# Patient Record
Sex: MALE | Race: WHITE | NOT HISPANIC OR LATINO | Employment: OTHER | URBAN - METROPOLITAN AREA
[De-identification: names, ages, dates, MRNs, and addresses within clinical notes are randomized per-mention and may not be internally consistent; named-entity substitution may affect disease eponyms.]

---

## 2017-01-02 ENCOUNTER — NURSE TRIAGE (OUTPATIENT)
Dept: ADMINISTRATIVE | Facility: CLINIC | Age: 72
End: 2017-01-02

## 2017-01-02 NOTE — TELEPHONE ENCOUNTER
Reason for Disposition   [1] Continuous (nonstop) coughing interferes with work or school AND [2] no improvement using cough treatment per protocol    Protocols used: ST COUGH - ACUTE NON-PRODUCTIVE-A-AH    Lenny is calling to report that he has been having a cough with congestion.  Hx of COPD.  Is using inhaler and it is helping.  Requesting appointment for tomorrow states may need steroid shot.  Advised will go to ER if sob gets worse or is not helped per inhaler.  Appointment scheduled for tomorrow.

## 2017-01-03 ENCOUNTER — OFFICE VISIT (OUTPATIENT)
Dept: FAMILY MEDICINE | Facility: CLINIC | Age: 72
End: 2017-01-03
Payer: MEDICARE

## 2017-01-03 VITALS
SYSTOLIC BLOOD PRESSURE: 113 MMHG | OXYGEN SATURATION: 96 % | DIASTOLIC BLOOD PRESSURE: 59 MMHG | TEMPERATURE: 98 F | BODY MASS INDEX: 35.56 KG/M2 | WEIGHT: 254 LBS | HEIGHT: 71 IN | HEART RATE: 68 BPM

## 2017-01-03 DIAGNOSIS — J44.1 COPD EXACERBATION: ICD-10-CM

## 2017-01-03 DIAGNOSIS — R05.8 PRODUCTIVE COUGH: ICD-10-CM

## 2017-01-03 DIAGNOSIS — R06.2 WHEEZING: ICD-10-CM

## 2017-01-03 DIAGNOSIS — J01.00 ACUTE MAXILLARY SINUSITIS, RECURRENCE NOT SPECIFIED: Primary | ICD-10-CM

## 2017-01-03 PROCEDURE — 99499 UNLISTED E&M SERVICE: CPT | Mod: S$GLB,,, | Performed by: PHYSICIAN ASSISTANT

## 2017-01-03 PROCEDURE — 1159F MED LIST DOCD IN RCRD: CPT | Mod: S$GLB,,, | Performed by: PHYSICIAN ASSISTANT

## 2017-01-03 PROCEDURE — 1125F AMNT PAIN NOTED PAIN PRSNT: CPT | Mod: S$GLB,,, | Performed by: PHYSICIAN ASSISTANT

## 2017-01-03 PROCEDURE — 1157F ADVNC CARE PLAN IN RCRD: CPT | Mod: S$GLB,,, | Performed by: PHYSICIAN ASSISTANT

## 2017-01-03 PROCEDURE — 99213 OFFICE O/P EST LOW 20 MIN: CPT | Mod: 25,S$GLB,, | Performed by: PHYSICIAN ASSISTANT

## 2017-01-03 PROCEDURE — 99999 PR PBB SHADOW E&M-EST. PATIENT-LVL IV: CPT | Mod: PBBFAC,,, | Performed by: PHYSICIAN ASSISTANT

## 2017-01-03 PROCEDURE — 3074F SYST BP LT 130 MM HG: CPT | Mod: S$GLB,,, | Performed by: PHYSICIAN ASSISTANT

## 2017-01-03 PROCEDURE — 1160F RVW MEDS BY RX/DR IN RCRD: CPT | Mod: S$GLB,,, | Performed by: PHYSICIAN ASSISTANT

## 2017-01-03 PROCEDURE — 3078F DIAST BP <80 MM HG: CPT | Mod: S$GLB,,, | Performed by: PHYSICIAN ASSISTANT

## 2017-01-03 PROCEDURE — 96372 THER/PROPH/DIAG INJ SC/IM: CPT | Mod: 59,S$GLB,, | Performed by: PHYSICIAN ASSISTANT

## 2017-01-03 RX ORDER — AMOXICILLIN AND CLAVULANATE POTASSIUM 875; 125 MG/1; MG/1
1 TABLET, FILM COATED ORAL 2 TIMES DAILY
Qty: 20 TABLET | Refills: 0 | Status: SHIPPED | OUTPATIENT
Start: 2017-01-03 | End: 2017-01-13

## 2017-01-03 RX ORDER — PREDNISONE 10 MG/1
TABLET ORAL
Qty: 12 TABLET | Refills: 0 | Status: SHIPPED | OUTPATIENT
Start: 2017-01-03 | End: 2017-01-26

## 2017-01-03 RX ORDER — BETAMETHASONE SODIUM PHOSPHATE AND BETAMETHASONE ACETATE 3; 3 MG/ML; MG/ML
6 INJECTION, SUSPENSION INTRA-ARTICULAR; INTRALESIONAL; INTRAMUSCULAR; SOFT TISSUE
Status: COMPLETED | OUTPATIENT
Start: 2017-01-03 | End: 2017-01-03

## 2017-01-03 RX ADMIN — BETAMETHASONE SODIUM PHOSPHATE AND BETAMETHASONE ACETATE 6 MG: 3; 3 INJECTION, SUSPENSION INTRA-ARTICULAR; INTRALESIONAL; INTRAMUSCULAR; SOFT TISSUE at 08:01

## 2017-01-03 NOTE — MR AVS SNAPSHOT
[facility]  [address]  [place] [address]  Phone: [phone]  Fax: [phone]                  Lenny Lopez   1/3/2017 8:00 AM   Office Visit    Description:  Male : 1945   Provider:  Kandace Davidson PA-C   Department:  [facility]           Reason for Visit     Cough           Diagnoses this Visit        Comments    Acute maxillary sinusitis, recurrence not specified    -  Primary     COPD exacerbation         Productive cough         Wheezing                To Do List           Future Appointments        Provider Department Dept Phone    2017 8:00 AM Kandace Davidson PA-C [facility] Medicine [phone]    2017 10:00 AM Candace Villegas DO [place] [phone]    2017 9:00 AM [facility] [phone]    3/8/2017 1:30 PM Aaron Armenta MD [facility] Cardiology [phone]      Goals (5 Years of Data)     None       These Medications        Disp Refills Start End    amoxicillin-clavulanate 875-125mg (AUGMENTIN) 875-125 mg per tablet 20 tablet 0 1/3/2017 2017    Take 1 tablet by mouth 2 (two) times daily. - Oral    Pharmacy: [facility] [address] [place] [address]. Ph #: [phone]       predniSONE (DELTASONE) 10 MG tablet 12 tablet 0 1/3/2017     2 tabs x 3 days then 1 tab x 3 days then 1/2 tab x 3 days    Pharmacy: [facility] [address] [place] [address]. Ph #: [phone]         [facility]     [facility] -  Assistance  Registered nurses in the [facility] provide clinical advisement, health education, appointment booking, and other advisory services.  Call for this free service at [phone].             Medications           Message regarding Medications     Verify the changes and/or additions to your medication regime listed below are the same as discussed with your  clinician today.  If any of these changes or additions are incorrect, please notify your healthcare provider.        START taking these NEW medications        Refills    amoxicillin-clavulanate 875-125mg (AUGMENTIN) 875-125 mg per tablet 0    Sig: Take 1 tablet by mouth 2 (two) times daily.    Class: Normal    Route: Oral    predniSONE (DELTASONE) 10 MG tablet 0    Si tabs x 3 days then 1 tab x 3 days then 1/2 tab x 3 days    Class: Normal      These medications were administered today        Dose Freq    betamethasone acetate-betamethasone sodium phosphate injection 6 mg 6 mg Clinic/HOD 1 time    Sig: Inject 1 mL (6 mg total) into the muscle one time.    Class: Normal    Route: Intramuscular    Cosign for Ordering: Required by Blue Shah MD           Verify that the below list of medications is an accurate representation of the medications you are currently taking.  If none reported, the list may be blank. If incorrect, please contact your healthcare provider. Carry this list with you in case of emergency.           Current Medications     albuterol 90 mcg/actuation inhaler Inhale 2 puffs into the lungs every 6 (six) hours as needed. Ventolin , medical necessary    aspirin 81 mg Tab Take 1 tablet by mouth once daily. Every day PRN    blood sugar diagnostic Strp 1 strip by Misc.(Non-Drug; Combo Route) route 3 (three) times daily.    BREO ELLIPTA 200-25 mcg/dose DsDv diskus inhaler Inhale 1 puff into the lungs once daily.    DYMISTA 137-50 mcg/spray Spry nassal spray 1 spray by Each Nare route 2 (two) times daily as needed.    lancets (FREESTYLE LANCETS) 28 gauge Misc 1 lancet by Misc.(Non-Drug; Combo Route) route 3 (three) times daily.    lidocaine-prilocaine (EMLA) cream Apply topically as needed.    lisinopril 10 MG tablet Take 1 tablet (10 mg total) by mouth every evening.    loratadine (CLARITIN) 10 mg tablet Take 10 mg by mouth once daily.    MAGNESIUM ORAL Take by mouth 2 (two) times daily.    omega-3  "fatty acids-vitamin E (FISH OIL) 1,000 mg Cap Three times a day    simvastatin (ZOCOR) 20 MG tablet Take 1 tablet (20 mg total) by mouth once daily.    spironolactone (ALDACTONE) 50 MG tablet Take 1 tablet (50 mg total) by mouth once daily.    turmeric root extract 500 mg Cap Take 1 capsule by mouth once daily.    amoxicillin-clavulanate 875-125mg (AUGMENTIN) 875-125 mg per tablet Take 1 tablet by mouth 2 (two) times daily.    predniSONE (DELTASONE) 10 MG tablet 2 tabs x 3 days then 1 tab x 3 days then 1/2 tab x 3 days           Clinical Reference Information           Vital Signs - Last Recorded  Most recent update: 1/3/2017  8:09 AM by Kandace Davidson PA-C    BP Pulse Temp Ht    (!) 113/59 (BP Location: Left arm, Patient Position: Sitting, BP Method: Automatic) 68 98.4 °F (36.9 °C) (Oral) 5' 11" (1.803 m)    Wt SpO2 BMI    115.2 kg (253 lb 15.5 oz) 96% 35.42 kg/m2      Blood Pressure          Most Recent Value    BP  (!)  113/59      Allergies as of 1/3/2017     No Known Drug Allergies      Immunizations Administered on Date of Encounter - 1/3/2017     None      Administrations This Visit     betamethasone acetate-betamethasone sodium phosphate injection 6 mg     Admin Date Action Dose Route Administered By             01/03/2017 Given 6 mg Intramuscular Snow Bermudez LPN                      Instructions    Take antibiotics with food.  Increase fluid intake.  Call the clinic if symptoms worsen, new symptoms develop or if you are not any better after completion of your antibiotics.           "

## 2017-01-03 NOTE — PROGRESS NOTES
Patient identified using name and . VIS given to patient and procedure was explained. Patient verbalized understanding. Celestone 1ml administered IM in the right upper outer quad gluteus using sterile technique. No residual bleeding noted. Patient tolerated procedure well.

## 2017-01-03 NOTE — PATIENT INSTRUCTIONS
Take antibiotics with food.  Increase fluid intake.  Call the clinic if symptoms worsen, new symptoms develop or if you are not any better after completion of your antibiotics.

## 2017-01-03 NOTE — PROGRESS NOTES
Subjective:       Patient ID: Lenny Lopez is a 71 y.o. male.    Chief Complaint: Cough (nasal drainage, pain when cough)    Cough   This is a recurrent problem. The current episode started 1 to 4 weeks ago. The problem has been gradually worsening. The problem occurs every few minutes. The cough is productive of purulent sputum. Associated symptoms include nasal congestion, postnasal drip, rhinorrhea, shortness of breath and wheezing. Pertinent negatives include no chest pain, chills, fever, headaches, heartburn, hemoptysis, sore throat or sweats. Nothing aggravates the symptoms. He has tried a beta-agonist inhaler (albuteral inhaler, breathing treatments) for the symptoms. The treatment provided no relief. His past medical history is significant for bronchitis, COPD and environmental allergies.     Review of Systems   Constitutional: Positive for activity change and appetite change. Negative for chills and fever.   HENT: Positive for congestion, postnasal drip, rhinorrhea and sinus pressure. Negative for sore throat.    Eyes: Negative for visual disturbance.   Respiratory: Positive for cough, shortness of breath and wheezing. Negative for hemoptysis.    Cardiovascular: Negative for chest pain.   Gastrointestinal: Negative for abdominal distention, abdominal pain and heartburn.   Allergic/Immunologic: Positive for environmental allergies.   Neurological: Negative for headaches.   Hematological: Negative for adenopathy.   Psychiatric/Behavioral: The patient is not nervous/anxious.        Objective:      Physical Exam   Constitutional: He is oriented to person, place, and time.   HENT:   Mouth/Throat: No oropharyngeal exudate.   Posterior oropharynx erythematous with post nasal drip present  Maxillary sinuses TTP  Nasal turbinates edematous    Eyes: Conjunctivae are normal. Pupils are equal, round, and reactive to light.   Cardiovascular: Normal rate and regular rhythm.    Pulmonary/Chest: Effort normal. He has  wheezes.   Scattered wheezes  Decreased breath sounds (chronic due to COPD)   Abdominal: Soft. Bowel sounds are normal.   Musculoskeletal: He exhibits no edema.   Lymphadenopathy:     He has no cervical adenopathy.   Neurological: He is alert and oriented to person, place, and time.   Skin: No erythema.   Psychiatric: His behavior is normal.       Assessment:       1. Acute maxillary sinusitis, recurrence not specified    2. COPD exacerbation    3. Productive cough    4. Wheezing        Plan:   Lenny was seen today for cough.    Diagnoses and all orders for this visit:    Acute maxillary sinusitis, recurrence not specified  -     betamethasone acetate-betamethasone sodium phosphate injection 6 mg; Inject 1 mL (6 mg total) into the muscle one time.  -     amoxicillin-clavulanate 875-125mg (AUGMENTIN) 875-125 mg per tablet; Take 1 tablet by mouth 2 (two) times daily.  -     predniSONE (DELTASONE) 10 MG tablet; 2 tabs x 3 days then 1 tab x 3 days then 1/2 tab x 3 days    COPD exacerbation  -     betamethasone acetate-betamethasone sodium phosphate injection 6 mg; Inject 1 mL (6 mg total) into the muscle one time.  -     amoxicillin-clavulanate 875-125mg (AUGMENTIN) 875-125 mg per tablet; Take 1 tablet by mouth 2 (two) times daily.  -     predniSONE (DELTASONE) 10 MG tablet; 2 tabs x 3 days then 1 tab x 3 days then 1/2 tab x 3 days    Productive cough  -     betamethasone acetate-betamethasone sodium phosphate injection 6 mg; Inject 1 mL (6 mg total) into the muscle one time.  -     amoxicillin-clavulanate 875-125mg (AUGMENTIN) 875-125 mg per tablet; Take 1 tablet by mouth 2 (two) times daily.  -     predniSONE (DELTASONE) 10 MG tablet; 2 tabs x 3 days then 1 tab x 3 days then 1/2 tab x 3 days    Wheezing  -     betamethasone acetate-betamethasone sodium phosphate injection 6 mg; Inject 1 mL (6 mg total) into the muscle one time.  -     predniSONE (DELTASONE) 10 MG tablet; 2 tabs x 3 days then 1 tab x 3 days then 1/2  tab x 3 days    Take antibiotics with food.  Increase fluid intake.  Call the clinic if symptoms worsen, new symptoms develop or if you are not any better after completion of your antibiotics.    Follow up in 7-10 days or sooner if needed  Patient advised to call clinic if no improvement in next 48 hours so a CXR can be ordered. Patient verbalized understanding.

## 2017-01-09 ENCOUNTER — DOCUMENTATION ONLY (OUTPATIENT)
Dept: FAMILY MEDICINE | Facility: CLINIC | Age: 72
End: 2017-01-09

## 2017-01-12 ENCOUNTER — TELEPHONE (OUTPATIENT)
Dept: FAMILY MEDICINE | Facility: CLINIC | Age: 72
End: 2017-01-12

## 2017-01-12 ENCOUNTER — OFFICE VISIT (OUTPATIENT)
Dept: FAMILY MEDICINE | Facility: CLINIC | Age: 72
End: 2017-01-12
Payer: MEDICARE

## 2017-01-12 VITALS
WEIGHT: 252.19 LBS | SYSTOLIC BLOOD PRESSURE: 116 MMHG | OXYGEN SATURATION: 96 % | DIASTOLIC BLOOD PRESSURE: 68 MMHG | BODY MASS INDEX: 35.31 KG/M2 | HEIGHT: 71 IN | TEMPERATURE: 98 F | RESPIRATION RATE: 12 BRPM

## 2017-01-12 DIAGNOSIS — J44.9 CHRONIC OBSTRUCTIVE PULMONARY DISEASE, UNSPECIFIED COPD TYPE: ICD-10-CM

## 2017-01-12 DIAGNOSIS — I10 ESSENTIAL HYPERTENSION: Primary | ICD-10-CM

## 2017-01-12 PROCEDURE — 1160F RVW MEDS BY RX/DR IN RCRD: CPT | Mod: S$GLB,,, | Performed by: PHYSICIAN ASSISTANT

## 2017-01-12 PROCEDURE — 99213 OFFICE O/P EST LOW 20 MIN: CPT | Mod: S$GLB,,, | Performed by: PHYSICIAN ASSISTANT

## 2017-01-12 PROCEDURE — 3078F DIAST BP <80 MM HG: CPT | Mod: S$GLB,,, | Performed by: PHYSICIAN ASSISTANT

## 2017-01-12 PROCEDURE — 99999 PR PBB SHADOW E&M-EST. PATIENT-LVL III: CPT | Mod: PBBFAC,,, | Performed by: PHYSICIAN ASSISTANT

## 2017-01-12 PROCEDURE — 3074F SYST BP LT 130 MM HG: CPT | Mod: S$GLB,,, | Performed by: PHYSICIAN ASSISTANT

## 2017-01-12 PROCEDURE — 99499 UNLISTED E&M SERVICE: CPT | Mod: S$GLB,,, | Performed by: PHYSICIAN ASSISTANT

## 2017-01-12 PROCEDURE — 1157F ADVNC CARE PLAN IN RCRD: CPT | Mod: S$GLB,,, | Performed by: PHYSICIAN ASSISTANT

## 2017-01-12 PROCEDURE — 1159F MED LIST DOCD IN RCRD: CPT | Mod: S$GLB,,, | Performed by: PHYSICIAN ASSISTANT

## 2017-01-12 PROCEDURE — 1126F AMNT PAIN NOTED NONE PRSNT: CPT | Mod: S$GLB,,, | Performed by: PHYSICIAN ASSISTANT

## 2017-01-12 NOTE — PATIENT INSTRUCTIONS
Established High Blood Pressure    High blood pressure (hypertension) is a chronic disease. Often health care providers dont know what causes it. But it can be caused by certain health conditions and medicines.  If you have high blood pressure, you may not have any symptoms. If you do have symptoms, they may include headache, dizziness, changes in your vision, chest pain, and shortness of breath. But even without symptoms, high blood pressure thats not treated raises your risk for heart attack and stroke. High blood pressure is a serious health risk and shouldnt be ignored.  A blood pressure reading is made up of two numbers: a higher number over a lower number. The top number is the systolic pressure. The bottom number is the diastolic pressure. A normal blood pressure is less than 120 over less than 80.  High blood pressure is when either the top number is 140 or higher, or the bottom number is 90 or higher. This must be the result when taking your blood pressure a number of times. The blood pressures between normal and high are called prehypertension.  Home care  If you have high blood pressure, you should do what is listed below to lower your blood pressure. If you are taking medicines for high blood pressure, these methods may reduce or end your need for medicines in the future.  · Begin a weight-loss program if you are overweight.  · Cut back on how much salt you get in your diet. Heres how to do this:  ¨ Dont eat foods that have a lot of salt. These include olives, pickles, smoked meats, and salted potato chips.  ¨ Dont add salt to your food at the table.  ¨ Use only small amounts of salt when cooking.  · Begin an exercise program. Talk with your health care provider about the type of exercise program that would be best for you. It doesn't have to be hard. Even brisk walking for 20 minutes 3 times a week is a good form of exercise.  · Dont take medicines that have heart stimulants. This includes many  cold and sinus decongestant pills and sprays, as well as diet pills. Check the warnings about hypertension on the label. Stimulants such as amphetamine or cocaine could be lethal for someone with high blood pressure. Never take these.  · Limit how much caffeine you get in your diet. Switch to caffeine-free products.  · Stop smoking. If you are a long-time smoker, this can be hard. Enroll in a stop-smoking program to make it more likely that you will quit for good.  · Learn how to handle stress. This is an important part of any program to lower blood pressure. Learn about relaxation methods like meditation, yoga, or biofeedback.  · If your provider prescribed medicines, take them exactly as directed. Missing doses may cause your blood pressure get out of control.  · Consider buying an automatic blood pressure machine. You can get one of these at most pharmacies. Use this to watch your blood pressure at home. Give the results to your provider.  Follow-up care  You will need to make regular visits to your health care provider. This is to check your blood pressure and to make changes to your medicines. Make a follow-up appointment as directed.  When to seek medical advice  Call your health care provider right away if any of these occur:  · Chest pain or shortness of breath  · Severe headache  · Throbbing or rushing sound in the ears  · Nosebleed  · Sudden severe pain in your belly (abdomen)  · Extreme drowsiness, confusion, or fainting  · Dizziness or dizziness with a spinning sensation (vertigo)  · Weakness of an arm or leg or one side of the face  · You have problems speaking or seeing   © 9079-7290 [employer]. [address] [place] [address], [place] [address]. All rights reserved. This information is not intended as a substitute for professional medical care. Always follow your healthcare professional's instructions.            Diabetes (General Information)  Diabetes is a long-term health problem. It means  your body does not make enough insulin. Or it may mean that your body cannot use the insulin it makes. Insulin is a hormone in your body. It lets blood sugar (glucose) reach the cells in your body. All of your cells need glucose for fuel.  When you have diabetes, the glucose in your blood builds up because it cannot get into the cells. This buildup is called high blood sugar (hyperglycemia).  Your blood sugar level depends on several things. It depends on what kind of food you eat and how much of it you eat. It also depends on how much exercise you get, and how much insulin you have in your body. Eating too much of the wrong kinds of food or not taking diabetes medicine on time can cause high blood sugar. Infections can cause high blood sugar even if you are taking medicines correctly.  These things can also cause low blood sugar:  · Missing meals  · Not eating enough food  · Taking too much diabetes medicine  Diabetes can cause serious problems over time if you do not get treated. These problems include heart disease, stroke, kidney failure, and blindness. They also include nerve pain or loss of feeling in your legs and feet, and gangrene of the feet. By keeping your blood sugar under control you can prevent or delay these problems.  Normal blood sugar levels are 80 to 100 before a meal and less than 180 in the 1 to 2 hours after a meal.  Home care  Follow these guidelines when caring for yourself at home:  · Follow the diet your healthcare provider gives you. Take insulin or other diabetes medicine exactly as told to.  · Watch your blood sugar as you are told to. Keep a log of your results. This will help your provider change your medicines to keep your blood sugar under control.  · Try to reach your ideal weight. You may be able to cut back on or not have to take diabetes medicine if you eat the right foods and get exercise.  · Do not smoke. Smoking worsens the effects of diabetes on your circulation. You are  much more likely to have a heart attack if you have diabetes and you smoke.  · Take good care of your feet. If you have lost feeling in your feet, you may not see an injury or infection. Check your feet and between your toes at least once a week.  · Wear a medical alert bracelet or necklace, or carry a card in your wallet that says you have diabetes. This will help healthcare providers give you the right care if you get very ill and cannot tell them that you have diabetes.  Sick day plan  If you get a cold, the flu, or a bacterial or viral infection, take these steps:  · Look at your diabetes sick plan and call your healthcare provider as you were told to. You may need to call your provider right away if:  ¨ Your blood sugar is above 240 while taking your diabetes medicine  ¨ Your urine ketone levels are above normal or high  ¨ You have been vomiting more than 6 hours  ¨ You have trouble breathing or your breath ha s a fruity smell  ¨ You have a high fever  ¨ You have a fever for several days and you are not getting better  ¨ You get light-headed and are sleepier than usual  · Keep taking your diabetes pills (oral medicine) even if you have been vomiting and are feeling sick. Call your provider right away because you may need insulin to lower your blood sugar until you recover from your illness.  · Keep taking your insulin even if you have been vomiting and are feeling sick. Call your provider right away to ask if you need to change your insulin dose. This will depend on your blood sugar results.  · Check your blood sugar every 2 to 4 hours, or at least 4 times a day.  · Check your ketones often. If you are vomiting and having diarrhea, watch them more often.  · Do not skip meals. Try to eat small meals on a regular schedule. Do this even if you do not feel like eating.  · Drink water or other liquids that do not have caffeine or calories. This will keep you from getting dehydrated. If you are nauseated or vomiting,  takes small sips every 5 minutes. To prevent dehydration try to drink a cup (8 ounces) of fluids every hour while you are awake.  General care  Always bring a source of fast-acting sugar with you in case you have symptoms of low blood sugar (below 70). At the first sign of low blood sugar, eat or drink 15 to 20 grams of fast-acting sugar to raise your blood sugar. Examples are:  · 3 to 4 glucose tablets. You can buy these at most drugstores.  · 4 ounces (1/2 cup) of regular (not diet) soft drinks  · 4 ounces (1/2 cup) of any fruit juice  · 8 ounces (1 cup) of milk  · 5 to 6 pieces of hard candy  · 1 tablespoon of honey  Check your blood sugar 15 minutes after treating yourself. If it is still below 70, take 15 to 20 more grams of fast-acting sugar. Test again in 15 minutes. If it returns to normal (70 or above), eat a snack or meal to keep your blood sugar in a safe range. If it stays low, call your doctor or go to an emergency room.  Follow-up care  Follow-up with your healthcare provider, or as advised. For more information about diabetes, visit the American Diabetes Association website at www.diabetes.org or call 145-887-3863.  When to seek medical advice  Call your healthcare provider right away if you have any of these symptoms of high blood sugar:  · Frequent urination  · Dizziness  · Drowsiness  · Thirst  · Headache  · Nausea or vomiting  · Abdominal pain  · Eyesight changes  · Fast breathing  · Confusion or loss of consciousness  Also call your provider right away if you have any of these signs of low blood sugar:  · Fatigue  · Headache  · Shakes  · Excess sweating  · Hunger  · Feeling anxious or restless  · Eyesight changes  · Drowsiness  · Weakness  · Confusion or loss of consciousness  Call 911  Call for emergency help right away if any of these occur:  · Chest pain or shortness of breath  · Dizziness or fainting  · Weakness of an arm or leg or one side of the face  · Trouble speaking or seeing   ©  8405-4680 [employer]. [address] [place] [address], [place] [address]. All rights reserved. This information is not intended as a substitute for professional medical care. Always follow your healthcare professional's instructions.          Weight Management: Getting Started  Healthy bodies come in all shapes and sizes. Not all bodies are made to be thin. For some people, a healthy weight is higher than the average weight listed on weight charts. Your healthcare provider can help you decide on a healthy weight for you.    Reasons to lose weight  Losing weight can help with some health problems, such as high blood pressure, heart disease, diabetes, sleep apnea, and arthritis. You may also feel more energy.  Set your long-term goal  Your goal doesn't even have to be a specific weight. You may decide on a fitness goal (such as being able to walk 10 miles a week), or a health goal (such as lowering your blood pressure). Choose a goal that is measurable and reasonable, so you know when you've reached it. A goal of reaching a BMI of less than 25 is not always reasonable (or possible).   Make an action plan  Habits dont change overnight. Setting your goals too high can leave you feeling discouraged if you cant reach them. Be realistic. Choose one or two small changes you can make now. Set an action plan for how you are going to make these changes. When you can stick to this plan, keep making a few more small changes. Taking small steps will help you stay on the path to success.  Track your progress  Write down your goals. Then, keep a daily record of your progress. Write down what you eat and how active you are. This record lets you look back on how much youve done. It may also help when youre feeling frustrated. Reward yourself for success. Even if you dont reach every goal, give yourself credit for what you do get done.  Get support  Encouragement from others can help make losing weight easier. Ask your  family members and friends for support. They may even want to join you. Also look to your healthcare provider, registered dietitian, and  for help. Your local hospital can give you more information about nutrition, exercise, and weight loss.  © 5784-1778 [employer]. [address] [place] [address], [place] [address]. All rights reserved. This information is not intended as a substitute for professional medical care. Always follow your healthcare professional's instructions.            Walking for Fitness  Fitness walking has something for everyone, even people who are already fit. Walking is one of the safest ways to condition your body aerobically. It can boost energy, help you lose weight, and reduce stress.    Physical benefits  · Walking strengthens your heart and lungs, and tones your muscles.  · When walking, your feet land with less impact than in other sports. This reduces chances of muscle, bone, and joint injury.  · Regular walking improves your cholesterol levels and lowers your risk of heart disease. And it helps you control your blood sugar if you have diabetes.  · Walking is a weight-bearing activity, which helps maintain bone density. This can help prevent osteoporosis.  Personal rewards  · Taking walks can help you relax and manage stress. And fitness walking may make you feel better about yourself.  · Walking can help you sleep better at night and make you less likely to be depressed.  · Regular walking may help maintain your memory as you get older.  · Walking is a great way to spend extra time with friends and family members. Be sure to invite your dog along!  Q&A about fitness walking  Q: Will walking keep me fit?  A: Yes. Regular walking at the right pace gives you all the benefits of other aerobic activities, such as jogging and swimming.  Q: Will walking help me lose weight and keep it off?  A: Yes. Per mile, walking can burn as many calories as jogging. Your health  care provider can help work walking into your weight-loss plan.  Q: Is walking safe for my health?  A: Yes. Walking is safe if you have high blood pressure, diabetes, heart disease, or other conditions. Talk to your health care provider before you start.  © 2652-6740 [employer]. [address] [place] [address], [place] [address]. All rights reserved. This information is not intended as a substitute for professional medical care. Always follow your healthcare professional's instructions.

## 2017-01-12 NOTE — PROGRESS NOTES
Subjective:       Patient ID: Lenny Lopez is a 71 y.o. male.    Chief Complaint: Follow-up (10day f/u )    HPI Comments: Mr. Lopez comes to clinic today for one week follow up of wheezing and COPD exacerbation. The patient reports he is feeling much better. He does report mild residual cough. The patient is scheduled to follow up with his pulmonologist, Dr. Mosley soon. He reports he is taking his maintenance inhaler as directed. He has no additional complaints today    Review of Systems   Constitutional: Negative for activity change, appetite change and fever.   HENT: Negative for postnasal drip, rhinorrhea and sinus pressure.    Eyes: Negative for visual disturbance.   Respiratory: Positive for cough. Negative for shortness of breath.    Cardiovascular: Negative for chest pain.   Gastrointestinal: Negative for abdominal distention and abdominal pain.   Genitourinary: Negative for difficulty urinating and dysuria.   Musculoskeletal: Negative for arthralgias and myalgias.   Neurological: Negative for headaches.   Hematological: Negative for adenopathy.   Psychiatric/Behavioral: The patient is not nervous/anxious.        Objective:      Physical Exam   Constitutional: He is oriented to person, place, and time.   HENT:   Mouth/Throat: Oropharynx is clear and moist. No oropharyngeal exudate.   Cardiovascular: Normal rate and regular rhythm.    Pulmonary/Chest: Effort normal and breath sounds normal. He has no wheezes.   Abdominal: Soft. Bowel sounds are normal. There is no tenderness.   Musculoskeletal: He exhibits no edema.   Lymphadenopathy:     He has no cervical adenopathy.   Neurological: He is alert and oriented to person, place, and time.   Skin: No erythema.   Psychiatric: His behavior is normal.       Assessment:       1. Essential hypertension    2. Chronic obstructive pulmonary disease, unspecified COPD type        Plan:   Lenny was seen today for follow-up.    Diagnoses and all orders for this  visit:    Essential hypertension  Controlled  Low salt diet  Continue current medication  Chronic obstructive pulmonary disease, unspecified COPD type  Recent exacerbation resolved  Continue current medication  Follow up with Dr. Mosley as scheduled.

## 2017-01-12 NOTE — TELEPHONE ENCOUNTER
----- Message from Mitzi Fontanez sent at 1/12/2017  8:29 AM CST -----  Pt needs a f/u appt p/jennifer but next available is august but pt travels from April on and wants to know if he can be seen between now and march if possible.  Please call him @ [phone]. Thanks !

## 2017-01-12 NOTE — MR AVS SNAPSHOT
[place] [facility]  [address]  [place] [address]  Phone: [phone]  Fax: [phone]                  Lenny Lopez   2017 8:00 AM   Office Visit    Description:  Male : 1945   Provider:  Kandace Davidson PA-C   Department:  [place] [facility]           Reason for Visit     Follow-up           Diagnoses this Visit        Comments    Essential hypertension    -  Primary     Chronic obstructive pulmonary disease, unspecified COPD type                To Do List           Future Appointments        Provider Department Dept Phone    2017 9:00 AM [facility] [phone]    3/8/2017 1:30 PM Aaron Armenta MD [facility] [phone]      Goals (5 Years of Data)     None      [facility]     [facility] On Call Nurse Care Line -  Assistance  Registered nurses in the [facility] Call Center provide clinical advisement, health education, appointment booking, and other advisory services.  Call for this free service at [phone].             Medications           Message regarding Medications     Verify the changes and/or additions to your medication regime listed below are the same as discussed with your clinician today.  If any of these changes or additions are incorrect, please notify your healthcare provider.             Verify that the below list of medications is an accurate representation of the medications you are currently taking.  If none reported, the list may be blank. If incorrect, please contact your healthcare provider. Carry this list with you in case of emergency.           Current Medications     albuterol 90 mcg/actuation inhaler Inhale 2 puffs into the lungs every 6 (six) hours as needed. Ventolin , medical necessary    amoxicillin-clavulanate 875-125mg (AUGMENTIN) 875-125 mg per tablet Take 1 tablet by mouth 2 (two) times daily.    aspirin 81 mg Tab Take 1 tablet by mouth once daily. Every day PRN     "blood sugar diagnostic Strp 1 strip by Misc.(Non-Drug; Combo Route) route 3 (three) times daily.    BREO ELLIPTA 200-25 mcg/dose DsDv diskus inhaler Inhale 1 puff into the lungs once daily.    DYMISTA 137-50 mcg/spray Spry nassal spray 1 spray by Each Nare route 2 (two) times daily as needed.    lancets (FREESTYLE LANCETS) 28 gauge Misc 1 lancet by Misc.(Non-Drug; Combo Route) route 3 (three) times daily.    lidocaine-prilocaine (EMLA) cream Apply topically as needed.    lisinopril 10 MG tablet Take 1 tablet (10 mg total) by mouth every evening.    loratadine (CLARITIN) 10 mg tablet Take 10 mg by mouth once daily.    MAGNESIUM ORAL Take by mouth 2 (two) times daily.    omega-3 fatty acids-vitamin E (FISH OIL) 1,000 mg Cap Three times a day    predniSONE (DELTASONE) 10 MG tablet 2 tabs x 3 days then 1 tab x 3 days then 1/2 tab x 3 days    simvastatin (ZOCOR) 20 MG tablet Take 1 tablet (20 mg total) by mouth once daily.    spironolactone (ALDACTONE) 50 MG tablet Take 1 tablet (50 mg total) by mouth once daily.    turmeric root extract 500 mg Cap Take 1 capsule by mouth once daily.           Clinical Reference Information           Vital Signs - Last Recorded  Most recent update: 1/12/2017  8:29 AM by Kandace Davidson PA-C    BP Temp Resp Ht Wt SpO2    116/68 97.6 °F (36.4 °C) (Oral) 12 5' 11" (1.803 m) 114.4 kg (252 lb 3.3 oz) 96%    BMI                35.18 kg/m2          Blood Pressure          Most Recent Value    BP  116/68      Allergies as of 1/12/2017     No Known Drug Allergies      Immunizations Administered on Date of Encounter - 1/12/2017     None      Instructions      Established High Blood Pressure    High blood pressure (hypertension) is a chronic disease. Often health care providers dont know what causes it. But it can be caused by certain health conditions and medicines.  If you have high blood pressure, you may not have any symptoms. If you do have symptoms, they may include headache, dizziness, " changes in your vision, chest pain, and shortness of breath. But even without symptoms, high blood pressure thats not treated raises your risk for heart attack and stroke. High blood pressure is a serious health risk and shouldnt be ignored.  A blood pressure reading is made up of two numbers: a higher number over a lower number. The top number is the systolic pressure. The bottom number is the diastolic pressure. A normal blood pressure is less than 120 over less than 80.  High blood pressure is when either the top number is 140 or higher, or the bottom number is 90 or higher. This must be the result when taking your blood pressure a number of times. The blood pressures between normal and high are called prehypertension.  Home care  If you have high blood pressure, you should do what is listed below to lower your blood pressure. If you are taking medicines for high blood pressure, these methods may reduce or end your need for medicines in the future.  · Begin a weight-loss program if you are overweight.  · Cut back on how much salt you get in your diet. Heres how to do this:  ¨ Dont eat foods that have a lot of salt. These include olives, pickles, smoked meats, and salted potato chips.  ¨ Dont add salt to your food at the table.  ¨ Use only small amounts of salt when cooking.  · Begin an exercise program. Talk with your health care provider about the type of exercise program that would be best for you. It doesn't have to be hard. Even brisk walking for 20 minutes 3 times a week is a good form of exercise.  · Dont take medicines that have heart stimulants. This includes many cold and sinus decongestant pills and sprays, as well as diet pills. Check the warnings about hypertension on the label. Stimulants such as amphetamine or cocaine could be lethal for someone with high blood pressure. Never take these.  · Limit how much caffeine you get in your diet. Switch to caffeine-free products.  · Stop smoking. If you  are a long-time smoker, this can be hard. Enroll in a stop-smoking program to make it more likely that you will quit for good.  · Learn how to handle stress. This is an important part of any program to lower blood pressure. Learn about relaxation methods like meditation, yoga, or biofeedback.  · If your provider prescribed medicines, take them exactly as directed. Missing doses may cause your blood pressure get out of control.  · Consider buying an automatic blood pressure machine. You can get one of these at most pharmacies. Use this to watch your blood pressure at home. Give the results to your provider.  Follow-up care  You will need to make regular visits to your health care provider. This is to check your blood pressure and to make changes to your medicines. Make a follow-up appointment as directed.  When to seek medical advice  Call your health care provider right away if any of these occur:  · Chest pain or shortness of breath  · Severe headache  · Throbbing or rushing sound in the ears  · Nosebleed  · Sudden severe pain in your belly (abdomen)  · Extreme drowsiness, confusion, or fainting  · Dizziness or dizziness with a spinning sensation (vertigo)  · Weakness of an arm or leg or one side of the face  · You have problems speaking or seeing   © 5860-9206 [employer]. [address] [place] [address], [place] [address]. All rights reserved. This information is not intended as a substitute for professional medical care. Always follow your healthcare professional's instructions.            Diabetes (General Information)  Diabetes is a long-term health problem. It means your body does not make enough insulin. Or it may mean that your body cannot use the insulin it makes. Insulin is a hormone in your body. It lets blood sugar (glucose) reach the cells in your body. All of your cells need glucose for fuel.  When you have diabetes, the glucose in your blood builds up because it cannot get into the cells. This  buildup is called high blood sugar (hyperglycemia).  Your blood sugar level depends on several things. It depends on what kind of food you eat and how much of it you eat. It also depends on how much exercise you get, and how much insulin you have in your body. Eating too much of the wrong kinds of food or not taking diabetes medicine on time can cause high blood sugar. Infections can cause high blood sugar even if you are taking medicines correctly.  These things can also cause low blood sugar:  · Missing meals  · Not eating enough food  · Taking too much diabetes medicine  Diabetes can cause serious problems over time if you do not get treated. These problems include heart disease, stroke, kidney failure, and blindness. They also include nerve pain or loss of feeling in your legs and feet, and gangrene of the feet. By keeping your blood sugar under control you can prevent or delay these problems.  Normal blood sugar levels are 80 to 100 before a meal and less than 180 in the 1 to 2 hours after a meal.  Home care  Follow these guidelines when caring for yourself at home:  · Follow the diet your healthcare provider gives you. Take insulin or other diabetes medicine exactly as told to.  · Watch your blood sugar as you are told to. Keep a log of your results. This will help your provider change your medicines to keep your blood sugar under control.  · Try to reach your ideal weight. You may be able to cut back on or not have to take diabetes medicine if you eat the right foods and get exercise.  · Do not smoke. Smoking worsens the effects of diabetes on your circulation. You are much more likely to have a heart attack if you have diabetes and you smoke.  · Take good care of your feet. If you have lost feeling in your feet, you may not see an injury or infection. Check your feet and between your toes at least once a week.  · Wear a medical alert bracelet or necklace, or carry a card in your wallet that says you have  diabetes. This will help healthcare providers give you the right care if you get very ill and cannot tell them that you have diabetes.  Sick day plan  If you get a cold, the flu, or a bacterial or viral infection, take these steps:  · Look at your diabetes sick plan and call your healthcare provider as you were told to. You may need to call your provider right away if:  ¨ Your blood sugar is above 240 while taking your diabetes medicine  ¨ Your urine ketone levels are above normal or high  ¨ You have been vomiting more than 6 hours  ¨ You have trouble breathing or your breath ha s a fruity smell  ¨ You have a high fever  ¨ You have a fever for several days and you are not getting better  ¨ You get light-headed and are sleepier than usual  · Keep taking your diabetes pills (oral medicine) even if you have been vomiting and are feeling sick. Call your provider right away because you may need insulin to lower your blood sugar until you recover from your illness.  · Keep taking your insulin even if you have been vomiting and are feeling sick. Call your provider right away to ask if you need to change your insulin dose. This will depend on your blood sugar results.  · Check your blood sugar every 2 to 4 hours, or at least 4 times a day.  · Check your ketones often. If you are vomiting and having diarrhea, watch them more often.  · Do not skip meals. Try to eat small meals on a regular schedule. Do this even if you do not feel like eating.  · Drink water or other liquids that do not have caffeine or calories. This will keep you from getting dehydrated. If you are nauseated or vomiting, takes small sips every 5 minutes. To prevent dehydration try to drink a cup (8 ounces) of fluids every hour while you are awake.  General care  Always bring a source of fast-acting sugar with you in case you have symptoms of low blood sugar (below 70). At the first sign of low blood sugar, eat or drink 15 to 20 grams of fast-acting sugar to  raise your blood sugar. Examples are:  · 3 to 4 glucose tablets. You can buy these at most drugstores.  · 4 ounces (1/2 cup) of regular (not diet) soft drinks  · 4 ounces (1/2 cup) of any fruit juice  · 8 ounces (1 cup) of milk  · 5 to 6 pieces of hard candy  · 1 tablespoon of honey  Check your blood sugar 15 minutes after treating yourself. If it is still below 70, take 15 to 20 more grams of fast-acting sugar. Test again in 15 minutes. If it returns to normal (70 or above), eat a snack or meal to keep your blood sugar in a safe range. If it stays low, call your doctor or go to an emergency room.  Follow-up care  Follow-up with your healthcare provider, or as advised. For more information about diabetes, visit the American Diabetes Association website at www.diabetes.org or call 071-719-0161.  When to seek medical advice  Call your healthcare provider right away if you have any of these symptoms of high blood sugar:  · Frequent urination  · Dizziness  · Drowsiness  · Thirst  · Headache  · Nausea or vomiting  · Abdominal pain  · Eyesight changes  · Fast breathing  · Confusion or loss of consciousness  Also call your provider right away if you have any of these signs of low blood sugar:  · Fatigue  · Headache  · Shakes  · Excess sweating  · Hunger  · Feeling anxious or restless  · Eyesight changes  · Drowsiness  · Weakness  · Confusion or loss of consciousness  Call 999  Call for emergency help right away if any of these occur:  · Chest pain or shortness of breath  · Dizziness or fainting  · Weakness of an arm or leg or one side of the face  · Trouble speaking or seeing   © 1622-9601 [employer]. [address] [place] [address], [place] [address]. All rights reserved. This information is not intended as a substitute for professional medical care. Always follow your healthcare professional's instructions.          Weight Management: Getting Started  Healthy bodies come in all shapes and sizes. Not all bodies are  made to be thin. For some people, a healthy weight is higher than the average weight listed on weight charts. Your healthcare provider can help you decide on a healthy weight for you.    Reasons to lose weight  Losing weight can help with some health problems, such as high blood pressure, heart disease, diabetes, sleep apnea, and arthritis. You may also feel more energy.  Set your long-term goal  Your goal doesn't even have to be a specific weight. You may decide on a fitness goal (such as being able to walk 10 miles a week), or a health goal (such as lowering your blood pressure). Choose a goal that is measurable and reasonable, so you know when you've reached it. A goal of reaching a BMI of less than 25 is not always reasonable (or possible).   Make an action plan  Habits dont change overnight. Setting your goals too high can leave you feeling discouraged if you cant reach them. Be realistic. Choose one or two small changes you can make now. Set an action plan for how you are going to make these changes. When you can stick to this plan, keep making a few more small changes. Taking small steps will help you stay on the path to success.  Track your progress  Write down your goals. Then, keep a daily record of your progress. Write down what you eat and how active you are. This record lets you look back on how much youve done. It may also help when youre feeling frustrated. Reward yourself for success. Even if you dont reach every goal, give yourself credit for what you do get done.  Get support  Encouragement from others can help make losing weight easier. Ask your family members and friends for support. They may even want to join you. Also look to your healthcare provider, registered dietitian, and  for help. Your local hospital can give you more information about nutrition, exercise, and weight loss.  © 3928-7359 [employer]. [address] [place] [address], [place] [address]. All  rights reserved. This information is not intended as a substitute for professional medical care. Always follow your healthcare professional's instructions.            Walking for Fitness  Fitness walking has something for everyone, even people who are already fit. Walking is one of the safest ways to condition your body aerobically. It can boost energy, help you lose weight, and reduce stress.    Physical benefits  · Walking strengthens your heart and lungs, and tones your muscles.  · When walking, your feet land with less impact than in other sports. This reduces chances of muscle, bone, and joint injury.  · Regular walking improves your cholesterol levels and lowers your risk of heart disease. And it helps you control your blood sugar if you have diabetes.  · Walking is a weight-bearing activity, which helps maintain bone density. This can help prevent osteoporosis.  Personal rewards  · Taking walks can help you relax and manage stress. And fitness walking may make you feel better about yourself.  · Walking can help you sleep better at night and make you less likely to be depressed.  · Regular walking may help maintain your memory as you get older.  · Walking is a great way to spend extra time with friends and family members. Be sure to invite your dog along!  Q&A about fitness walking  Q: Will walking keep me fit?  A: Yes. Regular walking at the right pace gives you all the benefits of other aerobic activities, such as jogging and swimming.  Q: Will walking help me lose weight and keep it off?  A: Yes. Per mile, walking can burn as many calories as jogging. Your health care provider can help work walking into your weight-loss plan.  Q: Is walking safe for my health?  A: Yes. Walking is safe if you have high blood pressure, diabetes, heart disease, or other conditions. Talk to your health care provider before you start.  © 5096-2365 [employer]. [address] [place] [address], [place] [address]. All rights  reserved. This information is not intended as a substitute for professional medical care. Always follow your healthcare professional's instructions.

## 2017-01-23 RX ORDER — IPRATROPIUM BROMIDE AND ALBUTEROL SULFATE 2.5; .5 MG/3ML; MG/3ML
3 SOLUTION RESPIRATORY (INHALATION) EVERY 6 HOURS PRN
Qty: 120 ML | Refills: 0 | Status: SHIPPED | OUTPATIENT
Start: 2017-01-23 | End: 2017-03-14 | Stop reason: SDUPTHER

## 2017-01-23 NOTE — TELEPHONE ENCOUNTER
----- Message from Ella Young sent at 1/23/2017 11:15 AM CST -----  Rx Abuttal.  Please send into [facility], any questions call wife/Halie at [phone].

## 2017-01-26 ENCOUNTER — HOSPITAL ENCOUNTER (OUTPATIENT)
Dept: RADIOLOGY | Facility: HOSPITAL | Age: 72
Discharge: HOME | End: 2017-01-26
Attending: INTERNAL MEDICINE
Payer: MEDICARE

## 2017-01-26 ENCOUNTER — INITIAL CONSULT (OUTPATIENT)
Dept: RHEUMATOLOGY | Facility: CLINIC | Age: 72
End: 2017-01-26
Payer: MEDICARE

## 2017-01-26 VITALS
HEART RATE: 76 BPM | SYSTOLIC BLOOD PRESSURE: 110 MMHG | WEIGHT: 249 LBS | BODY MASS INDEX: 34.86 KG/M2 | HEIGHT: 71 IN | DIASTOLIC BLOOD PRESSURE: 69 MMHG

## 2017-01-26 DIAGNOSIS — R53.83 FATIGUE, UNSPECIFIED TYPE: ICD-10-CM

## 2017-01-26 DIAGNOSIS — M94.9 DISORDER OF BONE AND CARTILAGE: ICD-10-CM

## 2017-01-26 DIAGNOSIS — R68.2 DRY MOUTH: ICD-10-CM

## 2017-01-26 DIAGNOSIS — M79.10 MYALGIA: ICD-10-CM

## 2017-01-26 DIAGNOSIS — M89.9 DISORDER OF BONE AND CARTILAGE: ICD-10-CM

## 2017-01-26 DIAGNOSIS — M51.36 DDD (DEGENERATIVE DISC DISEASE), LUMBAR: ICD-10-CM

## 2017-01-26 DIAGNOSIS — M25.50 POLYARTHRALGIA: Primary | ICD-10-CM

## 2017-01-26 DIAGNOSIS — M25.50 POLYARTHRALGIA: ICD-10-CM

## 2017-01-26 DIAGNOSIS — E66.9 OBESITY (BMI 30.0-34.9): ICD-10-CM

## 2017-01-26 PROCEDURE — 3078F DIAST BP <80 MM HG: CPT | Mod: S$GLB,,, | Performed by: INTERNAL MEDICINE

## 2017-01-26 PROCEDURE — 99999 PR PBB SHADOW E&M-EST. PATIENT-LVL III: CPT | Mod: PBBFAC,,, | Performed by: INTERNAL MEDICINE

## 2017-01-26 PROCEDURE — 99499 UNLISTED E&M SERVICE: CPT | Mod: S$GLB,,, | Performed by: INTERNAL MEDICINE

## 2017-01-26 PROCEDURE — 77077 JOINT SURVEY SINGLE VIEW: CPT | Mod: TC

## 2017-01-26 PROCEDURE — 77077 JOINT SURVEY SINGLE VIEW: CPT | Mod: 26,,, | Performed by: RADIOLOGY

## 2017-01-26 PROCEDURE — 1160F RVW MEDS BY RX/DR IN RCRD: CPT | Mod: S$GLB,,, | Performed by: INTERNAL MEDICINE

## 2017-01-26 PROCEDURE — 99205 OFFICE O/P NEW HI 60 MIN: CPT | Mod: S$GLB,,, | Performed by: INTERNAL MEDICINE

## 2017-01-26 PROCEDURE — 1157F ADVNC CARE PLAN IN RCRD: CPT | Mod: S$GLB,,, | Performed by: INTERNAL MEDICINE

## 2017-01-26 PROCEDURE — 1125F AMNT PAIN NOTED PAIN PRSNT: CPT | Mod: S$GLB,,, | Performed by: INTERNAL MEDICINE

## 2017-01-26 PROCEDURE — 3074F SYST BP LT 130 MM HG: CPT | Mod: S$GLB,,, | Performed by: INTERNAL MEDICINE

## 2017-01-26 PROCEDURE — 1159F MED LIST DOCD IN RCRD: CPT | Mod: S$GLB,,, | Performed by: INTERNAL MEDICINE

## 2017-01-26 RX ORDER — GABAPENTIN 100 MG/1
100 CAPSULE ORAL NIGHTLY
Qty: 30 CAPSULE | Refills: 2 | Status: SHIPPED | OUTPATIENT
Start: 2017-01-26 | End: 2017-11-28

## 2017-01-26 NOTE — LETTER
January 29, 2017      Blue Shah MD  [address]  [place] [address]           [place] [facility]  [address]  [place] [address]  Phone: [phone]  Fax: [phone]          Patient: Lenny Lopez   MR Number: [id number]   YOB: 1945   Date of Visit: 1/26/2017       Dear Dr. Blue Shah:    Thank you for referring Lenny Lopez to me for evaluation. Attached you will find relevant portions of my assessment and plan of care.    If you have questions, please do not hesitate to call me. I look forward to following Lenny Lopez along with you.    Sincerely,    Ela Choudhury MD    Enclosure  CC:  No Recipients    If you would like to receive this communication electronically, please contact [email or web address] or [phone] to request more information on [employer] access.    For providers and/or their staff who would like to refer a patient to [facility], please contact us through our one-stop-shop provider referral line, [facility], at [phone].    If you feel you have received this communication in error or would no longer like to receive these types of communications, please e-mail [email or web address]

## 2017-01-26 NOTE — MR AVS SNAPSHOT
[place] - [facility]ology  [address] [facility] [address]  [place] [address]  Phone: [phone]  Fax: [phone]                  Lenny Lopez   2017 3:30 PM   Initial consult    Description:  Male : 1945   Provider:  Ela Choudhury MD   Department:  [place] - Rheumatology           Reason for Visit     Joint Pain           Diagnoses this Visit        Comments    Polyarthralgia    -  Primary     Disorder of bone and cartilage                To Do List           Future Appointments        Provider Department Dept Phone    2017 4:10 PM [facility] XR2 [facility] [phone]    2017 8:40 AM Kandace Davidson PA-C [place] - Family Medicine [phone]    3/8/2017 1:30 PM Aaron Armenta MD [facility] - Cardiology [phone]    3/14/2017 1:20 PM Nathaniel Allan MD [facility] - Pulmonary [phone]      Goals (5 Years of Data)     None       These Medications        Disp Refills Start End    gabapentin (NEURONTIN) 100 MG capsule 30 capsule 2 2017    Take 1 capsule (100 mg total) by mouth every evening. - Oral    Pharmacy: [id number] - [place] - [address] #: [phone]         [facility] On Call     [facility] On Call Nurse Care Line - 24/7 Assistance  Registered nurses in the [facility] provide clinical advisement, health education, appointment booking, and other advisory services.  Call for this free service at [phone].             Medications           Message regarding Medications     Verify the changes and/or additions to your medication regime listed below are the same as discussed with your clinician today.  If any of these changes or additions are incorrect, please notify your healthcare provider.        START taking these NEW medications        Refills    gabapentin (NEURONTIN) 100 MG capsule 2    Sig: Take 1 capsule (100 mg total) by mouth every evening.    Class: Normal    Route: Oral      STOP  taking these medications     predniSONE (DELTASONE) 10 MG tablet 2 tabs x 3 days then 1 tab x 3 days then 1/2 tab x 3 days           Verify that the below list of medications is an accurate representation of the medications you are currently taking.  If none reported, the list may be blank. If incorrect, please contact your healthcare provider. Carry this list with you in case of emergency.           Current Medications     albuterol 90 mcg/actuation inhaler Inhale 2 puffs into the lungs every 6 (six) hours as needed. Ventolin , medical necessary    albuterol-ipratropium 2.5mg-0.5mg/3mL (DUO-NEB) 0.5 mg-3 mg(2.5 mg base)/3 mL nebulizer solution Take 3 mLs by nebulization every 6 (six) hours as needed for Wheezing.    aspirin 81 mg Tab Take 1 tablet by mouth once daily. Every day PRN    blood sugar diagnostic Strp 1 strip by Misc.(Non-Drug; Combo Route) route 3 (three) times daily.    BREO ELLIPTA 200-25 mcg/dose DsDv diskus inhaler Inhale 1 puff into the lungs once daily.    DYMISTA 137-50 mcg/spray Spry nassal spray 1 spray by Each Nare route 2 (two) times daily as needed.    lancets (FREESTYLE LANCETS) 28 gauge Misc 1 lancet by Misc.(Non-Drug; Combo Route) route 3 (three) times daily.    lidocaine-prilocaine (EMLA) cream Apply topically as needed.    lisinopril 10 MG tablet Take 1 tablet (10 mg total) by mouth every evening.    loratadine (CLARITIN) 10 mg tablet Take 10 mg by mouth once daily.    MAGNESIUM ORAL Take by mouth 2 (two) times daily.    omega-3 fatty acids-vitamin E (FISH OIL) 1,000 mg Cap Three times a day    simvastatin (ZOCOR) 20 MG tablet Take 1 tablet (20 mg total) by mouth once daily.    spironolactone (ALDACTONE) 50 MG tablet Take 1 tablet (50 mg total) by mouth once daily.    turmeric root extract 500 mg Cap Take 1 capsule by mouth once daily.    gabapentin (NEURONTIN) 100 MG capsule Take 1 capsule (100 mg total) by mouth every evening.           Clinical Reference Information           Vital  "Signs - Last Recorded  Most recent update: 1/26/2017  3:23 PM by Ayleen Eng MA    BP Pulse Ht Wt BMI    110/69 (BP Location: Right arm, Patient Position: Sitting, BP Method: Automatic) 76 5' 11" (1.803 m) 112.9 kg (249 lb) 34.73 kg/m2      Blood Pressure          Most Recent Value    BP  110/69      Allergies as of 1/26/2017     No Known Drug Allergies      Immunizations Administered on Date of Encounter - 1/26/2017     None      Orders Placed During Today's Visit     Future Labs/Procedures Expected by Expires    Aldolase  1/26/2017 3/27/2018    MARLENY  1/26/2017 3/27/2018    C-reactive protein  1/26/2017 3/27/2018    CK  1/26/2017 3/27/2018    Cyclic citrul peptide antibody, IgG  1/26/2017 3/27/2018    Rheumatoid factor  1/26/2017 3/27/2018    Sedimentation rate, manual  1/26/2017 3/27/2018    Sjogrens syndrome-A extractable nuclear antibody  1/26/2017 3/27/2018    Uric acid  1/26/2017 3/27/2018    Vitamin D  1/26/2017 3/27/2018    XR Arthritis Survey  1/26/2017 1/26/2018      "

## 2017-01-26 NOTE — PROGRESS NOTES
"Subjective:       Patient ID: Lenny Lopez is a 72 y.o. male.    Chief Complaint: Joint Pain    HPI 73 yo male with multiple co morbidities including HTN, HLD, LUZ, CKD3 COPD is seen in consultation for joint pain. He c/o polyarthralgia and myalgia for the last 10 years. Pain is stabbing, worse with activity, rest helps.  No joint swelling. No early morning stiffness. B/L buttock pain bothers him the most.   Pain worsens with standing and walking. Pain improves with sitting down. Saw Vu - degenerative disc disease. He  recommended lumbar medial branch blocks as a diagnostic procedure.   +Dry mouth   He  denies fever, weight loss, dry eyes, photosensitivity, rash, ulcer, raynaud's phenomenon, alopecia, dysphagia, diarrhea or blood in the stools        Review of Systems   Constitutional: Positive for fatigue. Negative for fever.   HENT: Negative for ear discharge and ear pain.    Eyes: Negative for pain and redness.   Respiratory: Negative for cough and shortness of breath.    Cardiovascular: Negative for chest pain and palpitations.   Gastrointestinal: Negative for abdominal distention and abdominal pain.   Genitourinary: Negative for genital sores and hematuria.   Musculoskeletal: Positive for myalgias.   Neurological: Negative for tremors and seizures.   Psychiatric/Behavioral: Negative for agitation and hallucinations.         Objective:     Visit Vitals    /69 (BP Location: Right arm, Patient Position: Sitting, BP Method: Automatic)    Pulse 76    Ht 5' 11" (1.803 m)    Wt 112.9 kg (249 lb)    BMI 34.73 kg/m2        Physical Exam   Constitutional: He is oriented to person, place, and time and well-developed, well-nourished, and in no distress.   HENT:   Head: Normocephalic and atraumatic.   Eyes: Conjunctivae and EOM are normal. Pupils are equal, round, and reactive to light.   Neck: Normal range of motion. Neck supple. No thyromegaly present.   Cardiovascular: Normal rate and regular rhythm.  "   Pulmonary/Chest: Effort normal and breath sounds normal.   Abdominal: Soft. Bowel sounds are normal. There is no hepatomegaly.   Neurological: He is alert and oriented to person, place, and time. He has normal sensation.   Skin: Skin is warm and dry.     Psychiatric: Memory and affect normal.   Musculoskeletal: He exhibits no edema.   Neck with intact range of motion in all directions   Bilateral shoulders with intact ROM   Elbows without any nodules, swelling or tenderness  No swelling or tenderness of b/l wrists, mcps, pips, dips  Limited ROM of spine  SLRT negative   Bilateral hips with external rotation 25° and  internal rotation 15°   Bilateral knee crepitus  Both ankles and feet nontender, without synovitis             Lab Results   Component Value Date    WBC 6.55 11/16/2016    HGB 14.1 11/16/2016    HCT 43.3 11/16/2016    MCV 90 11/16/2016     11/16/2016     CMP  Sodium   Date Value Ref Range Status   11/16/2016 139 136 - 145 mmol/L Final     Potassium   Date Value Ref Range Status   11/16/2016 4.4 3.5 - 5.1 mmol/L Final     Chloride   Date Value Ref Range Status   11/16/2016 103 95 - 110 mmol/L Final     CO2   Date Value Ref Range Status   11/16/2016 26 23 - 29 mmol/L Final     Glucose   Date Value Ref Range Status   11/16/2016 172 (H) 70 - 110 mg/dL Final     BUN, Bld   Date Value Ref Range Status   11/16/2016 31 (H) 8 - 23 mg/dL Final     Creatinine   Date Value Ref Range Status   11/16/2016 1.5 (H) 0.5 - 1.4 mg/dL Final     Calcium   Date Value Ref Range Status   11/16/2016 9.1 8.7 - 10.5 mg/dL Final     Total Protein   Date Value Ref Range Status   11/16/2016 6.8 6.0 - 8.4 g/dL Final     Albumin   Date Value Ref Range Status   11/16/2016 3.5 3.5 - 5.2 g/dL Final     Total Bilirubin   Date Value Ref Range Status   11/16/2016 0.7 0.1 - 1.0 mg/dL Final     Comment:     For infants and newborns, interpretation of results should be based  on gestational age, weight and in agreement with  "clinical  observations.  Premature Infant recommended reference ranges:  Up to 24 hours.............<8.0 mg/dL  Up to 48 hours............<12.0 mg/dL  3-5 days..................<15.0 mg/dL  6-29 days.................<15.0 mg/dL       Alkaline Phosphatase   Date Value Ref Range Status   11/16/2016 75 55 - 135 U/L Final     AST   Date Value Ref Range Status   11/16/2016 27 10 - 40 U/L Final     ALT   Date Value Ref Range Status   11/16/2016 43 10 - 44 U/L Final     Anion Gap   Date Value Ref Range Status   11/16/2016 10 8 - 16 mmol/L Final     eGFR if    Date Value Ref Range Status   11/16/2016 52.6 (A) >60 mL/min/1.73 m^2 Final     eGFR if non    Date Value Ref Range Status   11/16/2016 45.5 (A) >60 mL/min/1.73 m^2 Final     Comment:     Calculation used to obtain the estimated glomerular filtration  rate (eGFR) is the CKD-EPI equation. Since race is unknown   in our information system, the eGFR values for   -American and Non--American patients are given   for each creatinine result.       No results found for: SEDRATE  No results found for: CRP      Radiology: I personally reviewed imaging   X ray L spine in Dec 2016 reveals DDD  Assessment:   Polyarthralgia- Rule out inflammatory arthritis  DDD with radiculopathy   Diffused myalgia-Rule out myositis  Dry mouth- likely sec to CPAP, will check SSA   Fatigue-Rule out disorder of bone and cartilage/  vitamin d deficiency  Obesity       Plan:    Check MARLENY, SSA, RF, CCP, ESR, CRP, uric acid, Arthritis survey  Check CPK, aldolase, 25 hydroxy Vit D   Start gabapentin 100mg po qhs for DDD  Slowly escalate the dose to 300mg po qhs as tolerated, hold for sedation   Patient informed about the "red flag signs" and advised to call 911 immediately  if the following symptoms develop- worsening back pain, B/L sciatica, saddle anesthesia, spasticity, bladder or bowel incontinence, motor weakness of the lower extremities  Continue to " follow with LEIDY Singh   Counseled to loose weight   RTC in 3 months

## 2017-01-29 ENCOUNTER — TELEPHONE (OUTPATIENT)
Dept: RHEUMATOLOGY | Facility: CLINIC | Age: 72
End: 2017-01-29

## 2017-01-29 NOTE — TELEPHONE ENCOUNTER
Romeo,   Please let the patient know that I have reviewed results and there is no evidence of inflammatory arthritis. X rays show evidence of degenerative arthritis.   Vitamin D is slightly low. He should take otc vitamin D 2000 units a day.   Thanks, SS

## 2017-02-06 NOTE — TELEPHONE ENCOUNTER
Pt notified that his results showed no evidence of inflammatory arthritis, but x ray showed degenerative arthritis. Vitamin D slightly low, and he should take OTC vitamin D 2000 units a day. Pt verbalized understanding.

## 2017-02-07 ENCOUNTER — DOCUMENTATION ONLY (OUTPATIENT)
Dept: FAMILY MEDICINE | Facility: CLINIC | Age: 72
End: 2017-02-07

## 2017-02-07 NOTE — PROGRESS NOTES
Pre-Visit Chart Review  For Appointment Scheduled on 2/13/17    Health Maintenance Due   Topic Date Due    Foot Exam  01/13/1953    TETANUS VACCINE  01/13/1961    Zoster Vaccine  01/13/2003    Eye Exam  01/29/2017

## 2017-02-13 ENCOUNTER — HOSPITAL ENCOUNTER (OUTPATIENT)
Dept: RADIOLOGY | Facility: CLINIC | Age: 72
Discharge: HOME | End: 2017-02-13
Attending: FAMILY MEDICINE
Payer: MEDICARE

## 2017-02-13 ENCOUNTER — OFFICE VISIT (OUTPATIENT)
Dept: FAMILY MEDICINE | Facility: CLINIC | Age: 72
End: 2017-02-13
Payer: MEDICARE

## 2017-02-13 VITALS
WEIGHT: 252.44 LBS | SYSTOLIC BLOOD PRESSURE: 130 MMHG | RESPIRATION RATE: 14 BRPM | HEIGHT: 71 IN | BODY MASS INDEX: 35.34 KG/M2 | TEMPERATURE: 98 F | OXYGEN SATURATION: 96 % | DIASTOLIC BLOOD PRESSURE: 64 MMHG | HEART RATE: 59 BPM

## 2017-02-13 DIAGNOSIS — J45.901 CHRONIC OBSTRUCTIVE ASTHMA WITH EXACERBATION: Primary | ICD-10-CM

## 2017-02-13 DIAGNOSIS — J45.901 CHRONIC OBSTRUCTIVE ASTHMA WITH EXACERBATION: ICD-10-CM

## 2017-02-13 DIAGNOSIS — R06.2 WHEEZING: ICD-10-CM

## 2017-02-13 DIAGNOSIS — J44.1 CHRONIC OBSTRUCTIVE ASTHMA WITH EXACERBATION: ICD-10-CM

## 2017-02-13 DIAGNOSIS — J44.1 CHRONIC OBSTRUCTIVE ASTHMA WITH EXACERBATION: Primary | ICD-10-CM

## 2017-02-13 DIAGNOSIS — J32.9 SINUSITIS, UNSPECIFIED CHRONICITY, UNSPECIFIED LOCATION: ICD-10-CM

## 2017-02-13 PROCEDURE — 3078F DIAST BP <80 MM HG: CPT | Mod: S$GLB,,, | Performed by: PHYSICIAN ASSISTANT

## 2017-02-13 PROCEDURE — 1157F ADVNC CARE PLAN IN RCRD: CPT | Mod: S$GLB,,, | Performed by: PHYSICIAN ASSISTANT

## 2017-02-13 PROCEDURE — 71020 XR CHEST PA AND LATERAL: CPT | Mod: TC,PO

## 2017-02-13 PROCEDURE — 99999 PR PBB SHADOW E&M-EST. PATIENT-LVL V: CPT | Mod: PBBFAC,,, | Performed by: PHYSICIAN ASSISTANT

## 2017-02-13 PROCEDURE — 1126F AMNT PAIN NOTED NONE PRSNT: CPT | Mod: S$GLB,,, | Performed by: PHYSICIAN ASSISTANT

## 2017-02-13 PROCEDURE — 99213 OFFICE O/P EST LOW 20 MIN: CPT | Mod: 25,S$GLB,, | Performed by: PHYSICIAN ASSISTANT

## 2017-02-13 PROCEDURE — 96372 THER/PROPH/DIAG INJ SC/IM: CPT | Mod: S$GLB,,, | Performed by: PHYSICIAN ASSISTANT

## 2017-02-13 PROCEDURE — 99499 UNLISTED E&M SERVICE: CPT | Mod: S$GLB,,, | Performed by: PHYSICIAN ASSISTANT

## 2017-02-13 PROCEDURE — 94640 AIRWAY INHALATION TREATMENT: CPT | Mod: 59,S$GLB,, | Performed by: PHYSICIAN ASSISTANT

## 2017-02-13 PROCEDURE — 3075F SYST BP GE 130 - 139MM HG: CPT | Mod: S$GLB,,, | Performed by: PHYSICIAN ASSISTANT

## 2017-02-13 PROCEDURE — 1160F RVW MEDS BY RX/DR IN RCRD: CPT | Mod: S$GLB,,, | Performed by: PHYSICIAN ASSISTANT

## 2017-02-13 PROCEDURE — 71020 XR CHEST PA AND LATERAL: CPT | Mod: 26,,, | Performed by: RADIOLOGY

## 2017-02-13 PROCEDURE — 1159F MED LIST DOCD IN RCRD: CPT | Mod: S$GLB,,, | Performed by: PHYSICIAN ASSISTANT

## 2017-02-13 RX ORDER — PREDNISONE 10 MG/1
TABLET ORAL
Qty: 12 TABLET | Refills: 0 | Status: SHIPPED | OUTPATIENT
Start: 2017-02-13 | End: 2017-03-08

## 2017-02-13 RX ORDER — LEVALBUTEROL INHALATION SOLUTION 1.25 MG/3ML
1.25 SOLUTION RESPIRATORY (INHALATION)
Status: COMPLETED | OUTPATIENT
Start: 2017-02-13 | End: 2017-02-13

## 2017-02-13 RX ORDER — DOXYCYCLINE 100 MG/1
100 CAPSULE ORAL EVERY 12 HOURS
Qty: 20 CAPSULE | Refills: 0 | Status: SHIPPED | OUTPATIENT
Start: 2017-02-13 | End: 2017-02-23 | Stop reason: ALTCHOICE

## 2017-02-13 RX ADMIN — LEVALBUTEROL INHALATION SOLUTION 1.25 MG: 1.25 SOLUTION RESPIRATORY (INHALATION) at 09:02

## 2017-02-13 NOTE — PROGRESS NOTES
Subjective:       Patient ID: Lenny Lopez is a 72 y.o. male.    Chief Complaint: Follow-up (1mth f/u hypertension)    Cough   This is a new problem. The current episode started 1 to 4 weeks ago. The problem has been gradually worsening. The problem occurs every few minutes. The cough is productive of purulent sputum. Associated symptoms include nasal congestion, postnasal drip, shortness of breath and wheezing. Pertinent negatives include no chest pain, chills, fever, headaches, heartburn, hemoptysis, myalgias, rhinorrhea, sore throat, sweats or weight loss. Nothing aggravates the symptoms. He has tried a beta-agonist inhaler and ipratropium inhaler for the symptoms. His past medical history is significant for asthma and COPD.   Wheezing    This is a recurrent problem. The current episode started 1 to 4 weeks ago. The problem occurs constantly. The problem has been gradually worsening. Associated symptoms include coughing, shortness of breath and sputum production. Pertinent negatives include no abdominal pain, chest pain, chills, diarrhea, fever, headaches, hemoptysis, rhinorrhea, sore throat or vomiting. Nothing aggravates the symptoms. He has tried beta agonist inhalers and ipratropium inhalers for the symptoms. His past medical history is significant for asthma, chronic lung disease and COPD. There is no history of bronchiolitis, CAD, DVT, heart failure, past MI or PE.     Review of Systems   Constitutional: Negative for activity change, appetite change, chills, fever and weight loss.   HENT: Positive for postnasal drip. Negative for rhinorrhea, sinus pressure and sore throat.    Eyes: Negative for visual disturbance.   Respiratory: Positive for cough, sputum production, shortness of breath and wheezing. Negative for hemoptysis.    Cardiovascular: Negative for chest pain.   Gastrointestinal: Negative for abdominal distention, abdominal pain, diarrhea, heartburn and vomiting.   Genitourinary: Negative for  difficulty urinating and dysuria.   Musculoskeletal: Negative for arthralgias and myalgias.   Neurological: Negative for headaches.   Hematological: Negative for adenopathy.   Psychiatric/Behavioral: The patient is not nervous/anxious.        Objective:      Physical Exam   Constitutional: He is oriented to person, place, and time.   HENT:   Mouth/Throat: Oropharynx is clear and moist. No oropharyngeal exudate.   Eyes: Conjunctivae are normal. Pupils are equal, round, and reactive to light.   Cardiovascular: Normal rate and regular rhythm.    Pulmonary/Chest: Effort normal. He has wheezes.   Coughing forcefully in exam room  Diffuse wheezes in all lung fields   Abdominal: Soft. Bowel sounds are normal. He exhibits no distension. There is no tenderness.   Musculoskeletal: He exhibits no edema.   Lymphadenopathy:     He has no cervical adenopathy.   Neurological: He is alert and oriented to person, place, and time.   Skin: No erythema.   Psychiatric: His behavior is normal.       Assessment:       1. Chronic obstructive asthma with exacerbation    2. Wheezing    3. Sinusitis, unspecified chronicity, unspecified location        Plan:   Lenny was seen today for follow-up.    Diagnoses and all orders for this visit:    Chronic obstructive asthma with exacerbation  -     levalbuterol nebulizer solution 1.25 mg; Take 3 mLs (1.25 mg total) by nebulization one time.  -     methylPREDNISolone sod suc(PF) 125 mg/2 mL injection 125 mg; Inject 125 mg into the muscle once.  -     Culture, Respiratory with Gram Stain  -     X-Ray Chest PA And Lateral; Future  -     predniSONE (DELTASONE) 10 MG tablet; Take 2 tabs x 3 days. Take 1 tab x 3 days. Take 1/2 tab x 3 days  -     doxycycline (VIBRAMYCIN) 100 MG Cap; Take 1 capsule (100 mg total) by mouth every 12 (twelve) hours. Do not take magnesium with this medication    Wheezing  -     levalbuterol nebulizer solution 1.25 mg; Take 3 mLs (1.25 mg total) by nebulization one time.  -      methylPREDNISolone sod suc(PF) 125 mg/2 mL injection 125 mg; Inject 125 mg into the muscle once.  -     Culture, Respiratory with Gram Stain  -     X-Ray Chest PA And Lateral; Future  -     predniSONE (DELTASONE) 10 MG tablet; Take 2 tabs x 3 days. Take 1 tab x 3 days. Take 1/2 tab x 3 days    Sinusitis, unspecified chronicity, unspecified location  -     doxycycline (VIBRAMYCIN) 100 MG Cap; Take 1 capsule (100 mg total) by mouth every 12 (twelve) hours. Do not take magnesium with this medication    Take antibiotics with food.  Increase fluid intake.  Call the clinic if symptoms worsen, new symptoms develop or if you are not any better after completion of your antibiotics.    Avoid sun exposure while taking this antibiotic. Patient verbalized understanding  Increase breathing treatments to 3 times daily  Follow up in 10 days or sooner if needed  Patient encouraged to call pulmonologist office, Dr. Mosley and follow up with him ASAP.   Hold magnesium while taking antibiotic  Patient advised to seek urgent medical attention for worsening of symptoms. Patient verbalized understanding.

## 2017-02-13 NOTE — PROGRESS NOTES
2 Patent identifiers used (name and ). Administered Solumedrol 125mg IM. Patient tolerated well. No bleeding at insertion site noted. Pain scale 1/10. Aseptic technique maintained. Levalbuterol Nebulizer 1.25mg administered. No adverse reactions noted.

## 2017-02-13 NOTE — MR AVS SNAPSHOT
[facility]  [address]  [facility] [address]  Phone: [phone]  Fax: [phone]                  Lenny Lopez   2017 8:40 AM   Office Visit    Description:  Male : 1945   Provider:  Kandace Davidson PA-C   Department:  [facility]           Reason for Visit     Follow-up           Diagnoses this Visit        Comments    Chronic obstructive asthma with exacerbation    -  Primary     Wheezing         Sinusitis, unspecified chronicity, unspecified location                To Do List           Future Appointments        Provider Department Dept Phone    2017 10:00 AM [facility] XR1 [facility]- X-Ray [phone]    2017 9:20 AM LEESA Aguilar [facility] [phone]    3/8/2017 1:30 PM Aaron Armenta MD [facility] - Cardiology [phone]    3/14/2017 1:20 PM Nathaniel Allan MD [facility] - Pulmonary [phone]      Goals (5 Years of Data)     None      Follow-Up and Disposition     Follow-up and Disposition History       These Medications        Disp Refills Start End    predniSONE (DELTASONE) 10 MG tablet 12 tablet 0 2017     Take 2 tabs x 3 days. Take 1 tab x 3 days. Take 1/2 tab x 3 days    Pharmacy: [facility] [address] [place] [address]. Ph #: [phone]       doxycycline (VIBRAMYCIN) 100 MG Cap 20 capsule 0 2017     Take 1 capsule (100 mg total) by mouth every 12 (twelve) hours. Do not take magnesium with this medication - Oral    Pharmacy: [facility] [address] [place] [address]. Ph #: [phone]         [facility]     [facility] On Call Nurse Care Line -  Assistance  Registered nurses in the [facility] provide clinical advisement, health education, appointment booking, and other advisory services.  Call for this free service at [phone].             Medications           Message regarding Medications     Verify the changes and/or  additions to your medication regime listed below are the same as discussed with your clinician today.  If any of these changes or additions are incorrect, please notify your healthcare provider.        START taking these NEW medications        Refills    predniSONE (DELTASONE) 10 MG tablet 0    Sig: Take 2 tabs x 3 days. Take 1 tab x 3 days. Take 1/2 tab x 3 days    Class: Normal    doxycycline (VIBRAMYCIN) 100 MG Cap 0    Sig: Take 1 capsule (100 mg total) by mouth every 12 (twelve) hours. Do not take magnesium with this medication    Class: Normal    Route: Oral      These medications were administered today        Dose Freq    levalbuterol nebulizer solution 1.25 mg 1.25 mg Clinic/HOD 1 time    Sig: Take 3 mLs (1.25 mg total) by nebulization one time.    Class: Normal    Route: Nebulization    Cosign for Ordering: Required by Blue Shah MD    methylPREDNISolone sod suc(PF) 125 mg/2 mL injection 125 mg 125 mg Once    Sig: Inject 125 mg into the muscle once.    Class: Normal    Route: Intramuscular    Cosign for Ordering: Required by Blue Shah MD           Verify that the below list of medications is an accurate representation of the medications you are currently taking.  If none reported, the list may be blank. If incorrect, please contact your healthcare provider. Carry this list with you in case of emergency.           Current Medications     albuterol 90 mcg/actuation inhaler Inhale 2 puffs into the lungs every 6 (six) hours as needed. Ventolin , medical necessary    albuterol-ipratropium 2.5mg-0.5mg/3mL (DUO-NEB) 0.5 mg-3 mg(2.5 mg base)/3 mL nebulizer solution Take 3 mLs by nebulization every 6 (six) hours as needed for Wheezing.    aspirin 81 mg Tab Take 1 tablet by mouth once daily. Every day PRN    blood sugar diagnostic Strp 1 strip by Misc.(Non-Drug; Combo Route) route 3 (three) times daily.    BREO ELLIPTA 200-25 mcg/dose DsDv diskus inhaler Inhale 1 puff into the lungs once daily.    DYMISTA  "137-50 mcg/spray Spry nassal spray 1 spray by Each Nare route 2 (two) times daily as needed.    gabapentin (NEURONTIN) 100 MG capsule Take 1 capsule (100 mg total) by mouth every evening.    lancets (FREESTYLE LANCETS) 28 gauge Misc 1 lancet by Misc.(Non-Drug; Combo Route) route 3 (three) times daily.    lidocaine-prilocaine (EMLA) cream Apply topically as needed.    lisinopril 10 MG tablet Take 1 tablet (10 mg total) by mouth every evening.    loratadine (CLARITIN) 10 mg tablet Take 10 mg by mouth once daily.    MAGNESIUM ORAL Take by mouth 2 (two) times daily.    omega-3 fatty acids-vitamin E (FISH OIL) 1,000 mg Cap Three times a day    simvastatin (ZOCOR) 20 MG tablet Take 1 tablet (20 mg total) by mouth once daily.    spironolactone (ALDACTONE) 50 MG tablet Take 1 tablet (50 mg total) by mouth once daily.    turmeric root extract 500 mg Cap Take 1 capsule by mouth once daily.    doxycycline (VIBRAMYCIN) 100 MG Cap Take 1 capsule (100 mg total) by mouth every 12 (twelve) hours. Do not take magnesium with this medication    predniSONE (DELTASONE) 10 MG tablet Take 2 tabs x 3 days. Take 1 tab x 3 days. Take 1/2 tab x 3 days           Clinical Reference Information           Your Vitals Were     BP Pulse Temp Resp    130/64 (BP Location: Right arm, Patient Position: Sitting, BP Method: Automatic) 59 97.8 °F (36.6 °C) (Oral) 14    Height Weight SpO2 BMI    5' 11" (1.803 m) 114.5 kg (252 lb 6.8 oz) 96% 35.21 kg/m2      Blood Pressure          Most Recent Value    BP  130/64      Allergies as of 2/13/2017     No Known Drug Allergies      Immunizations Administered on Date of Encounter - 2/13/2017     None      Orders Placed During Today's Visit      Normal Orders This Visit    Culture, Respiratory with Gram Stain     Future Labs/Procedures Expected by Expires    X-Ray Chest PA And Lateral  2/13/2017 2/13/2018      Administrations This Visit     levalbuterol nebulizer solution 1.25 mg     Admin Date Action Dose Route " Administered By             02/13/2017 Given 1.25 mg Nebulization Libby Mcdonough LPN                    methylPREDNISolone sod suc(PF) 125 mg/2 mL injection 125 mg     Admin Date Action Dose Route Administered By             02/13/2017 Given 125 mg Intramuscular Libby Mcdonough LPN                      Language Assistance Services     ATTENTION: Language assistance services are available, free of charge. Please call [phone].      ATENCIÓN: Si habla español, tiene a samson disposición servicios gratuitos de asistencia lingüística. Llame al [phone].     CHÚ Ý: N?u b?n nói Ti?ng Vi?t, có các d?ch v? h? tr? ngôn ng? mi?n phí dành cho b?n. G?i s? [phone].         [facility] complies with applicable Federal civil rights laws and does not discriminate on the basis of race, color, national origin, age, disability, or sex.

## 2017-02-13 NOTE — PATIENT INSTRUCTIONS
Established High Blood Pressure    High blood pressure (hypertension) is a chronic disease. Often health care providers dont know what causes it. But it can be caused by certain health conditions and medicines.  If you have high blood pressure, you may not have any symptoms. If you do have symptoms, they may include headache, dizziness, changes in your vision, chest pain, and shortness of breath. But even without symptoms, high blood pressure thats not treated raises your risk for heart attack and stroke. High blood pressure is a serious health risk and shouldnt be ignored.  A blood pressure reading is made up of two numbers: a higher number over a lower number. The top number is the systolic pressure. The bottom number is the diastolic pressure. A normal blood pressure is less than 120 over less than 80.  High blood pressure is when either the top number is 140 or higher, or the bottom number is 90 or higher. This must be the result when taking your blood pressure a number of times. The blood pressures between normal and high are called prehypertension.  Home care  If you have high blood pressure, you should do what is listed below to lower your blood pressure. If you are taking medicines for high blood pressure, these methods may reduce or end your need for medicines in the future.  · Begin a weight-loss program if you are overweight.  · Cut back on how much salt you get in your diet. Heres how to do this:  ¨ Dont eat foods that have a lot of salt. These include olives, pickles, smoked meats, and salted potato chips.  ¨ Dont add salt to your food at the table.  ¨ Use only small amounts of salt when cooking.  · Begin an exercise program. Talk with your health care provider about the type of exercise program that would be best for you. It doesn't have to be hard. Even brisk walking for 20 minutes 3 times a week is a good form of exercise.  · Dont take medicines that have heart stimulants. This includes many  cold and sinus decongestant pills and sprays, as well as diet pills. Check the warnings about hypertension on the label. Stimulants such as amphetamine or cocaine could be lethal for someone with high blood pressure. Never take these.  · Limit how much caffeine you get in your diet. Switch to caffeine-free products.  · Stop smoking. If you are a long-time smoker, this can be hard. Enroll in a stop-smoking program to make it more likely that you will quit for good.  · Learn how to handle stress. This is an important part of any program to lower blood pressure. Learn about relaxation methods like meditation, yoga, or biofeedback.  · If your provider prescribed medicines, take them exactly as directed. Missing doses may cause your blood pressure get out of control.  · Consider buying an automatic blood pressure machine. You can get one of these at most pharmacies. Use this to watch your blood pressure at home. Give the results to your provider.  Follow-up care  You will need to make regular visits to your health care provider. This is to check your blood pressure and to make changes to your medicines. Make a follow-up appointment as directed.  When to seek medical advice  Call your health care provider right away if any of these occur:  · Chest pain or shortness of breath  · Severe headache  · Throbbing or rushing sound in the ears  · Nosebleed  · Sudden severe pain in your belly (abdomen)  · Extreme drowsiness, confusion, or fainting  · Dizziness or dizziness with a spinning sensation (vertigo)  · Weakness of an arm or leg or one side of the face  · You have problems speaking or seeing   Date Last Reviewed: 11/25/2014  © 5767-9721 [employer]. [address] [place] [address], [place] [address]. All rights reserved. This information is not intended as a substitute for professional medical care. Always follow your healthcare professional's instructions.          Diabetes (General Information)  Diabetes is a  long-term health problem. It means your body does not make enough insulin. Or it may mean that your body cannot use the insulin it makes. Insulin is a hormone in your body. It lets blood sugar (glucose) reach the cells in your body. All of your cells need glucose for fuel.  When you have diabetes, the glucose in your blood builds up because it cannot get into the cells. This buildup is called high blood sugar (hyperglycemia).  Your blood sugar level depends on several things. It depends on what kind of food you eat and how much of it you eat. It also depends on how much exercise you get, and how much insulin you have in your body. Eating too much of the wrong kinds of food or not taking diabetes medicine on time can cause high blood sugar. Infections can cause high blood sugar even if you are taking medicines correctly.  These things can also cause low blood sugar:  · Missing meals  · Not eating enough food  · Taking too much diabetes medicine  Diabetes can cause serious problems over time if you do not get treated. These problems include heart disease, stroke, kidney failure, and blindness. They also include nerve pain or loss of feeling in your legs and feet, and gangrene of the feet. By keeping your blood sugar under control you can prevent or delay these problems.  Normal blood sugar levels are 80 to 100 before a meal and less than 180 in the 1 to 2 hours after a meal.  Home care  Follow these guidelines when caring for yourself at home:  · Follow the diet your healthcare provider gives you. Take insulin or other diabetes medicine exactly as told to.  · Watch your blood sugar as you are told to. Keep a log of your results. This will help your provider change your medicines to keep your blood sugar under control.  · Try to reach your ideal weight. You may be able to cut back on or not have to take diabetes medicine if you eat the right foods and get exercise.  · Do not smoke. Smoking worsens the effects of  diabetes on your circulation. You are much more likely to have a heart attack if you have diabetes and you smoke.  · Take good care of your feet. If you have lost feeling in your feet, you may not see an injury or infection. Check your feet and between your toes at least once a week.  · Wear a medical alert bracelet or necklace, or carry a card in your wallet that says you have diabetes. This will help healthcare providers give you the right care if you get very ill and cannot tell them that you have diabetes.  Sick day plan  If you get a cold, the flu, or a bacterial or viral infection, take these steps:  · Look at your diabetes sick plan and call your healthcare provider as you were told to. You may need to call your provider right away if:  ¨ Your blood sugar is above 240 while taking your diabetes medicine  ¨ Your urine ketone levels are above normal or high  ¨ You have been vomiting more than 6 hours  ¨ You have trouble breathing or your breath ha s a fruity smell  ¨ You have a high fever  ¨ You have a fever for several days and you are not getting better  ¨ You get light-headed and are sleepier than usual  · Keep taking your diabetes pills (oral medicine) even if you have been vomiting and are feeling sick. Call your provider right away because you may need insulin to lower your blood sugar until you recover from your illness.  · Keep taking your insulin even if you have been vomiting and are feeling sick. Call your provider right away to ask if you need to change your insulin dose. This will depend on your blood sugar results.  · Check your blood sugar every 2 to 4 hours, or at least 4 times a day.  · Check your ketones often. If you are vomiting and having diarrhea, watch them more often.  · Do not skip meals. Try to eat small meals on a regular schedule. Do this even if you do not feel like eating.  · Drink water or other liquids that do not have caffeine or calories. This will keep you from getting  dehydrated. If you are nauseated or vomiting, takes small sips every 5 minutes. To prevent dehydration try to drink a cup (8 ounces) of fluids every hour while you are awake.  General care  Always bring a source of fast-acting sugar with you in case you have symptoms of low blood sugar (below 70). At the first sign of low blood sugar, eat or drink 15 to 20 grams of fast-acting sugar to raise your blood sugar. Examples are:  · 3 to 4 glucose tablets. You can buy these at most [employer].  · 4 ounces (1/2 cup) of regular (not diet) soft drinks  · 4 ounces (1/2 cup) of any fruit juice  · 8 ounces (1 cup) of milk  · 5 to 6 pieces of hard candy  · 1 tablespoon of honey  Check your blood sugar 15 minutes after treating yourself. If it is still below 70, take 15 to 20 more grams of fast-acting sugar. Test again in 15 minutes. If it returns to normal (70 or above), eat a snack or meal to keep your blood sugar in a safe range. If it stays low, call your doctor or go to an emergency room.  Follow-up care  Follow-up with your healthcare provider, or as advised. For more information about diabetes, visit the American Diabetes Association website at www.diabetes.org or call 888-148-8527.  When to seek medical advice  Call your healthcare provider right away if you have any of these symptoms of high blood sugar:  · Frequent urination  · Dizziness  · Drowsiness  · Thirst  · Headache  · Nausea or vomiting  · Abdominal pain  · Eyesight changes  · Fast breathing  · Confusion or loss of consciousness  Also call your provider right away if you have any of these signs of low blood sugar:  · Fatigue  · Headache  · Shakes  · Excess sweating  · Hunger  · Feeling anxious or restless  · Eyesight changes  · Drowsiness  · Weakness  · Confusion or loss of consciousness  Call 911  Call for emergency help right away if any of these occur:  · Chest pain or shortness of breath  · Dizziness or fainting  · Weakness of an arm or leg or one side of the  face  · Trouble speaking or seeing   Date Last Reviewed: 6/1/2016 © 2000-2016 [employer]. [address] [place] [address], [place] [address]. All rights reserved. This information is not intended as a substitute for professional medical care. Always follow your healthcare professional's instructions.            Walking for Fitness  Fitness walking has something for everyone, even people who are already fit. Walking is one of the safest ways to condition your body aerobically. It can boost energy, help you lose weight, and reduce stress.    Physical benefits  · Walking strengthens your heart and lungs, and tones your muscles.  · When walking, your feet land with less impact than in other sports. This reduces chances of muscle, bone, and joint injury.  · Regular walking improves your cholesterol levels and lowers your risk of heart disease. And it helps you control your blood sugar if you have diabetes.  · Walking is a weight-bearing activity, which helps maintain bone density. This can help prevent osteoporosis.  Personal rewards  · Taking walks can help you relax and manage stress. And fitness walking may make you feel better about yourself.  · Walking can help you sleep better at night and make you less likely to be depressed.  · Regular walking may help maintain your memory as you get older.  · Walking is a great way to spend extra time with friends and family members. Be sure to invite your dog along!  Q&A about fitness walking  Q: Will walking keep me fit?  A: Yes. Regular walking at the right pace gives you all the benefits of other aerobic activities, such as jogging and swimming.  Q: Will walking help me lose weight and keep it off?  A: Yes. Per mile, walking can burn as many calories as jogging. Your health care provider can help work walking into your weight-loss plan.  Q: Is walking safe for my health?  A: Yes. Walking is safe if you have high blood pressure, diabetes, heart disease, or other  conditions. Talk to your health care provider before you start.  Date Last Reviewed: 5/9/2015  © 3550-0842 [employer]. [address] [place] [address], [place] [address]. All rights reserved. This information is not intended as a substitute for professional medical care. Always follow your healthcare professional's instructions.            Weight Management: Getting Started  Healthy bodies come in all shapes and sizes. Not all bodies are made to be thin. For some people, a healthy weight is higher than the average weight listed on weight charts. Your healthcare provider can help you decide on a healthy weight for you.    Reasons to lose weight  Losing weight can help with some health problems, such as high blood pressure, heart disease, diabetes, sleep apnea, and arthritis. You may also feel more energy.  Set your long-term goal  Your goal doesn't even have to be a specific weight. You may decide on a fitness goal (such as being able to walk 10 miles a week), or a health goal (such as lowering your blood pressure). Choose a goal that is measurable and reasonable, so you know when you've reached it. A goal of reaching a BMI of less than 25 is not always reasonable (or possible).   Make an action plan  Habits dont change overnight. Setting your goals too high can leave you feeling discouraged if you cant reach them. Be realistic. Choose one or two small changes you can make now. Set an action plan for how you are going to make these changes. When you can stick to this plan, keep making a few more small changes. Taking small steps will help you stay on the path to success.  Track your progress  Write down your goals. Then, keep a daily record of your progress. Write down what you eat and how active you are. This record lets you look back on how much youve done. It may also help when youre feeling frustrated. Reward yourself for success. Even if you dont reach every goal, give yourself credit for what you do  get done.  Get support  Encouragement from others can help make losing weight easier. Ask your family members and friends for support. They may even want to join you. Also look to your healthcare provider, registered dietitian, and  for help. Your local hospital can give you more information about nutrition, exercise, and weight loss.  Date Last Reviewed: 1/31/2016  © 7417-2446 [employer]. [address] [place] [address], [place] [address]. All rights reserved. This information is not intended as a substitute for professional medical care. Always follow your healthcare professional's instructions.

## 2017-02-15 DIAGNOSIS — J40 MORAXELLA CATARRHALIS BRONCHITIS: ICD-10-CM

## 2017-02-15 DIAGNOSIS — B96.89 MORAXELLA CATARRHALIS BRONCHITIS: ICD-10-CM

## 2017-02-15 DIAGNOSIS — J44.1 COPD EXACERBATION: Primary | ICD-10-CM

## 2017-02-15 LAB
BACTERIA SPEC AEROBE CULT: NORMAL
GRAM STN SPEC: NORMAL

## 2017-02-15 RX ORDER — AMOXICILLIN AND CLAVULANATE POTASSIUM 875; 125 MG/1; MG/1
1 TABLET, FILM COATED ORAL 2 TIMES DAILY
Qty: 20 TABLET | Refills: 0 | Status: SHIPPED | OUTPATIENT
Start: 2017-02-15 | End: 2020-12-23 | Stop reason: SDUPTHER

## 2017-02-22 ENCOUNTER — DOCUMENTATION ONLY (OUTPATIENT)
Dept: FAMILY MEDICINE | Facility: CLINIC | Age: 72
End: 2017-02-22

## 2017-02-22 NOTE — PROGRESS NOTES
Pre-Visit Chart Review  For Appointment Scheduled on 2/23/17    Health Maintenance Due   Topic Date Due    Foot Exam  01/13/1955    TETANUS VACCINE  01/13/1961    Zoster Vaccine  01/13/2003    Eye Exam  01/29/2017    Hemoglobin A1c  02/16/2017

## 2017-02-23 ENCOUNTER — OFFICE VISIT (OUTPATIENT)
Dept: FAMILY MEDICINE | Facility: CLINIC | Age: 72
End: 2017-02-23
Payer: MEDICARE

## 2017-02-23 VITALS
HEIGHT: 71 IN | WEIGHT: 254.44 LBS | HEART RATE: 64 BPM | SYSTOLIC BLOOD PRESSURE: 121 MMHG | TEMPERATURE: 98 F | BODY MASS INDEX: 35.62 KG/M2 | DIASTOLIC BLOOD PRESSURE: 67 MMHG | OXYGEN SATURATION: 96 %

## 2017-02-23 DIAGNOSIS — I10 ESSENTIAL HYPERTENSION: ICD-10-CM

## 2017-02-23 DIAGNOSIS — H69.91 EUSTACHIAN TUBE DYSFUNCTION, RIGHT: Primary | ICD-10-CM

## 2017-02-23 DIAGNOSIS — J45.901 CHRONIC OBSTRUCTIVE ASTHMA WITH EXACERBATION: ICD-10-CM

## 2017-02-23 DIAGNOSIS — J44.1 CHRONIC OBSTRUCTIVE ASTHMA WITH EXACERBATION: ICD-10-CM

## 2017-02-23 PROCEDURE — 1126F AMNT PAIN NOTED NONE PRSNT: CPT | Mod: S$GLB,,, | Performed by: NURSE PRACTITIONER

## 2017-02-23 PROCEDURE — 3074F SYST BP LT 130 MM HG: CPT | Mod: S$GLB,,, | Performed by: NURSE PRACTITIONER

## 2017-02-23 PROCEDURE — 3078F DIAST BP <80 MM HG: CPT | Mod: S$GLB,,, | Performed by: NURSE PRACTITIONER

## 2017-02-23 PROCEDURE — 1159F MED LIST DOCD IN RCRD: CPT | Mod: S$GLB,,, | Performed by: NURSE PRACTITIONER

## 2017-02-23 PROCEDURE — 1157F ADVNC CARE PLAN IN RCRD: CPT | Mod: S$GLB,,, | Performed by: NURSE PRACTITIONER

## 2017-02-23 PROCEDURE — 99499 UNLISTED E&M SERVICE: CPT | Mod: S$GLB,,, | Performed by: NURSE PRACTITIONER

## 2017-02-23 PROCEDURE — 1160F RVW MEDS BY RX/DR IN RCRD: CPT | Mod: S$GLB,,, | Performed by: NURSE PRACTITIONER

## 2017-02-23 PROCEDURE — 99213 OFFICE O/P EST LOW 20 MIN: CPT | Mod: S$GLB,,, | Performed by: NURSE PRACTITIONER

## 2017-02-23 PROCEDURE — 99999 PR PBB SHADOW E&M-EST. PATIENT-LVL IV: CPT | Mod: PBBFAC,,, | Performed by: NURSE PRACTITIONER

## 2017-02-23 NOTE — PROGRESS NOTES
Patient, Lenny Lopez (MRN #[id number]), presented with a recorded BMI of 35.48 kg/m^2 and a documented comorbidity(s):  - Hypertension  to which the severe obesity is a contributing factor. This is consistent with the definition of severe obesity (BMI 35.0-35.9) with comorbidity (ICD-10 E66.01, Z68.35). The patient's severe obesity was monitored, evaluated, addressed and/or treated. This addendum to the medical record is made on 02/23/2017.

## 2017-02-23 NOTE — MR AVS SNAPSHOT
[facility]  [address]  [place] [address]  Phone: [phone]  Fax: [phone]                  Lenny Lopez   2017 9:40 AM   Office Visit    Description:  Male : 1947   Provider:  LEESA Aguilar   Department:  [facility]           Reason for Visit     Sinusitis     Wheezing           Diagnoses this Visit        Comments    Eustachian tube dysfunction, right    -  Primary            To Do List           Future Appointments        Provider Department Dept Phone    3/8/2017 1:30 PM Aaron Armenta MD [facility] - Cardiology [phone]    3/14/2017 1:20 PM Nathaniel Allan MD [facility] - Pulmonary [phone]      Goals (5 Years of Data)     None      Follow-Up and Disposition     Return if symptoms worsen or fail to improve.      PURCHASE these Medications (No prescription required)        Start End    dextromethorphan-guaifenesin  mg (MUCINEX DM)  mg per 12 hr tablet 2017 3/5/2017    Sig - Route: Take 1 tablet by mouth 2 (two) times daily. - Oral    Class: OTC      [facility] On Call     [facility] On Call Nurse Care Line -  Assistance  Registered nurses in the [facility] On Call Center provide clinical advisement, health education, appointment booking, and other advisory services.  Call for this free service at [phone].             Medications           Message regarding Medications     Verify the changes and/or additions to your medication regime listed below are the same as discussed with your clinician today.  If any of these changes or additions are incorrect, please notify your healthcare provider.        START taking these NEW medications        Refills    dextromethorphan-guaifenesin  mg (MUCINEX DM)  mg per 12 hr tablet     Sig: Take 1 tablet by mouth 2 (two) times daily.    Class: OTC    Route: Oral      STOP taking these medications     doxycycline (VIBRAMYCIN) 100 MG Cap Take 1 capsule (100 mg total)  by mouth every 12 (twelve) hours. Do not take magnesium with this medication           Verify that the below list of medications is an accurate representation of the medications you are currently taking.  If none reported, the list may be blank. If incorrect, please contact your healthcare provider. Carry this list with you in case of emergency.           Current Medications     albuterol 90 mcg/actuation inhaler Inhale 2 puffs into the lungs every 6 (six) hours as needed. Ventolin , medical necessary    albuterol-ipratropium 2.5mg-0.5mg/3mL (DUO-NEB) 0.5 mg-3 mg(2.5 mg base)/3 mL nebulizer solution Take 3 mLs by nebulization every 6 (six) hours as needed for Wheezing.    amoxicillin-clavulanate 875-125mg (AUGMENTIN) 875-125 mg per tablet Take 1 tablet by mouth 2 (two) times daily.    aspirin 81 mg Tab Take 1 tablet by mouth once daily. Every day PRN    blood sugar diagnostic Strp 1 strip by Misc.(Non-Drug; Combo Route) route 3 (three) times daily.    BREO ELLIPTA 200-25 mcg/dose DsDv diskus inhaler Inhale 1 puff into the lungs once daily.    dextromethorphan-guaifenesin  mg (MUCINEX DM)  mg per 12 hr tablet Take 1 tablet by mouth 2 (two) times daily.    DYMISTA 137-50 mcg/spray Spry nassal spray 1 spray by Each Nare route 2 (two) times daily as needed.    gabapentin (NEURONTIN) 100 MG capsule Take 1 capsule (100 mg total) by mouth every evening.    lancets (FREESTYLE LANCETS) 28 gauge Misc 1 lancet by Misc.(Non-Drug; Combo Route) route 3 (three) times daily.    lidocaine-prilocaine (EMLA) cream Apply topically as needed.    lisinopril 10 MG tablet Take 1 tablet (10 mg total) by mouth every evening.    loratadine (CLARITIN) 10 mg tablet Take 10 mg by mouth once daily.    MAGNESIUM ORAL Take by mouth 2 (two) times daily.    omega-3 fatty acids-vitamin E (FISH OIL) 1,000 mg Cap Three times a day    predniSONE (DELTASONE) 10 MG tablet Take 2 tabs x 3 days. Take 1 tab x 3 days. Take 1/2 tab x 3 days     "simvastatin (ZOCOR) 20 MG tablet Take 1 tablet (20 mg total) by mouth once daily.    spironolactone (ALDACTONE) 50 MG tablet Take 1 tablet (50 mg total) by mouth once daily.    turmeric root extract 500 mg Cap Take 1 capsule by mouth once daily.           Clinical Reference Information           Your Vitals Were     BP Pulse Temp    121/67 (BP Location: Right arm, Patient Position: Sitting, BP Method: Automatic) 64 97.6 °F (36.4 °C) (Oral)    Height Weight BMI    5' 11" (1.803 m) 115.4 kg (254 lb 6.6 oz) 35.48 kg/m2      Blood Pressure          Most Recent Value    BP  121/67      Allergies as of 2/23/2017     No Known Drug Allergies      Immunizations Administered on Date of Encounter - 2/23/2017     None      Instructions      Earache, No Infection (Adult)  Earaches can happen without an infection. This occurs when air and fluid build up behind the eardrum causing a feeling of fullness and discomfort and reduced hearing. This is called otitis media with effusion (OME) or serous otitis media. It means there is fluid in the middle ear. It is not the same as acute otitis media, which is typically from infection.  OME can happen when you have a cold if congestion blocks the passage that drains the middle ear. This passage is called the eustachian tube. OME may also occur with nasal allergies or after a bacterial middle ear infection.    The pain or discomfort may come and go. You may hear clicking or popping sounds when you chew or swallow. You may feel that your balance is off. Or you may hear ringing in the ear.  It often takes from several weeks up to 3 months for the fluid to clear on its own. Oral pain relievers and ear drops help if there is pain. Decongestants and antihistamines sometimes help. Antibiotics don't help since there is no infection. Your doctor may prescribe a nasal spray to help reduce swelling in the nose and eustachian tube. This can allow the ear to drain.  If your OME doesn't improve after 3 " months, surgery may be used to drain the fluid and insert a small tube in the eardrum to allow continued drainage.  Because the middle ear fluid can become infected, it is important to watch for signs of an ear infection which may develop later. These signs include increased ear pain, fever, or drainage from the ear.  Home care  The following guidelines will help you care for yourself at home:  · You may use over-the-counter medicine as directed to control pain, unless another medicine was prescribed. If you have chronic liver or kidney disease or ever had a stomach ulcer or GI bleeding, talk with your doctor before using these medicines. Aspirin should never be used in anyone under 18 years of age who is ill with a fever. It may cause severe liver damage.  · You may use over-the-counter decongestants such as phenylephrine or pseudoephedrine. But they are not always helpful. Don't use nasal spray decongestants more than 3 days. Longer use can make congestion worse. Prescription nasal sprays from your doctor don't typically have those restrictions.  · Antihistamines may help if you are also having allergy symptoms.  · You may use medicines such as guaifenesin to thin mucus and promote drainage.  Follow-up care  Follow up with your healthcare provider or as advised if you are not feeling better after 3 days.  When to seek medical advice  Call your healthcare provider right away if any of the following occur:  · Your ear pain gets worse or does not start to improve   · Fever of 100.4°F (38°C) or higher, or as directed by your healthcare provider  · Fluid or blood draining from the ear  · Headache or sinus pain  · Stiff neck  · Unusual drowsiness or confusion  Date Last Reviewed: 10/1/2016  © 3553-0981 [employer]. [address] [place] [address], [place] [address]. All rights reserved. This information is not intended as a substitute for professional medical care. Always follow your healthcare professional's  instructions.             Language Assistance Services     ATTENTION: Language assistance services are available, free of charge. Please call [phone].      ATENCIÓN: Si habla rafa, tiene a samson disposición servicios gratuitos de asistencia lingüística. Llame al [phone].     CHÚ Ý: N?u b?n nói Ti?ng Vi?t, có các d?ch v? h? tr? ngôn ng? mi?n phí dành cho b?n. G?i s? [phone].         [facility] complies with applicable Federal civil rights laws and does not discriminate on the basis of race, color, national origin, age, disability, or sex.

## 2017-02-23 NOTE — PROGRESS NOTES
"Subjective:       Patient ID: Lenny Lopez is a 70 y.o. male.    Chief Complaint: Sinusitis (10 day follow up) and Wheezing    HPI Comments: Mr. Lopez presents to the clinic today for 10 day follow up for COPD exacerbation and sinusitis.  He was seen for cough, wheezing, sinus congestion at last visit.  Sputum culture showed MORAXELLA (BRANHAMELLA) CATARRHALIS and he was prescribed Augmentin.  He also took a course of prednisone.  He reports almost complete resolution of cough and wheezing.  He reports new onset right ear pain which began yesterday.  He denies drainage from the ear.  He is still finishing up the Augmentin.  He is already using Flonase daily and a "breathing pill" which he states Dr. Mosley prescribed, and this has replaced claritin.  He states he "might" clench jaw at night while sleeping but he is unsure.  No jaw popping.    Review of Systems   Constitutional: Negative for chills, fatigue and fever.   HENT: Positive for ear pain. Negative for congestion, hearing loss, rhinorrhea and sinus pressure.    Eyes: Negative for visual disturbance.   Respiratory: Negative for cough, shortness of breath and wheezing.    Cardiovascular: Negative for chest pain, palpitations and leg swelling.   Gastrointestinal: Negative for abdominal pain, constipation and diarrhea.       Objective:      Physical Exam   Constitutional: He is oriented to person, place, and time. He appears well-developed and well-nourished. No distress.   HENT:   Head: Normocephalic and atraumatic.   Right Ear: External ear normal. Tympanic membrane is retracted. Tympanic membrane is not erythematous.   Left Ear: External ear normal. Tympanic membrane is not erythematous and not retracted.   Mouth/Throat: Oropharynx is clear and moist. No oropharyngeal exudate.   Eyes: Pupils are equal, round, and reactive to light. Right eye exhibits no discharge. Left eye exhibits no discharge.   Neck: Neck supple. No thyromegaly present. "   Cardiovascular: Normal rate and regular rhythm.  Exam reveals no gallop and no friction rub.    No murmur heard.  Pulmonary/Chest: Effort normal and breath sounds normal. No respiratory distress. He has no wheezes. He has no rales.   Abdominal: Soft. He exhibits no distension. There is no tenderness.   Lymphadenopathy:     He has no cervical adenopathy.   Neurological: He is alert and oriented to person, place, and time. Coordination normal.   Skin: Skin is warm and dry.   Psychiatric: He has a normal mood and affect. His behavior is normal. Thought content normal.   Vitals reviewed.          Current Outpatient Prescriptions:     albuterol 90 mcg/actuation inhaler, Inhale 2 puffs into the lungs every 6 (six) hours as needed. Ventolin , medical necessary, Disp: 1 Inhaler, Rfl: 3    albuterol-ipratropium 2.5mg-0.5mg/3mL (DUO-NEB) 0.5 mg-3 mg(2.5 mg base)/3 mL nebulizer solution, Take 3 mLs by nebulization every 6 (six) hours as needed for Wheezing., Disp: 120 mL, Rfl: 0    amoxicillin-clavulanate 875-125mg (AUGMENTIN) 875-125 mg per tablet, Take 1 tablet by mouth 2 (two) times daily., Disp: 20 tablet, Rfl: 0    aspirin 81 mg Tab, Take 1 tablet by mouth once daily. Every day PRN, Disp: , Rfl:     blood sugar diagnostic Strp, 1 strip by Misc.(Non-Drug; Combo Route) route 3 (three) times daily., Disp: 300 strip, Rfl: 3    BREO ELLIPTA 200-25 mcg/dose DsDv diskus inhaler, Inhale 1 puff into the lungs once daily., Disp: 60 each, Rfl: 4    dextromethorphan-guaifenesin  mg (MUCINEX DM)  mg per 12 hr tablet, Take 1 tablet by mouth 2 (two) times daily., Disp: , Rfl:     DYMISTA 137-50 mcg/spray [place] nassal spray, 1 spray by Each Nare route 2 (two) times daily as needed., Disp: 23 g, Rfl: 3    gabapentin (NEURONTIN) 100 MG capsule, Take 1 capsule (100 mg total) by mouth every evening., Disp: 30 capsule, Rfl: 2    lancets (FREESTYLE LANCETS) 28 gauge Misc, 1 lancet by Misc.(Non-Drug; Combo Route) route 3  (three) times daily., Disp: 300 each, Rfl: 3    lidocaine-prilocaine (EMLA) cream, Apply topically as needed., Disp: 90 g, Rfl: 3    lisinopril 10 MG tablet, Take 1 tablet (10 mg total) by mouth every evening., Disp: 30 tablet, Rfl: 6    loratadine (CLARITIN) 10 mg tablet, Take 10 mg by mouth once daily., Disp: , Rfl:     MAGNESIUM ORAL, Take by mouth 2 (two) times daily., Disp: , Rfl:     omega-3 fatty acids-vitamin E (FISH OIL) 1,000 mg Cap, Three times a day, Disp: , Rfl:     predniSONE (DELTASONE) 10 MG tablet, Take 2 tabs x 3 days. Take 1 tab x 3 days. Take 1/2 tab x 3 days, Disp: 12 tablet, Rfl: 0    simvastatin (ZOCOR) 20 MG tablet, Take 1 tablet (20 mg total) by mouth once daily., Disp: 90 tablet, Rfl: 11    spironolactone (ALDACTONE) 50 MG tablet, Take 1 tablet (50 mg total) by mouth once daily., Disp: 30 tablet, Rfl: 11    turmeric root extract 500 mg Cap, Take 1 capsule by mouth once daily., Disp: , Rfl:   Assessment:       1. Eustachian tube dysfunction, right    2. Essential hypertension    3. Chronic obstructive asthma with exacerbation        Plan:     Eustachian tube dysfunction, right  Likely due to recent sinusitis.  Complete antibiotics. Continue Flonase.  May need ENT consult if no relief after 2 months or for worsening symptoms.  -     dextromethorphan-guaifenesin  mg (MUCINEX DM)  mg per 12 hr tablet; Take 1 tablet by mouth 2 (two) times daily.    Essential hypertension  Patient readiness: acceptance and barriers:none    During the course of the visit the patient was educated and counseled about the following:     Hypertension:   Medication: no change.    Goals: Hypertension: Reduce Blood Pressure    Did patient meet goals/outcomes: Yes    The following self management tools provided: declined    Patient Instructions (the written plan) was given to the patient/family.     Time spent with patient: 15 minutes    Chronic obstructive asthma with exacerbation  Exacerbation  resolved.

## 2017-02-23 NOTE — PATIENT INSTRUCTIONS

## 2017-03-02 DIAGNOSIS — Z11.59 NEED FOR HEPATITIS C SCREENING TEST: ICD-10-CM

## 2017-03-08 ENCOUNTER — OFFICE VISIT (OUTPATIENT)
Dept: CARDIOLOGY | Facility: CLINIC | Age: 72
End: 2017-03-08
Payer: MEDICARE

## 2017-03-08 VITALS
SYSTOLIC BLOOD PRESSURE: 130 MMHG | HEART RATE: 50 BPM | OXYGEN SATURATION: 97 % | HEIGHT: 71 IN | DIASTOLIC BLOOD PRESSURE: 61 MMHG | WEIGHT: 245.13 LBS | BODY MASS INDEX: 34.32 KG/M2

## 2017-03-08 DIAGNOSIS — R80.9 TYPE 2 DIABETES MELLITUS WITH MICROALBUMINURIA, WITHOUT LONG-TERM CURRENT USE OF INSULIN: ICD-10-CM

## 2017-03-08 DIAGNOSIS — I11.9 LVH (LEFT VENTRICULAR HYPERTROPHY) DUE TO HYPERTENSIVE DISEASE, WITHOUT HEART FAILURE: Primary | ICD-10-CM

## 2017-03-08 DIAGNOSIS — Z72.0 CHEWS TOBACCO REGULARLY: ICD-10-CM

## 2017-03-08 DIAGNOSIS — G47.33 OSA ON CPAP: ICD-10-CM

## 2017-03-08 DIAGNOSIS — E11.29 TYPE 2 DIABETES MELLITUS WITH MICROALBUMINURIA, WITHOUT LONG-TERM CURRENT USE OF INSULIN: ICD-10-CM

## 2017-03-08 DIAGNOSIS — R80.9 MICROALBUMINURIA: ICD-10-CM

## 2017-03-08 DIAGNOSIS — E65 ABDOMINAL OBESITY: ICD-10-CM

## 2017-03-08 DIAGNOSIS — Z91.89 CARDIOVASCULAR RISK FACTOR: ICD-10-CM

## 2017-03-08 DIAGNOSIS — N18.30 CKD (CHRONIC KIDNEY DISEASE) STAGE 3, GFR 30-59 ML/MIN: ICD-10-CM

## 2017-03-08 DIAGNOSIS — R60.9 EDEMA, UNSPECIFIED TYPE: ICD-10-CM

## 2017-03-08 DIAGNOSIS — M05.79 RHEUMATOID ARTHRITIS INVOLVING MULTIPLE SITES WITH POSITIVE RHEUMATOID FACTOR: ICD-10-CM

## 2017-03-08 DIAGNOSIS — E66.9 OBESITY (BMI 30-39.9): ICD-10-CM

## 2017-03-08 PROCEDURE — 1157F ADVNC CARE PLAN IN RCRD: CPT | Mod: S$GLB,,, | Performed by: INTERNAL MEDICINE

## 2017-03-08 PROCEDURE — 99215 OFFICE O/P EST HI 40 MIN: CPT | Mod: S$GLB,,, | Performed by: INTERNAL MEDICINE

## 2017-03-08 PROCEDURE — 4010F ACE/ARB THERAPY RXD/TAKEN: CPT | Mod: S$GLB,,, | Performed by: INTERNAL MEDICINE

## 2017-03-08 PROCEDURE — 3075F SYST BP GE 130 - 139MM HG: CPT | Mod: S$GLB,,, | Performed by: INTERNAL MEDICINE

## 2017-03-08 PROCEDURE — 1160F RVW MEDS BY RX/DR IN RCRD: CPT | Mod: S$GLB,,, | Performed by: INTERNAL MEDICINE

## 2017-03-08 PROCEDURE — 3045F PR MOST RECENT HEMOGLOBIN A1C LEVEL 7.0-9.0%: CPT | Mod: S$GLB,,, | Performed by: INTERNAL MEDICINE

## 2017-03-08 PROCEDURE — 1126F AMNT PAIN NOTED NONE PRSNT: CPT | Mod: S$GLB,,, | Performed by: INTERNAL MEDICINE

## 2017-03-08 PROCEDURE — 99999 PR PBB SHADOW E&M-EST. PATIENT-LVL III: CPT | Mod: PBBFAC,,, | Performed by: INTERNAL MEDICINE

## 2017-03-08 PROCEDURE — 3078F DIAST BP <80 MM HG: CPT | Mod: S$GLB,,, | Performed by: INTERNAL MEDICINE

## 2017-03-08 PROCEDURE — 2022F DILAT RTA XM EVC RTNOPTHY: CPT | Mod: S$GLB,,, | Performed by: INTERNAL MEDICINE

## 2017-03-08 PROCEDURE — 99499 UNLISTED E&M SERVICE: CPT | Mod: S$GLB,,, | Performed by: INTERNAL MEDICINE

## 2017-03-08 PROCEDURE — 1159F MED LIST DOCD IN RCRD: CPT | Mod: S$GLB,,, | Performed by: INTERNAL MEDICINE

## 2017-03-08 NOTE — LETTER
March 8, 2017      Blue Shah MD  [address]  [place] [address]           [facility]iology  [address]  [place] [address]  Phone: [phone]          Patient: Lenny Lopez   MR Number: [id number]   YOB: 1947   Date of Visit: 3/8/2017       Dear Dr. Blue Shah:    Thank you for referring Lenny Lopez to me for evaluation. Attached you will find relevant portions of my assessment and plan of care.    If you have questions, please do not hesitate to call me. I look forward to following Lenny Lopez along with you.    Sincerely,    Aaron Armenta MD    Enclosure  CC:  No Recipients    If you would like to receive this communication electronically, please contact [email or web address] or [phone] to request more information on [employer] Link access.    For providers and/or their staff who would like to refer a patient to [facility], please contact us through our one-stop-shop provider referral line, [facility] , at [phone].    If you feel you have received this communication in error or would no longer like to receive these types of communications, please e-mail [email or web address]

## 2017-03-08 NOTE — MR AVS SNAPSHOT
[place] [facility] [place] - Cardiology  [address]  [place] [address]  Phone: [phone]                  Lenny Lopez   3/8/2017 1:30 PM   Office Visit    Description:  Male : 1947   Provider:  Aaron Armenta MD   Department:  [place] - [facility]           Reason for Visit     Consult           Diagnoses this Visit        Comments    LVH (left ventricular hypertrophy) due to hypertensive disease, without heart failure    -  Primary     Rheumatoid arthritis involving multiple sites with positive rheumatoid factor         Cardiovascular risk factor         Chews tobacco regularly         Type 2 diabetes mellitus with microalbuminuria, without long-term current use of insulin         Microalbuminuria         Obesity (BMI 30-39.9)         CKD (chronic kidney disease) stage 3, GFR 30-59 ml/min         Edema, unspecified type         Abdominal obesity         LUZ on CPAP                To Do List           Future Appointments        Provider Department Dept Phone    3/14/2017 1:20 PM MD [place] [facility] [place] - Pulmonary [phone]      Goals (5 Years of Data)     None      Follow-Up and Disposition     Return in about 1 year (around 3/8/2018).    Follow-up and Disposition History      [facility]     [facility] -  Assistance  Registered nurses in the [facility] provide clinical advisement, health education, appointment booking, and other advisory services.  Call for this free service at [phone].             Medications           Message regarding Medications     Verify the changes and/or additions to your medication regime listed below are the same as discussed with your clinician today.  If any of these changes or additions are incorrect, please notify your healthcare provider.        STOP taking these medications     predniSONE (DELTASONE) 10 MG tablet Take 2 tabs x 3 days. Take 1 tab x 3 days. Take 1/2 tab x 3 days           Verify  "that the below list of medications is an accurate representation of the medications you are currently taking.  If none reported, the list may be blank. If incorrect, please contact your healthcare provider. Carry this list with you in case of emergency.           Current Medications     albuterol 90 mcg/actuation inhaler Inhale 2 puffs into the lungs every 6 (six) hours as needed. Ventolin , medical necessary    albuterol-ipratropium 2.5mg-0.5mg/3mL (DUO-NEB) 0.5 mg-3 mg(2.5 mg base)/3 mL nebulizer solution Take 3 mLs by nebulization every 6 (six) hours as needed for Wheezing.    aspirin 81 mg Tab Take 1 tablet by mouth once daily. Every day PRN    BREO ELLIPTA 200-25 mcg/dose DsDv diskus inhaler Inhale 1 puff into the lungs once daily.    lisinopril 10 MG tablet Take 1 tablet (10 mg total) by mouth every evening.    MAGNESIUM ORAL Take by mouth 2 (two) times daily.    omega-3 fatty acids-vitamin E (FISH OIL) 1,000 mg Cap Three times a day    simvastatin (ZOCOR) 20 MG tablet Take 1 tablet (20 mg total) by mouth once daily.    spironolactone (ALDACTONE) 50 MG tablet Take 1 tablet (50 mg total) by mouth once daily.    turmeric root extract 500 mg Cap Take 1 capsule by mouth once daily.    blood sugar diagnostic Strp 1 strip by Misc.(Non-Drug; Combo Route) route 3 (three) times daily.    DYMISTA 137-50 mcg/spray [place] nassal spray 1 spray by Each Nare route 2 (two) times daily as needed.    gabapentin (NEURONTIN) 100 MG capsule Take 1 capsule (100 mg total) by mouth every evening.    lancets (FREESTYLE LANCETS) 28 gauge Misc 1 lancet by Misc.(Non-Drug; Combo Route) route 3 (three) times daily.    lidocaine-prilocaine (EMLA) cream Apply topically as needed.           Clinical Reference Information           Your Vitals Were     BP Pulse Height Weight SpO2 BMI    130/61 (BP Location: Left arm) 50 5' 11" (1.803 m) 111.2 kg (245 lb 2.4 oz) 97% 34.19 kg/m2      Blood Pressure          Most Recent Value    Right Arm BP - " Sitting  132/69    Left Arm BP - Sitting  130/61    BP  130/61      Allergies as of 3/8/2017     No Known Drug Allergies      Immunizations Administered on Date of Encounter - 3/8/2017     None      Orders Placed During Today's Visit     Future Labs/Procedures Expected by Expires    Microalbumin/creatinine urine ratio  3/8/2017 5/7/2018    Pharmacological stress echo  As directed 3/8/2018      Language Assistance Services     ATTENTION: Language assistance services are available, free of charge. Please call [phone].      ATENCIÓN: Si buck craig, tiene a samson disposición servicios gratuitos de asistencia lingüística. Llame al [phone].     [facility] Ý: N?u b?n nói Ti?ng Vi?t, có các d?ch v? h? tr? ngôn ng? mi?n phí dành cho b?n. G?i s? [phone].         [facility] complies with applicable Federal civil rights laws and does not discriminate on the basis of race, color, national origin, age, disability, or sex.
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
## 2017-03-09 ENCOUNTER — TELEPHONE (OUTPATIENT)
Dept: CARDIOLOGY | Facility: CLINIC | Age: 72
End: 2017-03-09

## 2017-03-09 ENCOUNTER — HOSPITAL ENCOUNTER (OUTPATIENT)
Dept: CARDIOLOGY | Facility: HOSPITAL | Age: 72
Discharge: HOME | End: 2017-03-09
Attending: INTERNAL MEDICINE
Payer: MEDICARE

## 2017-03-09 DIAGNOSIS — Z91.89 CARDIOVASCULAR RISK FACTOR: ICD-10-CM

## 2017-03-09 DIAGNOSIS — E11.9 TYPE 2 DIABETES MELLITUS WITHOUT COMPLICATION: ICD-10-CM

## 2017-03-09 DIAGNOSIS — I11.9 LVH (LEFT VENTRICULAR HYPERTROPHY) DUE TO HYPERTENSIVE DISEASE, WITHOUT HEART FAILURE: ICD-10-CM

## 2017-03-09 DIAGNOSIS — Z72.0 CHEWS TOBACCO REGULARLY: ICD-10-CM

## 2017-03-09 LAB — RETIRED EF AND QEF - SEE NOTES: 55 (ref 55–65)

## 2017-03-09 PROCEDURE — C8930 TTE W OR W/O CONTR, CONT ECG: HCPCS

## 2017-03-09 PROCEDURE — 63600175 PHARM REV CODE 636 W HCPCS: Performed by: INTERNAL MEDICINE

## 2017-03-09 PROCEDURE — 93351 STRESS TTE COMPLETE: CPT | Mod: 26,,, | Performed by: INTERNAL MEDICINE

## 2017-03-09 PROCEDURE — 63600175 PHARM REV CODE 636 W HCPCS

## 2017-03-09 RX ORDER — ATROPINE SULFATE 0.1 MG/ML
INJECTION INTRAVENOUS
Status: DISCONTINUED
Start: 2017-03-09 | End: 2017-03-09 | Stop reason: WASHOUT

## 2017-03-09 RX ORDER — DOBUTAMINE HYDROCHLORIDE 200 MG/100ML
INJECTION INTRAVENOUS
Status: DISPENSED
Start: 2017-03-09 | End: 2017-03-09

## 2017-03-09 NOTE — TELEPHONE ENCOUNTER
Called patient to come in 330PM  pm tomorrow for an appointment for Dr Armenta . Explained that Dr Armenta wants to discuss results for the DSE he had today 03/09/2017  Patient stated that he has to pay another co pay and that upsets him , and that he will try to be at clinic 330 PM

## 2017-03-09 NOTE — PROGRESS NOTES
DOBUTAMINE STRESS ECHO COMPLETED.  VSS, NO PROBLEMS NOTED.  IV D/IVANIA AND PT DISCHARGED FROM UNIT.

## 2017-03-10 ENCOUNTER — OFFICE VISIT (OUTPATIENT)
Dept: CARDIOLOGY | Facility: CLINIC | Age: 72
End: 2017-03-10
Payer: MEDICARE

## 2017-03-10 VITALS
DIASTOLIC BLOOD PRESSURE: 63 MMHG | SYSTOLIC BLOOD PRESSURE: 137 MMHG | WEIGHT: 243.38 LBS | HEIGHT: 71 IN | BODY MASS INDEX: 34.07 KG/M2 | RESPIRATION RATE: 16 BRPM | OXYGEN SATURATION: 96 % | HEART RATE: 71 BPM

## 2017-03-10 DIAGNOSIS — E78.00 HYPERCHOLESTEREMIA: ICD-10-CM

## 2017-03-10 DIAGNOSIS — I11.9 LVH (LEFT VENTRICULAR HYPERTROPHY) DUE TO HYPERTENSIVE DISEASE, WITHOUT HEART FAILURE: ICD-10-CM

## 2017-03-10 DIAGNOSIS — R94.39 ABNORMAL CARDIOVASCULAR STRESS TEST: Primary | ICD-10-CM

## 2017-03-10 DIAGNOSIS — N18.30 CKD (CHRONIC KIDNEY DISEASE) STAGE 3, GFR 30-59 ML/MIN: ICD-10-CM

## 2017-03-10 DIAGNOSIS — I10 ESSENTIAL HYPERTENSION: ICD-10-CM

## 2017-03-10 PROCEDURE — 1157F ADVNC CARE PLAN IN RCRD: CPT | Mod: S$GLB,,, | Performed by: INTERNAL MEDICINE

## 2017-03-10 PROCEDURE — 99499 UNLISTED E&M SERVICE: CPT | Mod: S$GLB,,, | Performed by: INTERNAL MEDICINE

## 2017-03-10 PROCEDURE — 1160F RVW MEDS BY RX/DR IN RCRD: CPT | Mod: S$GLB,,, | Performed by: INTERNAL MEDICINE

## 2017-03-10 PROCEDURE — 3075F SYST BP GE 130 - 139MM HG: CPT | Mod: S$GLB,,, | Performed by: INTERNAL MEDICINE

## 2017-03-10 PROCEDURE — 99214 OFFICE O/P EST MOD 30 MIN: CPT | Mod: S$GLB,,, | Performed by: INTERNAL MEDICINE

## 2017-03-10 PROCEDURE — 1126F AMNT PAIN NOTED NONE PRSNT: CPT | Mod: S$GLB,,, | Performed by: INTERNAL MEDICINE

## 2017-03-10 PROCEDURE — 1159F MED LIST DOCD IN RCRD: CPT | Mod: S$GLB,,, | Performed by: INTERNAL MEDICINE

## 2017-03-10 PROCEDURE — 3078F DIAST BP <80 MM HG: CPT | Mod: S$GLB,,, | Performed by: INTERNAL MEDICINE

## 2017-03-10 PROCEDURE — 99999 PR PBB SHADOW E&M-EST. PATIENT-LVL III: CPT | Mod: PBBFAC,,, | Performed by: INTERNAL MEDICINE

## 2017-03-10 RX ORDER — LISINOPRIL 20 MG/1
20 TABLET ORAL NIGHTLY
Qty: 30 TABLET | Refills: 11 | Status: SHIPPED | OUTPATIENT
Start: 2017-03-10 | End: 2018-02-20

## 2017-03-10 RX ORDER — NITROGLYCERIN 0.4 MG/1
0.4 TABLET SUBLINGUAL EVERY 5 MIN PRN
Qty: 25 TABLET | Refills: 3 | Status: SHIPPED | OUTPATIENT
Start: 2017-03-10 | End: 2020-08-11 | Stop reason: SDUPTHER

## 2017-03-10 RX ORDER — METOPROLOL SUCCINATE 25 MG/1
25 TABLET, EXTENDED RELEASE ORAL DAILY
Qty: 30 TABLET | Refills: 11 | Status: SHIPPED | OUTPATIENT
Start: 2017-03-10 | End: 2018-02-20

## 2017-03-10 RX ORDER — ATORVASTATIN CALCIUM 80 MG/1
80 TABLET, FILM COATED ORAL DAILY
Qty: 30 TABLET | Refills: 11 | Status: SHIPPED | OUTPATIENT
Start: 2017-03-10 | End: 2018-03-10
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
## 2017-03-10 NOTE — MR AVS SNAPSHOT
[facility] - Cardiology  [address]  [facility] [address]  Phone: [phone]                  Lenny Lopez   3/10/2017 1:00 PM   Office Visit    Description:  Male : 1947   Provider:  Aaron Armenta MD   Department:  [facility] - Cardiology           Reason for Visit     Follow-up           Diagnoses this Visit        Comments    Abnormal cardiovascular stress test    -  Primary     Hypercholesteremia         CKD (chronic kidney disease) stage 3, GFR 30-59 ml/min         Essential hypertension         LVH (left ventricular hypertrophy) due to hypertensive disease, without heart failure                To Do List           Future Appointments        Provider Department Dept Phone    3/14/2017 1:20 PM [phone]      Goals (5 Years of Data)     None      Follow-Up and Disposition     Return in about 4 weeks (around 2017).    Follow-up and Disposition History       These Medications        Disp Refills Start End    atorvastatin (LIPITOR) 80 MG tablet 30 tablet 11 3/10/2017 3/10/2018    Take 1 tablet (80 mg total) by mouth once daily. - Oral    Pharmacy: [address] Ph #: [phone]       lisinopril (PRINIVIL,ZESTRIL) 20 MG tablet 30 tablet 11 3/10/2017     Take 1 tablet (20 mg total) by mouth every evening. - Oral    Pharmacy: [address] Ph #: [phone]       metoprolol succinate (TOPROL-XL) 25 MG 24 hr tablet 30 tablet 11 3/10/2017 3/10/2018    Take 1 tablet (25 mg total) by mouth once daily. - Oral    Pharmacy: [facility] [address] [place] [address]. Ph #: [phone]       nitroGLYCERIN (NITROSTAT) 0.4 MG SL tablet 25 tablet 3 3/10/2017 3/10/2018    Place 1 tablet (0.4 mg total) under the tongue every 5 (five) minutes as needed for Chest pain. - Sublingual    Pharmacy: [address]  - [address] [facility].  #: [phone]         [facility]     [facility] - 24/7 Assistance  Registered nurses in the [facility] provide clinical advisement, health education, appointment booking, and other advisory services.  Call for this free service at [phone].             Medications           Message regarding Medications     Verify the changes and/or additions to your medication regime listed below are the same as discussed with your clinician today.  If any of these changes or additions are incorrect, please notify your healthcare provider.        START taking these NEW medications        Refills    atorvastatin (LIPITOR) 80 MG tablet 11    Sig: Take 1 tablet (80 mg total) by mouth once daily.    Class: Normal    Route: Oral    metoprolol succinate (TOPROL-XL) 25 MG 24 hr tablet 11    Sig: Take 1 tablet (25 mg total) by mouth once daily.    Class: Normal    Route: Oral    nitroGLYCERIN (NITROSTAT) 0.4 MG SL tablet 3    Sig: Place 1 tablet (0.4 mg total) under the tongue every 5 (five) minutes as needed for Chest pain.    Class: Normal    Route: Sublingual      CHANGE how you are taking these medications     Start Taking Instead of    lisinopril (PRINIVIL,ZESTRIL) 20 MG tablet lisinopril 10 MG tablet    Dosage:  Take 1 tablet (20 mg total) by mouth every evening. Dosage:  Take 1 tablet (10 mg total) by mouth every evening.    Reason for Change:  Reorder       STOP taking these medications     simvastatin (ZOCOR) 20 MG tablet Take 1 tablet (20 mg total) by mouth once daily.           Verify that the below list of medications is an accurate representation of the medications you are currently taking.  If none reported, the list may be blank. If incorrect, please contact your healthcare provider. Carry this list with you in case of emergency.           Current Medications     albuterol 90 mcg/actuation inhaler Inhale 2 puffs into the lungs every 6 (six) hours as  "needed. Ventolin , medical necessary    albuterol-ipratropium 2.5mg-0.5mg/3mL (DUO-NEB) 0.5 mg-3 mg(2.5 mg base)/3 mL nebulizer solution Take 3 mLs by nebulization every 6 (six) hours as needed for Wheezing.    aspirin 81 mg Tab Take 1 tablet by mouth once daily. Every day PRN    blood sugar diagnostic Strp 1 strip by Misc.(Non-Drug; Combo Route) route 3 (three) times daily.    BREO ELLIPTA 200-25 mcg/dose DsDv diskus inhaler Inhale 1 puff into the lungs once daily.    DYMISTA 137-50 mcg/spray Spry nassal spray 1 spray by Each Nare route 2 (two) times daily as needed.    gabapentin (NEURONTIN) 100 MG capsule Take 1 capsule (100 mg total) by mouth every evening.    lancets (FREESTYLE LANCETS) 28 gauge Misc 1 lancet by Misc.(Non-Drug; Combo Route) route 3 (three) times daily.    lidocaine-prilocaine (EMLA) cream Apply topically as needed.    lisinopril (PRINIVIL,ZESTRIL) 20 MG tablet Take 1 tablet (20 mg total) by mouth every evening.    MAGNESIUM ORAL Take by mouth 2 (two) times daily.    omega-3 fatty acids-vitamin E (FISH OIL) 1,000 mg Cap Three times a day    spironolactone (ALDACTONE) 50 MG tablet Take 1 tablet (50 mg total) by mouth once daily.    turmeric root extract 500 mg Cap Take 1 capsule by mouth once daily.    atorvastatin (LIPITOR) 80 MG tablet Take 1 tablet (80 mg total) by mouth once daily.    metoprolol succinate (TOPROL-XL) 25 MG 24 hr tablet Take 1 tablet (25 mg total) by mouth once daily.    nitroGLYCERIN (NITROSTAT) 0.4 MG SL tablet Place 1 tablet (0.4 mg total) under the tongue every 5 (five) minutes as needed for Chest pain.           Clinical Reference Information           Your Vitals Were     BP Pulse Resp Height Weight SpO2    137/63 71 16 5' 11" (1.803 m) 110.4 kg (243 lb 6.2 oz) 96%    BMI                33.95 kg/m2          Blood Pressure          Most Recent Value    Right Arm BP - Sitting  141/63    Left Arm BP - Sitting  137/63    BP  137/63      Allergies as of 3/10/2017     No Known " Drug Allergies      Immunizations Administered on Date of Encounter - 3/10/2017     None      Instructions      Tips for Quitting Smoking (Cardiovascular)  Quitting smoking is a gift to yourself, one of the best things you can do to keep your heart disease from getting worse. Smoking reduces oxygen flow to your heart by speeding the buildup of plaque and changing the health of your blood vessels. This increases your risk for heart attack, also known as acute myocardial infarction, or AMI. Quitting helps reduce smoking's harmful effects. You may have tried to quit before, but dont give up. Try again. Many smokers try 4 or 5 times before they succeed. It is never too early to benefit from smoking cessation, especially if you already have chronic conditions such as high blood pressure and high cholesterol that put you at increased risk for cardiovascular disease.     Youll have the best chance of success if you join a stop-smoking group and have the support of your doctor, family and friends.     Line up help  · Ask for the support of your family and friends.  · Join a smoking cessation class, or ask your healthcare provider for a referral to a psychologist who specializes in helping people quit smoking.   · Ask your healthcare provider about nicotine replacement products and prescription medicines that can help you quit.  Set a quit date  · Choose a date within the next 2 to 4 weeks.  · After picking a day, veronica it in bold letters on a calendar.     Your quit list  Ideas to stop smoking include:  1. Start by giving up cigarettes at the times you least need them.  2. Keeping a piece of fruit close by at the times you are most vulnerable to reach for a cigarette.  3. Using a nicotine replacement product instead of a cigarette.  Write down a few more ideas.     Set limits  · Limit where you can smoke. Pick one room or a porch, and smoke only in that place.  · Make smoking outdoors a house rule. Other smokers wont  tempt you as much.  · Speak to smokers around you about your intent to stop smoking so they can show consideration for you and limit their smoking around you.  · Hang a list of  quit benefits in the spot where you smoke. Put one on the refrigerator and one on your car dashboard.     For more information  · smokefree.gov/qdta-sk-ib-expert  · National Cancer [place] Smoking Quitline: 877-44U-QUIT (191-159-6462)      Date Last Reviewed: 3/26/2016  © 3099-3912 [employer]. [address] [place] [address]. All rights reserved. This information is not intended as a substitute for professional medical care. Always follow your healthcare professional's instructions.        Understanding Coronary Artery Disease (CAD)    To understand coronary artery disease (CAD), you need to know how your heart works. Your heart is a muscle that pumps blood throughout your body. To work right, your heart needs a steady supply of oxygen. It gets this oxygen from blood supplied by the coronary arteries.        Healthy Artery      Damaged Artery      Narrowed Artery      Blocked Artery   Healthy artery. When a coronary artery is healthy and has no blockages, blood flows through easily. Healthy arteries can easily supply the oxygen-rich blood your heart needs.  Damaged artery. Coronary artery disease begins when damage to the artery lining leads to the buildup of fat-like substances and cholesterol along the artery wall. This is called plaque. This damage could be caused by things like high blood pressure or smoking. This plaque buildup begins to narrow the arteries carrying blood to the heart. This is called atherosclerosis,  Narrowed artery. As more plaque builds up, your artery has trouble supplying blood to your heart muscle when it needs it most, such as during exercise. You may not feel any symptoms when this happens. Or you may feel angina--pressure, tightness, achiness, or pain in your chest, jaw, neck, back,  or arm.  Blocked artery. A piece of plaque may break off and completely block the artery. Or a blood clot may plug the narrowed artery. When this happens, blood flow is blocked from reaching the heart. Without oxygen-rich blood, part of the heart muscle becomes damaged and stops working. You may feel crushing pressure or pain in or around your chest. This is a heart attack (acute myocardial infarction, or AMI) and is a medical emergency.  Date Last Reviewed: 3/28/2016  © 7107-7345 [employer]. [address] [place] [address], [place] [address]. All rights reserved. This information is not intended as a substitute for professional medical care. Always follow your healthcare professional's instructions.        Exercise for a Healthier Heart  You may wonder how you can improve the health of your heart. If youre thinking about exercise, youre on the right track. You dont need to become an athlete, but you do need a certain amount of brisk exercise to help strengthen your heart. If you have been diagnosed with a heart condition, your doctor may recommend exercise to help stabilize your condition. To help make exercise a habit, choose safe, fun activities.     Exercise with a friend. When activity is fun, you're more likely to stick with it.     Be sure to check with your healthcare provider before starting an exercise program.   Why exercise?  Exercising regularly offers many healthy rewards. It can help you do all of the following:  · Improve your blood cholesterol level to help prevent further heart trouble  · Lower your blood pressure to help prevent a stroke or heart attack  · Control diabetes, or reduce your risk of getting this disease  · Improve your heart and lung function  · Reach and maintain a healthy weight  · Make your muscles stronger and more limber so you can stay active  · Prevent falls and fractures by slowing the loss of bone mass (osteoporosis)  · Manage stress better  · Reduce your blood  pressure  · Improve your sense of self and your body image  Exercise tips  Ease into your routine. Set small goals. Then build on them.  Exercise on most days. Aim for a total of 150 or more minutes of moderate to  vigorous intensity activity each week. Consider 40 minutes, 3 to 4 times a week. For best results, activity should last for 40 minutes on average. It is OK to work up to the 40 minute period over time. Examples of moderate-intensity activity is walking 1 mile in 15 minutes or 30 to 45 minutes of yard work.  Step up your daily activity level. Along with your exercise program, try being more active throughout the day. Walk instead of drive. Do more household tasks or yard work.  Choose one or more activities you enjoy. Walking is one of the easiest things you can do. You can also try swimming, riding a bike, dancing, or taking an exercise class.  Stop exercising and call your doctor if you:  · Have chest pain or feel dizzy or lightheaded  · Feel burning, tightness, pressure, or heaviness in your chest, neck, shoulders, back, or arms  · Have unusual shortness of breath  · Have increased joint or muscle pain  · Have palpitations or an irregular heartbeat   Date Last Reviewed: 5/1/2016  © 1825-7744 [employer]. [address] [place] [address], [place] [address]. All rights reserved. This information is not intended as a substitute for professional medical care. Always follow your healthcare professional's instructions.        Heart Disease Education    The heart beats 60 to 100 times per minute, 24 hours a day. This equals almost 1000,000 times a day. It pumps blood with oxygen and nutrients to the tissues and organs of the body. But the heart is a muscle and needs its own supply of blood. Blood flow to the heart is supplied by the coronary arteries. Coronary artery disease (atherosclerosis) is a result of cholesterol, saturated fat, and calcium deposits (plaques) that build up inside the walls. This  causes inflammation within the coronary arteries. These plaques narrow the artery and reduce blood flow to the heart muscle. The reduction in blood flow to the heart muscle decreases oxygen supply to the heart. If the narrowing is significant enough, the oxygen supply to one or more regions of the heart can be temporarily or permanently shut down. This can cause chest pain, and possibly death of heart tissue (heart attack).  Types of chest pain  Angina is the name for pain in the heart muscle. Angina is a warning sign of serious heart disease. When untreated it can lead to a heart attack, also known as acute myocardial infarction, or AMI. Angina occurs when there is not enough blood and oxygen flowing to the heart for the amount of work it is doing. This most often happens during physical exertion, when the heart is working hardest. It is usually relieved by rest or nitroglycerin. Angina may also occur after a large meal when extra blood is sent to the digestive organs and less goes to the heart. In the case of advanced or unstable heart disease, angina can occur at rest or awaken you from sleep. Angina usually lasts from a few minutes up to 20 minutes or more. When treated early, the effects of angina can be reversed without permanent damage to the heart. Angina is a serious condition and needs to be evaluated by a medical professional immediately.  There are two types of angina -- stable and unstable:  · Stable angina usually occurs with a predictable level of activity. Being stable, its character, severity, and occurrence do not change much over time. It usually starts with activity, and resolves with rest or taking your medicine as instructed by your doctor. The symptoms usually do not last long.  · Unstable angina changes or gets worse over time. It is different from whatever you are used to. It may feel different or worse, begin without cause, occur with exercise or exertion, wake you up from sleep, and last  "longer. It may not respond in the same way as it does when you take your usual medicines for an attack. This type of angina can be a warning sign of an impending heart attack.     A heart attack is usually the result of a blood clot that suddenly forms in a coronary artery that has been narrowed with plaque. When this occurs, blood flow may be cut off to a part of the heart muscle, causing the cells to die. This weakens the pumping action of the heart, which affects the delivery of blood to all the other organs in the body including the brain. This damage is not reversible. However, early treatment can limit the amount of damage.  The pain you feel with angina and a heart attack may have a similar quality. However, it is usually different in intensity and duration. Here are some typical descriptions of a heart attack:  · It is most often experienced as a squeezing, crushing, pressure-like sensation in the center of the chest.  · It is sometimes described as something heavy sitting on my chest.  · It may feel more like a bad case of indigestion.  · The pain may spread from the chest to the arm, shoulder, throat or jaw.  · Sometimes the pain is not felt in the chest at all, but only in the arm, shoulder, throat or jaw.  · There may also be nausea, vomiting, dizziness or light-headedness, sweating and trouble breathing.  · Palpitations, or your heart beating rapidly  · A new, irregular heart beat  · Unexplained weakness  You may not be able to tell the difference between "bad" angina and a heart attack at home. Seek help if your symptoms are different than usual. Do not be in denial or just try to "tough it out."  Call 911  This is the fastest and safest way to get to the emergency department. The paramedics can also start treatment on the way to the hospital, saving valuable time for your heart.  · If the angina gets worse, if it continues, or if it stops and returns, call 911 immediately. Do not delay. You may be " having a heart attack.  · After you call 911, take a second tablet or spray unless instructed otherwise. When repeating doses, sit down if possible, because it can make you feel lightheaded or dizzy. Wait another 5 minutes. If the angina still does not go away, take a third tablet or spray. Do not take more than 3 tablets or sprays within 15 minutes. Stay on the phone with 911 for further instruction.  · Your healthcare provider may give you slightly different instructions than those above. If so, follow them carefully.  Do not wait until symptoms become severe to call 911.  Other reasons to call 911 include:  · Trouble breathing  · Feeling lightheaded, faint, or dizzy  · Rapid heart beat  · Slower than usual heart rate compared to your normal  · Angina with weakness, dizziness, fainting, heavy sweating, nausea, or vomiting  · Extreme drowsiness, confusion  · Weakness of an arm or leg or one side of the face  · Difficulty with speech or vision  When to seek medical care  Remember, the signs and symptoms of a heart attack are not always like they are on TV. Sometimes they are not so obvious. You may only feel weak, or just not right. If it is not clear or if you have any doubt, call for advice.  · Seek help if there is a change in the type of pain, if it feels different, or if your symptoms are mild.  · Do not drive yourself. Have someone else drive you. If no one can drive, call 911.  · Do not delay. Fast diagnosis and treatment can prevent or limit the amount of heart damage during a heart attack.  · Do not go to your doctor's office or a clinic as they may not be able to provide all the testing and treatment required for this condition.  · If your doctor has given you medicine to take when symptoms occur, take them but don't delay getting help trying to locate medicines.  What happens in the emergency department  The emergency department is connected to your local emergency medical system (EMS) through 911. That's  "why during a cardiac emergency, calling 911 is the fastest way to get help. The goal of the emergency department is to rapidly screen, evaluate, and treat people.  Once you are there, an electrocardiogram (ECG or heart tracing) will be done. Blood samples may be taken to look for the presence of heart enzymes that leak from damaged heart cells and show if a heart attack is occurring. You will often be evaluated by a heart specialist (cardiologist) who decides the best course of action. In the case of severe angina or early heart attack, and depending on the circumstances, powerful "clot busting" medicines can be used to dissolve blood clots in the coronary artery. In other cases, you may be taken to a cardiac catheterization lab. Here, a tiny balloon-tipped catheter is advanced through blood vessels to the heart. There the balloon is inflated pushing open the blood vessel restoring blood flow.  Risk factors for heart disease  Risk factors for heart disease are a combination of genetic and lifestyle. Many risk factors work by either directly or indirectly damaging the blood vessels of the heart, or by increasing the risk of forming blood or cholesterol clots, which then clog up and block the arteries.     Examples of physical lifestyle risk factors:  · Cigarette smoking  · High blood pressure  · High blood cholesterol  · Use of stimulant drugs such as cocaine, crack, and amphetamines  · Eating a high-fat, high-cholesterol meal  · Diabetes   · Obesity which increases risk for diabetes and high blood pressure  · Lack of regular physical activity     Examples of emotional lifestyle factors:  · Chronic high stress levels release stress hormones. These raise blood pressure and cholesterol level and makes blood clot more easily.  · Held-in anger, hostile or cynical attitude  · Social and emotional isolation, lack of intimacy  · Loss of relationship  · Depression  Other factors that increase the risk of heart attack that " you cannot control :  · Age. The older you get beyond 40, the greater is your risk of significant coronary artery disease.  · Gender. More men than women get heart disease; but once past menopause, women who are not taking estrogen replacement have the same risk as men for a heart attack.  · Family history. If your mother, father, brother or sister has coronary artery disease, your risk of having it is higher than a person your age without this family history.  What can you do to decrease your risk  To reduce your risk of heart disease:  · Get regular checkups with your doctor.  · Take your medicines for blood pressure, cholesterol or diabetes as directed.  · Watch your diet. Eat a heart healthy diet choosing fresh foods, less salt, cholesterol, and fat  · Stop smoking. Get help if needed.  · Get regular exercise.  · Manage stress.  · Carry a list of medicines and doses in your wallet.  Date Last Reviewed: 12/30/2015  © 8657-8855 [employer]. [address] [place] [address]. All rights reserved. This information is not intended as a substitute for professional medical care. Always follow your healthcare professional's instructions.             Language Assistance Services     ATTENTION: Language assistance services are available, free of charge. Please call [phone].      ATENCIÓN: Si maritala rafa, tiene a samson disposición servicios gratuitos de asistencia lingüística. Llame al [phone].     RODGER Ý: N?u b?n nói Ti?ng Vi?t, có các d?ch v? h? tr? ngôn ng? mi?n phí dành cho b?n. G?i s? [phone].         [facility] complies with applicable Federal civil rights laws and does not discriminate on the basis of race, color, national origin, age, disability, or sex.

## 2017-03-10 NOTE — PATIENT INSTRUCTIONS
Tips for Quitting Smoking (Cardiovascular)  Quitting smoking is a gift to yourself, one of the best things you can do to keep your heart disease from getting worse. Smoking reduces oxygen flow to your heart by speeding the buildup of plaque and changing the health of your blood vessels. This increases your risk for heart attack, also known as acute myocardial infarction, or AMI. Quitting helps reduce smoking's harmful effects. You may have tried to quit before, but dont give up. Try again. Many smokers try 4 or 5 times before they succeed. It is never too early to benefit from smoking cessation, especially if you already have chronic conditions such as high blood pressure and high cholesterol that put you at increased risk for cardiovascular disease.     Youll have the best chance of success if you join a stop-smoking group and have the support of your doctor, family and friends.     Line up help  · Ask for the support of your family and friends.  · Join a smoking cessation class, or ask your healthcare provider for a referral to a psychologist who specializes in helping people quit smoking.   · Ask your healthcare provider about nicotine replacement products and prescription medicines that can help you quit.  Set a quit date  · Choose a date within the next 2 to 4 weeks.  · After picking a day, veronica it in bold letters on a calendar.     Your quit list  Ideas to stop smoking include:  1. Start by giving up cigarettes at the times you least need them.  2. Keeping a piece of fruit close by at the times you are most vulnerable to reach for a cigarette.  3. Using a nicotine replacement product instead of a cigarette.  Write down a few more ideas.     Set limits  · Limit where you can smoke. Pick one room or a porch, and smoke only in that place.  · Make smoking outdoors a house rule. Other smokers wont tempt you as much.  · Speak to smokers around you about your intent to stop smoking so they can show consideration  for you and limit their smoking around you.  · Hang a list of  quit benefits in the spot where you smoke. Put one on the refrigerator and one on your car dashboard.     For more information  · smokefree.gov/hyjx-nj-nh-expert  · National Cancer [place] Smoking Quitline: 877-44U-QUIT (094-380-0883)      Date Last Reviewed: 3/26/2016  © 5494-3416 [employer]. [address] [place] [address], [place] [address]. All rights reserved. This information is not intended as a substitute for professional medical care. Always follow your healthcare professional's instructions.        Understanding Coronary Artery Disease (CAD)    To understand coronary artery disease (CAD), you need to know how your heart works. Your heart is a muscle that pumps blood throughout your body. To work right, your heart needs a steady supply of oxygen. It gets this oxygen from blood supplied by the coronary arteries.        Healthy Artery      Damaged Artery      Narrowed Artery      Blocked Artery   Healthy artery. When a coronary artery is healthy and has no blockages, blood flows through easily. Healthy arteries can easily supply the oxygen-rich blood your heart needs.  Damaged artery. Coronary artery disease begins when damage to the artery lining leads to the buildup of fat-like substances and cholesterol along the artery wall. This is called plaque. This damage could be caused by things like high blood pressure or smoking. This plaque buildup begins to narrow the arteries carrying blood to the heart. This is called atherosclerosis,  Narrowed artery. As more plaque builds up, your artery has trouble supplying blood to your heart muscle when it needs it most, such as during exercise. You may not feel any symptoms when this happens. Or you may feel angina--pressure, tightness, achiness, or pain in your chest, jaw, neck, back, or arm.  Blocked artery. A piece of plaque may break off and completely block the artery. Or a blood clot may plug  the narrowed artery. When this happens, blood flow is blocked from reaching the heart. Without oxygen-rich blood, part of the heart muscle becomes damaged and stops working. You may feel crushing pressure or pain in or around your chest. This is a heart attack (acute myocardial infarction, or AMI) and is a medical emergency.  Date Last Reviewed: 3/28/2016  © 4912-1315 [employer]. [address] [place] [address], [place] [address]. All rights reserved. This information is not intended as a substitute for professional medical care. Always follow your healthcare professional's instructions.        Exercise for a Healthier Heart  You may wonder how you can improve the health of your heart. If youre thinking about exercise, youre on the right track. You dont need to become an athlete, but you do need a certain amount of brisk exercise to help strengthen your heart. If you have been diagnosed with a heart condition, your doctor may recommend exercise to help stabilize your condition. To help make exercise a habit, choose safe, fun activities.     Exercise with a friend. When activity is fun, you're more likely to stick with it.     Be sure to check with your healthcare provider before starting an exercise program.   Why exercise?  Exercising regularly offers many healthy rewards. It can help you do all of the following:  · Improve your blood cholesterol level to help prevent further heart trouble  · Lower your blood pressure to help prevent a stroke or heart attack  · Control diabetes, or reduce your risk of getting this disease  · Improve your heart and lung function  · Reach and maintain a healthy weight  · Make your muscles stronger and more limber so you can stay active  · Prevent falls and fractures by slowing the loss of bone mass (osteoporosis)  · Manage stress better  · Reduce your blood pressure  · Improve your sense of self and your body image  Exercise tips  Ease into your routine. Set small goals.  Then build on them.  Exercise on most days. Aim for a total of 150 or more minutes of moderate to  vigorous intensity activity each week. Consider 40 minutes, 3 to 4 times a week. For best results, activity should last for 40 minutes on average. It is OK to work up to the 40 minute period over time. Examples of moderate-intensity activity is walking 1 mile in 15 minutes or 30 to 45 minutes of yard work.  Step up your daily activity level. Along with your exercise program, try being more active throughout the day. Walk instead of drive. Do more household tasks or yard work.  Choose one or more activities you enjoy. Walking is one of the easiest things you can do. You can also try swimming, riding a bike, dancing, or taking an exercise class.  Stop exercising and call your doctor if you:  · Have chest pain or feel dizzy or lightheaded  · Feel burning, tightness, pressure, or heaviness in your chest, neck, shoulders, back, or arms  · Have unusual shortness of breath  · Have increased joint or muscle pain  · Have palpitations or an irregular heartbeat   Date Last Reviewed: 5/1/2016  © 9520-2617 [employer]. [address] [place] [address]. All rights reserved. This information is not intended as a substitute for professional medical care. Always follow your healthcare professional's instructions.        Heart Disease Education    The heart beats 60 to 100 times per minute, 24 hours a day. This equals almost 1000,000 times a day. It pumps blood with oxygen and nutrients to the tissues and organs of the body. But the heart is a muscle and needs its own supply of blood. Blood flow to the heart is supplied by the coronary arteries. Coronary artery disease (atherosclerosis) is a result of cholesterol, saturated fat, and calcium deposits (plaques) that build up inside the walls. This causes inflammation within the coronary arteries. These plaques narrow the artery and reduce blood flow to the heart  muscle. The reduction in blood flow to the heart muscle decreases oxygen supply to the heart. If the narrowing is significant enough, the oxygen supply to one or more regions of the heart can be temporarily or permanently shut down. This can cause chest pain, and possibly death of heart tissue (heart attack).  Types of chest pain  Angina is the name for pain in the heart muscle. Angina is a warning sign of serious heart disease. When untreated it can lead to a heart attack, also known as acute myocardial infarction, or AMI. Angina occurs when there is not enough blood and oxygen flowing to the heart for the amount of work it is doing. This most often happens during physical exertion, when the heart is working hardest. It is usually relieved by rest or nitroglycerin. Angina may also occur after a large meal when extra blood is sent to the digestive organs and less goes to the heart. In the case of advanced or unstable heart disease, angina can occur at rest or awaken you from sleep. Angina usually lasts from a few minutes up to 20 minutes or more. When treated early, the effects of angina can be reversed without permanent damage to the heart. Angina is a serious condition and needs to be evaluated by a medical professional immediately.  There are two types of angina -- stable and unstable:  · Stable angina usually occurs with a predictable level of activity. Being stable, its character, severity, and occurrence do not change much over time. It usually starts with activity, and resolves with rest or taking your medicine as instructed by your doctor. The symptoms usually do not last long.  · Unstable angina changes or gets worse over time. It is different from whatever you are used to. It may feel different or worse, begin without cause, occur with exercise or exertion, wake you up from sleep, and last longer. It may not respond in the same way as it does when you take your usual medicines for an attack. This type of  "angina can be a warning sign of an impending heart attack.     A heart attack is usually the result of a blood clot that suddenly forms in a coronary artery that has been narrowed with plaque. When this occurs, blood flow may be cut off to a part of the heart muscle, causing the cells to die. This weakens the pumping action of the heart, which affects the delivery of blood to all the other organs in the body including the brain. This damage is not reversible. However, early treatment can limit the amount of damage.  The pain you feel with angina and a heart attack may have a similar quality. However, it is usually different in intensity and duration. Here are some typical descriptions of a heart attack:  · It is most often experienced as a squeezing, crushing, pressure-like sensation in the center of the chest.  · It is sometimes described as something heavy sitting on my chest.  · It may feel more like a bad case of indigestion.  · The pain may spread from the chest to the arm, shoulder, throat or jaw.  · Sometimes the pain is not felt in the chest at all, but only in the arm, shoulder, throat or jaw.  · There may also be nausea, vomiting, dizziness or light-headedness, sweating and trouble breathing.  · Palpitations, or your heart beating rapidly  · A new, irregular heart beat  · Unexplained weakness  You may not be able to tell the difference between "bad" angina and a heart attack at home. Seek help if your symptoms are different than usual. Do not be in denial or just try to "tough it out."  Call 911  This is the fastest and safest way to get to the emergency department. The paramedics can also start treatment on the way to the hospital, saving valuable time for your heart.  · If the angina gets worse, if it continues, or if it stops and returns, call 911 immediately. Do not delay. You may be having a heart attack.  · After you call 911, take a second tablet or spray unless instructed otherwise. When " repeating doses, sit down if possible, because it can make you feel lightheaded or dizzy. Wait another 5 minutes. If the angina still does not go away, take a third tablet or spray. Do not take more than 3 tablets or sprays within 15 minutes. Stay on the phone with 911 for further instruction.  · Your healthcare provider may give you slightly different instructions than those above. If so, follow them carefully.  Do not wait until symptoms become severe to call 911.  Other reasons to call 911 include:  · Trouble breathing  · Feeling lightheaded, faint, or dizzy  · Rapid heart beat  · Slower than usual heart rate compared to your normal  · Angina with weakness, dizziness, fainting, heavy sweating, nausea, or vomiting  · Extreme drowsiness, confusion  · Weakness of an arm or leg or one side of the face  · Difficulty with speech or vision  When to seek medical care  Remember, the signs and symptoms of a heart attack are not always like they are on TV. Sometimes they are not so obvious. You may only feel weak, or just not right. If it is not clear or if you have any doubt, call for advice.  · Seek help if there is a change in the type of pain, if it feels different, or if your symptoms are mild.  · Do not drive yourself. Have someone else drive you. If no one can drive, call 911.  · Do not delay. Fast diagnosis and treatment can prevent or limit the amount of heart damage during a heart attack.  · Do not go to your doctor's office or a clinic as they may not be able to provide all the testing and treatment required for this condition.  · If your doctor has given you medicine to take when symptoms occur, take them but don't delay getting help trying to locate medicines.  What happens in the emergency department  The emergency department is connected to your local emergency medical system (EMS) through 911. That's why during a cardiac emergency, calling 911 is the fastest way to get help. The goal of the emergency  "department is to rapidly screen, evaluate, and treat people.  Once you are there, an electrocardiogram (ECG or heart tracing) will be done. Blood samples may be taken to look for the presence of heart enzymes that leak from damaged heart cells and show if a heart attack is occurring. You will often be evaluated by a heart specialist (cardiologist) who decides the best course of action. In the case of severe angina or early heart attack, and depending on the circumstances, powerful "clot busting" medicines can be used to dissolve blood clots in the coronary artery. In other cases, you may be taken to a cardiac catheterization lab. Here, a tiny balloon-tipped catheter is advanced through blood vessels to the heart. There the balloon is inflated pushing open the blood vessel restoring blood flow.  Risk factors for heart disease  Risk factors for heart disease are a combination of genetic and lifestyle. Many risk factors work by either directly or indirectly damaging the blood vessels of the heart, or by increasing the risk of forming blood or cholesterol clots, which then clog up and block the arteries.     Examples of physical lifestyle risk factors:  · Cigarette smoking  · High blood pressure  · High blood cholesterol  · Use of stimulant drugs such as cocaine, crack, and amphetamines  · Eating a high-fat, high-cholesterol meal  · Diabetes   · Obesity which increases risk for diabetes and high blood pressure  · Lack of regular physical activity     Examples of emotional lifestyle factors:  · Chronic high stress levels release stress hormones. These raise blood pressure and cholesterol level and makes blood clot more easily.  · Held-in anger, hostile or cynical attitude  · Social and emotional isolation, lack of intimacy  · Loss of relationship  · Depression  Other factors that increase the risk of heart attack that you cannot control :  · Age. The older you get beyond 40, the greater is your risk of significant " coronary artery disease.  · Gender. More men than women get heart disease; but once past menopause, women who are not taking estrogen replacement have the same risk as men for a heart attack.  · Family history. If your mother, father, brother or sister has coronary artery disease, your risk of having it is higher than a person your age without this family history.  What can you do to decrease your risk  To reduce your risk of heart disease:  · Get regular checkups with your doctor.  · Take your medicines for blood pressure, cholesterol or diabetes as directed.  · Watch your diet. Eat a heart healthy diet choosing fresh foods, less salt, cholesterol, and fat  · Stop smoking. Get help if needed.  · Get regular exercise.  · Manage stress.  · Carry a list of medicines and doses in your wallet.  Date Last Reviewed: 12/30/2015  © 9800-5758 [employer]. [address] [place] [address], [place] [address]. All rights reserved. This information is not intended as a substitute for professional medical care. Always follow your healthcare professional's instructions.

## 2017-03-13 ENCOUNTER — TELEPHONE (OUTPATIENT)
Dept: CARDIOLOGY | Facility: CLINIC | Age: 72
End: 2017-03-13

## 2017-03-13 NOTE — TELEPHONE ENCOUNTER
----- Message from Libby Ibarra sent at 3/13/2017  9:32 AM CDT -----  Patient is returning nurse's call/please call patient back at [phone]

## 2017-03-13 NOTE — TELEPHONE ENCOUNTER
"Returned pts call regarding incorrect date of birth. Pt was advised that his  was showing 43 in Epic system.  Pt advised that he was born on [birth year], and that his 's License shows [birth year]. Pt was very irrate and stated that when he checked in for Dr Armenta appointment on 3/10/17, his ID was checked and he was told by  that "they had to use [birth year], and that it could not be changed."  Pt also stated that he is very unhappy about the way that he is treated by Dr Armenta when in office.   Pt stated that he waited 1 hour and 18 minutes for Dr Armenta to come into exam room, after being roomed and having initial work up done for 1:00pm appointment.  Pt also stated that he will be seeking care elsewhere, due to "Dr Armenta not listening to him, talking over him, and basically making him feel stupid" while he is in the room with him.   Pt expressed his appreciation toward myself and the rest of the nursing staff, stating that he "knew it was not our fault", but feels that something needs to be changed in the way this office is ran.   Phone number to Patient Relations was offer to Mr Lopez, but he declined and stated that he would be in touch at a later date to request Cardiology records be forwarded to new physician.   Pt was satisfied with the outcome of the call and had no further issues, but reiterated that he would be seeking care elsewhere.  "

## 2017-03-14 ENCOUNTER — OFFICE VISIT (OUTPATIENT)
Dept: PULMONOLOGY | Facility: CLINIC | Age: 72
End: 2017-03-14
Payer: MEDICARE

## 2017-03-14 VITALS
OXYGEN SATURATION: 98 % | DIASTOLIC BLOOD PRESSURE: 76 MMHG | BODY MASS INDEX: 34.11 KG/M2 | SYSTOLIC BLOOD PRESSURE: 136 MMHG | HEART RATE: 68 BPM | HEIGHT: 71 IN | WEIGHT: 243.63 LBS

## 2017-03-14 DIAGNOSIS — J41.1 CHRONIC BRONCHITIS, MUCOPURULENT: ICD-10-CM

## 2017-03-14 DIAGNOSIS — J44.0 CHRONIC OBSTRUCTIVE PULMONARY DISEASE WITH ACUTE LOWER RESPIRATORY INFECTION: ICD-10-CM

## 2017-03-14 DIAGNOSIS — G47.33 OSA ON CPAP: ICD-10-CM

## 2017-03-14 DIAGNOSIS — J30.2 SEASONAL ALLERGIC RHINITIS, UNSPECIFIED ALLERGIC RHINITIS TRIGGER: Primary | ICD-10-CM

## 2017-03-14 PROCEDURE — 1159F MED LIST DOCD IN RCRD: CPT | Mod: S$GLB,,, | Performed by: INTERNAL MEDICINE

## 2017-03-14 PROCEDURE — 99999 PR PBB SHADOW E&M-EST. PATIENT-LVL III: CPT | Mod: PBBFAC,,, | Performed by: INTERNAL MEDICINE

## 2017-03-14 PROCEDURE — 3075F SYST BP GE 130 - 139MM HG: CPT | Mod: S$GLB,,, | Performed by: INTERNAL MEDICINE

## 2017-03-14 PROCEDURE — 1157F ADVNC CARE PLAN IN RCRD: CPT | Mod: S$GLB,,, | Performed by: INTERNAL MEDICINE

## 2017-03-14 PROCEDURE — 3078F DIAST BP <80 MM HG: CPT | Mod: S$GLB,,, | Performed by: INTERNAL MEDICINE

## 2017-03-14 PROCEDURE — 1126F AMNT PAIN NOTED NONE PRSNT: CPT | Mod: S$GLB,,, | Performed by: INTERNAL MEDICINE

## 2017-03-14 PROCEDURE — 99499 UNLISTED E&M SERVICE: CPT | Mod: S$GLB,,, | Performed by: INTERNAL MEDICINE

## 2017-03-14 PROCEDURE — 1160F RVW MEDS BY RX/DR IN RCRD: CPT | Mod: S$GLB,,, | Performed by: INTERNAL MEDICINE

## 2017-03-14 PROCEDURE — 99214 OFFICE O/P EST MOD 30 MIN: CPT | Mod: S$GLB,,, | Performed by: INTERNAL MEDICINE

## 2017-03-14 RX ORDER — DOXYCYCLINE 100 MG/1
100 CAPSULE ORAL EVERY 12 HOURS
Qty: 20 CAPSULE | Refills: 2 | Status: SHIPPED | OUTPATIENT
Start: 2017-03-14 | End: 2017-04-13

## 2017-03-14 RX ORDER — PREDNISONE 20 MG/1
TABLET ORAL
Qty: 36 TABLET | Refills: 1 | Status: SHIPPED | OUTPATIENT
Start: 2017-03-14 | End: 2017-11-28 | Stop reason: SDUPTHER

## 2017-03-14 RX ORDER — IPRATROPIUM BROMIDE AND ALBUTEROL SULFATE 2.5; .5 MG/3ML; MG/3ML
3 SOLUTION RESPIRATORY (INHALATION) EVERY 6 HOURS PRN
Qty: 120 ML | Refills: 0 | Status: SHIPPED | OUTPATIENT
Start: 2017-03-14 | End: 2017-06-29 | Stop reason: SDUPTHER

## 2017-03-14 RX ORDER — ALBUTEROL SULFATE 90 UG/1
2 AEROSOL, METERED RESPIRATORY (INHALATION) EVERY 6 HOURS PRN
Qty: 1 INHALER | Refills: 3 | Status: SHIPPED | OUTPATIENT
Start: 2017-03-14 | End: 2017-06-29 | Stop reason: SDUPTHER

## 2017-03-14 RX ORDER — MONTELUKAST SODIUM 10 MG/1
10 TABLET ORAL NIGHTLY
Qty: 30 TABLET | Refills: 11 | Status: SHIPPED | OUTPATIENT
Start: 2017-03-14 | End: 2018-01-09 | Stop reason: SDUPTHER

## 2017-03-14 RX ORDER — AMOXICILLIN 875 MG/1
875 TABLET, FILM COATED ORAL 2 TIMES DAILY
Qty: 20 TABLET | Refills: 2 | Status: SHIPPED | OUTPATIENT
Start: 2017-03-14 | End: 2017-03-24

## 2017-03-14 NOTE — PROGRESS NOTES
3/14/2017    Lenny Lopez  New Patient History and Physical    Chief Complaint   Patient presents with    Asthma    Apnea       HPI: pt follows with Dr dean, has had 3 exacerbations.  Took prednisone x 3 and cleared sort of.  Has had cough and wheeze and seasonal worse.  Chr sinus seems to trigger.      Problem now continuous. Prednisone only effective rx.  On breo - uses regular. Has had freq infections last 2 yrs.    Has diabetes, sugars 150's or so.       The chief compliant  problem is new to me    PFSH:  Past Medical History:   Diagnosis Date    Angina pectoris     Arthritis     Asthma     Back pain     Cataract     COPD (chronic obstructive pulmonary disease)     Coronary artery disease     DM (diabetes mellitus), type 2, 2000 12/12/2014    Hyperlipidemia     Hypertension     Nephrolithiasis     Obesity     Sleep apnea     Thyroid disease     Unspecified disorder of kidney and ureter     Urinary incontinence          Past Surgical History:   Procedure Laterality Date    APPENDECTOMY      EYE SURGERY      left eye secondary to trauma    FRACTURE SURGERY      right arm    KNEE SURGERY      screw    TONSILLECTOMY, ADENOIDECTOMY       Social History   Substance Use Topics    Smoking status: Former Smoker     Types: Cigarettes, Cigars    Smokeless tobacco: Current User     Types: Chew      Comment: smoked a few cigars in his 20s    Alcohol use No      Comment: occ/socially     Family History   Problem Relation Age of Onset    Thyroid disease Other     Cancer Mother     Hypertension Mother     Osteoarthritis Mother     Heart disease Father     Hypertension Father     Cancer Paternal Uncle      lung    Hypertension Sister     Hypertension Brother     Diabetes Maternal Aunt     Collagen disease Neg Hx     Amblyopia Neg Hx     Blindness Neg Hx     Cataracts Neg Hx     Glaucoma Neg Hx     Macular degeneration Neg Hx     Retinal detachment Neg Hx     Strabismus Neg Hx   "   Stroke Neg Hx      Review of patient's allergies indicates:   Allergen Reactions    No known drug allergies        Performance Status:The patient's activity level is functions out of house.      Review of Systems:  a review of eleven systems covering constitutional, Eye, HEENT, Psych, Respiratory, Cardiac, GI, , Musculoskeletal, Endocrine, Dermatologic was negative except for pertinent findings as listed ABOVE and below: wt varies 265 -240, singh, sl abn stress test but no chest pain/mi, urine freq with slow flow, arthritis degenerative.       Exam:Comprehensive exam done. /76 (BP Location: Right arm, Patient Position: Sitting, BP Method: Automatic)  Pulse 68  Ht 5' 11" (1.803 m)  Wt 110.5 kg (243 lb 9.7 oz)  SpO2 98%  BMI 33.98 kg/m2  Exam included Vitals as listed, and patient's appearance and affect and alertness and mood, oral exam for yeast and hygiene and pharynx lesions and Mallapatti (M) score, neck with inspection for jvd and masses and thyroid abnormalities and lymph nodes (supraclavicular and infraclavicular nodes and axillary also examined and noted if abn), chest exam included symmetry and effort and fremitus and percussion and auscultation, cardiac exam included rhythm and gallops and murmur and rubs and jvd and edema, abdominal exam for mass and hepatosplenomegaly and tenderness and hernias and bowel sounds, Musculoskeletal exam with muscle tone and posture and mobility/gait and  strength, and skin for rashes and cyanosis and pallor and turgor, extremity for clubbing.  Findings were normal except for pertinent findings listed below:   M3 and good bs, no edema (trace), diaphragms move.    Radiographs (ct chest and cxr) reviewed: view by direct vision left diaphragm up.    Labs reviewed  eos up     PFT results reviewed Pulmonary Functions, including spirometry and bronchodilator response and lung volumes and diffusion, study was done dec 7, 2016.  Spirometry shows loss of vital " capacity and fev1 with no obstruction and no bronchodilator response.   FEV1 is 55% or 1.81 liters.  Lung volumes show  loss of TLC with restriction 64% and elevated residual volume.  Diffusion shows reduced but falls within normal range when corrected for lung volumes.     Pulmonary functions show restriction. Clinical correlation recommended.      Nathaniel Allan M.D.          4wk ago     Respiratory Culture MORAXELLA (BRANHAMELLA) CATARRHALIS  Many  Normal respiratory iris also present  Beta Lactamase positive    Gram Stain (Respiratory) <10 epithelial cells per low power field.   Gram Stain (Respiratory) Rare WBC's               Plan:  Clinical impression is resonably certain and repeated evaluation prn +/- follow up will be needed as below.    Lenny was seen today for asthma and apnea.    Diagnoses and all orders for this visit:    Seasonal allergic rhinitis, unspecified allergic rhinitis trigger  -     amoxicillin (AMOXIL) 875 MG tablet; Take 1 tablet (875 mg total) by mouth 2 (two) times daily.  -     montelukast (SINGULAIR) 10 mg tablet; Take 1 tablet (10 mg total) by mouth every evening.  -     IgE; Future    Chronic obstructive pulmonary disease with acute lower respiratory infection  -     amoxicillin (AMOXIL) 875 MG tablet; Take 1 tablet (875 mg total) by mouth 2 (two) times daily.  -     predniSONE (DELTASONE) 20 MG tablet; 3 for 3 days then 2 for 3 days then one for 3 days and repeat for breathing problems  -     albuterol-ipratropium 2.5mg-0.5mg/3mL (DUO-NEB) 0.5 mg-3 mg(2.5 mg base)/3 mL nebulizer solution; Take 3 mLs by nebulization every 6 (six) hours as needed for Wheezing.  -     albuterol 90 mcg/actuation inhaler; Inhale 2 puffs into the lungs every 6 (six) hours as needed. Ventolin , medical necessary    LUZ on CPAP, 100% use, 2016    Asthma, persistent  -     amoxicillin (AMOXIL) 875 MG tablet; Take 1 tablet (875 mg total) by mouth 2 (two) times daily.  -     montelukast (SINGULAIR) 10 mg  tablet; Take 1 tablet (10 mg total) by mouth every evening.  -     predniSONE (DELTASONE) 20 MG tablet; 3 for 3 days then 2 for 3 days then one for 3 days and repeat for breathing problems  -     albuterol-ipratropium 2.5mg-0.5mg/3mL (DUO-NEB) 0.5 mg-3 mg(2.5 mg base)/3 mL nebulizer solution; Take 3 mLs by nebulization every 6 (six) hours as needed for Wheezing.  -     albuterol 90 mcg/actuation inhaler; Inhale 2 puffs into the lungs every 6 (six) hours as needed. Ventolin , medical necessary  -     IgE; Future    Chronic bronchitis, mucopurulent  -     amoxicillin (AMOXIL) 875 MG tablet; Take 1 tablet (875 mg total) by mouth 2 (two) times daily.  -     AFB Culture & Smear; Future  -     AFB Culture & Smear; Future  -     Culture, Respiratory with Gram Stain; Future  -     doxycycline (VIBRAMYCIN) 100 MG Cap; Take 1 capsule (100 mg total) by mouth every 12 (twelve) hours.  -     albuterol-ipratropium 2.5mg-0.5mg/3mL (DUO-NEB) 0.5 mg-3 mg(2.5 mg base)/3 mL nebulizer solution; Take 3 mLs by nebulization every 6 (six) hours as needed for Wheezing.  -     albuterol 90 mcg/actuation inhaler; Inhale 2 puffs into the lungs every 6 (six) hours as needed. Ventolin , medical necessary      Return in about 4 weeks (around 4/11/2017), or if symptoms worsen or fail to improve.    Discussed with patient above for education the following:        Chronic sinus - singulair, flonase and zyrtec ok, you may be on astelin also?   Had sinus infection in 2012, has freq need for antibiotic        Chronic lung disease- suspect chronic asthma caused copd. You had lots eosinophils on recent blood test. Asthma should do better with singulair.  Lung capacity is 55% or so- not too bad.   breo and singulair prevents      Albuterol as needed, may up to every 4 hrs if needed.      Prednisone (had used 10 mg - script is for 20 mg) one daily for 3 to 5 days for mild.  Higher doses should run sugar up.        Check for unusual lung infections.   Specific asthma therapy for eosinophilic asthma (nucala)??      antibiotics   Doxycycline or amoxil reasonable - only if yellow mucous or sinus pain/infection.

## 2017-03-14 NOTE — PATIENT INSTRUCTIONS
Chronic sinus - singulair, flonase and zyrtec ok, you may be on astelin also?   Had sinus infection in 2012, has freq need for antibiotic        Chronic lung disease- suspect chronic asthma caused copd. You had lots eosinophils on recent blood test. Asthma should do better with singulair.  Lung capacity is 55% or so- not too bad.   breo and singulair prevents      Albuterol as needed, may up to every 4 hrs if needed.      Prednisone (had used 10 mg - script is for 20 mg) one daily for 3 to 5 days for mild.  Higher doses should run sugar up.        Check for unusual lung infections.  Specific asthma therapy for eosinophilic asthma (nucala)??      antibiotics   Doxycycline or amoxil reasonable - only if yellow mucous or sinus pain/infection.                                           TEST DESCRIPTION   The patient received a graduated infusion of Dobutamine beginning at 10.00 mcg/Kg/min to a peak dose of 50 mcg/Kg/min, achieving a peak heart rate of 133 bpm, which is 90% of the age predicted maximum heart rate. . The patient experienced 6/10 shortness   of breath. All symptoms resolved by in recovery recovery.     At peak infusion, EKG revealed < 1mm of horizontal ST segment depression at a maximum heart rate of 133 bpm.     EKG Conclusions:    1. The EKG portion of this study is negative for ischemia at a peak heart rate of 133 bpm (90% of predicted).   2. Blood pressure remained stable throughout the protocol  (Presenting BP: 123/62 Peak BP: 183/57).   3. The following arrhythmias were present: PACs & PVCs.   4. The patient reported significant dyspnea during the protocol which resolved in recovery.     Echocardiographic Description:  Technical Quality: This is a technically challenging study. This study was performed in conjunction with a 3.00ml intravenous injection of Optison contrast agent because of poor endocardial visualization.     Aorta: The aortic root is normal in size, measuring 2.4 cm at sinotubular  junction.     Left Atrium: The left atrial volume index is normal, measuring 27.12 cc/m2.     Left Ventricle: The left ventricle is normal in size, with an end-diastolic diameter of 4.8 cm, and an end-systolic diameter of 3.5 cm. Wall thickness is upper limit of normal in size, with the septum and the posterior wall each measuring 1.1 cm across.   Relative wall thickness was increased at 0.46, and the LV mass index was 99.2 g/m2 consistent with concentric remodeling. Global left ventricular systolic function appears normal. Visually estimated ejection fraction is 55-60%.       Right Atrium: The right atrium is mildly enlarged, measuring 4.7 cm in length and 3.2 cm in width in the apical view.     Right Ventricle: The right ventricle is normal in size measuring 2.7 cm at the base in the apical right ventricle-focused view. Global right ventricular systolic function was not well seen.     Mitral Valve:  There is mitral annular calcification.     Intracavitary: There is no evidence of pericardial effusion, intracavity mass, thrombi, or vegetation.     Peak Imaging:    Images taken at peak infusion showed left ventricular function augmenting. Left ventricular end systolic volume decreases.     The apex worsens becoming moderately hypokinetic.       CONCLUSIONS     1 - Concentric remodeling.     2 - Normal left ventricular systolic function (EF 55-60%).     3 - Right atrial enlargement.     4 - Difficult windows, Optison contrast used for wall motion analysis.     Positive stress echocardiographic study demonstrating an ischemic response involving the apex.         This document has been electronically    SIGNED BY: Aaron Armenta MD On: 03/09/2017 12:00    This document was originally electronically signed on: 03/09/2017 11:59

## 2017-03-14 NOTE — MR AVS SNAPSHOT
[facility]  [address]  [place] [address]  Phone: [phone]                  Lenny Lopez   3/14/2017 1:20 PM   Office Visit    Description:  Male : 1947   Provider:  Nathaniel Allan MD   Department:  [facility]           Reason for Visit     Asthma     Apnea           Diagnoses this Visit        Comments    Seasonal allergic rhinitis, unspecified allergic rhinitis trigger    -  Primary     Chronic obstructive pulmonary disease with acute lower respiratory infection         LUZ on CPAP         Asthma, persistent         Chronic bronchitis, mucopurulent                To Do List           Future Appointments        Provider Department Dept Phone    2017 10:40 AM [phone]    2017 3:00 PM Aaron Armenta MD [facility] - Cardiology [phone]      Goals (5 Years of Data)     None      Follow-Up and Disposition     Return in about 4 weeks (around 2017), or if symptoms worsen or fail to improve.    Follow-up and Disposition History       These Medications        Disp Refills Start End    amoxicillin (AMOXIL) 875 MG tablet 20 tablet 2 3/14/2017 3/24/2017    Take 1 tablet (875 mg total) by mouth 2 (two) times daily. - Oral    Pharmacy: [facility] [address] [place] [address]. Ph #: [phone]       montelukast (SINGULAIR) 10 mg tablet 30 tablet 11 3/14/2017     Take 1 tablet (10 mg total) by mouth every evening. - Oral    Pharmacy: [facility] [address] [place] [address]. Ph #: [phone]       predniSONE (DELTASONE) 20 MG tablet 36 tablet 1 3/14/2017     3 for 3 days then 2 for 3 days then one for 3 days and repeat for breathing problems    Pharmacy: [facility] [address] [place] [address]. Ph #: [phone]       doxycycline (VIBRAMYCIN) 100 MG Cap 20 capsule 2 3/14/2017 2017    Take 1 capsule (100 mg total) by mouth every 12  (twelve) hours. - Oral    Pharmacy: [facility] [address] [place] [address]. Ph #: [phone]       albuterol-ipratropium 2.5mg-0.5mg/3mL (DUO-NEB) 0.5 mg-3 mg(2.5 mg base)/3 mL nebulizer solution 120 mL 0 3/14/2017 3/14/2018    Take 3 mLs by nebulization every 6 (six) hours as needed for Wheezing. - Nebulization    Pharmacy: [facility] [address] [place] [address]. Ph #: [phone]       albuterol 90 mcg/actuation inhaler 1 Inhaler 3 3/14/2017 3/14/2018    Inhale 2 puffs into the lungs every 6 (six) hours as needed. Ventolin , medical necessary - Inhalation    Pharmacy: [facility] [address] [place] [address]. Ph #: [phone]         [facility] On Call     [facility] On Call Nurse [facility] - 24/7 Assistance  Registered nurses in the [facility] On Call Center provide clinical advisement, health education, appointment booking, and other advisory services.  Call for this free service at [phone].             Medications           Message regarding Medications     Verify the changes and/or additions to your medication regime listed below are the same as discussed with your clinician today.  If any of these changes or additions are incorrect, please notify your healthcare provider.        START taking these NEW medications        Refills    amoxicillin (AMOXIL) 875 MG tablet 2    Sig: Take 1 tablet (875 mg total) by mouth 2 (two) times daily.    Class: Normal    Route: Oral    montelukast (SINGULAIR) 10 mg tablet 11    Sig: Take 1 tablet (10 mg total) by mouth every evening.    Class: Normal    Route: Oral    predniSONE (DELTASONE) 20 MG tablet 1    Sig: 3 for 3 days then 2 for 3 days then one for 3 days and repeat for breathing problems    Class: Normal    doxycycline (VIBRAMYCIN) 100 MG Cap 2    Sig: Take 1 capsule (100 mg total) by mouth every 12 (twelve) hours.    Class: Normal    Route: Oral      STOP taking these medications     DYMISTA 137-50 mcg/spray  Spry nassal spray 1 spray by Each Nare route 2 (two) times daily as needed.           Verify that the below list of medications is an accurate representation of the medications you are currently taking.  If none reported, the list may be blank. If incorrect, please contact your healthcare provider. Carry this list with you in case of emergency.           Current Medications     aspirin 81 mg Tab Take 1 tablet by mouth once daily. Every day PRN    blood sugar diagnostic Strp 1 strip by Misc.(Non-Drug; Combo Route) route 3 (three) times daily.    BREO ELLIPTA 200-25 mcg/dose DsDv diskus inhaler Inhale 1 puff into the lungs once daily.    gabapentin (NEURONTIN) 100 MG capsule Take 1 capsule (100 mg total) by mouth every evening.    lancets (FREESTYLE LANCETS) 28 gauge Misc 1 lancet by Misc.(Non-Drug; Combo Route) route 3 (three) times daily.    lisinopril (PRINIVIL,ZESTRIL) 20 MG tablet Take 1 tablet (20 mg total) by mouth every evening.    omega-3 fatty acids-vitamin E (FISH OIL) 1,000 mg Cap Three times a day    spironolactone (ALDACTONE) 50 MG tablet Take 1 tablet (50 mg total) by mouth once daily.    turmeric root extract 500 mg Cap Take 1 capsule by mouth once daily.    albuterol 90 mcg/actuation inhaler Inhale 2 puffs into the lungs every 6 (six) hours as needed. Ventolin , medical necessary    albuterol-ipratropium 2.5mg-0.5mg/3mL (DUO-NEB) 0.5 mg-3 mg(2.5 mg base)/3 mL nebulizer solution Take 3 mLs by nebulization every 6 (six) hours as needed for Wheezing.    amoxicillin (AMOXIL) 875 MG tablet Take 1 tablet (875 mg total) by mouth 2 (two) times daily.    atorvastatin (LIPITOR) 80 MG tablet Take 1 tablet (80 mg total) by mouth once daily.    doxycycline (VIBRAMYCIN) 100 MG Cap Take 1 capsule (100 mg total) by mouth every 12 (twelve) hours.    lidocaine-prilocaine (EMLA) cream Apply topically as needed.    MAGNESIUM ORAL Take by mouth 2 (two) times daily.    metoprolol succinate (TOPROL-XL) 25 MG 24 hr tablet  "Take 1 tablet (25 mg total) by mouth once daily.    montelukast (SINGULAIR) 10 mg tablet Take 1 tablet (10 mg total) by mouth every evening.    nitroGLYCERIN (NITROSTAT) 0.4 MG SL tablet Place 1 tablet (0.4 mg total) under the tongue every 5 (five) minutes as needed for Chest pain.    predniSONE (DELTASONE) 20 MG tablet 3 for 3 days then 2 for 3 days then one for 3 days and repeat for breathing problems           Clinical Reference Information           Your Vitals Were     BP Pulse Height Weight SpO2 BMI    136/76 (BP Location: Right arm, Patient Position: Sitting, BP Method: Automatic) 68 5' 11" (1.803 m) 110.5 kg (243 lb 9.7 oz) 98% 33.98 kg/m2      Blood Pressure          Most Recent Value    BP  136/76      Allergies as of 3/14/2017     No Known Drug Allergies      Immunizations Administered on Date of Encounter - 3/14/2017     None      Orders Placed During Today's Visit     Future Labs/Procedures Expected by Expires    AFB Culture & Smear  3/14/2017 5/13/2018    IgE  3/14/2017 5/13/2018    Culture, Respiratory with Gram Stain  4/3/2017 (Approximate) 5/13/2018    AFB Culture & Smear  As directed 3/14/2018      Instructions      Chronic sinus - singulair, flonase and zyrtec ok, you may be on astelin also?   Had sinus infection in 2012, has freq need for antibiotic        Chronic lung disease- suspect chronic asthma caused copd. You had lots eosinophils on recent blood test. Asthma should do better with singulair.  Lung capacity is 55% or so- not too bad.   breo and singulair prevents      Albuterol as needed, may up to every 4 hrs if needed.      Prednisone (had used 10 mg - script is for 20 mg) one daily for 3 to 5 days for mild.  Higher doses should run sugar up.        Check for unusual lung infections.  Specific asthma therapy for eosinophilic asthma (nucala)??      antibiotics   Doxycycline or amoxil reasonable - only if yellow mucous or sinus pain/infection.                                           TEST " DESCRIPTION   The patient received a graduated infusion of Dobutamine beginning at 10.00 mcg/Kg/min to a peak dose of 50 mcg/Kg/min, achieving a peak heart rate of 133 bpm, which is 90% of the age predicted maximum heart rate. . The patient experienced 6/10 shortness   of breath. All symptoms resolved by in recovery recovery.     At peak infusion, EKG revealed < 1mm of horizontal ST segment depression at a maximum heart rate of 133 bpm.     EKG Conclusions:    1. The EKG portion of this study is negative for ischemia at a peak heart rate of 133 bpm (90% of predicted).   2. Blood pressure remained stable throughout the protocol  (Presenting BP: 123/62 Peak BP: 183/57).   3. The following arrhythmias were present: PACs & PVCs.   4. The patient reported significant dyspnea during the protocol which resolved in recovery.     Echocardiographic Description:  Technical Quality: This is a technically challenging study. This study was performed in conjunction with a 3.00ml intravenous injection of Optison contrast agent because of poor endocardial visualization.     Aorta: The aortic root is normal in size, measuring 2.4 cm at sinotubular junction.     Left Atrium: The left atrial volume index is normal, measuring 27.12 cc/m2.     Left Ventricle: The left ventricle is normal in size, with an end-diastolic diameter of 4.8 cm, and an end-systolic diameter of 3.5 cm. Wall thickness is upper limit of normal in size, with the septum and the posterior wall each measuring 1.1 cm across.   Relative wall thickness was increased at 0.46, and the LV mass index was 99.2 g/m2 consistent with concentric remodeling. Global left ventricular systolic function appears normal. Visually estimated ejection fraction is 55-60%.       Right Atrium: The right atrium is mildly enlarged, measuring 4.7 cm in length and 3.2 cm in width in the apical view.     Right Ventricle: The right ventricle is normal in size measuring 2.7 cm at the base in the  apical right ventricle-focused view. Global right ventricular systolic function was not well seen.     Mitral Valve:  There is mitral annular calcification.     Intracavitary: There is no evidence of pericardial effusion, intracavity mass, thrombi, or vegetation.     Peak Imaging:    Images taken at peak infusion showed left ventricular function augmenting. Left ventricular end systolic volume decreases.     The apex worsens becoming moderately hypokinetic.       CONCLUSIONS     1 - Concentric remodeling.     2 - Normal left ventricular systolic function (EF 55-60%).     3 - Right atrial enlargement.     4 - Difficult windows, Optison contrast used for wall motion analysis.     Positive stress echocardiographic study demonstrating an ischemic response involving the apex.         This document has been electronically    SIGNED BY: Aaron Armenta MD On: 03/09/2017 12:00    This document was originally electronically signed on: 03/09/2017 11:59       Language Assistance Services     ATTENTION: Language assistance services are available, free of charge. Please call [phone].      ATENCIÓN: Si habla español, tiene a samson disposición servicios gratuitos de asistencia lingüística. Llame al [phone].     RODGER Ý: N?u b?n nói Ti?ng Vi?t, có các d?ch v? h? tr? ngôn ng? mi?n phí dành cho b?n. G?i s? [phone].         [facility] complies with applicable Federal civil rights laws and does not discriminate on the basis of race, color, national origin, age, disability, or sex.

## 2017-03-15 ENCOUNTER — LAB VISIT (OUTPATIENT)
Dept: LAB | Facility: HOSPITAL | Age: 72
End: 2017-03-15
Attending: INTERNAL MEDICINE
Payer: MEDICARE

## 2017-03-15 DIAGNOSIS — J41.1 CHRONIC BRONCHITIS, MUCOPURULENT: ICD-10-CM

## 2017-03-15 PROCEDURE — 87186 SC STD MICRODIL/AGAR DIL: CPT

## 2017-03-15 PROCEDURE — 87149 DNA/RNA DIRECT PROBE: CPT

## 2017-03-15 PROCEDURE — 87116 MYCOBACTERIA CULTURE: CPT

## 2017-03-15 PROCEDURE — 87070 CULTURE OTHR SPECIMN AEROBIC: CPT

## 2017-03-15 PROCEDURE — 87015 SPECIMEN INFECT AGNT CONCNTJ: CPT

## 2017-03-15 PROCEDURE — 87118 MYCOBACTERIC IDENTIFICATION: CPT

## 2017-03-15 PROCEDURE — 87205 SMEAR GRAM STAIN: CPT

## 2017-03-17 LAB
BACTERIA SPEC AEROBE CULT: NORMAL
GRAM STN SPEC: NORMAL

## 2017-03-28 ENCOUNTER — LAB VISIT (OUTPATIENT)
Dept: LAB | Facility: HOSPITAL | Age: 72
End: 2017-03-28
Attending: INTERNAL MEDICINE
Payer: MEDICARE

## 2017-03-28 DIAGNOSIS — J30.2 SEASONAL ALLERGIC RHINITIS, UNSPECIFIED ALLERGIC RHINITIS TRIGGER: ICD-10-CM

## 2017-03-28 LAB — IGE SERPL-ACNC: 174 IU/ML

## 2017-03-28 PROCEDURE — 82785 ASSAY OF IGE: CPT

## 2017-03-28 PROCEDURE — 36415 COLL VENOUS BLD VENIPUNCTURE: CPT | Mod: PO

## 2017-03-29 ENCOUNTER — TELEPHONE (OUTPATIENT)
Dept: PULMONOLOGY | Facility: CLINIC | Age: 72
End: 2017-03-29

## 2017-03-29 NOTE — TELEPHONE ENCOUNTER
Spoke to pt. He was concerned about the directions on the label of the prednisone. After reviewing his med list and the AVS from last office visit, I advised him to only take one tablet a day for 3-5 and call us and let us know how he feels after 2-3 days. Pt stated he was much more comfortable with that and he understood my instructions.

## 2017-03-29 NOTE — TELEPHONE ENCOUNTER
----- Message from Radha Pitts sent at 3/29/2017  9:27 AM CDT -----  Contact: Wife,Halie Ambrose wants to speak with a nurse regarding prednisone dosage, patient think he taking too much of it,  please call back at [phone]

## 2017-03-30 ENCOUNTER — TELEPHONE (OUTPATIENT)
Dept: PULMONOLOGY | Facility: CLINIC | Age: 72
End: 2017-03-30

## 2017-03-30 NOTE — TELEPHONE ENCOUNTER
Spoke with pt and informed him of possible mycobacterial infection. Told him we would call back if needed in office sooner than next scheduled appt

## 2017-03-30 NOTE — TELEPHONE ENCOUNTER
----- Message from Nathaniel Allan MD sent at 3/30/2017 12:58 PM CDT -----  May have mycobacterial infection, id likely soon if significant, discuss at follow up

## 2017-04-04 ENCOUNTER — TELEPHONE (OUTPATIENT)
Dept: PULMONOLOGY | Facility: CLINIC | Age: 72
End: 2017-04-04

## 2017-04-04 NOTE — TELEPHONE ENCOUNTER
Notified pt of Mycobacterial in sputum and that tb is not likely       ----- Message from Nathaniel Allan MD sent at 4/4/2017 12:59 PM CDT -----  Has mycobacterial in sputum, may not be significant.  Will take a few days to weeks to id. Tb is not likely.

## 2017-04-04 NOTE — PROGRESS NOTES
Has mycobacterial in sputum, may not be significant.  Will take a few days to weeks to id. Tb is not likely.

## 2017-04-11 ENCOUNTER — OFFICE VISIT (OUTPATIENT)
Dept: PULMONOLOGY | Facility: CLINIC | Age: 72
End: 2017-04-11
Payer: MEDICARE

## 2017-04-11 VITALS
DIASTOLIC BLOOD PRESSURE: 71 MMHG | OXYGEN SATURATION: 95 % | WEIGHT: 256.81 LBS | SYSTOLIC BLOOD PRESSURE: 115 MMHG | HEIGHT: 71 IN | BODY MASS INDEX: 35.95 KG/M2 | HEART RATE: 68 BPM

## 2017-04-11 DIAGNOSIS — N40.0 PROSTATISM: ICD-10-CM

## 2017-04-11 DIAGNOSIS — A31.0 MAI (MYCOBACTERIUM AVIUM-INTRACELLULARE): ICD-10-CM

## 2017-04-11 DIAGNOSIS — J41.1 CHRONIC BRONCHITIS, MUCOPURULENT: ICD-10-CM

## 2017-04-11 DIAGNOSIS — R60.9 EDEMA, UNSPECIFIED TYPE: ICD-10-CM

## 2017-04-11 DIAGNOSIS — E11.9 TYPE 2 DIABETES MELLITUS WITHOUT COMPLICATION: ICD-10-CM

## 2017-04-11 DIAGNOSIS — J44.0 CHRONIC OBSTRUCTIVE PULMONARY DISEASE WITH ACUTE LOWER RESPIRATORY INFECTION: Primary | ICD-10-CM

## 2017-04-11 DIAGNOSIS — R09.89 CHRONIC SINUS COMPLAINTS: ICD-10-CM

## 2017-04-11 PROCEDURE — 3078F DIAST BP <80 MM HG: CPT | Mod: S$GLB,,, | Performed by: INTERNAL MEDICINE

## 2017-04-11 PROCEDURE — 1160F RVW MEDS BY RX/DR IN RCRD: CPT | Mod: S$GLB,,, | Performed by: INTERNAL MEDICINE

## 2017-04-11 PROCEDURE — 3074F SYST BP LT 130 MM HG: CPT | Mod: S$GLB,,, | Performed by: INTERNAL MEDICINE

## 2017-04-11 PROCEDURE — 1157F ADVNC CARE PLAN IN RCRD: CPT | Mod: S$GLB,,, | Performed by: INTERNAL MEDICINE

## 2017-04-11 PROCEDURE — 99999 PR PBB SHADOW E&M-EST. PATIENT-LVL IV: CPT | Mod: PBBFAC,,, | Performed by: INTERNAL MEDICINE

## 2017-04-11 PROCEDURE — 1126F AMNT PAIN NOTED NONE PRSNT: CPT | Mod: S$GLB,,, | Performed by: INTERNAL MEDICINE

## 2017-04-11 PROCEDURE — 99499 UNLISTED E&M SERVICE: CPT | Mod: S$GLB,,, | Performed by: INTERNAL MEDICINE

## 2017-04-11 PROCEDURE — 99214 OFFICE O/P EST MOD 30 MIN: CPT | Mod: S$GLB,,, | Performed by: INTERNAL MEDICINE

## 2017-04-11 PROCEDURE — 1159F MED LIST DOCD IN RCRD: CPT | Mod: S$GLB,,, | Performed by: INTERNAL MEDICINE

## 2017-04-11 RX ORDER — SIMVASTATIN 20 MG/1
TABLET, FILM COATED ORAL
Qty: 90 TABLET | Refills: 0 | Status: SHIPPED | OUTPATIENT
Start: 2017-04-11 | End: 2017-04-12 | Stop reason: ALTCHOICE

## 2017-04-11 RX ORDER — AZITHROMYCIN 500 MG/1
500 TABLET, FILM COATED ORAL DAILY
Qty: 30 TABLET | Refills: 11 | Status: SHIPPED | OUTPATIENT
Start: 2017-04-11 | End: 2017-06-29

## 2017-04-11 RX ORDER — LEVOFLOXACIN 500 MG/1
500 TABLET, FILM COATED ORAL DAILY
Qty: 12 TABLET | Refills: 3 | Status: SHIPPED | OUTPATIENT
Start: 2017-04-11 | End: 2017-11-21

## 2017-04-11 RX ORDER — TAMSULOSIN HYDROCHLORIDE 0.4 MG/1
0.4 CAPSULE ORAL DAILY
Qty: 30 CAPSULE | Refills: 11 | Status: SHIPPED | OUTPATIENT
Start: 2017-04-11 | End: 2017-06-29 | Stop reason: SDUPTHER

## 2017-04-11 RX ORDER — INDOMETHACIN 50 MG/1
CAPSULE ORAL
COMMUNITY
Start: 2017-03-24 | End: 2017-06-29 | Stop reason: SDUPTHER

## 2017-04-11 RX ORDER — FUROSEMIDE 20 MG/1
20 TABLET ORAL DAILY PRN
Qty: 15 TABLET | Refills: 1 | Status: SHIPPED | OUTPATIENT
Start: 2017-04-11 | End: 2017-11-28

## 2017-04-11 RX ORDER — AZELASTINE 1 MG/ML
SPRAY, METERED NASAL
COMMUNITY
Start: 2017-02-14 | End: 2017-11-28

## 2017-04-11 RX ORDER — ZAFIRLUKAST 20 MG/1
TABLET, FILM COATED ORAL
COMMUNITY
Start: 2017-02-14 | End: 2017-04-11 | Stop reason: SDUPTHER

## 2017-04-11 NOTE — PROGRESS NOTES
4/11/2017    Lenny Lopez  New Patient History and Physical    Chief Complaint   Patient presents with    Follow-up     4 wk    Asthma    Apnea     April 11, 2107, having fluid retention, uses indocin 2 pills 2 weeks ago- for episodic gout.   Had prednisone once since last month, one of 2 sputums + Maryana.  Congestion problems.  Coughs with lung pain.   Sinuses crazy- lots of secretions, uses flonase with   Sugars 115 or so.  Up with prednisone.      March 14, 2017HPI: pt follows with Dr dean, has had 3 exacerbations.  Took prednisone x 3 and cleared sort of.  Has had cough and wheeze and seasonal worse.  Chr sinus seems to trigger.      Problem now continuous. Prednisone only effective rx.  On breo - uses regular. Has had freq infections last 2 yrs.    Has diabetes, sugars 150's or so.       The chief compliant  problem is new to me    PFSH:  Past Medical History:   Diagnosis Date    Angina pectoris     Arthritis     Asthma     Back pain     Cataract     COPD (chronic obstructive pulmonary disease)     Coronary artery disease     DM (diabetes mellitus), type 2, 2000 12/12/2014    Hyperlipidemia     Hypertension     Nephrolithiasis     Obesity     Sleep apnea     Thyroid disease     Unspecified disorder of kidney and ureter     Urinary incontinence          Past Surgical History:   Procedure Laterality Date    APPENDECTOMY      EYE SURGERY      left eye secondary to trauma    FRACTURE SURGERY      right arm    KNEE SURGERY      screw    TONSILLECTOMY, ADENOIDECTOMY       Social History   Substance Use Topics    Smoking status: Former Smoker     Types: Cigarettes, Cigars    Smokeless tobacco: Current User     Types: Chew      Comment: smoked a few cigars in his 20s    Alcohol use No      Comment: occ/socially     Family History   Problem Relation Age of Onset    Thyroid disease Other     Cancer Mother     Hypertension Mother     Osteoarthritis Mother     Heart disease Father      "Hypertension Father     Cancer Paternal Uncle      lung    Hypertension Sister     Hypertension Brother     Diabetes Maternal Aunt     Collagen disease Neg Hx     Amblyopia Neg Hx     Blindness Neg Hx     Cataracts Neg Hx     Glaucoma Neg Hx     Macular degeneration Neg Hx     Retinal detachment Neg Hx     Strabismus Neg Hx     Stroke Neg Hx      Review of patient's allergies indicates:   Allergen Reactions    No known drug allergies        Performance Status:The patient's activity level is functions out of house.      Review of Systems:  a review of eleven systems covering constitutional, Eye, HEENT, Psych, Respiratory, Cardiac, GI, , Musculoskeletal, Endocrine, Dermatologic was negative except for pertinent findings as listed ABOVE and below: wt varies 265 -240, singh, sl abn stress test but no chest pain/mi, urine freq with slow flow, arthritis degenerative.       Exam:Comprehensive exam done. /71 (BP Location: Right arm, Patient Position: Sitting, BP Method: Automatic)  Pulse 68  Ht 5' 11" (1.803 m)  Wt 116.5 kg (256 lb 13.4 oz)  SpO2 95%  BMI 35.82 kg/m2  Exam included Vitals as listed, and patient's appearance and affect and alertness and mood, oral exam for yeast and hygiene and pharynx lesions and Mallapatti (M) score, neck with inspection for jvd and masses and thyroid abnormalities and lymph nodes (supraclavicular and infraclavicular nodes and axillary also examined and noted if abn), chest exam included symmetry and effort and fremitus and percussion and auscultation, cardiac exam included rhythm and gallops and murmur and rubs and jvd and edema, abdominal exam for mass and hepatosplenomegaly and tenderness and hernias and bowel sounds, Musculoskeletal exam with muscle tone and posture and mobility/gait and  strength, and skin for rashes and cyanosis and pallor and turgor, extremity for clubbing.  Findings were normal except for pertinent findings listed below:   M3 and good " bs with faint squeek, +2 edema (trace), diaphragms move.    Radiographs (ct chest and cxr) reviewed: view by direct vision left diaphragm up.    Labs reviewed  eos up     PFT results reviewed Pulmonary Functions, including spirometry and bronchodilator response and lung volumes and diffusion, study was done dec 7, 2016.  Spirometry shows loss of vital capacity and fev1 with no obstruction and no bronchodilator response.   FEV1 is 55% or 1.81 liters.  Lung volumes show  loss of TLC with restriction 64% and elevated residual volume.  Diffusion shows reduced but falls within normal range when corrected for lung volumes.     Pulmonary functions show restriction. Clinical correlation recommended.      Nathaniel Allan M.D.          4wk ago     Respiratory Culture MORAXELLA (BRANHAMELLA) CATARRHALIS  Many  Normal respiratory iris also present  Beta Lactamase positive    Gram Stain (Respiratory) <10 epithelial cells per low power field.   Gram Stain (Respiratory) Rare WBC's               Plan:  Clinical impression is resonably certain and repeated evaluation prn +/- follow up will be needed as below.    Lenny was seen today for follow-up, asthma and apnea.    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  -     AFB Culture & Smear; Future  -     AFB Culture & Smear; Future  -     AFB Culture & Smear; Future  -     azithromycin (ZITHROMAX) 500 MG tablet; Take 1 tablet (500 mg total) by mouth once daily.  -     levoFLOXacin (LEVAQUIN) 500 MG tablet; Take 1 tablet (500 mg total) by mouth once daily.  -     Comprehensive metabolic panel; Future    Chronic bronchitis, mucopurulent  -     AFB Culture & Smear; Future  -     AFB Culture & Smear; Future  -     AFB Culture & Smear; Future  -     azithromycin (ZITHROMAX) 500 MG tablet; Take 1  tablet (500 mg total) by mouth once daily.  -     levoFLOXacin (LEVAQUIN) 500 MG tablet; Take 1 tablet (500 mg total) by mouth once daily.  -     Comprehensive metabolic panel; Future  -     CBC auto differential; Future    Edema, unspecified type  -     Comprehensive metabolic panel; Future  -     CBC auto differential; Future  -     furosemide (LASIX) 20 MG tablet; Take 1 tablet (20 mg total) by mouth daily as needed (excess edema).    Prostatism  -     tamsulosin (FLOMAX) 0.4 mg Cp24; Take 1 capsule (0.4 mg total) by mouth once daily.      Return in about 3 months (around 7/11/2017), or if symptoms worsen or fail to improve.    Discussed with patient above for education the following:        May have chronic lung infection call ADIN with one of 2 sputums last month showing germ.  Need 2 positives to be certain ADIN.  Will order 3 more sputum checks.  Will give script for azithromycin daily for a week, then and levaquin every other day -- START IF INSTRUCTED.     Check blood for kidney test. May need kidney check if worsened, may need stronger diuretic if stable and follow up kidney.      Doxycycline caused sunburn reaction.  Avoid.      flonase should be adequate for sinuses, astelin could be used.    Would try flomax one a day- with urine and kidney problems.  Take lasix only once a day- if lots of edema- should work for 2 hrs or so.

## 2017-04-11 NOTE — PATIENT INSTRUCTIONS
May have chronic lung infection call ADIN with one of 2 sputums last month showing germ.  Need 2 positives to be certain ADIN.  Will order 3 more sputum checks.  Will give script for azithromycin daily for a week, then and levaquin every other day -- START IF INSTRUCTED.     Check blood for kidney test. May need kidney check if worsened, may need stronger diuretic if stable and follow up kidney.      Doxycycline caused sunburn reaction.  Avoid.      flonase should be adequate for sinuses, astelin could be used.    Would try flomax one a day- with urine and kidney problems.  Take lasix only once a day- if lots of edema- should work for 2 hrs or so.

## 2017-04-11 NOTE — MR AVS SNAPSHOT
[place]  [address]  [place] [address]  Phone: [phone]                  Lenny Lopez   2017 10:40 AM   Office Visit    Description:  Male : 1945   Provider:  Nathaniel Allan MD   Department:  [facility]           Reason for Visit     Follow-up     Asthma     Apnea           Diagnoses this Visit        Comments    Chronic obstructive pulmonary disease with acute lower respiratory infection    -  Primary     Asthma, persistent         Chronic sinus complaints         ADIN (mycobacterium avium-intracellulare)         Chronic bronchitis, mucopurulent         Edema, unspecified type         Prostatism                To Do List           Future Appointments        Provider Department Dept Phone    2017 3:00 PM [facility] [phone]    2017 2:00 PM [facility] [phone]      Goals (5 Years of Data)     None      Follow-Up and Disposition     Return in about 3 months (around 2017), or if symptoms worsen or fail to improve.    Follow-up and Disposition History       These Medications        Disp Refills Start End    azithromycin (ZITHROMAX) 500 MG tablet 30 tablet 11 2017     Take 1 tablet (500 mg total) by mouth once daily. - Oral    Pharmacy: [facility] [address] [place] [address]. Ph #: [phone]       levoFLOXacin (LEVAQUIN) 500 MG tablet 12 tablet 3 2017     Take 1 tablet (500 mg total) by mouth once daily. - Oral    Pharmacy: [facility] [address] [place] [address]. Ph #: [phone]       tamsulosin (FLOMAX) 0.4 mg Cp24 30 capsule 11 2017    Take 1 capsule (0.4 mg total) by mouth once daily. - Oral    Pharmacy: [facility] [address] [place] [address]. Ph #: [phone]       furosemide (LASIX) 20 MG tablet 15 tablet 1 2017    Take 1 tablet (20 mg total) by  mouth daily as needed (excess edema). - Oral    Pharmacy: [facility] [address], [place] - [address].  #: [phone]         [facility]     [facility] On Call Nurse Care Line - 24/7 Assistance  Unless otherwise directed by your provider, please contact [facility], our nurse care line that is available for 24/7 assistance.     Registered nurses in the [facility] provide: appointment scheduling, clinical advisement, health education, and other advisory services.  Call: [phone] (toll free)               Medications           Message regarding Medications     Verify the changes and/or additions to your medication regime listed below are the same as discussed with your clinician today.  If any of these changes or additions are incorrect, please notify your healthcare provider.        START taking these NEW medications        Refills    azithromycin (ZITHROMAX) 500 MG tablet 11    Sig: Take 1 tablet (500 mg total) by mouth once daily.    Class: Print    Route: Oral    levoFLOXacin (LEVAQUIN) 500 MG tablet 3    Sig: Take 1 tablet (500 mg total) by mouth once daily.    Class: Print    Route: Oral    tamsulosin (FLOMAX) 0.4 mg Cp24 11    Sig: Take 1 capsule (0.4 mg total) by mouth once daily.    Class: Normal    Route: Oral    furosemide (LASIX) 20 MG tablet 1    Sig: Take 1 tablet (20 mg total) by mouth daily as needed (excess edema).    Class: Normal    Route: Oral      STOP taking these medications     zafirlukast (ACCOLATE) 20 MG tablet            Verify that the below list of medications is an accurate representation of the medications you are currently taking.  If none reported, the list may be blank. If incorrect, please contact your healthcare provider. Carry this list with you in case of emergency.           Current Medications     albuterol 90 mcg/actuation inhaler Inhale 2 puffs into the lungs every 6 (six) hours as needed. Ventolin , medical necessary    aspirin 81 mg  Tab Take 1 tablet by mouth once daily. Every day PRN    atorvastatin (LIPITOR) 80 MG tablet Take 1 tablet (80 mg total) by mouth once daily.    azelastine (ASTELIN) 137 mcg (0.1 %) nasal spray     blood sugar diagnostic Strp 1 strip by Misc.(Non-Drug; Combo Route) route 3 (three) times daily.    BREO ELLIPTA 200-25 mcg/dose DsDv diskus inhaler Inhale 1 puff into the lungs once daily.    indomethacin (INDOCIN) 50 MG capsule     lancets (FREESTYLE LANCETS) 28 gauge Misc 1 lancet by Misc.(Non-Drug; Combo Route) route 3 (three) times daily.    lisinopril (PRINIVIL,ZESTRIL) 20 MG tablet Take 1 tablet (20 mg total) by mouth every evening.    metoprolol succinate (TOPROL-XL) 25 MG 24 hr tablet Take 1 tablet (25 mg total) by mouth once daily.    montelukast (SINGULAIR) 10 mg tablet Take 1 tablet (10 mg total) by mouth every evening.    omega-3 fatty acids-vitamin E (FISH OIL) 1,000 mg Cap Three times a day    spironolactone (ALDACTONE) 50 MG tablet Take 1 tablet (50 mg total) by mouth once daily.    turmeric root extract 500 mg Cap Take 1 capsule by mouth once daily.    albuterol-ipratropium 2.5mg-0.5mg/3mL (DUO-NEB) 0.5 mg-3 mg(2.5 mg base)/3 mL nebulizer solution Take 3 mLs by nebulization every 6 (six) hours as needed for Wheezing.    azithromycin (ZITHROMAX) 500 MG tablet Take 1 tablet (500 mg total) by mouth once daily.    doxycycline (VIBRAMYCIN) 100 MG Cap Take 1 capsule (100 mg total) by mouth every 12 (twelve) hours.    furosemide (LASIX) 20 MG tablet Take 1 tablet (20 mg total) by mouth daily as needed (excess edema).    gabapentin (NEURONTIN) 100 MG capsule Take 1 capsule (100 mg total) by mouth every evening.    levoFLOXacin (LEVAQUIN) 500 MG tablet Take 1 tablet (500 mg total) by mouth once daily.    lidocaine-prilocaine (EMLA) cream Apply topically as needed.    MAGNESIUM ORAL Take by mouth 2 (two) times daily.    nitroGLYCERIN (NITROSTAT) 0.4 MG SL tablet Place 1 tablet (0.4 mg total) under the tongue every 5  "(five) minutes as needed for Chest pain.    predniSONE (DELTASONE) 20 MG tablet 3 for 3 days then 2 for 3 days then one for 3 days and repeat for breathing problems    tamsulosin (FLOMAX) 0.4 mg Cp24 Take 1 capsule (0.4 mg total) by mouth once daily.           Clinical Reference Information           Your Vitals Were     BP Pulse Height Weight SpO2 BMI    115/71 (BP Location: Right arm, Patient Position: Sitting, BP Method: Automatic) 68 5' 11" (1.803 m) 116.5 kg (256 lb 13.4 oz) 95% 35.82 kg/m2      Blood Pressure          Most Recent Value    BP  115/71      Allergies as of 4/11/2017     No Known Drug Allergies      Immunizations Administered on Date of Encounter - 4/11/2017     None      Orders Placed During Today's Visit     Future Labs/Procedures Expected by Expires    AFB Culture & Smear  4/11/2017 6/10/2018    CBC auto differential  4/11/2017 6/10/2018    Comprehensive metabolic panel  4/11/2017 6/10/2018    AFB Culture & Smear  As directed 4/11/2018    AFB Culture & Smear  As directed 4/11/2018      Instructions    May have chronic lung infection call ADIN with one of 2 sputums last month showing germ.  Need 2 positives to be certain ADIN.  Will order 3 more sputum checks.  Will give script for azithromycin daily for a week, then and levaquin every other day -- START IF INSTRUCTED.     Check blood for kidney test. May need kidney check if worsened, may need stronger diuretic if stable and follow up kidney.      Doxycycline caused sunburn reaction.  Avoid.      flonase should be adequate for sinuses, astelin could be used.    Would try flomax one a day- with urine and kidney problems.  Take lasix only once a day- if lots of edema- should work for 2 hrs or so.       Language Assistance Services     ATTENTION: Language assistance services are available, free of charge. Please call [phone].      ATENCIÓN: Si maritala rafa, tiene a samson disposición servicios gratuitos de asistencia lingüística. Llame al " [phone].     RODGER Ý: N?u b?n nói Ti?ng Vi?t, có các d?ch v? h? tr? ngôn ng? mi?n phí dành cho b?n. G?i s? [phone].         [facility] complies with applicable Federal civil rights laws and does not discriminate on the basis of race, color, national origin, age, disability, or sex.

## 2017-04-12 ENCOUNTER — LAB VISIT (OUTPATIENT)
Dept: LAB | Facility: HOSPITAL | Age: 72
End: 2017-04-12
Attending: INTERNAL MEDICINE
Payer: MEDICARE

## 2017-04-12 ENCOUNTER — TELEPHONE (OUTPATIENT)
Dept: FAMILY MEDICINE | Facility: CLINIC | Age: 72
End: 2017-04-12

## 2017-04-12 DIAGNOSIS — R60.9 EDEMA, UNSPECIFIED TYPE: ICD-10-CM

## 2017-04-12 DIAGNOSIS — A31.0 MAI (MYCOBACTERIUM AVIUM-INTRACELLULARE): ICD-10-CM

## 2017-04-12 DIAGNOSIS — J41.1 CHRONIC BRONCHITIS, MUCOPURULENT: ICD-10-CM

## 2017-04-12 LAB
ALBUMIN SERPL BCP-MCNC: 3.5 G/DL
ALP SERPL-CCNC: 59 U/L
ALT SERPL W/O P-5'-P-CCNC: 20 U/L
ANION GAP SERPL CALC-SCNC: 10 MMOL/L
AST SERPL-CCNC: 19 U/L
BASOPHILS # BLD AUTO: 0.03 K/UL
BASOPHILS NFR BLD: 0.4 %
BILIRUB SERPL-MCNC: 0.7 MG/DL
BUN SERPL-MCNC: 32 MG/DL
CALCIUM SERPL-MCNC: 9.3 MG/DL
CHLORIDE SERPL-SCNC: 107 MMOL/L
CO2 SERPL-SCNC: 23 MMOL/L
CREAT SERPL-MCNC: 1.5 MG/DL
DIFFERENTIAL METHOD: ABNORMAL
EOSINOPHIL # BLD AUTO: 0.7 K/UL
EOSINOPHIL NFR BLD: 9.2 %
ERYTHROCYTE [DISTWIDTH] IN BLOOD BY AUTOMATED COUNT: 13.6 %
EST. GFR  (AFRICAN AMERICAN): 52.3 ML/MIN/1.73 M^2
EST. GFR  (NON AFRICAN AMERICAN): 45.2 ML/MIN/1.73 M^2
GLUCOSE SERPL-MCNC: 143 MG/DL
HCT VFR BLD AUTO: 39.5 %
HGB BLD-MCNC: 13.1 G/DL
LYMPHOCYTES # BLD AUTO: 1.9 K/UL
LYMPHOCYTES NFR BLD: 25.6 %
MCH RBC QN AUTO: 29.9 PG
MCHC RBC AUTO-ENTMCNC: 33.2 %
MCV RBC AUTO: 90 FL
MONOCYTES # BLD AUTO: 0.7 K/UL
MONOCYTES NFR BLD: 8.7 %
NEUTROPHILS # BLD AUTO: 4.2 K/UL
NEUTROPHILS NFR BLD: 55.8 %
PLATELET # BLD AUTO: 146 K/UL
PMV BLD AUTO: 12.2 FL
POTASSIUM SERPL-SCNC: 4.4 MMOL/L
PROT SERPL-MCNC: 6.6 G/DL
RBC # BLD AUTO: 4.38 M/UL
SODIUM SERPL-SCNC: 140 MMOL/L
WBC # BLD AUTO: 7.51 K/UL

## 2017-04-12 PROCEDURE — 87015 SPECIMEN INFECT AGNT CONCNTJ: CPT

## 2017-04-12 PROCEDURE — 85025 COMPLETE CBC W/AUTO DIFF WBC: CPT

## 2017-04-12 PROCEDURE — 80053 COMPREHEN METABOLIC PANEL: CPT

## 2017-04-12 PROCEDURE — 36415 COLL VENOUS BLD VENIPUNCTURE: CPT | Mod: PO

## 2017-04-12 PROCEDURE — 87116 MYCOBACTERIA CULTURE: CPT

## 2017-04-12 NOTE — TELEPHONE ENCOUNTER
----- Message from Nancy Carballo sent at 4/12/2017 12:08 PM CDT -----  Contact: [facility]- [phone]  Pharmacy called regarding duplicate rx zocor,lipitor  for the above listed patient.Pharmacy need to know which rx to fill for the patient.Thanks!

## 2017-04-17 ENCOUNTER — TELEPHONE (OUTPATIENT)
Dept: PULMONOLOGY | Facility: CLINIC | Age: 72
End: 2017-04-17

## 2017-04-17 NOTE — TELEPHONE ENCOUNTER
----- Message from Ella Hannah sent at 4/17/2017  2:02 PM CDT -----  Please call patients wife/Halie in reference to patients health condition.  Call [phone].

## 2017-04-17 NOTE — TELEPHONE ENCOUNTER
Advised the patient that the sputum cultures take a while to get back but as soon as we get the results back we will call.

## 2017-04-18 ENCOUNTER — TELEPHONE (OUTPATIENT)
Dept: PULMONOLOGY | Facility: CLINIC | Age: 72
End: 2017-04-18

## 2017-04-18 NOTE — TELEPHONE ENCOUNTER
Spoke directly to pt. Informed him that yes he can start using the predisone today, but he needs to monitor sugars while taking. Advised if he is not feeling better by Thursday afternoon to call us back and we will get him in to see Dr Allan       - Message from Zoila Arora sent at 4/18/2017 10:46 AM CDT -----  Contact: patient's wife Halie  patient's wife Halie called regarding if it's okay for patient to take the prednisone? Please call back at [phone] to advise. Thanks,

## 2017-04-26 RX ORDER — FLUTICASONE FUROATE AND VILANTEROL TRIFENATATE 200; 25 UG/1; UG/1
POWDER RESPIRATORY (INHALATION)
Qty: 60 EACH | Refills: 0 | Status: SHIPPED | OUTPATIENT
Start: 2017-04-26 | End: 2017-08-29 | Stop reason: SDUPTHER

## 2017-05-16 LAB
ACID FAST SUSCEPTIBILITY, SLOW GROWER: ABNORMAL
SPECIMEN SOURCE AND ORGANISM NAME: ABNORMAL

## 2017-05-17 ENCOUNTER — TELEPHONE (OUTPATIENT)
Dept: PULMONOLOGY | Facility: CLINIC | Age: 72
End: 2017-05-17

## 2017-05-17 LAB
ACID FAST MOD KINY STN SPEC: NORMAL
MYCOBACTERIUM SPEC QL CULT: NORMAL

## 2017-05-17 NOTE — TELEPHONE ENCOUNTER
Spoke to pt and informed him that he needed another sputum sample. Put sample cups at  for pt to , and rescheduled pt due to dr going out of town on currently scheduled appt date.       ----- Message from Nathaniel Allan MD sent at 5/16/2017  4:31 PM CDT -----  Need another pos afb to treat.

## 2017-05-18 ENCOUNTER — TELEPHONE (OUTPATIENT)
Dept: PULMONOLOGY | Facility: CLINIC | Age: 72
End: 2017-05-18

## 2017-05-18 ENCOUNTER — LAB VISIT (OUTPATIENT)
Dept: LAB | Facility: HOSPITAL | Age: 72
End: 2017-05-18
Attending: INTERNAL MEDICINE
Payer: MEDICARE

## 2017-05-18 DIAGNOSIS — J41.1 CHRONIC BRONCHITIS, MUCOPURULENT: Primary | ICD-10-CM

## 2017-05-18 DIAGNOSIS — A31.0 MAI (MYCOBACTERIUM AVIUM-INTRACELLULARE): ICD-10-CM

## 2017-05-18 DIAGNOSIS — J41.1 CHRONIC BRONCHITIS, MUCOPURULENT: ICD-10-CM

## 2017-05-18 DIAGNOSIS — J44.9 CHRONIC OBSTRUCTIVE PULMONARY DISEASE, UNSPECIFIED COPD TYPE: ICD-10-CM

## 2017-05-18 DIAGNOSIS — J44.9 CHRONIC OBSTRUCTIVE PULMONARY DISEASE, UNSPECIFIED COPD TYPE: Primary | ICD-10-CM

## 2017-05-18 PROCEDURE — 87070 CULTURE OTHR SPECIMN AEROBIC: CPT

## 2017-05-18 PROCEDURE — 87015 SPECIMEN INFECT AGNT CONCNTJ: CPT | Mod: 91

## 2017-05-18 PROCEDURE — 87116 MYCOBACTERIA CULTURE: CPT | Mod: 59

## 2017-05-18 PROCEDURE — 87205 SMEAR GRAM STAIN: CPT

## 2017-05-19 LAB
ACID FAST MOD KINY STN SPEC: NORMAL
MYCOBACTERIUM SPEC QL CULT: NORMAL

## 2017-05-20 LAB
BACTERIA SPEC AEROBE CULT: NORMAL
GRAM STN SPEC: NORMAL

## 2017-06-15 LAB
ACID FAST MOD KINY STN SPEC: NORMAL
MYCOBACTERIUM SPEC QL CULT: NORMAL

## 2017-06-29 ENCOUNTER — OFFICE VISIT (OUTPATIENT)
Dept: PULMONOLOGY | Facility: CLINIC | Age: 72
End: 2017-06-29
Payer: MEDICARE

## 2017-06-29 ENCOUNTER — HOSPITAL ENCOUNTER (OUTPATIENT)
Dept: RADIOLOGY | Facility: HOSPITAL | Age: 72
Discharge: HOME | End: 2017-06-29
Attending: INTERNAL MEDICINE
Payer: MEDICARE

## 2017-06-29 VITALS
SYSTOLIC BLOOD PRESSURE: 122 MMHG | BODY MASS INDEX: 36.02 KG/M2 | WEIGHT: 257.25 LBS | DIASTOLIC BLOOD PRESSURE: 70 MMHG | OXYGEN SATURATION: 96 % | HEIGHT: 71 IN | HEART RATE: 61 BPM

## 2017-06-29 DIAGNOSIS — N40.0 PROSTATISM: ICD-10-CM

## 2017-06-29 DIAGNOSIS — R09.89 CHRONIC SINUS COMPLAINTS: Primary | ICD-10-CM

## 2017-06-29 DIAGNOSIS — M10.9 GOUT, UNSPECIFIED CAUSE, UNSPECIFIED CHRONICITY, UNSPECIFIED SITE: ICD-10-CM

## 2017-06-29 DIAGNOSIS — J44.0 CHRONIC OBSTRUCTIVE PULMONARY DISEASE WITH ACUTE LOWER RESPIRATORY INFECTION: ICD-10-CM

## 2017-06-29 DIAGNOSIS — J41.1 CHRONIC BRONCHITIS, MUCOPURULENT: ICD-10-CM

## 2017-06-29 DIAGNOSIS — R60.0 BILATERAL LEG EDEMA: ICD-10-CM

## 2017-06-29 PROCEDURE — 99999 PR PBB SHADOW E&M-EST. PATIENT-LVL IV: CPT | Mod: PBBFAC,,, | Performed by: INTERNAL MEDICINE

## 2017-06-29 PROCEDURE — 99499 UNLISTED E&M SERVICE: CPT | Mod: S$GLB,,, | Performed by: INTERNAL MEDICINE

## 2017-06-29 PROCEDURE — 99214 OFFICE O/P EST MOD 30 MIN: CPT | Mod: S$GLB,,, | Performed by: INTERNAL MEDICINE

## 2017-06-29 PROCEDURE — 1159F MED LIST DOCD IN RCRD: CPT | Mod: S$GLB,,, | Performed by: INTERNAL MEDICINE

## 2017-06-29 PROCEDURE — 93970 EXTREMITY STUDY: CPT | Mod: 26,,, | Performed by: RADIOLOGY

## 2017-06-29 PROCEDURE — 93970 EXTREMITY STUDY: CPT | Mod: TC

## 2017-06-29 PROCEDURE — 1126F AMNT PAIN NOTED NONE PRSNT: CPT | Mod: S$GLB,,, | Performed by: INTERNAL MEDICINE

## 2017-06-29 RX ORDER — TAMSULOSIN HYDROCHLORIDE 0.4 MG/1
0.4 CAPSULE ORAL DAILY
Qty: 30 CAPSULE | Refills: 11 | Status: SHIPPED | OUTPATIENT
Start: 2017-06-29 | End: 2018-09-25 | Stop reason: SDUPTHER

## 2017-06-29 RX ORDER — ALBUTEROL SULFATE 90 UG/1
2 AEROSOL, METERED RESPIRATORY (INHALATION) EVERY 6 HOURS PRN
Qty: 1 INHALER | Refills: 3 | Status: SHIPPED | OUTPATIENT
Start: 2017-06-29 | End: 2017-11-28

## 2017-06-29 RX ORDER — IPRATROPIUM BROMIDE AND ALBUTEROL SULFATE 2.5; .5 MG/3ML; MG/3ML
3 SOLUTION RESPIRATORY (INHALATION) EVERY 6 HOURS PRN
Qty: 120 ML | Refills: 0 | Status: SHIPPED | OUTPATIENT
Start: 2017-06-29 | End: 2017-07-19 | Stop reason: SDUPTHER

## 2017-06-29 RX ORDER — PREDNISONE 20 MG/1
TABLET ORAL
Qty: 27 TABLET | Refills: 2 | Status: SHIPPED | OUTPATIENT
Start: 2017-06-29 | End: 2018-09-28 | Stop reason: SDUPTHER

## 2017-06-29 RX ORDER — INDOMETHACIN 50 MG/1
50 CAPSULE ORAL 2 TIMES DAILY WITH MEALS
Qty: 12 CAPSULE | Refills: 1 | Status: SHIPPED | OUTPATIENT
Start: 2017-06-29 | End: 2018-03-02 | Stop reason: SDUPTHER

## 2017-06-29 NOTE — PROGRESS NOTES
6/29/2017    Lenny Lopez  New Patient History and Physical    Chief Complaint   Patient presents with    Follow-up    COPD     juNE 29, 2017- used prednisone used 2x at 4 and 5 days, still green mucous, z pack only rx that works well for infection. Having intermittent leg swelling r> left.  Prostrate better with meds.    One Maryana +0 of 5 or so.    Not using lasix/aldactone regular      March 14, 2017HPI: pt follows with Dr dean, has had 3 exacerbations.  Took prednisone x 3 and cleared sort of.  Has had cough and wheeze and seasonal worse.  Chr sinus seems to trigger.      Problem now continuous. Prednisone only effective rx.  On breo - uses regular. Has had freq infections last 2 yrs.    Has diabetes, sugars 150's or so.       The chief compliant  problem is new to me    PFSH:  Past Medical History:   Diagnosis Date    Angina pectoris     Arthritis     Asthma     Back pain     Cataract     COPD (chronic obstructive pulmonary disease)     Coronary artery disease     DM (diabetes mellitus), type 2, 2000 12/12/2014    Hyperlipidemia     Hypertension     Nephrolithiasis     Obesity     Sleep apnea     Thyroid disease     Unspecified disorder of kidney and ureter     Urinary incontinence          Past Surgical History:   Procedure Laterality Date    APPENDECTOMY      EYE SURGERY      left eye secondary to trauma    FRACTURE SURGERY      right arm    KNEE SURGERY      screw    TONSILLECTOMY, ADENOIDECTOMY       Social History   Substance Use Topics    Smoking status: Former Smoker     Types: Cigarettes, Cigars    Smokeless tobacco: Current User     Types: Chew      Comment: smoked a few cigars in his 20s    Alcohol use No      Comment: occ/socially     Family History   Problem Relation Age of Onset    Thyroid disease Other     Cancer Mother     Hypertension Mother     Osteoarthritis Mother     Heart disease Father     Hypertension Father     Cancer Paternal Uncle      lung     "Hypertension Sister     Hypertension Brother     Diabetes Maternal Aunt     Collagen disease Neg Hx     Amblyopia Neg Hx     Blindness Neg Hx     Cataracts Neg Hx     Glaucoma Neg Hx     Macular degeneration Neg Hx     Retinal detachment Neg Hx     Strabismus Neg Hx     Stroke Neg Hx      Review of patient's allergies indicates:   Allergen Reactions    No known drug allergies        Performance Status:The patient's activity level is functions out of house.      Review of Systems:  a review of eleven systems covering constitutional, Eye, HEENT, Psych, Respiratory, Cardiac, GI, , Musculoskeletal, Endocrine, Dermatologic was negative except for pertinent findings as listed ABOVE and below: wt varies 265 -240, singh, sl abn stress test but no chest pain/mi, urine freq with slow flow, arthritis degenerative.       Exam:Comprehensive exam done. /70 (BP Location: Right arm, Patient Position: Sitting, BP Method: Automatic)   Pulse 61   Ht 5' 11" (1.803 m)   Wt 116.7 kg (257 lb 4.4 oz)   SpO2 96%   BMI 35.88 kg/m²   Exam included Vitals as listed, and patient's appearance and affect and alertness and mood, oral exam for yeast and hygiene and pharynx lesions and Mallapatti (M) score, neck with inspection for jvd and masses and thyroid abnormalities and lymph nodes (supraclavicular and infraclavicular nodes and axillary also examined and noted if abn), chest exam included symmetry and effort and fremitus and percussion and auscultation, cardiac exam included rhythm and gallops and murmur and rubs and jvd and edema, abdominal exam for mass and hepatosplenomegaly and tenderness and hernias and bowel sounds, Musculoskeletal exam with muscle tone and posture and mobility/gait and  strength, and skin for rashes and cyanosis and pallor and turgor, extremity for clubbing.  Findings were normal except for pertinent findings listed below:   M3 and good bs with faint squeek, +2 edema (trace), diaphragms " move.    Radiographs (ct chest and cxr) reviewed: view by direct vision left diaphragm up.    Labs reviewed  eos up     PFT results reviewed Pulmonary Functions, including spirometry and bronchodilator response and lung volumes and diffusion, study was done dec 7, 2016.  Spirometry shows loss of vital capacity and fev1 with no obstruction and no bronchodilator response.   FEV1 is 55% or 1.81 liters.  Lung volumes show  loss of TLC with restriction 64% and elevated residual volume.  Diffusion shows reduced but falls within normal range when corrected for lung volumes.     Pulmonary functions show restriction. Clinical correlation recommended.      Nathaniel Allan M.D.          4wk ago     Respiratory Culture MORAXELLA (BRANHAMELLA) CATARRHALIS  Many  Normal respiratory iris also present  Beta Lactamase positive    Gram Stain (Respiratory) <10 epithelial cells per low power field.   Gram Stain (Respiratory) Rare WBC's               Plan:  Clinical impression is resonably certain and repeated evaluation prn +/- follow up will be needed as below.    Lenny was seen today for follow-up and copd.    Diagnoses and all orders for this visit:    Chronic sinus complaints    Chronic obstructive pulmonary disease with acute lower respiratory infection  -     predniSONE (DELTASONE) 20 MG tablet; One daily for 7 days and repeat for flare of lung symptoms as intructed  -     albuterol 90 mcg/actuation inhaler; Inhale 2 puffs into the lungs every 6 (six) hours as needed. Ventolin , medical necessary  -     albuterol-ipratropium 2.5mg-0.5mg/3mL (DUO-NEB) 0.5 mg-3 mg(2.5 mg base)/3 mL nebulizer solution; Take 3 mLs by nebulization every 6 (six) hours as needed for Wheezing.    Chronic bronchitis, mucopurulent  -     predniSONE (DELTASONE) 20 MG tablet; One daily for 7 days and repeat for flare of lung symptoms as intructed  -     AFB Culture & Smear; Standing  -     albuterol 90 mcg/actuation inhaler; Inhale 2 puffs into the lungs  every 6 (six) hours as needed. Ventolin , medical necessary  -     albuterol-ipratropium 2.5mg-0.5mg/3mL (DUO-NEB) 0.5 mg-3 mg(2.5 mg base)/3 mL nebulizer solution; Take 3 mLs by nebulization every 6 (six) hours as needed for Wheezing.    Asthma, persistent  -     predniSONE (DELTASONE) 20 MG tablet; One daily for 7 days and repeat for flare of lung symptoms as intructed  -     albuterol 90 mcg/actuation inhaler; Inhale 2 puffs into the lungs every 6 (six) hours as needed. Ventolin , medical necessary  -     albuterol-ipratropium 2.5mg-0.5mg/3mL (DUO-NEB) 0.5 mg-3 mg(2.5 mg base)/3 mL nebulizer solution; Take 3 mLs by nebulization every 6 (six) hours as needed for Wheezing.    Prostatism  -     tamsulosin (FLOMAX) 0.4 mg Cp24; Take 1 capsule (0.4 mg total) by mouth once daily.    Bilateral leg edema  -     US Lower Extremity Veins Bilateral; Future    Gout, unspecified cause, unspecified chronicity, unspecified site  -     indomethacin (INDOCIN) 50 MG capsule; Take 1 capsule (50 mg total) by mouth 2 (two) times daily with meals. For gout only , limit use as dangerous, not likely covered by insurance.        Return in about 5 months (around 11/22/2017), or if symptoms worsen or fail to improve.    Discussed with patient above for education the following:        Need more sputums to be sure MAC not issue.  If no clear diagnosis of MAC by November - may start Nucala to stabilize.    Avoid using azithromycin - MAC may get resistant if present.    Check legs for clot, use flomax regular, avoid indocin (if kidneys worsen - you should not use), use diuretic if legs swell.    Prednisone as little as able up to 7 days.

## 2017-06-29 NOTE — PATIENT INSTRUCTIONS
Need more sputums to be sure MAC not issue.  If no clear diagnosis of MAC by November - may start Nucala to stabilize.    Avoid using azithromycin - MAC may get resistant if present.    Check legs for clot, use flomax regular, avoid indocin (if kidneys worsen - you should not use), use diuretic if legs swell.    Prednisone as little as able up to 7 days.

## 2017-06-30 ENCOUNTER — TELEPHONE (OUTPATIENT)
Dept: PULMONOLOGY | Facility: CLINIC | Age: 72
End: 2017-06-30

## 2017-06-30 NOTE — TELEPHONE ENCOUNTER
Pt notified no DVT seen on US    ----- Message from Nathaniel Allan MD sent at 6/29/2017  4:13 PM CDT -----  No dvt

## 2017-07-19 DIAGNOSIS — J44.0 CHRONIC OBSTRUCTIVE PULMONARY DISEASE WITH ACUTE LOWER RESPIRATORY INFECTION: ICD-10-CM

## 2017-07-19 DIAGNOSIS — J41.1 CHRONIC BRONCHITIS, MUCOPURULENT: ICD-10-CM

## 2017-07-19 RX ORDER — IPRATROPIUM BROMIDE AND ALBUTEROL SULFATE 2.5; .5 MG/3ML; MG/3ML
3 SOLUTION RESPIRATORY (INHALATION) EVERY 6 HOURS PRN
Qty: 120 ML | Refills: 11 | Status: SHIPPED | OUTPATIENT
Start: 2017-07-19 | End: 2017-07-20 | Stop reason: SDUPTHER

## 2017-07-19 NOTE — TELEPHONE ENCOUNTER
----- Message from Nickie Smith sent at 7/19/2017 10:45 AM CDT -----  Contact: Halie Lopez   Wife called regarding a refill of medication. Please contact [phone] (home)     albuterol-ipratropium 2.5mg-0.5mg/3mL (DUO-NEB) 0.5 mg-3 mg(2.5 mg base)/3 mL nebulizer solution      [facility]  [address] [place] [address]   Tele: [phone]

## 2017-07-20 DIAGNOSIS — J41.1 CHRONIC BRONCHITIS, MUCOPURULENT: ICD-10-CM

## 2017-07-20 DIAGNOSIS — J44.0 CHRONIC OBSTRUCTIVE PULMONARY DISEASE WITH ACUTE LOWER RESPIRATORY INFECTION: ICD-10-CM

## 2017-07-20 LAB
ACID FAST MOD KINY STN SPEC: NORMAL
MYCOBACTERIUM SPEC QL CULT: NORMAL

## 2017-07-20 RX ORDER — IPRATROPIUM BROMIDE AND ALBUTEROL SULFATE 2.5; .5 MG/3ML; MG/3ML
3 SOLUTION RESPIRATORY (INHALATION) EVERY 6 HOURS PRN
Qty: 120 ML | Refills: 1 | Status: SHIPPED | OUTPATIENT
Start: 2017-07-20 | End: 2017-11-28 | Stop reason: SDUPTHER

## 2017-07-20 NOTE — TELEPHONE ENCOUNTER
Sent rx    ----- Message from Zoila Arora sent at 7/19/2017  4:22 PM CDT -----  Contact: Patient  Patient called requesting medication albuterol-ipratropium 2.5mg-0.5mg/3mL (DUO-NEB) 0.5 mg-3 mg(2.5 mg base)/3 mL nebulizer solution be sent to [facility] in [place] [phone]. Please call back at [phone] when script has been sent. Thanks,

## 2017-07-21 LAB
ACID FAST MOD KINY STN SPEC: NORMAL
MYCOBACTERIUM SPEC QL CULT: NORMAL

## 2017-08-16 ENCOUNTER — TELEPHONE (OUTPATIENT)
Dept: PULMONOLOGY | Facility: CLINIC | Age: 72
End: 2017-08-16

## 2017-08-16 DIAGNOSIS — G47.33 OSA (OBSTRUCTIVE SLEEP APNEA): Primary | ICD-10-CM

## 2017-08-29 DIAGNOSIS — J45.909 UNCOMPLICATED ASTHMA, UNSPECIFIED ASTHMA SEVERITY: Primary | ICD-10-CM

## 2017-08-29 RX ORDER — FLUTICASONE FUROATE AND VILANTEROL 200; 25 UG/1; UG/1
1 POWDER RESPIRATORY (INHALATION) DAILY
Qty: 60 EACH | Refills: 11 | Status: SHIPPED | OUTPATIENT
Start: 2017-08-29 | End: 2018-02-20

## 2017-08-29 NOTE — TELEPHONE ENCOUNTER
----- Message from Radha Pitts sent at 8/29/2017 10:20 AM CDT -----  Patient need a refill on BREO please send to [facility], any questions please call back at [phone] (home)      [facility]  Phone Number [phone]

## 2017-08-30 ENCOUNTER — TELEPHONE (OUTPATIENT)
Dept: PULMONOLOGY | Facility: CLINIC | Age: 72
End: 2017-08-30

## 2017-08-30 NOTE — TELEPHONE ENCOUNTER
Spoke to pt and called in Rx for Breo to [facility] in [place], [place] [phone]    ----- Message from Libby Ibarra sent at 8/30/2017 12:59 PM CDT -----  Patient requested medication: Breo Inhaler 200 be ordered at [facility] in [place], [place] at [phone]/stated pharmacy received wrong information/please reorder and call back at [phone] to confirm.

## 2017-11-06 PROBLEM — J41.1 CHRONIC BRONCHITIS, MUCOPURULENT: Status: RESOLVED | Noted: 2017-04-11 | Resolved: 2017-11-06

## 2017-11-06 PROBLEM — E78.5 HYPERLIPIDEMIA ASSOCIATED WITH TYPE 2 DIABETES MELLITUS: Status: ACTIVE | Noted: 2017-03-10

## 2017-11-06 PROBLEM — R94.39 ABNORMAL CARDIOVASCULAR STRESS TEST: Status: RESOLVED | Noted: 2017-03-10 | Resolved: 2017-11-06

## 2017-11-06 PROBLEM — E11.69 HYPERLIPIDEMIA ASSOCIATED WITH TYPE 2 DIABETES MELLITUS: Status: ACTIVE | Noted: 2017-03-10

## 2017-11-17 ENCOUNTER — TELEPHONE (OUTPATIENT)
Dept: FAMILY MEDICINE | Facility: CLINIC | Age: 72
End: 2017-11-17

## 2017-11-17 NOTE — TELEPHONE ENCOUNTER
----- Message from Joann Square, MA sent at 11/17/2017 10:57 AM CST -----  Contact: Halie      ----- Message -----  From: Shweta Newman  Sent: 11/17/2017  10:11 AM  To: Dena CARTER Staff    Patient's wife returning call. Sending message on wife Halie Lopez. Please call [phone]. Thanks!

## 2017-11-17 NOTE — TELEPHONE ENCOUNTER
Patient's wife (Halie) requesting to know date of patient's next office appointment. Advised next appointment is with Jenn Fernandes on 11-21-17. Verbalized understanding.

## 2017-11-21 ENCOUNTER — DOCUMENTATION ONLY (OUTPATIENT)
Dept: FAMILY MEDICINE | Facility: CLINIC | Age: 72
End: 2017-11-21

## 2017-11-21 ENCOUNTER — TELEPHONE (OUTPATIENT)
Dept: FAMILY MEDICINE | Facility: CLINIC | Age: 72
End: 2017-11-21

## 2017-11-21 ENCOUNTER — CLINICAL SUPPORT (OUTPATIENT)
Dept: FAMILY MEDICINE | Facility: CLINIC | Age: 72
End: 2017-11-21
Attending: PHYSICIAN ASSISTANT
Payer: MEDICARE

## 2017-11-21 ENCOUNTER — OFFICE VISIT (OUTPATIENT)
Dept: FAMILY MEDICINE | Facility: CLINIC | Age: 72
End: 2017-11-21
Payer: MEDICARE

## 2017-11-21 VITALS
HEIGHT: 71 IN | SYSTOLIC BLOOD PRESSURE: 129 MMHG | HEART RATE: 63 BPM | TEMPERATURE: 98 F | DIASTOLIC BLOOD PRESSURE: 63 MMHG | OXYGEN SATURATION: 95 % | BODY MASS INDEX: 37.28 KG/M2 | WEIGHT: 266.31 LBS

## 2017-11-21 DIAGNOSIS — R32 URINARY INCONTINENCE, UNSPECIFIED TYPE: ICD-10-CM

## 2017-11-21 DIAGNOSIS — E11.29 TYPE 2 DIABETES MELLITUS WITH MICROALBUMINURIA, WITHOUT LONG-TERM CURRENT USE OF INSULIN: ICD-10-CM

## 2017-11-21 DIAGNOSIS — Z23 NEEDS FLU SHOT: ICD-10-CM

## 2017-11-21 DIAGNOSIS — I11.9 HYPERTENSIVE LEFT VENTRICULAR HYPERTROPHY, WITHOUT HEART FAILURE: ICD-10-CM

## 2017-11-21 DIAGNOSIS — J84.10 CALCIFIED GRANULOMA OF LUNG: ICD-10-CM

## 2017-11-21 DIAGNOSIS — I77.9 BILATERAL CAROTID ARTERY DISEASE: ICD-10-CM

## 2017-11-21 DIAGNOSIS — R80.9 TYPE 2 DIABETES MELLITUS WITH MICROALBUMINURIA, WITHOUT LONG-TERM CURRENT USE OF INSULIN: ICD-10-CM

## 2017-11-21 DIAGNOSIS — M19.90 OSTEOARTHRITIS, UNSPECIFIED OSTEOARTHRITIS TYPE, UNSPECIFIED SITE: ICD-10-CM

## 2017-11-21 DIAGNOSIS — J44.0 CHRONIC OBSTRUCTIVE PULMONARY DISEASE WITH ACUTE LOWER RESPIRATORY INFECTION: ICD-10-CM

## 2017-11-21 DIAGNOSIS — Z00.00 ENCOUNTER FOR PREVENTIVE HEALTH EXAMINATION: Primary | ICD-10-CM

## 2017-11-21 DIAGNOSIS — E78.5 HYPERLIPIDEMIA ASSOCIATED WITH TYPE 2 DIABETES MELLITUS: ICD-10-CM

## 2017-11-21 DIAGNOSIS — G47.33 OSA ON CPAP: ICD-10-CM

## 2017-11-21 DIAGNOSIS — E66.01 SEVERE OBESITY (BMI 35.0-39.9) WITH COMORBIDITY: ICD-10-CM

## 2017-11-21 DIAGNOSIS — E11.69 HYPERLIPIDEMIA ASSOCIATED WITH TYPE 2 DIABETES MELLITUS: ICD-10-CM

## 2017-11-21 DIAGNOSIS — N18.30 CKD (CHRONIC KIDNEY DISEASE) STAGE 3, GFR 30-59 ML/MIN: ICD-10-CM

## 2017-11-21 DIAGNOSIS — E11.59 HYPERTENSION ASSOCIATED WITH DIABETES: ICD-10-CM

## 2017-11-21 DIAGNOSIS — Z72.0 CHEWS TOBACCO REGULARLY: ICD-10-CM

## 2017-11-21 DIAGNOSIS — I15.2 HYPERTENSION ASSOCIATED WITH DIABETES: ICD-10-CM

## 2017-11-21 PROBLEM — J98.4 CALCIFIED GRANULOMA OF LUNG: Status: ACTIVE | Noted: 2017-11-21

## 2017-11-21 PROCEDURE — G0008 ADMIN INFLUENZA VIRUS VAC: HCPCS | Mod: S$GLB,,, | Performed by: FAMILY MEDICINE

## 2017-11-21 PROCEDURE — G0439 PPPS, SUBSEQ VISIT: HCPCS | Mod: S$GLB,,, | Performed by: PHYSICIAN ASSISTANT

## 2017-11-21 PROCEDURE — 99499 UNLISTED E&M SERVICE: CPT | Mod: S$GLB,,, | Performed by: PHYSICIAN ASSISTANT

## 2017-11-21 PROCEDURE — 99999 PR PBB SHADOW E&M-EST. PATIENT-LVL V: CPT | Mod: PBBFAC,,, | Performed by: PHYSICIAN ASSISTANT

## 2017-11-21 PROCEDURE — 90662 IIV NO PRSV INCREASED AG IM: CPT | Mod: S$GLB,,, | Performed by: FAMILY MEDICINE

## 2017-11-21 PROCEDURE — 99999 PR PBB SHADOW E&M-EST. PATIENT-LVL II: CPT | Mod: PBBFAC,,,

## 2017-11-21 NOTE — PATIENT INSTRUCTIONS
Counseling and Referral of Other Preventative  (Italic type indicates deductible and co-insurance are waived)    Patient Name: Lenny Lopez  Today's Date: 11/21/2017      SERVICE LIMITATIONS RECOMMENDATION    Vaccines    · Pneumococcal (once after 65)    · Influenza (annually)    · Hepatitis B (if medium/high risk)    · Prevnar 13      Hepatitis B medium/high risk factors:       - End-stage renal disease       - Hemophiliacs who received Factor VII or         IX concentrates       - Clients of institutions for the mentally             retarded       - Persons who live in the same house as          a HepB carrier       - Homosexual men       - Illicit injectable drug abusers     Pneumococcal: Done, no repeat necessary     Influenza: Done, repeat in one year     Hepatitis B: N/A     Prevnar 13: Done, no repeat necessary    Prostate cancer screening (annually to age 75)     Prostate specific antigen (PSA) Shared decision making with Provider. Sometimes a co-pay may be required if the patient decides to have this test. The USPSTF no longer recommends prostate cancer screening routinely in medicine: not to follow    Colorectal cancer screening (to age 75)    · Fecal occult blood test (annual)  · Flexible sigmoidoscopy (5y)  · Screening colonoscopy (10y)  · Barium enema   Last done 2013, recommend to repeat every 3-5  years    Diabetes self-management training (no USPSTF recommendations)  Requires referral by treating physician for patient with diabetes or renal disease. 10 hours of initial DSMT sessions of no less than 30 minutes each in a continuous 12-month period. 2 hours of follow-up DSMT in subsequent years.  Recommended to patient, declined    Glaucoma screening (no USPSTF recommendation)  Diabetes mellitus, family history   , age 50 or over    American, age 65 or over  Recommend follow up with eye care professional regularly    Medical nutrition therapy for diabetes or renal disease (no  recommended schedule)  Requires referral by treating physician for patient with diabetes or renal disease or kidney transplant within the past 3 years.  Can be provided in same year as diabetes self-management training (DSMT), and CMS recommends medical nutrition therapy take place after DSMT. Up to 3 hours for initial year and 2 hours in subsequent years.  Recommended to patient, declined    Cardiovascular screening blood tests (every 5 years)  · Fasting lipid panel  Order as a panel if possible  Scheduled, see appointments    Diabetes screening tests (at least every 3 years, Medicare covers annually or at 6-month intervals for prediabetic patients)  · Fasting blood sugar (FBS) or glucose tolerance test (GTT)  Patient must be diagnosed with one of the following:       - Hypertension       - Dyslipidemia       - Obesity (BMI 30kg/m2)       - Previous elevated impaired FBS or GTT       ... or any two of the following:       - Overweight (BMI 25 but <30)       - Family history of diabetes       - Age 65 or older       - History of gestational diabetes or birth of baby weighing more than 9 pounds  Done this year, repeat every year    Abdominal aortic aneurysm screening (once)  · Sonogram   Limited to patients who meet one of the following criteria:       - Men who are 65-75 years old and have smoked more than 100 cigarette in their lifetime       - Anyone with a family history of abdominal aortic aneurysm       - Anyone recommended for screening by the USPSTF  Done, no repeat necessary    HIV screening (annually for increased risk patients)  · HIV-1 and HIV-2 by EIA, or GOEFF, rapid antibody test or oral mucosa transudate  Patients must be at increased risk for HIV infection per USPSTF guidelines or pregnant. Tests covered annually for patient at increased risk or as requested by the patient. Pregnant patients may receive up to 3 tests during pregnancy.  Risks discussed, screening is not recommended    Smoking  cessation counseling (up to 8 sessions per year)  Patients must be asymptomatic of tobacco-related conditions to receive as a preventative service.  Non-smoker Encouraged smokeless tobacco cessation.    Subsequent annual wellness visit  At least 12 months since last AWV  Return in one year     The following information is provided to all patients.  This information is to help you find resources for any of the problems found today that may be affecting your health:                Living healthy guide: www.[facility].[place].[facility]      Understanding Diabetes: www.diabetes.org      Eating healthy: www.cdc.gov/healthyweight      [facility] home safety checklist: www.cdc.gov/steadi/patient.html      Agency on Aging: www.goea.[place].[facility]      Alcoholics anonymous (AA): www.aa.org      Physical Activity: www.mary beth.nih.gov/rc6dpvs      Tobacco use: www.quitwithusla.org

## 2017-11-21 NOTE — TELEPHONE ENCOUNTER
Call placed to patient to scheduled follow up appointment as requested by Meli Fernandes in 3-6 months. First available with Dr. Shah noted in April, 2018.  Patient declined to schedule stating he usually sees Dr. Shah on the same day as his wife and his wife is seeing Dr. Shah today. States he will not be home in April, 2018.  Advised patient will notify Jenae Hernandez for scheduling.

## 2017-11-21 NOTE — Clinical Note
Primary Care Providers: Blue Shah MD, MD (General)  Your patient was seen today for a HRA visit. Gap(s) in care (HEDIS gaps) have been identified during this visit that require additional testing and possible follow up.  Orders Placed This Encounter     Influenza - High Dose (65+) (PF) (IM)     Lipid panel      Optometry Referral, Diabetic Eye Photo      Urine microalbumin, Hbga1c   These orders were placed using [facility] approved protocol and any results will be forwarded to your office for appropriate follow up. I have included a copy of my visit note; please review the note and feel free to contact me with any questions.   Thank you for allowing me to participate in the care of your patients. Jenn Fernandes PA-C

## 2017-11-21 NOTE — PROGRESS NOTES
"Lenny Lopez presented for a  Medicare AWV and comprehensive Health Risk Assessment today. The following components were reviewed and updated:    · Medical history  · Family History  · Social history  · Allergies and Current Medications  · Health Risk Assessment  · Health Maintenance  · Care Team     ** See Completed Assessments for Annual Wellness Visit within the encounter summary.**       The following assessments were completed:  · Living Situation  · CAGE  · Depression Screening  · Timed Get Up and Go  · Whisper Test  · Cognitive Function Screening  · Nutrition Screening  · ADL Screening  · PAQ Screening    Vitals:    11/21/17 1309   BP: 129/63   BP Location: Right arm   Patient Position: Sitting   BP Method: Large (Automatic)   Pulse: 63   Temp: 98.3 °F (36.8 °C)   TempSrc: Oral   SpO2: 95%   Weight: 120.8 kg (266 lb 5.1 oz)   Height: 5' 11" (1.803 m)     Body mass index is 37.14 kg/m².  Physical Exam   Constitutional: He is oriented to person, place, and time. He appears well-developed.   Obese body habitus.   HENT:   Head: Normocephalic and atraumatic.   Right Ear: Hearing and external ear normal.   Left Ear: Hearing and external ear normal.   Eyes: Conjunctivae and EOM are normal. Pupils are equal, round, and reactive to light.   Cardiovascular: Normal rate, regular rhythm, normal heart sounds and intact distal pulses.    Pulses:       Dorsalis pedis pulses are 2+ on the right side, and 2+ on the left side.        Posterior tibial pulses are 2+ on the right side, and 2+ on the left side.   Trace bilateral lower ext edema L >R, mild erythema mid leg to the ankle   Pulmonary/Chest: Effort normal. He has wheezes.   Abdominal:   Protuberant abdomen.   Musculoskeletal:        Right foot: There is normal range of motion and no deformity.        Left foot: There is normal range of motion and no deformity.   Feet:   Right Foot:   Protective Sensation: 5 sites tested. 5 sites sensed.   Skin Integrity: Negative for " ulcer, blister or skin breakdown.   Left Foot:   Protective Sensation: 5 sites tested. 5 sites sensed.   Skin Integrity: Negative for ulcer, blister or skin breakdown.   Neurological: He is alert and oriented to person, place, and time.   Skin: Skin is warm and dry.   Abnormal scaling noted between the toes   Psychiatric: He has a normal mood and affect. His behavior is normal.             Diagnoses and health risks identified today and associated recommendations/orders:    Encounter for preventive health examination    Chronic obstructive pulmonary disease with acute lower respiratory infection  Comments:  Chronic, on breo ellipta inhaler and prednisone PRN, continue to follow with Dr. Allan    Hypertension associated with diabetes  Comments:  Controlled, on lisinopril 20 mg daily, toprol XL 25 mg daily, aldactone 50 mg daily    Hypertensive left ventricular hypertrophy, without heart failure  Comments:  Stable, continue to follow with Cardiology    Hyperlipidemia associated with type 2 diabetes mellitus  Comments:  Overdue for lipid panel, continue lipitor 80 mg daily, follow up with Cardiology  Orders:  -     Lipid panel; Future; Expected date: 12/05/2017    CKD (chronic kidney disease) stage 3, GFR 45 ml/min  Comments:  Stable, recent GFR 45 ml/min, avoid NSAIDs, continue to follow with PCP every 3-6 months    Severe obesity (BMI 35.0-39.9) with comorbidity  Comments:  Uncontrolled, encouraged weight loss, continue to f/u with PCP    Type 2 diabetes mellitus with microalbuminuria, without long-term current use of insulin  Comments:  Uncontrolled, last Hgba1c 7.6%, overdue for repeat testing, declined referral to diabetic education/nutrition  Orders:  -     Lipid panel; Future; Expected date: 12/05/2017  -     Hemoglobin A1c; Future; Expected date: 11/21/2017  -     Diabetic Eye Screening Photo; Future  -     Ambulatory referral to Optometry  -     Microalbumin/creatinine urine ratio; Future; Expected date:  12/05/2017    Osteoarthritis, unspecified osteoarthritis type, unspecified site  Comments:  Chronic, stable, continue to f/u with PCP    Urinary incontinence, unspecified type  Comments:  Uncontrolled, recommended referral to Urology   Orders:  -     Ambulatory referral to Urology    Calcified granuloma of lung  Comments:  Stable, seen on CXR 2/2017 left lung hilum, continue to follow with Pulmonary    Bilateral carotid artery disease  Comments:  Stable, >50 percent stenosis of LICA, moderate plaque ADILENE, recommend continue statin/ASA, follow up with Cardiology    LUZ on CPAP, 100% use, 2016  Comments:  Stable, continue CPAP and f/u with PCP    Chews tobacco regularly, about can a day  Comments:  Encouraged cessation    Needs flu shot  -     Influenza - High Dose (65+) (PF) (IM)      Recommended pt obtain zoster and tetanus vaccine at insurance approved pharmacy. Recommended Dermatology referral, pt declined.  Advised patient to f/u with Dr. Masterson (Cardiology) for dyspnea on exertion. Advised him to go to ER if he develops any chest pain/pressure or worsening SOB. Pt voiced understanding.     Provided Lenny with a 5-10 year written screening schedule and personal prevention plan. Recommendations were developed using the USPSTF age appropriate recommendations. Education, counseling, and referrals were provided as needed. After Visit Summary printed and given to patient which includes a list of additional screenings\tests needed.    Follow up with PCP in 3-6 months    Jenn Fernandes PA-C    Patient readiness: acceptance and barriers:none    During the course of the visit the patient was educated and counseled about the following:     Diabetes:  Discussed foot care.  Hypertension:   Medication: no change.  Obesity:   General weight loss/lifestyle modification strategies discussed (elicit support from others; identify saboteurs; non-food rewards, etc).    Goals: Diabetes: Maintain Hemoglobin A1C below 7, Hypertension:  Reduce Blood Pressure and Obesity: Reduce calorie intake and BMI    Did patient meet goals/outcomes: No    The following self management tools provided: declined    Patient Instructions (the written plan) was given to the patient/family.     Time spent with patient: 55 minutes      2 patient identifiers used ( name &  ).  Administered Flu vaccine IM right deltoid.  Patient tolerated well, no bleeding at insertion site noted.  Pain scale 0/10.  Aseptic technique maintained.  Immunization information given to patient. Advised patient to remain in clinic for 15 minutes to monitor for reaction.  No AR noted.

## 2017-11-21 NOTE — TELEPHONE ENCOUNTER
----- Message from Jenn Fernandes PA-C sent at 11/21/2017  3:13 PM CST -----  Can you please schedule Mr. Lopez for a f/u with Dr. Shah in 3-6 months?

## 2017-11-21 NOTE — PROGRESS NOTES
Lenny Lopez is a 72 y.o. male here for a diabetic eye screening with non-dilated fundus photos per Dr Shah  Patient cooperative?: Yes  Small pupils?: No  Last eye exam: 2 years ago    For exam results, see Encounter Report.

## 2017-11-21 NOTE — PROGRESS NOTES
Pre-Visit Chart Review  For Appointment Scheduled on 11/22/17    Health Maintenance Due   Topic Date Due    Foot Exam  01/13/1955    TETANUS VACCINE  01/13/1961    Zoster Vaccine  01/13/2003    Eye Exam  01/29/2017    Hemoglobin A1c  06/08/2017    Influenza Vaccine  08/01/2017    Lipid Panel  11/16/2017

## 2017-11-22 ENCOUNTER — DOCUMENTATION ONLY (OUTPATIENT)
Dept: FAMILY MEDICINE | Facility: CLINIC | Age: 72
End: 2017-11-22

## 2017-11-22 PROCEDURE — 92250 FUNDUS PHOTOGRAPHY W/I&R: CPT | Mod: S$GLB,,, | Performed by: OPTOMETRIST

## 2017-11-22 NOTE — PROGRESS NOTES
Pre-Visit Chart Review  For Appointment Scheduled on 11/28/17    Health Maintenance Due   Topic Date Due    TETANUS VACCINE  01/13/1961    Zoster Vaccine  01/13/2003    Eye Exam  01/29/2017    Hemoglobin A1c  06/08/2017    Lipid Panel  11/16/2017

## 2017-11-24 ENCOUNTER — OFFICE VISIT (OUTPATIENT)
Dept: OPTOMETRY | Facility: CLINIC | Age: 72
End: 2017-11-24
Payer: MEDICARE

## 2017-11-24 DIAGNOSIS — E11.9 DIABETES MELLITUS WITHOUT OPHTHALMIC MANIFESTATIONS: Primary | ICD-10-CM

## 2017-11-24 DIAGNOSIS — H25.13 NUCLEAR SCLEROSIS, BILATERAL: ICD-10-CM

## 2017-11-24 DIAGNOSIS — H52.7 REFRACTIVE ERROR: ICD-10-CM

## 2017-11-24 DIAGNOSIS — H11.003 PTERYGIUM OF BOTH EYES: ICD-10-CM

## 2017-11-24 PROCEDURE — 92014 COMPRE OPH EXAM EST PT 1/>: CPT | Mod: S$GLB,,, | Performed by: OPTOMETRIST

## 2017-11-24 PROCEDURE — 99999 PR PBB SHADOW E&M-EST. PATIENT-LVL II: CPT | Mod: PBBFAC,,, | Performed by: OPTOMETRIST

## 2017-11-24 PROCEDURE — 99499 UNLISTED E&M SERVICE: CPT | Mod: S$GLB,,, | Performed by: OPTOMETRIST

## 2017-11-24 NOTE — PROGRESS NOTES
HPI     Presenting Complaint: Pt here today for yearly diabetic exam./ Pt states   vision has been stable with current glasses.     Hemoglobin A1C       Date                     Value               Ref Range             Status                11/16/2016               7.6 (H)             4.5 - 6.2 %           Final                 04/22/2016               7.0 (H)             4.5 - 6.2 %           Final                 04/22/2016               7.0 (H)             4.5 - 6.2 %           Final            ----------     Ophthalmic medication / drops:None    (-) headaches  (-) diplopia   (-) flashes / (-) floaters      Last edited by Cy Gallego, OD on 11/24/2017  2:59 PM. (History)            Assessment /Plan     For exam results, see Encounter Report.    Diabetes mellitus without ophthalmic manifestations    Nuclear sclerosis, bilateral    Refractive error    Pterygium of both eyes      DM type 2 w/o ocular retinopathy OU. Discussed possible ocular affects of uncontrolled blood sugar with patient. Recommended continued strong blood sugar control and continued care with PCP. Monitor yearly.     Mild NS OU. Discussed possible ocular affects of cataracts. Acceptable BCVA OU. Discussed treatment options. Surgery not recommended at this time. Monitor yearly.     Pt doing well with current specs, denies refraction. Return prn for updated spec Rx.    Pterygium nasal OU. Non-visually significant. Recommend OTC refresh drops twice daily. Return if any problems.      RTC in 1 year for comprehensive eye exam, or sooner prn.

## 2017-11-24 NOTE — LETTER
November 24, 2017      Jenn Fernandes PA-C  [address]  [place] [address]           [facility] - Optometry  [address] [place] [address] [address]  [facility] [address]  Phone: [phone]          Patient: Lenny Lopez   MR Number: [id number]   YOB: 1945   Date of Visit: 11/24/2017       Dear Jenn Fernandes:    Thank you for referring Lenny Lopez to me for evaluation. Attached you will find relevant portions of my assessment and plan of care.    If you have questions, please do not hesitate to call me. I look forward to following Lenny Lopez along with you.    Sincerely,    Cy Gallego, OD    Enclosure  CC:  No Recipients    If you would like to receive this communication electronically, please contact [email or web address] or [phone] to request more information on [facility] Mobile Link access.    For providers and/or their staff who would like to refer a patient to [facility], please contact us through our one-stop-shop provider referral line, [facility], at [phone].    If you feel you have received this communication in error or would no longer like to receive these types of communications, please e-mail [email or web address]

## 2017-11-28 ENCOUNTER — OFFICE VISIT (OUTPATIENT)
Dept: PULMONOLOGY | Facility: CLINIC | Age: 72
End: 2017-11-28
Payer: MEDICARE

## 2017-11-28 ENCOUNTER — OFFICE VISIT (OUTPATIENT)
Dept: UROLOGY | Facility: CLINIC | Age: 72
End: 2017-11-28
Payer: MEDICARE

## 2017-11-28 ENCOUNTER — OFFICE VISIT (OUTPATIENT)
Dept: FAMILY MEDICINE | Facility: CLINIC | Age: 72
End: 2017-11-28
Payer: MEDICARE

## 2017-11-28 ENCOUNTER — LAB VISIT (OUTPATIENT)
Dept: LAB | Facility: HOSPITAL | Age: 72
End: 2017-11-28
Attending: PHYSICIAN ASSISTANT
Payer: MEDICARE

## 2017-11-28 VITALS
RESPIRATION RATE: 14 BRPM | OXYGEN SATURATION: 95 % | BODY MASS INDEX: 37.1 KG/M2 | DIASTOLIC BLOOD PRESSURE: 66 MMHG | HEART RATE: 62 BPM | WEIGHT: 265 LBS | HEIGHT: 71 IN | SYSTOLIC BLOOD PRESSURE: 113 MMHG

## 2017-11-28 VITALS
HEIGHT: 71 IN | OXYGEN SATURATION: 96 % | SYSTOLIC BLOOD PRESSURE: 116 MMHG | WEIGHT: 263.25 LBS | DIASTOLIC BLOOD PRESSURE: 56 MMHG | BODY MASS INDEX: 36.85 KG/M2 | HEART RATE: 73 BPM

## 2017-11-28 VITALS
HEIGHT: 71 IN | DIASTOLIC BLOOD PRESSURE: 67 MMHG | TEMPERATURE: 98 F | HEART RATE: 71 BPM | BODY MASS INDEX: 36.68 KG/M2 | SYSTOLIC BLOOD PRESSURE: 117 MMHG | WEIGHT: 262 LBS

## 2017-11-28 DIAGNOSIS — R39.15 URINARY URGENCY: ICD-10-CM

## 2017-11-28 DIAGNOSIS — J44.0 CHRONIC OBSTRUCTIVE PULMONARY DISEASE WITH ACUTE LOWER RESPIRATORY INFECTION: ICD-10-CM

## 2017-11-28 DIAGNOSIS — E11.59 HYPERTENSION ASSOCIATED WITH DIABETES: Primary | ICD-10-CM

## 2017-11-28 DIAGNOSIS — I15.2 HYPERTENSION ASSOCIATED WITH DIABETES: Primary | ICD-10-CM

## 2017-11-28 DIAGNOSIS — E11.69 HYPERLIPIDEMIA ASSOCIATED WITH TYPE 2 DIABETES MELLITUS: ICD-10-CM

## 2017-11-28 DIAGNOSIS — N18.30 CKD (CHRONIC KIDNEY DISEASE) STAGE 3, GFR 30-59 ML/MIN: ICD-10-CM

## 2017-11-28 DIAGNOSIS — J41.1 CHRONIC BRONCHITIS, MUCOPURULENT: ICD-10-CM

## 2017-11-28 DIAGNOSIS — R80.9 TYPE 2 DIABETES MELLITUS WITH MICROALBUMINURIA, WITHOUT LONG-TERM CURRENT USE OF INSULIN: ICD-10-CM

## 2017-11-28 DIAGNOSIS — R32 URINARY INCONTINENCE, UNSPECIFIED TYPE: ICD-10-CM

## 2017-11-28 DIAGNOSIS — E78.5 HYPERLIPIDEMIA ASSOCIATED WITH TYPE 2 DIABETES MELLITUS: ICD-10-CM

## 2017-11-28 DIAGNOSIS — J45.50 SEVERE PERSISTENT ASTHMA WITHOUT COMPLICATION: Primary | ICD-10-CM

## 2017-11-28 DIAGNOSIS — E11.29 TYPE 2 DIABETES MELLITUS WITH MICROALBUMINURIA, WITHOUT LONG-TERM CURRENT USE OF INSULIN: ICD-10-CM

## 2017-11-28 DIAGNOSIS — R32 URINARY INCONTINENCE, UNSPECIFIED TYPE: Primary | ICD-10-CM

## 2017-11-28 DIAGNOSIS — E66.9 OBESITY (BMI 30-39.9): ICD-10-CM

## 2017-11-28 PROBLEM — J44.1 ASTHMA EXACERBATION IN COPD: Status: ACTIVE | Noted: 2017-03-14

## 2017-11-28 PROBLEM — J45.901 ASTHMA EXACERBATION IN COPD: Status: ACTIVE | Noted: 2017-03-14

## 2017-11-28 LAB
ALBUMIN SERPL BCP-MCNC: 3.5 G/DL
ALP SERPL-CCNC: 69 U/L
ALT SERPL W/O P-5'-P-CCNC: 24 U/L
ANION GAP SERPL CALC-SCNC: 7 MMOL/L
AST SERPL-CCNC: 19 U/L
BILIRUB SERPL-MCNC: 0.9 MG/DL
BILIRUB SERPL-MCNC: NORMAL MG/DL
BLOOD URINE, POC: NORMAL
BUN SERPL-MCNC: 35 MG/DL
CALCIUM SERPL-MCNC: 9.4 MG/DL
CHLORIDE SERPL-SCNC: 106 MMOL/L
CHOLEST SERPL-MCNC: 94 MG/DL
CHOLEST/HDLC SERPL: 3.4 {RATIO}
CO2 SERPL-SCNC: 26 MMOL/L
COLOR, POC UA: NORMAL
CREAT SERPL-MCNC: 1.6 MG/DL
EST. GFR  (AFRICAN AMERICAN): 48.3 ML/MIN/1.73 M^2
EST. GFR  (NON AFRICAN AMERICAN): 41.8 ML/MIN/1.73 M^2
ESTIMATED AVG GLUCOSE: 140 MG/DL
GLUCOSE SERPL-MCNC: 126 MG/DL
GLUCOSE UR QL STRIP: NORMAL
HBA1C MFR BLD HPLC: 6.5 %
HDLC SERPL-MCNC: 28 MG/DL
HDLC SERPL: 29.8 %
KETONES UR QL STRIP: NORMAL
LDLC SERPL CALC-MCNC: 52 MG/DL
LEUKOCYTE ESTERASE URINE, POC: NORMAL
NITRITE, POC UA: NORMAL
NONHDLC SERPL-MCNC: 66 MG/DL
PH, POC UA: 5
POTASSIUM SERPL-SCNC: 4.4 MMOL/L
PROSTATE SPECIFIC ANTIGEN, TOTAL: 1.7 NG/ML
PROT SERPL-MCNC: 6.6 G/DL
PROTEIN, POC: NORMAL
PSA FREE MFR SERPL: 40.59 %
PSA FREE SERPL-MCNC: 0.69 NG/ML
SODIUM SERPL-SCNC: 139 MMOL/L
SPECIFIC GRAVITY, POC UA: 1.02
TRIGL SERPL-MCNC: 70 MG/DL
UROBILINOGEN, POC UA: NORMAL

## 2017-11-28 PROCEDURE — 99204 OFFICE O/P NEW MOD 45 MIN: CPT | Mod: 25,S$GLB,, | Performed by: NURSE PRACTITIONER

## 2017-11-28 PROCEDURE — 83036 HEMOGLOBIN GLYCOSYLATED A1C: CPT

## 2017-11-28 PROCEDURE — 99499 UNLISTED E&M SERVICE: CPT | Mod: S$GLB,,, | Performed by: PHYSICIAN ASSISTANT

## 2017-11-28 PROCEDURE — 51798 US URINE CAPACITY MEASURE: CPT | Mod: S$GLB,,, | Performed by: NURSE PRACTITIONER

## 2017-11-28 PROCEDURE — 81001 URINALYSIS AUTO W/SCOPE: CPT | Mod: S$GLB,,, | Performed by: NURSE PRACTITIONER

## 2017-11-28 PROCEDURE — 99999 PR PBB SHADOW E&M-EST. PATIENT-LVL V: CPT | Mod: PBBFAC,,, | Performed by: NURSE PRACTITIONER

## 2017-11-28 PROCEDURE — 99214 OFFICE O/P EST MOD 30 MIN: CPT | Mod: S$GLB,,, | Performed by: PHYSICIAN ASSISTANT

## 2017-11-28 PROCEDURE — 99499 UNLISTED E&M SERVICE: CPT | Mod: S$GLB,,, | Performed by: INTERNAL MEDICINE

## 2017-11-28 PROCEDURE — 99999 PR PBB SHADOW E&M-EST. PATIENT-LVL III: CPT | Mod: PBBFAC,,, | Performed by: PHYSICIAN ASSISTANT

## 2017-11-28 PROCEDURE — 99214 OFFICE O/P EST MOD 30 MIN: CPT | Mod: S$GLB,,, | Performed by: INTERNAL MEDICINE

## 2017-11-28 PROCEDURE — 80061 LIPID PANEL: CPT

## 2017-11-28 PROCEDURE — 99999 PR PBB SHADOW E&M-EST. PATIENT-LVL IV: CPT | Mod: PBBFAC,,, | Performed by: INTERNAL MEDICINE

## 2017-11-28 PROCEDURE — 36415 COLL VENOUS BLD VENIPUNCTURE: CPT | Mod: PO

## 2017-11-28 PROCEDURE — 80053 COMPREHEN METABOLIC PANEL: CPT

## 2017-11-28 PROCEDURE — 84153 ASSAY OF PSA TOTAL: CPT

## 2017-11-28 RX ORDER — IPRATROPIUM BROMIDE AND ALBUTEROL SULFATE 2.5; .5 MG/3ML; MG/3ML
3 SOLUTION RESPIRATORY (INHALATION) EVERY 6 HOURS PRN
Qty: 120 ML | Refills: 1 | Status: SHIPPED | OUTPATIENT
Start: 2017-11-28 | End: 2018-09-28 | Stop reason: SDUPTHER

## 2017-11-28 RX ORDER — ACETYLCYSTEINE 200 MG/ML
4 SOLUTION ORAL; RESPIRATORY (INHALATION) 3 TIMES DAILY PRN
Qty: 120 ML | Refills: 12 | Status: SHIPPED | OUTPATIENT
Start: 2017-11-28 | End: 2017-12-28

## 2017-11-28 NOTE — PATIENT INSTRUCTIONS
Urinary Incontinence (Male)    Urinary incontinence means not being able to control the release of urine from the bladder.  Causes  Common causes of urinary incontinence in men include:  · Infection  · Certain medicines  · Aging  · Poor pelvic muscle tone  · Bladder spasms  · Obesity  · Urinary retention  Nervous system diseases, diabetes, sleep apnea, urinary tract infections, prostate surgery, and pelvic trauma can also cause incontinence. Constipation and smoking have also been identified as risk factors.  Symptoms  · Urge incontinence (also called overactive bladder) is a sudden urge to urinate even though there may not be much urine in the bladder. The need to urinate often during the night is common. It is due to bladder spasms.  · Stress incontinence is involuntary urine leakage that can occur with sneezing, coughing, and other actions that put stress on the bladder.  Treatment  Treatment of urinary incontinence depends on the cause. Infections of the bladder are treated with antibiotics. Urinary retention is treated with a bladder catheter.  Home care  Follow these guidelines when caring for yourself at home:  · Avoid foods and drinks that may irritate the bladder. These include drinks containing alcohol, caffeinate, or carbonation; chocolate; and acidic fruits and juices).  · Limit fluid intake to 6 to 8 cups a day.  · Lose weight if you are overweight. This will reduce your symptoms.  · If needed, wear absorbent pads to catch urine. Change pads frequently to maintain hygiene and prevent skin and bladder infections.  · Bathe daily to maintain good hygiene.  · If an antibiotic was prescribed to treat a bladder infection, be sure to take it until finished, even if you are feeling better before then. This is to make sure your infection has cleared.  · If a catheter was left in place, it is important to keep bacteria from getting into the collection bag. Do not disconnect the catheter from the collection  bag.  · Use a leg band to secure the catheter drainage tube, so it does not pull on the catheter. Drain the collection bag when it becomes full using the drain spout at the bottom of the bag. Do not disconnect the bag from the catheter.  · Do not pull on or try to remove a catheter. The catheter must be removed by a healthcare provider.  Follow-up care  Follow up with your healthcare provider, or as advised.  When to seek medical advice  Call your healthcare provider right away if any of these occur:  · Fever over 100.4ºF (38°C), or as directed by your healthcare provider  · Bladder pain or fullness  · Abdominal swelling, nausea, or vomiting  · Back pain  · Weakness, dizziness, or fainting  · If a catheter was left in place, return if:  ¨ Catheter falls out  ¨ Catheter stops draining for 6 hours  Date Last Reviewed: 7/26/2015  © 1322-6133 [employer]. [address] [place] [address], [place] [address]. All rights reserved. This information is not intended as a substitute for professional medical care. Always follow your healthcare professional's instructions.

## 2017-11-28 NOTE — LETTER
November 28, 2017      Jenn Fernandes PA-C  [address]  [place] [address]           [facility]  [address] [place] [address] [address]  [place] [address]  Phone: [phone]  Fax: [phone]          Patient: Lenny Lopez   MR Number: [id number]   YOB: 1945   Date of Visit: 11/28/2017       Dear Jenn Fernandes:    Thank you for referring Lenny Lopez to me for evaluation. Attached you will find relevant portions of my assessment and plan of care.    If you have questions, please do not hesitate to call me. I look forward to following Lenny Lopez along with you.    Sincerely,    Matilda Chin, [facility]    Enclosure  CC:  No Recipients    If you would like to receive this communication electronically, please contact [email or web address] or [phone] to request more information on [facility] Link access.    For providers and/or their staff who would like to refer a patient to [facility], please contact us through our one-stop-shop provider referral line, [facility], at [phone].    If you feel you have received this communication in error or would no longer like to receive these types of communications, please e-mail [email or web address]

## 2017-11-28 NOTE — PROGRESS NOTES
Subjective:       Patient ID: Lenny Lopez is a 72 y.o. male.    Chief Complaint: Follow-up    Mr. Lopez comes to clinic today for follow up. He was recently seen by urology NP, Ms. Narvaez for urinary symptoms. He was started on vesicare and advised to continue flomax. The patient is also to follow up with pulmonologist, Dr. Allan. He reports the inhalers and treatment started by Dr. Allan have significantly helped him. He has not had any recent COPD exacerbations. The patient is overdue for blood work at this time.       Review of Systems   Constitutional: Negative for activity change, appetite change and fever.   HENT: Negative for postnasal drip, rhinorrhea and sinus pressure.    Eyes: Negative for visual disturbance.   Respiratory: Negative for cough and shortness of breath.    Cardiovascular: Negative for chest pain.   Gastrointestinal: Negative for abdominal distention and abdominal pain.   Genitourinary: Negative for difficulty urinating and dysuria.   Musculoskeletal: Positive for arthralgias and myalgias.        Chronic arthralgia due to RA   Neurological: Negative for headaches.   Hematological: Negative for adenopathy.   Psychiatric/Behavioral: The patient is not nervous/anxious.        Objective:      Physical Exam   Constitutional: He is oriented to person, place, and time.   HENT:   Mouth/Throat: Oropharynx is clear and moist. No oropharyngeal exudate.   Eyes: Conjunctivae are normal. Pupils are equal, round, and reactive to light.   Cardiovascular: Normal rate and regular rhythm.    Pulmonary/Chest: Effort normal and breath sounds normal. He has no wheezes.   Abdominal: Soft. Bowel sounds are normal. He exhibits no distension. There is no tenderness.   Musculoskeletal: He exhibits no edema.   Lymphadenopathy:     He has no cervical adenopathy.   Neurological: He is alert and oriented to person, place, and time.   Skin: No erythema.   Psychiatric: His behavior is normal.       Assessment:        1. Hypertension associated with diabetes    2. Hyperlipidemia associated with type 2 diabetes mellitus    3. CKD (chronic kidney disease) stage 3, GFR 39.7 ml/min    4. Chronic bronchitis, mucopurulent    5. Type 2 diabetes mellitus with microalbuminuria, without long-term current use of insulin    6. Obesity (BMI 30-39.9)        Plan:   Lenny was seen today for follow-up.    Diagnoses and all orders for this visit:    Hypertension associated with diabetes  Controlled  Low salt diet  Continue current medication  Hyperlipidemia associated with type 2 diabetes mellitus  High fiber diet  Continue current medication  Labs today  CKD (chronic kidney disease) stage 3, GFR 39.7 ml/min  -     Comprehensive metabolic panel; Future  Labs today  Chronic bronchitis, mucopurulent  Symptoms stable/ improved  Continue current medication/ inhalers  Follow up with Dr. Allan as scheduled  Type 2 diabetes mellitus with microalbuminuria, without long-term current use of insulin  Update labs today  Diabetic diet    Obesity (BMI 30-39.9)  Low carbohydrate, high fiber diet  Increase exercise as able      Patient readiness: acceptance and barriers:none    During the course of the visit the patient was educated and counseled about the following:     Diabetes:  Discussed general issues about diabetes pathophysiology and management.  Educational material distributed.  Addressed ADA diet.  Suggested low cholesterol diet.  Encouraged aerobic exercise.  Discussed foot care.  Reminded to get yearly retinal exam.  Hypertension:   Medication: no change.  Dietary sodium restriction.  Regular aerobic exercise.  Obesity:   General weight loss/lifestyle modification strategies discussed (elicit support from others; identify saboteurs; non-food rewards, etc).  Informal exercise measures discussed, e.g. taking stairs instead of elevator.  Regular aerobic exercise program discussed.    Goals: Diabetes: Maintain Hemoglobin A1C below 7, Hypertension:  Reduce Blood Pressure and Obesity: Reduce calorie intake and BMI    Did patient meet goals/outcomes: Yes    The following self management tools provided: declined    Patient Instructions (the written plan) was given to the patient/family.     Time spent with patient: 30 minutes

## 2017-11-28 NOTE — PATIENT INSTRUCTIONS
Your eosinophils are high and your lungs very unstable with prednisone use 9-12 times yearly.    xolair treatments would be needed every 2 weeks if able to qualify - would have to see allergist.    IL5 therapy is once a month and should stabilize lungs well - expect much less prednisone needs and better breathing.

## 2017-11-28 NOTE — PROGRESS NOTES
11/28/2017    Lenny Lopez    Office f/u    Chief Complaint   Patient presents with    Follow-up     5 month    COPD   f/u asthma and copd      Nov 28, 2017- pt had one of 5 afb pos for mike.  Still having thick mucous, uses prednisone every month or so.  No yg mucous production.  On road with [employer] freq.      juNE 29, 2017- used prednisone used 2x at 4 and 5 days, still green mucous, z pack only rx that works well for infection. Having intermittent leg swelling r> left.  Prostrate better with meds.  One Mike +0 of 5 or so.    Not using lasix/aldactone regular  March 14, 2017HPI: pt follows with Dr dean, has had 3 exacerbations.  Took prednisone x 3 and cleared sort of.  Has had cough and wheeze and seasonal worse.  Chr sinus seems to trigger.    Problem now continuous. Prednisone only effective rx.  On breo - uses regular. Has had freq infections last 2 yrs.  Has diabetes, sugars 150's or so.       The chief compliant  problem is new to me    PFSH:  Past Medical History:   Diagnosis Date    Angina pectoris     Arthritis     Asthma     Back pain     Cataract     Chronic bronchitis     COPD (chronic obstructive pulmonary disease)     Coronary artery disease     DM (diabetes mellitus), type 2, 2000 12/12/2014    Hyperlipidemia     Hypertension     Kidney stones 12/17/2012    Nephrolithiasis     Obesity     Renal manifestation of secondary diabetes mellitus     Sleep apnea     Thyroid disease     Unspecified disorder of kidney and ureter     Urinary incontinence          Past Surgical History:   Procedure Laterality Date    APPENDECTOMY      EYE SURGERY      left eye secondary to trauma    FRACTURE SURGERY      right arm    KNEE SURGERY      screw    TONSILLECTOMY, ADENOIDECTOMY       Social History   Substance Use Topics    Smoking status: Former Smoker     Types: Cigarettes, Cigars    Smokeless tobacco: Current User     Types: Chew      Comment: smoked a few cigars in his 20s     "Alcohol use No     Family History   Problem Relation Age of Onset    Thyroid disease Other     Cancer Mother     Hypertension Mother     Osteoarthritis Mother     Heart disease Father     Hypertension Father     Cancer Paternal Uncle      lung    Hypertension Sister     Hypertension Brother     Cancer Brother      colon?    Diabetes Maternal Aunt     Cancer Brother      pancreatic    Collagen disease Neg Hx     Amblyopia Neg Hx     Blindness Neg Hx     Cataracts Neg Hx     Glaucoma Neg Hx     Macular degeneration Neg Hx     Retinal detachment Neg Hx     Strabismus Neg Hx     Stroke Neg Hx      Review of patient's allergies indicates:   Allergen Reactions    No known drug allergies        Performance Status:The patient's activity level is functions out of house.      Review of Systems:  a review of eleven systems covering constitutional, Eye, HEENT, Psych, Respiratory, Cardiac, GI, , Musculoskeletal, Endocrine, Dermatologic was negative except for pertinent findings as listed ABOVE and below: wt varies 265 -240, singh, sl abn stress test but no chest pain/mi, urine freq with slow flow, arthritis degenerative.       Exam:Comprehensive exam done. BP (!) 116/56 (BP Location: Right arm, Patient Position: Sitting)   Pulse 73   Ht 5' 11" (1.803 m)   Wt 119.4 kg (263 lb 3.7 oz)   SpO2 96%   BMI 36.71 kg/m²   Exam included Vitals as listed, and patient's appearance and affect and alertness and mood, oral exam for yeast and hygiene and pharynx lesions and Mallapatti (M) score, neck with inspection for jvd and masses and thyroid abnormalities and lymph nodes (supraclavicular and infraclavicular nodes and axillary also examined and noted if abn), chest exam included symmetry and effort and fremitus and percussion and auscultation, cardiac exam included rhythm and gallops and murmur and rubs and jvd and edema, abdominal exam for mass and hepatosplenomegaly and tenderness and hernias and bowel sounds, " Musculoskeletal exam with muscle tone and posture and mobility/gait and  strength, and skin for rashes and cyanosis and pallor and turgor, extremity for clubbing.  Findings were normal except for pertinent findings listed below:   M3 and good bs with faint squeek, +2 edema (trace), diaphragms move.    Radiographs (ct chest and cxr) reviewed: view by direct vision left diaphragm up.    Labs reviewed  eos up     PFT results reviewed Pulmonary Functions, including spirometry and bronchodilator response and lung volumes and diffusion, study was done dec 7, 2016.  Spirometry shows loss of vital capacity and fev1 with no obstruction and no bronchodilator response.   FEV1 is 55% or 1.81 liters.  Lung volumes show  loss of TLC with restriction 64% and elevated residual volume.  Diffusion shows reduced but falls within normal range when corrected for lung volumes.     Pulmonary functions show restriction. Clinical correlation recommended.      Nathaniel Allan M.D.          4wk ago     Respiratory Culture MORAXELLA (BRANHAMELLA) CATARRHALIS  Many  Normal respiratory iris also present  Beta Lactamase positive    Gram Stain (Respiratory) <10 epithelial cells per low power field.   Gram Stain (Respiratory) Rare WBC's           Results for REG VINES (MRN [id number]) as of 11/28/2017 13:54   Ref. Range 4/12/2017 11:35   WBC Latest Ref Range: 3.90 - 12.70 K/uL 7.51   RBC Latest Ref Range: 4.60 - 6.20 M/uL 4.38 (L)   Hemoglobin Latest Ref Range: 14.0 - 18.0 g/dL 13.1 (L)   Hematocrit Latest Ref Range: 40.0 - 54.0 % 39.5 (L)   MCV Latest Ref Range: 82 - 98 fL 90   MCH Latest Ref Range: 27.0 - 31.0 pg 29.9   MCHC Latest Ref Range: 32.0 - 36.0 % 33.2   RDW Latest Ref Range: 11.5 - 14.5 % 13.6   Platelets Latest Ref Range: 150 - 350 K/uL 146 (L)   MPV Latest Ref Range: 9.2 - 12.9 fL 12.2   Gran% Latest Ref Range: 38.0 - 73.0 % 55.8   Gran # Latest Ref Range: 1.8 - 7.7 K/uL 4.2   Lymph% Latest Ref Range: 18.0 - 48.0 % 25.6    Lymph # Latest Ref Range: 1.0 - 4.8 K/uL 1.9   Mono% Latest Ref Range: 4.0 - 15.0 % 8.7   Mono # Latest Ref Range: 0.3 - 1.0 K/uL 0.7   Eosinophil% Latest Ref Range: 0.0 - 8.0 % 9.2 (H)   Eos # Latest Ref Range: 0.0 - 0.5 K/uL 0.7 (H)   Basophil% Latest Ref Range: 0.0 - 1.9 % 0.4   Baso # Latest Ref Range: 0.00 - 0.20 K/uL 0.03       Plan:  Clinical impression is resonably certain and repeated evaluation prn +/- follow up will be needed as below.    Lenny was seen today for follow-up and copd.    Diagnoses and all orders for this visit:    Severe persistent asthma without complication  -     acetylcysteine 200 mg/ml, 20%, (MUCOMYST) 200 mg/mL (20 %) nebulizer solution; Take 4 mLs by nebulization 3 (three) times daily as needed (thick mucous).  -     albuterol-ipratropium 2.5mg-0.5mg/3mL (DUO-NEB) 0.5 mg-3 mg(2.5 mg base)/3 mL nebulizer solution; Take 3 mLs by nebulization every 6 (six) hours as needed for Wheezing.  -     NEBULIZER FOR HOME USE    Chronic obstructive pulmonary disease with acute lower respiratory infection  -     acetylcysteine 200 mg/ml, 20%, (MUCOMYST) 200 mg/mL (20 %) nebulizer solution; Take 4 mLs by nebulization 3 (three) times daily as needed (thick mucous).  -     albuterol-ipratropium 2.5mg-0.5mg/3mL (DUO-NEB) 0.5 mg-3 mg(2.5 mg base)/3 mL nebulizer solution; Take 3 mLs by nebulization every 6 (six) hours as needed for Wheezing.  -     NEBULIZER FOR HOME USE    Chronic bronchitis, mucopurulent  -     acetylcysteine 200 mg/ml, 20%, (MUCOMYST) 200 mg/mL (20 %) nebulizer solution; Take 4 mLs by nebulization 3 (three) times daily as needed (thick mucous).  -     albuterol-ipratropium 2.5mg-0.5mg/3mL (DUO-NEB) 0.5 mg-3 mg(2.5 mg base)/3 mL nebulizer solution; Take 3 mLs by nebulization every 6 (six) hours as needed for Wheezing.  -     NEBULIZER FOR HOME USE        Return in about 3 months (around 2/28/2018), or if symptoms worsen or fail to improve.    Discussed with patient above for  education the following:      Your eosinophils are high and your lungs very unstable with prednisone use 9-12 times yearly.    xolair treatments would be needed every 2 weeks if able to qualify - would have to see allergist.    IL5 therapy is once a month and should stabilize lungs well - expect much less prednisone needs and better breathing.

## 2017-11-28 NOTE — PATIENT INSTRUCTIONS
Diabetes (General Information)  Diabetes is a long-term health problem. It means your body does not make enough insulin. Or it may mean that your body cannot use the insulin it makes. Insulin is a hormone in your body. It lets blood sugar (glucose) reach the cells in your body. All of your cells need glucose for fuel.  When you have diabetes, the glucose in your blood builds up because it cannot get into the cells. This buildup is called high blood sugar (hyperglycemia).  Your blood sugar level depends on several things. It depends on what kind of food you eat and how much of it you eat. It also depends on how much exercise you get, and how much insulin you have in your body. Eating too much of the wrong kinds of food or not taking diabetes medicine on time can cause high blood sugar. Infections can cause high blood sugar even if you are taking medicines correctly.  These things can also cause low blood sugar:  · Missing meals  · Not eating enough food  · Taking too much diabetes medicine  Diabetes can cause serious problems over time if you do not get treated. These problems include heart disease, stroke, kidney failure, and blindness. They also include nerve pain or loss of feeling in your legs and feet, and gangrene of the feet. By keeping your blood sugar under control you can prevent or delay these problems.  Normal blood sugar levels are 80 to 100 before a meal and less than 180 in the 1 to 2 hours after a meal.  Home care  Follow these guidelines when caring for yourself at home:  · Follow the diet your healthcare provider gives you. Take insulin or other diabetes medicine exactly as told to.  · Watch your blood sugar as you are told to. Keep a log of your results. This will help your provider change your medicines to keep your blood sugar under control.  · Try to reach your ideal weight. You may be able to cut back on or not have to take diabetes medicine if you eat the right foods and get exercise.  · Do  not smoke. Smoking worsens the effects of diabetes on your circulation. You are much more likely to have a heart attack if you have diabetes and you smoke.  · Take good care of your feet. If you have lost feeling in your feet, you may not see an injury or infection. Check your feet and between your toes at least once a week.  · Wear a medical alert bracelet or necklace, or carry a card in your wallet that says you have diabetes. This will help healthcare providers give you the right care if you get very ill and cannot tell them that you have diabetes.  Sick day plan  If you get a cold, the flu, or a bacterial or viral infection, take these steps:  · Look at your diabetes sick plan and call your healthcare provider as you were told to. You may need to call your provider right away if:  ¨ Your blood sugar is above 240 while taking your diabetes medicine  ¨ Your urine ketone levels are above normal or high  ¨ You have been vomiting more than 6 hours  ¨ You have trouble breathing or your breath ha s a fruity smell  ¨ You have a high fever  ¨ You have a fever for several days and you are not getting better  ¨ You get light-headed and are sleepier than usual  · Keep taking your diabetes pills (oral medicine) even if you have been vomiting and are feeling sick. Call your provider right away because you may need insulin to lower your blood sugar until you recover from your illness.  · Keep taking your insulin even if you have been vomiting and are feeling sick. Call your provider right away to ask if you need to change your insulin dose. This will depend on your blood sugar results.  · Check your blood sugar every 2 to 4 hours, or at least 4 times a day.  · Check your ketones often. If you are vomiting and having diarrhea, watch them more often.  · Do not skip meals. Try to eat small meals on a regular schedule. Do this even if you do not feel like eating.  · Drink water or other liquids that do not have caffeine or  calories. This will keep you from getting dehydrated. If you are nauseated or vomiting, takes small sips every 5 minutes. To prevent dehydration try to drink a cup (8 ounces) of fluids every hour while you are awake.  General care  Always bring a source of fast-acting sugar with you in case you have symptoms of low blood sugar (below 70). At the first sign of low blood sugar, eat or drink 15 to 20 grams of fast-acting sugar to raise your blood sugar. Examples are:  · 3 to 4 glucose tablets. You can buy these at most [facility].  · 4 ounces (1/2 cup) of regular (not diet) soft drinks  · 4 ounces (1/2 cup) of any fruit juice  · 8 ounces (1 cup) of milk  · 5 to 6 pieces of hard candy  · 1 tablespoon of honey  Check your blood sugar 15 minutes after treating yourself. If it is still below 70, take 15 to 20 more grams of fast-acting sugar. Test again in 15 minutes. If it returns to normal (70 or above), eat a snack or meal to keep your blood sugar in a safe range. If it stays low, call your doctor or go to an emergency room.  Follow-up care  Follow-up with your healthcare provider, or as advised. For more information about diabetes, visit the American Diabetes Association website at www.diabetes.org or call 538-748-4106.  When to seek medical advice  Call your healthcare provider right away if you have any of these symptoms of high blood sugar:  · Frequent urination  · Dizziness  · Drowsiness  · Thirst  · Headache  · Nausea or vomiting  · Abdominal pain  · Eyesight changes  · Fast breathing  · Confusion or loss of consciousness  Also call your provider right away if you have any of these signs of low blood sugar:  · Fatigue  · Headache  · Shakes  · Excess sweating  · Hunger  · Feeling anxious or restless  · Eyesight changes  · Drowsiness  · Weakness  · Confusion or loss of consciousness  Call 911  Call for emergency help right away if any of these occur:  · Chest pain or shortness of breath  · Dizziness or  fainting  · Weakness of an arm or leg or one side of the face  · Trouble speaking or seeing   Date Last Reviewed: 6/1/2016 © 2000-2017 [employer]. [address] [place] [address], [place] [address]. All rights reserved. This information is not intended as a substitute for professional medical care. Always follow your healthcare professional's instructions.            Established High Blood Pressure    High blood pressure (hypertension) is a chronic disease. Often, healthcare providers dont know what causes it. But it can be caused by certain health conditions and medicines.  If you have high blood pressure, you may not have any symptoms. If you do have symptoms, they may include headache, dizziness, changes in your vision, chest pain, and shortness of breath. But even without symptoms, high blood pressure thats not treated raises your risk for heart attack and stroke. High blood pressure is a serious health risk and shouldnt be ignored.  A blood pressure reading is made up of two numbers: a higher number over a lower number. The top number is the systolic pressure. The bottom number is the diastolic pressure. A normal blood pressure is a systolic pressure of  less than 120 over a diastolic pressure of less than 80. You will see your blood pressure readings written together. For example, a person with a systolic pressure of 188 and a diastolic pressure of 78 will have 118/78 written in the medical record.  High blood pressure is when either the top number is 140 or higher, or the bottom number is 90 or higher. This must be the result when taking your blood pressure a number of times. The blood pressures between normal and high are called prehypertension.  Home care  If you have high blood pressure, you should do what is listed below to lower your blood pressure. If you are taking medicines for high blood pressure, these methods may reduce or end your need for medicines in the future.  · Begin a weight-loss  program if you are overweight.  · Cut back on how much salt you get in your diet. Heres how to do this:  ¨ Dont eat foods that have a lot of salt. These include olives, pickles, smoked meats, and salted potato chips.  ¨ Dont add salt to your food at the table.  ¨ Use only small amounts of salt when cooking.  · Start an exercise program. Talk with your healthcare provider about the type of exercise program that would be best for you. It doesn't have to be hard. Even brisk walking for 20 minutes 3 times a week is a good form of exercise.  · Dont take medicines that stimulate the heart. This includes many over-the-counter cold and sinus decongestant pills and sprays, as well as diet pills. Check the warnings about hypertension on the label. Before buying any over-the-counter medicines or supplements, always ask the pharmacist about the product's potential interaction with your high blood pressure and your high blood pressure medicines.  · Stimulants such as amphetamine or cocaine could be deadly for someone with high blood pressure. Never take these.  · Limit how much caffeine you get in your diet. Switch to caffeine-free products.  · Stop smoking. If you are a long-time smoker, this can be hard. Talk to your healthcare provider about medicines and nicotine replacement options to help you. Also, enroll in a stop-smoking program to make it more likely that you will quit for good.  · Learn how to handle stress. This is an important part of any program to lower blood pressure. Learn about relaxation methods like meditation, yoga, or biofeedback.  · If your provider prescribed medicines, take them exactly as directed. Missing doses may cause your blood pressure get out of control.  · If you miss a dose or doses, check with your healthcare provider or pharmacist about what to do.  · Consider buying an automatic blood pressure machine. Ask your provider for a recommendation. You can get one of these at most  pharmacies.     The American Heart Association recommends the following guidelines for home blood pressure monitoring:  · Don't smoke or drink coffee for 30 minutes before taking your blood pressure.  · Go to the bathroom before the test.  · Relax for 5 minutes before taking the measurement.  · Sit with your back supported (don't sit on a couch or soft chair); keep your feet on the floor uncrossed. Place your arm on a solid flat surface (like a table) with the upper part of the arm at heart level. Place the middle of the cuff directly above the eye of the elbow. Check the monitor's instruction manual for an illustration.  · Take multiple readings. When you measure, take 2 to 3 readings one minute apart and record all of the results.  · Take your blood pressure at the same time every day, or as your healthcare provider recommends.  · Record the date, time, and blood pressure reading.  · Take the record with you to your next medical appointment. If your blood pressure monitor has a built-in memory, simply take the monitor with you to your next appointment.  · Call your provider if you have several high readings. Don't be frightened by a single high blood pressure reading, but if you get several high readings, check in with your healthcare provider.  · Note: When blood pressure reaches a systolic (top number) of 180 or higher OR diastolic (bottom number) of 110 or higher, seek emergency medical treatment.  Follow-up care  You will need to see your healthcare provider regularly. This is to check your blood pressure and to make changes to your medicines. Make a follow-up appointment as directed. Bring the record of your home blood pressure readings to the appointment.  When to seek medical advice  Call your healthcare provider right away if any of these occur:  · Blood pressure reaches a systolic (upper number) of 180 or higher OR a diastolic (bottom number) of 110 or higher  · Chest pain or shortness of breath  · Severe  headache  · Throbbing or rushing sound in the ears  · Nosebleed  · Sudden severe pain in your belly (abdomen)  · Extreme drowsiness, confusion, or fainting  · Dizziness or spinning sensation (vertigo)  · Weakness of an arm or leg or one side of the face  · You have problems speaking or seeing   Date Last Reviewed: 12/1/2016 © 2000-2017 [employer]. [address] [place] [address], [place] [address]. All rights reserved. This information is not intended as a substitute for professional medical care. Always follow your healthcare professional's instructions.            Walking for Fitness  Fitness walking has something for everyone, even people who are already fit. Walking is one of the safest ways to condition your body aerobically. It can boost energy, help you lose weight, and reduce stress.    Physical benefits  · Walking strengthens your heart and lungs, and tones your muscles.  · When walking, your feet land with less impact than in other sports. This reduces chances of muscle, bone, and joint injury.  · Regular walking improves your cholesterol levels and lowers your risk of heart disease. And it helps you control your blood sugar if you have diabetes.  · Walking is a weight-bearing activity, which helps maintain bone density. This can help prevent osteoporosis.  Personal rewards  · Taking walks can help you relax and manage stress. And fitness walking may make you feel better about yourself.  · Walking can help you sleep better at night and make you less likely to be depressed.  · Regular walking may help maintain your memory as you get older.  · Walking is a great way to spend extra time with friends and family members. Be sure to invite your dog along!  Q&A about fitness walking  Q: Will walking keep me fit?  A: Yes. Regular walking at the right pace gives you all the benefits of other aerobic activities, such as jogging and swimming.  Q: Will walking help me lose weight and keep it off?  A: Yes. Per  mile, walking can burn as many calories as jogging. Your health care provider can help work walking into your weight-loss plan.  Q: Is walking safe for my health?  A: Yes. Walking is safe if you have high blood pressure, diabetes, heart disease, or other conditions. Talk to your healthcare provider before you start.  Date Last Reviewed: 4/1/2017 © 2000-2017 [employer]. [address] [place] [address], [place] [address]. All rights reserved. This information is not intended as a substitute for professional medical care. Always follow your healthcare professional's instructions.            Weight Management: Getting Started  Healthy bodies come in all shapes and sizes. Not all bodies are made to be thin. For some people, a healthy weight is higher than the average weight listed on weight charts. Your healthcare provider can help you decide on a healthy weight for you.    Reasons to lose weight  Losing weight can help with some health problems, such as high blood pressure, heart disease, diabetes, sleep apnea, and arthritis. You may also feel more energy.  Set your long-term goal  Your goal doesn't even have to be a specific weight. You may decide on a fitness goal (such as being able to walk 10 miles a week), or a health goal (such as lowering your blood pressure). Choose a goal that is measurable and reasonable, so you know when you've reached it. A goal of reaching a BMI of less than 25 is not always reasonable (or possible).   Make an action plan  Habits dont change overnight. Setting your goals too high can leave you feeling discouraged if you cant reach them. Be realistic. Choose one or two small changes you can make now. Set an action plan for how you are going to make these changes. When you can stick to this plan, keep making a few more small changes. Taking small steps will help you stay on the path to success.  Track your progress  Write down your goals. Then, keep a daily record of your progress.  Write down what you eat and how active you are. This record lets you look back on how much youve done. It may also help when youre feeling frustrated. Reward yourself for success. Even if you dont reach every goal, give yourself credit for what you do get done.  Get support  Encouragement from others can help make losing weight easier. Ask your family members and friends for support. They may even want to join you. Also look to your healthcare provider, registered dietitian, and  for help. Your local hospital can give you more information about nutrition, exercise, and weight loss.  Date Last Reviewed: 1/31/2016  © 2670-5024 [employer]. [address] [place] [address], [place] [address]. All rights reserved. This information is not intended as a substitute for professional medical care. Always follow your healthcare professional's instructions.

## 2017-11-28 NOTE — PROGRESS NOTES
[facility] Note  Staff: MALACHI GutierrezC    PCP: Dr. Blue Shah    Chief Complaint: Urinary incontinence, urinary urgency    Subjective:        HPI: Lenny Lopez is a 72 y.o. male presents with complaints of urinary urgency and incontinence which began several years ago and has progressively worsened within past year.  Pt is accompanied today by his son and grandson to office visit.    The patient was last seen by Dr. Zaragoza on 03/17/2014 with kidney stones.  In the past pt has taken Ketty one tablet daily for his BPH with LUTS and Ditropan for his past urinary frequency.  Pt states today those meds caused extreme swelling to his bilateral lower extremities therefore he is no longer on any of those meds.    The only Urology medication he currently takes is Flomax 0.4 mg one tablet daily which Dr. Allan originally prescribed him to try one year ago and he is please with this medication.    Questions asked the pt during office visit today:  Urgency: Yes, urge incontinence? Yes  NTF: 1x night, DTF: Yes, but attributes to drinking lots of water during the day.  Dysuria: No  Gross Hematuria:No  Straining:No, Hesistancy:No, Intermittency:No, Weak stream:No with flomax    Family History:  Brother had prostate cancer in his 70s    Last PSA Screening:   Lab Results   Component Value Date    PSA 3.5 12/01/2015    PSA 3.3 12/10/2013    PSA 1.78 01/02/2013     REVIEW OF SYSTEMS:  Review of Systems   Constitutional: Negative for chills, diaphoresis, fever and weight loss.   HENT: Negative for congestion, hearing loss, nosebleeds and sore throat.    Eyes: Negative for blurred vision and pain.   Respiratory: Negative for cough and wheezing.    Cardiovascular: Negative for chest pain, palpitations and leg swelling.   Gastrointestinal: Negative for abdominal pain, heartburn, nausea and vomiting.   Genitourinary: Positive for urgency. Negative for dysuria, flank pain, frequency and hematuria.         +Incontinence   Musculoskeletal: Negative for back pain, joint pain, myalgias and neck pain.   Skin: Negative for itching and rash.   Neurological: Negative for dizziness, tremors, sensory change, seizures, loss of consciousness, weakness and headaches.   Endo/Heme/Allergies: Does not bruise/bleed easily.   Psychiatric/Behavioral: Negative for depression and suicidal ideas. The patient is not nervous/anxious.      Physical Exam    PMHx:  Past Medical History:   Diagnosis Date    Angina pectoris     Arthritis     Asthma     Back pain     Cataract     Chronic bronchitis     COPD (chronic obstructive pulmonary disease)     Coronary artery disease     DM (diabetes mellitus), type 2, 2000 12/12/2014    Hyperlipidemia     Hypertension     Kidney stones 12/17/2012    Nephrolithiasis     Obesity     Renal manifestation of secondary diabetes mellitus     Sleep apnea     Thyroid disease     Unspecified disorder of kidney and ureter     Urinary incontinence      PSHx:  Past Surgical History:   Procedure Laterality Date    APPENDECTOMY      EYE SURGERY      left eye secondary to trauma    FRACTURE SURGERY      right arm    KNEE SURGERY      screw    TONSILLECTOMY, ADENOIDECTOMY       Allergies:  No known drug allergies    Medications: reviewed   Objective:     Vitals:    11/28/17 0837   BP: 117/67   Pulse: 71   Temp: 97.7 °F (36.5 °C)     General:WDWN in NAD  Eyes: PERRLA, normal conjunctiva  Respiratory: no increased work on breathing, clear to auscultation  Cardiovascular: regular rate and rhythm. No obvious extremity edema.  GI: palpation of masses. No tenderness. No hepatosplenomegaly to palpation.  Musculoskeletal: normal range of motion of bilateral upper extremities. Normal muscle strength and tone.  Skin: no obvious rashes or lesions. No tightening of skin noted.  Neurologic: CN grossly normal. Normal sensation.   Psychiatric: awake, alert and oriented x 3. Mood and affect normal.  Cooperative.    :  Inspection of anus and perineum normal  No scrotal rashes, cysts or lesions  Epididymis normal in size, no tenderness  Testes normal and size, equal size bilaterally, no masses  Urethral meatus normal without discharge  MORTEZA: Enlarged gland without masses, tenderness. SV not palpable. Normal sphincter tone.   No bilateral inguinal hernias noted     PVR by bladder scan performed by me today:  0 mL*    LABS REVIEW:  UA today:  Color:Clear, Yellow  Spec. Grav.  1.020  PH  5.0  Negative for leukocytes, nitrates, protein, glucose, ketones, urobili, bili, and blood.    Assessment:       1. Urinary incontinence, unspecified type    2. Urinary urgency          Plan:     1.  Pt was advised to continue the Flomax 0.4 mg one tablet daily but take in the evenings.  2.  We will place pt on trial Vesicare 10 mg one tablet daily for his urinary urgency with incontinence at this time.  3.  Draw PSA level today.    F/u with me in 3-4 weeks.    [facility]: Active    Matilda Chin, JOHNP-C

## 2017-11-29 ENCOUNTER — TELEPHONE (OUTPATIENT)
Dept: PULMONOLOGY | Facility: CLINIC | Age: 72
End: 2017-11-29

## 2017-11-29 NOTE — TELEPHONE ENCOUNTER
Left message for patient to call me back     ----- Message from Nancy Carballo sent at 11/29/2017 12:32 PM CST -----  Contact: self-  [phone]  Patient called asking to speak with the nurse regarding supplies for Cpap. Thanks!

## 2017-11-30 ENCOUNTER — TELEPHONE (OUTPATIENT)
Dept: PULMONOLOGY | Facility: CLINIC | Age: 72
End: 2017-11-30

## 2017-11-30 NOTE — TELEPHONE ENCOUNTER
Advised to call [facility] to schedule a fitting for new mask     ----- Message from Leela Peacock sent at 11/30/2017  9:33 AM CST -----  Contact: wife,  russ   Mask is keeping pt awake   Wants to speak to Joselin   Call back

## 2017-12-01 ENCOUNTER — TELEPHONE (OUTPATIENT)
Dept: PULMONOLOGY | Facility: CLINIC | Age: 72
End: 2017-12-01

## 2017-12-01 ENCOUNTER — TELEPHONE (OUTPATIENT)
Dept: FAMILY MEDICINE | Facility: CLINIC | Age: 72
End: 2017-12-01

## 2017-12-01 DIAGNOSIS — J45.50 SEVERE PERSISTENT ASTHMA, UNSPECIFIED WHETHER COMPLICATED: Primary | ICD-10-CM

## 2017-12-01 NOTE — TELEPHONE ENCOUNTER
Results given to patient.  Patient states he is not sure if he is taking indomethacin and he is not drinking much water like he use to.  Patient states he go to the restroom maybe every 2 hours or so.

## 2017-12-01 NOTE — TELEPHONE ENCOUNTER
----- Message from Kandace Davidson PA-C sent at 11/29/2017  9:37 AM CST -----  Blood work shows that there is a decline in kidney function. I see that the patient has indomethacin on med list. Has he taken this recently? Also, how much water is patient drinking daily?

## 2017-12-04 ENCOUNTER — LAB VISIT (OUTPATIENT)
Dept: LAB | Facility: HOSPITAL | Age: 72
End: 2017-12-04
Attending: INTERNAL MEDICINE
Payer: MEDICARE

## 2017-12-04 ENCOUNTER — TELEPHONE (OUTPATIENT)
Dept: FAMILY MEDICINE | Facility: CLINIC | Age: 72
End: 2017-12-04

## 2017-12-04 DIAGNOSIS — N28.9 FUNCTION KIDNEY DECREASED: Primary | ICD-10-CM

## 2017-12-04 DIAGNOSIS — R05.9 COUGH: ICD-10-CM

## 2017-12-04 DIAGNOSIS — J44.9 CHRONIC OBSTRUCTIVE PULMONARY DISEASE, UNSPECIFIED COPD TYPE: ICD-10-CM

## 2017-12-04 DIAGNOSIS — J45.50 SEVERE PERSISTENT ASTHMA, UNSPECIFIED WHETHER COMPLICATED: ICD-10-CM

## 2017-12-04 LAB
BASOPHILS # BLD AUTO: 0.04 K/UL
BASOPHILS NFR BLD: 0.6 %
DIFFERENTIAL METHOD: ABNORMAL
EOSINOPHIL # BLD AUTO: 0.4 K/UL
EOSINOPHIL NFR BLD: 6.7 %
ERYTHROCYTE [DISTWIDTH] IN BLOOD BY AUTOMATED COUNT: 13.1 %
HCT VFR BLD AUTO: 37.5 %
HGB BLD-MCNC: 12.2 G/DL
IMM GRANULOCYTES # BLD AUTO: 0.03 K/UL
IMM GRANULOCYTES NFR BLD AUTO: 0.5 %
LYMPHOCYTES # BLD AUTO: 1.3 K/UL
LYMPHOCYTES NFR BLD: 19.2 %
MCH RBC QN AUTO: 30.2 PG
MCHC RBC AUTO-ENTMCNC: 32.5 G/DL
MCV RBC AUTO: 93 FL
MONOCYTES # BLD AUTO: 0.6 K/UL
MONOCYTES NFR BLD: 9 %
NEUTROPHILS # BLD AUTO: 4.2 K/UL
NEUTROPHILS NFR BLD: 64 %
NRBC BLD-RTO: 0 /100 WBC
PLATELET # BLD AUTO: 139 K/UL
PMV BLD AUTO: 12.2 FL
RBC # BLD AUTO: 4.04 M/UL
WBC # BLD AUTO: 6.52 K/UL

## 2017-12-04 PROCEDURE — 36415 COLL VENOUS BLD VENIPUNCTURE: CPT | Mod: PO

## 2017-12-04 PROCEDURE — 85025 COMPLETE CBC W/AUTO DIFF WBC: CPT

## 2017-12-04 NOTE — TELEPHONE ENCOUNTER
----- Message from Kandace Davidson PA-C sent at 12/4/2017 10:55 AM CST -----  Increase water intake. Be sure to hold indomethacin and we will recheck labs in 2 weeks. Please schedule.

## 2017-12-14 ENCOUNTER — TELEPHONE (OUTPATIENT)
Dept: PULMONOLOGY | Facility: CLINIC | Age: 72
End: 2017-12-14

## 2017-12-14 NOTE — TELEPHONE ENCOUNTER
Spoke to patient and his grandson. Pt reports a wet non-productive cough with elevated heart rate. Dr Allan advised he needs cardiac evaluation ASAP. Advised to go to ED.     ----- Message from Brit Ball sent at 12/14/2017 11:21 AM CST -----  Contact: GrandSon - Lenny Queencomb  States that the patient has a really bad cough and just got a stint put in by the outside cardiologist due to a 95% blockage.  The patient has a bad cough, pulse is 150 and there isn't any mucus coming up.  Can you please call Lenny back at [phone].  Thank you

## 2017-12-15 ENCOUNTER — TELEPHONE (OUTPATIENT)
Dept: PULMONOLOGY | Facility: CLINIC | Age: 72
End: 2017-12-15

## 2017-12-15 NOTE — TELEPHONE ENCOUNTER
Advised the we knew he was admitted and dr rajan does not go to [place].    ----- Message from Nickie Smith sent at 12/15/2017  3:24 PM CST -----  Contact: Halie Lopez  Wife called regarding the patient is currently at [facility]. Wanted to discuss his treatments. Please contact [phone]

## 2017-12-20 ENCOUNTER — OFFICE VISIT (OUTPATIENT)
Dept: UROLOGY | Facility: CLINIC | Age: 72
End: 2017-12-20
Payer: MEDICARE

## 2017-12-20 VITALS
DIASTOLIC BLOOD PRESSURE: 67 MMHG | SYSTOLIC BLOOD PRESSURE: 143 MMHG | WEIGHT: 251 LBS | HEIGHT: 71 IN | BODY MASS INDEX: 35.14 KG/M2 | HEART RATE: 59 BPM | TEMPERATURE: 98 F

## 2017-12-20 DIAGNOSIS — N40.0 BENIGN PROSTATIC HYPERPLASIA, UNSPECIFIED WHETHER LOWER URINARY TRACT SYMPTOMS PRESENT: ICD-10-CM

## 2017-12-20 DIAGNOSIS — R32 URINARY INCONTINENCE, UNSPECIFIED TYPE: Primary | ICD-10-CM

## 2017-12-20 PROCEDURE — 99999 PR PBB SHADOW E&M-EST. PATIENT-LVL IV: CPT | Mod: PBBFAC,,, | Performed by: NURSE PRACTITIONER

## 2017-12-20 PROCEDURE — 99213 OFFICE O/P EST LOW 20 MIN: CPT | Mod: S$GLB,,, | Performed by: NURSE PRACTITIONER

## 2017-12-20 RX ORDER — APIXABAN 5 MG/1
5 TABLET, FILM COATED ORAL 2 TIMES DAILY
COMMUNITY
Start: 2017-12-19 | End: 2018-09-25 | Stop reason: SDUPTHER

## 2017-12-20 RX ORDER — METOPROLOL TARTRATE 25 MG/1
TABLET, FILM COATED ORAL
COMMUNITY
Start: 2017-12-19 | End: 2017-12-20 | Stop reason: SDUPTHER

## 2017-12-20 RX ORDER — SOLIFENACIN SUCCINATE 10 MG/1
10 TABLET, FILM COATED ORAL DAILY
Qty: 30 TABLET | Refills: 12 | Status: SHIPPED | OUTPATIENT
Start: 2017-12-20 | End: 2018-04-09

## 2017-12-20 RX ORDER — METHYLPREDNISOLONE 4 MG/1
4 TABLET ORAL DAILY
COMMUNITY
Start: 2017-12-19 | End: 2018-03-27

## 2017-12-20 RX ORDER — CLOPIDOGREL BISULFATE 75 MG/1
75 TABLET ORAL DAILY
COMMUNITY
Start: 2017-12-13 | End: 2018-02-20

## 2017-12-20 NOTE — PROGRESS NOTES
[facility] Progress Note  Staff: LEESA Gutierrez    Chief Complaint:   Chief Complaint   Patient presents with    3 week recheck     urinary incontinence      HPI: Lenny Lopez is a 72 y.o. male here for routine recheck r/t hx of urinary incontinence, BPH.    Last seen: I initially saw this patient on 2017 with urinary incontinence issues.  We started him back on Flomax 0.4 mg one tablet daily and gave him a trial of Vesicare 10 mg for the incontinence symptoms.    Pt states today he is doing quite well on this medication regimen with no problems.  His LUTS have improved, nocturia is now only 1x a night.    The patient was last seen by Dr. Zaragoza on 2014 with kidney stones.  In the past pt has taken Ketty one tablet daily for his BPH with LUTS and Ditropan for his past urinary frequency.  Pt states today those meds caused extreme swelling to his bilateral lower extremities therefore he is no longer on any of those meds.     Last PSA Screenin17:  1.7  Lab Results   Component Value Date    PSA 3.5 2015    PSA 3.3 12/10/2013    PSA 1.78 2013     Urologic meds: Flomax 0.4 mg one tablet daily; Vesicare 10 mg one tablet daily  Anticoagulant meds: Plavix 75 mg one tablet daily; Aspirin 81 mg one tablet daily  Eliquis 5 mg one tablet twice daily.    Review of Systems   Constitutional: Negative for chills, diaphoresis, fever and weight loss.   HENT: Negative for congestion, hearing loss, nosebleeds and sore throat.    Eyes: Negative for blurred vision and pain.   Respiratory: Negative for cough and wheezing.    Cardiovascular: Negative for chest pain, palpitations and leg swelling.        Recent heart procedure performed.   Gastrointestinal: Negative for abdominal pain, heartburn, nausea and vomiting.   Genitourinary: Negative for dysuria, flank pain, frequency, hematuria and urgency.        +Incontinence issues  +BPH   Musculoskeletal: Negative for back  pain, joint pain, myalgias and neck pain.   Skin: Negative for itching and rash.   Neurological: Negative for dizziness, tremors, sensory change, seizures, loss of consciousness, weakness and headaches.   Endo/Heme/Allergies: Does not bruise/bleed easily.   Psychiatric/Behavioral: Negative for depression and suicidal ideas. The patient is not nervous/anxious.      Physical Exam   Vitals reviewed.  Constitutional: He is oriented to person, place, and time. He appears well-developed and well-nourished.   HENT:   Head: Normocephalic and atraumatic.   Right Ear: External ear normal.   Left Ear: External ear normal.   Nose: Nose normal.   Mouth/Throat: Oropharynx is clear and moist.   Eyes: Conjunctivae and EOM are normal. Pupils are equal, round, and reactive to light.   Neck: Normal range of motion. Neck supple.   Cardiovascular: Normal rate, regular rhythm, normal heart sounds and intact distal pulses.    Pulmonary/Chest: Effort normal and breath sounds normal.   Abdominal: Soft. Bowel sounds are normal.   Musculoskeletal: Normal range of motion.   Neurological: He is alert and oriented to person, place, and time. He has normal reflexes.   Skin: Skin is warm and dry.     Psychiatric: He has a normal mood and affect. His behavior is normal. Judgment and thought content normal.     A) Lenny Lopez is a 72 y.o. male with   1. Urinary incontinence, unspecified type    2. Benign prostatic hyperplasia, unspecified whether lower urinary tract symptoms present        Plan)   1. Vesicare 10 mg one tablet daily prescribed to the patient today.  2.  Pt will continue the Flomax 0.4 mg daily as previously instructed.    I have spent 15 minutes with this patient and over 50% of visit was spent counseling with the patient.    Matilda WELLER-EMMETT

## 2017-12-22 ENCOUNTER — TELEPHONE (OUTPATIENT)
Dept: PULMONOLOGY | Facility: CLINIC | Age: 72
End: 2017-12-22

## 2017-12-22 NOTE — TELEPHONE ENCOUNTER
Spoke to pt. He stated that he has large amounts of thick green mucous from lungs.  Advised we need a sample of mucous and left cups at  for family member to . Pt verbalized understanding.     ----- Message from Abiola Garcia sent at 12/22/2017  8:57 AM CST -----  Contact: [place] -Lisa Law needs to be advised if he can have something over the counter for congestion. Please call back patient at

## 2017-12-26 ENCOUNTER — LAB VISIT (OUTPATIENT)
Dept: LAB | Facility: HOSPITAL | Age: 72
End: 2017-12-26
Attending: INTERNAL MEDICINE
Payer: MEDICARE

## 2017-12-26 DIAGNOSIS — J44.9 CHRONIC OBSTRUCTIVE PULMONARY DISEASE, UNSPECIFIED COPD TYPE: ICD-10-CM

## 2017-12-26 DIAGNOSIS — R05.9 COUGH: ICD-10-CM

## 2017-12-26 DIAGNOSIS — J41.1 CHRONIC BRONCHITIS, MUCOPURULENT: ICD-10-CM

## 2017-12-26 PROCEDURE — 87205 SMEAR GRAM STAIN: CPT

## 2017-12-26 PROCEDURE — 87015 SPECIMEN INFECT AGNT CONCNTJ: CPT

## 2017-12-26 PROCEDURE — 87116 MYCOBACTERIA CULTURE: CPT

## 2017-12-26 PROCEDURE — 87070 CULTURE OTHR SPECIMN AEROBIC: CPT

## 2017-12-28 LAB
BACTERIA SPEC AEROBE CULT: NORMAL
GRAM STN SPEC: NORMAL

## 2018-01-08 ENCOUNTER — TELEPHONE (OUTPATIENT)
Dept: PHARMACY | Facility: CLINIC | Age: 73
End: 2018-01-08

## 2018-01-09 DIAGNOSIS — J45.909 PERSISTENT ASTHMA WITHOUT COMPLICATION, UNSPECIFIED ASTHMA SEVERITY: ICD-10-CM

## 2018-01-09 RX ORDER — MONTELUKAST SODIUM 10 MG/1
10 TABLET ORAL NIGHTLY
Qty: 30 TABLET | Refills: 11 | Status: SHIPPED | OUTPATIENT
Start: 2018-01-09 | End: 2018-09-25 | Stop reason: SDUPTHER

## 2018-01-09 NOTE — TELEPHONE ENCOUNTER
----- Message from Halie Norton sent at 1/9/2018  3:11 PM CST -----  Contact: Joseluis  Best Hartman Joseluis [phone], calling about the patient needing a refill on Rx montelukast (SINGULAIR) 10 mg tablet. Patient only has one week left. Please advise. thanks.  [facility] [phone]  [place] [id number]  Phone: [phone] Fax: [phone]

## 2018-01-11 DIAGNOSIS — J45.909 ASTHMA, UNSPECIFIED ASTHMA SEVERITY, UNSPECIFIED WHETHER COMPLICATED, UNSPECIFIED WHETHER PERSISTENT: Primary | ICD-10-CM

## 2018-01-11 DIAGNOSIS — J44.9 CHRONIC OBSTRUCTIVE PULMONARY DISEASE, UNSPECIFIED COPD TYPE: ICD-10-CM

## 2018-01-11 NOTE — TELEPHONE ENCOUNTER
Pt received sample of Trelegy from another doctor and wanted to know If dr allan would approve a Rx for it. Routed request to Dr Allan

## 2018-01-15 NOTE — TELEPHONE ENCOUNTER
DOCUMENTATION ONLY:  Prior authorization for Fasenra approved from 01/17/2018 to 01/17/2019    Case Id: [id number]    Co-pay: $917.16    Patient Assistance IS required and is being researched.  ANABELLE      Submitted prior authorization request for Fasenra to insurance on 01/15 4:55pm. ANABELLE

## 2018-01-24 NOTE — TELEPHONE ENCOUNTER
FOR DOCUMENTATION ONLY:  Financial Assistance for Fasenra approved from 10/26/17 to 1/23/19  Source: [facility] ([phone])  BIN: [id number]  GREG: [id number]  Id: [id number]  GRP: [id number]

## 2018-02-02 ENCOUNTER — LAB VISIT (OUTPATIENT)
Dept: LAB | Facility: HOSPITAL | Age: 73
End: 2018-02-02
Attending: SPECIALIST
Payer: MEDICARE

## 2018-02-02 DIAGNOSIS — E78.5 HYPERLIPEMIA: ICD-10-CM

## 2018-02-02 DIAGNOSIS — E78.5 HYPERLIPEMIA: Primary | ICD-10-CM

## 2018-02-02 DIAGNOSIS — M10.9 GOUT, UNSPECIFIED CAUSE, UNSPECIFIED CHRONICITY, UNSPECIFIED SITE: ICD-10-CM

## 2018-02-02 LAB
ALBUMIN SERPL BCP-MCNC: 3.5 G/DL
ALP SERPL-CCNC: 83 U/L
ALT SERPL W/O P-5'-P-CCNC: 26 U/L
ANION GAP SERPL CALC-SCNC: 10 MMOL/L
AST SERPL-CCNC: 25 U/L
BASOPHILS # BLD AUTO: 0.07 K/UL
BASOPHILS NFR BLD: 0.9 %
BILIRUB SERPL-MCNC: 1 MG/DL
BUN SERPL-MCNC: 32 MG/DL
CALCIUM SERPL-MCNC: 9.5 MG/DL
CHLORIDE SERPL-SCNC: 101 MMOL/L
CO2 SERPL-SCNC: 28 MMOL/L
CREAT SERPL-MCNC: 1.8 MG/DL
DIFFERENTIAL METHOD: ABNORMAL
EOSINOPHIL # BLD AUTO: 0.8 K/UL
EOSINOPHIL NFR BLD: 10.2 %
ERYTHROCYTE [DISTWIDTH] IN BLOOD BY AUTOMATED COUNT: 13 %
EST. GFR  (AFRICAN AMERICAN): 41.6 ML/MIN/1.73 M^2
EST. GFR  (NON AFRICAN AMERICAN): 36 ML/MIN/1.73 M^2
GLUCOSE SERPL-MCNC: 140 MG/DL
HCT VFR BLD AUTO: 39.3 %
HGB BLD-MCNC: 12.8 G/DL
IMM GRANULOCYTES # BLD AUTO: 0.04 K/UL
IMM GRANULOCYTES NFR BLD AUTO: 0.5 %
LYMPHOCYTES # BLD AUTO: 1.6 K/UL
LYMPHOCYTES NFR BLD: 21.2 %
MCH RBC QN AUTO: 28.9 PG
MCHC RBC AUTO-ENTMCNC: 32.6 G/DL
MCV RBC AUTO: 89 FL
MONOCYTES # BLD AUTO: 0.9 K/UL
MONOCYTES NFR BLD: 12.3 %
NEUTROPHILS # BLD AUTO: 4.1 K/UL
NEUTROPHILS NFR BLD: 54.9 %
NRBC BLD-RTO: 0 /100 WBC
PLATELET # BLD AUTO: 174 K/UL
PMV BLD AUTO: 12.2 FL
POTASSIUM SERPL-SCNC: 4.4 MMOL/L
PROT SERPL-MCNC: 7 G/DL
RBC # BLD AUTO: 4.43 M/UL
SODIUM SERPL-SCNC: 139 MMOL/L
WBC # BLD AUTO: 7.47 K/UL

## 2018-02-02 PROCEDURE — 85025 COMPLETE CBC W/AUTO DIFF WBC: CPT

## 2018-02-02 PROCEDURE — 80053 COMPREHEN METABOLIC PANEL: CPT

## 2018-02-02 PROCEDURE — 36415 COLL VENOUS BLD VENIPUNCTURE: CPT | Mod: PO

## 2018-02-12 ENCOUNTER — TELEPHONE (OUTPATIENT)
Dept: PHARMACY | Facility: CLINIC | Age: 73
End: 2018-02-12

## 2018-02-12 NOTE — TELEPHONE ENCOUNTER
Initial Fasenra consult completed on . Fasenra will sent via  to provider's office on 2/15. $0 copay- 004. Fasenra first dose date planned for . Address confirmed, CC on file. Confirmed 2 patient identifiers - name and . Therapy Appropriate.    Will be delivered to  Attn: Lalitha Mora- Dr Nathaniel Allan  [address]  [address]  [place] [address]    Patient was counseled on the administration directions:  · To be injected by providers office - Inject 30mg subcutaneously every 4 weeks for the first 3 doses and every 8 weeks thereafter.   · Inject into upper arm, thigh or abdomen  · Do not discontinue asthma medications unless discussed with provider    Patient was counseled on the following possible side effects, which include, but are not limited to:  · Headache, pharyngitis, and fever  · Hypersensitivity- rash and itching      DDIs: medication list reviewed, no DDI's with Fasenra upon initial review.       Consultation included: indication; goals of treatment; administration side effects; the importance of compliance; the importance of laboratory monitoring; the importance of keeping all follow up appointments.  Patient understands to report any medication changes to [facility] and provider. All questions answered and addressed to patients satisfaction. [facility] will f/u with patient 1 week after start and [facility] to contact patient in 3 weeks for refills. Patient verbalized understanding.      Merna Rendon, Pharm.D  Specialty Pharmacy Clinical Pharmacist  [facility]  Phone: [phone]

## 2018-02-16 ENCOUNTER — CLINICAL SUPPORT (OUTPATIENT)
Dept: PULMONOLOGY | Facility: CLINIC | Age: 73
End: 2018-02-16
Payer: MEDICARE

## 2018-02-16 ENCOUNTER — LAB VISIT (OUTPATIENT)
Dept: LAB | Facility: HOSPITAL | Age: 73
End: 2018-02-16
Attending: INTERNAL MEDICINE
Payer: MEDICARE

## 2018-02-16 VITALS
DIASTOLIC BLOOD PRESSURE: 66 MMHG | OXYGEN SATURATION: 95 % | WEIGHT: 255.5 LBS | SYSTOLIC BLOOD PRESSURE: 143 MMHG | HEART RATE: 51 BPM | BODY MASS INDEX: 35.64 KG/M2

## 2018-02-16 DIAGNOSIS — J44.89 COPD WITH ASTHMA: Primary | ICD-10-CM

## 2018-02-16 DIAGNOSIS — J44.89 COPD WITH ASTHMA: ICD-10-CM

## 2018-02-16 DIAGNOSIS — J82.83 EOSINOPHILIC ASTHMA: Primary | ICD-10-CM

## 2018-02-16 PROCEDURE — 87205 SMEAR GRAM STAIN: CPT

## 2018-02-16 PROCEDURE — 99999 PR PBB SHADOW E&M-EST. PATIENT-LVL III: CPT | Mod: PBBFAC,,,

## 2018-02-16 PROCEDURE — 87070 CULTURE OTHR SPECIMN AEROBIC: CPT

## 2018-02-16 NOTE — PROGRESS NOTES
Pt came in for 1st Fasenra injection. Administered Subcutaneously in left arm. Aseptic technique maintained. Pt reported 0/10 pain scale. No site reaction and no A/R noted. Pt monitored for one full hour post injection. Next injection scheduled.

## 2018-02-19 LAB
BACTERIA SPEC AEROBE CULT: NORMAL
GRAM STN SPEC: NORMAL

## 2018-02-20 ENCOUNTER — OFFICE VISIT (OUTPATIENT)
Dept: PULMONOLOGY | Facility: CLINIC | Age: 73
End: 2018-02-20
Payer: MEDICARE

## 2018-02-20 VITALS
BODY MASS INDEX: 35.62 KG/M2 | OXYGEN SATURATION: 97 % | WEIGHT: 254.44 LBS | HEART RATE: 63 BPM | SYSTOLIC BLOOD PRESSURE: 145 MMHG | DIASTOLIC BLOOD PRESSURE: 66 MMHG | HEIGHT: 71 IN

## 2018-02-20 DIAGNOSIS — J44.89 COPD WITH ASTHMA: ICD-10-CM

## 2018-02-20 DIAGNOSIS — R79.89 AZOTEMIA: ICD-10-CM

## 2018-02-20 DIAGNOSIS — J82.83 EOSINOPHILIC ASTHMA: ICD-10-CM

## 2018-02-20 DIAGNOSIS — R60.0 LOCALIZED EDEMA: ICD-10-CM

## 2018-02-20 DIAGNOSIS — R09.89 CHRONIC SINUS COMPLAINTS: ICD-10-CM

## 2018-02-20 DIAGNOSIS — G47.33 OSA ON CPAP: Primary | ICD-10-CM

## 2018-02-20 PROCEDURE — 99214 OFFICE O/P EST MOD 30 MIN: CPT | Mod: S$GLB,,, | Performed by: INTERNAL MEDICINE

## 2018-02-20 PROCEDURE — 1125F AMNT PAIN NOTED PAIN PRSNT: CPT | Mod: S$GLB,,, | Performed by: INTERNAL MEDICINE

## 2018-02-20 PROCEDURE — 99499 UNLISTED E&M SERVICE: CPT | Mod: S$GLB,,, | Performed by: INTERNAL MEDICINE

## 2018-02-20 PROCEDURE — 3008F BODY MASS INDEX DOCD: CPT | Mod: S$GLB,,, | Performed by: INTERNAL MEDICINE

## 2018-02-20 PROCEDURE — 1159F MED LIST DOCD IN RCRD: CPT | Mod: S$GLB,,, | Performed by: INTERNAL MEDICINE

## 2018-02-20 PROCEDURE — 99999 PR PBB SHADOW E&M-EST. PATIENT-LVL V: CPT | Mod: PBBFAC,,, | Performed by: INTERNAL MEDICINE

## 2018-02-20 RX ORDER — BENRALIZUMAB 30 MG/ML
INJECTION, SOLUTION SUBCUTANEOUS
COMMUNITY
Start: 2018-02-12 | End: 2018-05-30 | Stop reason: SDUPTHER

## 2018-02-20 RX ORDER — ALBUTEROL SULFATE 90 UG/1
AEROSOL, METERED RESPIRATORY (INHALATION)
COMMUNITY
Start: 2018-02-17 | End: 2018-09-28 | Stop reason: SDUPTHER

## 2018-02-20 RX ORDER — TICAGRELOR 90 MG/1
90 TABLET ORAL 2 TIMES DAILY
COMMUNITY
Start: 2018-02-15 | End: 2018-08-03 | Stop reason: ALTCHOICE

## 2018-02-20 RX ORDER — SOTALOL HYDROCHLORIDE 80 MG/1
40 TABLET ORAL 2 TIMES DAILY
COMMUNITY
Start: 2018-02-14 | End: 2018-09-25 | Stop reason: SDUPTHER

## 2018-02-20 RX ORDER — FUROSEMIDE 40 MG/1
20 TABLET ORAL DAILY PRN
COMMUNITY
Start: 2018-01-29 | End: 2018-09-25 | Stop reason: SDUPTHER

## 2018-02-20 NOTE — PATIENT INSTRUCTIONS
Asthma should improve with fasenra in future.    Check kidney ultrasound and kidney doctor evaluation.    Use lasix as needed, do blood work monthly if needing lasix regular.

## 2018-02-20 NOTE — PROGRESS NOTES
2/20/2018    Lenny Lopez    Office f/u    Chief Complaint   Patient presents with    Follow-up     3 month    Asthma   f/u asthma and copd  Feb 20, 2018 - took prednisone 1-2 since November. Started nucala - had stented 95% blockage and pacer.  Having bilat left > right edema    Nov 28, 2017- pt had one of 5 afb pos for maryana.  Still having thick mucous, uses prednisone every month or so.  No yg mucous production.  On road with [employer].      juNE 29, 2017- used prednisone used 2x at 4 and 5 days, still green mucous, z pack only rx that works well for infection. Having intermittent leg swelling r> left.  Prostrate better with meds.  One Maryana +0 of 5 or so.    Not using lasix/aldactone regular  March 14, 2017HPI: pt follows with Dr dean, has had 3 exacerbations.  Took prednisone x 3 and cleared sort of.  Has had cough and wheeze and seasonal worse.  Chr sinus seems to trigger.    Problem now continuous. Prednisone only effective rx.  On breo - uses regular. Has had freq infections last 2 yrs.  Has diabetes, sugars 150's or so.       The chief compliant  problem is new to me    PFSH:  Past Medical History:   Diagnosis Date    Angina pectoris     Arthritis     Asthma     Back pain     Cataract     Chronic bronchitis     COPD (chronic obstructive pulmonary disease)     Coronary artery disease     DM (diabetes mellitus), type 2, 2000 12/12/2014    Hyperlipidemia     Hypertension     Kidney stones 12/17/2012    Nephrolithiasis     Obesity     Renal manifestation of secondary diabetes mellitus     Sleep apnea     Thyroid disease     Unspecified disorder of kidney and ureter     Urinary incontinence          Past Surgical History:   Procedure Laterality Date    APPENDECTOMY      EYE SURGERY      left eye secondary to trauma    FRACTURE SURGERY      right arm    KNEE SURGERY      screw    TONSILLECTOMY, ADENOIDECTOMY       Social History   Substance Use Topics    Smoking status: Former  "Smoker     Types: Cigarettes, Cigars    Smokeless tobacco: Current User     Types: Chew      Comment: smoked a few cigars in his 20s    Alcohol use No     Family History   Problem Relation Age of Onset    Thyroid disease Other     Cancer Mother     Hypertension Mother     Osteoarthritis Mother     Heart disease Father     Hypertension Father     Cancer Paternal Uncle      lung    Hypertension Sister     Hypertension Brother     Cancer Brother      colon?    Diabetes Maternal Aunt     Cancer Brother      pancreatic    Collagen disease Neg Hx     Amblyopia Neg Hx     Blindness Neg Hx     Cataracts Neg Hx     Glaucoma Neg Hx     Macular degeneration Neg Hx     Retinal detachment Neg Hx     Strabismus Neg Hx     Stroke Neg Hx      Review of patient's allergies indicates:   Allergen Reactions    No known drug allergies        Performance Status:The patient's activity level is functions out of house.      Review of Systems:  a review of eleven systems covering constitutional, Eye, HEENT, Psych, Respiratory, Cardiac, GI, , Musculoskeletal, Endocrine, Dermatologic was negative except for pertinent findings as listed ABOVE and below: wt varies 265 -240, singh, sl abn stress test but no chest pain/mi, urine freq with slow flow, arthritis degenerative.       Exam:Comprehensive exam done. BP (!) 145/66 (BP Location: Right arm, Patient Position: Sitting)   Pulse 63   Ht 5' 11" (1.803 m)   Wt 115.4 kg (254 lb 6.6 oz)   SpO2 97%   BMI 35.48 kg/m²   Exam included Vitals as listed, and patient's appearance and affect and alertness and mood, oral exam for yeast and hygiene and pharynx lesions and Mallapatti (M) score, neck with inspection for jvd and masses and thyroid abnormalities and lymph nodes (supraclavicular and infraclavicular nodes and axillary also examined and noted if abn), chest exam included symmetry and effort and fremitus and percussion and auscultation, cardiac exam included rhythm and " gallops and murmur and rubs and jvd and edema, abdominal exam for mass and hepatosplenomegaly and tenderness and hernias and bowel sounds, Musculoskeletal exam with muscle tone and posture and mobility/gait and  strength, and skin for rashes and cyanosis and pallor and turgor, extremity for clubbing.  Findings were normal except for pertinent findings listed below:   M3 and good bs , +2 edema (trace), diaphragms move.    Radiographs (ct chest and cxr) reviewed: view by direct vision left diaphragm up.    Labs reviewed  eos up     PFT results reviewed Pulmonary Functions, including spirometry and bronchodilator response and lung volumes and diffusion, study was done dec 7, 2016.  Spirometry shows loss of vital capacity and fev1 with no obstruction and no bronchodilator response.   FEV1 is 55% or 1.81 liters.  Lung volumes show  loss of TLC with restriction 64% and elevated residual volume.  Diffusion shows reduced but falls within normal range when corrected for lung volumes.     Pulmonary functions show restriction. Clinical correlation recommended.      Nathaniel Allan M.D.          4wk ago     Respiratory Culture MORAXELLA (BRANHAMELLA) CATARRHALIS  Many  Normal respiratory iris also present  Beta Lactamase positive    Gram Stain (Respiratory) <10 epithelial cells per low power field.   Gram Stain (Respiratory) Rare WBC's           Results for REG VINES (MRN [id number]) as of 11/28/2017 13:54   Ref. Range 4/12/2017 11:35   WBC Latest Ref Range: 3.90 - 12.70 K/uL 7.51   RBC Latest Ref Range: 4.60 - 6.20 M/uL 4.38 (L)   Hemoglobin Latest Ref Range: 14.0 - 18.0 g/dL 13.1 (L)   Hematocrit Latest Ref Range: 40.0 - 54.0 % 39.5 (L)   MCV Latest Ref Range: 82 - 98 fL 90   MCH Latest Ref Range: 27.0 - 31.0 pg 29.9   MCHC Latest Ref Range: 32.0 - 36.0 % 33.2   RDW Latest Ref Range: 11.5 - 14.5 % 13.6   Platelets Latest Ref Range: 150 - 350 K/uL 146 (L)   MPV Latest Ref Range: 9.2 - 12.9 fL 12.2   Gran% Latest Ref  Range: 38.0 - 73.0 % 55.8   Gran # Latest Ref Range: 1.8 - 7.7 K/uL 4.2   Lymph% Latest Ref Range: 18.0 - 48.0 % 25.6   Lymph # Latest Ref Range: 1.0 - 4.8 K/uL 1.9   Mono% Latest Ref Range: 4.0 - 15.0 % 8.7   Mono # Latest Ref Range: 0.3 - 1.0 K/uL 0.7   Eosinophil% Latest Ref Range: 0.0 - 8.0 % 9.2 (H)   Eos # Latest Ref Range: 0.0 - 0.5 K/uL 0.7 (H)   Basophil% Latest Ref Range: 0.0 - 1.9 % 0.4   Baso # Latest Ref Range: 0.00 - 0.20 K/uL 0.03       Plan:  Clinical impression is resonably certain and repeated evaluation prn +/- follow up will be needed as below.    Lenny was seen today for follow-up and asthma.    Diagnoses and all orders for this visit:    LUZ on CPAP, 100% use, 2016    Chronic sinus complaints    Eosinophilic asthma    COPD with asthma    Localized edema  -     Basic metabolic panel; Standing  -     US Kidney Only; Future    Azotemia  -     Basic metabolic panel; Standing  -     US Kidney Only; Future  -     Ambulatory consult to Nephrology        Follow-up in about 4 months (around 6/20/2018), or if symptoms worsen or fail to improve.    Discussed with patient above for education the following:      Asthma should improve with fasenra in future.    Check kidney ultrasound and kidney doctor evaluation.    Use lasix as needed, do blood work monthly if needing lasix regular.

## 2018-02-26 ENCOUNTER — TELEPHONE (OUTPATIENT)
Dept: PULMONOLOGY | Facility: CLINIC | Age: 73
End: 2018-02-26

## 2018-02-26 ENCOUNTER — HOSPITAL ENCOUNTER (OUTPATIENT)
Dept: RADIOLOGY | Facility: HOSPITAL | Age: 73
Discharge: HOME | End: 2018-02-26
Attending: INTERNAL MEDICINE
Payer: MEDICARE

## 2018-02-26 DIAGNOSIS — R60.0 LOCALIZED EDEMA: ICD-10-CM

## 2018-02-26 DIAGNOSIS — R79.89 AZOTEMIA: ICD-10-CM

## 2018-02-26 PROCEDURE — 76770 US EXAM ABDO BACK WALL COMP: CPT | Mod: TC

## 2018-02-26 PROCEDURE — 76770 US EXAM ABDO BACK WALL COMP: CPT | Mod: 26,,, | Performed by: RADIOLOGY

## 2018-02-26 NOTE — TELEPHONE ENCOUNTER
Pt notified    ----- Message from Nathaniel Allan MD sent at 2/26/2018  4:18 PM CST -----  Notify us not too bad, needs renal f/u.

## 2018-02-27 ENCOUNTER — TELEPHONE (OUTPATIENT)
Dept: UROLOGY | Facility: CLINIC | Age: 73
End: 2018-02-27

## 2018-02-27 LAB
ACID FAST MOD KINY STN SPEC: NORMAL
MYCOBACTERIUM SPEC QL CULT: NORMAL

## 2018-02-27 NOTE — TELEPHONE ENCOUNTER
Called and spoke to patient's son, states patient needs to be seen for chronic kidney disease. Name and phone given to Nephrology office, Dr Jaramillo and Dr Alvarado,  to call and make appt, he verbally understood.

## 2018-02-27 NOTE — TELEPHONE ENCOUNTER
----- Message from Lalitha Mora LPN sent at 2/20/2018  3:38 PM CST -----  Regarding: consult  Hello,       Can someone please schedule this pt when able for a consult with Dr Wheatley please and thank you.     Lalitha

## 2018-02-28 ENCOUNTER — LAB VISIT (OUTPATIENT)
Dept: LAB | Facility: HOSPITAL | Age: 73
End: 2018-02-28
Attending: INTERNAL MEDICINE
Payer: MEDICARE

## 2018-02-28 DIAGNOSIS — R79.89 AZOTEMIA: ICD-10-CM

## 2018-02-28 DIAGNOSIS — R60.0 LOCALIZED EDEMA: ICD-10-CM

## 2018-02-28 PROCEDURE — 36415 COLL VENOUS BLD VENIPUNCTURE: CPT | Mod: PO

## 2018-02-28 PROCEDURE — 80048 BASIC METABOLIC PNL TOTAL CA: CPT

## 2018-03-01 ENCOUNTER — TELEPHONE (OUTPATIENT)
Dept: PULMONOLOGY | Facility: CLINIC | Age: 73
End: 2018-03-01

## 2018-03-01 LAB
ANION GAP SERPL CALC-SCNC: 11 MMOL/L
BUN SERPL-MCNC: 24 MG/DL
CALCIUM SERPL-MCNC: 9.8 MG/DL
CHLORIDE SERPL-SCNC: 102 MMOL/L
CO2 SERPL-SCNC: 24 MMOL/L
CREAT SERPL-MCNC: 1.3 MG/DL
EST. GFR  (AFRICAN AMERICAN): >60 ML/MIN/1.73 M^2
EST. GFR  (NON AFRICAN AMERICAN): 53.4 ML/MIN/1.73 M^2
GLUCOSE SERPL-MCNC: 129 MG/DL
POTASSIUM SERPL-SCNC: 4.8 MMOL/L
SODIUM SERPL-SCNC: 137 MMOL/L

## 2018-03-01 NOTE — TELEPHONE ENCOUNTER
Speaking to MD about medication. Will return call to pt when have information     ----- Message from Katey Nicholson sent at 3/1/2018 11:02 AM CST -----  Contact: patient  Patient calling to speak with the Nurse about requesting some gout medication. He is having a flare up. Please advise. Call to pod. Call connected to pod. Warm transferred.  Call back   Thanks!

## 2018-03-02 ENCOUNTER — TELEPHONE (OUTPATIENT)
Dept: PULMONOLOGY | Facility: CLINIC | Age: 73
End: 2018-03-02

## 2018-03-02 RX ORDER — INDOMETHACIN 50 MG/1
CAPSULE ORAL
Qty: 6 CAPSULE | Refills: 0 | Status: SHIPPED | OUTPATIENT
Start: 2018-03-02 | End: 2018-03-27 | Stop reason: SINTOL

## 2018-03-02 NOTE — TELEPHONE ENCOUNTER
Pt notified  ----- Message from Nathaniel Allan MD sent at 3/1/2018  5:34 PM CST -----  Notify kidney function is much better, sodium is slightly low (?).  It was low back in November.  No new recommendations.

## 2018-03-13 ENCOUNTER — TELEPHONE (OUTPATIENT)
Dept: PULMONOLOGY | Facility: CLINIC | Age: 73
End: 2018-03-13

## 2018-03-13 DIAGNOSIS — J45.909 PERSISTENT ASTHMA WITHOUT COMPLICATION, UNSPECIFIED ASTHMA SEVERITY: ICD-10-CM

## 2018-03-13 NOTE — TELEPHONE ENCOUNTER
Fasenra will be sent via  to provider's office on 3/15/18.  Patient has appointment for injection 3/16/18.  Copay $234.32 in 004.  Verified address, informed of copay.  Declined [facility] .    Attn: Lalitha Mora  [address]  [address]  [place] [address]

## 2018-03-13 NOTE — TELEPHONE ENCOUNTER
Pt needed refill on singulair. Pt had one on file at other [facility]. Advised have transfer Rx

## 2018-03-16 ENCOUNTER — CLINICAL SUPPORT (OUTPATIENT)
Dept: PULMONOLOGY | Facility: CLINIC | Age: 73
End: 2018-03-16
Payer: MEDICARE

## 2018-03-16 VITALS
OXYGEN SATURATION: 96 % | SYSTOLIC BLOOD PRESSURE: 137 MMHG | BODY MASS INDEX: 34.93 KG/M2 | WEIGHT: 250.44 LBS | DIASTOLIC BLOOD PRESSURE: 67 MMHG | HEART RATE: 63 BPM

## 2018-03-16 DIAGNOSIS — J82.83 EOSINOPHILIC ASTHMA: Primary | ICD-10-CM

## 2018-03-16 PROCEDURE — 99999 PR PBB SHADOW E&M-EST. PATIENT-LVL IV: CPT | Mod: PBBFAC,,,

## 2018-03-16 NOTE — PROGRESS NOTES
Pt came in for 2nd Fasenra injeciton. Subcutaneous injection given, aseptic technique maintained, pt monitored for 30 min post injection with no A/R noted.  Injection site clear with not A/R noted. Pt stated he has only had to use nebulizer one time since first injection was given.  Pt states he is noticing an improvement in breathing.

## 2018-03-27 ENCOUNTER — HOSPITAL ENCOUNTER (OUTPATIENT)
Dept: RADIOLOGY | Facility: CLINIC | Age: 73
Discharge: HOME | End: 2018-03-27
Attending: PHYSICIAN ASSISTANT
Payer: MEDICARE

## 2018-03-27 ENCOUNTER — TELEPHONE (OUTPATIENT)
Dept: FAMILY MEDICINE | Facility: CLINIC | Age: 73
End: 2018-03-27

## 2018-03-27 ENCOUNTER — OFFICE VISIT (OUTPATIENT)
Dept: FAMILY MEDICINE | Facility: CLINIC | Age: 73
End: 2018-03-27
Payer: MEDICARE

## 2018-03-27 VITALS
RESPIRATION RATE: 14 BRPM | OXYGEN SATURATION: 95 % | WEIGHT: 248.25 LBS | DIASTOLIC BLOOD PRESSURE: 64 MMHG | BODY MASS INDEX: 34.75 KG/M2 | SYSTOLIC BLOOD PRESSURE: 119 MMHG | HEART RATE: 63 BPM | HEIGHT: 71 IN

## 2018-03-27 DIAGNOSIS — M05.79 RHEUMATOID ARTHRITIS INVOLVING MULTIPLE SITES WITH POSITIVE RHEUMATOID FACTOR: ICD-10-CM

## 2018-03-27 DIAGNOSIS — G89.29 CHRONIC PAIN OF RIGHT ANKLE: ICD-10-CM

## 2018-03-27 DIAGNOSIS — M25.571 CHRONIC PAIN OF RIGHT ANKLE: ICD-10-CM

## 2018-03-27 DIAGNOSIS — I25.10 CORONARY ARTERY DISEASE INVOLVING NATIVE CORONARY ARTERY OF NATIVE HEART WITHOUT ANGINA PECTORIS: ICD-10-CM

## 2018-03-27 DIAGNOSIS — M79.671 RIGHT FOOT PAIN: ICD-10-CM

## 2018-03-27 DIAGNOSIS — G89.29 CHRONIC PAIN OF RIGHT ANKLE: Primary | ICD-10-CM

## 2018-03-27 DIAGNOSIS — M25.571 CHRONIC PAIN OF RIGHT ANKLE: Primary | ICD-10-CM

## 2018-03-27 PROCEDURE — 73630 X-RAY EXAM OF FOOT: CPT | Mod: TC,FY,PO,RT

## 2018-03-27 PROCEDURE — 99499 UNLISTED E&M SERVICE: CPT | Mod: S$GLB,,, | Performed by: PHYSICIAN ASSISTANT

## 2018-03-27 PROCEDURE — 3074F SYST BP LT 130 MM HG: CPT | Mod: CPTII,S$GLB,, | Performed by: PHYSICIAN ASSISTANT

## 2018-03-27 PROCEDURE — 99213 OFFICE O/P EST LOW 20 MIN: CPT | Mod: S$GLB,,, | Performed by: PHYSICIAN ASSISTANT

## 2018-03-27 PROCEDURE — 3078F DIAST BP <80 MM HG: CPT | Mod: CPTII,S$GLB,, | Performed by: PHYSICIAN ASSISTANT

## 2018-03-27 PROCEDURE — 73610 X-RAY EXAM OF ANKLE: CPT | Mod: TC,FY,PO,RT

## 2018-03-27 PROCEDURE — 73630 X-RAY EXAM OF FOOT: CPT | Mod: 26,RT,S$GLB, | Performed by: RADIOLOGY

## 2018-03-27 PROCEDURE — 99999 PR PBB SHADOW E&M-EST. PATIENT-LVL V: CPT | Mod: PBBFAC,,, | Performed by: PHYSICIAN ASSISTANT

## 2018-03-27 PROCEDURE — 73610 X-RAY EXAM OF ANKLE: CPT | Mod: 26,RT,S$GLB, | Performed by: RADIOLOGY

## 2018-03-27 RX ORDER — ATORVASTATIN CALCIUM 80 MG/1
40 TABLET, FILM COATED ORAL DAILY
COMMUNITY
End: 2018-09-25 | Stop reason: SDUPTHER

## 2018-03-27 NOTE — PROGRESS NOTES
Subjective:       Patient ID: Lenny Lopez is a 73 y.o. male.    Chief Complaint: Possible Gout    Mr. Lopez comes to clinic today for pain in right foot and ankle. The patient reports that he feels this is his gout. He was been taking indomethacin for the pain with mild improvement. The pain has not resolved. The patient does have known history of RA. The patient has also been diagnosed with gout in the past. The patient has had a recent hospitalization where he received a stent in the RCA  And a pacemaker. The patient is followed by cardiologist, Dr. Masterson.       Review of Systems   Constitutional: Negative for activity change, appetite change and fever.   HENT: Negative for postnasal drip, rhinorrhea and sinus pressure.    Eyes: Negative for visual disturbance.   Respiratory: Negative for cough and shortness of breath.    Cardiovascular: Negative for chest pain.   Gastrointestinal: Negative for abdominal distention and abdominal pain.   Genitourinary: Negative for difficulty urinating and dysuria.   Musculoskeletal: Positive for arthralgias and myalgias.   Neurological: Negative for headaches.   Hematological: Negative for adenopathy.   Psychiatric/Behavioral: The patient is not nervous/anxious.        Objective:      Physical Exam   Constitutional: He is oriented to person, place, and time.   HENT:   Mouth/Throat: Oropharynx is clear and moist. No oropharyngeal exudate.   Eyes: Conjunctivae are normal. Pupils are equal, round, and reactive to light.   Cardiovascular: Normal rate and regular rhythm.    Pulmonary/Chest: Effort normal and breath sounds normal. He has no wheezes.   Abdominal: Soft. Bowel sounds are normal. He exhibits no distension. There is no tenderness.   Musculoskeletal: He exhibits edema.   Distal lower extremities 1+ pitting edema present  Right ankle mild TTP. No erythema present. No increased temperature of skin   Lymphadenopathy:     He has no cervical adenopathy.   Neurological:  He is alert and oriented to person, place, and time.   Skin: No erythema.   Psychiatric: His behavior is normal.       Assessment:       1. Chronic pain of right ankle    2. Right foot pain    3. Rheumatoid arthritis involving multiple sites with positive rheumatoid factor, diagnosed 2017    4. Coronary artery disease involving native coronary artery of native heart without angina pectoris        Plan:     Lenny was seen today for possible gout.    Diagnoses and all orders for this visit:    Chronic pain of right ankle  -     X-Ray Ankle Complete Right; Future  -     X-Ray Foot Complete Right; Future  -     Uric acid; Future  -     Basic metabolic panel; Future  -     CBC auto differential; Future    Right foot pain  -     X-Ray Ankle Complete Right; Future  -     X-Ray Foot Complete Right; Future  -     Uric acid; Future  -     Basic metabolic panel; Future  -     CBC auto differential; Future    Rheumatoid arthritis involving multiple sites with positive rheumatoid factor, diagnosed 2017  -     C-reactive protein; Future  -     Sedimentation rate, manual; Future    Coronary artery disease involving native coronary artery of native heart without angina pectoris  We will request records from [place]      Patient advised he must not take any NSAIDs due to his chronic conditions.

## 2018-03-27 NOTE — PATIENT INSTRUCTIONS
Diabetes (General Information)  Diabetes is a long-term health problem. It means your body does not make enough insulin. Or it may mean that your body cannot use the insulin it makes. Insulin is a hormone in your body. It lets blood sugar (glucose) reach the cells in your body. All of your cells need glucose for fuel.  When you have diabetes, the glucose in your blood builds up because it cannot get into the cells. This buildup is called high blood sugar (hyperglycemia).  Your blood sugar level depends on several things. It depends on what kind of food you eat and how much of it you eat. It also depends on how much exercise you get, and how much insulin you have in your body. Eating too much of the wrong kinds of food or not taking diabetes medicine on time can cause high blood sugar. Infections can cause high blood sugar even if you are taking medicines correctly.  These things can also cause low blood sugar:  · Missing meals  · Not eating enough food  · Taking too much diabetes medicine  Diabetes can cause serious problems over time if you do not get treated. These problems include heart disease, stroke, kidney failure, and blindness. They also include nerve pain or loss of feeling in your legs and feet, and gangrene of the feet. By keeping your blood sugar under control you can prevent or delay these problems.  Normal blood sugar levels are 80 to 100 before a meal and less than 180 in the 1 to 2 hours after a meal.  Home care  Follow these guidelines when caring for yourself at home:  · Follow the diet your healthcare provider gives you. Take insulin or other diabetes medicine exactly as told to.  · Watch your blood sugar as you are told to. Keep a log of your results. This will help your provider change your medicines to keep your blood sugar under control.  · Try to reach your ideal weight. You may be able to cut back on or not have to take diabetes medicine if you eat the right foods and get exercise.  · Do  not smoke. Smoking worsens the effects of diabetes on your circulation. You are much more likely to have a heart attack if you have diabetes and you smoke.  · Take good care of your feet. If you have lost feeling in your feet, you may not see an injury or infection. Check your feet and between your toes at least once a week.  · Wear a medical alert bracelet or necklace, or carry a card in your wallet that says you have diabetes. This will help healthcare providers give you the right care if you get very ill and cannot tell them that you have diabetes.  Sick day plan  If you get a cold, the flu, or a bacterial or viral infection, take these steps:  · Look at your diabetes sick plan and call your healthcare provider as you were told to. You may need to call your provider right away if:  ¨ Your blood sugar is above 240 while taking your diabetes medicine  ¨ Your urine ketone levels are above normal or high  ¨ You have been vomiting more than 6 hours  ¨ You have trouble breathing or your breath ha s a fruity smell  ¨ You have a high fever  ¨ You have a fever for several days and you are not getting better  ¨ You get light-headed and are sleepier than usual  · Keep taking your diabetes pills (oral medicine) even if you have been vomiting and are feeling sick. Call your provider right away because you may need insulin to lower your blood sugar until you recover from your illness.  · Keep taking your insulin even if you have been vomiting and are feeling sick. Call your provider right away to ask if you need to change your insulin dose. This will depend on your blood sugar results.  · Check your blood sugar every 2 to 4 hours, or at least 4 times a day.  · Check your ketones often. If you are vomiting and having diarrhea, watch them more often.  · Do not skip meals. Try to eat small meals on a regular schedule. Do this even if you do not feel like eating.  · Drink water or other liquids that do not have caffeine or  calories. This will keep you from getting dehydrated. If you are nauseated or vomiting, takes small sips every 5 minutes. To prevent dehydration try to drink a cup (8 ounces) of fluids every hour while you are awake.  General care  Always bring a source of fast-acting sugar with you in case you have symptoms of low blood sugar (below 70). At the first sign of low blood sugar, eat or drink 15 to 20 grams of fast-acting sugar to raise your blood sugar. Examples are:  · 3 to 4 glucose tablets. You can buy these at most [employer].  · 4 ounces (1/2 cup) of regular (not diet) soft drinks  · 4 ounces (1/2 cup) of any fruit juice  · 8 ounces (1 cup) of milk  · 5 to 6 pieces of hard candy  · 1 tablespoon of honey  Check your blood sugar 15 minutes after treating yourself. If it is still below 70, take 15 to 20 more grams of fast-acting sugar. Test again in 15 minutes. If it returns to normal (70 or above), eat a snack or meal to keep your blood sugar in a safe range. If it stays low, call your doctor or go to an emergency room.  Follow-up care  Follow-up with your healthcare provider, or as advised. For more information about diabetes, visit the American Diabetes Association website at www.diabetes.org or call 043-817-8516.  When to seek medical advice  Call your healthcare provider right away if you have any of these symptoms of high blood sugar:  · Frequent urination  · Dizziness  · Drowsiness  · Thirst  · Headache  · Nausea or vomiting  · Abdominal pain  · Eyesight changes  · Fast breathing  · Confusion or loss of consciousness  Also call your provider right away if you have any of these signs of low blood sugar:  · Fatigue  · Headache  · Shakes  · Excess sweating  · Hunger  · Feeling anxious or restless  · Eyesight changes  · Drowsiness  · Weakness  · Confusion or loss of consciousness  Call 911  Call for emergency help right away if any of these occur:  · Chest pain or shortness of breath  · Dizziness or  fainting  · Weakness of an arm or leg or one side of the face  · Trouble speaking or seeing   Date Last Reviewed: 6/1/2016  © 5563-8969 [employer]. [address] [place] [address], [place] [address]. All rights reserved. This information is not intended as a substitute for professional medical care. Always follow your healthcare professional's instructions.        Walking for Fitness  Fitness walking has something for everyone, even people who are already fit. Walking is one of the safest ways to condition your body aerobically. It can boost energy, help you lose weight, and reduce stress.    Physical benefits  · Walking strengthens your heart and lungs, and tones your muscles.  · When walking, your feet land with less impact than in other sports. This reduces chances of muscle, bone, and joint injury.  · Regular walking improves your cholesterol levels and lowers your risk of heart disease. And it helps you control your blood sugar if you have diabetes.  · Walking is a weight-bearing activity, which helps maintain bone density. This can help prevent osteoporosis.  Personal rewards  · Taking walks can help you relax and manage stress. And fitness walking may make you feel better about yourself.  · Walking can help you sleep better at night and make you less likely to be depressed.  · Regular walking may help maintain your memory as you get older.  · Walking is a great way to spend extra time with friends and family members. Be sure to invite your dog along!  Q&A about fitness walking  Q: Will walking keep me fit?  A: Yes. Regular walking at the right pace gives you all the benefits of other aerobic activities, such as jogging and swimming.  Q: Will walking help me lose weight and keep it off?  A: Yes. Per mile, walking can burn as many calories as jogging. Your health care provider can help work walking into your weight-loss plan.  Q: Is walking safe for my health?  A: Yes. Walking is safe if you have high  blood pressure, diabetes, heart disease, or other conditions. Talk to your healthcare provider before you start.  Date Last Reviewed: 4/1/2017 © 2000-2017 [employer]. [address] [place] [address], [place] [address]. All rights reserved. This information is not intended as a substitute for professional medical care. Always follow your healthcare professional's instructions.          Established High Blood Pressure    High blood pressure (hypertension) is a chronic disease. Often, healthcare providers dont know what causes it. But it can be caused by certain health conditions and medicines.  If you have high blood pressure, you may not have any symptoms. If you do have symptoms, they may include headache, dizziness, changes in your vision, chest pain, and shortness of breath. But even without symptoms, high blood pressure thats not treated raises your risk for heart attack and stroke. High blood pressure is a serious health risk and shouldnt be ignored.  A blood pressure reading is made up of two numbers: a higher number over a lower number. The top number is the systolic pressure. The bottom number is the diastolic pressure. A normal blood pressure is a systolic pressure of  less than 120 over a diastolic pressure of less than 80. You will see your blood pressure readings written together. For example, a person with a systolic pressure of 188 and a diastolic pressure of 78 will have 118/78 written in the medical record.  High blood pressure is when either the top number is 140 or higher, or the bottom number is 90 or higher. This must be the result when taking your blood pressure a number of times. The blood pressures between normal and high are called prehypertension.  Home care  If you have high blood pressure, you should do what is listed below to lower your blood pressure. If you are taking medicines for high blood pressure, these methods may reduce or end your need for medicines in the  future.  · Begin a weight-loss program if you are overweight.  · Cut back on how much salt you get in your diet. Heres how to do this:  ¨ Dont eat foods that have a lot of salt. These include olives, pickles, smoked meats, and salted potato chips.  ¨ Dont add salt to your food at the table.  ¨ Use only small amounts of salt when cooking.  · Start an exercise program. Talk with your healthcare provider about the type of exercise program that would be best for you. It doesn't have to be hard. Even brisk walking for 20 minutes 3 times a week is a good form of exercise.  · Dont take medicines that stimulate the heart. This includes many over-the-counter cold and sinus decongestant pills and sprays, as well as diet pills. Check the warnings about hypertension on the label. Before buying any over-the-counter medicines or supplements, always ask the pharmacist about the product's potential interaction with your high blood pressure and your high blood pressure medicines.  · Stimulants such as amphetamine or cocaine could be deadly for someone with high blood pressure. Never take these.  · Limit how much caffeine you get in your diet. Switch to caffeine-free products.  · Stop smoking. If you are a long-time smoker, this can be hard. Talk to your healthcare provider about medicines and nicotine replacement options to help you. Also, enroll in a stop-smoking program to make it more likely that you will quit for good.  · Learn how to handle stress. This is an important part of any program to lower blood pressure. Learn about relaxation methods like meditation, yoga, or biofeedback.  · If your provider prescribed medicines, take them exactly as directed. Missing doses may cause your blood pressure get out of control.  · If you miss a dose or doses, check with your healthcare provider or pharmacist about what to do.  · Consider buying an automatic blood pressure machine. Ask your provider for a recommendation. You can get one  of these at most pharmacies.     The American Heart Association recommends the following guidelines for home blood pressure monitoring:  · Don't smoke or drink coffee for 30 minutes before taking your blood pressure.  · Go to the bathroom before the test.  · Relax for 5 minutes before taking the measurement.  · Sit with your back supported (don't sit on a couch or soft chair); keep your feet on the floor uncrossed. Place your arm on a solid flat surface (like a table) with the upper part of the arm at heart level. Place the middle of the cuff directly above the eye of the elbow. Check the monitor's instruction manual for an illustration.  · Take multiple readings. When you measure, take 2 to 3 readings one minute apart and record all of the results.  · Take your blood pressure at the same time every day, or as your healthcare provider recommends.  · Record the date, time, and blood pressure reading.  · Take the record with you to your next medical appointment. If your blood pressure monitor has a built-in memory, simply take the monitor with you to your next appointment.  · Call your provider if you have several high readings. Don't be frightened by a single high blood pressure reading, but if you get several high readings, check in with your healthcare provider.  · Note: When blood pressure reaches a systolic (top number) of 180 or higher OR diastolic (bottom number) of 110 or higher, seek emergency medical treatment.  Follow-up care  You will need to see your healthcare provider regularly. This is to check your blood pressure and to make changes to your medicines. Make a follow-up appointment as directed. Bring the record of your home blood pressure readings to the appointment.  When to seek medical advice  Call your healthcare provider right away if any of these occur:  · Blood pressure reaches a systolic (upper number) of 180 or higher OR a diastolic (bottom number) of 110 or higher  · Chest pain or shortness of  breath  · Severe headache  · Throbbing or rushing sound in the ears  · Nosebleed  · Sudden severe pain in your belly (abdomen)  · Extreme drowsiness, confusion, or fainting  · Dizziness or spinning sensation (vertigo)  · Weakness of an arm or leg or one side of the face  · You have problems speaking or seeing   Date Last Reviewed: 12/1/2016 © 2000-2017 [employer]. [address] [place] [address], [place] [address]. All rights reserved. This information is not intended as a substitute for professional medical care. Always follow your healthcare professional's instructions.            Weight Management: Getting Started  Healthy bodies come in all shapes and sizes. Not all bodies are made to be thin. For some people, a healthy weight is higher than the average weight listed on weight charts. Your healthcare provider can help you decide on a healthy weight for you.    Reasons to lose weight  Losing weight can help with some health problems, such as high blood pressure, heart disease, diabetes, sleep apnea, and arthritis. You may also feel more energy.  Set your long-term goal  Your goal doesn't even have to be a specific weight. You may decide on a fitness goal (such as being able to walk 10 miles a week), or a health goal (such as lowering your blood pressure). Choose a goal that is measurable and reasonable, so you know when you've reached it. A goal of reaching a BMI of less than 25 is not always reasonable (or possible).   Make an action plan  Habits dont change overnight. Setting your goals too high can leave you feeling discouraged if you cant reach them. Be realistic. Choose one or two small changes you can make now. Set an action plan for how you are going to make these changes. When you can stick to this plan, keep making a few more small changes. Taking small steps will help you stay on the path to success.  Track your progress  Write down your goals. Then, keep a daily record of your progress. Write  down what you eat and how active you are. This record lets you look back on how much youve done. It may also help when youre feeling frustrated. Reward yourself for success. Even if you dont reach every goal, give yourself credit for what you do get done.  Get support  Encouragement from others can help make losing weight easier. Ask your family members and friends for support. They may even want to join you. Also look to your healthcare provider, registered dietitian, and  for help. Your local hospital can give you more information about nutrition, exercise, and weight loss.  Date Last Reviewed: 1/31/2016  © 0142-1938 [employer], [employer]. [address] [place] [address], [place] [address]. All rights reserved. This information is not intended as a substitute for professional medical care. Always follow your healthcare professional's instructions.

## 2018-03-27 NOTE — TELEPHONE ENCOUNTER
----- Message from Salvador Sparks sent at 3/27/2018 10:06 AM CDT -----  Contact: Pt's grandson Best Jewell is calling in regards to pt. Best states pt would like to be seen today or sometime this week due to gout. Best states this is the worst form of gout he's seen so far from pt. They are concerned. Best can be reached at [phone]

## 2018-03-28 DIAGNOSIS — M25.571 CHRONIC PAIN OF RIGHT ANKLE: ICD-10-CM

## 2018-03-28 DIAGNOSIS — E79.0 ELEVATED URIC ACID IN BLOOD: ICD-10-CM

## 2018-03-28 DIAGNOSIS — E79.0 ELEVATED URIC ACID IN BLOOD: Primary | ICD-10-CM

## 2018-03-28 DIAGNOSIS — M77.31 HEEL SPUR, RIGHT: Primary | ICD-10-CM

## 2018-03-28 DIAGNOSIS — M79.671 RIGHT FOOT PAIN: ICD-10-CM

## 2018-03-28 DIAGNOSIS — G89.29 CHRONIC PAIN OF RIGHT ANKLE: ICD-10-CM

## 2018-03-28 RX ORDER — METHYLPREDNISOLONE 4 MG/1
TABLET ORAL
Qty: 1 PACKAGE | Refills: 0 | Status: SHIPPED | OUTPATIENT
Start: 2018-03-28 | End: 2018-04-09

## 2018-03-28 RX ORDER — ALLOPURINOL 100 MG/1
100 TABLET ORAL DAILY
Qty: 90 TABLET | Refills: 0 | Status: SHIPPED | OUTPATIENT
Start: 2018-03-28 | End: 2018-05-30 | Stop reason: SDUPTHER

## 2018-04-05 ENCOUNTER — TELEPHONE (OUTPATIENT)
Dept: PHARMACY | Facility: CLINIC | Age: 73
End: 2018-04-05

## 2018-04-05 NOTE — TELEPHONE ENCOUNTER
Refill call for Fas[facility]ra.Patient confirmed the need of refill for next appointment, will deliver on 04/12 with patient consent.Patient denies any changes to his medical history and has no questions for a clinical pharmacist at this time.Confirmed delivery and appointment date with Lalitha.Copay $0 at 004.

## 2018-04-05 NOTE — TELEPHONE ENCOUNTER
FOR DOCUMENTATION ONLY:  Financial Assistance extension for Fasenra approved until 01/23/2019  Source [facility]  BIN: [id number]  PCN: [id number]  Id: [id number]  GRP: [id number]    Pt. Will exhaust funds in [place] by June. Will apply for assistance through [place] and [place] before this time. ANABELLE

## 2018-04-09 ENCOUNTER — OFFICE VISIT (OUTPATIENT)
Dept: FAMILY MEDICINE | Facility: CLINIC | Age: 73
End: 2018-04-09
Payer: MEDICARE

## 2018-04-09 VITALS
WEIGHT: 246.94 LBS | OXYGEN SATURATION: 96 % | BODY MASS INDEX: 34.57 KG/M2 | SYSTOLIC BLOOD PRESSURE: 109 MMHG | HEART RATE: 75 BPM | DIASTOLIC BLOOD PRESSURE: 59 MMHG | TEMPERATURE: 98 F | HEIGHT: 71 IN

## 2018-04-09 DIAGNOSIS — E78.2 MIXED HYPERLIPIDEMIA: ICD-10-CM

## 2018-04-09 DIAGNOSIS — G47.33 OSA ON CPAP: ICD-10-CM

## 2018-04-09 DIAGNOSIS — M05.79 RHEUMATOID ARTHRITIS INVOLVING MULTIPLE SITES WITH POSITIVE RHEUMATOID FACTOR: ICD-10-CM

## 2018-04-09 DIAGNOSIS — J45.30 MILD PERSISTENT ASTHMA WITHOUT COMPLICATION: ICD-10-CM

## 2018-04-09 DIAGNOSIS — Z78.9 STATIN INTOLERANCE: Chronic | ICD-10-CM

## 2018-04-09 DIAGNOSIS — M19.171 POST-TRAUMATIC OSTEOARTHRITIS OF RIGHT FOOT: Primary | ICD-10-CM

## 2018-04-09 DIAGNOSIS — N18.30 CKD (CHRONIC KIDNEY DISEASE) STAGE 3, GFR 30-59 ML/MIN: ICD-10-CM

## 2018-04-09 DIAGNOSIS — M10.071 ACUTE IDIOPATHIC GOUT OF RIGHT FOOT: ICD-10-CM

## 2018-04-09 DIAGNOSIS — J41.1 CHRONIC BRONCHITIS, MUCOPURULENT: ICD-10-CM

## 2018-04-09 DIAGNOSIS — J44.0 CHRONIC OBSTRUCTIVE PULMONARY DISEASE WITH ACUTE LOWER RESPIRATORY INFECTION: ICD-10-CM

## 2018-04-09 DIAGNOSIS — E66.9 OBESITY (BMI 30-39.9): ICD-10-CM

## 2018-04-09 PROCEDURE — 3078F DIAST BP <80 MM HG: CPT | Mod: CPTII,S$GLB,, | Performed by: FAMILY MEDICINE

## 2018-04-09 PROCEDURE — 99999 PR PBB SHADOW E&M-EST. PATIENT-LVL IV: CPT | Mod: PBBFAC,,, | Performed by: FAMILY MEDICINE

## 2018-04-09 PROCEDURE — 99499 UNLISTED E&M SERVICE: CPT | Mod: S$GLB,,, | Performed by: FAMILY MEDICINE

## 2018-04-09 PROCEDURE — 99214 OFFICE O/P EST MOD 30 MIN: CPT | Mod: S$GLB,,, | Performed by: FAMILY MEDICINE

## 2018-04-09 PROCEDURE — 3074F SYST BP LT 130 MM HG: CPT | Mod: CPTII,S$GLB,, | Performed by: FAMILY MEDICINE

## 2018-04-09 RX ORDER — PREDNISONE 10 MG/1
10 TABLET ORAL DAILY
Qty: 5 TABLET | Refills: 0 | Status: SHIPPED | OUTPATIENT
Start: 2018-04-09 | End: 2018-04-14

## 2018-04-09 NOTE — PATIENT INSTRUCTIONS

## 2018-04-10 NOTE — PROGRESS NOTES
Subjective:       Patient ID: Lenny Lopez is a 73 y.o. male.    Chief Complaint: Hypertension and Gout    HPI  Review of Systems   Constitutional: Positive for unexpected weight change. Negative for fatigue.   Respiratory: Positive for cough and shortness of breath. Negative for chest tightness.         LUZ  With fair response to Cpap. He has lately more fatigue, and poor sleeping cycle.   Cardiovascular: Negative for chest pain, palpitations and leg swelling.   Gastrointestinal: Negative for abdominal pain.   Musculoskeletal: Positive for arthralgias and joint swelling.        Patient with chronic arthritis, with mild lateral swollen and erythema. Partial response to prednisone. He has moderate elevated uric acid. He has CKD III.   Neurological: Negative for dizziness, syncope, light-headedness and headaches.   Psychiatric/Behavioral: Positive for decreased concentration.       Patient Active Problem List   Diagnosis    Osteoarthritis    Hypertension associated with diabetes    CKD (chronic kidney disease) stage 3, GFR 39.7 ml/min    Constipation due to pain medication    Gout    Low testosterone    Metabolic syndrome    Obesity (BMI 30-39.9), today 34.1    LVH (left ventricular hypertrophy) due to hypertensive disease    Cardiovascular risk factor, ASCVD 10-year risk 20.7%, ideal 14.1%    Chews tobacco regularly, about can a day    Type 2 diabetes mellitus with microalbuminuria, without long-term current use of insulin    Microalbuminuria    Rheumatoid arthritis involving multiple sites with positive rheumatoid factor, diagnosed 2017    LUZ on CPAP, 100% use, 2016    Hyperlipidemia associated with type 2 diabetes mellitus    Abnormal cardiovascular stress test    Asthma exacerbation in COPD    Chronic sinus complaints    Prostatism    Bilateral leg edema    Calcified granuloma of lung    Bilateral carotid artery disease    Azotemia    Localized edema       Objective:      Physical  Exam   Constitutional: He is oriented to person, place, and time. He appears well-developed and well-nourished.   Cardiovascular: Normal rate, regular rhythm and normal heart sounds.    Pulses:       Dorsalis pedis pulses are 1+ on the right side, and 1+ on the left side.        Posterior tibial pulses are 1+ on the right side, and 1+ on the left side.   Pulmonary/Chest: Effort normal. He has decreased breath sounds in the right lower field and the left lower field. He has no wheezes. He has no rhonchi. He has no rales.   Musculoskeletal: He exhibits no edema.        Right foot: There is decreased range of motion, tenderness and bony tenderness.        Left foot: There is normal range of motion, no tenderness, no bony tenderness and no swelling.   RT foot decreased with chronic talar arthritis, pes planus bilateral.bilateral achilles and plantar fasciitis   Feet:   Right Foot:   Protective Sensation: 6 sites tested. 4 sites sensed.   Skin Integrity: Negative for ulcer, blister or skin breakdown.   Left Foot:   Protective Sensation: 6 sites tested. 5 sites sensed.   Skin Integrity: Negative for ulcer, blister or skin breakdown.   Neurological: He is alert and oriented to person, place, and time.   Skin: Skin is warm and dry.   Psychiatric: He has a normal mood and affect.   Nursing note and vitals reviewed.      Lab Results   Component Value Date    WBC 7.91 03/27/2018    HGB 12.9 (L) 03/27/2018    HCT 40.4 03/27/2018     03/27/2018    CHOL 94 (L) 11/28/2017    TRIG 70 11/28/2017    HDL 28 (L) 11/28/2017    ALT 26 02/02/2018    AST 25 02/02/2018     03/27/2018    K 3.9 03/27/2018     03/27/2018    CREATININE 1.6 (H) 03/27/2018    BUN 27 (H) 03/27/2018    CO2 30 (H) 03/27/2018    TSH 3.464 12/10/2013    PSA 3.5 12/01/2015    HGBA1C 6.5 (H) 11/28/2017     The ASCVD Risk score (O'[place] JEREMY Jr., et al., 2013) failed to calculate for the following reasons:    The valid total cholesterol range is 130 to 320  mg/dL    Assessment:       1. Post-traumatic osteoarthritis of right foot    2. LUZ on CPAP    3. Chronic obstructive pulmonary disease with acute lower respiratory infection    4. Chronic bronchitis, mucopurulent    5. Mild persistent asthma without complication    6. Mixed hyperlipidemia        Plan:       Post-traumatic osteoarthritis of right foot  -     Ambulatory referral to Orthopedics  -     predniSONE (DELTASONE) 10 MG tablet; Take 1 tablet (10 mg total) by mouth once daily.  Dispense: 5 tablet; Refill: 0  -     Uric acid; Future; Expected date: 04/09/2018    LUZ on CPAP  -     Ambulatory referral to Pulmonology    Chronic obstructive pulmonary disease with acute lower respiratory infection    Chronic bronchitis, mucopurulent    Mild persistent asthma without complication    Mixed hyperlipidemia  -     Lipid panel; Future; Expected date: 04/09/2018  -     Comprehensive metabolic panel; Future; Expected date: 04/09/2018  -     CBC auto differential; Future; Expected date: 04/09/2018  -     Comprehensive metabolic panel; Future; Expected date: 04/09/2018  -     Lipid panel; Future; Expected date: 04/09/2018    Statin intolerance    CKD (chronic kidney disease) stage 3, GFR 39.7 ml/min    Obesity (BMI 30-39.9), today 34.1    Rheumatoid arthritis involving multiple sites with positive rheumatoid factor, diagnosed 2017    Acute idiopathic gout of right foot      Patient readiness: acceptance and barriers:readiness    During the course of the visit the patient was educated and counseled about the following:     Hypertension:   Dietary sodium restriction.  Regular aerobic exercise.  Check blood pressures daily and record.  Follow up: 4 months and as needed.  Obesity:   General weight loss/lifestyle modification strategies discussed (elicit support from others; identify saboteurs; non-food rewards, etc).  Informal exercise measures discussed, e.g. taking stairs instead of elevator.  Regular aerobic exercise program  discussed.    Goals: Hypertension: Reduce Blood Pressure and Obesity: Reduce calorie intake and BMI    Did patient meet goals/outcomes: Yes    The following self management tools provided: blood pressure log    Patient Instructions (the written plan) was given to the patient/family.     Time spent with patient: 30 minutes    Barriers to medications present (yes )    Adverse reactions to current medications (yes)    Over the counter medications reviewed (No)        40-minute visit. 25 minutes spent counseling patient on diet, exercise, and weight loss.  This has been fully explained to the patient, who indicates understanding.

## 2018-04-13 ENCOUNTER — CLINICAL SUPPORT (OUTPATIENT)
Dept: PULMONOLOGY | Facility: CLINIC | Age: 73
End: 2018-04-13
Payer: MEDICARE

## 2018-04-13 VITALS
BODY MASS INDEX: 33.67 KG/M2 | WEIGHT: 241.38 LBS | SYSTOLIC BLOOD PRESSURE: 134 MMHG | DIASTOLIC BLOOD PRESSURE: 64 MMHG | HEART RATE: 65 BPM | OXYGEN SATURATION: 95 %

## 2018-04-13 DIAGNOSIS — J82.83 EOSINOPHILIC ASTHMA: Primary | ICD-10-CM

## 2018-04-13 PROCEDURE — 99999 PR PBB SHADOW E&M-EST. PATIENT-LVL III: CPT | Mod: PBBFAC,,,

## 2018-04-13 NOTE — PROGRESS NOTES
Pt came in today for 3rd fasenra injeciton. Pt reports a decrease in the use of nebulizer medications and rescue medications.  Pt currently on 10mg prednisone, but not due to lung issues. Pt monitored for 15 minutes post injection. No a/r noted at injection site or otherwise.

## 2018-04-26 ENCOUNTER — OFFICE VISIT (OUTPATIENT)
Dept: ORTHOPEDICS | Facility: CLINIC | Age: 73
End: 2018-04-26
Payer: MEDICARE

## 2018-04-26 VITALS
HEART RATE: 67 BPM | HEIGHT: 71 IN | DIASTOLIC BLOOD PRESSURE: 60 MMHG | SYSTOLIC BLOOD PRESSURE: 132 MMHG | BODY MASS INDEX: 33.74 KG/M2 | WEIGHT: 241 LBS

## 2018-04-26 DIAGNOSIS — M72.2 PLANTAR FASCIITIS, RIGHT: Primary | ICD-10-CM

## 2018-04-26 PROCEDURE — 3078F DIAST BP <80 MM HG: CPT | Mod: CPTII,S$GLB,, | Performed by: ORTHOPAEDIC SURGERY

## 2018-04-26 PROCEDURE — 99203 OFFICE O/P NEW LOW 30 MIN: CPT | Mod: S$GLB,,, | Performed by: ORTHOPAEDIC SURGERY

## 2018-04-26 PROCEDURE — 99999 PR PBB SHADOW E&M-EST. PATIENT-LVL III: CPT | Mod: PBBFAC,,, | Performed by: ORTHOPAEDIC SURGERY

## 2018-04-26 PROCEDURE — 3075F SYST BP GE 130 - 139MM HG: CPT | Mod: CPTII,S$GLB,, | Performed by: ORTHOPAEDIC SURGERY

## 2018-04-26 RX ORDER — METHYLPREDNISOLONE 4 MG/1
TABLET ORAL
Qty: 1 PACKAGE | Refills: 0 | Status: SHIPPED | OUTPATIENT
Start: 2018-04-26 | End: 2018-05-17

## 2018-04-26 NOTE — LETTER
May 1, 2018      Blue Shah MD  [address]  [place] [address]           [address] [facility] [address]  [place] [address]  Phone: [phone]          Patient: Lenny Lopez   MR Number: [id number]   YOB: 1945   Date of Visit: 4/26/2018       Dear Dr. Blue Shah:    Thank you for referring Lenny Lopez to me for evaluation. Attached you will find relevant portions of my assessment and plan of care.    If you have questions, please do not hesitate to call me. I look forward to following Lenny Lopez along with you.    Sincerely,    Tra Carvalho MD    Enclosure  CC:  No Recipients    If you would like to receive this communication electronically, please contact [email or web address] or [phone] to request more information on [facility] Link access.    For providers and/or their staff who would like to refer a patient to [facility], please contact us through our one-stop-shop provider referral line, Lalito Flaherty, at [phone].    If you feel you have received this communication in error or would no longer like to receive these types of communications, please e-mail [email or web address]

## 2018-04-26 NOTE — PROGRESS NOTES
Past Medical History:   Diagnosis Date    Angina pectoris     Arthritis     Asthma     Back pain     Cataract     Chronic bronchitis     COPD (chronic obstructive pulmonary disease)     Coronary artery disease     DM (diabetes mellitus), type 2, 2000 12/12/2014    Hyperlipidemia     Hypertension     Kidney stones 12/17/2012    Nephrolithiasis     Obesity     Renal manifestation of secondary diabetes mellitus     Sleep apnea     Thyroid disease     Unspecified disorder of kidney and ureter     Urinary incontinence        Past Surgical History:   Procedure Laterality Date    APPENDECTOMY      EYE SURGERY      left eye secondary to trauma    FRACTURE SURGERY      right arm    KNEE SURGERY      screw    TONSILLECTOMY, ADENOIDECTOMY         Current Outpatient Prescriptions   Medication Sig    albuterol-ipratropium 2.5mg-0.5mg/3mL (DUO-NEB) 0.5 mg-3 mg(2.5 mg base)/3 mL nebulizer solution Take 3 mLs by nebulization every 6 (six) hours as needed for Wheezing.    allopurinol (ZYLOPRIM) 100 MG tablet Take 1 tablet (100 mg total) by mouth once daily.    aspirin 81 mg Tab Take 1 tablet by mouth once daily.     atorvastatin (LIPITOR) 80 MG tablet Take 40 mg by mouth once daily.    blood sugar diagnostic Strp 1 strip by Misc.(Non-Drug; Combo Route) route 3 (three) times daily. DX: E11.29   Dispense test strips that are covered by insurance    BRILINTA 90 mg tablet Take 90 mg by mouth 2 (two) times daily.     ELIQUIS 5 mg Tab 5 mg 2 (two) times daily.     FASENRA 30 mg/mL Syrg     fluticasone-umeclidin-vilanter (TRELEGY ELLIPTA) 100-62.5-25 mcg DsDv Inhale 1 puff into the lungs once daily.    furosemide (LASIX) 40 MG tablet Take 20 mg by mouth daily as needed.     lancets (FREESTYLE LANCETS) 28 gauge Misc 1 lancet by Misc.(Non-Drug; Combo Route) route 3 (three) times daily.    montelukast (SINGULAIR) 10 mg tablet Take 1 tablet (10 mg total) by mouth every evening.    omega-3 fatty  acids-vitamin E (FISH OIL) 1,000 mg Cap 1 capsule once daily. Three times a day    predniSONE (DELTASONE) 20 MG tablet One daily for 7 days and repeat for flare of lung symptoms as intructed    sotalol (BETAPACE) 80 MG tablet Take 40 mg by mouth 2 (two) times daily.     tamsulosin (FLOMAX) 0.4 mg Cp24 Take 1 capsule (0.4 mg total) by mouth once daily.    turmeric root extract 500 mg Cap Take 1 capsule by mouth once daily.    VENTOLIN HFA 90 mcg/actuation inhaler     nitroGLYCERIN (NITROSTAT) 0.4 MG SL tablet Place 1 tablet (0.4 mg total) under the tongue every 5 (five) minutes as needed for Chest pain.     No current facility-administered medications for this visit.        Review of patient's allergies indicates:   Allergen Reactions    No known drug allergies        Family History   Problem Relation Age of Onset    Thyroid disease Other     Cancer Mother     Hypertension Mother     Osteoarthritis Mother     Heart disease Father     Hypertension Father     Cancer Paternal Uncle      lung    Hypertension Sister     Hypertension Brother     Cancer Brother      colon?    Diabetes Maternal Aunt     Cancer Brother      pancreatic    Collagen disease Neg Hx     Amblyopia Neg Hx     Blindness Neg Hx     Cataracts Neg Hx     Glaucoma Neg Hx     Macular degeneration Neg Hx     Retinal detachment Neg Hx     Strabismus Neg Hx     Stroke Neg Hx        Social History     Social History    Marital status:      Spouse name: N/A    Number of children: N/A    Years of education: N/A     Occupational History    Not on file.     Social History Main Topics    Smoking status: Former Smoker     Types: Cigarettes, Cigars    Smokeless tobacco: Current User     Types: Chew      Comment: smoked a few cigars in his 20s    Alcohol use No    Drug use: No    Sexual activity: Not on file     Other Topics Concern    Not on file     Social History Narrative    No narrative on file       Chief Complaint:    Chief Complaint   Patient presents with    Right Foot - Pain    Right Ankle - Pain       History of present illness: Is a 73-year-old male seen for 3 weeks of right foot and heel pain.  Patient's had chronic problems with both feet forever.  He has had custom shoes made over time.  Patient was treated recently for gout with minimal improvement.  Has trouble taking some of the complications because of his kidneys.  He took prednisone which helped while on the medicine.  Pain along the lateral aspect of his ankle as well as over the heel.  Pain is an 8 out of 10.      Review of Systems:    Constitution: Negative for chills, fever, and sweats.  Negative for unexplained weight loss.    HENT:  Negative for headaches and blurry vision.    Cardiovascular:Negative for chest pain or irregular heart beat. Negative for hypertension.    Respiratory:  Negative for cough and shortness of breath.    Gastrointestinal: Negative for abdominal pain, heartburn, melena, nausea, and vomitting.    Genitourinary:  Negative bladder incontinence and dysuria.    Musculoskeletal:  See HPI    Neurological: Negative for numbness.    Psychiatric/Behavioral: Negative for depression.  The patient is not nervous/anxious.      Endocrine: Negative for polyuria    Hematologic/Lymphatic: Negative for bleeding problem.  Does not bruise/bleed easily.    Skin: Negative for poor would healing and rash      Physical Examination:    Vital Signs:    Vitals:    04/26/18 0923   BP: 132/60   Pulse: 67       Body mass index is 33.61 kg/m².    This a well-developed, well nourished patient in no acute distress.  They are alert and oriented and cooperative to examination.  Pt. walks without an antalgic gait.      Examination of the patient's right foot and ankle shows no signs of rashes or erythema. The patient has no ecchymosis or effusion or masses. The patient has a negative anterior drawer and talar tilting exam. The patient has full range of motion of ankle  dorsiflexion, plantarflexion, inversion, and eversion. Patient has 5 out of 5 motor strength in all muscle groups. Patient has 2+ dorsalis pedis pulses and intact light touch sensation. The patient is tender along the plantar fascial origin as well as along the course of the peroneal tendons.  Significant flatfoot deformity.    Examination of the patient's left foot and ankle shows no signs of rashes or erythema. The patient has no ecchymosis or effusion or masses. The patient has a negative anterior drawer and talar tilting exam. The patient has full range of motion of ankle dorsiflexion, plantarflexion, inversion, and eversion. Patient has 5 out of 5 motor strength in all muscle groups. Patient has 2+ dorsalis pedis pulses and intact light touch sensation. The patient is nontender over both medial ankle ligaments and medial malleolus as well as lateral ankle ligaments and the lateral malleolus. Negative squeeze test.        X-rays: 3 views of the foot and ankle are available for review which show severe flatfoot.  Patient has some degenerative changes noted.  Spurring along the heel.  Also has some hammertoes and some anteromedial ankle arthritis     Assessment:: Right gout in the foot  Right flatfoot deformity with arthritis and peroneal tendinitis and impingement    Plan:  I reviewed the findings with him today.  I recommended talking with Dr. Shah about further treatment for his gout.  I offered him another Medrol Dosepak since it's been a while since he was on the last one.  I recommended physical therapy and custom arch supports.  Follow-up as needed.    This note was created using Dragon voice recognition software that occasionally misinterpreted phrases or words.    Consult note is delivered via Epic messaging service.

## 2018-05-10 DIAGNOSIS — E11.9 TYPE 2 DIABETES MELLITUS WITHOUT COMPLICATION: ICD-10-CM

## 2018-05-18 ENCOUNTER — TELEPHONE (OUTPATIENT)
Dept: PHARMACY | Facility: CLINIC | Age: 73
End: 2018-05-18

## 2018-05-18 NOTE — TELEPHONE ENCOUNTER
FOR DOCUMENTATION ONLY  Financial Assistance for Fasenra approved from 05/14/18 to 12/31/18. Prescription comes directly from [employer].    Source: [place]

## 2018-05-30 ENCOUNTER — OFFICE VISIT (OUTPATIENT)
Dept: PULMONOLOGY | Facility: CLINIC | Age: 73
End: 2018-05-30
Payer: MEDICARE

## 2018-05-30 ENCOUNTER — PES CALL (OUTPATIENT)
Dept: ADMINISTRATIVE | Facility: CLINIC | Age: 73
End: 2018-05-30

## 2018-05-30 VITALS
DIASTOLIC BLOOD PRESSURE: 67 MMHG | SYSTOLIC BLOOD PRESSURE: 133 MMHG | HEIGHT: 71 IN | BODY MASS INDEX: 35.12 KG/M2 | WEIGHT: 250.88 LBS | OXYGEN SATURATION: 96 % | HEART RATE: 78 BPM

## 2018-05-30 DIAGNOSIS — R60.0 LOCALIZED EDEMA: ICD-10-CM

## 2018-05-30 DIAGNOSIS — E79.0 ELEVATED URIC ACID IN BLOOD: ICD-10-CM

## 2018-05-30 DIAGNOSIS — J82.83 EOSINOPHILIC ASTHMA: ICD-10-CM

## 2018-05-30 DIAGNOSIS — J44.89 ASTHMA-COPD OVERLAP SYNDROME: Primary | ICD-10-CM

## 2018-05-30 DIAGNOSIS — J45.50 SEVERE PERSISTENT ASTHMA WITHOUT COMPLICATION: ICD-10-CM

## 2018-05-30 DIAGNOSIS — M10.9 GOUT, UNSPECIFIED CAUSE, UNSPECIFIED CHRONICITY, UNSPECIFIED SITE: ICD-10-CM

## 2018-05-30 DIAGNOSIS — M79.671 RIGHT FOOT PAIN: ICD-10-CM

## 2018-05-30 PROBLEM — J44.1 ASTHMA EXACERBATION IN COPD: Status: RESOLVED | Noted: 2017-03-14 | Resolved: 2018-05-30

## 2018-05-30 PROBLEM — J45.901 ASTHMA EXACERBATION IN COPD: Status: RESOLVED | Noted: 2017-03-14 | Resolved: 2018-05-30

## 2018-05-30 PROCEDURE — 99214 OFFICE O/P EST MOD 30 MIN: CPT | Mod: S$GLB,,, | Performed by: INTERNAL MEDICINE

## 2018-05-30 PROCEDURE — 99999 PR PBB SHADOW E&M-EST. PATIENT-LVL IV: CPT | Mod: PBBFAC,,, | Performed by: INTERNAL MEDICINE

## 2018-05-30 PROCEDURE — 99499 UNLISTED E&M SERVICE: CPT | Mod: S$GLB,,, | Performed by: INTERNAL MEDICINE

## 2018-05-30 PROCEDURE — 3078F DIAST BP <80 MM HG: CPT | Mod: CPTII,S$GLB,, | Performed by: INTERNAL MEDICINE

## 2018-05-30 PROCEDURE — 3075F SYST BP GE 130 - 139MM HG: CPT | Mod: CPTII,S$GLB,, | Performed by: INTERNAL MEDICINE

## 2018-05-30 RX ORDER — ALLOPURINOL 100 MG/1
200 TABLET ORAL DAILY
Qty: 180 TABLET | Refills: 0 | Status: SHIPPED | OUTPATIENT
Start: 2018-05-30 | End: 2019-03-28 | Stop reason: SDUPTHER

## 2018-05-30 NOTE — PROGRESS NOTES
5/30/2018    Lenny Lopez    Office f/u    Chief Complaint   Patient presents with    Follow-up   f/u asthma and copd  May 30 , 2018 - on fasenra last 4 months- going to every other month.  Has done well last 2 months since last shot-  Uses trelegy and some sinus problem.  Right ankle pain - saw ortho - recommended gout rx optimal - no f/u uric acid.      Feb 20, 2018 - took prednisone 1-2 since November. Started nucala - had stented 95% blockage and pacer.  Having bilat left > right edema    Nov 28, 2017- pt had one of 5 afb pos for maryana.  Still having thick mucous, uses prednisone every month or so.  No yg mucous production.  On road with [employer].      juNE 29, 2017- used prednisone used 2x at 4 and 5 days, still green mucous, z pack only rx that works well for infection. Having intermittent leg swelling r> left.  Prostrate better with meds.  One Maryana +0 of 5 or so.    Not using lasix/aldactone regular  March 14, 2017HPI: pt follows with Dr dean, has had 3 exacerbations.  Took prednisone x 3 and cleared sort of.  Has had cough and wheeze and seasonal worse.  Chr sinus seems to trigger.    Problem now continuous. Prednisone only effective rx.  On breo - uses regular. Has had freq infections last 2 yrs.  Has diabetes, sugars 150's or so.       The chief compliant  problem is new to me    PFSH:  Past Medical History:   Diagnosis Date    Angina pectoris     Arthritis     Asthma     Back pain     Cataract     Chronic bronchitis     COPD (chronic obstructive pulmonary disease)     Coronary artery disease     DM (diabetes mellitus), type 2, 2000 12/12/2014    Hyperlipidemia     Hypertension     Kidney stones 12/17/2012    Nephrolithiasis     Obesity     Renal manifestation of secondary diabetes mellitus     Sleep apnea     Thyroid disease     Unspecified disorder of kidney and ureter     Urinary incontinence          Past Surgical History:   Procedure Laterality Date    APPENDECTOMY       "EYE SURGERY      left eye secondary to trauma    FRACTURE SURGERY      right arm    KNEE SURGERY      screw    TONSILLECTOMY, ADENOIDECTOMY       Social History   Substance Use Topics    Smoking status: Former Smoker     Types: Cigarettes, Cigars    Smokeless tobacco: Current User     Types: Chew      Comment: smoked a few cigars in his 20s    Alcohol use No     Family History   Problem Relation Age of Onset    Thyroid disease Other     Cancer Mother     Hypertension Mother     Osteoarthritis Mother     Heart disease Father     Hypertension Father     Cancer Paternal Uncle         lung    Hypertension Sister     Hypertension Brother     Cancer Brother         colon?    Diabetes Maternal Aunt     Cancer Brother         pancreatic    Collagen disease Neg Hx     Amblyopia Neg Hx     Blindness Neg Hx     Cataracts Neg Hx     Glaucoma Neg Hx     Macular degeneration Neg Hx     Retinal detachment Neg Hx     Strabismus Neg Hx     Stroke Neg Hx      Review of patient's allergies indicates:   Allergen Reactions    No known drug allergies        Performance Status:The patient's activity level is functions out of house.      Review of Systems:  a review of eleven systems covering constitutional, Eye, HEENT, Psych, Respiratory, Cardiac, GI, , Musculoskeletal, Endocrine, Dermatologic was negative except for pertinent findings as listed ABOVE and below: wt varies 265 -240, singh, sl abn stress test but no chest pain/mi, urine freq with slow flow, arthritis degenerative.       Exam:Comprehensive exam done. /67 (BP Location: Left arm, Patient Position: Sitting)   Pulse 78   Ht 5' 11" (1.803 m)   Wt 113.8 kg (250 lb 14.1 oz)   SpO2 96%   BMI 34.99 kg/m²   Exam included Vitals as listed, and patient's appearance and affect and alertness and mood, oral exam for yeast and hygiene and pharynx lesions and Mallapatti (M) score, neck with inspection for jvd and masses and thyroid abnormalities and " lymph nodes (supraclavicular and infraclavicular nodes and axillary also examined and noted if abn), chest exam included symmetry and effort and fremitus and percussion and auscultation, cardiac exam included rhythm and gallops and murmur and rubs and jvd and edema, abdominal exam for mass and hepatosplenomegaly and tenderness and hernias and bowel sounds, Musculoskeletal exam with muscle tone and posture and mobility/gait and  strength, and skin for rashes and cyanosis and pallor and turgor, extremity for clubbing.  Findings were normal except for pertinent findings listed below:   M3 and good bs , +2 edema (trace), diaphragms move.    Radiographs (ct chest and cxr) reviewed: view by direct vision left diaphragm up.    Labs reviewed  eos up     PFT results reviewed Pulmonary Functions, including spirometry and bronchodilator response and lung volumes and diffusion, study was done dec 7, 2016.  Spirometry shows loss of vital capacity and fev1 with no obstruction and no bronchodilator response.   FEV1 is 55% or 1.81 liters.  Lung volumes show  loss of TLC with restriction 64% and elevated residual volume.  Diffusion shows reduced but falls within normal range when corrected for lung volumes.     Pulmonary functions show restriction. Clinical correlation recommended.      Nathaniel Allan M.D.          4wk ago     Respiratory Culture MORAXELLA (BRANHAMELLA) CATARRHALIS  Many  Normal respiratory iris also present  Beta Lactamase positive    Gram Stain (Respiratory) <10 epithelial cells per low power field.   Gram Stain (Respiratory) Rare WBC's           Results for REG VINES (MRN [id number]) as of 11/28/2017 13:54   Ref. Range 4/12/2017 11:35   WBC Latest Ref Range: 3.90 - 12.70 K/uL 7.51   RBC Latest Ref Range: 4.60 - 6.20 M/uL 4.38 (L)   Hemoglobin Latest Ref Range: 14.0 - 18.0 g/dL 13.1 (L)   Hematocrit Latest Ref Range: 40.0 - 54.0 % 39.5 (L)   MCV Latest Ref Range: 82 - 98 fL 90   MCH Latest Ref Range:  27.0 - 31.0 pg 29.9   MCHC Latest Ref Range: 32.0 - 36.0 % 33.2   RDW Latest Ref Range: 11.5 - 14.5 % 13.6   Platelets Latest Ref Range: 150 - 350 K/uL 146 (L)   MPV Latest Ref Range: 9.2 - 12.9 fL 12.2   Gran% Latest Ref Range: 38.0 - 73.0 % 55.8   Gran # Latest Ref Range: 1.8 - 7.7 K/uL 4.2   Lymph% Latest Ref Range: 18.0 - 48.0 % 25.6   Lymph # Latest Ref Range: 1.0 - 4.8 K/uL 1.9   Mono% Latest Ref Range: 4.0 - 15.0 % 8.7   Mono # Latest Ref Range: 0.3 - 1.0 K/uL 0.7   Eosinophil% Latest Ref Range: 0.0 - 8.0 % 9.2 (H)   Eos # Latest Ref Range: 0.0 - 0.5 K/uL 0.7 (H)   Basophil% Latest Ref Range: 0.0 - 1.9 % 0.4   Baso # Latest Ref Range: 0.00 - 0.20 K/uL 0.03       Plan:  Clinical impression is resonably certain and repeated evaluation prn +/- follow up will be needed as below.    Lenny was seen today for follow-up.    Diagnoses and all orders for this visit:    Asthma-COPD overlap syndrome    Elevated uric acid in blood  -     allopurinol (ZYLOPRIM) 100 MG tablet; Take 2 tablets (200 mg total) by mouth once daily.  -     URIC ACID; Standing    Right foot pain  -     allopurinol (ZYLOPRIM) 100 MG tablet; Take 2 tablets (200 mg total) by mouth once daily.  -     URIC ACID; Standing    Severe persistent asthma without complication    Eosinophilic asthma    Gout, unspecified cause, unspecified chronicity, unspecified site  -     allopurinol (ZYLOPRIM) 100 MG tablet; Take 2 tablets (200 mg total) by mouth once daily.  -     URIC ACID; Standing    Localized edema  -     Basic metabolic panel; Future        Follow-up in about 4 months (around 9/30/2018), or if symptoms worsen or fail to improve.    Discussed with patient above for education the following:      Uric acid needs less than 6- ordered uric acid levels every 2 wks up to 4 times.      Ordered allopurinol 200 mg doses, may need adjustment.    May use prednisone if asthma unstable or gout- prednisone 40 x 3 20 x 3.    Indocin risky- avoid.      fasenra has  helped greatly, continue    Use trelegy.

## 2018-05-30 NOTE — PATIENT INSTRUCTIONS
Uric acid needs less than 6- ordered uric acid levels every 2 wks up to 4 times.      Ordered allopurinol 200 mg doses, may need adjustment.    May use prednisone if asthma unstable or gout- prednisone 40 x 3 20 x 3.    Indocin risky- avoid.      fasenra has helped greatly, continue    Use trelegy.

## 2018-05-31 ENCOUNTER — LAB VISIT (OUTPATIENT)
Dept: LAB | Facility: HOSPITAL | Age: 73
End: 2018-05-31
Attending: INTERNAL MEDICINE
Payer: MEDICARE

## 2018-05-31 ENCOUNTER — DOCUMENTATION ONLY (OUTPATIENT)
Dept: FAMILY MEDICINE | Facility: CLINIC | Age: 73
End: 2018-05-31

## 2018-05-31 ENCOUNTER — OFFICE VISIT (OUTPATIENT)
Dept: FAMILY MEDICINE | Facility: CLINIC | Age: 73
End: 2018-05-31
Payer: MEDICARE

## 2018-05-31 VITALS
TEMPERATURE: 99 F | HEART RATE: 76 BPM | HEIGHT: 71 IN | WEIGHT: 250 LBS | BODY MASS INDEX: 35 KG/M2 | DIASTOLIC BLOOD PRESSURE: 71 MMHG | SYSTOLIC BLOOD PRESSURE: 121 MMHG

## 2018-05-31 DIAGNOSIS — I15.2 HYPERTENSION ASSOCIATED WITH DIABETES: ICD-10-CM

## 2018-05-31 DIAGNOSIS — R80.9 TYPE 2 DIABETES MELLITUS WITH MICROALBUMINURIA, WITHOUT LONG-TERM CURRENT USE OF INSULIN: ICD-10-CM

## 2018-05-31 DIAGNOSIS — J45.50 SEVERE PERSISTENT ASTHMA WITHOUT COMPLICATION: ICD-10-CM

## 2018-05-31 DIAGNOSIS — Z72.0 CHEWS TOBACCO REGULARLY: ICD-10-CM

## 2018-05-31 DIAGNOSIS — R60.0 LOCALIZED EDEMA: ICD-10-CM

## 2018-05-31 DIAGNOSIS — E11.69 HYPERLIPIDEMIA ASSOCIATED WITH TYPE 2 DIABETES MELLITUS: ICD-10-CM

## 2018-05-31 DIAGNOSIS — M19.90 OSTEOARTHRITIS, UNSPECIFIED OSTEOARTHRITIS TYPE, UNSPECIFIED SITE: ICD-10-CM

## 2018-05-31 DIAGNOSIS — H91.91 DECREASED HEARING OF RIGHT EAR: ICD-10-CM

## 2018-05-31 DIAGNOSIS — E78.5 HYPERLIPIDEMIA ASSOCIATED WITH TYPE 2 DIABETES MELLITUS: ICD-10-CM

## 2018-05-31 DIAGNOSIS — M10.9 GOUT, UNSPECIFIED CAUSE, UNSPECIFIED CHRONICITY, UNSPECIFIED SITE: ICD-10-CM

## 2018-05-31 DIAGNOSIS — E66.9 OBESITY (BMI 30-39.9): ICD-10-CM

## 2018-05-31 DIAGNOSIS — G47.33 OSA ON CPAP: ICD-10-CM

## 2018-05-31 DIAGNOSIS — E11.29 TYPE 2 DIABETES MELLITUS WITH MICROALBUMINURIA, WITHOUT LONG-TERM CURRENT USE OF INSULIN: ICD-10-CM

## 2018-05-31 DIAGNOSIS — E11.22 TYPE 2 DIABETES MELLITUS WITH CHRONIC KIDNEY DISEASE, WITHOUT LONG-TERM CURRENT USE OF INSULIN, UNSPECIFIED CKD STAGE: Primary | ICD-10-CM

## 2018-05-31 DIAGNOSIS — I48.91 ATRIAL FIBRILLATION, UNSPECIFIED TYPE: ICD-10-CM

## 2018-05-31 DIAGNOSIS — I25.118 CORONARY ARTERY DISEASE WITH STABLE ANGINA PECTORIS, UNSPECIFIED VESSEL OR LESION TYPE, UNSPECIFIED WHETHER NATIVE OR TRANSPLANTED HEART: ICD-10-CM

## 2018-05-31 DIAGNOSIS — E11.22 CKD STAGE 3 DUE TO TYPE 2 DIABETES MELLITUS: ICD-10-CM

## 2018-05-31 DIAGNOSIS — N18.9 CHRONIC KIDNEY DISEASE, UNSPECIFIED CKD STAGE: ICD-10-CM

## 2018-05-31 DIAGNOSIS — Z00.00 ENCOUNTER FOR PREVENTIVE HEALTH EXAMINATION: Primary | ICD-10-CM

## 2018-05-31 DIAGNOSIS — Z95.0 S/P PLACEMENT OF CARDIAC PACEMAKER: ICD-10-CM

## 2018-05-31 DIAGNOSIS — I77.9 BILATERAL CAROTID ARTERY DISEASE: ICD-10-CM

## 2018-05-31 DIAGNOSIS — N18.30 CKD STAGE 3 DUE TO TYPE 2 DIABETES MELLITUS: ICD-10-CM

## 2018-05-31 DIAGNOSIS — E11.59 HYPERTENSION ASSOCIATED WITH DIABETES: ICD-10-CM

## 2018-05-31 DIAGNOSIS — J84.10 CALCIFIED GRANULOMA OF LUNG: ICD-10-CM

## 2018-05-31 PROBLEM — I25.10 CORONARY ARTERY DISEASE: Status: ACTIVE | Noted: 2018-05-31

## 2018-05-31 PROBLEM — R76.8 RHEUMATOID FACTOR POSITIVE: Status: ACTIVE | Noted: 2017-03-08

## 2018-05-31 LAB
ANION GAP SERPL CALC-SCNC: 9 MMOL/L
BUN SERPL-MCNC: 25 MG/DL
CALCIUM SERPL-MCNC: 10 MG/DL
CHLORIDE SERPL-SCNC: 101 MMOL/L
CO2 SERPL-SCNC: 31 MMOL/L
CREAT SERPL-MCNC: 1.5 MG/DL
EST. GFR  (AFRICAN AMERICAN): 51.9 ML/MIN/1.73 M^2
EST. GFR  (NON AFRICAN AMERICAN): 44.9 ML/MIN/1.73 M^2
GLUCOSE SERPL-MCNC: 139 MG/DL
POTASSIUM SERPL-SCNC: 4.1 MMOL/L
SODIUM SERPL-SCNC: 141 MMOL/L

## 2018-05-31 PROCEDURE — 99999 PR PBB SHADOW E&M-EST. PATIENT-LVL V: CPT | Mod: PBBFAC,,, | Performed by: PHYSICIAN ASSISTANT

## 2018-05-31 PROCEDURE — G0439 PPPS, SUBSEQ VISIT: HCPCS | Mod: S$GLB,,, | Performed by: PHYSICIAN ASSISTANT

## 2018-05-31 PROCEDURE — 3078F DIAST BP <80 MM HG: CPT | Mod: CPTII,S$GLB,, | Performed by: PHYSICIAN ASSISTANT

## 2018-05-31 PROCEDURE — 99499 UNLISTED E&M SERVICE: CPT | Mod: S$GLB,,, | Performed by: PHYSICIAN ASSISTANT

## 2018-05-31 PROCEDURE — 3074F SYST BP LT 130 MM HG: CPT | Mod: CPTII,S$GLB,, | Performed by: PHYSICIAN ASSISTANT

## 2018-05-31 PROCEDURE — 3044F HG A1C LEVEL LT 7.0%: CPT | Mod: CPTII,S$GLB,, | Performed by: PHYSICIAN ASSISTANT

## 2018-05-31 PROCEDURE — 80048 BASIC METABOLIC PNL TOTAL CA: CPT

## 2018-05-31 PROCEDURE — 36415 COLL VENOUS BLD VENIPUNCTURE: CPT | Mod: PO

## 2018-05-31 RX ORDER — INSULIN PUMP SYRINGE, 3 ML
EACH MISCELLANEOUS
Qty: 1 EACH | Refills: 0 | Status: SHIPPED | OUTPATIENT
Start: 2018-05-31 | End: 2020-10-09

## 2018-05-31 NOTE — PROGRESS NOTES
Pre-Visit Chart Review  For Appointment Scheduled on 5/31/2018    Health Maintenance Due   Topic Date Due    TETANUS VACCINE  01/13/1963    Zoster Vaccine  01/13/2003    Hemoglobin A1c  02/28/2018

## 2018-05-31 NOTE — PATIENT INSTRUCTIONS
Counseling and Referral of Other Preventative  (Italic type indicates deductible and co-insurance are waived)    Patient Name: Lenny Lopez  Today's Date: 5/31/2018    Health Maintenance       Date Due Completion Date    TETANUS VACCINE 01/13/1961 ---    Zoster Vaccine 01/13/2003 ---    Hemoglobin A1c 02/28/2018 11/28/2017    Influenza Vaccine 08/01/2018 11/21/2017    Eye Exam 11/24/2018 11/24/2017    Lipid Panel 11/28/2018 11/28/2017    Urine Microalbumin 11/28/2018 11/28/2017    Colonoscopy 12/17/2018 12/17/2013    Foot Exam 04/09/2019 4/9/2018    High Dose Statin 05/30/2019 5/30/2018        HgbA1c ordered today. Tetanus and zoster at local insurance pharmacy.   The following information is provided to all patients.  This information is to help you find resources for any of the problems found today that may be affecting your health:                Living healthy guide: www.[facility].[place].[facility]      Understanding Diabetes: www.diabetes.org      Eating healthy: www.cdc.gov/healthyweight      CDC home safety checklist: www.cdc.gov/steadi/patient.html      Agency on Aging: www.goea.[place].[facility]      Alcoholics anonymous (AA): www.aa.org      Physical Activity: www.mary beth.nih.gov/wf3inld      Tobacco use: www.quitwithusla.org

## 2018-05-31 NOTE — PROGRESS NOTES
"Lenny Lopez presented for a  Medicare AWV and comprehensive Health Risk Assessment today. The following components were reviewed and updated:    · Medical history  · Family History  · Social history  · Allergies and Current Medications  · Health Risk Assessment  · Health Maintenance  · Care Team     ** See Completed Assessments for Annual Wellness Visit within the encounter summary.**       The following assessments were completed:  · Living Situation  · CAGE  · Depression Screening  · Timed Get Up and Go  · Whisper Test  · Cognitive Function Screening  · Nutrition Screening  · ADL Screening  · PAQ Screening    I offered to discuss end of life issues, including information on how to make advance directives that the patient could use to name someone who would make medical decisions on their behalf if they became too ill to make themselves.    ___Patient declined  _X_Patient is interested, I provided paper work and offered to discuss.    Vitals:    05/31/18 1334   BP: 121/71   Pulse: 76   Temp: 98.7 °F (37.1 °C)   Weight: 113.4 kg (250 lb)   Height: 5' 11" (1.803 m)     Body mass index is 34.87 kg/m².  Physical Exam   Constitutional: He is oriented to person, place, and time. He appears well-developed.   Obese body habitus.   HENT:   Head: Normocephalic and atraumatic.   Right Ear: Hearing and external ear normal.   Left Ear: Hearing and external ear normal.   Eyes: Conjunctivae and EOM are normal. Pupils are equal, round, and reactive to light.   Cardiovascular: Normal rate, regular rhythm, normal heart sounds and intact distal pulses.    Trace bilateral lower ext edema   Pulmonary/Chest: Effort normal and breath sounds normal.   Neurological: He is alert and oriented to person, place, and time.   Skin: Skin is warm and dry.   Psychiatric: He has a normal mood and affect. His behavior is normal.             Diagnoses and health risks identified today and associated recommendations/orders:    Encounter for " preventive health examination    Calcified granuloma of lung  Comments:  Stable, seen on CXR 2/27 in the left hilum, conitinue to follow with Pulmonary    Severe persistent asthma without complication  Comments:  Stable, improving control with fasenra injections monthly, on trelegy ellipta per Pulmonary    Bilateral carotid artery disease  Comments:  Stable, >50 percent stenosis of LICA, moderate plaque ADILENE, recommend continue statin/ASA, follow up with Cardiology, evidence of plaque on CT 2010       Hyperlipidemia associated with type 2 diabetes mellitus  Comments:  Stable, continue lipitor 80 mg daily per PCP    Hypertension associated with diabetes  Comments:  Stable, not currently ACE inh with hx of DM?, on sotalol per Cardiology with recent afib diagnosis, continue per Cardiology    CKD stage 3 due to type 2 diabetes mellitus  Comments:  Stable, avoid NSAIDs, continue to follow with PCP    Obesity (BMI 30-39.9), today 34.1  Comments:  Uncontrolled, encouraged weight loss and dietary changes, exercise must be cleared by Cardiology given recent stent placement    Type 2 diabetes mellitus with microalbuminuria, without long-term current use of insulin  Comments:  Stable, not currently on any medications, due for HgbA1c now  Orders:  -     Hemoglobin A1c; Future; Expected date: 05/31/2018    Gout, unspecified cause, unspecified chronicity, unspecified site  Comments:  Stable, allopurinol recently started, continue to follow with PCP    Osteoarthritis, unspecified osteoarthritis type, unspecified site  Comments:  Chronic, stable, no evidence of inflammatory arthritis per Rheum work up in 1/2017    LUZ on CPAP, 100% use, 2016  Comments:  Stable, using CPAP nightly, continue to follow with PCP    Chews tobacco regularly, about can a day  Comments:  Uncontrolled, encouraged cessation, pt declines referral to tobacco cessation program    Decreased hearing of right ear  Comments:  Abnormal whisper test, pt declined  referral to ENT/Audiology    Coronary artery disease with stable angina pectoris, unspecified vessel or lesion type, unspecified whether native or transplanted heart  Comments:  Stable, s/p recent stent within the last 4 months on brilinta per Cardiology    Atrial fibrillation, unspecified type  Comments:  Stable, on sotalol and eliquis per Cardiology    S/P placement of cardiac pacemaker  Comments:  Stable, continue to follow with Dr. Masterson        Provided Lenny with a 5-10 year written screening schedule and personal prevention plan. Recommendations were developed using the USPSTF age appropriate recommendations. Education, counseling, and referrals were provided as needed. After Visit Summary printed and given to patient which includes a list of additional screenings\tests needed.    Follow up with PCP team in 3-4 months.  HgbA1c scheduled in 2 weeks.    Jenn Fernandes PA-C     Patient readiness: acceptance and barriers:none    During the course of the visit the patient was educated and counseled about the following:     Diabetes:  Labs: hemoglobin A1C.  Hypertension:   Medication: no change.  Obesity:   General weight loss/lifestyle modification strategies discussed (elicit support from others; identify saboteurs; non-food rewards, etc).    Goals: Diabetes: Maintain Hemoglobin A1C below 7, Hypertension: Reduce Blood Pressure and Obesity: Reduce calorie intake and BMI    Did patient meet goals/outcomes: No    The following self management tools provided: declined    Patient Instructions (the written plan) was given to the patient/family.     Time spent with patient: 55 minutes

## 2018-05-31 NOTE — Clinical Note
Primary Care Providers: Blue Shah MD, MD (General)  Your patient was seen today for a HRA visit. Gap(s) in care (HEDIS gaps) have been identified during this visit that require additional testing and possible follow up. Orders Placed This Encounter     Hemoglobin A1c  These orders were placed using [facility] approved protocol and any results will be forwarded to your office for appropriate follow up. I have included a copy of my visit note; please review the note and feel free to contact me with any questions.   Thank you for allowing me to participate in the care of your patients. Jenn Fernandes PA-C

## 2018-06-07 DIAGNOSIS — J82.83 EOSINOPHILIC ASTHMA: Primary | ICD-10-CM

## 2018-06-08 ENCOUNTER — LAB VISIT (OUTPATIENT)
Dept: LAB | Facility: HOSPITAL | Age: 73
End: 2018-06-08
Attending: PHYSICIAN ASSISTANT
Payer: MEDICARE

## 2018-06-08 ENCOUNTER — CLINICAL SUPPORT (OUTPATIENT)
Dept: PULMONOLOGY | Facility: CLINIC | Age: 73
End: 2018-06-08
Payer: MEDICARE

## 2018-06-08 VITALS
WEIGHT: 248.44 LBS | SYSTOLIC BLOOD PRESSURE: 135 MMHG | DIASTOLIC BLOOD PRESSURE: 66 MMHG | OXYGEN SATURATION: 97 % | HEART RATE: 72 BPM | BODY MASS INDEX: 34.65 KG/M2

## 2018-06-08 DIAGNOSIS — E11.29 TYPE 2 DIABETES MELLITUS WITH MICROALBUMINURIA, WITHOUT LONG-TERM CURRENT USE OF INSULIN: ICD-10-CM

## 2018-06-08 DIAGNOSIS — R80.9 TYPE 2 DIABETES MELLITUS WITH MICROALBUMINURIA, WITHOUT LONG-TERM CURRENT USE OF INSULIN: ICD-10-CM

## 2018-06-08 DIAGNOSIS — J82.83 EOSINOPHILIC ASTHMA: Primary | ICD-10-CM

## 2018-06-08 LAB
ESTIMATED AVG GLUCOSE: 146 MG/DL
HBA1C MFR BLD HPLC: 6.7 %

## 2018-06-08 PROCEDURE — 83036 HEMOGLOBIN GLYCOSYLATED A1C: CPT

## 2018-06-08 PROCEDURE — 36415 COLL VENOUS BLD VENIPUNCTURE: CPT | Mod: PO

## 2018-06-08 PROCEDURE — 99999 PR PBB SHADOW E&M-EST. PATIENT-LVL III: CPT | Mod: PBBFAC,,,

## 2018-06-08 NOTE — PROGRESS NOTES
Pt completed 4 th Fasenra injection. Pt. Reports no complications from previous injection. Education received and reports complete understanding. No further needs. Pt monitored for 15 minutes post injection per Dr. Ryder instructions.

## 2018-06-09 ENCOUNTER — NURSE TRIAGE (OUTPATIENT)
Dept: ADMINISTRATIVE | Facility: CLINIC | Age: 73
End: 2018-06-09

## 2018-06-09 NOTE — TELEPHONE ENCOUNTER
Reason for Disposition   General information question, no triage required and triager able to answer question    Protocols used: ST INFORMATION ONLY CALL-A-AH    Caregiver calling in regards to sleep apnea machine is not working.  Care advice given.

## 2018-06-12 ENCOUNTER — TELEPHONE (OUTPATIENT)
Dept: FAMILY MEDICINE | Facility: CLINIC | Age: 73
End: 2018-06-12

## 2018-06-12 NOTE — TELEPHONE ENCOUNTER
----- Message from Jenn Fernandes PA-C sent at 6/11/2018  8:27 PM CDT -----  Blood sugar is at goal. We will continue to monitor his blood sugar over time.

## 2018-06-21 ENCOUNTER — LAB VISIT (OUTPATIENT)
Dept: LAB | Facility: HOSPITAL | Age: 73
End: 2018-06-21
Attending: INTERNAL MEDICINE
Payer: MEDICARE

## 2018-06-21 DIAGNOSIS — N18.30 CHRONIC KIDNEY DISEASE, STAGE III (MODERATE): ICD-10-CM

## 2018-06-21 DIAGNOSIS — N18.6 TYPE 2 DIABETES MELLITUS WITH END-STAGE RENAL DISEASE: ICD-10-CM

## 2018-06-21 DIAGNOSIS — I10 ESSENTIAL HYPERTENSION, MALIGNANT: ICD-10-CM

## 2018-06-21 DIAGNOSIS — R60.9 EDEMA: ICD-10-CM

## 2018-06-21 DIAGNOSIS — D63.1 ANEMIA OF CHRONIC RENAL FAILURE: Primary | ICD-10-CM

## 2018-06-21 DIAGNOSIS — N18.9 ANEMIA OF CHRONIC RENAL FAILURE: ICD-10-CM

## 2018-06-21 DIAGNOSIS — N18.9 ANEMIA OF CHRONIC RENAL FAILURE: Primary | ICD-10-CM

## 2018-06-21 DIAGNOSIS — E11.22 TYPE 2 DIABETES MELLITUS WITH END-STAGE RENAL DISEASE: ICD-10-CM

## 2018-06-21 DIAGNOSIS — E55.9 AVITAMINOSIS D: ICD-10-CM

## 2018-06-21 DIAGNOSIS — R80.9 PROTEINURIA: ICD-10-CM

## 2018-06-21 DIAGNOSIS — M10.9 GOUT, UNSPECIFIED: ICD-10-CM

## 2018-06-21 DIAGNOSIS — D63.1 ANEMIA OF CHRONIC RENAL FAILURE: ICD-10-CM

## 2018-06-21 LAB
25(OH)D3+25(OH)D2 SERPL-MCNC: 29 NG/ML
ALBUMIN SERPL BCP-MCNC: 3.7 G/DL
ANION GAP SERPL CALC-SCNC: 9 MMOL/L
BASOPHILS # BLD AUTO: 0.01 K/UL
BASOPHILS NFR BLD: 0.2 %
BUN SERPL-MCNC: 26 MG/DL
CALCIUM SERPL-MCNC: 9.8 MG/DL
CHLORIDE SERPL-SCNC: 103 MMOL/L
CO2 SERPL-SCNC: 27 MMOL/L
CREAT SERPL-MCNC: 1.3 MG/DL
DIFFERENTIAL METHOD: ABNORMAL
EOSINOPHIL # BLD AUTO: 0 K/UL
EOSINOPHIL NFR BLD: 0 %
ERYTHROCYTE [DISTWIDTH] IN BLOOD BY AUTOMATED COUNT: 15 %
EST. GFR  (AFRICAN AMERICAN): >60 ML/MIN/1.73 M^2
EST. GFR  (NON AFRICAN AMERICAN): 53.4 ML/MIN/1.73 M^2
GLUCOSE SERPL-MCNC: 130 MG/DL
HCT VFR BLD AUTO: 41.9 %
HGB BLD-MCNC: 12.5 G/DL
IMM GRANULOCYTES # BLD AUTO: 0.03 K/UL
IMM GRANULOCYTES NFR BLD AUTO: 0.5 %
LYMPHOCYTES # BLD AUTO: 1.2 K/UL
LYMPHOCYTES NFR BLD: 20.7 %
MCH RBC QN AUTO: 27.5 PG
MCHC RBC AUTO-ENTMCNC: 29.8 G/DL
MCV RBC AUTO: 92 FL
MONOCYTES # BLD AUTO: 0.6 K/UL
MONOCYTES NFR BLD: 9.5 %
NEUTROPHILS # BLD AUTO: 4 K/UL
NEUTROPHILS NFR BLD: 69.1 %
NRBC BLD-RTO: 0 /100 WBC
PHOSPHATE SERPL-MCNC: 3.5 MG/DL
PLATELET # BLD AUTO: 152 K/UL
PMV BLD AUTO: 12.5 FL
POTASSIUM SERPL-SCNC: 4.4 MMOL/L
PTH-INTACT SERPL-MCNC: 69 PG/ML
RBC # BLD AUTO: 4.55 M/UL
SODIUM SERPL-SCNC: 139 MMOL/L
URATE SERPL-MCNC: 6.1 MG/DL
WBC # BLD AUTO: 5.81 K/UL

## 2018-06-21 PROCEDURE — 82306 VITAMIN D 25 HYDROXY: CPT

## 2018-06-21 PROCEDURE — 36415 COLL VENOUS BLD VENIPUNCTURE: CPT | Mod: PO

## 2018-06-21 PROCEDURE — 85025 COMPLETE CBC W/AUTO DIFF WBC: CPT

## 2018-06-21 PROCEDURE — 84550 ASSAY OF BLOOD/URIC ACID: CPT

## 2018-06-21 PROCEDURE — 83970 ASSAY OF PARATHORMONE: CPT

## 2018-06-21 PROCEDURE — 80069 RENAL FUNCTION PANEL: CPT

## 2018-07-25 PROBLEM — R76.8 RHEUMATOID FACTOR POSITIVE: Status: RESOLVED | Noted: 2017-03-08 | Resolved: 2018-07-25

## 2018-08-03 ENCOUNTER — CLINICAL SUPPORT (OUTPATIENT)
Dept: PULMONOLOGY | Facility: CLINIC | Age: 73
End: 2018-08-03
Payer: MEDICARE

## 2018-08-03 VITALS
BODY MASS INDEX: 36.25 KG/M2 | DIASTOLIC BLOOD PRESSURE: 70 MMHG | WEIGHT: 259.94 LBS | HEART RATE: 59 BPM | OXYGEN SATURATION: 95 % | SYSTOLIC BLOOD PRESSURE: 150 MMHG

## 2018-08-03 DIAGNOSIS — J82.83 EOSINOPHILIC ASTHMA: Primary | ICD-10-CM

## 2018-08-03 PROCEDURE — 99999 PR PBB SHADOW E&M-EST. PATIENT-LVL IV: CPT | Mod: PBBFAC,,,

## 2018-08-06 NOTE — PROGRESS NOTES
Pt came in for his Fasenra injection. No complaints of lung problems. Pt states it is the best he has felt in years. Pt monitored in office for 15 min post injection.

## 2018-09-17 DIAGNOSIS — J45.909 PERSISTENT ASTHMA WITHOUT COMPLICATION, UNSPECIFIED ASTHMA SEVERITY: ICD-10-CM

## 2018-09-25 DIAGNOSIS — I25.10 CORONARY ARTERY DISEASE, ANGINA PRESENCE UNSPECIFIED, UNSPECIFIED VESSEL OR LESION TYPE, UNSPECIFIED WHETHER NATIVE OR TRANSPLANTED HEART: ICD-10-CM

## 2018-09-25 DIAGNOSIS — N40.0 PROSTATISM: ICD-10-CM

## 2018-09-25 DIAGNOSIS — I77.9 BILATERAL CAROTID ARTERY DISEASE: ICD-10-CM

## 2018-09-25 DIAGNOSIS — I48.91 ATRIAL FIBRILLATION, UNSPECIFIED TYPE: Primary | ICD-10-CM

## 2018-09-25 DIAGNOSIS — J45.909 PERSISTENT ASTHMA WITHOUT COMPLICATION, UNSPECIFIED ASTHMA SEVERITY: ICD-10-CM

## 2018-09-25 RX ORDER — TAMSULOSIN HYDROCHLORIDE 0.4 MG/1
0.4 CAPSULE ORAL DAILY
Qty: 30 CAPSULE | Refills: 11 | Status: SHIPPED | OUTPATIENT
Start: 2018-09-25 | End: 2019-09-18 | Stop reason: SDUPTHER

## 2018-09-25 RX ORDER — MONTELUKAST SODIUM 10 MG/1
10 TABLET ORAL NIGHTLY
Qty: 30 TABLET | Refills: 11 | Status: SHIPPED | OUTPATIENT
Start: 2018-09-25 | End: 2019-10-16 | Stop reason: SDUPTHER

## 2018-09-25 RX ORDER — ATORVASTATIN CALCIUM 80 MG/1
40 TABLET, FILM COATED ORAL DAILY
Qty: 7 TABLET | Refills: 0 | Status: SHIPPED | OUTPATIENT
Start: 2018-09-25 | End: 2020-01-16

## 2018-09-25 RX ORDER — SOTALOL HYDROCHLORIDE 80 MG/1
40 TABLET ORAL 2 TIMES DAILY
Qty: 7 TABLET | Refills: 0 | Status: SHIPPED | OUTPATIENT
Start: 2018-09-25 | End: 2024-01-16

## 2018-09-25 RX ORDER — FUROSEMIDE 40 MG/1
40 TABLET ORAL DAILY PRN
Qty: 7 TABLET | Refills: 0 | Status: SHIPPED | OUTPATIENT
Start: 2018-09-25 | End: 2018-09-26 | Stop reason: SDUPTHER

## 2018-09-25 RX ORDER — APIXABAN 5 MG/1
5 TABLET, FILM COATED ORAL 2 TIMES DAILY
Qty: 14 TABLET | Refills: 0 | Status: SHIPPED | OUTPATIENT
Start: 2018-09-25 | End: 2018-10-02
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
## 2018-09-26 ENCOUNTER — HOSPITAL ENCOUNTER (OUTPATIENT)
Dept: RADIOLOGY | Facility: CLINIC | Age: 73
Discharge: HOME | End: 2018-09-26
Attending: PHYSICIAN ASSISTANT
Payer: MEDICARE

## 2018-09-26 ENCOUNTER — OFFICE VISIT (OUTPATIENT)
Dept: FAMILY MEDICINE | Facility: CLINIC | Age: 73
End: 2018-09-26
Payer: MEDICARE

## 2018-09-26 ENCOUNTER — TELEPHONE (OUTPATIENT)
Dept: PULMONOLOGY | Facility: CLINIC | Age: 73
End: 2018-09-26

## 2018-09-26 VITALS
HEART RATE: 64 BPM | RESPIRATION RATE: 16 BRPM | TEMPERATURE: 98 F | HEIGHT: 71 IN | SYSTOLIC BLOOD PRESSURE: 138 MMHG | BODY MASS INDEX: 35.95 KG/M2 | WEIGHT: 256.81 LBS | OXYGEN SATURATION: 96 % | DIASTOLIC BLOOD PRESSURE: 60 MMHG

## 2018-09-26 DIAGNOSIS — E11.29 TYPE 2 DIABETES MELLITUS WITH MICROALBUMINURIA, WITHOUT LONG-TERM CURRENT USE OF INSULIN: ICD-10-CM

## 2018-09-26 DIAGNOSIS — I77.9 BILATERAL CAROTID ARTERY DISEASE: ICD-10-CM

## 2018-09-26 DIAGNOSIS — I25.10 CORONARY ARTERY DISEASE, ANGINA PRESENCE UNSPECIFIED, UNSPECIFIED VESSEL OR LESION TYPE, UNSPECIFIED WHETHER NATIVE OR TRANSPLANTED HEART: ICD-10-CM

## 2018-09-26 DIAGNOSIS — M79.89 LEFT LEG SWELLING: ICD-10-CM

## 2018-09-26 DIAGNOSIS — M79.605 LEFT LEG PAIN: ICD-10-CM

## 2018-09-26 DIAGNOSIS — E66.01 SEVERE OBESITY (BMI 35.0-39.9) WITH COMORBIDITY: ICD-10-CM

## 2018-09-26 DIAGNOSIS — E11.69 HYPERLIPIDEMIA ASSOCIATED WITH TYPE 2 DIABETES MELLITUS: ICD-10-CM

## 2018-09-26 DIAGNOSIS — E78.5 HYPERLIPIDEMIA ASSOCIATED WITH TYPE 2 DIABETES MELLITUS: ICD-10-CM

## 2018-09-26 DIAGNOSIS — M79.89 LEFT LEG SWELLING: Primary | ICD-10-CM

## 2018-09-26 DIAGNOSIS — E11.59 HYPERTENSION ASSOCIATED WITH DIABETES: ICD-10-CM

## 2018-09-26 DIAGNOSIS — R80.9 TYPE 2 DIABETES MELLITUS WITH MICROALBUMINURIA, WITHOUT LONG-TERM CURRENT USE OF INSULIN: ICD-10-CM

## 2018-09-26 DIAGNOSIS — I15.2 HYPERTENSION ASSOCIATED WITH DIABETES: ICD-10-CM

## 2018-09-26 PROCEDURE — 3044F HG A1C LEVEL LT 7.0%: CPT | Mod: CPTII,,, | Performed by: PHYSICIAN ASSISTANT

## 2018-09-26 PROCEDURE — 99499 UNLISTED E&M SERVICE: CPT | Mod: S$GLB,,, | Performed by: PHYSICIAN ASSISTANT

## 2018-09-26 PROCEDURE — 3078F DIAST BP <80 MM HG: CPT | Mod: CPTII,,, | Performed by: PHYSICIAN ASSISTANT

## 2018-09-26 PROCEDURE — 93971 EXTREMITY STUDY: CPT | Mod: 26,,, | Performed by: RADIOLOGY

## 2018-09-26 PROCEDURE — 90662 IIV NO PRSV INCREASED AG IM: CPT | Mod: PBBFAC,PO

## 2018-09-26 PROCEDURE — 99215 OFFICE O/P EST HI 40 MIN: CPT | Mod: PBBFAC,PO,25 | Performed by: PHYSICIAN ASSISTANT

## 2018-09-26 PROCEDURE — 99214 OFFICE O/P EST MOD 30 MIN: CPT | Mod: S$PBB,,, | Performed by: PHYSICIAN ASSISTANT

## 2018-09-26 PROCEDURE — 99999 PR PBB SHADOW E&M-EST. PATIENT-LVL V: CPT | Mod: PBBFAC,,, | Performed by: PHYSICIAN ASSISTANT

## 2018-09-26 PROCEDURE — 93971 EXTREMITY STUDY: CPT | Mod: TC,PO

## 2018-09-26 PROCEDURE — 3075F SYST BP GE 130 - 139MM HG: CPT | Mod: CPTII,,, | Performed by: PHYSICIAN ASSISTANT

## 2018-09-26 PROCEDURE — 1101F PT FALLS ASSESS-DOCD LE1/YR: CPT | Mod: CPTII,,, | Performed by: PHYSICIAN ASSISTANT

## 2018-09-26 RX ORDER — POTASSIUM CHLORIDE 750 MG/1
10 TABLET, EXTENDED RELEASE ORAL 2 TIMES DAILY
Qty: 180 TABLET | Refills: 0 | Status: SHIPPED | OUTPATIENT
Start: 2018-09-26 | End: 2019-03-26 | Stop reason: SDUPTHER

## 2018-09-26 RX ORDER — FUROSEMIDE 40 MG/1
40 TABLET ORAL DAILY PRN
Qty: 90 TABLET | Refills: 0 | Status: SHIPPED | OUTPATIENT
Start: 2018-09-26 | End: 2019-03-26 | Stop reason: SDUPTHER
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
## 2018-09-27 ENCOUNTER — PATIENT MESSAGE (OUTPATIENT)
Dept: FAMILY MEDICINE | Facility: CLINIC | Age: 73
End: 2018-09-27

## 2018-09-27 NOTE — TELEPHONE ENCOUNTER
No, the kidney function is chronically low but stable.  There has been minimal fluctuation.  I would always recommend sharing the lab work with Nephrology at your next appointment with them.

## 2018-09-28 ENCOUNTER — TELEPHONE (OUTPATIENT)
Dept: FAMILY MEDICINE | Facility: CLINIC | Age: 73
End: 2018-09-28

## 2018-09-28 ENCOUNTER — CLINICAL SUPPORT (OUTPATIENT)
Dept: PULMONOLOGY | Facility: CLINIC | Age: 73
End: 2018-09-28
Payer: MEDICARE

## 2018-09-28 ENCOUNTER — OFFICE VISIT (OUTPATIENT)
Dept: PULMONOLOGY | Facility: CLINIC | Age: 73
End: 2018-09-28
Payer: MEDICARE

## 2018-09-28 VITALS
DIASTOLIC BLOOD PRESSURE: 67 MMHG | WEIGHT: 254.44 LBS | SYSTOLIC BLOOD PRESSURE: 143 MMHG | OXYGEN SATURATION: 97 % | HEIGHT: 71 IN | BODY MASS INDEX: 35.62 KG/M2 | HEART RATE: 61 BPM

## 2018-09-28 DIAGNOSIS — R09.89 CHRONIC SINUS COMPLAINTS: ICD-10-CM

## 2018-09-28 DIAGNOSIS — J45.50 SEVERE PERSISTENT ASTHMA, UNSPECIFIED WHETHER COMPLICATED: ICD-10-CM

## 2018-09-28 DIAGNOSIS — J82.83 EOSINOPHILIC ASTHMA: Primary | ICD-10-CM

## 2018-09-28 DIAGNOSIS — J41.1 CHRONIC BRONCHITIS, MUCOPURULENT: ICD-10-CM

## 2018-09-28 DIAGNOSIS — Z51.81 ENCOUNTER FOR MONITORING DIURETIC THERAPY: Primary | ICD-10-CM

## 2018-09-28 DIAGNOSIS — J44.9 CHRONIC OBSTRUCTIVE PULMONARY DISEASE, UNSPECIFIED COPD TYPE: ICD-10-CM

## 2018-09-28 DIAGNOSIS — G47.33 OSA ON CPAP: ICD-10-CM

## 2018-09-28 DIAGNOSIS — Z79.899 ENCOUNTER FOR MONITORING DIURETIC THERAPY: Primary | ICD-10-CM

## 2018-09-28 PROCEDURE — 99214 OFFICE O/P EST MOD 30 MIN: CPT | Mod: S$PBB,,, | Performed by: NURSE PRACTITIONER

## 2018-09-28 PROCEDURE — 99213 OFFICE O/P EST LOW 20 MIN: CPT | Mod: PBBFAC,PO | Performed by: NURSE PRACTITIONER

## 2018-09-28 PROCEDURE — 3077F SYST BP >= 140 MM HG: CPT | Mod: CPTII,,, | Performed by: NURSE PRACTITIONER

## 2018-09-28 PROCEDURE — 99999 PR PBB SHADOW E&M-EST. PATIENT-LVL III: CPT | Mod: PBBFAC,,, | Performed by: NURSE PRACTITIONER

## 2018-09-28 PROCEDURE — 1101F PT FALLS ASSESS-DOCD LE1/YR: CPT | Mod: CPTII,,, | Performed by: NURSE PRACTITIONER

## 2018-09-28 PROCEDURE — 3078F DIAST BP <80 MM HG: CPT | Mod: CPTII,,, | Performed by: NURSE PRACTITIONER

## 2018-09-28 RX ORDER — IPRATROPIUM BROMIDE AND ALBUTEROL SULFATE 2.5; .5 MG/3ML; MG/3ML
3 SOLUTION RESPIRATORY (INHALATION) EVERY 6 HOURS PRN
Qty: 120 ML | Refills: 1 | Status: SHIPPED | OUTPATIENT
Start: 2018-09-28 | End: 2020-02-20

## 2018-09-28 RX ORDER — ALBUTEROL SULFATE 90 UG/1
1-2 AEROSOL, METERED RESPIRATORY (INHALATION) EVERY 4 HOURS PRN
Qty: 1 EACH | Refills: 11 | Status: SHIPPED | OUTPATIENT
Start: 2018-09-28 | End: 2020-12-23 | Stop reason: SDUPTHER

## 2018-09-28 RX ORDER — PREDNISONE 20 MG/1
TABLET ORAL
Qty: 27 TABLET | Refills: 2 | Status: SHIPPED | OUTPATIENT
Start: 2018-09-28 | End: 2020-02-13

## 2018-09-28 NOTE — TELEPHONE ENCOUNTER
Patient should start lasix. Take with potassium. We will follow up with repeat kidney functions in 2 weeks. Orders placed.

## 2018-09-28 NOTE — TELEPHONE ENCOUNTER
Patient wants to know if he is supposed to start his Lasix? He was waiting to see what his labs said.
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
## 2018-09-28 NOTE — TELEPHONE ENCOUNTER
----- Message from Cely Bergman sent at 9/28/2018 11:45 AM CDT -----  Contact: patient  Patient requesting call back regarding his test results. Please call [phone]
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
## 2018-10-17 ENCOUNTER — LAB VISIT (OUTPATIENT)
Dept: LAB | Facility: HOSPITAL | Age: 73
End: 2018-10-17
Attending: PHYSICIAN ASSISTANT
Payer: MEDICARE

## 2018-10-17 DIAGNOSIS — Z51.81 ENCOUNTER FOR MONITORING DIURETIC THERAPY: ICD-10-CM

## 2018-10-17 DIAGNOSIS — Z79.899 ENCOUNTER FOR MONITORING DIURETIC THERAPY: ICD-10-CM

## 2018-10-17 LAB
ALBUMIN SERPL BCP-MCNC: 3.8 G/DL
ANION GAP SERPL CALC-SCNC: 8 MMOL/L
BUN SERPL-MCNC: 33 MG/DL
CALCIUM SERPL-MCNC: 9.7 MG/DL
CHLORIDE SERPL-SCNC: 103 MMOL/L
CO2 SERPL-SCNC: 28 MMOL/L
CREAT SERPL-MCNC: 1.4 MG/DL
EST. GFR  (AFRICAN AMERICAN): 56.4 ML/MIN/1.73 M^2
EST. GFR  (NON AFRICAN AMERICAN): 48.8 ML/MIN/1.73 M^2
GLUCOSE SERPL-MCNC: 141 MG/DL
PHOSPHATE SERPL-MCNC: 3.6 MG/DL
POTASSIUM SERPL-SCNC: 4.2 MMOL/L
SODIUM SERPL-SCNC: 139 MMOL/L

## 2018-10-17 PROCEDURE — 36415 COLL VENOUS BLD VENIPUNCTURE: CPT | Mod: PO

## 2018-10-17 PROCEDURE — 80069 RENAL FUNCTION PANEL: CPT

## 2018-10-26 ENCOUNTER — LAB VISIT (OUTPATIENT)
Dept: LAB | Facility: HOSPITAL | Age: 73
End: 2018-10-26
Attending: INTERNAL MEDICINE
Payer: MEDICARE

## 2018-10-26 DIAGNOSIS — M10.9 GOUT, UNSPECIFIED: ICD-10-CM

## 2018-10-26 DIAGNOSIS — N18.30 CHRONIC KIDNEY DISEASE, STAGE III (MODERATE): ICD-10-CM

## 2018-10-26 DIAGNOSIS — N18.6 TYPE 2 DIABETES MELLITUS WITH END-STAGE RENAL DISEASE: ICD-10-CM

## 2018-10-26 DIAGNOSIS — E11.22 TYPE 2 DIABETES MELLITUS WITH END-STAGE RENAL DISEASE: ICD-10-CM

## 2018-10-26 DIAGNOSIS — R80.9 PROTEINURIA: ICD-10-CM

## 2018-10-26 DIAGNOSIS — I10 ESSENTIAL HYPERTENSION, MALIGNANT: ICD-10-CM

## 2018-10-26 DIAGNOSIS — D63.1 ANEMIA OF CHRONIC RENAL FAILURE: Primary | ICD-10-CM

## 2018-10-26 DIAGNOSIS — E55.9 AVITAMINOSIS D: ICD-10-CM

## 2018-10-26 DIAGNOSIS — N18.9 ANEMIA OF CHRONIC RENAL FAILURE: Primary | ICD-10-CM

## 2018-10-26 LAB
25(OH)D3+25(OH)D2 SERPL-MCNC: 31 NG/ML
ALBUMIN SERPL BCP-MCNC: 3.5 G/DL
ANION GAP SERPL CALC-SCNC: 6 MMOL/L
BASOPHILS # BLD AUTO: 0.01 K/UL
BASOPHILS NFR BLD: 0.2 %
BUN SERPL-MCNC: 32 MG/DL
CALCIUM SERPL-MCNC: 9.7 MG/DL
CHLORIDE SERPL-SCNC: 103 MMOL/L
CO2 SERPL-SCNC: 30 MMOL/L
CREAT SERPL-MCNC: 1.3 MG/DL
DIFFERENTIAL METHOD: ABNORMAL
EOSINOPHIL # BLD AUTO: 0 K/UL
EOSINOPHIL NFR BLD: 0 %
ERYTHROCYTE [DISTWIDTH] IN BLOOD BY AUTOMATED COUNT: 14.4 %
EST. GFR  (AFRICAN AMERICAN): >60 ML/MIN/1.73 M^2
EST. GFR  (NON AFRICAN AMERICAN): 53.4 ML/MIN/1.73 M^2
GLUCOSE SERPL-MCNC: 131 MG/DL
HCT VFR BLD AUTO: 43.4 %
HGB BLD-MCNC: 13.8 G/DL
IMM GRANULOCYTES # BLD AUTO: 0.02 K/UL
IMM GRANULOCYTES NFR BLD AUTO: 0.3 %
LYMPHOCYTES # BLD AUTO: 1.5 K/UL
LYMPHOCYTES NFR BLD: 23.8 %
MCH RBC QN AUTO: 28.7 PG
MCHC RBC AUTO-ENTMCNC: 31.8 G/DL
MCV RBC AUTO: 90 FL
MONOCYTES # BLD AUTO: 0.6 K/UL
MONOCYTES NFR BLD: 9.7 %
NEUTROPHILS # BLD AUTO: 4.2 K/UL
NEUTROPHILS NFR BLD: 66 %
NRBC BLD-RTO: 0 /100 WBC
PHOSPHATE SERPL-MCNC: 3.3 MG/DL
PLATELET # BLD AUTO: 150 K/UL
PMV BLD AUTO: 12.5 FL
POTASSIUM SERPL-SCNC: 4.4 MMOL/L
PTH-INTACT SERPL-MCNC: 86 PG/ML
RBC # BLD AUTO: 4.81 M/UL
SODIUM SERPL-SCNC: 139 MMOL/L
WBC # BLD AUTO: 6.42 K/UL

## 2018-10-26 PROCEDURE — 82306 VITAMIN D 25 HYDROXY: CPT | Mod: HCWC

## 2018-10-26 PROCEDURE — 83970 ASSAY OF PARATHORMONE: CPT | Mod: HCWC

## 2018-10-26 PROCEDURE — 80069 RENAL FUNCTION PANEL: CPT | Mod: HCWC

## 2018-10-26 PROCEDURE — 36415 COLL VENOUS BLD VENIPUNCTURE: CPT | Mod: HCWC,PO

## 2018-10-26 PROCEDURE — 85025 COMPLETE CBC W/AUTO DIFF WBC: CPT | Mod: HCWC

## 2018-11-23 ENCOUNTER — CLINICAL SUPPORT (OUTPATIENT)
Dept: PULMONOLOGY | Facility: CLINIC | Age: 73
End: 2018-11-23
Payer: MEDICARE

## 2018-11-23 VITALS
WEIGHT: 258.19 LBS | HEIGHT: 71 IN | OXYGEN SATURATION: 96 % | SYSTOLIC BLOOD PRESSURE: 124 MMHG | HEART RATE: 61 BPM | BODY MASS INDEX: 36.15 KG/M2 | DIASTOLIC BLOOD PRESSURE: 59 MMHG

## 2018-11-23 DIAGNOSIS — J82.83 EOSINOPHILIC ASTHMA: Primary | ICD-10-CM

## 2018-11-23 PROCEDURE — 99999 PR PBB SHADOW E&M-EST. PATIENT-LVL IV: CPT | Mod: PBBFAC,HCWC,,

## 2018-11-23 RX ORDER — APIXABAN 5 MG/1
5 TABLET, FILM COATED ORAL 2 TIMES DAILY
COMMUNITY
Start: 2018-10-30

## 2018-11-23 NOTE — PROGRESS NOTES
Pt came in for 7th fasenra injection. Pt reports feeling congestion in nose/throat, but not in lungs. Small amount of mucous, no color noted. Pt states that he stil has not been using his nebulizer for rescue needs at all.

## 2018-12-04 ENCOUNTER — OFFICE VISIT (OUTPATIENT)
Dept: PULMONOLOGY | Facility: CLINIC | Age: 73
End: 2018-12-04
Payer: MEDICARE

## 2018-12-04 VITALS
HEART RATE: 61 BPM | WEIGHT: 255.5 LBS | HEIGHT: 71 IN | OXYGEN SATURATION: 95 % | BODY MASS INDEX: 35.77 KG/M2 | DIASTOLIC BLOOD PRESSURE: 59 MMHG | SYSTOLIC BLOOD PRESSURE: 130 MMHG

## 2018-12-04 DIAGNOSIS — J82.83 EOSINOPHILIC ASTHMA: Primary | ICD-10-CM

## 2018-12-04 DIAGNOSIS — G47.33 OSA ON CPAP: ICD-10-CM

## 2018-12-04 DIAGNOSIS — J45.50 SEVERE PERSISTENT ASTHMA, UNSPECIFIED WHETHER COMPLICATED: ICD-10-CM

## 2018-12-04 DIAGNOSIS — R09.89 CHRONIC SINUS COMPLAINTS: ICD-10-CM

## 2018-12-04 PROCEDURE — 99999 PR PBB SHADOW E&M-EST. PATIENT-LVL IV: CPT | Mod: PBBFAC,HCWC,, | Performed by: INTERNAL MEDICINE

## 2018-12-04 PROCEDURE — 3075F SYST BP GE 130 - 139MM HG: CPT | Mod: CPTII,HCWC,S$GLB, | Performed by: INTERNAL MEDICINE

## 2018-12-04 PROCEDURE — 1101F PT FALLS ASSESS-DOCD LE1/YR: CPT | Mod: CPTII,HCWC,S$GLB, | Performed by: INTERNAL MEDICINE

## 2018-12-04 PROCEDURE — 3078F DIAST BP <80 MM HG: CPT | Mod: CPTII,HCWC,S$GLB, | Performed by: INTERNAL MEDICINE

## 2018-12-04 PROCEDURE — 99499 UNLISTED E&M SERVICE: CPT | Mod: HCNC,S$GLB,, | Performed by: INTERNAL MEDICINE

## 2018-12-04 PROCEDURE — 99214 OFFICE O/P EST MOD 30 MIN: CPT | Mod: HCWC,S$GLB,, | Performed by: INTERNAL MEDICINE
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
## 2018-12-04 NOTE — PATIENT INSTRUCTIONS
flonase 2 each side daily bedtime,  Use afrin  1st only at bedtime.    Use some coverage over nose sore to prevent shearing.    Could try tapering down trelegy.      singulair needed for lungs and sinus.

## 2019-01-02 ENCOUNTER — LAB VISIT (OUTPATIENT)
Dept: LAB | Facility: HOSPITAL | Age: 74
End: 2019-01-02
Attending: UROLOGY
Payer: MEDICARE

## 2019-01-02 ENCOUNTER — OFFICE VISIT (OUTPATIENT)
Dept: UROLOGY | Facility: CLINIC | Age: 74
End: 2019-01-02
Payer: MEDICARE

## 2019-01-02 VITALS — HEIGHT: 72 IN | BODY MASS INDEX: 33.72 KG/M2 | WEIGHT: 249 LBS | TEMPERATURE: 98 F

## 2019-01-02 DIAGNOSIS — N40.0 BPH WITHOUT URINARY OBSTRUCTION: ICD-10-CM

## 2019-01-02 DIAGNOSIS — N39.41 URGE URINARY INCONTINENCE: Primary | ICD-10-CM

## 2019-01-02 LAB
BILIRUB SERPL-MCNC: NEGATIVE MG/DL
BLOOD URINE, POC: NEGATIVE
COLOR, POC UA: NORMAL
COMPLEXED PSA SERPL-MCNC: 3.7 NG/ML
GLUCOSE UR QL STRIP: NEGATIVE
KETONES UR QL STRIP: NEGATIVE
LEUKOCYTE ESTERASE URINE, POC: NEGATIVE
NITRITE, POC UA: NEGATIVE
PH, POC UA: 5
PROTEIN, POC: NEGATIVE
SPECIFIC GRAVITY, POC UA: 1015
UROBILINOGEN, POC UA: NEGATIVE

## 2019-01-02 PROCEDURE — 3288F PR FALLS RISK ASSESSMENT DOCUMENTED: ICD-10-PCS | Mod: CPTII,HCWC,S$GLB, | Performed by: UROLOGY

## 2019-01-02 PROCEDURE — 1100F PR PT FALLS ASSESS DOC 2+ FALLS/FALL W/INJURY/YR: ICD-10-PCS | Mod: CPTII,HCWC,S$GLB, | Performed by: UROLOGY

## 2019-01-02 PROCEDURE — 81002 POCT URINE DIPSTICK WITHOUT MICROSCOPE: ICD-10-PCS | Mod: HCWC,S$GLB,, | Performed by: UROLOGY

## 2019-01-02 PROCEDURE — 99999 PR PBB SHADOW E&M-EST. PATIENT-LVL II: CPT | Mod: PBBFAC,HCWC,, | Performed by: UROLOGY

## 2019-01-02 PROCEDURE — 99999 PR PBB SHADOW E&M-EST. PATIENT-LVL II: ICD-10-PCS | Mod: PBBFAC,HCWC,, | Performed by: UROLOGY

## 2019-01-02 PROCEDURE — 99214 OFFICE O/P EST MOD 30 MIN: CPT | Mod: HCWC,25,S$GLB, | Performed by: UROLOGY

## 2019-01-02 PROCEDURE — 36415 COLL VENOUS BLD VENIPUNCTURE: CPT | Mod: HCWC

## 2019-01-02 PROCEDURE — 81002 URINALYSIS NONAUTO W/O SCOPE: CPT | Mod: HCWC,S$GLB,, | Performed by: UROLOGY

## 2019-01-02 PROCEDURE — 3288F FALL RISK ASSESSMENT DOCD: CPT | Mod: CPTII,HCWC,S$GLB, | Performed by: UROLOGY

## 2019-01-02 PROCEDURE — 1100F PTFALLS ASSESS-DOCD GE2>/YR: CPT | Mod: CPTII,HCWC,S$GLB, | Performed by: UROLOGY

## 2019-01-02 PROCEDURE — 84153 ASSAY OF PSA TOTAL: CPT | Mod: HCWC

## 2019-01-02 PROCEDURE — 99214 PR OFFICE/OUTPT VISIT, EST, LEVL IV, 30-39 MIN: ICD-10-PCS | Mod: HCWC,25,S$GLB, | Performed by: UROLOGY

## 2019-01-02 NOTE — PROGRESS NOTES
Subjective:       Patient ID: Lenny Lopez is a 73 y.o. male.    Chief Complaint:   OFFICE NOTE    DATE OF VISIT:  01/02/2019.    CHIEF COMPLAINT:  Urge urinary incontinence, history of BPH, history of  kidney stones.    HISTORY OF PRESENT ILLNESS:  Mr. Lopez is a 72-year-old male last seen  by Ms. Matilda Li in December 2017.  The patient underwent a lithotripsy  on March 2014 for kidney stones.  At the present time, the patient is  taking Flomax daily.  The patient referred that he has significant urinary  urge incontinence, he cannot make it to the bathroom to empty his bladder  and occasionally he gets wet.  He has nocturia only x1.  No dysuria and no  hematuria.  The flow sometimes is adequate, sometimes is restricted.  He  has the sensation that he empties the bladder satisfactorily.  Due to his  urge incontinence, his quality of life has significantly been affected.    Past medical and surgical history is very significant apart from the kidney  stones.  The patient has developed arteriosclerotic cardiovascular disease.   A pacemaker has been placed and has atrial fibrillation for which he has  been anticoagulated.  The patient also suffered of severe COPD.  The  remainder of the medical and surgical history are well documented in the  medical record and were reviewed by me during this visit.    The postvoid residual urine was measured and was found to be less than 30  mL.  The last PSA was in November 2017, 1.7.  The urinalysis today is  within normal limits.        EOR/IN dd: 01/02/2019 16:28:02 (CST)   td: 01/02/2019 17:07:46 (CST)  Doc ID #[id number]   Job ID #[id number]    CC:            HPI  Review of Systems   Constitutional: Negative for activity change and appetite change.        Morbid Obese 249 lb   HENT: Negative.    Eyes: Negative for discharge.   Respiratory: Negative for cough and shortness of breath.    Cardiovascular: Positive for leg swelling. Negative for chest pain and palpitations.    Gastrointestinal: Negative for abdominal distention, abdominal pain, constipation and vomiting.   Genitourinary: Negative for discharge, dysuria, flank pain, frequency, hematuria, testicular pain and urgency.   Musculoskeletal: Positive for arthralgias.   Skin: Negative for rash.   Neurological: Negative for dizziness.   Psychiatric/Behavioral: The patient is not nervous/anxious.        Objective:      Physical Exam   Constitutional: He appears well-developed and well-nourished.   HENT:   Head: Normocephalic.   Eyes: Pupils are equal, round, and reactive to light.   Neck: Normal range of motion.   Cardiovascular: Normal rate.    Pulmonary/Chest: Effort normal.   Abdominal: Soft. He exhibits no distension and no mass. There is no tenderness.   Genitourinary: Rectum normal, prostate normal and penis normal. Rectal exam shows no external hemorrhoid, no mass and no tenderness. Prostate is not enlarged and not tender. Right testis shows no mass and no tenderness. Left testis shows no mass and no tenderness.         Musculoskeletal: Normal range of motion.   Neurological: He is alert.   Skin: Skin is warm.     Psychiatric: He has a normal mood and affect.       Assessment:       1. Urge urinary incontinence    2. BPH without urinary obstruction        Plan:       Urge urinary incontinence    BPH without urinary obstruction  -     POCT URINE DIPSTICK WITHOUT MICROSCOPE  -     Prostate Specific Antigen, Diagnostic; Future; Expected date: 01/02/2019    Other orders  -     mirabegron (MYRBETRIQ) 50 mg Tb24; Take 1 tablet (50 mg total) by mouth once daily.  Dispense: 30 tablet; Refill: 11    RTC in 2 mo.

## 2019-01-04 DIAGNOSIS — J82.83 EOSINOPHILIC ASTHMA: Primary | ICD-10-CM

## 2019-01-04 RX ORDER — BENRALIZUMAB 30 MG/ML
30 INJECTION, SOLUTION SUBCUTANEOUS
Qty: 1 SYRINGE | Refills: 6 | Status: SHIPPED | OUTPATIENT
Start: 2019-01-04 | End: 2019-11-18 | Stop reason: SDUPTHER

## 2019-01-07 ENCOUNTER — TELEPHONE (OUTPATIENT)
Dept: PHARMACY | Facility: CLINIC | Age: 74
End: 2019-01-07

## 2019-01-09 NOTE — TELEPHONE ENCOUNTER
DOCUMENTATION ONLY:  Prior Authorization for Fasenra approved from 01/11/2019 to 01/17/2020    Case Id: [id number]    Co-pay: $1,012.91    Patient Assistance IS required  ANABELLE      DOCUMENTATION ONLY  Submitted re-authorization request for Fasenra to insurance on 01/09 4:09pm. ANABELLE

## 2019-01-15 ENCOUNTER — TELEPHONE (OUTPATIENT)
Dept: PHARMACY | Facility: CLINIC | Age: 74
End: 2019-01-15

## 2019-01-15 NOTE — TELEPHONE ENCOUNTER
Initial Fasenra consult declined on 1/15/19. Patient is existing to therapy (8th dose). Fasenra will sent via  to provider's office on . $0 copay. Fasenra next dose date planned for 19. Address confirmed. Confirmed 2 patient identifiers - name and . Therapy Appropriate.    Will be delivered to  Attn: Lalitha Mora- Dr Nathaniel Allan  [address]  [address]  [place] [address]    Last month had to take a couple nebulizer treatments. No rescue inhaler. Uses a pill box loaded by his grandson.     Patient was counseled on the administration directions:  · To be injected by providers office - Inject 30mg subcutaneously every 4 weeks for the first 3 doses and every 8 weeks thereafter.     Patient denied any injection site reactions or other side effects.       DDIs: medication list reviewed, no DDI's with Fasenra upon initial review.       All questions answered and addressed to patients satisfaction. [facility] will f/u with patient 1 week after start and [facility] to contact patient in 3 weeks for refills. Patient verbalized understanding.      Radha Singleton, PharmD  Specialty Pharmacy Clinical Pharmacist  [facility]  P: [phone]

## 2019-01-18 DIAGNOSIS — J82.83 EOSINOPHILIC ASTHMA: Primary | ICD-10-CM

## 2019-01-25 ENCOUNTER — CLINICAL SUPPORT (OUTPATIENT)
Dept: PULMONOLOGY | Facility: CLINIC | Age: 74
End: 2019-01-25
Payer: MEDICARE

## 2019-01-25 VITALS
BODY MASS INDEX: 34.22 KG/M2 | SYSTOLIC BLOOD PRESSURE: 135 MMHG | HEIGHT: 72 IN | OXYGEN SATURATION: 95 % | DIASTOLIC BLOOD PRESSURE: 65 MMHG | HEART RATE: 61 BPM | WEIGHT: 252.63 LBS

## 2019-01-25 DIAGNOSIS — J82.83 EOSINOPHILIC ASTHMA: Primary | ICD-10-CM

## 2019-01-25 PROCEDURE — 99999 PR PBB SHADOW E&M-EST. PATIENT-LVL IV: ICD-10-PCS | Mod: PBBFAC,HCNC,,

## 2019-01-25 PROCEDURE — 99999 PR PBB SHADOW E&M-EST. PATIENT-LVL IV: CPT | Mod: PBBFAC,HCNC,,

## 2019-01-25 NOTE — PROGRESS NOTES
Pt states he has been feeling well, but has has some congestion recently (about a week) but no other complaints.

## 2019-02-07 PROBLEM — R76.8 RHEUMATOID FACTOR POSITIVE: Status: ACTIVE | Noted: 2017-03-08

## 2019-03-04 ENCOUNTER — TELEPHONE (OUTPATIENT)
Dept: UROLOGY | Facility: CLINIC | Age: 74
End: 2019-03-04

## 2019-03-04 NOTE — TELEPHONE ENCOUNTER
Rescheduled pts appt due to Dr. Zaragoza being out of office. Provided pt with the [facility] address.

## 2019-03-04 NOTE — TELEPHONE ENCOUNTER
----- Message from Radha Pitts sent at 3/4/2019  3:10 PM CST -----  Contact: self   Patient want to speak with a nurse regarding getting his March 6th appointment back patient said he cancelled by accident I adv patient what we had available refuse to take later appointment please call back at [phone] (home)

## 2019-03-07 ENCOUNTER — OFFICE VISIT (OUTPATIENT)
Dept: UROLOGY | Facility: CLINIC | Age: 74
End: 2019-03-07
Payer: MEDICARE

## 2019-03-07 VITALS
BODY MASS INDEX: 36.91 KG/M2 | TEMPERATURE: 98 F | HEIGHT: 70 IN | HEART RATE: 83 BPM | DIASTOLIC BLOOD PRESSURE: 71 MMHG | SYSTOLIC BLOOD PRESSURE: 119 MMHG | WEIGHT: 257.81 LBS

## 2019-03-07 DIAGNOSIS — N32.81 OAB (OVERACTIVE BLADDER): Primary | ICD-10-CM

## 2019-03-07 LAB
BILIRUB SERPL-MCNC: NEGATIVE MG/DL
BLOOD URINE, POC: NEGATIVE
COLOR, POC UA: NORMAL
GLUCOSE UR QL STRIP: NEGATIVE
KETONES UR QL STRIP: NEGATIVE
LEUKOCYTE ESTERASE URINE, POC: NEGATIVE
NITRITE, POC UA: NEGATIVE
PH, POC UA: 5
PROTEIN, POC: NEGATIVE
SPECIFIC GRAVITY, POC UA: 1005
UROBILINOGEN, POC UA: NEGATIVE

## 2019-03-07 PROCEDURE — 3078F DIAST BP <80 MM HG: CPT | Mod: HCWC,CPTII,S$GLB, | Performed by: UROLOGY

## 2019-03-07 PROCEDURE — 3078F PR MOST RECENT DIASTOLIC BLOOD PRESSURE < 80 MM HG: ICD-10-PCS | Mod: HCWC,CPTII,S$GLB, | Performed by: UROLOGY

## 2019-03-07 PROCEDURE — 99999 PR PBB SHADOW E&M-EST. PATIENT-LVL III: ICD-10-PCS | Mod: PBBFAC,HCWC,, | Performed by: UROLOGY

## 2019-03-07 PROCEDURE — 81002 POCT URINE DIPSTICK WITHOUT MICROSCOPE: ICD-10-PCS | Mod: HCWC,S$GLB,, | Performed by: UROLOGY

## 2019-03-07 PROCEDURE — 99213 OFFICE O/P EST LOW 20 MIN: CPT | Mod: HCWC,25,S$GLB, | Performed by: UROLOGY

## 2019-03-07 PROCEDURE — 1101F PR PT FALLS ASSESS DOC 0-1 FALLS W/OUT INJ PAST YR: ICD-10-PCS | Mod: HCWC,CPTII,S$GLB, | Performed by: UROLOGY

## 2019-03-07 PROCEDURE — 99213 PR OFFICE/OUTPT VISIT, EST, LEVL III, 20-29 MIN: ICD-10-PCS | Mod: HCWC,25,S$GLB, | Performed by: UROLOGY

## 2019-03-07 PROCEDURE — 3074F SYST BP LT 130 MM HG: CPT | Mod: HCWC,CPTII,S$GLB, | Performed by: UROLOGY

## 2019-03-07 PROCEDURE — 99999 PR PBB SHADOW E&M-EST. PATIENT-LVL III: CPT | Mod: PBBFAC,HCWC,, | Performed by: UROLOGY

## 2019-03-07 PROCEDURE — 81002 URINALYSIS NONAUTO W/O SCOPE: CPT | Mod: HCWC,S$GLB,, | Performed by: UROLOGY

## 2019-03-07 PROCEDURE — 3074F PR MOST RECENT SYSTOLIC BLOOD PRESSURE < 130 MM HG: ICD-10-PCS | Mod: HCWC,CPTII,S$GLB, | Performed by: UROLOGY

## 2019-03-07 PROCEDURE — 1101F PT FALLS ASSESS-DOCD LE1/YR: CPT | Mod: HCWC,CPTII,S$GLB, | Performed by: UROLOGY

## 2019-03-07 NOTE — PROGRESS NOTES
DATE OF VISIT:  03/07/2019.    CHIEF COMPLAINT:  Overactive bladder.    Mr. Lopez is a 74-year-old male who was last seen in our office on  01/20/2019.  The patient had a history of urgency and urge incontinence.   The patient also is taking Flomax for mild BPH.  At the present time,   the patient was placed on Myrbetriq 50 mg daily and he will request a  followup visit in order to determine if we have any improvement of his  symptoms.  He refers that the urgency and urge incontinence have  significantly improved and still the patient has mild urinary frequency.   The patient denies any side effects from the medication and the patient  refers that he has no dysuria or gross hematuria.  He has nocturia only x1.   The remaining of the medical and surgical history has not changed in the  last two months and the patient is satisfied with the results of that  therapy and the lack of side effects.  It is to be noted that today's  urinalysis is normal and the last PSA that was done on 01/02/2019 was 3.7.    I explained to the patient that Myrbetriq if he keeps taking it, he is  going to probably improve some even more and decrease his urinary frequency  if we give enough time to the medication to act.  He agreed for that.  All  the questions were answered at his satisfaction and he is going to keep  taking that medication for the next year and have a followup appointment  with us in a year.  I spent approximately 15 minutes with Mr. Lopez all  the time was spent on counseling and he left the office in satisfactory  condition.          EOR/HN dd: 03/07/2019 11:57:02 (CST)   td: 03/07/2019 14:27:12 (CST)  Doc ID #[id number]   Job ID #[id number]    CC:

## 2019-03-20 ENCOUNTER — TELEPHONE (OUTPATIENT)
Dept: UROLOGY | Facility: CLINIC | Age: 74
End: 2019-03-20

## 2019-03-20 NOTE — TELEPHONE ENCOUNTER
Informed pt of our business hours and when the provider is here per request. Pt verbalized understanding.

## 2019-03-20 NOTE — TELEPHONE ENCOUNTER
----- Message from Yina Wilson sent at 3/20/2019  9:39 AM CDT -----  Contact: Lenny  Type: Needs Medical Advice    Who Called:  Lenny  Best Call Back Number: [phone]  Additional Information: Lenny alvarez'd he was supposed to spk to Erika regarding picking up samples of meds. Pls call pt to adv of times to be picked up

## 2019-03-22 ENCOUNTER — CLINICAL SUPPORT (OUTPATIENT)
Dept: PULMONOLOGY | Facility: CLINIC | Age: 74
End: 2019-03-22
Payer: MEDICARE

## 2019-03-22 VITALS
HEIGHT: 70 IN | HEART RATE: 60 BPM | SYSTOLIC BLOOD PRESSURE: 138 MMHG | OXYGEN SATURATION: 97 % | WEIGHT: 257.69 LBS | BODY MASS INDEX: 36.89 KG/M2 | DIASTOLIC BLOOD PRESSURE: 66 MMHG

## 2019-03-22 DIAGNOSIS — J82.83 EOSINOPHILIC ASTHMA: Primary | ICD-10-CM

## 2019-03-22 PROCEDURE — 99999 PR PBB SHADOW E&M-EST. PATIENT-LVL IV: ICD-10-PCS | Mod: PBBFAC,,,

## 2019-03-22 PROCEDURE — 99999 PR PBB SHADOW E&M-EST. PATIENT-LVL IV: CPT | Mod: PBBFAC,,,

## 2019-03-26 DIAGNOSIS — I25.10 CORONARY ARTERY DISEASE, ANGINA PRESENCE UNSPECIFIED, UNSPECIFIED VESSEL OR LESION TYPE, UNSPECIFIED WHETHER NATIVE OR TRANSPLANTED HEART: ICD-10-CM

## 2019-03-26 DIAGNOSIS — I77.9 BILATERAL CAROTID ARTERY DISEASE: ICD-10-CM

## 2019-03-26 RX ORDER — POTASSIUM CHLORIDE 750 MG/1
TABLET, EXTENDED RELEASE ORAL
Qty: 180 TABLET | Refills: 0 | Status: SHIPPED | OUTPATIENT
Start: 2019-03-26 | End: 2019-09-18 | Stop reason: SDUPTHER

## 2019-03-26 RX ORDER — FUROSEMIDE 40 MG/1
TABLET ORAL
Qty: 90 TABLET | Refills: 0 | Status: SHIPPED | OUTPATIENT
Start: 2019-03-26 | End: 2019-09-18 | Stop reason: SDUPTHER

## 2019-03-27 ENCOUNTER — OFFICE VISIT (OUTPATIENT)
Dept: FAMILY MEDICINE | Facility: CLINIC | Age: 74
End: 2019-03-27
Payer: MEDICARE

## 2019-03-27 VITALS
BODY MASS INDEX: 36.61 KG/M2 | HEART RATE: 62 BPM | HEIGHT: 70 IN | SYSTOLIC BLOOD PRESSURE: 128 MMHG | TEMPERATURE: 98 F | WEIGHT: 255.75 LBS | DIASTOLIC BLOOD PRESSURE: 70 MMHG

## 2019-03-27 DIAGNOSIS — I48.91 ATRIAL FIBRILLATION, UNSPECIFIED TYPE: ICD-10-CM

## 2019-03-27 DIAGNOSIS — J82.83 EOSINOPHILIC ASTHMA: ICD-10-CM

## 2019-03-27 DIAGNOSIS — I77.9 BILATERAL CAROTID ARTERY DISEASE, UNSPECIFIED TYPE: ICD-10-CM

## 2019-03-27 DIAGNOSIS — J84.10 CALCIFIED GRANULOMA OF LUNG: ICD-10-CM

## 2019-03-27 DIAGNOSIS — I25.118 CORONARY ARTERY DISEASE OF NATIVE ARTERY OF NATIVE HEART WITH STABLE ANGINA PECTORIS: ICD-10-CM

## 2019-03-27 DIAGNOSIS — E11.69 HYPERLIPIDEMIA ASSOCIATED WITH TYPE 2 DIABETES MELLITUS: ICD-10-CM

## 2019-03-27 DIAGNOSIS — I15.2 HYPERTENSION ASSOCIATED WITH DIABETES: ICD-10-CM

## 2019-03-27 DIAGNOSIS — N18.30 CKD (CHRONIC KIDNEY DISEASE) STAGE 3, GFR 30-59 ML/MIN: ICD-10-CM

## 2019-03-27 DIAGNOSIS — E11.3293 MILD NONPROLIFERATIVE DIABETIC RETINOPATHY OF BOTH EYES ASSOCIATED WITH TYPE 2 DIABETES MELLITUS, MACULAR EDEMA PRESENCE UNSPECIFIED: ICD-10-CM

## 2019-03-27 DIAGNOSIS — Z72.0 CHEWS TOBACCO REGULARLY: ICD-10-CM

## 2019-03-27 DIAGNOSIS — E66.01 SEVERE OBESITY (BMI 35.0-39.9) WITH COMORBIDITY: ICD-10-CM

## 2019-03-27 DIAGNOSIS — Z00.00 ENCOUNTER FOR PREVENTIVE HEALTH EXAMINATION: Primary | ICD-10-CM

## 2019-03-27 DIAGNOSIS — E11.29 TYPE 2 DIABETES MELLITUS WITH MICROALBUMINURIA, WITHOUT LONG-TERM CURRENT USE OF INSULIN: ICD-10-CM

## 2019-03-27 DIAGNOSIS — G47.33 OSA ON CPAP: ICD-10-CM

## 2019-03-27 DIAGNOSIS — R80.9 TYPE 2 DIABETES MELLITUS WITH MICROALBUMINURIA, WITHOUT LONG-TERM CURRENT USE OF INSULIN: ICD-10-CM

## 2019-03-27 DIAGNOSIS — M10.9 GOUT, UNSPECIFIED CAUSE, UNSPECIFIED CHRONICITY, UNSPECIFIED SITE: ICD-10-CM

## 2019-03-27 DIAGNOSIS — M19.90 OSTEOARTHRITIS, UNSPECIFIED OSTEOARTHRITIS TYPE, UNSPECIFIED SITE: ICD-10-CM

## 2019-03-27 DIAGNOSIS — E78.5 HYPERLIPIDEMIA ASSOCIATED WITH TYPE 2 DIABETES MELLITUS: ICD-10-CM

## 2019-03-27 DIAGNOSIS — Z86.010 HISTORY OF COLON POLYPS: ICD-10-CM

## 2019-03-27 DIAGNOSIS — E11.59 HYPERTENSION ASSOCIATED WITH DIABETES: ICD-10-CM

## 2019-03-27 DIAGNOSIS — E21.3 HYPERPARATHYROIDISM: ICD-10-CM

## 2019-03-27 PROBLEM — R09.89 CHRONIC SINUS COMPLAINTS: Status: RESOLVED | Noted: 2017-04-11 | Resolved: 2019-03-27

## 2019-03-27 PROBLEM — Z95.0 S/P PLACEMENT OF CARDIAC PACEMAKER: Status: RESOLVED | Noted: 2018-05-31 | Resolved: 2019-03-27

## 2019-03-27 PROBLEM — R94.39 ABNORMAL CARDIOVASCULAR STRESS TEST: Status: RESOLVED | Noted: 2017-03-10 | Resolved: 2019-03-27

## 2019-03-27 PROBLEM — R60.0 LOCALIZED EDEMA: Status: RESOLVED | Noted: 2018-02-20 | Resolved: 2019-03-27

## 2019-03-27 PROBLEM — J45.50 SEVERE PERSISTENT ASTHMA: Status: RESOLVED | Noted: 2018-05-30 | Resolved: 2019-03-27

## 2019-03-27 PROCEDURE — 99499 UNLISTED E&M SERVICE: CPT | Mod: HCNC,S$GLB,, | Performed by: PHYSICIAN ASSISTANT

## 2019-03-27 PROCEDURE — 99999 PR PBB SHADOW E&M-EST. PATIENT-LVL V: CPT | Mod: PBBFAC,,, | Performed by: PHYSICIAN ASSISTANT

## 2019-03-27 PROCEDURE — 3078F PR MOST RECENT DIASTOLIC BLOOD PRESSURE < 80 MM HG: ICD-10-PCS | Mod: CPTII,S$GLB,, | Performed by: PHYSICIAN ASSISTANT

## 2019-03-27 PROCEDURE — 99999 PR PBB SHADOW E&M-EST. PATIENT-LVL V: ICD-10-PCS | Mod: PBBFAC,,, | Performed by: PHYSICIAN ASSISTANT

## 2019-03-27 PROCEDURE — G0439 PR MEDICARE ANNUAL WELLNESS SUBSEQUENT VISIT: ICD-10-PCS | Mod: S$GLB,,, | Performed by: PHYSICIAN ASSISTANT

## 2019-03-27 PROCEDURE — 3078F DIAST BP <80 MM HG: CPT | Mod: CPTII,S$GLB,, | Performed by: PHYSICIAN ASSISTANT

## 2019-03-27 PROCEDURE — G0439 PPPS, SUBSEQ VISIT: HCPCS | Mod: S$GLB,,, | Performed by: PHYSICIAN ASSISTANT

## 2019-03-27 PROCEDURE — 3074F PR MOST RECENT SYSTOLIC BLOOD PRESSURE < 130 MM HG: ICD-10-PCS | Mod: CPTII,S$GLB,, | Performed by: PHYSICIAN ASSISTANT

## 2019-03-27 PROCEDURE — 99499 RISK ADDL DX/OHS AUDIT: ICD-10-PCS | Mod: HCNC,S$GLB,, | Performed by: PHYSICIAN ASSISTANT

## 2019-03-27 PROCEDURE — 3074F SYST BP LT 130 MM HG: CPT | Mod: CPTII,S$GLB,, | Performed by: PHYSICIAN ASSISTANT

## 2019-03-27 NOTE — PROGRESS NOTES
"Lenny Lopez presented for a  Medicare AWV and comprehensive Health Risk Assessment today. The following components were reviewed and updated:    · Medical history  · Family History  · Social history  · Allergies and Current Medications  · Health Risk Assessment  · Health Maintenance  · Care Team     ** See Completed Assessments for Annual Wellness Visit within the encounter summary.**       The following assessments were completed:  · Living Situation  · CAGE  · Depression Screening  · Timed Get Up and Go  · Whisper Test  · Cognitive Function Screening  · Nutrition Screening  · ADL Screening  · PAQ Screening    Vitals:    03/27/19 1257   BP: 128/70   BP Location: Right arm   Patient Position: Sitting   BP Method: Medium (Automatic)   Pulse: 62   Temp: 98.3 °F (36.8 °C)   TempSrc: Oral   Weight: 116 kg (255 lb 11.7 oz)   Height: 5' 10" (1.778 m)     Body mass index is 36.69 kg/m².  Physical Exam   Constitutional: He is oriented to person, place, and time. He appears well-developed and well-nourished. No distress.   HENT:   Head: Normocephalic and atraumatic.   Neck: Normal range of motion. Neck supple. No JVD present.   Pulmonary/Chest: Effort normal.   Neurological: He is alert and oriented to person, place, and time.   Skin: He is not diaphoretic.               Diagnoses and health risks identified today and associated recommendations/orders:    Diagnoses and all orders for this visit:    Encounter for preventive health examination    Type 2 diabetes mellitus with microalbuminuria, without long-term current use of insulin  Comments:  Controlled, continue current regimen  Orders:  -     CBC auto differential; Future  -     Comprehensive metabolic panel; Future  -     Lipid panel; Future  -     Hemoglobin A1c; Future  -     Microalbumin/creatinine urine ratio; Future    Hypertension associated with diabetes  Comments:  Controlled, continue current regimen  Orders:  -     CBC auto differential; Future  -     " Comprehensive metabolic panel; Future  -     Lipid panel; Future  -     Hemoglobin A1c; Future  -     Microalbumin/creatinine urine ratio; Future    Severe obesity (BMI 35.0-39.9) with comorbidity  Comments:  Uncontrolled, encouraged diet modification and exercise    CKD (chronic kidney disease) stage 3, GFR 39.7 ml/min  Comments:  Stable, continue to monitor    Atrial fibrillation, unspecified type  Comments:  Stable, followed by cardiology    Hyperlipidemia associated with type 2 diabetes mellitus  Comments:  Controlled, continue current regimen  Orders:  -     CBC auto differential; Future  -     Comprehensive metabolic panel; Future  -     Lipid panel; Future  -     Hemoglobin A1c; Future  -     Microalbumin/creatinine urine ratio; Future    Calcified granuloma of lung  Comments:  Stable, continue to monitor    Bilateral carotid artery disease, unspecified type  Comments:  Stable, continue to monitor    Eosinophilic asthma  Comments:  Controlled, continue current regimen    Coronary artery disease of native artery of native heart with stable angina pectoris  Comments:  Controlled, continue current regimen    Hyperparathyroidism  Comments:  Controlled, continue current regimen    Gout, unspecified cause, unspecified chronicity, unspecified site  Comments:  Controlled, continue current regimen    LUZ on CPAP, 100% use, 2016  Comments:  Controlled, continue current regimen    Osteoarthritis, unspecified osteoarthritis type, unspecified site  Comments:  Controlled, continue current regimen    Chews tobacco regularly, about can a day  Comments:  Uncontrolled, counseled patient on tobacco cessation    History of colon polyps  Comments:  Colonoscopy referral ordered  Orders:  -     Ambulatory referral to Gastroenterology    Mild nonproliferative diabetic retinopathy of both eyes associated with type 2 diabetes mellitus, macular edema presence unspecified  Comments:  Eye exam referral ordered. Encouraged patient to  schedule appointment ASAP  Orders:  -     Ambulatory referral to Ophthalmology  -     Ambulatory referral to Optometry        Provided Lenny with a 5-10 year written screening schedule and personal prevention plan. Recommendations were developed using the USPSTF age appropriate recommendations. Education, counseling, and referrals were provided as needed. After Visit Summary printed and given to patient which includes a list of additional screenings\tests needed.    No follow-ups on file.    PRANAV Boone

## 2019-03-27 NOTE — Clinical Note
Primary Care Providers:Blue Shah MD, MD (General)Your patient was seen today for a HRA visit. Gap(s) in care (HEDIS gaps) have been identified during this visit that require additional testing and possible follow up.Orders Placed This Encounter    CBC auto differential        Standing Status: Future        Number of Occurrences: 1        Standing Expiration Date: 5/25/2020    Comprehensive metabolic panel        Standing Status: Future        Number of Occurrences: 1        Standing Expiration Date: 5/25/2020    Lipid panel        Standing Status: Future        Number of Occurrences: 1        Standing Expiration Date: 5/25/2020    Hemoglobin A1c        Standing Status: Future        Number of Occurrences: 1        Standing Expiration Date: 5/25/2020    Microalbumin/creatinine urine ratio        Standing Status: Future        Number of Occurrences: 1        Standing Expiration Date: 5/25/2020        Order Specific Question: Specimen Source        Answer

## 2019-03-27 NOTE — PROGRESS NOTES
I offered to discuss end of life issues, including information on how to make advance directives that the patient could use to name someone who would make medical decisions on their behalf if they became too ill to make themselves.    _X__Patient declined  __Patient is interested, I provided paper work and offered to discuss.

## 2019-03-27 NOTE — PATIENT INSTRUCTIONS
Counseling and Referral of Other Preventative  (Italic type indicates deductible and co-insurance are waived)    Patient Name: Lenny Lopez  Today's Date: 3/27/2019    Health Maintenance       Date Due Completion Date    Zoster Vaccine 01/13/2003 ---    Eye Exam 11/24/2018 11/24/2017    Lipid Panel 11/28/2018 11/28/2017    Colonoscopy 12/17/2018 12/17/2013    Hemoglobin A1c 12/26/2018 9/26/2018    Foot Exam 04/09/2019 4/9/2018    TETANUS VACCINE 05/12/2019 (Originally 1/13/1963) ---        No orders of the defined types were placed in this encounter.    The following information is provided to all patients.  This information is to help you find resources for any of the problems found today that may be affecting your health:                Living healthy guide: www.[facility].[place].gov      Understanding Diabetes: www.diabetes.org      Eating healthy: www.cdc.gov/healthyweight      [facility] home safety checklist: www.cdc.gov/steadi/patient.html      Agency on Aging: www.goea.[place].gov      Alcoholics anonymous (AA): www.aa.org      Physical Activity: www.mary beth.nih.gov/yu2hahw      Tobacco use: www.quitwithusla.org

## 2019-03-28 DIAGNOSIS — M79.671 RIGHT FOOT PAIN: ICD-10-CM

## 2019-03-28 DIAGNOSIS — M10.9 GOUT, UNSPECIFIED CAUSE, UNSPECIFIED CHRONICITY, UNSPECIFIED SITE: ICD-10-CM

## 2019-03-28 DIAGNOSIS — E79.0 ELEVATED URIC ACID IN BLOOD: ICD-10-CM

## 2019-03-28 RX ORDER — ALLOPURINOL 100 MG/1
200 TABLET ORAL DAILY
Qty: 180 TABLET | Refills: 0 | Status: SHIPPED | OUTPATIENT
Start: 2019-03-28 | End: 2021-02-18

## 2019-05-17 ENCOUNTER — CLINICAL SUPPORT (OUTPATIENT)
Dept: PULMONOLOGY | Facility: CLINIC | Age: 74
End: 2019-05-17
Payer: MEDICARE

## 2019-05-17 VITALS
BODY MASS INDEX: 37.46 KG/M2 | SYSTOLIC BLOOD PRESSURE: 130 MMHG | HEART RATE: 78 BPM | WEIGHT: 261.69 LBS | DIASTOLIC BLOOD PRESSURE: 68 MMHG | HEIGHT: 70 IN | OXYGEN SATURATION: 96 %

## 2019-05-17 DIAGNOSIS — J82.83 EOSINOPHILIC ASTHMA: ICD-10-CM

## 2019-05-17 DIAGNOSIS — J45.50 SEVERE PERSISTENT ASTHMA WITHOUT COMPLICATION: Primary | ICD-10-CM

## 2019-05-17 PROCEDURE — 99999 PR PBB SHADOW E&M-EST. PATIENT-LVL IV: ICD-10-PCS | Mod: PBBFAC,HCNC,,

## 2019-05-17 PROCEDURE — 99999 PR PBB SHADOW E&M-EST. PATIENT-LVL IV: CPT | Mod: PBBFAC,HCNC,,

## 2019-05-30 ENCOUNTER — OFFICE VISIT (OUTPATIENT)
Dept: PULMONOLOGY | Facility: CLINIC | Age: 74
End: 2019-05-30
Payer: MEDICARE

## 2019-05-30 VITALS
WEIGHT: 264.13 LBS | HEIGHT: 70 IN | SYSTOLIC BLOOD PRESSURE: 123 MMHG | OXYGEN SATURATION: 94 % | DIASTOLIC BLOOD PRESSURE: 70 MMHG | BODY MASS INDEX: 37.81 KG/M2 | HEART RATE: 75 BPM

## 2019-05-30 DIAGNOSIS — G89.29 CHRONIC PAIN OF LEFT KNEE: Primary | ICD-10-CM

## 2019-05-30 DIAGNOSIS — J82.83 EOSINOPHILIC ASTHMA: ICD-10-CM

## 2019-05-30 DIAGNOSIS — M25.562 CHRONIC PAIN OF LEFT KNEE: Primary | ICD-10-CM

## 2019-05-30 DIAGNOSIS — G47.33 OSA ON CPAP: ICD-10-CM

## 2019-05-30 PROCEDURE — 1101F PT FALLS ASSESS-DOCD LE1/YR: CPT | Mod: HCNC,CPTII,S$GLB, | Performed by: NURSE PRACTITIONER

## 2019-05-30 PROCEDURE — 3074F PR MOST RECENT SYSTOLIC BLOOD PRESSURE < 130 MM HG: ICD-10-PCS | Mod: HCNC,CPTII,S$GLB, | Performed by: NURSE PRACTITIONER

## 2019-05-30 PROCEDURE — 3078F PR MOST RECENT DIASTOLIC BLOOD PRESSURE < 80 MM HG: ICD-10-PCS | Mod: HCNC,CPTII,S$GLB, | Performed by: NURSE PRACTITIONER

## 2019-05-30 PROCEDURE — 99999 PR PBB SHADOW E&M-EST. PATIENT-LVL V: ICD-10-PCS | Mod: PBBFAC,HCNC,, | Performed by: NURSE PRACTITIONER

## 2019-05-30 PROCEDURE — 99499 UNLISTED E&M SERVICE: CPT | Mod: HCNC,S$GLB,, | Performed by: NURSE PRACTITIONER

## 2019-05-30 PROCEDURE — 99213 OFFICE O/P EST LOW 20 MIN: CPT | Mod: HCNC,S$GLB,, | Performed by: NURSE PRACTITIONER

## 2019-05-30 PROCEDURE — 1101F PR PT FALLS ASSESS DOC 0-1 FALLS W/OUT INJ PAST YR: ICD-10-PCS | Mod: HCNC,CPTII,S$GLB, | Performed by: NURSE PRACTITIONER

## 2019-05-30 PROCEDURE — 99999 PR PBB SHADOW E&M-EST. PATIENT-LVL V: CPT | Mod: PBBFAC,HCNC,, | Performed by: NURSE PRACTITIONER

## 2019-05-30 PROCEDURE — 3074F SYST BP LT 130 MM HG: CPT | Mod: HCNC,CPTII,S$GLB, | Performed by: NURSE PRACTITIONER

## 2019-05-30 PROCEDURE — 3078F DIAST BP <80 MM HG: CPT | Mod: HCNC,CPTII,S$GLB, | Performed by: NURSE PRACTITIONER

## 2019-05-30 PROCEDURE — 99499 RISK ADDL DX/OHS AUDIT: ICD-10-PCS | Mod: HCNC,S$GLB,, | Performed by: NURSE PRACTITIONER

## 2019-05-30 PROCEDURE — 99213 PR OFFICE/OUTPT VISIT, EST, LEVL III, 20-29 MIN: ICD-10-PCS | Mod: HCNC,S$GLB,, | Performed by: NURSE PRACTITIONER

## 2019-05-30 RX ORDER — DICLOFENAC SODIUM 10 MG/G
2 GEL TOPICAL DAILY
Qty: 100 G | Refills: 2 | Status: SHIPPED | OUTPATIENT
Start: 2019-05-30 | End: 2020-08-11
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
## 2019-05-31 ENCOUNTER — TELEPHONE (OUTPATIENT)
Dept: ORTHOPEDICS | Facility: CLINIC | Age: 74
End: 2019-05-31

## 2019-06-01 ENCOUNTER — LAB VISIT (OUTPATIENT)
Dept: LAB | Facility: HOSPITAL | Age: 74
End: 2019-06-01
Attending: INTERNAL MEDICINE
Payer: MEDICARE

## 2019-06-01 DIAGNOSIS — R80.9 PROTEINURIA: ICD-10-CM

## 2019-06-01 DIAGNOSIS — E11.22 TYPE 2 DIABETES MELLITUS WITH END-STAGE RENAL DISEASE: ICD-10-CM

## 2019-06-01 DIAGNOSIS — E55.9 AVITAMINOSIS D: ICD-10-CM

## 2019-06-01 DIAGNOSIS — M10.9 GOUT, UNSPECIFIED: ICD-10-CM

## 2019-06-01 DIAGNOSIS — I10 ESSENTIAL HYPERTENSION, MALIGNANT: ICD-10-CM

## 2019-06-01 DIAGNOSIS — R60.9 EDEMA: ICD-10-CM

## 2019-06-01 DIAGNOSIS — N18.30 CHRONIC KIDNEY DISEASE, STAGE III (MODERATE): ICD-10-CM

## 2019-06-01 DIAGNOSIS — N18.9 ANEMIA OF CHRONIC RENAL FAILURE: Primary | ICD-10-CM

## 2019-06-01 DIAGNOSIS — N18.6 TYPE 2 DIABETES MELLITUS WITH END-STAGE RENAL DISEASE: ICD-10-CM

## 2019-06-01 DIAGNOSIS — D63.1 ANEMIA OF CHRONIC RENAL FAILURE: Primary | ICD-10-CM

## 2019-06-01 LAB
ALBUMIN SERPL BCP-MCNC: 3.7 G/DL (ref 3.5–5.2)
ANION GAP SERPL CALC-SCNC: 15 MMOL/L (ref 8–16)
BASOPHILS # BLD AUTO: 0 K/UL (ref 0–0.2)
BASOPHILS NFR BLD: 0 % (ref 0–1.9)
BUN SERPL-MCNC: 27 MG/DL (ref 8–23)
CALCIUM SERPL-MCNC: 9.9 MG/DL (ref 8.7–10.5)
CHLORIDE SERPL-SCNC: 102 MMOL/L (ref 95–110)
CO2 SERPL-SCNC: 24 MMOL/L (ref 23–29)
CREAT SERPL-MCNC: 1.3 MG/DL (ref 0.5–1.4)
DIFFERENTIAL METHOD: ABNORMAL
EOSINOPHIL # BLD AUTO: 0 K/UL (ref 0–0.5)
EOSINOPHIL NFR BLD: 0 % (ref 0–8)
ERYTHROCYTE [DISTWIDTH] IN BLOOD BY AUTOMATED COUNT: 13.2 % (ref 11.5–14.5)
EST. GFR  (AFRICAN AMERICAN): >60 ML/MIN/1.73 M^2
EST. GFR  (NON AFRICAN AMERICAN): 53 ML/MIN/1.73 M^2
GLUCOSE SERPL-MCNC: 150 MG/DL (ref 70–110)
HCT VFR BLD AUTO: 43.7 % (ref 40–54)
HGB BLD-MCNC: 13.7 G/DL (ref 14–18)
IMM GRANULOCYTES # BLD AUTO: 0.02 K/UL (ref 0–0.04)
IMM GRANULOCYTES NFR BLD AUTO: 0.3 % (ref 0–0.5)
LYMPHOCYTES # BLD AUTO: 1.7 K/UL (ref 1–4.8)
LYMPHOCYTES NFR BLD: 28 % (ref 18–48)
MCH RBC QN AUTO: 29.5 PG (ref 27–31)
MCHC RBC AUTO-ENTMCNC: 31.4 G/DL (ref 32–36)
MCV RBC AUTO: 94 FL (ref 82–98)
MONOCYTES # BLD AUTO: 0.7 K/UL (ref 0.3–1)
MONOCYTES NFR BLD: 11.2 % (ref 4–15)
NEUTROPHILS # BLD AUTO: 3.7 K/UL (ref 1.8–7.7)
NEUTROPHILS NFR BLD: 60.5 % (ref 38–73)
NRBC BLD-RTO: 0 /100 WBC
PHOSPHATE SERPL-MCNC: 3.8 MG/DL (ref 2.7–4.5)
PLATELET # BLD AUTO: 162 K/UL (ref 150–350)
PMV BLD AUTO: 12.2 FL (ref 9.2–12.9)
POTASSIUM SERPL-SCNC: 4.2 MMOL/L (ref 3.5–5.1)
RBC # BLD AUTO: 4.64 M/UL (ref 4.6–6.2)
SODIUM SERPL-SCNC: 141 MMOL/L (ref 136–145)
WBC # BLD AUTO: 6.15 K/UL (ref 3.9–12.7)

## 2019-06-01 PROCEDURE — 36415 COLL VENOUS BLD VENIPUNCTURE: CPT | Mod: HCNC,PO

## 2019-06-01 PROCEDURE — 85025 COMPLETE CBC W/AUTO DIFF WBC: CPT | Mod: HCNC

## 2019-06-01 PROCEDURE — 80069 RENAL FUNCTION PANEL: CPT | Mod: HCNC

## 2019-06-03 ENCOUNTER — HOSPITAL ENCOUNTER (OUTPATIENT)
Dept: RADIOLOGY | Facility: HOSPITAL | Age: 74
Discharge: HOME | End: 2019-06-03
Attending: ORTHOPAEDIC SURGERY
Payer: MEDICARE

## 2019-06-03 ENCOUNTER — OFFICE VISIT (OUTPATIENT)
Dept: ORTHOPEDICS | Facility: CLINIC | Age: 74
End: 2019-06-03
Payer: MEDICARE

## 2019-06-03 VITALS — RESPIRATION RATE: 20 BRPM | WEIGHT: 264.13 LBS | BODY MASS INDEX: 37.81 KG/M2 | HEIGHT: 70 IN

## 2019-06-03 DIAGNOSIS — Z98.890 H/O LEFT KNEE SURGERY: ICD-10-CM

## 2019-06-03 DIAGNOSIS — M25.569 KNEE PAIN, UNSPECIFIED CHRONICITY, UNSPECIFIED LATERALITY: ICD-10-CM

## 2019-06-03 DIAGNOSIS — M17.12 ARTHRITIS OF LEFT KNEE: ICD-10-CM

## 2019-06-03 DIAGNOSIS — M23.52 RECURRENT LEFT KNEE INSTABILITY: Primary | ICD-10-CM

## 2019-06-03 DIAGNOSIS — M25.569 KNEE PAIN, UNSPECIFIED CHRONICITY, UNSPECIFIED LATERALITY: Primary | ICD-10-CM

## 2019-06-03 DIAGNOSIS — M17.12 ARTHRITIS OF LEFT KNEE: Primary | ICD-10-CM

## 2019-06-03 DIAGNOSIS — M25.562 LEFT KNEE PAIN, UNSPECIFIED CHRONICITY: ICD-10-CM

## 2019-06-03 PROCEDURE — 20610 LARGE JOINT ASPIRATION/INJECTION: L KNEE: ICD-10-PCS | Mod: HCNC,LT,S$GLB, | Performed by: ORTHOPAEDIC SURGERY

## 2019-06-03 PROCEDURE — 73564 X-RAY EXAM KNEE 4 OR MORE: CPT | Mod: 26,50,HCNC, | Performed by: RADIOLOGY

## 2019-06-03 PROCEDURE — 99214 OFFICE O/P EST MOD 30 MIN: CPT | Mod: HCNC,25,S$GLB, | Performed by: ORTHOPAEDIC SURGERY

## 2019-06-03 PROCEDURE — 99999 PR PBB SHADOW E&M-EST. PATIENT-LVL III: CPT | Mod: PBBFAC,HCNC,, | Performed by: ORTHOPAEDIC SURGERY

## 2019-06-03 PROCEDURE — 99214 PR OFFICE/OUTPT VISIT, EST, LEVL IV, 30-39 MIN: ICD-10-PCS | Mod: HCNC,25,S$GLB, | Performed by: ORTHOPAEDIC SURGERY

## 2019-06-03 PROCEDURE — 1101F PT FALLS ASSESS-DOCD LE1/YR: CPT | Mod: HCNC,CPTII,S$GLB, | Performed by: ORTHOPAEDIC SURGERY

## 2019-06-03 PROCEDURE — 1101F PR PT FALLS ASSESS DOC 0-1 FALLS W/OUT INJ PAST YR: ICD-10-PCS | Mod: HCNC,CPTII,S$GLB, | Performed by: ORTHOPAEDIC SURGERY

## 2019-06-03 PROCEDURE — 99999 PR PBB SHADOW E&M-EST. PATIENT-LVL III: ICD-10-PCS | Mod: PBBFAC,HCNC,, | Performed by: ORTHOPAEDIC SURGERY

## 2019-06-03 PROCEDURE — 20610 DRAIN/INJ JOINT/BURSA W/O US: CPT | Mod: HCNC,LT,S$GLB, | Performed by: ORTHOPAEDIC SURGERY

## 2019-06-03 PROCEDURE — 73564 XR KNEE ORTHO BILAT WITH FLEXION: ICD-10-PCS | Mod: 26,50,HCNC, | Performed by: RADIOLOGY

## 2019-06-03 PROCEDURE — 73564 X-RAY EXAM KNEE 4 OR MORE: CPT | Mod: TC,50,HCNC,PN

## 2019-06-03 RX ADMIN — TRIAMCINOLONE ACETONIDE 40 MG: 40 INJECTION, SUSPENSION INTRA-ARTICULAR; INTRAMUSCULAR at 03:06

## 2019-06-03 NOTE — LETTER
June 12, 2019      Pratibha Quesada, NP  [address]  [address]  [facility] [facility] [address]           [address]  [facility] [address]  Phone: [phone]          Patient: Lenny Lopez   MR Number: [id number]   YOB: 1945   Date of Visit: 6/3/2019       Dear Pratibha Quesada:    Thank you for referring Lenny Lopze to me for evaluation. Attached you will find relevant portions of my assessment and plan of care.    If you have questions, please do not hesitate to call me. I look forward to following Lenny Lopez along with you.    Sincerely,    Jermaine Greenwood MD    Enclosure  CC:  No Recipients    If you would like to receive this communication electronically, please contact [email or web address] or [phone] to request more information on [employer] access.    For providers and/or their staff who would like to refer a patient to [facility], please contact us through our one-stop-shop provider referral line, [facility], at [phone].    If you feel you have received this communication in error or would no longer like to receive these types of communications, please e-mail [email or web address]

## 2019-06-12 RX ORDER — TRIAMCINOLONE ACETONIDE 40 MG/ML
40 INJECTION, SUSPENSION INTRA-ARTICULAR; INTRAMUSCULAR
Status: DISCONTINUED | OUTPATIENT
Start: 2019-06-03 | End: 2019-06-12 | Stop reason: HOSPADM

## 2019-06-12 NOTE — PROCEDURES
Large Joint Aspiration/Injection: L knee  Date/Time: 6/3/2019 3:02 PM  Performed by: Jermaine Greenwood MD  Authorized by: Jermaine Greenwood MD     Consent Done?:  Yes (Verbal)  Indications:  Pain  Timeout: Prior to procedure the correct patient, procedure, and site was verified      Location:  Knee  Site:  L knee  Prep: Patient was prepped and draped in usual sterile fashion    Approach:  Anteromedial  Medications:  40 mg triamcinolone acetonide 40 mg/mL
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
## 2019-06-13 DIAGNOSIS — M17.12 LOCALIZED OSTEOARTHRITIS OF LEFT KNEE: Primary | ICD-10-CM

## 2019-07-17 ENCOUNTER — CLINICAL SUPPORT (OUTPATIENT)
Dept: PULMONOLOGY | Facility: CLINIC | Age: 74
End: 2019-07-17
Payer: MEDICARE

## 2019-07-17 VITALS
BODY MASS INDEX: 36.28 KG/M2 | HEART RATE: 70 BPM | DIASTOLIC BLOOD PRESSURE: 79 MMHG | SYSTOLIC BLOOD PRESSURE: 139 MMHG | WEIGHT: 252.88 LBS

## 2019-07-17 DIAGNOSIS — J82.83 EOSINOPHILIC ASTHMA: ICD-10-CM

## 2019-07-17 DIAGNOSIS — J45.50 SEVERE PERSISTENT ASTHMA WITHOUT COMPLICATION: Primary | ICD-10-CM

## 2019-07-17 PROCEDURE — 99999 PR PBB SHADOW E&M-EST. PATIENT-LVL II: CPT | Mod: PBBFAC,HCNC,, | Performed by: INTERNAL MEDICINE

## 2019-07-17 PROCEDURE — 99999 PR PBB SHADOW E&M-EST. PATIENT-LVL II: ICD-10-PCS | Mod: PBBFAC,HCNC,, | Performed by: INTERNAL MEDICINE

## 2019-08-19 NOTE — PROGRESS NOTES
Call to [facility], [phone], spoke with Yvette, states that the patient is out of the area they service.  Waiting for patient to call back.    Notify us not too bad, needs renal f/u.

## 2019-09-04 DIAGNOSIS — J82.83 EOSINOPHILIC ASTHMA: Primary | ICD-10-CM

## 2019-09-04 DIAGNOSIS — R09.89 SINUS COMPLAINT: ICD-10-CM

## 2019-09-04 RX ORDER — AZITHROMYCIN 500 MG/1
TABLET, FILM COATED ORAL
Qty: 3 TABLET | Refills: 3 | Status: SHIPPED | OUTPATIENT
Start: 2019-09-04 | End: 2020-02-13 | Stop reason: ALTCHOICE

## 2019-09-06 ENCOUNTER — PATIENT OUTREACH (OUTPATIENT)
Dept: ADMINISTRATIVE | Facility: HOSPITAL | Age: 74
End: 2019-09-06

## 2019-09-18 DIAGNOSIS — R60.0 BILATERAL LEG EDEMA: Primary | ICD-10-CM

## 2019-09-18 DIAGNOSIS — I25.10 CORONARY ARTERY DISEASE, ANGINA PRESENCE UNSPECIFIED, UNSPECIFIED VESSEL OR LESION TYPE, UNSPECIFIED WHETHER NATIVE OR TRANSPLANTED HEART: ICD-10-CM

## 2019-09-18 DIAGNOSIS — N40.0 PROSTATISM: ICD-10-CM

## 2019-09-18 DIAGNOSIS — I77.9 BILATERAL CAROTID ARTERY DISEASE: ICD-10-CM

## 2019-09-18 RX ORDER — TAMSULOSIN HYDROCHLORIDE 0.4 MG/1
0.4 CAPSULE ORAL DAILY
Qty: 30 CAPSULE | Refills: 11 | Status: CANCELLED | OUTPATIENT
Start: 2019-09-18 | End: 2020-09-17

## 2019-09-18 RX ORDER — POTASSIUM CHLORIDE 750 MG/1
10 TABLET, EXTENDED RELEASE ORAL 2 TIMES DAILY
Qty: 180 TABLET | Refills: 0 | Status: CANCELLED | OUTPATIENT
Start: 2019-09-18

## 2019-09-18 RX ORDER — TAMSULOSIN HYDROCHLORIDE 0.4 MG/1
0.4 CAPSULE ORAL DAILY
Qty: 90 CAPSULE | Refills: 0 | Status: CANCELLED | OUTPATIENT
Start: 2019-09-18 | End: 2020-09-17

## 2019-09-18 RX ORDER — FUROSEMIDE 40 MG/1
40 TABLET ORAL DAILY
Qty: 90 TABLET | Refills: 5 | Status: SHIPPED | OUTPATIENT
Start: 2019-09-18 | End: 2020-01-30

## 2019-09-18 RX ORDER — FUROSEMIDE 40 MG/1
40 TABLET ORAL DAILY
Qty: 90 TABLET | Refills: 0 | Status: CANCELLED | OUTPATIENT
Start: 2019-09-18

## 2019-09-18 RX ORDER — TAMSULOSIN HYDROCHLORIDE 0.4 MG/1
0.4 CAPSULE ORAL DAILY
Qty: 30 CAPSULE | Refills: 5 | Status: SHIPPED | OUTPATIENT
Start: 2019-09-18 | End: 2020-03-06 | Stop reason: SDUPTHER

## 2019-09-18 RX ORDER — POTASSIUM CHLORIDE 750 MG/1
10 TABLET, EXTENDED RELEASE ORAL 2 TIMES DAILY
Qty: 180 TABLET | Refills: 5 | Status: SHIPPED | OUTPATIENT
Start: 2019-09-18 | End: 2020-08-11

## 2019-09-18 NOTE — TELEPHONE ENCOUNTER
----- Message from Galilea De La Rosa sent at 9/18/2019 12:09 PM CDT -----  Contact: Son-Anh  Pt son Anh calling states pt needing a refill on tamsulosin (FLOMAX) 0.4 mg Cap,furosemide (LASIX) 40 MG tablet,potassium chloride (KLOR-CON) 10 MEQ TbSR....[phone] pt is traveling and need this done before Friday,please preferably in the next few hours!          .  [facility] in [address],in [place], [place]...[phone]

## 2019-09-18 NOTE — TELEPHONE ENCOUNTER
----- Message from Galilea De La Rosa sent at 9/18/2019 12:09 PM CDT -----  Contact: Son-Anh  Pt son Anh calling states pt needing a refill on tamsulosin (FLOMAX) 0.4 mg Cap,furosemide (LASIX) 40 MG tablet,potassium chloride (KLOR-CON) 10 MEQ TbSR....[phone] pt is traveling and need this done before Friday,please preferably in the next few hours!          .  [facility] in [address],in [place], [place]...[phone]

## 2019-09-26 ENCOUNTER — CLINICAL SUPPORT (OUTPATIENT)
Dept: PULMONOLOGY | Facility: CLINIC | Age: 74
End: 2019-09-26
Payer: MEDICARE

## 2019-09-26 VITALS
DIASTOLIC BLOOD PRESSURE: 66 MMHG | OXYGEN SATURATION: 96 % | HEART RATE: 62 BPM | SYSTOLIC BLOOD PRESSURE: 135 MMHG | WEIGHT: 259.69 LBS | BODY MASS INDEX: 37.26 KG/M2

## 2019-09-26 DIAGNOSIS — J45.50 SEVERE PERSISTENT ASTHMA WITHOUT COMPLICATION: Primary | ICD-10-CM

## 2019-09-26 DIAGNOSIS — J82.83 EOSINOPHILIC ASTHMA: ICD-10-CM

## 2019-09-26 PROCEDURE — 96372 PR INJECTION,THERAP/PROPH/DIAG2ST, IM OR SUBCUT: ICD-10-PCS | Mod: HCNC,S$GLB,, | Performed by: NURSE PRACTITIONER

## 2019-09-26 PROCEDURE — 99999 PR PBB SHADOW E&M-EST. PATIENT-LVL IV: CPT | Mod: PBBFAC,HCNC,,

## 2019-09-26 PROCEDURE — 99999 PR PBB SHADOW E&M-EST. PATIENT-LVL IV: ICD-10-PCS | Mod: PBBFAC,HCNC,,

## 2019-09-26 PROCEDURE — 96372 THER/PROPH/DIAG INJ SC/IM: CPT | Mod: HCNC,S$GLB,, | Performed by: NURSE PRACTITIONER

## 2019-10-09 ENCOUNTER — OFFICE VISIT (OUTPATIENT)
Dept: FAMILY MEDICINE | Facility: CLINIC | Age: 74
End: 2019-10-09
Payer: MEDICARE

## 2019-10-09 VITALS
SYSTOLIC BLOOD PRESSURE: 122 MMHG | WEIGHT: 261 LBS | HEART RATE: 66 BPM | TEMPERATURE: 98 F | BODY MASS INDEX: 37.37 KG/M2 | DIASTOLIC BLOOD PRESSURE: 80 MMHG | HEIGHT: 70 IN | OXYGEN SATURATION: 95 %

## 2019-10-09 DIAGNOSIS — T14.8XXA ABRASION OF SKIN: Primary | ICD-10-CM

## 2019-10-09 DIAGNOSIS — E11.59 HYPERTENSION ASSOCIATED WITH DIABETES: ICD-10-CM

## 2019-10-09 DIAGNOSIS — I15.2 HYPERTENSION ASSOCIATED WITH DIABETES: ICD-10-CM

## 2019-10-09 PROCEDURE — 99999 PR PBB SHADOW E&M-EST. PATIENT-LVL III: CPT | Mod: PBBFAC,HCNC,, | Performed by: NURSE PRACTITIONER

## 2019-10-09 PROCEDURE — 3079F DIAST BP 80-89 MM HG: CPT | Mod: HCNC,CPTII,S$GLB, | Performed by: NURSE PRACTITIONER

## 2019-10-09 PROCEDURE — 99999 PR PBB SHADOW E&M-EST. PATIENT-LVL III: ICD-10-PCS | Mod: PBBFAC,HCNC,, | Performed by: NURSE PRACTITIONER

## 2019-10-09 PROCEDURE — 3074F SYST BP LT 130 MM HG: CPT | Mod: HCNC,CPTII,S$GLB, | Performed by: NURSE PRACTITIONER

## 2019-10-09 PROCEDURE — 99213 PR OFFICE/OUTPT VISIT, EST, LEVL III, 20-29 MIN: ICD-10-PCS | Mod: HCNC,S$GLB,, | Performed by: NURSE PRACTITIONER

## 2019-10-09 PROCEDURE — 1101F PR PT FALLS ASSESS DOC 0-1 FALLS W/OUT INJ PAST YR: ICD-10-PCS | Mod: HCNC,CPTII,S$GLB, | Performed by: NURSE PRACTITIONER

## 2019-10-09 PROCEDURE — 3079F PR MOST RECENT DIASTOLIC BLOOD PRESSURE 80-89 MM HG: ICD-10-PCS | Mod: HCNC,CPTII,S$GLB, | Performed by: NURSE PRACTITIONER

## 2019-10-09 PROCEDURE — 99213 OFFICE O/P EST LOW 20 MIN: CPT | Mod: HCNC,S$GLB,, | Performed by: NURSE PRACTITIONER

## 2019-10-09 PROCEDURE — 3074F PR MOST RECENT SYSTOLIC BLOOD PRESSURE < 130 MM HG: ICD-10-PCS | Mod: HCNC,CPTII,S$GLB, | Performed by: NURSE PRACTITIONER

## 2019-10-09 PROCEDURE — 1101F PT FALLS ASSESS-DOCD LE1/YR: CPT | Mod: HCNC,CPTII,S$GLB, | Performed by: NURSE PRACTITIONER

## 2019-10-09 RX ORDER — MUPIROCIN 20 MG/G
OINTMENT TOPICAL
Status: DISCONTINUED | OUTPATIENT
Start: 2019-10-09 | End: 2020-10-13

## 2019-10-09 RX ORDER — MUPIROCIN 20 MG/G
OINTMENT TOPICAL 2 TIMES DAILY
Qty: 30 G | Refills: 1 | Status: ON HOLD | OUTPATIENT
Start: 2019-10-09 | End: 2020-10-13

## 2019-10-09 NOTE — PROGRESS NOTES
"Subjective:       Patient ID: Lenny Lopez is a 74 y.o. male.    Chief Complaint: Wound Dehiscence    HPI   Mr. Lopez is a 73 yo male who presents today with c/o of a "skin tear".  About one week ago he accidentally hit his left and with a hammer tearing the skin.  Since that time he has been cleansing and applying antibiotic ointment OTC.  He is here today to ensure that it is healing appropriately.  He denies any current pain to the area, purulent drainage, fever.  He actually feels like it is improving.    Review of Systems   Constitutional: Negative for chills, fatigue, fever and unexpected weight change.   HENT: Negative for congestion, hearing loss, nosebleeds, postnasal drip, sinus pressure, sinus pain and sore throat.    Eyes: Negative for pain, discharge, redness and visual disturbance.   Respiratory: Negative for cough, chest tightness and shortness of breath.    Cardiovascular: Negative for chest pain and palpitations.   Gastrointestinal: Negative for abdominal pain, constipation, diarrhea, nausea and vomiting.   Endocrine: Negative for cold intolerance and heat intolerance.   Musculoskeletal: Negative for back pain.   Skin: Positive for wound.   Neurological: Negative for dizziness, syncope, light-headedness and headaches.   Psychiatric/Behavioral: Negative for agitation and dysphoric mood. The patient is not nervous/anxious.        Objective:      Physical Exam   Constitutional: He is oriented to person, place, and time. He appears well-developed and well-nourished.   HENT:   Head: Normocephalic and atraumatic.   Right Ear: Hearing normal.   Left Ear: Hearing normal.   Nose: Nose normal.   Eyes: Pupils are equal, round, and reactive to light. Conjunctivae and lids are normal.   Neck: Normal range of motion. Neck supple.   Cardiovascular: Normal rate.   Pulmonary/Chest: Effort normal.   Abdominal: Soft.   Musculoskeletal: Normal range of motion.   Neurological: He is alert and oriented to person, " place, and time.   Skin: Skin is warm and dry.            Assessment:       1. Abrasion of skin    2. Hypertension associated with diabetes        Plan:       Lenny was seen today for wound dehiscence.    Diagnoses and all orders for this visit:    Abrasion of skin  -     mupirocin (BACTROBAN) 2 % ointment; Apply topically 2 (two) times daily.  -     mupirocin 2 % ointment        -     Cleanse twice daily with mild soap and water, apply Bactroban, and clean bandage.          -     If no improvement or worsening condition over next several days, notify provider   Hypertension associated with diabetes  -Stable, continue current medication regimen

## 2019-10-09 NOTE — PATIENT INSTRUCTIONS
Wash and dry wound twice dialy with mild soap such as Dial    Apply bactroban twice daily.  Apply clean bandage to area when you know you will  Be out and about     Leave open to air when home.     If no improvement over the next several days, notify provider

## 2019-10-16 DIAGNOSIS — J45.909 PERSISTENT ASTHMA WITHOUT COMPLICATION, UNSPECIFIED ASTHMA SEVERITY: ICD-10-CM

## 2019-10-16 RX ORDER — MONTELUKAST SODIUM 10 MG/1
10 TABLET ORAL NIGHTLY
Qty: 30 TABLET | Refills: 11 | Status: SHIPPED | OUTPATIENT
Start: 2019-10-16 | End: 2020-11-30 | Stop reason: SDUPTHER

## 2019-11-13 ENCOUNTER — TELEPHONE (OUTPATIENT)
Dept: PULMONOLOGY | Facility: CLINIC | Age: 74
End: 2019-11-13

## 2019-11-13 NOTE — TELEPHONE ENCOUNTER
Called to reschedule appt on 12/1/19 provider out of office, pt stated he feels he can cancel the appointment as he does not need to see provider. appointment canceled. pt will call at at later time to reschedule

## 2019-11-18 DIAGNOSIS — J82.83 EOSINOPHILIC ASTHMA: ICD-10-CM

## 2019-11-18 RX ORDER — BENRALIZUMAB 30 MG/ML
30 INJECTION, SOLUTION SUBCUTANEOUS
Qty: 1 SYRINGE | Refills: 6 | Status: SHIPPED | OUTPATIENT
Start: 2019-11-18 | End: 2019-11-19 | Stop reason: SDUPTHER

## 2019-11-19 DIAGNOSIS — J82.83 EOSINOPHILIC ASTHMA: ICD-10-CM

## 2019-11-19 RX ORDER — BENRALIZUMAB 30 MG/ML
30 INJECTION, SOLUTION SUBCUTANEOUS
Qty: 1 SYRINGE | Refills: 6 | Status: SHIPPED | OUTPATIENT
Start: 2019-11-19 | End: 2020-11-18

## 2019-11-21 ENCOUNTER — CLINICAL SUPPORT (OUTPATIENT)
Dept: PULMONOLOGY | Facility: CLINIC | Age: 74
End: 2019-11-21
Payer: MEDICARE

## 2019-11-21 DIAGNOSIS — J45.50 SEVERE PERSISTENT ASTHMA WITHOUT COMPLICATION: Primary | ICD-10-CM

## 2019-11-21 DIAGNOSIS — J82.83 EOSINOPHILIC ASTHMA: ICD-10-CM

## 2019-11-21 PROCEDURE — 96372 THER/PROPH/DIAG INJ SC/IM: CPT | Mod: HCNC,S$GLB,, | Performed by: NURSE PRACTITIONER

## 2019-11-21 PROCEDURE — 96372 PR INJECTION,THERAP/PROPH/DIAG2ST, IM OR SUBCUT: ICD-10-PCS | Mod: HCNC,S$GLB,, | Performed by: NURSE PRACTITIONER

## 2019-12-11 ENCOUNTER — OFFICE VISIT (OUTPATIENT)
Dept: FAMILY MEDICINE | Facility: CLINIC | Age: 74
End: 2019-12-11
Payer: MEDICARE

## 2019-12-11 VITALS
BODY MASS INDEX: 37.34 KG/M2 | SYSTOLIC BLOOD PRESSURE: 128 MMHG | TEMPERATURE: 98 F | HEIGHT: 70 IN | HEART RATE: 70 BPM | RESPIRATION RATE: 17 BRPM | OXYGEN SATURATION: 95 % | DIASTOLIC BLOOD PRESSURE: 62 MMHG | WEIGHT: 260.81 LBS

## 2019-12-11 DIAGNOSIS — G89.29 CHRONIC PAIN OF LEFT KNEE: Primary | ICD-10-CM

## 2019-12-11 DIAGNOSIS — M25.562 CHRONIC PAIN OF LEFT KNEE: Primary | ICD-10-CM

## 2019-12-11 PROCEDURE — 1159F PR MEDICATION LIST DOCUMENTED IN MEDICAL RECORD: ICD-10-PCS | Mod: HCNC,S$GLB,, | Performed by: PHYSICIAN ASSISTANT

## 2019-12-11 PROCEDURE — 3074F PR MOST RECENT SYSTOLIC BLOOD PRESSURE < 130 MM HG: ICD-10-PCS | Mod: HCNC,CPTII,S$GLB, | Performed by: PHYSICIAN ASSISTANT

## 2019-12-11 PROCEDURE — 99213 OFFICE O/P EST LOW 20 MIN: CPT | Mod: HCNC,S$GLB,, | Performed by: PHYSICIAN ASSISTANT

## 2019-12-11 PROCEDURE — 99213 PR OFFICE/OUTPT VISIT, EST, LEVL III, 20-29 MIN: ICD-10-PCS | Mod: HCNC,S$GLB,, | Performed by: PHYSICIAN ASSISTANT

## 2019-12-11 PROCEDURE — 99999 PR PBB SHADOW E&M-EST. PATIENT-LVL V: CPT | Mod: PBBFAC,HCNC,, | Performed by: PHYSICIAN ASSISTANT

## 2019-12-11 PROCEDURE — 1101F PT FALLS ASSESS-DOCD LE1/YR: CPT | Mod: HCNC,CPTII,S$GLB, | Performed by: PHYSICIAN ASSISTANT

## 2019-12-11 PROCEDURE — 1125F PR PAIN SEVERITY QUANTIFIED, PAIN PRESENT: ICD-10-PCS | Mod: HCNC,S$GLB,, | Performed by: PHYSICIAN ASSISTANT

## 2019-12-11 PROCEDURE — 1101F PR PT FALLS ASSESS DOC 0-1 FALLS W/OUT INJ PAST YR: ICD-10-PCS | Mod: HCNC,CPTII,S$GLB, | Performed by: PHYSICIAN ASSISTANT

## 2019-12-11 PROCEDURE — 1159F MED LIST DOCD IN RCRD: CPT | Mod: HCNC,S$GLB,, | Performed by: PHYSICIAN ASSISTANT

## 2019-12-11 PROCEDURE — 3078F DIAST BP <80 MM HG: CPT | Mod: HCNC,CPTII,S$GLB, | Performed by: PHYSICIAN ASSISTANT

## 2019-12-11 PROCEDURE — 99999 PR PBB SHADOW E&M-EST. PATIENT-LVL V: ICD-10-PCS | Mod: PBBFAC,HCNC,, | Performed by: PHYSICIAN ASSISTANT

## 2019-12-11 PROCEDURE — 3078F PR MOST RECENT DIASTOLIC BLOOD PRESSURE < 80 MM HG: ICD-10-PCS | Mod: HCNC,CPTII,S$GLB, | Performed by: PHYSICIAN ASSISTANT

## 2019-12-11 PROCEDURE — 3074F SYST BP LT 130 MM HG: CPT | Mod: HCNC,CPTII,S$GLB, | Performed by: PHYSICIAN ASSISTANT

## 2019-12-11 PROCEDURE — 1125F AMNT PAIN NOTED PAIN PRSNT: CPT | Mod: HCNC,S$GLB,, | Performed by: PHYSICIAN ASSISTANT

## 2019-12-11 NOTE — PROGRESS NOTES
Subjective:       Patient ID: Lenny Lopez is a 74 y.o. male.    Chief Complaint: Knee Pain (left knee pain )    Knee Pain    The incident occurred more than 1 week ago (chronic.). Injury mechanism: chronic overuse. distant injuries. The pain is present in the left knee. The quality of the pain is described as aching. The pain is severe. The pain has been constant since onset. Associated symptoms include an inability to bear weight. Pertinent negatives include no loss of motion, loss of sensation or numbness. The symptoms are aggravated by movement and weight bearing. Treatments tried: history of join injection. The treatment provided significant relief.     Review of patient's allergies indicates:   Allergen Reactions    No known drug allergies          Current Outpatient Medications:     aspirin 81 mg Tab, Take 1 tablet by mouth once daily. , Disp: , Rfl:     atorvastatin (LIPITOR) 80 MG tablet, Take 0.5 tablets (40 mg total) by mouth once daily., Disp: 7 tablet, Rfl: 0    blood sugar diagnostic Strp, 1 strip by Misc.(Non-Drug; Combo Route) route 3 (three) times daily. DX: E11.29   Dispense test strips that are covered by insurance, Disp: 300 strip, Rfl: 3    diclofenac sodium (VOLTAREN) 1 % Gel, Apply 2 g topically once daily., Disp: 100 g, Rfl: 2    ELIQUIS 5 mg Tab, Take 5 mg by mouth 2 (two) times daily. , Disp: , Rfl:     FASENRA 30 mg/mL Syrg injection, Inject 1 mL (30 mg total) into the skin every 8 weeks., Disp: 1 Syringe, Rfl: 6    furosemide (LASIX) 40 MG tablet, Take 1 tablet (40 mg total) by mouth once daily., Disp: 90 tablet, Rfl: 5    lancets (FREESTYLE LANCETS) 28 gauge Misc, 1 lancet by Misc.(Non-Drug; Combo Route) route 3 (three) times daily., Disp: 300 each, Rfl: 3    mirabegron (MYRBETRIQ) 50 mg Tb24, Take 1 tablet (50 mg total) by mouth once daily., Disp: 30 tablet, Rfl: 11    montelukast (SINGULAIR) 10 mg tablet, Take 1 tablet (10 mg total) by mouth every evening., Disp: 30  tablet, Rfl: 11    mupirocin (BACTROBAN) 2 % ointment, Apply topically 2 (two) times daily., Disp: 30 g, Rfl: 1    omega-3 fatty acids-vitamin E (FISH OIL) 1,000 mg Cap, 1 capsule once daily. Three times a day, Disp: , Rfl:     potassium chloride (KLOR-CON) 10 MEQ TbSR, Take 1 tablet (10 mEq total) by mouth 2 (two) times daily., Disp: 180 tablet, Rfl: 5    predniSONE (DELTASONE) 20 MG tablet, One daily for 7 days and repeat for flare of lung symptoms as intructed, Disp: 27 tablet, Rfl: 2    tamsulosin (FLOMAX) 0.4 mg Cap, Take 1 capsule (0.4 mg total) by mouth once daily., Disp: 30 capsule, Rfl: 5    turmeric root extract 500 mg Cap, Take 1 capsule by mouth once daily., Disp: , Rfl:     VENTOLIN HFA 90 mcg/actuation inhaler, Inhale 1-2 puffs into the lungs every 4 (four) hours as needed for Wheezing or Shortness of Breath., Disp: 1 each, Rfl: 11    albuterol-ipratropium (DUO-NEB) 2.5 mg-0.5 mg/3 mL nebulizer solution, Take 3 mLs by nebulization every 6 (six) hours as needed for Wheezing., Disp: 120 mL, Rfl: 1    allopurinol (ZYLOPRIM) 100 MG tablet, Take 2 tablets (200 mg total) by mouth once daily. (Patient not taking: Reported on 12/11/2019), Disp: 180 tablet, Rfl: 0    azithromycin (ZITHROMAX) 500 MG tablet, One daily for yellow mucous, repeat if needed (Patient not taking: Reported on 12/11/2019), Disp: 3 tablet, Rfl: 3    blood-glucose meter kit, Use as instructed to check blood sugar daily, Disp: 1 each, Rfl: 0    nitroGLYCERIN (NITROSTAT) 0.4 MG SL tablet, Place 1 tablet (0.4 mg total) under the tongue every 5 (five) minutes as needed for Chest pain., Disp: 25 tablet, Rfl: 3    sotalol (BETAPACE) 80 MG tablet, Take 0.5 tablets (40 mg total) by mouth 2 (two) times daily. for 7 days, Disp: 7 tablet, Rfl: 0    Current Facility-Administered Medications:     benralizumab (FASENRA) 30 mg/mL subcutaneous syringe, 30 mg, Subcutaneous, Q8 weeks, Pratibha Quesada NP, 30 mg at 11/21/19 1000    mupirocin  2 % ointment, , Topical (Top), 1 time in Clinic/HOD, Sherry Dang, HIEU    Lab Results   Component Value Date    WBC 6.15 06/01/2019    HGB 13.7 (L) 06/01/2019    HCT 43.7 06/01/2019     06/01/2019    CHOL 114 (L) 03/27/2019    TRIG 176 (H) 03/27/2019    HDL 29 (L) 03/27/2019    ALT 21 03/27/2019    AST 20 03/27/2019     06/01/2019    K 4.2 06/01/2019     06/01/2019    CREATININE 1.3 06/01/2019    BUN 27 (H) 06/01/2019    CO2 24 06/01/2019    TSH 3.464 12/10/2013    PSA 3.5 12/01/2015    HGBA1C 7.2 (H) 03/27/2019       Review of Systems   Constitutional: Negative for activity change, appetite change and fever.   Eyes: Negative for visual disturbance.   Respiratory: Negative for cough and shortness of breath.    Cardiovascular: Negative for chest pain.   Genitourinary: Negative for difficulty urinating and dysuria.   Musculoskeletal: Positive for arthralgias. Negative for myalgias.   Neurological: Negative for numbness and headaches.   Hematological: Negative for adenopathy.   Psychiatric/Behavioral: The patient is not nervous/anxious.        Objective:      Physical Exam   Constitutional: He is oriented to person, place, and time.   Cardiovascular: Normal rate and regular rhythm.   Pulmonary/Chest: Effort normal and breath sounds normal. He has no wheezes.   Musculoskeletal: He exhibits no edema.   Neurological: He is alert and oriented to person, place, and time.   Skin: No erythema.   Psychiatric: His behavior is normal.       Assessment:       1. Chronic pain of left knee        Plan:     Lenny was seen today for knee pain.    Diagnoses and all orders for this visit:    Chronic pain of left knee  -     Ambulatory referral/consult to Orthopedics; Future    Patient to follow up with Dr. Greenwood.  Patient to schedule physical with Dr. Shah.

## 2019-12-16 ENCOUNTER — OFFICE VISIT (OUTPATIENT)
Dept: ORTHOPEDICS | Facility: CLINIC | Age: 74
End: 2019-12-16
Payer: MEDICARE

## 2019-12-16 VITALS
HEART RATE: 68 BPM | DIASTOLIC BLOOD PRESSURE: 66 MMHG | WEIGHT: 260.81 LBS | BODY MASS INDEX: 37.34 KG/M2 | SYSTOLIC BLOOD PRESSURE: 144 MMHG | HEIGHT: 70 IN

## 2019-12-16 DIAGNOSIS — M17.12 ARTHRITIS OF LEFT KNEE: Primary | ICD-10-CM

## 2019-12-16 DIAGNOSIS — M25.562 CHRONIC PAIN OF LEFT KNEE: ICD-10-CM

## 2019-12-16 DIAGNOSIS — G89.29 CHRONIC PAIN OF LEFT KNEE: ICD-10-CM

## 2019-12-16 PROCEDURE — 1159F MED LIST DOCD IN RCRD: CPT | Mod: HCNC,S$GLB,, | Performed by: ORTHOPAEDIC SURGERY

## 2019-12-16 PROCEDURE — 1101F PR PT FALLS ASSESS DOC 0-1 FALLS W/OUT INJ PAST YR: ICD-10-PCS | Mod: HCNC,CPTII,S$GLB, | Performed by: ORTHOPAEDIC SURGERY

## 2019-12-16 PROCEDURE — 3078F DIAST BP <80 MM HG: CPT | Mod: HCNC,CPTII,S$GLB, | Performed by: ORTHOPAEDIC SURGERY

## 2019-12-16 PROCEDURE — 99213 PR OFFICE/OUTPT VISIT, EST, LEVL III, 20-29 MIN: ICD-10-PCS | Mod: HCNC,25,S$GLB, | Performed by: ORTHOPAEDIC SURGERY

## 2019-12-16 PROCEDURE — 99999 PR PBB SHADOW E&M-EST. PATIENT-LVL III: ICD-10-PCS | Mod: PBBFAC,HCNC,, | Performed by: ORTHOPAEDIC SURGERY

## 2019-12-16 PROCEDURE — 99213 OFFICE O/P EST LOW 20 MIN: CPT | Mod: HCNC,25,S$GLB, | Performed by: ORTHOPAEDIC SURGERY

## 2019-12-16 PROCEDURE — 3078F PR MOST RECENT DIASTOLIC BLOOD PRESSURE < 80 MM HG: ICD-10-PCS | Mod: HCNC,CPTII,S$GLB, | Performed by: ORTHOPAEDIC SURGERY

## 2019-12-16 PROCEDURE — 1159F PR MEDICATION LIST DOCUMENTED IN MEDICAL RECORD: ICD-10-PCS | Mod: HCNC,S$GLB,, | Performed by: ORTHOPAEDIC SURGERY

## 2019-12-16 PROCEDURE — 1125F PR PAIN SEVERITY QUANTIFIED, PAIN PRESENT: ICD-10-PCS | Mod: HCNC,S$GLB,, | Performed by: ORTHOPAEDIC SURGERY

## 2019-12-16 PROCEDURE — 20610 DRAIN/INJ JOINT/BURSA W/O US: CPT | Mod: HCNC,LT,S$GLB, | Performed by: ORTHOPAEDIC SURGERY

## 2019-12-16 PROCEDURE — 1101F PT FALLS ASSESS-DOCD LE1/YR: CPT | Mod: HCNC,CPTII,S$GLB, | Performed by: ORTHOPAEDIC SURGERY

## 2019-12-16 PROCEDURE — 3077F SYST BP >= 140 MM HG: CPT | Mod: HCNC,CPTII,S$GLB, | Performed by: ORTHOPAEDIC SURGERY

## 2019-12-16 PROCEDURE — 99999 PR PBB SHADOW E&M-EST. PATIENT-LVL III: CPT | Mod: PBBFAC,HCNC,, | Performed by: ORTHOPAEDIC SURGERY

## 2019-12-16 PROCEDURE — 20610 LARGE JOINT ASPIRATION/INJECTION: L KNEE: ICD-10-PCS | Mod: HCNC,LT,S$GLB, | Performed by: ORTHOPAEDIC SURGERY

## 2019-12-16 PROCEDURE — 3077F PR MOST RECENT SYSTOLIC BLOOD PRESSURE >= 140 MM HG: ICD-10-PCS | Mod: HCNC,CPTII,S$GLB, | Performed by: ORTHOPAEDIC SURGERY

## 2019-12-16 PROCEDURE — 1125F AMNT PAIN NOTED PAIN PRSNT: CPT | Mod: HCNC,S$GLB,, | Performed by: ORTHOPAEDIC SURGERY

## 2019-12-16 RX ADMIN — TRIAMCINOLONE ACETONIDE 40 MG: 40 INJECTION, SUSPENSION INTRA-ARTICULAR; INTRAMUSCULAR at 10:12

## 2019-12-16 NOTE — LETTER
December 18, 2019      Kandace Davidson PA-C  [address]  [facility] [address]           [address]  [facility] [address]  Phone: [phone]          Patient: Lenny Lopez   MR Number: [id number]   YOB: 1945   Date of Visit: 12/16/2019       Dear Kandace Davidson:    Thank you for referring Lenny Lopez to me for evaluation. Attached you will find relevant portions of my assessment and plan of care.    If you have questions, please do not hesitate to call me. I look forward to following Lenny Lopez along with you.    Sincerely,    Jermaine Greenwood MD    Enclosure  CC:  No Recipients    If you would like to receive this communication electronically, please contact [email or web address] or [phone] to request more information on mention Link access.    For providers and/or their staff who would like to refer a patient to [facility], please contact us through our one-stop-shop provider referral line, [facility], at [phone].    If you feel you have received this communication in error or would no longer like to receive these types of communications, please e-mail [email or web address]

## 2019-12-18 RX ORDER — TRIAMCINOLONE ACETONIDE 40 MG/ML
40 INJECTION, SUSPENSION INTRA-ARTICULAR; INTRAMUSCULAR
Status: DISCONTINUED | OUTPATIENT
Start: 2019-12-16 | End: 2019-12-18 | Stop reason: HOSPADM

## 2019-12-18 NOTE — PROCEDURES
Large Joint Aspiration/Injection: L knee  Date/Time: 12/16/2019 10:45 AM  Performed by: Jeramine Greenwood MD  Authorized by: Jermaine Greenwood MD     Consent Done?:  Yes (Verbal)  Indications:  Pain  Timeout: Prior to procedure the correct patient, procedure, and site was verified      Location:  Knee  Site:  L knee  Prep: Patient was prepped and draped in usual sterile fashion    Approach:  Anteromedial  Medications:  40 mg triamcinolone acetonide 40 mg/mL

## 2019-12-18 NOTE — PROGRESS NOTES
Past Medical History:   Diagnosis Date    Angina pectoris     Arthritis     Asthma     Atrial fibrillation     Back pain     Cardiac pacemaker     Cataract     Chronic bronchitis     COPD (chronic obstructive pulmonary disease)     Coronary artery disease     DM (diabetes mellitus), type 2, 2000 12/12/2014    Gout     Hyperlipidemia     Hypertension     Kidney stones 12/17/2012    Nephrolithiasis     Obesity     Renal manifestation of secondary diabetes mellitus     Rheumatoid factor positive 03/08/2017    Negative work up with Dr. Choudhury    Sleep apnea     Thyroid disease     Unspecified disorder of kidney and ureter     Urinary incontinence        Past Surgical History:   Procedure Laterality Date    APPENDECTOMY      CARDIAC PACEMAKER PLACEMENT  2018    CORONARY STENT PLACEMENT  2018    EYE SURGERY      left eye secondary to trauma    FRACTURE SURGERY      right arm    KNEE SURGERY      screw    TONSILLECTOMY, ADENOIDECTOMY         Current Outpatient Medications   Medication Sig    albuterol-ipratropium (DUO-NEB) 2.5 mg-0.5 mg/3 mL nebulizer solution Take 3 mLs by nebulization every 6 (six) hours as needed for Wheezing.    allopurinol (ZYLOPRIM) 100 MG tablet Take 2 tablets (200 mg total) by mouth once daily.    aspirin 81 mg Tab Take 1 tablet by mouth once daily.     atorvastatin (LIPITOR) 80 MG tablet Take 0.5 tablets (40 mg total) by mouth once daily.    azithromycin (ZITHROMAX) 500 MG tablet One daily for yellow mucous, repeat if needed    blood sugar diagnostic Strp 1 strip by Misc.(Non-Drug; Combo Route) route 3 (three) times daily. DX: E11.29   Dispense test strips that are covered by insurance    diclofenac sodium (VOLTAREN) 1 % Gel Apply 2 g topically once daily.    ELIQUIS 5 mg Tab Take 5 mg by mouth 2 (two) times daily.     FASENRA 30 mg/mL Syrg injection Inject 1 mL (30 mg total) into the skin every 8 weeks.    furosemide (LASIX) 40 MG tablet Take 1 tablet (40  mg total) by mouth once daily.    lancets (FREESTYLE LANCETS) 28 gauge Misc 1 lancet by Misc.(Non-Drug; Combo Route) route 3 (three) times daily.    mirabegron (MYRBETRIQ) 50 mg Tb24 Take 1 tablet (50 mg total) by mouth once daily.    montelukast (SINGULAIR) 10 mg tablet Take 1 tablet (10 mg total) by mouth every evening.    mupirocin (BACTROBAN) 2 % ointment Apply topically 2 (two) times daily.    omega-3 fatty acids-vitamin E (FISH OIL) 1,000 mg Cap 1 capsule once daily. Three times a day    potassium chloride (KLOR-CON) 10 MEQ TbSR Take 1 tablet (10 mEq total) by mouth 2 (two) times daily.    predniSONE (DELTASONE) 20 MG tablet One daily for 7 days and repeat for flare of lung symptoms as intructed    tamsulosin (FLOMAX) 0.4 mg Cap Take 1 capsule (0.4 mg total) by mouth once daily.    turmeric root extract 500 mg Cap Take 1 capsule by mouth once daily.    VENTOLIN HFA 90 mcg/actuation inhaler Inhale 1-2 puffs into the lungs every 4 (four) hours as needed for Wheezing or Shortness of Breath.    blood-glucose meter kit Use as instructed to check blood sugar daily    nitroGLYCERIN (NITROSTAT) 0.4 MG SL tablet Place 1 tablet (0.4 mg total) under the tongue every 5 (five) minutes as needed for Chest pain.    sotalol (BETAPACE) 80 MG tablet Take 0.5 tablets (40 mg total) by mouth 2 (two) times daily. for 7 days     Current Facility-Administered Medications   Medication    benralizumab (FASENRA) 30 mg/mL subcutaneous syringe    mupirocin 2 % ointment       Review of patient's allergies indicates:   Allergen Reactions    No known drug allergies        Family History   Problem Relation Age of Onset    Thyroid disease Other     Cancer Mother     Hypertension Mother     Osteoarthritis Mother     Heart disease Father     Hypertension Father     Cancer Paternal Uncle         lung    Hypertension Sister     Hypertension Brother     Cancer Brother         colon?    Diabetes Maternal Aunt     Cancer  Brother         pancreatic    Kidney disease Daughter     Stroke Daughter     No Known Problems Son     No Known Problems Daughter     Collagen disease Neg Hx     Amblyopia Neg Hx     Blindness Neg Hx     Cataracts Neg Hx     Glaucoma Neg Hx     Macular degeneration Neg Hx     Retinal detachment Neg Hx     Strabismus Neg Hx        Social History     Socioeconomic History    Marital status:      Spouse name: Not on file    Number of children: Not on file    Years of education: Not on file    Highest education level: Not on file   Occupational History    Not on file   Social Needs    Financial resource strain: Not on file    Food insecurity:     Worry: Not on file     Inability: Not on file    Transportation needs:     Medical: Not on file     Non-medical: Not on file   Tobacco Use    Smoking status: Former Smoker     Types: Cigars, Cigarettes    Smokeless tobacco: Current User     Types: Chew    Tobacco comment: smoked a few cigars in his 20s   Substance and Sexual Activity    Alcohol use: Yes     Comment: a few beers during holidays    Drug use: No    Sexual activity: Not on file   Lifestyle    Physical activity:     Days per week: Not on file     Minutes per session: Not on file    Stress: Not on file   Relationships    Social connections:     Talks on phone: Not on file     Gets together: Not on file     Attends Restoration service: Not on file     Active member of club or organization: Not on file     Attends meetings of clubs or organizations: Not on file     Relationship status: Not on file   Other Topics Concern    Not on file   Social History Narrative    Not on file       Chief Complaint:   Chief Complaint   Patient presents with    Left Knee - Pain       Consulting Physician: Kandace Davidson PA-C    History of present illness:    This is a 74 y.o. year old male who complains of left knee pain that he has had for years.  He states that the pain in this knee has  "recently increased and his new pain is up to an 7/10 and worse with activities.  He has a history of gout.  He also has kidney issues.  He denies any recent injuries.  He states his pain is constant and aching with occasional sharp twinges. Previous injection helped significantly.    Review of Systems:    Constitution: Denies chills, fever, and sweats.  HENT: Denies headaches or blurry vision.  Cardiovascular: Denies chest pain or irregular heart beat.  Respiratory: Denies cough or shortness of breath.  Gastrointestinal: Denies abdominal pain, nausea, or vomiting.  Musculoskeletal:  Denies muscle cramps.  Neurological: Denies dizziness or focal weakness.  Psychiatric/Behavioral: Normal mental status.  Hematologic/Lymphatic: Denies bleeding problem or easy bruising/bleeding.  Skin: Denies rash or suspicious lesions.    Examination:    Vital Signs:    Vitals:    12/16/19 1145   BP: (!) 144/66   Pulse: 68   Weight: 118.3 kg (260 lb 12.9 oz)   Height: 5' 10" (1.778 m)   PainSc:   7   PainLoc: Knee       Body mass index is 37.42 kg/m².    This a well-developed, well nourished patient in no acute distress.    Alert and oriented x 3 and cooperative to examination.       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        Assessment: Arthritis of left knee  -     Large Joint Aspiration/Injection: L knee    Chronic pain of left " knee  -     Ambulatory referral/consult to Orthopedics        Plan:  He wound like another steroid injection. Declined euflexxa. Brace was painful and he does not wear it.    DISCLAIMER: This note may have been dictated using voice recognition software and may contain grammatical errors.     NOTE: Consult report sent to referring provider via [employer] EMR.

## 2020-01-02 DIAGNOSIS — J82.83 EOSINOPHILIC ASTHMA: ICD-10-CM

## 2020-01-08 ENCOUNTER — TELEPHONE (OUTPATIENT)
Dept: PULMONOLOGY | Facility: CLINIC | Age: 75
End: 2020-01-08

## 2020-01-08 NOTE — TELEPHONE ENCOUNTER
Spoke to patient's grandson, sent a message to clinic supervisor regarding concerns.  No further action is needed at this time.----- Message from Galilea De La Rosa sent at 1/8/2020 12:09 PM CST -----  Contact: Best Hartman (Grandchild)  Best Hartman (Grandchild) calling states that he is needing to speak to the office about upcoming appointment on 1/16/20,please...[phone]

## 2020-01-13 ENCOUNTER — TELEPHONE (OUTPATIENT)
Dept: PHARMACY | Facility: CLINIC | Age: 75
End: 2020-01-13

## 2020-01-13 RX ORDER — BENRALIZUMAB 30 MG/ML
30 INJECTION, SOLUTION SUBCUTANEOUS
Qty: 1 SYRINGE | Refills: 6 | Status: SHIPPED | OUTPATIENT
Start: 2020-01-13 | End: 2020-02-13

## 2020-01-13 NOTE — TELEPHONE ENCOUNTER
Informed Patient  that [facility] received prescription for Fasenra and benefits investigation is required.  [facility] will be back in touch once insurance determination is received.

## 2020-01-16 ENCOUNTER — CLINICAL SUPPORT (OUTPATIENT)
Dept: PULMONOLOGY | Facility: CLINIC | Age: 75
End: 2020-01-16
Payer: MEDICARE

## 2020-01-16 VITALS
HEIGHT: 70 IN | BODY MASS INDEX: 36.47 KG/M2 | HEART RATE: 77 BPM | OXYGEN SATURATION: 96 % | SYSTOLIC BLOOD PRESSURE: 130 MMHG | DIASTOLIC BLOOD PRESSURE: 73 MMHG | WEIGHT: 254.75 LBS

## 2020-01-16 DIAGNOSIS — J82.83 EOSINOPHILIC ASTHMA: ICD-10-CM

## 2020-01-16 DIAGNOSIS — J45.50 SEVERE PERSISTENT ASTHMA WITHOUT COMPLICATION: Primary | ICD-10-CM

## 2020-01-16 PROCEDURE — 99999 PR PBB SHADOW E&M-EST. PATIENT-LVL IV: CPT | Mod: PBBFAC,HCNC,,

## 2020-01-16 PROCEDURE — 99999 PR PBB SHADOW E&M-EST. PATIENT-LVL IV: ICD-10-PCS | Mod: PBBFAC,HCNC,,

## 2020-01-16 PROCEDURE — 96372 PR INJECTION,THERAP/PROPH/DIAG2ST, IM OR SUBCUT: ICD-10-PCS | Mod: HCNC,S$GLB,, | Performed by: NURSE PRACTITIONER

## 2020-01-16 PROCEDURE — 96372 THER/PROPH/DIAG INJ SC/IM: CPT | Mod: HCNC,S$GLB,, | Performed by: NURSE PRACTITIONER

## 2020-01-16 RX ORDER — ATORVASTATIN CALCIUM 40 MG/1
40 TABLET, FILM COATED ORAL NIGHTLY
COMMUNITY
Start: 2019-12-19 | End: 2021-06-30 | Stop reason: SDUPTHER

## 2020-01-28 RX ORDER — MIRABEGRON 50 MG/1
TABLET, FILM COATED, EXTENDED RELEASE ORAL
Qty: 30 TABLET | Refills: 11 | Status: SHIPPED | OUTPATIENT
Start: 2020-01-28 | End: 2023-08-11

## 2020-01-30 ENCOUNTER — OFFICE VISIT (OUTPATIENT)
Dept: FAMILY MEDICINE | Facility: CLINIC | Age: 75
End: 2020-01-30
Payer: MEDICARE

## 2020-01-30 ENCOUNTER — LAB VISIT (OUTPATIENT)
Dept: LAB | Facility: HOSPITAL | Age: 75
End: 2020-01-30
Attending: FAMILY MEDICINE
Payer: MEDICARE

## 2020-01-30 VITALS
BODY MASS INDEX: 35.18 KG/M2 | SYSTOLIC BLOOD PRESSURE: 124 MMHG | HEIGHT: 71 IN | TEMPERATURE: 98 F | DIASTOLIC BLOOD PRESSURE: 70 MMHG | WEIGHT: 251.31 LBS | HEART RATE: 63 BPM | OXYGEN SATURATION: 95 %

## 2020-01-30 DIAGNOSIS — E78.5 HYPERLIPIDEMIA ASSOCIATED WITH TYPE 2 DIABETES MELLITUS: ICD-10-CM

## 2020-01-30 DIAGNOSIS — I48.91 ATRIAL FIBRILLATION, UNSPECIFIED TYPE: ICD-10-CM

## 2020-01-30 DIAGNOSIS — E11.29 TYPE 2 DIABETES MELLITUS WITH MICROALBUMINURIA, WITHOUT LONG-TERM CURRENT USE OF INSULIN: ICD-10-CM

## 2020-01-30 DIAGNOSIS — E11.69 HYPERLIPIDEMIA ASSOCIATED WITH TYPE 2 DIABETES MELLITUS: ICD-10-CM

## 2020-01-30 DIAGNOSIS — R80.9 TYPE 2 DIABETES MELLITUS WITH MICROALBUMINURIA, WITHOUT LONG-TERM CURRENT USE OF INSULIN: ICD-10-CM

## 2020-01-30 DIAGNOSIS — G47.33 OSA ON CPAP: ICD-10-CM

## 2020-01-30 DIAGNOSIS — N18.30 CKD (CHRONIC KIDNEY DISEASE) STAGE 3, GFR 30-59 ML/MIN: ICD-10-CM

## 2020-01-30 DIAGNOSIS — N40.0 BENIGN PROSTATIC HYPERPLASIA, UNSPECIFIED WHETHER LOWER URINARY TRACT SYMPTOMS PRESENT: Primary | ICD-10-CM

## 2020-01-30 DIAGNOSIS — E11.3293 MILD NONPROLIFERATIVE DIABETIC RETINOPATHY OF BOTH EYES ASSOCIATED WITH TYPE 2 DIABETES MELLITUS, MACULAR EDEMA PRESENCE UNSPECIFIED: ICD-10-CM

## 2020-01-30 DIAGNOSIS — R60.0 BILATERAL LEG EDEMA: ICD-10-CM

## 2020-01-30 DIAGNOSIS — K63.5 HYPERPLASTIC POLYP OF DESCENDING COLON: ICD-10-CM

## 2020-01-30 LAB
ALBUMIN SERPL BCP-MCNC: 3.8 G/DL (ref 3.5–5.2)
ALP SERPL-CCNC: 87 U/L (ref 55–135)
ALT SERPL W/O P-5'-P-CCNC: 22 U/L (ref 10–44)
ANION GAP SERPL CALC-SCNC: 10 MMOL/L (ref 8–16)
AST SERPL-CCNC: 20 U/L (ref 10–40)
BASOPHILS # BLD AUTO: 0.01 K/UL (ref 0–0.2)
BASOPHILS NFR BLD: 0.2 % (ref 0–1.9)
BILIRUB SERPL-MCNC: 0.7 MG/DL (ref 0.1–1)
BUN SERPL-MCNC: 29 MG/DL (ref 8–23)
CALCIUM SERPL-MCNC: 9.6 MG/DL (ref 8.7–10.5)
CHLORIDE SERPL-SCNC: 101 MMOL/L (ref 95–110)
CO2 SERPL-SCNC: 28 MMOL/L (ref 23–29)
CREAT SERPL-MCNC: 1.3 MG/DL (ref 0.5–1.4)
DIFFERENTIAL METHOD: ABNORMAL
EOSINOPHIL # BLD AUTO: 0 K/UL (ref 0–0.5)
EOSINOPHIL NFR BLD: 0 % (ref 0–8)
ERYTHROCYTE [DISTWIDTH] IN BLOOD BY AUTOMATED COUNT: 13.4 % (ref 11.5–14.5)
EST. GFR  (AFRICAN AMERICAN): >60 ML/MIN/1.73 M^2
EST. GFR  (NON AFRICAN AMERICAN): 52.6 ML/MIN/1.73 M^2
ESTIMATED AVG GLUCOSE: 157 MG/DL (ref 68–131)
GLUCOSE SERPL-MCNC: 135 MG/DL (ref 70–110)
HBA1C MFR BLD HPLC: 7.1 % (ref 4–5.6)
HCT VFR BLD AUTO: 45.9 % (ref 40–54)
HGB BLD-MCNC: 14.1 G/DL (ref 14–18)
IMM GRANULOCYTES # BLD AUTO: 0.03 K/UL (ref 0–0.04)
IMM GRANULOCYTES NFR BLD AUTO: 0.5 % (ref 0–0.5)
LYMPHOCYTES # BLD AUTO: 1.6 K/UL (ref 1–4.8)
LYMPHOCYTES NFR BLD: 25.7 % (ref 18–48)
MAGNESIUM SERPL-MCNC: 1.9 MG/DL (ref 1.6–2.6)
MCH RBC QN AUTO: 28.8 PG (ref 27–31)
MCHC RBC AUTO-ENTMCNC: 30.7 G/DL (ref 32–36)
MCV RBC AUTO: 94 FL (ref 82–98)
MONOCYTES # BLD AUTO: 0.6 K/UL (ref 0.3–1)
MONOCYTES NFR BLD: 9.7 % (ref 4–15)
NEUTROPHILS # BLD AUTO: 4 K/UL (ref 1.8–7.7)
NEUTROPHILS NFR BLD: 63.9 % (ref 38–73)
NRBC BLD-RTO: 0 /100 WBC
PHOSPHATE SERPL-MCNC: 3.4 MG/DL (ref 2.7–4.5)
PLATELET # BLD AUTO: 143 K/UL (ref 150–350)
PMV BLD AUTO: 12.2 FL (ref 9.2–12.9)
POTASSIUM SERPL-SCNC: 4.5 MMOL/L (ref 3.5–5.1)
PROT SERPL-MCNC: 7.2 G/DL (ref 6–8.4)
PTH-INTACT SERPL-MCNC: 113 PG/ML (ref 9–77)
RBC # BLD AUTO: 4.89 M/UL (ref 4.6–6.2)
SODIUM SERPL-SCNC: 139 MMOL/L (ref 136–145)
WBC # BLD AUTO: 6.26 K/UL (ref 3.9–12.7)

## 2020-01-30 PROCEDURE — 99499 UNLISTED E&M SERVICE: CPT | Mod: HCNC,S$GLB,, | Performed by: FAMILY MEDICINE

## 2020-01-30 PROCEDURE — 99999 PR PBB SHADOW E&M-EST. PATIENT-LVL IV: CPT | Mod: PBBFAC,HCNC,, | Performed by: FAMILY MEDICINE

## 2020-01-30 PROCEDURE — 99999 PR PBB SHADOW E&M-EST. PATIENT-LVL IV: ICD-10-PCS | Mod: PBBFAC,HCNC,, | Performed by: FAMILY MEDICINE

## 2020-01-30 PROCEDURE — 1101F PT FALLS ASSESS-DOCD LE1/YR: CPT | Mod: HCNC,CPTII,S$GLB, | Performed by: FAMILY MEDICINE

## 2020-01-30 PROCEDURE — 99499 UNLISTED E&M SERVICE: CPT | Mod: HCNC,,, | Performed by: FAMILY MEDICINE

## 2020-01-30 PROCEDURE — 99499 RISK ADDL DX/OHS AUDIT: ICD-10-PCS | Mod: HCNC,S$GLB,, | Performed by: FAMILY MEDICINE

## 2020-01-30 PROCEDURE — 83036 HEMOGLOBIN GLYCOSYLATED A1C: CPT | Mod: HCNC

## 2020-01-30 PROCEDURE — 99214 PR OFFICE/OUTPT VISIT, EST, LEVL IV, 30-39 MIN: ICD-10-PCS | Mod: HCNC,S$GLB,, | Performed by: FAMILY MEDICINE

## 2020-01-30 PROCEDURE — 3074F PR MOST RECENT SYSTOLIC BLOOD PRESSURE < 130 MM HG: ICD-10-PCS | Mod: HCNC,CPTII,S$GLB, | Performed by: FAMILY MEDICINE

## 2020-01-30 PROCEDURE — 1159F PR MEDICATION LIST DOCUMENTED IN MEDICAL RECORD: ICD-10-PCS | Mod: HCNC,S$GLB,, | Performed by: FAMILY MEDICINE

## 2020-01-30 PROCEDURE — 85025 COMPLETE CBC W/AUTO DIFF WBC: CPT | Mod: HCNC

## 2020-01-30 PROCEDURE — 99214 OFFICE O/P EST MOD 30 MIN: CPT | Mod: HCNC,S$GLB,, | Performed by: FAMILY MEDICINE

## 2020-01-30 PROCEDURE — 84100 ASSAY OF PHOSPHORUS: CPT | Mod: HCNC

## 2020-01-30 PROCEDURE — 1126F PR PAIN SEVERITY QUANTIFIED, NO PAIN PRESENT: ICD-10-PCS | Mod: HCNC,S$GLB,, | Performed by: FAMILY MEDICINE

## 2020-01-30 PROCEDURE — 83735 ASSAY OF MAGNESIUM: CPT | Mod: HCNC

## 2020-01-30 PROCEDURE — 3074F SYST BP LT 130 MM HG: CPT | Mod: HCNC,CPTII,S$GLB, | Performed by: FAMILY MEDICINE

## 2020-01-30 PROCEDURE — 3051F PR MOST RECENT HEMOGLOBIN A1C LEVEL 7.0 - < 8.0%: ICD-10-PCS | Mod: HCNC,CPTII,S$GLB, | Performed by: FAMILY MEDICINE

## 2020-01-30 PROCEDURE — 3078F PR MOST RECENT DIASTOLIC BLOOD PRESSURE < 80 MM HG: ICD-10-PCS | Mod: HCNC,CPTII,S$GLB, | Performed by: FAMILY MEDICINE

## 2020-01-30 PROCEDURE — 36415 COLL VENOUS BLD VENIPUNCTURE: CPT | Mod: HCNC,PO

## 2020-01-30 PROCEDURE — 3051F HG A1C>EQUAL 7.0%<8.0%: CPT | Mod: HCNC,CPTII,S$GLB, | Performed by: FAMILY MEDICINE

## 2020-01-30 PROCEDURE — 3078F DIAST BP <80 MM HG: CPT | Mod: HCNC,CPTII,S$GLB, | Performed by: FAMILY MEDICINE

## 2020-01-30 PROCEDURE — 1101F PR PT FALLS ASSESS DOC 0-1 FALLS W/OUT INJ PAST YR: ICD-10-PCS | Mod: HCNC,CPTII,S$GLB, | Performed by: FAMILY MEDICINE

## 2020-01-30 PROCEDURE — 1126F AMNT PAIN NOTED NONE PRSNT: CPT | Mod: HCNC,S$GLB,, | Performed by: FAMILY MEDICINE

## 2020-01-30 PROCEDURE — 99499 RISK ADDL DX/OHS AUDIT: ICD-10-PCS | Mod: HCNC,,, | Performed by: FAMILY MEDICINE

## 2020-01-30 PROCEDURE — 80053 COMPREHEN METABOLIC PANEL: CPT | Mod: HCNC

## 2020-01-30 PROCEDURE — 1159F MED LIST DOCD IN RCRD: CPT | Mod: HCNC,S$GLB,, | Performed by: FAMILY MEDICINE

## 2020-01-30 PROCEDURE — 83970 ASSAY OF PARATHORMONE: CPT | Mod: HCNC

## 2020-01-30 RX ORDER — FUROSEMIDE 20 MG/1
20 TABLET ORAL
Qty: 45 TABLET | Refills: 3 | Status: SHIPPED | OUTPATIENT
Start: 2020-01-31 | End: 2020-08-11 | Stop reason: SDUPTHER

## 2020-01-30 NOTE — PATIENT INSTRUCTIONS
Low-Salt Diet  This diet removes foods that are high in salt. It also limits the amount of salt you use when cooking. It is most often used for people with high blood pressure, edema (fluid retention), and kidney, liver, or heart disease.  Table salt contains the mineral sodium. Your body needs sodium to work normally. But too much sodium can make your health problems worse. Your healthcare provider is recommending a low-salt (also called low-sodium) diet for you. Your total daily allowance of salt is 1,500 to 2,300 milligrams (mg). It is less than 1 teaspoon of table salt. This means you can have only about 500 to 700 mg of sodium at each meal. People with certain health problems should limit salt intake to the lower end of the recommended range.    When you cook, dont add much salt. If you can cook without using salt, even better. Dont add salt to your food at the table.  When shopping, read food labels. Salt is often called sodium on the label. Choose foods that are salt-free, low salt, or very low salt. Note that foods with reduced salt may not lower your salt intake enough.    Beans, potatoes, and pasta  Ok: Dry beans, split peas, lentils, potatoes, rice, macaroni, pasta, spaghetti without added salt  Avoid: Potato chips, tortilla chips, and similar products  Breads and cereals  Ok: Low-sodium breads, rolls, cereals, and cakes; low-salt crackers, matzo crackers  Avoid: Salted crackers, pretzels, popcorn, Swedish toast, pancakes, muffins  Dairy  Ok: Milk, chocolate milk, hot chocolate mix, low-salt cheeses, and yogurt  Avoid: Processed cheese and cheese spreads; Roquefort, Camembert, and cottage cheese; buttermilk, instant breakfast drink  Desserts  Ok: Ice cream, frozen yogurt, juice bars, gelatin, cookies and pies, sugar, honey, jelly, hard candy  Avoid: Most pies, cakes and cookies prepared or processed with salt; instant pudding  Drinks  Ok: Tea, coffee, fizzy (carbonated) drinks, juices  Avoid: Flavored  coffees, electrolyte replacement drinks, sports drinks  Meats  Ok: All fresh meat, fish, poultry, low-salt tuna, eggs, egg substitute  Avoid: Smoked, pickled, brine-cured, or salted meats and fish. This includes mejia, chipped beef, corned beef, hot dogs, deli meats, ham, kosher meats, salt pork, sausage, canned tuna, salted codfish, smoked salmon, herring, sardines, or anchovies.  Seasonings and spices  Ok: Most seasonings are okay. Good substitutes for salt include: fresh herb blends, hot sauce, lemon, garlic, torres, vinegar, dry mustard, parsley, cilantro, horseradish, tomato paste, regular margarine, mayonnaise, unsalted butter, cream cheese, vegetable oil, cream, low-salt salad dressing and gravy.  Avoid: Regular ketchup, relishes, pickles, soy sauce, teriyaki sauce, Worcestershire sauce, BBQ sauce, tartar sauce, meat tenderizer, chili sauce, regular gravy, regular salad dressing, salted butter  Soups  Ok: Low-salt soups and broths made with allowed foods  Avoid: Bouillon cubes, soups with smoked or salted meats, regular soup and broth  Vegetables  Ok: Most vegetables are okay; also low-salt tomato and vegetable juices  Avoid: Sauerkraut and other brine-soaked vegetables; pickles and other pickled vegetables; tomato juice, olives  Date Last Reviewed: 8/1/2016 © 2000-2017 [employer]. [address] [place] [address]. All rights reserved. This information is not intended as a substitute for professional medical care. Always follow your healthcare professional's instructions.        Exercises to Strengthen Your Lower Back  Strong lower back and abdominal muscles work together to support your spine. The exercises below will help strengthen the lower back. It is important that you begin exercising slowly and increase levels gradually.  Always begin any exercise program with stretching. If you feel pain while doing any of these exercises, stop and talk to your doctor about a more specific  exercise program that better suits your condition.   Low back stretch  The point of stretching is to make you more flexible and increase your range of motion. Stretch only as much as you are able. Stretch slowly. Do not push your stretch to the limit. If at any point you feel pain while stretching, this is your (temporary) limit.  · Lie on your back with your knees bent and both feet on the ground.  · Slowly raise your left knee to your chest as you flatten your lower back against the floor. Hold for 5 seconds.  · Relax and repeat the exercise with your right knee.  · Do 10 of these exercises for each leg.  · Repeat hugging both knees to your chest at the same time.  Building lower back strength  Start your exercise routine with 10 to 30 minutes a day, 1 to 3 times a day.  Initial exercises  Lying on your back:  1. Ankle pumps: Move your foot up and down, towards your head, and then away. Repeat 10 times with each foot.  2. Heel slides: Slowly bend your knee, drawing the heel of your foot towards you. Then slide your heel/foot from you, straightening your knee. Do not lift your foot off the floor (this is not a leg lift).  3. Abdominal contraction: Bend your knees and put your hands on your stomach. Tighten your stomach muscles. Hold for 5 seconds, then relax. Repeat 10 times.  4. Straight leg raise: Bend one leg at the knee and keep the other leg straight. Tighten your stomach muscles. Slowly lift your straight leg 6 to 12 inches off the floor and hold for up to 5 seconds. Repeat 10 times on each side.  Standin. Wall squats: Stand with your back against the wall. Move your feet about 12 inches away from the wall. Tighten your stomach muscles, and slowly bend your knees until they are at about a 45 degree angle. Do not go down too far. Hold about 5 seconds. Then slowly return to your starting position. Repeat 10 times.  2. Heel raises: Stand facing the wall. Slowly raise the heels of your feet up and down,  while keeping your toes on the floor. If you have trouble balancing, you can touch the wall with your hands. Repeat 10 times.  More advanced exercises  When you feel comfortable enough, try these exercises.  1. Kneeling lumbar extension: Begin on your hands and knees. At the same time, raise and straighten your right arm and left leg until they are parallel to the ground. Hold for 2 seconds and come back slowly to a starting position. Repeat with left arm and right leg, alternating 10 times.  2. Prone lumbar extension: Lie face down, arms extended overhead, palms on the floor. At the same time, raise your right arm and left leg as high as comfortably possible. Hold for 10 seconds and slowly return to start. Repeat with left arm and right leg, alternating 10 times. Gradually build up to 20 times. (Advanced: Repeat this exercise raising both arms and both legs a few inches off the floor at the same time. Hold for 5 seconds and release.)  3. Pelvic tilt: Lie on the floor on your back with your knees bent at 90 degrees. Your feet should be flat on the floor. Inhale, exhale, then slowly contract your abdominal muscles bringing your navel toward your spine. Let your pelvis rock back until your lower back is flat on the floor. Hold for 10 seconds while breathing smoothly.  4. Abdominal crunch: Perform a pelvic tilt (above) flattening your lower back against the floor. Holding the tension in your abdominal muscles, take another breath and raise your shoulder blades off the ground (this is not a full sit-up). Keep your head in line with your body (dont bend your neck forward). Hold for 2 seconds, then slowly lower.  Date Last Reviewed: 6/1/2016  © 6529-3826 [employer]. [address] [place] [address], [place] [address]. All rights reserved. This information is not intended as a substitute for professional medical care. Always follow your healthcare professional's instructions.        Back Exercises: Leg Pull    To  start, lie on your back with your knees bent and feet flat on the floor. Dont press your neck or lower back to the floor. Breathe deeply. You should feel comfortable and relaxed in this position.  · Pull one knee to your chest.  · Hold for 30 to 60 seconds. Return to starting position.  · Repeat 2 times.  · Switch legs.  · For a double leg pull, pull both legs to your chest at the same time. Repeat 2 times.  For your safety, check with your healthcare provider before starting an exercise program.   Date Last Reviewed: 8/16/2015  © 9794-3839 [employer]. [address] [place] [address]. All rights reserved. This information is not intended as a substitute for professional medical care. Always follow your healthcare professional's instructions.        Back Exercises: Knee Lift         To start, lie on your back with your knees bent and feet flat on the floor. Dont press your neck or lower back to the floor. Breathe deeply. You should feel comfortable and relaxed in this position:  · Start by tightening your abdominal muscles.  · Lift one bent knee off the floor 2 to 4 inches.  · Hold for 10 seconds. Return to start position.  · Repeat 3 times.  · Switch legs.  Date Last Reviewed: 8/16/2015  © 4356-6239 [employer]. [address] [place] [address]. All rights reserved. This information is not intended as a substitute for professional medical care. Always follow your healthcare professional's instructions.        Back Exercises: Pelvic Tilt    To start, lie on your back with your knees bent and feet flat on the floor. Dont press your neck or lower back to the floor. Breathe deeply. You should feel comfortable and relaxed in this position:  · Tighten your stomach and buttocks, and press your lower back toward the floor. This should be a small, subtle movement. This should not increase your pain.  · Hold for 5 to 15 seconds. Release.  · Repeat 2 to 5 times.  Date Last  Reviewed: 10/11/2015  © 9966-3458 [employer]. [address] [place] [address]. All rights reserved. This information is not intended as a substitute for professional medical care. Always follow your healthcare professional's instructions.        Back Exercises: Seated Rotation    To start, sit in a chair with your feet flat on the floor. Shift your weight slightly forward to avoid rounding your back. Relax, and keep your ears, shoulders, and hips aligned:  · Fold your arms and elbows just below shoulder height.  · Turn from the waist with hips forward. Turn your head last. Do not push through the pain.  · Hold for a count of 10 to 30. Return to starting position.  · Repeat 3 to 5 times on one side. Then switch sides.  Date Last Reviewed: 10/11/2015  © 3719-9559 [employer]. [address] [place] [address]. All rights reserved. This information is not intended as a substitute for professional medical care. Always follow your healthcare professional's instructions.        Back Exercises: Side Stretch      To start, sit in a chair with your feet flat on the floor. Shift your weight slightly forward to avoid rounding your back. Relax. Keep your ears, shoulders, and hips aligned:  · Stretch your right arm overhead.  · Slowly bend to the left. Dont twist your torso. Stay within your pain limits.  · Hold for 20 seconds. Return to starting position.  · Repeat 2 to 5 times. Then, switch to the other side.  Date Last Reviewed: 10/13/2015  © 7889-5240 [employer]. [address] [place] [address]. All rights reserved. This information is not intended as a substitute for professional medical care. Always follow your healthcare professional's instructions.        Lower Body Exercises: Hip Flexor        This exercise stretches and strengthens your lower body to help your back. As you work out, dont rush or strain. Use an exercise mat, pillow, or folded  towel to protect your knees and other sensitive areas.  · Kneel on the floor. Put one foot on the floor in front of you, with the knee slightly bent. If you need to, hold on to a chair for balance. Tighten your abdomen.  · Move your hips forward, keeping your back and shoulders upright. Feel the stretch in the front of your hip.  · Hold for 30-60 seconds. Return to starting position.  · Repeat 2 times. Switch sides.   · Repeat ___ times per day.  For your safety, check with your healthcare provider before starting an exercise program.   Date Last Reviewed: 8/16/2015  © 0088-9102 [email or web address]. [address] [place] [address], [place] [address]. All rights reserved. This information is not intended as a substitute for professional medical care. Always follow your healthcare professional's instructions.        Fall Prevention  Falls often occur due to slipping, tripping or losing your balance. Millions of people fall every year and injure themselves. Here are ways to reduce your risk of falling again.  · Think about your fall, was there anything that caused your fall that can be fixed, removed, or replaced?  · Make your home safe by keeping walkways clear of objects you may trip over.  · Use non-slip pads under rugs. Do not use area rugs or small throw rugs.  · Use non-slip mats in bathtubs and showers.  · Install handrails and lights on staircases.  · Do not walk in poorly lit areas.  · Do not stand on chairs or wobbly ladders.  · Use caution when reaching overhead or looking upward. This position can cause a loss of balance.  · Be sure your shoes fit properly, have non-slip bottoms and are in good condition.   · Wear shoes both inside and out. Avoid going barefoot or wearing slippers.  · Be cautious when going up and down stairs, curbs, and when walking on uneven sidewalks.  · If your balance is poor, consider using a cane or walker.  · If your fall was related to alcohol use, stop or limit alcohol intake.   · If  your fall was related to use of sleeping medicines, talk to your doctor about this. You may need to reduce your dosage at bedtime if you awaken during the night to go to the bathroom.    · To reduce the need for nighttime bathroom trips:  ¨ Avoid drinking fluids for several hours before going to bed  ¨ Empty your bladder before going to bed  ¨ Men can keep a urinal at the bedside  · Stay as active as you can. Balance, flexibility, strength, and endurance all come from exercise. They all play a role in preventing falls. Ask your healthcare provider which types of activity are right for you.  · Get your vision checked on a regular basis.  · If you have pets, know where they are before you stand up or walk so you don't trip over them.  · Use night lights.  Date Last Reviewed: 11/5/2015 © 2000-2017 [employer]. [address] [place] [address]. All rights reserved. This information is not intended as a substitute for professional medical care. Always follow your healthcare professional's instructions.        Mechanical Fall  You have had a fall today. It appears that the cause is what we call mechanical. That means that you slipped, tripped or lost your balance. If your fall had been due to fainting or a seizure, other tests might be required.  It is normal to feel sore and tight in your muscles and back the next day, and not just the muscles you injured at first. Remember, all the parts of your body are connected, so while initially one area hurts, the next day another may hurt. Also, when you injure yourself, it causes inflammation, which then causes the muscles to tighten up and hurt more. After the initial worsening, it should gradually improve over the next few days. However, report more severe pain.  Even without a definite head injury, you can still get a concussion from your head suddenly jerking forward, backward or sideways when falling. Concussions and even bleeding can still occur,  especially if you have had a recent injury or take blood thinner medicine. It is not unusual to have a mild headache and feel tired and even nauseous or dizzy.   Home care  1. Rest today and resume your normal activities when you are feeling back to normal.  2. If you were injured during the fall, follow the advice from your doctor regarding care of your injury.  3. At first, do not try to stretch out the sore spots. If there is a strain, stretching may make it worse.  Massage may help relax the muscles without stretching them.  4. You can use an ice pack or cold compress on and off to the sore spots 10 to 20 at a time, as often as you feel comfortable. This may help reduce the inflammation, swelling and pain.  5. If you have any scrapes or abrasions, they usually heal within 10 days. It is important to keep the abrasions clean while they initially start to heal. However, an infection may occur even with proper care, so watch for early signs of infection.  Medications  · Talk to your doctor before taking new medicines, especially if you have other medical problems or are taking other medicines.  · If you need anything for pain, you can take acetaminophen or ibuprofen, unless you were given a different pain medicine to use. Talk with your doctor before using these medicines if you have chronic liver or kidney disease, or ever had a stomach ulcer or gastrointestinal bleeding, or are taking blood thinner medicines.  · Be careful if you are given prescription pain medicines, narcotics, or medicine for muscle spasm. They can make you sleepy, dizzy and can affect your coordination, reflexes and judgment. Do not drive or do work where you can injure yourself when taking them.  Fall prevention  · Was there anything that caused your fall that can be fixed, removed, or replaced?  · Make your home safe by keeping walkways clear of objects you may trip over.  · Use non-slip pads under rugs. Don't use small area rugs or throw  rugs.  · Do not walk in poorly lit areas.  · Do not stand on chairs or wobbly ladders.  · Use caution when reaching overhead or looking upward. This position can cause a loss of balance.  · Be sure your shoes fit properly, have non-slip bottoms and are in good condition.  · Be cautious when going up and down curbs, and walking on uneven sidewalks.  · If your balance is poor, consider using a cane or walker.  · Stay as active as you can. Balance, flexibility, strength, and endurance all come from exercise. They all play a role in preventing falls.  · If you have pets, know where they are before you stand up or walk so you don't trip over them.  · Limit alcohol intake. Alcohol can cause balance problems and increase the risk of falls.  · Use night lights.  Follow-up  Follow up with your doctor or as advised by our staff. If X-rays or CT scans were done, you will be notified if there is a change in the reading, especially if it affects treatment.  Call 911  Call 911 if any of these occur:  · Trouble breathing  · Confused or difficulty arousing  · Fainting or loss of consciousness  · Rapid or very slow heart rate  · Seizure  · Difficulty with speech or vision, weakness of an arm or leg  · Difficulty walking or talking, loss of balance, numbness or weakness in one side of your body, facial droop  When to seek medical advice  Call your healthcare provider right away if any of these occur:  · Repeated mechanical falls, or unexplained falls  · Dizziness  · Severe headache  · Blood in vomit, stools (black or red color)  Date Last Reviewed: 11/5/2015 © 2000-2017 [employer]. [address] [place] [address], [place] [address]. All rights reserved. This information is not intended as a substitute for professional medical care. Always follow your healthcare professional's instructions.        Exercises to Prevent Falls  Certain types of exercises may help make you less likely to fall. Try the ones below. Or do other  "exercises that your health care provider suggests. Depending on your health, you may need to start slowly. Don't let that stop you. Even small amounts of exercise can help you. Be sure to talk to your health care provider before starting any exercise program.       Improve balance  Many types of exercise can help improve balance. Larry chi and yoga are good examples. Here's another one to try. You can do it anytime and almost anywhere.  · Stand next to a counter or solid support.  · Push yourself up onto your tiptoes.  · Hold for 5 seconds. If you start to lose your balance, hold on to the counter.  · Rest and repeat 5 times. Work up to holding for 20 to 30 seconds, if you can. Increase flexibility  Being more flexible makes it easier for you to move around safely. Try exercises like the seated hamstring stretch.  · Sit in a chair and put one foot on a stool.  · Straighten your leg and reach with both hands down either side of your leg. Reach as far down your leg as you can.  · Hold for about 20 seconds.  · Go back to the starting position. Then repeat 5 times. Switch legs. Build strength  "Resistance" exercises help build strength. You can do them without equipment. Or you can use weights, elastic bands, or special machines. One such exercise is called the biceps curl. You can hold a 1-pound weight or even a can of soup. Do this exercise at least 3 times a week. Strive for every day.  · Sit up straight in a chair.  · Keep your elbow close to your body and your wrist straight.  · Bend your arm, moving your hand up to your shoulder. Then slowly lower your arm.  · Repeat 5 times. Switch to the other arm.   Build your staying power  Aerobic exercises make your heart and lungs stronger so you can keep moving longer. Walking and swimming are two of the best types of exercises you can do. Using a stationary bike is great, too. Find an aerobic exercise that you enjoy. Start slowly and build up. Even 5 minutes is helpful. Aim " for a goal of 30 minutes, at least 3 times a week. You don't have to do 30 minutes in 1 session. Break it up and walk a little throughout the day.  More helpful tips  · Start easy. Slowly work up to doing more.  · Talk with your health care provider about the best exercises for you.  · Call senior centers or health clubs about exercise programs.  · If needed, have a family member watch you walk every so often to check your stability.  · Exercise with a friend. Choose an activity you both enjoy.  · Consider joyce chi or yoga to strengthen your balance.  · Try exercises that you can do anytime, anywhere. Here are 2 examples. Have someone with you when you first try these:  ¨ Practice walking by placing 1 foot right in front of the other.  ¨ Stand up and sit down 10 times. Repeat this throughout the day.   Date Last Reviewed: 6/13/2015  © 1421-3016 [employer]. [address] [place] [address]. All rights reserved. This information is not intended as a substitute for professional medical care. Always follow your healthcare professional's instructions.        Preventing Falls: Moving Safely Outside     When stepping off a curb with a walker, lower the walker onto the street first, then step off the curb.     Moving safely outside your home can be a challenge. Take care when walking up and down stairs and curbs. And be sure to wear sturdy, comfortable shoes and pay attention to where you step. Here are more tips to keep you from falling.  Using curbs and stairs  Curbs, steps, or uneven pavement can trip you. Take care when near them:  · Check the height of a curb before stepping up or down. Be careful with uneven and cut-out sections of curbs.  · Don't rush when crossing the street. Watch for changes in pavement height.  · On stairs, grasp the handrail and take one step at a time. If you ever feel dizzy on stairs, sit down until you feel better.  Wearing shoes that keep you safe  When you shop for  shoes, keep these things in mind:  · Choose shoes with rubber or nonskid soles. Athletic shoes are a good choice.  · Choose flats or shoes with low heels. Avoid high heels or platforms.  · All footwear should be sturdy and well-fitting. Don't wear flip-flops or backless shoes or slippers.  · Don't walk around in stocking feet. Shoes are your safest bet, even when indoors. If you like, keep 1 pair of shoes just for indoors.  Moving objects from place to place  Carrying objects can be hard, especially if you use a cane or walker. These tips can make it easier:  · Use a rolling cart to carry things like groceries.  · Wear clothes with large pockets for carrying small objects or wear a matt pack.  · Divide large loads into smaller loads. That way, you can always keep 1 hand free for grasping railings.  · Don't carry objects that block your view. That's a sure way to trip.   Date Last Reviewed: 6/13/2015  © 4209-0245 [employer]. [address] [place] [address], [place] [address]. All rights reserved. This information is not intended as a substitute for professional medical care. Always follow your healthcare professional's instructions.

## 2020-01-30 NOTE — PROGRESS NOTES
Subjective:       Patient ID: Lenny Lopez is a 75 y.o. male.    Chief Complaint: Annual Exam and Hip Pain    LUZ, partial response w/Cipap.  BPH , spray urine due to Flomax.  Polyuria due to Lasix.  CHf better  CKD better  COPd better w/Fansera    Back Pain   This is a chronic problem. The current episode started more than 1 year ago. The problem occurs intermittently. The problem is unchanged. The pain is present in the lumbar spine (hip). The quality of the pain is described as aching. The pain radiates to the left thigh and right thigh. The pain is at a severity of 6/10. The pain is mild. The pain is worse during the day. The symptoms are aggravated by standing. Associated symptoms include weakness. Pertinent negatives include no abdominal pain, bladder incontinence, chest pain, dysuria, fever, headaches, paresis or paresthesias. Risk factors include sedentary lifestyle, lack of exercise and obesity. He has tried walking for the symptoms. The treatment provided mild relief.     Review of Systems   Constitutional: Negative for fatigue, fever and unexpected weight change.   Respiratory: Negative for chest tightness and shortness of breath.    Cardiovascular: Negative for chest pain, palpitations and leg swelling.   Gastrointestinal: Negative for abdominal pain.   Genitourinary: Negative for bladder incontinence and dysuria.   Musculoskeletal: Positive for arthralgias and back pain.   Neurological: Positive for weakness. Negative for dizziness, syncope, light-headedness, headaches and paresthesias.       Patient Active Problem List   Diagnosis    Osteoarthritis    Hypertension associated with diabetes    CKD (chronic kidney disease) stage 3, GFR 39.7 ml/min    Atrial fibrillation    Constipation due to pain medication    Gout    Low testosterone    Metabolic syndrome    LVH (left ventricular hypertrophy) due to hypertensive disease    Cardiovascular risk factor, ASCVD 10-year risk 20.7%, ideal 14.1%     Chews tobacco regularly, about can a day    Type 2 diabetes mellitus with microalbuminuria, without long-term current use of insulin    Microalbuminuria    LUZ on CPAP, 100% use, 2016    Hyperlipidemia associated with type 2 diabetes mellitus    Prostatism    Bilateral leg edema    Calcified granuloma of lung    Bilateral carotid artery disease    Azotemia    Statin intolerance    Eosinophilic asthma    Coronary artery disease of native artery of native heart with stable angina pectoris    Urge urinary incontinence    Rheumatoid factor positive    Severe obesity (BMI 35.0-39.9) with comorbidity    Hyperparathyroidism    Chronic pain of left knee       Objective:      Physical Exam   Constitutional: He is oriented to person, place, and time. He appears well-developed. No distress.   HENT:   Head: Normocephalic and atraumatic.   Right Ear: External ear normal.   Left Ear: External ear normal.   Nose: Nose normal.   Mouth/Throat: Oropharynx is clear and moist. No oropharyngeal exudate.   Eyes: Pupils are equal, round, and reactive to light. Conjunctivae and EOM are normal. Right eye exhibits no discharge. Left eye exhibits no discharge. No scleral icterus.   Neck: Normal range of motion. Neck supple. No JVD present. No tracheal deviation present. No thyromegaly present.   Cardiovascular: Normal rate, normal heart sounds and intact distal pulses.   No murmur heard.  Pulmonary/Chest: Effort normal and breath sounds normal. No respiratory distress. He has no wheezes. He has no rales.   Abdominal: Soft. Bowel sounds are normal. He exhibits no distension and no mass. There is no tenderness. There is no rebound and no guarding.   Musculoskeletal: He exhibits no edema or tenderness.        Lumbar back: He exhibits decreased range of motion, bony tenderness, deformity and pain.       Preceptor 5 strength.  Adequate reflexes.  Poor balance control due to weak core muscles.   Lymphadenopathy:     He has no  cervical adenopathy.   Neurological: He is alert and oriented to person, place, and time. No cranial nerve deficit. Coordination normal.   Skin: Skin is warm and dry. No rash noted. He is not diaphoretic. No erythema. No pallor.   Psychiatric: He has a normal mood and affect. His behavior is normal. Judgment and thought content normal.   Vitals reviewed.    declined physical therapy.  Consider decreasing the pressure of the CPAP machine to a 10 cm water pressure.  Patient is aware to increase her medications if he has indeed increased weight.  Lab Results   Component Value Date    WBC 6.15 06/01/2019    HGB 13.7 (L) 06/01/2019    HCT 43.7 06/01/2019     06/01/2019    CHOL 114 (L) 03/27/2019    TRIG 176 (H) 03/27/2019    HDL 29 (L) 03/27/2019    ALT 21 03/27/2019    AST 20 03/27/2019     06/01/2019    K 4.2 06/01/2019     06/01/2019    CREATININE 1.3 06/01/2019    BUN 27 (H) 06/01/2019    CO2 24 06/01/2019    TSH 3.464 12/10/2013    PSA 3.5 12/01/2015    HGBA1C 7.2 (H) 03/27/2019     The ASCVD Risk score ([place] JEREMY Jr., et al., 2013) failed to calculate for the following reasons:    The valid total cholesterol range is 130 to 320 mg/dL    Assessment:       1. Benign prostatic hyperplasia, unspecified whether lower urinary tract symptoms present    2. Bilateral leg edema    3. Type 2 diabetes mellitus with microalbuminuria, without long-term current use of insulin    4. CKD (chronic kidney disease) stage 3, GFR 39.7 ml/min    5. Atrial fibrillation, unspecified type    6. Hyperlipidemia associated with type 2 diabetes mellitus    7. LUZ on CPAP, 100% use, 2016    8. Mild nonproliferative diabetic retinopathy of both eyes associated with type 2 diabetes mellitus, macular edema presence unspecified    9. Hyperplastic polyp of descending colon        Plan:       Benign prostatic hyperplasia, unspecified whether lower urinary tract symptoms present  -     Ambulatory referral to Urology    Bilateral leg  edema  -     furosemide (LASIX) 20 MG tablet; Take 1 tablet (20 mg total) by mouth 3 (three) times a week.  Dispense: 45 tablet; Refill: 3    Type 2 diabetes mellitus with microalbuminuria, without long-term current use of insulin  -     Hemoglobin A1c; Future; Expected date: 01/30/2020    CKD (chronic kidney disease) stage 3, GFR 39.7 ml/min  -     Comprehensive metabolic panel; Future; Expected date: 01/30/2020  -     PHOSPHORUS; Future; Expected date: 01/30/2020  -     Magnesium; Future; Expected date: 01/30/2020  -     PTH, intact; Future; Expected date: 01/30/2020  -     CBC auto differential; Future; Expected date: 01/30/2020    Atrial fibrillation, unspecified type    Hyperlipidemia associated with type 2 diabetes mellitus    LUZ on CPAP, 100% use, 2016    Mild nonproliferative diabetic retinopathy of both eyes associated with type 2 diabetes mellitus, macular edema presence unspecified    Hyperplastic polyp of descending colon  -     Ambulatory referral to Gastroenterology      Patient readiness: eager and barriers:readiness and social stressors    During the course of the visit the patient was educated and counseled about the following:     Diabetes:  Discussed general issues about diabetes pathophysiology and management.  Hypertension:   Dietary sodium restriction.  Regular aerobic exercise.    Goals: Diabetes: Maintain Hemoglobin A1C below 7 and Hypertension: Reduce Blood Pressure    Did patient meet goals/outcomes: Yes    The following self management tools provided: blood pressure log  blood glucose log    Patient Instructions (the written plan) was given to the patient/family.     Time spent with patient: 30 minutes    Barriers to medications present (no )    Adverse reactions to current medications (yes)    Over the counter medications reviewed (Yes)        30-35-minute visit. 20 minutes spent counseling patient on diet, exercise, and weight loss.  This has been fully explained to the patient, who  indicates understanding.    Discussed health maintenance guidelines appropriate for age.

## 2020-01-31 ENCOUNTER — TELEPHONE (OUTPATIENT)
Dept: UROLOGY | Facility: CLINIC | Age: 75
End: 2020-01-31

## 2020-01-31 ENCOUNTER — TELEPHONE (OUTPATIENT)
Dept: FAMILY MEDICINE | Facility: CLINIC | Age: 75
End: 2020-01-31

## 2020-01-31 NOTE — TELEPHONE ENCOUNTER
----- Message from Alma  sent at 1/31/2020  4:14 PM CST -----  Contact: Joseluis Jewell  Type: Needs Medical Advice    Who Called:      Best Call Back Number:   Additional Information: Requesting a call back from Nurse, regarding pt came to  And a note was wrote on paper to call and ask for  nurse ,please call back  On number provided

## 2020-01-31 NOTE — TELEPHONE ENCOUNTER
No noted call to patient from office. Call placed to patient for notification. Patient verbalized understanding.       ----- Message from Ren Padron sent at 1/31/2020  2:13 PM CST -----  Contact: same  Patient called in and stated someone may have called him from office & could have been about his lab results that he had done on 1/30/2020 at the [address] [place] [address] location.  Call back number is [phone]

## 2020-01-31 NOTE — PROGRESS NOTES
I note some minor lab abnormalities that have been stable over time, these are of doubtful clinical significance.  Normal blood count  Need vitamin-D 2000 daily.  Increase hydration.  Diabetes is still mildly uncontrolled and needs high-fiber diet.

## 2020-01-31 NOTE — TELEPHONE ENCOUNTER
Spoke w pt regarding referral placed by Dr Shah. Pt voiced that he was unsure if appt was needed. States he is a pt of Dr Zaragoza and would like his take in if appt was needed or not pt voiced that he still has a leaky bladder doesn't think oab meds prescribed is not helping.    Please discuss with Dr Zaragoza and address with pt.    Please advise

## 2020-02-11 ENCOUNTER — TELEPHONE (OUTPATIENT)
Dept: FAMILY MEDICINE | Facility: CLINIC | Age: 75
End: 2020-02-11

## 2020-02-11 NOTE — TELEPHONE ENCOUNTER
Spoke to patient's wife/grandson who reports patient has been experiencing cough x's 4 days. States patient is coughing up green colored phlegm. Denies fever/sob. Denies using any OTC medications. No available appointments today in [place]. Offered to locate appointment in [facility] today. Patient's wife declined to schedule in [place]. Offered suggestion to be seen at urgent care facility today. Patient's wife/grandson declined. Appointment scheduled for 2-. Patient's grandson/wife agreed to appointment date and time. Appointment added to wait list for earlier access.

## 2020-02-11 NOTE — TELEPHONE ENCOUNTER
----- Message from Gabriel Mccarthy sent at 2/11/2020 10:35 AM CST -----  Contact: pt's wife Halie   Type:  Same Day Appointment Request    Caller is requesting a same day appointment.  Caller declined first available appointment listed below.      Name of Caller:  Halie   When is the first available appointment?  02/13/2020  Symptoms:  cough  Best Call Back Number:  [phone]  Additional Information:

## 2020-02-13 ENCOUNTER — HOSPITAL ENCOUNTER (OUTPATIENT)
Dept: RADIOLOGY | Facility: CLINIC | Age: 75
Discharge: HOME | End: 2020-02-13
Attending: NURSE PRACTITIONER
Payer: MEDICARE

## 2020-02-13 ENCOUNTER — OFFICE VISIT (OUTPATIENT)
Dept: FAMILY MEDICINE | Facility: CLINIC | Age: 75
End: 2020-02-13
Payer: MEDICARE

## 2020-02-13 VITALS
BODY MASS INDEX: 36.29 KG/M2 | DIASTOLIC BLOOD PRESSURE: 72 MMHG | OXYGEN SATURATION: 97 % | HEIGHT: 71 IN | TEMPERATURE: 98 F | HEART RATE: 87 BPM | SYSTOLIC BLOOD PRESSURE: 140 MMHG | WEIGHT: 259.25 LBS

## 2020-02-13 DIAGNOSIS — I15.2 HYPERTENSION ASSOCIATED WITH DIABETES: ICD-10-CM

## 2020-02-13 DIAGNOSIS — J44.1 COPD EXACERBATION: ICD-10-CM

## 2020-02-13 DIAGNOSIS — E11.59 HYPERTENSION ASSOCIATED WITH DIABETES: ICD-10-CM

## 2020-02-13 DIAGNOSIS — J44.1 COPD EXACERBATION: Primary | ICD-10-CM

## 2020-02-13 PROCEDURE — 1101F PR PT FALLS ASSESS DOC 0-1 FALLS W/OUT INJ PAST YR: ICD-10-PCS | Mod: HCNC,CPTII,S$GLB, | Performed by: NURSE PRACTITIONER

## 2020-02-13 PROCEDURE — 3051F PR MOST RECENT HEMOGLOBIN A1C LEVEL 7.0 - < 8.0%: ICD-10-PCS | Mod: HCNC,CPTII,S$GLB, | Performed by: NURSE PRACTITIONER

## 2020-02-13 PROCEDURE — 3077F PR MOST RECENT SYSTOLIC BLOOD PRESSURE >= 140 MM HG: ICD-10-PCS | Mod: HCNC,CPTII,S$GLB, | Performed by: NURSE PRACTITIONER

## 2020-02-13 PROCEDURE — 1126F PR PAIN SEVERITY QUANTIFIED, NO PAIN PRESENT: ICD-10-PCS | Mod: HCNC,S$GLB,, | Performed by: NURSE PRACTITIONER

## 2020-02-13 PROCEDURE — 99999 PR PBB SHADOW E&M-EST. PATIENT-LVL III: CPT | Mod: PBBFAC,HCNC,, | Performed by: NURSE PRACTITIONER

## 2020-02-13 PROCEDURE — 1101F PT FALLS ASSESS-DOCD LE1/YR: CPT | Mod: HCNC,CPTII,S$GLB, | Performed by: NURSE PRACTITIONER

## 2020-02-13 PROCEDURE — 1159F PR MEDICATION LIST DOCUMENTED IN MEDICAL RECORD: ICD-10-PCS | Mod: HCNC,S$GLB,, | Performed by: NURSE PRACTITIONER

## 2020-02-13 PROCEDURE — 71046 X-RAY EXAM CHEST 2 VIEWS: CPT | Mod: TC,HCNC,FY,PO

## 2020-02-13 PROCEDURE — 99999 PR PBB SHADOW E&M-EST. PATIENT-LVL III: ICD-10-PCS | Mod: PBBFAC,HCNC,, | Performed by: NURSE PRACTITIONER

## 2020-02-13 PROCEDURE — 1126F AMNT PAIN NOTED NONE PRSNT: CPT | Mod: HCNC,S$GLB,, | Performed by: NURSE PRACTITIONER

## 2020-02-13 PROCEDURE — 99213 OFFICE O/P EST LOW 20 MIN: CPT | Mod: 25,HCNC,S$GLB, | Performed by: NURSE PRACTITIONER

## 2020-02-13 PROCEDURE — 99499 UNLISTED E&M SERVICE: CPT | Mod: HCNC,S$GLB,, | Performed by: NURSE PRACTITIONER

## 2020-02-13 PROCEDURE — 96372 THER/PROPH/DIAG INJ SC/IM: CPT | Mod: HCNC,S$GLB,, | Performed by: NURSE PRACTITIONER

## 2020-02-13 PROCEDURE — 3077F SYST BP >= 140 MM HG: CPT | Mod: HCNC,CPTII,S$GLB, | Performed by: NURSE PRACTITIONER

## 2020-02-13 PROCEDURE — 96372 PR INJECTION,THERAP/PROPH/DIAG2ST, IM OR SUBCUT: ICD-10-PCS | Mod: HCNC,S$GLB,, | Performed by: NURSE PRACTITIONER

## 2020-02-13 PROCEDURE — 71046 XR CHEST PA AND LATERAL: ICD-10-PCS | Mod: 26,HCNC,, | Performed by: RADIOLOGY

## 2020-02-13 PROCEDURE — 99213 PR OFFICE/OUTPT VISIT, EST, LEVL III, 20-29 MIN: ICD-10-PCS | Mod: 25,HCNC,S$GLB, | Performed by: NURSE PRACTITIONER

## 2020-02-13 PROCEDURE — 1159F MED LIST DOCD IN RCRD: CPT | Mod: HCNC,S$GLB,, | Performed by: NURSE PRACTITIONER

## 2020-02-13 PROCEDURE — 3078F DIAST BP <80 MM HG: CPT | Mod: HCNC,CPTII,S$GLB, | Performed by: NURSE PRACTITIONER

## 2020-02-13 PROCEDURE — 3051F HG A1C>EQUAL 7.0%<8.0%: CPT | Mod: HCNC,CPTII,S$GLB, | Performed by: NURSE PRACTITIONER

## 2020-02-13 PROCEDURE — 3078F PR MOST RECENT DIASTOLIC BLOOD PRESSURE < 80 MM HG: ICD-10-PCS | Mod: HCNC,CPTII,S$GLB, | Performed by: NURSE PRACTITIONER

## 2020-02-13 PROCEDURE — 71046 X-RAY EXAM CHEST 2 VIEWS: CPT | Mod: 26,HCNC,, | Performed by: RADIOLOGY

## 2020-02-13 PROCEDURE — 99499 RISK ADDL DX/OHS AUDIT: ICD-10-PCS | Mod: HCNC,S$GLB,, | Performed by: NURSE PRACTITIONER

## 2020-02-13 RX ORDER — PREDNISONE 20 MG/1
20 TABLET ORAL DAILY
Qty: 5 TABLET | Refills: 0 | Status: SHIPPED | OUTPATIENT
Start: 2020-02-13 | End: 2020-02-18

## 2020-02-13 RX ORDER — DEXAMETHASONE SODIUM PHOSPHATE 4 MG/ML
8 INJECTION, SOLUTION INTRA-ARTICULAR; INTRALESIONAL; INTRAMUSCULAR; INTRAVENOUS; SOFT TISSUE
Status: DISCONTINUED | OUTPATIENT
Start: 2020-02-13 | End: 2020-02-13

## 2020-02-13 RX ORDER — DOXYCYCLINE HYCLATE 100 MG
100 TABLET ORAL 2 TIMES DAILY
Qty: 14 TABLET | Refills: 0 | Status: SHIPPED | OUTPATIENT
Start: 2020-02-13 | End: 2020-02-20

## 2020-02-13 RX ORDER — DEXAMETHASONE SODIUM PHOSPHATE 4 MG/ML
8 INJECTION, SOLUTION INTRA-ARTICULAR; INTRALESIONAL; INTRAMUSCULAR; INTRAVENOUS; SOFT TISSUE ONCE
Status: COMPLETED | OUTPATIENT
Start: 2020-02-13 | End: 2020-02-13

## 2020-02-13 RX ADMIN — DEXAMETHASONE SODIUM PHOSPHATE 8 MG: 4 INJECTION, SOLUTION INTRA-ARTICULAR; INTRALESIONAL; INTRAMUSCULAR; INTRAVENOUS; SOFT TISSUE at 02:02

## 2020-02-13 NOTE — PROGRESS NOTES
Patient verified by name and . Patient received 8 mg Dexamethasone in R ventrogluteal. Patient tolerated injection well. Patient advised to wait in clinic for 15 minutes in case of adverse reactions. Patient verbalized understanding. .

## 2020-02-13 NOTE — PROGRESS NOTES
"Subjective:       Patient ID: Lenny Lopez is a 75 y.o. male.    Chief Complaint: Cough    HPI   Mr. Lopez is a 67 yo male who presents today with c/o cough.  This began about two days ago.  He has a history of COPD.  He is regularly followed by Dr. Allan.  He presents with his grandson who notes that it has been a "long time" since he had had an exacerbation.  The cough is productive of mucous that is thicker than his usual.  He had started doing nebulizer treatments and this is helping.  He denies any fever, chills, body aches.    Vitals:    02/13/20 1323   BP: (!) 140/72   Pulse: 87   Temp: 98.2 °F (36.8 °C)     Past Medical History:   Diagnosis Date    Angina pectoris     Arthritis     Asthma     Atrial fibrillation     Back pain     Cardiac pacemaker     Cataract     Chronic bronchitis     COPD (chronic obstructive pulmonary disease)     Coronary artery disease     DM (diabetes mellitus), type 2, 2000 12/12/2014    Gout     Hyperlipidemia     Hypertension     Kidney stones 12/17/2012    Nephrolithiasis     Obesity     Renal manifestation of secondary diabetes mellitus     Rheumatoid factor positive 03/08/2017    Negative work up with Dr. Choudhury    Sleep apnea     Thyroid disease     Unspecified disorder of kidney and ureter     Urinary incontinence      Review of Systems   Constitutional: Negative for chills, fatigue and fever.   HENT: Negative for congestion, hearing loss, sinus pressure, sinus pain and sore throat.    Eyes: Negative for pain, discharge and itching.   Respiratory: Positive for cough, shortness of breath and wheezing.    Cardiovascular: Negative for chest pain and palpitations.   Gastrointestinal: Negative for diarrhea, nausea and vomiting.   Genitourinary: Negative for dysuria.   Musculoskeletal: Negative for myalgias.   Skin: Negative for color change, pallor and rash.   Neurological: Negative for headaches.   Psychiatric/Behavioral: Negative for dysphoric mood. "       Objective:      Physical Exam   Constitutional: He is oriented to person, place, and time. He appears ill.   HENT:   Head: Normocephalic and atraumatic.   Nose: Nose normal.   Eyes: Conjunctivae, EOM and lids are normal.   Cardiovascular: Normal rate, regular rhythm, S1 normal, S2 normal and normal heart sounds.   Pulmonary/Chest: No respiratory distress. He has wheezes in the right upper field and the left upper field. He has no rhonchi. He has no rales.   Neurological: He is alert and oriented to person, place, and time.   Skin: Skin is warm, dry and intact.   Psychiatric: He has a normal mood and affect.       Assessment & Plan:       COPD exacerbation  -     Discontinue: dexamethasone injection 8 mg  -     X-Ray Chest PA And Lateral; Future; Expected date: 02/13/2020  -     dexamethasone injection 8 mg  -     doxycycline (VIBRA-TABS) 100 MG tablet; Take 1 tablet (100 mg total) by mouth 2 (two) times daily. for 7 days  Dispense: 14 tablet; Refill: 0  -     predniSONE (DELTASONE) 20 MG tablet; Take 1 tablet (20 mg total) by mouth once daily. for 5 days  Dispense: 5 tablet; Refill: 0        -     Continue nebulizer treatments         -     Follow up in one week or sooner PRN  Hypertension associated with diabetes  -controlled, continue current medication regimen  -Low salt diet         Follow up in about 1 week (around 2/20/2020) for follow up COPD exacerbation .

## 2020-02-14 ENCOUNTER — PATIENT MESSAGE (OUTPATIENT)
Dept: FAMILY MEDICINE | Facility: CLINIC | Age: 75
End: 2020-02-14

## 2020-02-14 ENCOUNTER — PES CALL (OUTPATIENT)
Dept: ADMINISTRATIVE | Facility: CLINIC | Age: 75
End: 2020-02-14

## 2020-02-17 NOTE — PROGRESS NOTES
Patient, Lenny Lopez (MRN #[id number]), presented with a recorded BMI of 36.16 kg/m^2 and a documented comorbidity(s):  - Hypertension  to which the severe obesity is a contributing factor. This is consistent with the definition of severe obesity (BMI 35.0-39.9) with comorbidity (ICD-10 E66.01, Z68.35). The patient's severe obesity was monitored, evaluated, addressed and/or treated. This addendum to the medical record is made on 02/17/2020.

## 2020-02-20 ENCOUNTER — OFFICE VISIT (OUTPATIENT)
Dept: FAMILY MEDICINE | Facility: CLINIC | Age: 75
End: 2020-02-20
Payer: MEDICARE

## 2020-02-20 VITALS
HEART RATE: 72 BPM | HEIGHT: 71 IN | WEIGHT: 257.69 LBS | SYSTOLIC BLOOD PRESSURE: 116 MMHG | DIASTOLIC BLOOD PRESSURE: 68 MMHG | TEMPERATURE: 98 F | OXYGEN SATURATION: 95 % | BODY MASS INDEX: 36.08 KG/M2

## 2020-02-20 DIAGNOSIS — I15.2 HYPERTENSION ASSOCIATED WITH DIABETES: ICD-10-CM

## 2020-02-20 DIAGNOSIS — E11.59 HYPERTENSION ASSOCIATED WITH DIABETES: ICD-10-CM

## 2020-02-20 DIAGNOSIS — J44.1 COPD EXACERBATION: Primary | ICD-10-CM

## 2020-02-20 PROCEDURE — 99213 OFFICE O/P EST LOW 20 MIN: CPT | Mod: HCNC,S$GLB,, | Performed by: NURSE PRACTITIONER

## 2020-02-20 PROCEDURE — 99213 PR OFFICE/OUTPT VISIT, EST, LEVL III, 20-29 MIN: ICD-10-PCS | Mod: HCNC,S$GLB,, | Performed by: NURSE PRACTITIONER

## 2020-02-20 PROCEDURE — 1126F AMNT PAIN NOTED NONE PRSNT: CPT | Mod: HCNC,S$GLB,, | Performed by: NURSE PRACTITIONER

## 2020-02-20 PROCEDURE — 99999 PR PBB SHADOW E&M-EST. PATIENT-LVL III: CPT | Mod: PBBFAC,HCNC,, | Performed by: NURSE PRACTITIONER

## 2020-02-20 PROCEDURE — 3074F SYST BP LT 130 MM HG: CPT | Mod: HCNC,CPTII,S$GLB, | Performed by: NURSE PRACTITIONER

## 2020-02-20 PROCEDURE — 3078F PR MOST RECENT DIASTOLIC BLOOD PRESSURE < 80 MM HG: ICD-10-PCS | Mod: HCNC,CPTII,S$GLB, | Performed by: NURSE PRACTITIONER

## 2020-02-20 PROCEDURE — 1101F PT FALLS ASSESS-DOCD LE1/YR: CPT | Mod: HCNC,CPTII,S$GLB, | Performed by: NURSE PRACTITIONER

## 2020-02-20 PROCEDURE — 3078F DIAST BP <80 MM HG: CPT | Mod: HCNC,CPTII,S$GLB, | Performed by: NURSE PRACTITIONER

## 2020-02-20 PROCEDURE — 99999 PR PBB SHADOW E&M-EST. PATIENT-LVL III: ICD-10-PCS | Mod: PBBFAC,HCNC,, | Performed by: NURSE PRACTITIONER

## 2020-02-20 PROCEDURE — 1159F MED LIST DOCD IN RCRD: CPT | Mod: HCNC,S$GLB,, | Performed by: NURSE PRACTITIONER

## 2020-02-20 PROCEDURE — 3051F HG A1C>EQUAL 7.0%<8.0%: CPT | Mod: HCNC,CPTII,S$GLB, | Performed by: NURSE PRACTITIONER

## 2020-02-20 PROCEDURE — 1101F PR PT FALLS ASSESS DOC 0-1 FALLS W/OUT INJ PAST YR: ICD-10-PCS | Mod: HCNC,CPTII,S$GLB, | Performed by: NURSE PRACTITIONER

## 2020-02-20 PROCEDURE — 1126F PR PAIN SEVERITY QUANTIFIED, NO PAIN PRESENT: ICD-10-PCS | Mod: HCNC,S$GLB,, | Performed by: NURSE PRACTITIONER

## 2020-02-20 PROCEDURE — 1159F PR MEDICATION LIST DOCUMENTED IN MEDICAL RECORD: ICD-10-PCS | Mod: HCNC,S$GLB,, | Performed by: NURSE PRACTITIONER

## 2020-02-20 PROCEDURE — 3074F PR MOST RECENT SYSTOLIC BLOOD PRESSURE < 130 MM HG: ICD-10-PCS | Mod: HCNC,CPTII,S$GLB, | Performed by: NURSE PRACTITIONER

## 2020-02-20 PROCEDURE — 3051F PR MOST RECENT HEMOGLOBIN A1C LEVEL 7.0 - < 8.0%: ICD-10-PCS | Mod: HCNC,CPTII,S$GLB, | Performed by: NURSE PRACTITIONER

## 2020-02-20 RX ORDER — DOXYCYCLINE HYCLATE 100 MG
100 TABLET ORAL 2 TIMES DAILY
Qty: 6 TABLET | Refills: 0 | Status: SHIPPED | OUTPATIENT
Start: 2020-02-20 | End: 2020-02-23

## 2020-02-20 NOTE — PROGRESS NOTES
Subjective:       Patient ID: Lenny Lopez is a 75 y.o. male.    Chief Complaint: Follow-up (COPD exacerbation )    HPI   Mr. Lopez is a 76 yo male who presents today for follow up.  He was seen one week ago and diagnosed with a COPD exacerbation.  He was given a prescription for antibiotics and steroids.  Today he reports that his symptoms are improved, but not completely resolved.  He reports that he had some additional steroids provided by his pulmonologist that he has been taking.  He has only been doing his nebulizer once daily.  He denies fever.    Vitals:    02/20/20 1332   BP: 116/68   Pulse: 72   Temp: 97.9 °F (36.6 °C)     Review of Systems   Constitutional: Negative for chills, fatigue, fever and unexpected weight change.   HENT: Negative for congestion, hearing loss, nosebleeds, postnasal drip, sinus pressure, sinus pain and sore throat.    Eyes: Negative for pain, discharge, redness and visual disturbance.   Respiratory: Positive for cough (improving). Negative for chest tightness, shortness of breath and wheezing.    Cardiovascular: Negative for chest pain and palpitations.   Gastrointestinal: Negative for abdominal pain, constipation, diarrhea, nausea and vomiting.   Endocrine: Negative for cold intolerance and heat intolerance.   Musculoskeletal: Negative for back pain.   Neurological: Negative for dizziness, syncope, light-headedness and headaches.   Psychiatric/Behavioral: Negative for agitation and dysphoric mood. The patient is not nervous/anxious.        Objective:      Physical Exam   Constitutional: He is oriented to person, place, and time. He appears well-developed and well-nourished.   HENT:   Head: Normocephalic and atraumatic.   Eyes: Pupils are equal, round, and reactive to light. Conjunctivae are normal. Right eye exhibits no discharge. Left eye exhibits no discharge.   Neck: Normal range of motion. Neck supple. No thyromegaly present.   Cardiovascular: Normal rate, regular  rhythm, normal heart sounds and intact distal pulses.   Pulmonary/Chest: Breath sounds normal. No respiratory distress. He has no wheezes.   Musculoskeletal: Normal range of motion. He exhibits no edema or deformity.   Lymphadenopathy:     He has no cervical adenopathy.   Neurological: He is alert and oriented to person, place, and time. No cranial nerve deficit or sensory deficit.   Skin: Skin is warm and dry. No rash noted.   Psychiatric: He has a normal mood and affect.       Assessment & Plan:       COPD exacerbation  -     doxycycline (VIBRA-TABS) 100 MG tablet; Take 1 tablet (100 mg total) by mouth 2 (two) times daily. for 3 days  Dispense: 6 tablet; Refill: 0        -     Additional antibiotics as above        -     Increase nebulizer to every 6 hours until resolution of symptoms         -     If no improvement, notify provider   Hypertension associated with diabetes  -Controlled, continue current medication regimen         Follow up if symptoms worsen or fail to improve.

## 2020-02-20 NOTE — PATIENT INSTRUCTIONS
Nebulizer treatment every 6 hours  Three more days of antibiotics   Plain mucinex twice daily with a large glass of water

## 2020-02-26 DIAGNOSIS — J45.50 SEVERE PERSISTENT ASTHMA WITHOUT COMPLICATION: Primary | ICD-10-CM

## 2020-02-26 DIAGNOSIS — J41.1 CHRONIC BRONCHITIS, MUCOPURULENT: ICD-10-CM

## 2020-02-26 DIAGNOSIS — J45.50 SEVERE PERSISTENT ASTHMA, UNSPECIFIED WHETHER COMPLICATED: ICD-10-CM

## 2020-02-27 ENCOUNTER — TELEPHONE (OUTPATIENT)
Dept: PULMONOLOGY | Facility: CLINIC | Age: 75
End: 2020-02-27

## 2020-02-27 NOTE — TELEPHONE ENCOUNTER
----- Message from Shaq Phillips PharmD sent at 2/21/2020  3:11 PM CST -----  Regarding: Fasenra Script  Good afternoon,     The patient's Fasenra copay is $1,043.53, which is unaffordable to the patient at this time. There are currently no andrew options and no financial assistance available for in-office Fasenra.     There is a possibility to get financial assistance from the  for the Fasenra auto-injector. If appropriate, please send a script to [facility] for the auto-injector, so we may begin pursuing copay assistance.      Thanks,     Shaq Phillips, [place]   Clinical Pharmacist   [facility]   P: [phone]

## 2020-02-28 DIAGNOSIS — J45.50 SEVERE PERSISTENT ASTHMA WITHOUT COMPLICATION: ICD-10-CM

## 2020-03-03 NOTE — TELEPHONE ENCOUNTER
Action Required:     I have faxed over 's assistance forms to your office. The forms need to be completed by the physician for the patient's [facility] prescription assistance. Please complete the prescription sections of the forms.     Once completed, you can fax them to [facility]. Any questions or concerns regarding the program or completion of the forms, please reach out to Kayla WASHINGTON at [facility].     Thank you,    Kayla Ponce  [facility] [phone] ([phone])

## 2020-03-06 DIAGNOSIS — N40.0 PROSTATISM: ICD-10-CM

## 2020-03-06 RX ORDER — TAMSULOSIN HYDROCHLORIDE 0.4 MG/1
0.4 CAPSULE ORAL DAILY
Qty: 30 CAPSULE | Refills: 5 | Status: SHIPPED | OUTPATIENT
Start: 2020-03-06 | End: 2020-09-11 | Stop reason: SDUPTHER

## 2020-03-25 DIAGNOSIS — J45.50 SEVERE PERSISTENT ASTHMA WITHOUT COMPLICATION: Primary | ICD-10-CM

## 2020-03-27 DIAGNOSIS — J45.50 SEVERE PERSISTENT ASTHMA WITHOUT COMPLICATION: ICD-10-CM

## 2020-03-29 NOTE — PROGRESS NOTES
Patient, Lenny Lopez (MRN #[id number]), presented with a recent Platelet count less than 150 K/uL consistent with the definition of thrombocytopenia (ICD10 - D69.6).    Platelets   Date Value Ref Range Status   01/30/2020 143 (L) 150 - 350 K/uL Final     The patient's thrombocytopenia was monitored, evaluated, addressed and/or treated. This addendum to the medical record is made on 03/29/2020.

## 2020-05-05 ENCOUNTER — PATIENT MESSAGE (OUTPATIENT)
Dept: ADMINISTRATIVE | Facility: HOSPITAL | Age: 75
End: 2020-05-05

## 2020-05-18 DIAGNOSIS — J82.83 EOSINOPHILIC ASTHMA: Primary | ICD-10-CM

## 2020-07-18 ENCOUNTER — PATIENT MESSAGE (OUTPATIENT)
Dept: FAMILY MEDICINE | Facility: CLINIC | Age: 75
End: 2020-07-18

## 2020-07-31 ENCOUNTER — TELEPHONE (OUTPATIENT)
Dept: FAMILY MEDICINE | Facility: CLINIC | Age: 75
End: 2020-07-31

## 2020-07-31 DIAGNOSIS — E11.29 TYPE 2 DIABETES MELLITUS WITH MICROALBUMINURIA, WITHOUT LONG-TERM CURRENT USE OF INSULIN: ICD-10-CM

## 2020-07-31 DIAGNOSIS — N18.30 CKD (CHRONIC KIDNEY DISEASE) STAGE 3, GFR 30-59 ML/MIN: Primary | ICD-10-CM

## 2020-07-31 DIAGNOSIS — I15.2 HYPERTENSION ASSOCIATED WITH DIABETES: ICD-10-CM

## 2020-07-31 DIAGNOSIS — E11.59 HYPERTENSION ASSOCIATED WITH DIABETES: ICD-10-CM

## 2020-07-31 DIAGNOSIS — R80.9 TYPE 2 DIABETES MELLITUS WITH MICROALBUMINURIA, WITHOUT LONG-TERM CURRENT USE OF INSULIN: ICD-10-CM

## 2020-07-31 NOTE — TELEPHONE ENCOUNTER
----- Message from Vicente Sebastian sent at 7/31/2020 10:45 AM CDT -----  Regarding: blood work orders  Contact: Best  Type:  Patient Returning Call    Who Called:  Best  Does the patient know what this is regarding?:  pt needs blood work orders put in grandson stated pt has more swelling than normal  Best Call Back Number:    Additional Information:  Please follow up. Thank you

## 2020-08-06 ENCOUNTER — TELEPHONE (OUTPATIENT)
Dept: FAMILY MEDICINE | Facility: CLINIC | Age: 75
End: 2020-08-06

## 2020-08-06 ENCOUNTER — LAB VISIT (OUTPATIENT)
Dept: LAB | Facility: HOSPITAL | Age: 75
End: 2020-08-06
Attending: FAMILY MEDICINE
Payer: MEDICARE

## 2020-08-06 DIAGNOSIS — R80.9 TYPE 2 DIABETES MELLITUS WITH MICROALBUMINURIA, WITHOUT LONG-TERM CURRENT USE OF INSULIN: ICD-10-CM

## 2020-08-06 DIAGNOSIS — E11.29 TYPE 2 DIABETES MELLITUS WITH MICROALBUMINURIA, WITHOUT LONG-TERM CURRENT USE OF INSULIN: ICD-10-CM

## 2020-08-06 DIAGNOSIS — N18.30 CKD (CHRONIC KIDNEY DISEASE) STAGE 3, GFR 30-59 ML/MIN: ICD-10-CM

## 2020-08-06 LAB
ALBUMIN SERPL BCP-MCNC: 3.7 G/DL (ref 3.5–5.2)
ALP SERPL-CCNC: 100 U/L (ref 55–135)
ALT SERPL W/O P-5'-P-CCNC: 23 U/L (ref 10–44)
ANION GAP SERPL CALC-SCNC: 8 MMOL/L (ref 8–16)
AST SERPL-CCNC: 21 U/L (ref 10–40)
BILIRUB SERPL-MCNC: 0.7 MG/DL (ref 0.1–1)
BUN SERPL-MCNC: 23 MG/DL (ref 8–23)
CALCIUM SERPL-MCNC: 9.5 MG/DL (ref 8.7–10.5)
CHLORIDE SERPL-SCNC: 103 MMOL/L (ref 95–110)
CHOLEST SERPL-MCNC: 113 MG/DL (ref 120–199)
CHOLEST/HDLC SERPL: 3.9 {RATIO} (ref 2–5)
CO2 SERPL-SCNC: 29 MMOL/L (ref 23–29)
CREAT SERPL-MCNC: 1.3 MG/DL (ref 0.5–1.4)
EST. GFR  (AFRICAN AMERICAN): >60 ML/MIN/1.73 M^2
EST. GFR  (NON AFRICAN AMERICAN): 52.6 ML/MIN/1.73 M^2
ESTIMATED AVG GLUCOSE: 180 MG/DL (ref 68–131)
GLUCOSE SERPL-MCNC: 177 MG/DL (ref 70–110)
HBA1C MFR BLD HPLC: 7.9 % (ref 4–5.6)
HDLC SERPL-MCNC: 29 MG/DL (ref 40–75)
HDLC SERPL: 25.7 % (ref 20–50)
LDLC SERPL CALC-MCNC: 61.2 MG/DL (ref 63–159)
NONHDLC SERPL-MCNC: 84 MG/DL
POTASSIUM SERPL-SCNC: 4.2 MMOL/L (ref 3.5–5.1)
PROT SERPL-MCNC: 7 G/DL (ref 6–8.4)
PTH-INTACT SERPL-MCNC: 107 PG/ML (ref 9–77)
SODIUM SERPL-SCNC: 140 MMOL/L (ref 136–145)
TRIGL SERPL-MCNC: 114 MG/DL (ref 30–150)

## 2020-08-06 PROCEDURE — 36415 COLL VENOUS BLD VENIPUNCTURE: CPT | Mod: HCNC,PO

## 2020-08-06 PROCEDURE — 83036 HEMOGLOBIN GLYCOSYLATED A1C: CPT | Mod: HCNC

## 2020-08-06 PROCEDURE — 83970 ASSAY OF PARATHORMONE: CPT | Mod: HCNC

## 2020-08-06 PROCEDURE — 80061 LIPID PANEL: CPT | Mod: HCNC

## 2020-08-06 PROCEDURE — 80053 COMPREHEN METABOLIC PANEL: CPT | Mod: HCNC

## 2020-08-06 NOTE — TELEPHONE ENCOUNTER
Patient's grandson (Best) requesting orders for urinalysis to check for protein in urine. States patient has not had this testing in a number of months. Spoke to Dr Shah verbally regarding. States will review this request at upcoming office visit on 8-. Mr Jewell notified. Verbalized understanding.         ----- Message from Nancy Carballo sent at 8/6/2020 11:05 AM CDT -----  Regarding: order  Contact: grand son  Type: Needs Medical Advice  Who Called:  grand son - Best  Symptoms (please be specific):    How long has patient had these symptoms:    Pharmacy name and phone #:   Best Call Back Number: [phone]  Additional Information: The patient's grand son states the patient need an ordered lab in order to drop off urine sample today.

## 2020-08-11 ENCOUNTER — HOSPITAL ENCOUNTER (OUTPATIENT)
Dept: RADIOLOGY | Facility: HOSPITAL | Age: 75
Discharge: HOME | End: 2020-08-11
Attending: FAMILY MEDICINE
Payer: MEDICARE

## 2020-08-11 ENCOUNTER — LAB VISIT (OUTPATIENT)
Dept: LAB | Facility: HOSPITAL | Age: 75
End: 2020-08-11
Attending: FAMILY MEDICINE
Payer: MEDICARE

## 2020-08-11 ENCOUNTER — OFFICE VISIT (OUTPATIENT)
Dept: FAMILY MEDICINE | Facility: CLINIC | Age: 75
End: 2020-08-11
Payer: MEDICARE

## 2020-08-11 VITALS
BODY MASS INDEX: 38.22 KG/M2 | OXYGEN SATURATION: 95 % | DIASTOLIC BLOOD PRESSURE: 60 MMHG | SYSTOLIC BLOOD PRESSURE: 110 MMHG | HEIGHT: 70 IN | RESPIRATION RATE: 14 BRPM | WEIGHT: 267 LBS | TEMPERATURE: 97 F | HEART RATE: 80 BPM

## 2020-08-11 DIAGNOSIS — R22.9 LOCALIZED SWELLING, MASS AND LUMP, UNSPECIFIED: ICD-10-CM

## 2020-08-11 DIAGNOSIS — E11.29 TYPE 2 DIABETES MELLITUS WITH MICROALBUMINURIA, WITHOUT LONG-TERM CURRENT USE OF INSULIN: Primary | ICD-10-CM

## 2020-08-11 DIAGNOSIS — R60.0 BILATERAL LEG EDEMA: ICD-10-CM

## 2020-08-11 DIAGNOSIS — R80.9 TYPE 2 DIABETES MELLITUS WITH MICROALBUMINURIA, WITHOUT LONG-TERM CURRENT USE OF INSULIN: Primary | ICD-10-CM

## 2020-08-11 DIAGNOSIS — K74.60 CIRRHOSIS OF LIVER WITHOUT ASCITES, UNSPECIFIED HEPATIC CIRRHOSIS TYPE: ICD-10-CM

## 2020-08-11 DIAGNOSIS — R76.8 RHEUMATOID FACTOR POSITIVE: ICD-10-CM

## 2020-08-11 DIAGNOSIS — M25.562 CHRONIC PAIN OF LEFT KNEE: ICD-10-CM

## 2020-08-11 DIAGNOSIS — G47.33 OSA ON CPAP: ICD-10-CM

## 2020-08-11 DIAGNOSIS — E11.29 TYPE 2 DIABETES MELLITUS WITH MICROALBUMINURIA, WITHOUT LONG-TERM CURRENT USE OF INSULIN: ICD-10-CM

## 2020-08-11 DIAGNOSIS — R94.39 ABNORMAL CARDIOVASCULAR STRESS TEST: ICD-10-CM

## 2020-08-11 DIAGNOSIS — N18.30 CKD (CHRONIC KIDNEY DISEASE) STAGE 3, GFR 30-59 ML/MIN: ICD-10-CM

## 2020-08-11 DIAGNOSIS — G89.29 CHRONIC PAIN OF LEFT KNEE: ICD-10-CM

## 2020-08-11 DIAGNOSIS — R79.89 AZOTEMIA: ICD-10-CM

## 2020-08-11 DIAGNOSIS — R80.9 TYPE 2 DIABETES MELLITUS WITH MICROALBUMINURIA, WITHOUT LONG-TERM CURRENT USE OF INSULIN: ICD-10-CM

## 2020-08-11 PROCEDURE — 3074F PR MOST RECENT SYSTOLIC BLOOD PRESSURE < 130 MM HG: ICD-10-PCS | Mod: HCNC,CPTII,S$GLB, | Performed by: FAMILY MEDICINE

## 2020-08-11 PROCEDURE — 1125F PR PAIN SEVERITY QUANTIFIED, PAIN PRESENT: ICD-10-PCS | Mod: HCNC,S$GLB,, | Performed by: FAMILY MEDICINE

## 2020-08-11 PROCEDURE — 99999 PR PBB SHADOW E&M-EST. PATIENT-LVL IV: ICD-10-PCS | Mod: PBBFAC,HCNC,, | Performed by: FAMILY MEDICINE

## 2020-08-11 PROCEDURE — 99214 PR OFFICE/OUTPT VISIT, EST, LEVL IV, 30-39 MIN: ICD-10-PCS | Mod: HCNC,S$GLB,, | Performed by: FAMILY MEDICINE

## 2020-08-11 PROCEDURE — 1101F PT FALLS ASSESS-DOCD LE1/YR: CPT | Mod: HCNC,CPTII,S$GLB, | Performed by: FAMILY MEDICINE

## 2020-08-11 PROCEDURE — 3078F PR MOST RECENT DIASTOLIC BLOOD PRESSURE < 80 MM HG: ICD-10-PCS | Mod: HCNC,CPTII,S$GLB, | Performed by: FAMILY MEDICINE

## 2020-08-11 PROCEDURE — 73200 CT ARM (HUMERUS) WITHOUT CONTRAST RIGHT: ICD-10-PCS | Mod: 26,HCNC,RT, | Performed by: RADIOLOGY

## 2020-08-11 PROCEDURE — 3078F DIAST BP <80 MM HG: CPT | Mod: HCNC,CPTII,S$GLB, | Performed by: FAMILY MEDICINE

## 2020-08-11 PROCEDURE — 1159F PR MEDICATION LIST DOCUMENTED IN MEDICAL RECORD: ICD-10-PCS | Mod: HCNC,S$GLB,, | Performed by: FAMILY MEDICINE

## 2020-08-11 PROCEDURE — 80048 BASIC METABOLIC PNL TOTAL CA: CPT | Mod: HCNC

## 2020-08-11 PROCEDURE — 99999 PR PBB SHADOW E&M-EST. PATIENT-LVL IV: CPT | Mod: PBBFAC,HCNC,, | Performed by: FAMILY MEDICINE

## 2020-08-11 PROCEDURE — 3051F HG A1C>EQUAL 7.0%<8.0%: CPT | Mod: HCNC,CPTII,S$GLB, | Performed by: FAMILY MEDICINE

## 2020-08-11 PROCEDURE — 36415 COLL VENOUS BLD VENIPUNCTURE: CPT | Mod: HCNC,PO

## 2020-08-11 PROCEDURE — 73200 CT UPPER EXTREMITY W/O DYE: CPT | Mod: TC,HCNC,RT

## 2020-08-11 PROCEDURE — 1159F MED LIST DOCD IN RCRD: CPT | Mod: HCNC,S$GLB,, | Performed by: FAMILY MEDICINE

## 2020-08-11 PROCEDURE — 3051F PR MOST RECENT HEMOGLOBIN A1C LEVEL 7.0 - < 8.0%: ICD-10-PCS | Mod: HCNC,CPTII,S$GLB, | Performed by: FAMILY MEDICINE

## 2020-08-11 PROCEDURE — 99214 OFFICE O/P EST MOD 30 MIN: CPT | Mod: HCNC,S$GLB,, | Performed by: FAMILY MEDICINE

## 2020-08-11 PROCEDURE — 73200 CT UPPER EXTREMITY W/O DYE: CPT | Mod: 26,HCNC,RT, | Performed by: RADIOLOGY

## 2020-08-11 PROCEDURE — 3074F SYST BP LT 130 MM HG: CPT | Mod: HCNC,CPTII,S$GLB, | Performed by: FAMILY MEDICINE

## 2020-08-11 PROCEDURE — 1101F PR PT FALLS ASSESS DOC 0-1 FALLS W/OUT INJ PAST YR: ICD-10-PCS | Mod: HCNC,CPTII,S$GLB, | Performed by: FAMILY MEDICINE

## 2020-08-11 PROCEDURE — 1125F AMNT PAIN NOTED PAIN PRSNT: CPT | Mod: HCNC,S$GLB,, | Performed by: FAMILY MEDICINE

## 2020-08-11 RX ORDER — NITROGLYCERIN 0.4 MG/1
0.4 TABLET SUBLINGUAL EVERY 5 MIN PRN
Qty: 25 TABLET | Refills: 3 | Status: SHIPPED | OUTPATIENT
Start: 2020-08-11 | End: 2021-10-13 | Stop reason: ALTCHOICE

## 2020-08-11 RX ORDER — TRAMADOL HYDROCHLORIDE 50 MG/1
50 TABLET ORAL EVERY 6 HOURS PRN
Qty: 15 TABLET | Refills: 1 | Status: SHIPPED | OUTPATIENT
Start: 2020-08-11 | End: 2021-05-13 | Stop reason: SDUPTHER

## 2020-08-11 RX ORDER — FUROSEMIDE 20 MG/1
TABLET ORAL
Qty: 60 TABLET | Refills: 3 | Status: SHIPPED | OUTPATIENT
Start: 2020-08-11 | End: 2020-09-11

## 2020-08-11 RX ORDER — POTASSIUM CHLORIDE 750 MG/1
TABLET, EXTENDED RELEASE ORAL
Qty: 180 TABLET | Refills: 5 | Status: SHIPPED | OUTPATIENT
Start: 2020-08-11 | End: 2021-09-15 | Stop reason: SDUPTHER

## 2020-08-11 RX ORDER — IPRATROPIUM BROMIDE AND ALBUTEROL SULFATE 2.5; .5 MG/3ML; MG/3ML
3 SOLUTION RESPIRATORY (INHALATION) EVERY 6 HOURS PRN
COMMUNITY
End: 2020-08-11 | Stop reason: SDUPTHER

## 2020-08-11 RX ORDER — IPRATROPIUM BROMIDE AND ALBUTEROL SULFATE 2.5; .5 MG/3ML; MG/3ML
3 SOLUTION RESPIRATORY (INHALATION) EVERY 6 HOURS PRN
Qty: 1 BOX | Refills: 4 | Status: SHIPPED | OUTPATIENT
Start: 2020-08-11 | End: 2021-01-27 | Stop reason: SDUPTHER

## 2020-08-11 RX ORDER — ACARBOSE 25 MG/1
25 TABLET ORAL
Qty: 270 TABLET | Refills: 3 | Status: SHIPPED | OUTPATIENT
Start: 2020-08-11 | End: 2021-05-13

## 2020-08-11 NOTE — PATIENT INSTRUCTIONS

## 2020-08-12 LAB
ANION GAP SERPL CALC-SCNC: 10 MMOL/L (ref 8–16)
BUN SERPL-MCNC: 30 MG/DL (ref 8–23)
CALCIUM SERPL-MCNC: 8.7 MG/DL (ref 8.7–10.5)
CHLORIDE SERPL-SCNC: 103 MMOL/L (ref 95–110)
CO2 SERPL-SCNC: 26 MMOL/L (ref 23–29)
CREAT SERPL-MCNC: 1.5 MG/DL (ref 0.5–1.4)
EST. GFR  (AFRICAN AMERICAN): 51.2 ML/MIN/1.73 M^2
EST. GFR  (NON AFRICAN AMERICAN): 44.3 ML/MIN/1.73 M^2
GLUCOSE SERPL-MCNC: 223 MG/DL (ref 70–110)
POTASSIUM SERPL-SCNC: 4.3 MMOL/L (ref 3.5–5.1)
SODIUM SERPL-SCNC: 139 MMOL/L (ref 136–145)

## 2020-08-16 DIAGNOSIS — R19.00 INTRA-ABDOMINAL AND PELVIC SWELLING, MASS AND LUMP, UNSPECIFIED SITE: ICD-10-CM

## 2020-08-16 DIAGNOSIS — K74.60 HEPATIC CIRRHOSIS, UNSPECIFIED HEPATIC CIRRHOSIS TYPE, UNSPECIFIED WHETHER ASCITES PRESENT: Primary | ICD-10-CM

## 2020-08-16 DIAGNOSIS — R60.0 ARM EDEMA: ICD-10-CM

## 2020-08-16 RX ORDER — AMOXICILLIN AND CLAVULANATE POTASSIUM 500; 125 MG/1; MG/1
1 TABLET, FILM COATED ORAL 3 TIMES DAILY
Qty: 20 TABLET | Refills: 0 | Status: ON HOLD | OUTPATIENT
Start: 2020-08-16 | End: 2020-10-13

## 2020-08-16 NOTE — PROGRESS NOTES
Subjective:       Patient ID: Lenny Lopez is a 75 y.o. male.    Chief Complaint: Follow-up (4mth f/u diabetes / hypertension)    Patient 76 y/o male with weight increase of 15 pounds, edema, chronic SOB, LUZ, partial response w/Cipap.Rt arm with moderate swelling. There is localized swelling, indurated at the triceps, no palpable axillary nodes, no erythema, no breast masses. The re is cirrhotic changes of the liver by Ct , no apparent ascites.  BPH , spray urine due to Flomax.  Polyuria due to Lasix.  CHf better, poor compliance with diet, nor Lasix  CKD better  COPd better w/Fansera    Back Pain  This is a chronic problem. The current episode started more than 1 year ago. The problem occurs intermittently. The problem is unchanged. The pain is present in the lumbar spine (hip). The quality of the pain is described as aching. The pain radiates to the left thigh and right thigh. The pain is at a severity of 6/10. The pain is mild. The pain is worse during the day. The symptoms are aggravated by standing. Associated symptoms include weakness. Pertinent negatives include no abdominal pain, bladder incontinence, chest pain, dysuria, fever, headaches, paresis or paresthesias. Risk factors include sedentary lifestyle, lack of exercise and obesity. He has tried walking for the symptoms. The treatment provided mild relief.   Follow-up  Associated symptoms include arthralgias, fatigue and weakness. Pertinent negatives include no abdominal pain, chest pain, fever or headaches.     Review of Systems   Constitutional: Positive for fatigue and unexpected weight change. Negative for fever.   Respiratory: Positive for shortness of breath. Negative for chest tightness.    Cardiovascular: Negative for chest pain, palpitations and leg swelling.   Gastrointestinal: Negative for abdominal pain.   Genitourinary: Negative for bladder incontinence and dysuria.   Musculoskeletal: Positive for arthralgias and back pain.    Neurological: Positive for weakness. Negative for dizziness, syncope, light-headedness, headaches and paresthesias.   Psychiatric/Behavioral: Negative for behavioral problems.       Patient Active Problem List   Diagnosis    Osteoarthritis    Hypertension associated with diabetes    CKD (chronic kidney disease) stage 3, GFR 39.7 ml/min    Atrial fibrillation    Constipation due to pain medication    Gout    Low testosterone    Metabolic syndrome    LVH (left ventricular hypertrophy) due to hypertensive disease    Cardiovascular risk factor, ASCVD 10-year risk 20.7%, ideal 14.1%    Chews tobacco regularly, about can a day    Type 2 diabetes mellitus with microalbuminuria, without long-term current use of insulin    Microalbuminuria    LUZ on CPAP, 100% use, 2016    Hyperlipidemia associated with type 2 diabetes mellitus    Prostatism    Bilateral leg edema    Calcified granuloma of lung    Bilateral carotid artery disease    Azotemia    Statin intolerance    Eosinophilic asthma    Coronary artery disease of native artery of native heart with stable angina pectoris    Urge urinary incontinence    Rheumatoid factor positive    Severe obesity (BMI 35.0-39.9) with comorbidity    Hyperparathyroidism    Chronic pain of left knee       Objective:      Physical Exam  Vitals signs reviewed.   Constitutional:       General: He is not in acute distress.     Appearance: He is well-developed. He is obese. He is not diaphoretic.   HENT:      Head: Normocephalic and atraumatic.      Right Ear: External ear normal.      Left Ear: External ear normal.      Nose: Nose normal.      Mouth/Throat:      Pharynx: No oropharyngeal exudate.   Eyes:      General: No scleral icterus.        Right eye: No discharge.         Left eye: No discharge.      Conjunctiva/sclera: Conjunctivae normal.      Pupils: Pupils are equal, round, and reactive to light.   Neck:      Musculoskeletal: Normal range of motion and neck  supple.      Thyroid: No thyromegaly.      Vascular: No JVD.      Trachea: No tracheal deviation.   Cardiovascular:      Rate and Rhythm: Normal rate.      Heart sounds: Normal heart sounds. No murmur.   Pulmonary:      Effort: Pulmonary effort is normal. No respiratory distress.      Breath sounds: Normal breath sounds. No wheezing or rales.   Abdominal:      General: Bowel sounds are normal. There is distension.      Palpations: Abdomen is soft. There is no mass.      Tenderness: There is no abdominal tenderness. There is no guarding or rebound.       Musculoskeletal:      Right elbow: He exhibits swelling and deformity. He exhibits normal range of motion and no effusion.      Right knee: He exhibits swelling. Tenderness found. Medial joint line tenderness noted.      Lumbar back: He exhibits decreased range of motion, bony tenderness, deformity and pain.        Arms:         Legs:       Comments:     Preceptor 5 strength.  Adequate reflexes.  Poor balance control due to weak core muscles.   Lymphadenopathy:      Cervical: No cervical adenopathy.   Skin:     General: Skin is warm and dry.      Coloration: Skin is not pale.      Findings: No erythema or rash.   Neurological:      Mental Status: He is alert and oriented to person, place, and time.      Cranial Nerves: No cranial nerve deficit.      Coordination: Coordination normal.   Psychiatric:         Behavior: Behavior normal.         Thought Content: Thought content normal.         Judgment: Judgment normal.       declined physical therapy.  Consider decreasing the pressure of the CPAP machine to a 10 cm water pressure.  Patient is aware to increase her medications if he has indeed increased weight.  Lab Results   Component Value Date    WBC 6.26 01/30/2020    HGB 14.1 01/30/2020    HCT 45.9 01/30/2020     (L) 01/30/2020    CHOL 113 (L) 08/06/2020    TRIG 114 08/06/2020    HDL 29 (L) 08/06/2020    ALT 23 08/06/2020    AST 21 08/06/2020      08/11/2020    K 4.3 08/11/2020     08/11/2020    CREATININE 1.5 (H) 08/11/2020    BUN 30 (H) 08/11/2020    CO2 26 08/11/2020    TSH 3.464 12/10/2013    PSA 3.5 12/01/2015    HGBA1C 7.9 (H) 08/06/2020     The ASCVD Risk score (Kenn LUNA Jr., et al., 2013) failed to calculate for the following reasons:    The valid total cholesterol range is 130 to 320 mg/dL    Assessment:       1. Type 2 diabetes mellitus with microalbuminuria, without long-term current use of insulin    2. Abnormal cardiovascular stress test    3. Bilateral leg edema    4. Chronic pain of left knee    5. Localized swelling, mass and lump, unspecified        Plan:       Type 2 diabetes mellitus with microalbuminuria, without long-term current use of insulin  -     acarbose (PRECOSE) 25 MG Tab; Take 1 tablet (25 mg total) by mouth 3 (three) times daily with meals.  Dispense: 270 tablet; Refill: 3  -     URINALYSIS; Future; Expected date: 08/11/2020  -     Basic metabolic panel; Future; Expected date: 08/11/2020    Abnormal cardiovascular stress test  -     nitroGLYCERIN (NITROSTAT) 0.4 MG SL tablet; Place 1 tablet (0.4 mg total) under the tongue every 5 (five) minutes as needed for Chest pain.  Dispense: 25 tablet; Refill: 3    Bilateral leg edema  -     furosemide (LASIX) 20 MG tablet; 1 tab twice a day for 5 days then 1/2 tab twice for 10 days then 1/2 tab daily  Dispense: 60 tablet; Refill: 3  -     potassium chloride (KLOR-CON) 10 MEQ TbSR; 1 tab with furosemide.  Dispense: 180 tablet; Refill: 5  -     Basic metabolic panel; Future; Expected date: 08/11/2020  -     Brain Natriuretic Peptide; Future; Expected date: 08/11/2020    Chronic pain of left knee  -     traMADoL (ULTRAM) 50 mg tablet; Take 1 tablet (50 mg total) by mouth every 6 (six) hours as needed for Pain. 7 days for acute pain.  Dispense: 15 tablet; Refill: 1    Localized swelling, mass and lump, unspecified  -     Cancel: CT Arm (Humerus) Without Contrast Right; Future; Expected  date: 08/11/2020  -     CT Arm (Humerus) Without Contrast Right; Future; Expected date: 08/11/2020    Other orders  -     albuterol-ipratropium (DUO-NEB) 2.5 mg-0.5 mg/3 mL nebulizer solution; Take 3 mLs by nebulization every 6 (six) hours as needed for Wheezing. Rescue  Dispense: 1 Box; Refill: 4      Patient readiness: eager and barriers:readiness and social stressors    During the course of the visit the patient was educated and counseled about the following:     Diabetes:  Discussed general issues about diabetes pathophysiology and management.  Hypertension:   Dietary sodium restriction.  Regular aerobic exercise.    Goals: Diabetes: Maintain Hemoglobin A1C below 7 and Hypertension: Reduce Blood Pressure    Did patient meet goals/outcomes: Yes    The following self management tools provided: blood pressure log  blood glucose log    Patient Instructions (the written plan) was given to the patient/family.     Time spent with patient: 30 minutes    Barriers to medications present (no )    Adverse reactions to current medications (yes)    Over the counter medications reviewed (Yes)        30-35-minute visit. 20 minutes spent counseling patient on diet, exercise, and weight loss.  This has been fully explained to the patient, who indicates understanding.    Discussed health maintenance guidelines appropriate for age.  Discussed health maintenance guidelines appropriate for age.

## 2020-08-18 ENCOUNTER — OFFICE VISIT (OUTPATIENT)
Dept: FAMILY MEDICINE | Facility: CLINIC | Age: 75
End: 2020-08-18
Payer: MEDICARE

## 2020-08-18 ENCOUNTER — TELEPHONE (OUTPATIENT)
Dept: TRANSPLANT | Facility: CLINIC | Age: 75
End: 2020-08-18

## 2020-08-18 VITALS
DIASTOLIC BLOOD PRESSURE: 62 MMHG | SYSTOLIC BLOOD PRESSURE: 128 MMHG | TEMPERATURE: 97 F | HEIGHT: 71 IN | RESPIRATION RATE: 17 BRPM | WEIGHT: 257.5 LBS | BODY MASS INDEX: 36.05 KG/M2 | HEART RATE: 71 BPM | OXYGEN SATURATION: 95 %

## 2020-08-18 DIAGNOSIS — E78.5 HYPERLIPIDEMIA ASSOCIATED WITH TYPE 2 DIABETES MELLITUS: ICD-10-CM

## 2020-08-18 DIAGNOSIS — Z86.010 HISTORY OF COLON POLYPS: ICD-10-CM

## 2020-08-18 DIAGNOSIS — K74.60 HEPATIC CIRRHOSIS, UNSPECIFIED HEPATIC CIRRHOSIS TYPE, UNSPECIFIED WHETHER ASCITES PRESENT: ICD-10-CM

## 2020-08-18 DIAGNOSIS — E11.29 TYPE 2 DIABETES MELLITUS WITH MICROALBUMINURIA, WITHOUT LONG-TERM CURRENT USE OF INSULIN: ICD-10-CM

## 2020-08-18 DIAGNOSIS — E11.69 HYPERLIPIDEMIA ASSOCIATED WITH TYPE 2 DIABETES MELLITUS: ICD-10-CM

## 2020-08-18 DIAGNOSIS — E11.59 HYPERTENSION ASSOCIATED WITH DIABETES: Primary | ICD-10-CM

## 2020-08-18 DIAGNOSIS — I15.2 HYPERTENSION ASSOCIATED WITH DIABETES: Primary | ICD-10-CM

## 2020-08-18 DIAGNOSIS — Z11.59 ENCOUNTER FOR HEPATITIS C SCREENING TEST FOR LOW RISK PATIENT: ICD-10-CM

## 2020-08-18 DIAGNOSIS — R80.9 TYPE 2 DIABETES MELLITUS WITH MICROALBUMINURIA, WITHOUT LONG-TERM CURRENT USE OF INSULIN: ICD-10-CM

## 2020-08-18 DIAGNOSIS — E21.3 HYPERPARATHYROIDISM: ICD-10-CM

## 2020-08-18 DIAGNOSIS — E66.01 SEVERE OBESITY (BMI 35.0-39.9) WITH COMORBIDITY: ICD-10-CM

## 2020-08-18 DIAGNOSIS — N18.30 CKD (CHRONIC KIDNEY DISEASE) STAGE 3, GFR 30-59 ML/MIN: ICD-10-CM

## 2020-08-18 PROCEDURE — 99999 PR PBB SHADOW E&M-EST. PATIENT-LVL V: CPT | Mod: PBBFAC,HCNC,, | Performed by: PHYSICIAN ASSISTANT

## 2020-08-18 PROCEDURE — 99499 UNLISTED E&M SERVICE: CPT | Mod: HCNC,S$GLB,, | Performed by: PHYSICIAN ASSISTANT

## 2020-08-18 PROCEDURE — 99499 RISK ADDL DX/OHS AUDIT: ICD-10-PCS | Mod: HCNC,S$GLB,, | Performed by: PHYSICIAN ASSISTANT

## 2020-08-18 PROCEDURE — 99999 PR PBB SHADOW E&M-EST. PATIENT-LVL V: ICD-10-PCS | Mod: PBBFAC,HCNC,, | Performed by: PHYSICIAN ASSISTANT

## 2020-08-18 NOTE — TELEPHONE ENCOUNTER
----- Message from Damian Torres sent at 8/18/2020  3:55 PM CDT -----    Pt chart sent to nurse for review.     .       ----- Message -----  From: Nadia Kaminski MA  Sent: 8/18/2020   3:01 PM CDT  To: [facility]      ----- Message -----  From: Mitzi Fontanez  Sent: 8/18/2020  11:53 AM CDT  To: [facility] Staff    Pt was seen today by jennifer and needs an appt for Hepatic cirrhosis, unspecified hepatic cirrhosis type, unspecified whether ascit  Please call his grandson alek  @ [phone]  Thanks !

## 2020-08-18 NOTE — PROGRESS NOTES
Subjective:       Patient ID: Lenny Lopez is a 75 y.o. male.    Chief Complaint: Follow-up (review CT results )    Patient comes to clinic today to review recent CT results.  Recent CT revealed cellulitis of the arm an area of swelling.  Patient has not started the medication as he did not realize that the antibiotic was at the pharmacy.  The CT also incidentally revealed cirrhotic changes of liver.  Patient is scheduled to have abdominal CT later this week.    Review of patient's allergies indicates:   Allergen Reactions    No known drug allergies          Current Outpatient Medications:     acarbose (PRECOSE) 25 MG Tab, Take 1 tablet (25 mg total) by mouth 3 (three) times daily with meals., Disp: 270 tablet, Rfl: 3    albuterol-ipratropium (DUO-NEB) 2.5 mg-0.5 mg/3 mL nebulizer solution, Take 3 mLs by nebulization every 6 (six) hours as needed for Wheezing. Rescue, Disp: 1 Box, Rfl: 4    allopurinol (ZYLOPRIM) 100 MG tablet, Take 2 tablets (200 mg total) by mouth once daily., Disp: 180 tablet, Rfl: 0    amoxicillin-clavulanate 500-125mg (AUGMENTIN) 500-125 mg Tab, Take 1 tablet (500 mg total) by mouth 3 (three) times daily., Disp: 20 tablet, Rfl: 0    aspirin 81 mg Tab, Take 1 tablet by mouth once daily. , Disp: , Rfl:     atorvastatin (LIPITOR) 40 MG tablet, , Disp: , Rfl:     benralizumab (FASENRA PEN) 30 mg/mL AtIn, Inject 30 mg into the skin every 8 weeks., Disp: 1 mL, Rfl: 6    blood sugar diagnostic Strp, 1 strip by Misc.(Non-Drug; Combo Route) route 3 (three) times daily. DX: E11.29   Dispense test strips that are covered by insurance, Disp: 300 strip, Rfl: 3    ELIQUIS 5 mg Tab, Take 5 mg by mouth 2 (two) times daily. , Disp: , Rfl:     FASENRA 30 mg/mL Syrg injection, Inject 1 mL (30 mg total) into the skin every 8 weeks., Disp: 1 Syringe, Rfl: 6    furosemide (LASIX) 20 MG tablet, 1 tab twice a day for 5 days then 1/2 tab twice for 10 days then 1/2 tab daily, Disp: 60 tablet, Rfl: 3     lancets (FREESTYLE LANCETS) 28 gauge Misc, 1 lancet by Misc.(Non-Drug; Combo Route) route 3 (three) times daily., Disp: 300 each, Rfl: 3    montelukast (SINGULAIR) 10 mg tablet, Take 1 tablet (10 mg total) by mouth every evening., Disp: 30 tablet, Rfl: 11    mupirocin (BACTROBAN) 2 % ointment, Apply topically 2 (two) times daily., Disp: 30 g, Rfl: 1    MYRBETRIQ 50 mg Tb24, TAKE 1 TABLET BY MOUTH ONCE DAILY, Disp: 30 tablet, Rfl: 11    nitroGLYCERIN (NITROSTAT) 0.4 MG SL tablet, Place 1 tablet (0.4 mg total) under the tongue every 5 (five) minutes as needed for Chest pain., Disp: 25 tablet, Rfl: 3    omega-3 fatty acids-vitamin E (FISH OIL) 1,000 mg Cap, 1 capsule once daily. Three times a day, Disp: , Rfl:     potassium chloride (KLOR-CON) 10 MEQ TbSR, 1 tab with furosemide., Disp: 180 tablet, Rfl: 5    tamsulosin (FLOMAX) 0.4 mg Cap, Take 1 capsule (0.4 mg total) by mouth once daily., Disp: 30 capsule, Rfl: 5    traMADoL (ULTRAM) 50 mg tablet, Take 1 tablet (50 mg total) by mouth every 6 (six) hours as needed for Pain. 7 days for acute pain., Disp: 15 tablet, Rfl: 1    turmeric root extract 500 mg Cap, Take 1 capsule by mouth once daily., Disp: , Rfl:     VENTOLIN HFA 90 mcg/actuation inhaler, Inhale 1-2 puffs into the lungs every 4 (four) hours as needed for Wheezing or Shortness of Breath., Disp: 1 each, Rfl: 11    blood-glucose meter kit, Use as instructed to check blood sugar daily, Disp: 1 each, Rfl: 0    sotalol (BETAPACE) 80 MG tablet, Take 0.5 tablets (40 mg total) by mouth 2 (two) times daily. for 7 days, Disp: 7 tablet, Rfl: 0    Current Facility-Administered Medications:     mupirocin 2 % ointment, , Topical (Top), 1 time in Clinic/HOD, Sherry Dang NP    Lab Results   Component Value Date    WBC 6.26 01/30/2020    HGB 14.1 01/30/2020    HCT 45.9 01/30/2020     (L) 01/30/2020    CHOL 113 (L) 08/06/2020    TRIG 114 08/06/2020    HDL 29 (L) 08/06/2020    ALT 23 08/06/2020    AST  21 08/06/2020     08/11/2020    K 4.3 08/11/2020     08/11/2020    CREATININE 1.5 (H) 08/11/2020    BUN 30 (H) 08/11/2020    CO2 26 08/11/2020    TSH 3.464 12/10/2013    PSA 3.5 12/01/2015    HGBA1C 7.9 (H) 08/06/2020       Review of Systems   Constitutional: Negative for activity change, appetite change and fever.   HENT: Negative for postnasal drip, rhinorrhea and sinus pressure.    Eyes: Negative for visual disturbance.   Respiratory: Negative for cough and shortness of breath.    Cardiovascular: Negative for chest pain.   Gastrointestinal: Negative for abdominal distention and abdominal pain.   Genitourinary: Negative for difficulty urinating and dysuria.   Musculoskeletal: Positive for arthralgias, back pain and myalgias.   Neurological: Negative for headaches.   Hematological: Negative for adenopathy.   Psychiatric/Behavioral: The patient is not nervous/anxious.        Objective:      Physical Exam  Constitutional:       Appearance: Normal appearance.   HENT:      Head: Normocephalic and atraumatic.   Cardiovascular:      Rate and Rhythm: Normal rate and regular rhythm.      Heart sounds: Normal heart sounds.   Pulmonary:      Effort: Pulmonary effort is normal.      Breath sounds: Normal breath sounds. No wheezing.   Abdominal:      General: Bowel sounds are normal.      Palpations: Abdomen is soft.      Tenderness: There is no abdominal tenderness.   Skin:     Findings: No erythema.   Neurological:      Mental Status: He is alert and oriented to person, place, and time.   Psychiatric:         Behavior: Behavior normal.         Assessment:       1. Hypertension associated with diabetes    2. Severe obesity (BMI 35.0-39.9) with comorbidity    3. Hepatic cirrhosis, unspecified hepatic cirrhosis type, unspecified whether ascites present    4. History of colon polyps    5. CKD (chronic kidney disease) stage 3, GFR 39.7 ml/min    6. Hyperlipidemia associated with type 2 diabetes mellitus    7.  Hyperparathyroidism    8. Type 2 diabetes mellitus with microalbuminuria, without long-term current use of insulin    9. Encounter for hepatitis C screening test for low risk patient        Plan:   Lenny was seen today for follow-up.    Diagnoses and all orders for this visit:    Hypertension associated with diabetes  -     CBC auto differential; Future  -     Comprehensive metabolic panel; Future  -     PTH, intact; Future  Controlled.  Low salt diet.  Continue current medication.  Continue to monitor blood pressure at home.   Severe obesity (BMI 35.0-39.9) with comorbidity  Low carbohydrate, high fiber diet.  Increase exercise as able.  Hepatic cirrhosis, unspecified hepatic cirrhosis type, unspecified whether ascites present  -     Ambulatory referral/consult to Gastroenterology; Future    History of colon polyps  Follow-up GI  CKD (chronic kidney disease) stage 3, GFR 39.7 ml/min  -     CBC auto differential; Future  -     Comprehensive metabolic panel; Future    Hyperlipidemia associated with type 2 diabetes mellitus  High-fiber diet  Continue current medication  Hyperparathyroidism  -     Comprehensive metabolic panel; Future  -     PTH, intact; Future  -     Vitamin D; Future    Type 2 diabetes mellitus with microalbuminuria, without long-term current use of insulin  -     Hemoglobin A1C; Future    Encounter for hepatitis C screening test for low risk patient  -     Hepatitis C Antibody; Future        Labs and follow-up in 3 months

## 2020-08-19 ENCOUNTER — TELEPHONE (OUTPATIENT)
Dept: HEPATOLOGY | Facility: CLINIC | Age: 75
End: 2020-08-19

## 2020-08-19 ENCOUNTER — DOCUMENTATION ONLY (OUTPATIENT)
Dept: TRANSPLANT | Facility: CLINIC | Age: 75
End: 2020-08-19

## 2020-08-19 NOTE — LETTER
August 19, 2020    Lenny Lopez  [address] [address]      Dear Lenny Lopez:    Your doctor has referred you to the [facility]. We are sending this letter to remind you to make an appointment with us to complete the referral process.     Please call us at [phone] to schedule an appointment. We look forward to seeing you soon.     If you received a call and have been scheduled, please disregard this letter.       Sincerely,        [facility]   [facility] [place] [address]  [phone]

## 2020-08-19 NOTE — NURSING
Pt records reviewed.   Pt will be referred to Hepatology.  Hepatic cirrhosis, unspecified hepatic cirrhosis type, unspecified whether ascites present  Initial referral received  from the [facility]que.   Referring Provider/diagnosis  Blue Shah MD      Referral letter sent to patient.

## 2020-08-21 ENCOUNTER — TELEPHONE (OUTPATIENT)
Dept: FAMILY MEDICINE | Facility: CLINIC | Age: 75
End: 2020-08-21

## 2020-08-21 ENCOUNTER — HOSPITAL ENCOUNTER (OUTPATIENT)
Dept: RADIOLOGY | Facility: HOSPITAL | Age: 75
Discharge: HOME | End: 2020-08-21
Attending: FAMILY MEDICINE
Payer: MEDICARE

## 2020-08-21 DIAGNOSIS — K74.60 HEPATIC CIRRHOSIS, UNSPECIFIED HEPATIC CIRRHOSIS TYPE, UNSPECIFIED WHETHER ASCITES PRESENT: ICD-10-CM

## 2020-08-21 DIAGNOSIS — N20.0 RECURRENT NEPHROLITHIASIS: Primary | ICD-10-CM

## 2020-08-21 DIAGNOSIS — K74.60 HEPATIC CIRRHOSIS, UNSPECIFIED HEPATIC CIRRHOSIS TYPE, UNSPECIFIED WHETHER ASCITES PRESENT: Primary | ICD-10-CM

## 2020-08-21 DIAGNOSIS — N35.819 OTHER STRICTURE OF URETHRA IN MALE: ICD-10-CM

## 2020-08-21 PROCEDURE — 74150 CT ABDOMEN W/O CONTRAST: CPT | Mod: 26,HCNC,, | Performed by: RADIOLOGY

## 2020-08-21 PROCEDURE — 74150 CT ABDOMEN W/O CONTRAST: CPT | Mod: TC,HCNC

## 2020-08-21 PROCEDURE — 74150 CT ABDOMEN WITHOUT CONTRAST: ICD-10-PCS | Mod: 26,HCNC,, | Performed by: RADIOLOGY

## 2020-08-21 PROCEDURE — 25500020 PHARM REV CODE 255: Mod: HCNC

## 2020-08-21 RX ADMIN — IOHEXOL 500 ML: 12 SOLUTION ORAL at 11:08

## 2020-08-21 NOTE — TELEPHONE ENCOUNTER
----- Message from Mitzi Fontanez sent at 8/21/2020  9:35 AM CDT -----    ----- Message -----  From: Damian Torres  Sent: 8/18/2020   3:52 PM CDT  To: Mitzi Fontanez    Pt referral need to be order under [facility] Hepatology and the one for GI needs to be removed. GI does not see any liver pts, but the schedulers in that depart will still esther the pt in GI  ----- Message -----  From: Nadia Kaminski MA  Sent: 8/18/2020   3:01 PM CDT  To: [facility] Referral [facility]      ----- Message -----  From: Mitzi Fontanez  Sent: 8/18/2020  11:53 AM CDT  To: [facility] Staff    Pt was seen today by jennifer and needs an appt for Hepatic cirrhosis, unspecified hepatic cirrhosis type, unspecified whether ascit  Please call his grandson alek  @ [phone]  Thanks !

## 2020-08-25 ENCOUNTER — PATIENT OUTREACH (OUTPATIENT)
Dept: ADMINISTRATIVE | Facility: OTHER | Age: 75
End: 2020-08-25

## 2020-08-25 NOTE — PROGRESS NOTES
Chart was reviewed for overdue [facility]active [facility] Encounters ([facility])  topics  Updates were requested from care everywhere  Health Maintenance has been updated  [facility] immunization registry triggered  Exam referral in chart

## 2020-08-26 ENCOUNTER — TELEPHONE (OUTPATIENT)
Dept: GASTROENTEROLOGY | Facility: CLINIC | Age: 75
End: 2020-08-26

## 2020-08-26 ENCOUNTER — HOSPITAL ENCOUNTER (OUTPATIENT)
Dept: RADIOLOGY | Facility: HOSPITAL | Age: 75
Discharge: HOME | End: 2020-08-26
Attending: FAMILY MEDICINE
Payer: MEDICARE

## 2020-08-26 DIAGNOSIS — N20.0 RECURRENT NEPHROLITHIASIS: ICD-10-CM

## 2020-08-26 PROCEDURE — 25500020 PHARM REV CODE 255: Mod: HCNC

## 2020-08-26 PROCEDURE — 74178 CT UROGRAM ABD PELVIS W WO: ICD-10-PCS | Mod: 26,HCNC,, | Performed by: RADIOLOGY

## 2020-08-26 PROCEDURE — 74178 CT ABD&PLV WO CNTR FLWD CNTR: CPT | Mod: 26,HCNC,, | Performed by: RADIOLOGY

## 2020-08-26 PROCEDURE — 74178 CT ABD&PLV WO CNTR FLWD CNTR: CPT | Mod: TC,HCNC

## 2020-08-26 RX ADMIN — IOHEXOL 100 ML: 350 INJECTION, SOLUTION INTRAVENOUS at 11:08

## 2020-08-26 NOTE — TELEPHONE ENCOUNTER
Called patient per referral to schedule colonoscopy patient states he has to many appointments and will call back in a couple of weeks when able to schedule.

## 2020-08-27 ENCOUNTER — TELEPHONE (OUTPATIENT)
Dept: FAMILY MEDICINE | Facility: CLINIC | Age: 75
End: 2020-08-27

## 2020-08-27 NOTE — TELEPHONE ENCOUNTER
Patient requesting results of CT Urogram Abdomen Pelvis performed on 8-. Please advise.           ----- Message from Lillian Burton sent at 8/27/2020  2:44 PM CDT -----  Regarding: results  Contact: REG VINES [[id number]]  Patient is requesting a call back from the nurse requesting results of scan.  Please call the patient upon request at phone number [phone].

## 2020-08-28 NOTE — TELEPHONE ENCOUNTER
Nonobstructed nephrolithiasis.  He has left ureterlithis.  He will need urology intervention.  He has indeed fatty liver, questionable early cirrhosis.  He has moderate enlarged prostate.  Patient is aware of this results.  Ambulatory referral to urology.

## 2020-09-01 ENCOUNTER — OFFICE VISIT (OUTPATIENT)
Dept: UROLOGY | Facility: CLINIC | Age: 75
End: 2020-09-01
Payer: MEDICARE

## 2020-09-01 VITALS
DIASTOLIC BLOOD PRESSURE: 80 MMHG | TEMPERATURE: 97 F | HEIGHT: 71 IN | BODY MASS INDEX: 36.05 KG/M2 | HEART RATE: 73 BPM | SYSTOLIC BLOOD PRESSURE: 128 MMHG | WEIGHT: 257.5 LBS | RESPIRATION RATE: 18 BRPM

## 2020-09-01 DIAGNOSIS — N32.81 OAB (OVERACTIVE BLADDER): ICD-10-CM

## 2020-09-01 DIAGNOSIS — N39.41 URGE URINARY INCONTINENCE: ICD-10-CM

## 2020-09-01 DIAGNOSIS — N20.0 CALCULUS OF KIDNEY: Primary | ICD-10-CM

## 2020-09-01 DIAGNOSIS — N40.0 BENIGN PROSTATIC HYPERPLASIA WITHOUT LOWER URINARY TRACT SYMPTOMS: ICD-10-CM

## 2020-09-01 LAB
BILIRUB SERPL-MCNC: NORMAL MG/DL
BLOOD URINE, POC: NORMAL
CLARITY, POC UA: CLEAR
COLOR, POC UA: YELLOW
GLUCOSE UR QL STRIP: NORMAL
KETONES UR QL STRIP: NORMAL
LEUKOCYTE ESTERASE URINE, POC: NORMAL
NITRITE, POC UA: NORMAL
PH, POC UA: 5.5
PROTEIN, POC: NORMAL
SPECIFIC GRAVITY, POC UA: 1.01
UROBILINOGEN, POC UA: 0.2

## 2020-09-01 PROCEDURE — 99214 OFFICE O/P EST MOD 30 MIN: CPT | Mod: 25,HCNC,S$GLB, | Performed by: UROLOGY

## 2020-09-01 PROCEDURE — 1159F PR MEDICATION LIST DOCUMENTED IN MEDICAL RECORD: ICD-10-PCS | Mod: HCNC,S$GLB,, | Performed by: UROLOGY

## 2020-09-01 PROCEDURE — 99999 PR PBB SHADOW E&M-EST. PATIENT-LVL III: CPT | Mod: PBBFAC,HCNC,, | Performed by: UROLOGY

## 2020-09-01 PROCEDURE — 99999 PR PBB SHADOW E&M-EST. PATIENT-LVL III: ICD-10-PCS | Mod: PBBFAC,HCNC,, | Performed by: UROLOGY

## 2020-09-01 PROCEDURE — 81002 URINALYSIS NONAUTO W/O SCOPE: CPT | Mod: HCNC,S$GLB,, | Performed by: UROLOGY

## 2020-09-01 PROCEDURE — 1126F PR PAIN SEVERITY QUANTIFIED, NO PAIN PRESENT: ICD-10-PCS | Mod: HCNC,S$GLB,, | Performed by: UROLOGY

## 2020-09-01 PROCEDURE — 1159F MED LIST DOCD IN RCRD: CPT | Mod: HCNC,S$GLB,, | Performed by: UROLOGY

## 2020-09-01 PROCEDURE — 3079F PR MOST RECENT DIASTOLIC BLOOD PRESSURE 80-89 MM HG: ICD-10-PCS | Mod: HCNC,CPTII,S$GLB, | Performed by: UROLOGY

## 2020-09-01 PROCEDURE — 1126F AMNT PAIN NOTED NONE PRSNT: CPT | Mod: HCNC,S$GLB,, | Performed by: UROLOGY

## 2020-09-01 PROCEDURE — 99214 PR OFFICE/OUTPT VISIT, EST, LEVL IV, 30-39 MIN: ICD-10-PCS | Mod: 25,HCNC,S$GLB, | Performed by: UROLOGY

## 2020-09-01 PROCEDURE — 3074F SYST BP LT 130 MM HG: CPT | Mod: HCNC,CPTII,S$GLB, | Performed by: UROLOGY

## 2020-09-01 PROCEDURE — 81002 POCT URINE DIPSTICK WITHOUT MICROSCOPE: ICD-10-PCS | Mod: HCNC,S$GLB,, | Performed by: UROLOGY

## 2020-09-01 PROCEDURE — 1101F PT FALLS ASSESS-DOCD LE1/YR: CPT | Mod: HCNC,CPTII,S$GLB, | Performed by: UROLOGY

## 2020-09-01 PROCEDURE — 1101F PR PT FALLS ASSESS DOC 0-1 FALLS W/OUT INJ PAST YR: ICD-10-PCS | Mod: HCNC,CPTII,S$GLB, | Performed by: UROLOGY

## 2020-09-01 PROCEDURE — 3079F DIAST BP 80-89 MM HG: CPT | Mod: HCNC,CPTII,S$GLB, | Performed by: UROLOGY

## 2020-09-01 PROCEDURE — 3074F PR MOST RECENT SYSTOLIC BLOOD PRESSURE < 130 MM HG: ICD-10-PCS | Mod: HCNC,CPTII,S$GLB, | Performed by: UROLOGY

## 2020-09-01 RX ORDER — FINASTERIDE 5 MG/1
5 TABLET, FILM COATED ORAL DAILY
Qty: 30 TABLET | Refills: 11 | Status: CANCELLED | OUTPATIENT
Start: 2020-09-01 | End: 2021-09-01

## 2020-09-01 RX ORDER — FINASTERIDE 5 MG/1
5 TABLET, FILM COATED ORAL DAILY
Qty: 30 TABLET | Refills: 11 | Status: SHIPPED | OUTPATIENT
Start: 2020-09-01 | End: 2021-09-15 | Stop reason: SDUPTHER

## 2020-09-01 NOTE — PROGRESS NOTES
OFFICE NOTE  [unfilled]  [id number]  9/1/2020       CHIEF COMPLAINT:   renal calculi, BPH, overactive bladder     HISTORY OF PRESENT ILLNESS:   this 75-year-old male returns routine recheck.  Has history renal calculi and had undergone ESWL in the past.  Recently had a CT scan on 08/26/2020 that revealed a 1.6 cm proximal left ureteral calculus but with  no significant obstruction.  He also has a history BPH for which continues to take Flomax and also takes Myrbetriq for his overactive bladder.  On today's visit denies any pain and no specific complaints    Medical history update - he continues to be under the care of Dr. Masterson for his atrial fibrillation.  He continues to take Eliquis.    Physical exam:  Abdomen - soft benign nontender no masses no hernias no organomegaly                             External genitalia - normal phallus with adequate meatus testes descended and feel normal no scrotal mass                             Rectal - 25 g smooth prostate no nodules normal sphincter tone         PSA  - 3.7 on 01/02/2019     UA - negative pH 5.5     FINAL IMPRESSION:  Renal calculi, BPH, overactive bladder       RECOMMENDATIONS:   KUB, PSA.  Continue on Flomax 0.4 mg and Myrbetriq 50 mg and will also start on Proscar 5 mg p.o. q.d..

## 2020-09-02 ENCOUNTER — TELEPHONE (OUTPATIENT)
Dept: HEPATOLOGY | Facility: CLINIC | Age: 75
End: 2020-09-02

## 2020-09-02 ENCOUNTER — OFFICE VISIT (OUTPATIENT)
Dept: HEPATOLOGY | Facility: CLINIC | Age: 75
End: 2020-09-02
Payer: MEDICARE

## 2020-09-02 DIAGNOSIS — K74.60 CIRRHOSIS OF LIVER WITHOUT ASCITES, UNSPECIFIED HEPATIC CIRRHOSIS TYPE: Primary | ICD-10-CM

## 2020-09-02 PROCEDURE — 99202 OFFICE O/P NEW SF 15 MIN: CPT | Mod: HCNC,95,, | Performed by: STUDENT IN AN ORGANIZED HEALTH CARE EDUCATION/TRAINING PROGRAM

## 2020-09-02 PROCEDURE — 99202 PR OFFICE/OUTPT VISIT, NEW, LEVL II, 15-29 MIN: ICD-10-PCS | Mod: HCNC,95,, | Performed by: STUDENT IN AN ORGANIZED HEALTH CARE EDUCATION/TRAINING PROGRAM

## 2020-09-02 NOTE — TELEPHONE ENCOUNTER
MA called pt and got him scheduled for labs and ultrasound for tomorrow informed him to fast 8 hours prior
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
## 2020-09-02 NOTE — TELEPHONE ENCOUNTER
----- Message from Karl Tafoya MD sent at 9/2/2020 11:31 AM CDT -----  I had a video visit with  John. Can he please get scheduled for labs and ultrasound? Thanks!    Karl

## 2020-09-02 NOTE — LETTER
September 2, 2020      Blue Shah MD  [address] [facility] [address]           [facility] 1st Fl  [address]  [facility] [address]  Phone: [phone]  Fax: [phone]          Patient: Lenny Lopez   MR Number: [id number]   YOB: 1945   Date of Visit: 9/2/2020       Dear Dr. Blue Shah:    Thank you for referring Lenny Lopez to me for evaluation. Attached you will find relevant portions of my assessment and plan of care.    If you have questions, please do not hesitate to call me. I look forward to following Lenny Lopez along with you.    Sincerely,    Karl Tafoya MD    Enclosure  CC:  No Recipients    If you would like to receive this communication electronically, please contact [email or web address] or [phone] to request more information on [employer] access.    For providers and/or their staff who would like to refer a patient to [facility], please contact us through our one-stop-shop provider referral line, [facility], at [phone].    If you feel you have received this communication in error or would no longer like to receive these types of communications, please e-mail [email or web address]

## 2020-09-03 ENCOUNTER — HOSPITAL ENCOUNTER (OUTPATIENT)
Dept: RADIOLOGY | Facility: CLINIC | Age: 75
Discharge: HOME | End: 2020-09-03
Attending: UROLOGY
Payer: MEDICARE

## 2020-09-03 ENCOUNTER — HOSPITAL ENCOUNTER (OUTPATIENT)
Dept: RADIOLOGY | Facility: HOSPITAL | Age: 75
Discharge: HOME | End: 2020-09-03
Attending: STUDENT IN AN ORGANIZED HEALTH CARE EDUCATION/TRAINING PROGRAM
Payer: MEDICARE

## 2020-09-03 DIAGNOSIS — K74.60 CIRRHOSIS OF LIVER WITHOUT ASCITES, UNSPECIFIED HEPATIC CIRRHOSIS TYPE: ICD-10-CM

## 2020-09-03 DIAGNOSIS — N20.0 CALCULUS OF KIDNEY: ICD-10-CM

## 2020-09-03 PROCEDURE — 74018 XR ABDOMEN AP 1 VIEW: ICD-10-PCS | Mod: 26,HCNC,, | Performed by: RADIOLOGY

## 2020-09-03 PROCEDURE — 76700 US EXAM ABDOM COMPLETE: CPT | Mod: TC,HCNC

## 2020-09-03 PROCEDURE — 76700 US ABDOMEN COMPLETE: ICD-10-PCS | Mod: 26,HCNC,, | Performed by: RADIOLOGY

## 2020-09-03 PROCEDURE — 74018 RADEX ABDOMEN 1 VIEW: CPT | Mod: TC,HCNC,FY,PO

## 2020-09-03 PROCEDURE — 76700 US EXAM ABDOM COMPLETE: CPT | Mod: 26,HCNC,, | Performed by: RADIOLOGY

## 2020-09-03 PROCEDURE — 74018 RADEX ABDOMEN 1 VIEW: CPT | Mod: 26,HCNC,, | Performed by: RADIOLOGY

## 2020-09-07 ENCOUNTER — PATIENT MESSAGE (OUTPATIENT)
Dept: UROLOGY | Facility: CLINIC | Age: 75
End: 2020-09-07

## 2020-09-08 ENCOUNTER — TELEPHONE (OUTPATIENT)
Dept: UROLOGY | Facility: CLINIC | Age: 75
End: 2020-09-08

## 2020-09-08 ENCOUNTER — TELEPHONE (OUTPATIENT)
Dept: FAMILY MEDICINE | Facility: CLINIC | Age: 75
End: 2020-09-08

## 2020-09-08 NOTE — TELEPHONE ENCOUNTER
----- Message from Misty Cabrera sent at 9/8/2020 10:07 AM CDT -----  Contact: call [phone]    Type:  Patient Returning Call    Who Called: call [phone]  Who Left Message for Patient:   antonia  Does the patient know what this is regarding?:    pt  is  not  sure   Best Call Back Number:call [phone]  Additional Information:   please call  for details

## 2020-09-08 NOTE — TELEPHONE ENCOUNTER
Returned call and spoke with patient, informed we need to make appt to discuss treatment of stone, appt made, patient verbally understood.

## 2020-09-08 NOTE — TELEPHONE ENCOUNTER
----- Message from Ro Mckeon sent at 9/8/2020  3:00 PM CDT -----  Regarding: requesting call back  Contact: patient  Type: Needs Medical Advice    Who Called:  Patient    Best Call Back Number: [phone]    Additional Information:   Pt requesting call back in regards to labs scheduled for 9/15- wants to know if these are necessary?  States he just had a bunch of labs done & is a hard stick.  I advised patient that the last tests did not include the one ordered.  Requesting call back from clinic to discuss.

## 2020-09-08 NOTE — TELEPHONE ENCOUNTER
Further explained that labs scheduled for 09/15 were not included in previous labs drawn. Pt labs rescheduled for Wednesday 09/09/2020 at 1235 for fasting labs. Pt  Verbalized understanding.

## 2020-09-09 ENCOUNTER — LAB VISIT (OUTPATIENT)
Dept: LAB | Facility: HOSPITAL | Age: 75
End: 2020-09-09
Attending: FAMILY MEDICINE
Payer: MEDICARE

## 2020-09-09 DIAGNOSIS — R60.0 BILATERAL LEG EDEMA: ICD-10-CM

## 2020-09-09 PROCEDURE — 80048 BASIC METABOLIC PNL TOTAL CA: CPT | Mod: HCNC

## 2020-09-09 PROCEDURE — 36415 COLL VENOUS BLD VENIPUNCTURE: CPT | Mod: HCNC,PO

## 2020-09-09 PROCEDURE — 83880 ASSAY OF NATRIURETIC PEPTIDE: CPT | Mod: HCNC

## 2020-09-10 LAB
ANION GAP SERPL CALC-SCNC: 11 MMOL/L (ref 8–16)
BNP SERPL-MCNC: 100 PG/ML (ref 0–99)
BUN SERPL-MCNC: 26 MG/DL (ref 8–23)
CALCIUM SERPL-MCNC: 9.4 MG/DL (ref 8.7–10.5)
CHLORIDE SERPL-SCNC: 102 MMOL/L (ref 95–110)
CO2 SERPL-SCNC: 27 MMOL/L (ref 23–29)
CREAT SERPL-MCNC: 1.3 MG/DL (ref 0.5–1.4)
EST. GFR  (AFRICAN AMERICAN): >60 ML/MIN/1.73 M^2
EST. GFR  (NON AFRICAN AMERICAN): 52.6 ML/MIN/1.73 M^2
GLUCOSE SERPL-MCNC: 126 MG/DL (ref 70–110)
POTASSIUM SERPL-SCNC: 4.4 MMOL/L (ref 3.5–5.1)
SODIUM SERPL-SCNC: 140 MMOL/L (ref 136–145)

## 2020-09-11 ENCOUNTER — TELEPHONE (OUTPATIENT)
Dept: FAMILY MEDICINE | Facility: CLINIC | Age: 75
End: 2020-09-11

## 2020-09-11 ENCOUNTER — OFFICE VISIT (OUTPATIENT)
Dept: UROLOGY | Facility: CLINIC | Age: 75
End: 2020-09-11
Payer: MEDICARE

## 2020-09-11 VITALS
WEIGHT: 257.5 LBS | BODY MASS INDEX: 36.05 KG/M2 | HEART RATE: 61 BPM | TEMPERATURE: 97 F | SYSTOLIC BLOOD PRESSURE: 122 MMHG | RESPIRATION RATE: 18 BRPM | DIASTOLIC BLOOD PRESSURE: 62 MMHG | HEIGHT: 71 IN

## 2020-09-11 DIAGNOSIS — N40.0 BENIGN PROSTATIC HYPERPLASIA WITHOUT LOWER URINARY TRACT SYMPTOMS: ICD-10-CM

## 2020-09-11 DIAGNOSIS — N40.0 PROSTATISM: ICD-10-CM

## 2020-09-11 DIAGNOSIS — N32.81 OAB (OVERACTIVE BLADDER): ICD-10-CM

## 2020-09-11 DIAGNOSIS — N20.0 CALCULUS OF KIDNEY: Primary | ICD-10-CM

## 2020-09-11 DIAGNOSIS — R60.0 BILATERAL LEG EDEMA: ICD-10-CM

## 2020-09-11 PROCEDURE — 1126F AMNT PAIN NOTED NONE PRSNT: CPT | Mod: HCNC,S$GLB,, | Performed by: UROLOGY

## 2020-09-11 PROCEDURE — 3074F PR MOST RECENT SYSTOLIC BLOOD PRESSURE < 130 MM HG: ICD-10-PCS | Mod: HCNC,CPTII,S$GLB, | Performed by: UROLOGY

## 2020-09-11 PROCEDURE — 99999 PR PBB SHADOW E&M-EST. PATIENT-LVL III: ICD-10-PCS | Mod: PBBFAC,HCNC,, | Performed by: UROLOGY

## 2020-09-11 PROCEDURE — 1101F PT FALLS ASSESS-DOCD LE1/YR: CPT | Mod: HCNC,CPTII,S$GLB, | Performed by: UROLOGY

## 2020-09-11 PROCEDURE — 99999 PR PBB SHADOW E&M-EST. PATIENT-LVL III: CPT | Mod: PBBFAC,HCNC,, | Performed by: UROLOGY

## 2020-09-11 PROCEDURE — 99214 OFFICE O/P EST MOD 30 MIN: CPT | Mod: HCNC,S$GLB,, | Performed by: UROLOGY

## 2020-09-11 PROCEDURE — 3074F SYST BP LT 130 MM HG: CPT | Mod: HCNC,CPTII,S$GLB, | Performed by: UROLOGY

## 2020-09-11 PROCEDURE — 3078F DIAST BP <80 MM HG: CPT | Mod: HCNC,CPTII,S$GLB, | Performed by: UROLOGY

## 2020-09-11 PROCEDURE — 3078F PR MOST RECENT DIASTOLIC BLOOD PRESSURE < 80 MM HG: ICD-10-PCS | Mod: HCNC,CPTII,S$GLB, | Performed by: UROLOGY

## 2020-09-11 PROCEDURE — 1159F PR MEDICATION LIST DOCUMENTED IN MEDICAL RECORD: ICD-10-PCS | Mod: HCNC,S$GLB,, | Performed by: UROLOGY

## 2020-09-11 PROCEDURE — 1101F PR PT FALLS ASSESS DOC 0-1 FALLS W/OUT INJ PAST YR: ICD-10-PCS | Mod: HCNC,CPTII,S$GLB, | Performed by: UROLOGY

## 2020-09-11 PROCEDURE — 1126F PR PAIN SEVERITY QUANTIFIED, NO PAIN PRESENT: ICD-10-PCS | Mod: HCNC,S$GLB,, | Performed by: UROLOGY

## 2020-09-11 PROCEDURE — 99214 PR OFFICE/OUTPT VISIT, EST, LEVL IV, 30-39 MIN: ICD-10-PCS | Mod: HCNC,S$GLB,, | Performed by: UROLOGY

## 2020-09-11 PROCEDURE — 1159F MED LIST DOCD IN RCRD: CPT | Mod: HCNC,S$GLB,, | Performed by: UROLOGY

## 2020-09-11 RX ORDER — TAMSULOSIN HYDROCHLORIDE 0.4 MG/1
0.4 CAPSULE ORAL DAILY
Qty: 30 CAPSULE | Refills: 11 | Status: SHIPPED | OUTPATIENT
Start: 2020-09-11 | End: 2020-09-14 | Stop reason: SDUPTHER

## 2020-09-11 RX ORDER — FUROSEMIDE 20 MG/1
20 TABLET ORAL DAILY
Qty: 90 TABLET | Refills: 3 | Status: SHIPPED | OUTPATIENT
Start: 2020-09-11 | End: 2022-02-24

## 2020-09-11 RX ORDER — TAMSULOSIN HYDROCHLORIDE 0.4 MG/1
0.4 CAPSULE ORAL DAILY
Qty: 30 CAPSULE | Refills: 5 | Status: CANCELLED | OUTPATIENT
Start: 2020-09-11 | End: 2021-09-11

## 2020-09-11 NOTE — TELEPHONE ENCOUNTER
----- Message from Kimber Austin sent at 9/11/2020 12:58 PM CDT -----  Type:  RX Refill Request    Who Called:  pt  Refill or New Rx:  refill  RX Name and Strength:  Lasix 20mg  Preferred Pharmacy with phone number:    [id number] - [place], [place] - [address] [address]  Phone: [phone] Fax: [phone]  Local or Mail Order:  local  Ordering Provider:    Best Call Back Number:  [phone]  Additional Information:

## 2020-09-11 NOTE — PROGRESS NOTES
OFFICE NOTE  [unfilled]  [id number]  9/11/2020       CHIEF COMPLAINT:   renal calculi, BPH, overactive bladder     HISTORY OF PRESENT ILLNESS:   this 75-year-old male returns routine recheck.  He has history renal calculi and undergone ESWL in the past.  Recent CT scan on 08/26/2020 and a KUB revealed a 1.6 cm proximal left ureteral calculus but no evidence of any obstruction and patient denies any flank pain.  He also has history BPH and overactive bladder for which he takes Flomax, Proscar and Myrbetriq respectively with which he does well.  On today's visit he has essentially symptom-free.    Medical history update reveals he continues to be on Eliquis.    Physical exam:  Abdomen - soft benign nontender no masses no hernias no organomegaly, more specifically no flank tenderness.                                    PSA  - 3.5 on 09/03/2020        FINAL IMPRESSION:  Renal calculi, BPH, overactive bladder       RECOMMENDATIONS:  Discussion was had with the patient concerning further management of the left renal calculus and the patient subsequently mentioned that he does not want to undergo any ESWL since it is not causing any problems and and there is no obstruction and wants to avoid any procedures at this time and must mention he is on Eliquis.  Recheck 2 months with follow-up KUB.  Continue on Flomax, Proscar and Myrbetriq.

## 2020-09-11 NOTE — TELEPHONE ENCOUNTER
----- Message from Sierra Morales sent at 9/11/2020 12:32 PM CDT -----  Regarding: Refill  Type:  RX Refill Request    Who Called: Pt  Refill or New Rx:  Refill  RX Name and Strength:  tamsulosin (FLOMAX) 0.4 mg Cap  How is the patient currently taking it? (ex. 1XDay):  ...  Is this a 30 day or 90 day RX:  30 day   Preferred Pharmacy with phone number:    [id number]  [place], [place] - [address] [place] [address]  [place] [address]  Phone: [phone] Fax: [phone]    Local or Mail Order:  local  Ordering Provider:  Dioni Mccloud Call Back Number:  [phone]  Additional Information:  patient stated pharmacy will not refill the meds due to no more refills

## 2020-09-14 DIAGNOSIS — N40.0 PROSTATISM: ICD-10-CM

## 2020-09-14 RX ORDER — TAMSULOSIN HYDROCHLORIDE 0.4 MG/1
0.4 CAPSULE ORAL DAILY
Qty: 30 CAPSULE | Refills: 11 | Status: SHIPPED | OUTPATIENT
Start: 2020-09-14 | End: 2021-09-15 | Stop reason: SDUPTHER

## 2020-09-22 ENCOUNTER — TELEPHONE (OUTPATIENT)
Dept: OTOLARYNGOLOGY | Facility: CLINIC | Age: 75
End: 2020-09-22

## 2020-09-22 ENCOUNTER — LAB VISIT (OUTPATIENT)
Dept: PRIMARY CARE CLINIC | Facility: CLINIC | Age: 75
End: 2020-09-22
Payer: MEDICARE

## 2020-09-22 DIAGNOSIS — Z03.818 ENCNTR FOR OBS FOR SUSP EXPSR TO OTH BIOLG AGENTS RULED OUT: Primary | ICD-10-CM

## 2020-09-22 DIAGNOSIS — Z03.818 ENCNTR FOR OBS FOR SUSP EXPSR TO OTH BIOLG AGENTS RULED OUT: ICD-10-CM

## 2020-09-22 PROCEDURE — U0003 INFECTIOUS AGENT DETECTION BY NUCLEIC ACID (DNA OR RNA); SEVERE ACUTE RESPIRATORY SYNDROME CORONAVIRUS 2 (SARS-COV-2) (CORONAVIRUS DISEASE [COVID-19]), AMPLIFIED PROBE TECHNIQUE, MAKING USE OF HIGH THROUGHPUT TECHNOLOGIES AS DESCRIBED BY CMS-2020-01-R: HCPCS | Mod: HCNC

## 2020-09-23 LAB — SARS-COV-2 RNA RESP QL NAA+PROBE: NOT DETECTED

## 2020-09-25 ENCOUNTER — OFFICE VISIT (OUTPATIENT)
Dept: OTOLARYNGOLOGY | Facility: CLINIC | Age: 75
End: 2020-09-25
Payer: MEDICARE

## 2020-09-25 VITALS
OXYGEN SATURATION: 95 % | WEIGHT: 253.5 LBS | BODY MASS INDEX: 35.49 KG/M2 | DIASTOLIC BLOOD PRESSURE: 72 MMHG | SYSTOLIC BLOOD PRESSURE: 131 MMHG | HEIGHT: 71 IN | HEART RATE: 71 BPM

## 2020-09-25 DIAGNOSIS — J38.3 LESION OF TRUE VOCAL CORD: Primary | ICD-10-CM

## 2020-09-25 DIAGNOSIS — R49.0 HOARSENESS: ICD-10-CM

## 2020-09-25 PROCEDURE — 3075F SYST BP GE 130 - 139MM HG: CPT | Mod: HCNC,CPTII,S$GLB, | Performed by: OTOLARYNGOLOGY

## 2020-09-25 PROCEDURE — 3075F PR MOST RECENT SYSTOLIC BLOOD PRESS GE 130-139MM HG: ICD-10-PCS | Mod: HCNC,CPTII,S$GLB, | Performed by: OTOLARYNGOLOGY

## 2020-09-25 PROCEDURE — 31575 PR LARYNGOSCOPY, FLEXIBLE; DIAGNOSTIC: ICD-10-PCS | Mod: HCNC,S$GLB,, | Performed by: OTOLARYNGOLOGY

## 2020-09-25 PROCEDURE — 99204 OFFICE O/P NEW MOD 45 MIN: CPT | Mod: 25,HCNC,S$GLB, | Performed by: OTOLARYNGOLOGY

## 2020-09-25 PROCEDURE — 3078F DIAST BP <80 MM HG: CPT | Mod: HCNC,CPTII,S$GLB, | Performed by: OTOLARYNGOLOGY

## 2020-09-25 PROCEDURE — 1125F PR PAIN SEVERITY QUANTIFIED, PAIN PRESENT: ICD-10-PCS | Mod: HCNC,S$GLB,, | Performed by: OTOLARYNGOLOGY

## 2020-09-25 PROCEDURE — 1159F PR MEDICATION LIST DOCUMENTED IN MEDICAL RECORD: ICD-10-PCS | Mod: HCNC,S$GLB,, | Performed by: OTOLARYNGOLOGY

## 2020-09-25 PROCEDURE — 1101F PT FALLS ASSESS-DOCD LE1/YR: CPT | Mod: HCNC,CPTII,S$GLB, | Performed by: OTOLARYNGOLOGY

## 2020-09-25 PROCEDURE — 99204 PR OFFICE/OUTPT VISIT, NEW, LEVL IV, 45-59 MIN: ICD-10-PCS | Mod: 25,HCNC,S$GLB, | Performed by: OTOLARYNGOLOGY

## 2020-09-25 PROCEDURE — 1101F PR PT FALLS ASSESS DOC 0-1 FALLS W/OUT INJ PAST YR: ICD-10-PCS | Mod: HCNC,CPTII,S$GLB, | Performed by: OTOLARYNGOLOGY

## 2020-09-25 PROCEDURE — 1159F MED LIST DOCD IN RCRD: CPT | Mod: HCNC,S$GLB,, | Performed by: OTOLARYNGOLOGY

## 2020-09-25 PROCEDURE — 31575 DIAGNOSTIC LARYNGOSCOPY: CPT | Mod: HCNC,S$GLB,, | Performed by: OTOLARYNGOLOGY

## 2020-09-25 PROCEDURE — 1125F AMNT PAIN NOTED PAIN PRSNT: CPT | Mod: HCNC,S$GLB,, | Performed by: OTOLARYNGOLOGY

## 2020-09-25 PROCEDURE — 3078F PR MOST RECENT DIASTOLIC BLOOD PRESSURE < 80 MM HG: ICD-10-PCS | Mod: HCNC,CPTII,S$GLB, | Performed by: OTOLARYNGOLOGY

## 2020-09-25 NOTE — H&P (VIEW-ONLY)
Subjective:       Patient ID: Lenny Lopez is a 75 y.o. male.    Chief Complaint: Hoarseness.    HPI   Lenny Lopez is a 76 yo man presenting with hoarseness. It has been present for about 1 year, but seems to be getting worse lately. In the past he could clear his throat and his voice would get better. Now his hoarseness persists. He denies weight loss, dysphagia, ear pain. He chews tobacco. He does not smoke. He has a h/o COPD, afib on ASA and eliquis and has a pacemaker.      Review of Systems   Constitutional: Negative for activity change, appetite change, fatigue, fever and unexpected weight change.   HENT: Positive for voice change. Negative for nasal congestion, dental problem, ear discharge, ear pain, facial swelling, hearing loss, nosebleeds, postnasal drip, rhinorrhea, sinus pressure/congestion, sneezing, sore throat and trouble swallowing.    Eyes: Negative for photophobia, pain and itching.   Respiratory: Negative for apnea, cough, shortness of breath, wheezing and stridor.    Cardiovascular: Negative for chest pain and palpitations.   Gastrointestinal: Negative for abdominal pain, diarrhea, nausea and vomiting.   Endocrine: Negative for cold intolerance and heat intolerance.   Genitourinary: Negative for bladder incontinence and dysuria.   Musculoskeletal: Negative for arthralgias, myalgias, neck pain and neck stiffness.   Integumentary:  Negative for pallor, rash and mole/lesion.   Allergic/Immunologic: Negative for food allergies.   Neurological: Negative for dizziness, vertigo, seizures, syncope and headaches.   Psychiatric/Behavioral: Negative for behavioral problems and confusion.         Objective:      Physical Exam  Constitutional:       General: He is awake.      Appearance: Normal appearance. He is well-developed.   HENT:      Head: Normocephalic and atraumatic.      Right Ear: Tympanic membrane, ear canal and external ear normal.      Left Ear: Tympanic membrane, ear canal and external  ear normal.      Nose: Nose normal. No septal deviation.      Mouth/Throat:      Pharynx: Uvula midline.      Comments: Procedure: Flexible Fiberoptic Laryngoscopy  Nasopharynx - the torus is clear. There are no lesions of the posterior wall.   Oropharynx - no lesions of the tongue base. There is no obvious fullness or asymmetry.  Hypopharynx - there are no lesions of the pyriform sinuses or postcricoid region    Larynx - Polypoid lesion of anterior portion of right true vocal fold. Normal mobility of both vocal folds    Eyes:      General: Lids are normal. Vision grossly intact.      Conjunctiva/sclera: Conjunctivae normal.   Neck:      Musculoskeletal: Full passive range of motion without pain, normal range of motion and neck supple.      Thyroid: No thyroid mass or thyromegaly.   Pulmonary:      Effort: Pulmonary effort is normal. No tachypnea or respiratory distress.   Skin:     General: Skin is warm and dry.   Neurological:      Mental Status: He is alert and oriented to person, place, and time.   Psychiatric:         Mood and Affect: Mood and affect normal.           Above still image from flexible laryngoscopy (image is rotated 90 degrees)    Assessment:       1. Lesion of true vocal cord    2. Hoarseness        Plan:       74 yo man with h/o COPD, A fib presenting with anterior right vocal fold lesion    -given his tobacco history I recommend direct laryngoscopy with biopsy/removal of the lesion  -will need clearance from pulmonology and cardiology for general anesthesia along with recommendations for holding blood thinners  -patient would like to think about surgery. He will obtain necessary clearance and return to clinic in two weeks to discuss further and hopefully schedule surgery date

## 2020-09-25 NOTE — PROGRESS NOTES
Subjective:       Patient ID: Lenny Lopez is a 75 y.o. male.    Chief Complaint: Hoarseness.    HPI   Lenny Lopez is a 74 yo man presenting with hoarseness. It has been present for about 1 year, but seems to be getting worse lately. In the past he could clear his throat and his voice would get better. Now his hoarseness persists. He denies weight loss, dysphagia, ear pain. He chews tobacco. He does not smoke. He has a h/o COPD, afib on ASA and eliquis and has a pacemaker.      Review of Systems   Constitutional: Negative for activity change, appetite change, fatigue, fever and unexpected weight change.   HENT: Positive for voice change. Negative for nasal congestion, dental problem, ear discharge, ear pain, facial swelling, hearing loss, nosebleeds, postnasal drip, rhinorrhea, sinus pressure/congestion, sneezing, sore throat and trouble swallowing.    Eyes: Negative for photophobia, pain and itching.   Respiratory: Negative for apnea, cough, shortness of breath, wheezing and stridor.    Cardiovascular: Negative for chest pain and palpitations.   Gastrointestinal: Negative for abdominal pain, diarrhea, nausea and vomiting.   Endocrine: Negative for cold intolerance and heat intolerance.   Genitourinary: Negative for bladder incontinence and dysuria.   Musculoskeletal: Negative for arthralgias, myalgias, neck pain and neck stiffness.   Integumentary:  Negative for pallor, rash and mole/lesion.   Allergic/Immunologic: Negative for food allergies.   Neurological: Negative for dizziness, vertigo, seizures, syncope and headaches.   Psychiatric/Behavioral: Negative for behavioral problems and confusion.         Objective:      Physical Exam  Constitutional:       General: He is awake.      Appearance: Normal appearance. He is well-developed.   HENT:      Head: Normocephalic and atraumatic.      Right Ear: Tympanic membrane, ear canal and external ear normal.      Left Ear: Tympanic membrane, ear canal and external  ear normal.      Nose: Nose normal. No septal deviation.      Mouth/Throat:      Pharynx: Uvula midline.      Comments: Procedure: Flexible Fiberoptic Laryngoscopy  Nasopharynx - the torus is clear. There are no lesions of the posterior wall.   Oropharynx - no lesions of the tongue base. There is no obvious fullness or asymmetry.  Hypopharynx - there are no lesions of the pyriform sinuses or postcricoid region    Larynx - Polypoid lesion of anterior portion of right true vocal fold. Normal mobility of both vocal folds    Eyes:      General: Lids are normal. Vision grossly intact.      Conjunctiva/sclera: Conjunctivae normal.   Neck:      Musculoskeletal: Full passive range of motion without pain, normal range of motion and neck supple.      Thyroid: No thyroid mass or thyromegaly.   Pulmonary:      Effort: Pulmonary effort is normal. No tachypnea or respiratory distress.   Skin:     General: Skin is warm and dry.   Neurological:      Mental Status: He is alert and oriented to person, place, and time.   Psychiatric:         Mood and Affect: Mood and affect normal.           Above still image from flexible laryngoscopy (image is rotated 90 degrees)    Assessment:       1. Lesion of true vocal cord    2. Hoarseness        Plan:       76 yo man with h/o COPD, A fib presenting with anterior right vocal fold lesion    -given his tobacco history I recommend direct laryngoscopy with biopsy/removal of the lesion  -will need clearance from pulmonology and cardiology for general anesthesia along with recommendations for holding blood thinners  -patient would like to think about surgery. He will obtain necessary clearance and return to clinic in two weeks to discuss further and hopefully schedule surgery date

## 2020-09-25 NOTE — Clinical Note
Hi there - this patient has a lesion of his right vocal cord. I recommend removal - this would require general anesthesia for direct laryngoscopy with biopsy. The procedure would take about 30 minutes to an hour. I would like him to see you to provide clearance for this procedure. Thank you!

## 2020-09-29 ENCOUNTER — PATIENT MESSAGE (OUTPATIENT)
Dept: OTHER | Facility: OTHER | Age: 75
End: 2020-09-29

## 2020-10-05 DIAGNOSIS — J38.3 LESION OF TRUE VOCAL CORD: Primary | ICD-10-CM

## 2020-10-05 NOTE — PROGRESS NOTES
Scheduling patient for surgery: Direct Laryngoscopy with Biopsy    Patient's cardiac clearance in media tab from 10/2 - holding ASA 7 days prior to surgery, eliquis 2 days prior.

## 2020-10-07 ENCOUNTER — TELEPHONE (OUTPATIENT)
Dept: OTOLARYNGOLOGY | Facility: CLINIC | Age: 75
End: 2020-10-07

## 2020-10-07 NOTE — TELEPHONE ENCOUNTER
Call placed to Mr. Lopez in regards to scheduling appointment for covid test. Appointment made no further issue noted.

## 2020-10-08 ENCOUNTER — TELEPHONE (OUTPATIENT)
Dept: PULMONOLOGY | Facility: CLINIC | Age: 75
End: 2020-10-08

## 2020-10-08 RX ORDER — SODIUM CHLORIDE 0.9 % (FLUSH) 0.9 %
10 SYRINGE (ML) INJECTION
Status: CANCELLED | OUTPATIENT
Start: 2020-10-08

## 2020-10-08 RX ORDER — DEXAMETHASONE SODIUM PHOSPHATE 4 MG/ML
8 INJECTION, SOLUTION INTRA-ARTICULAR; INTRALESIONAL; INTRAMUSCULAR; INTRAVENOUS; SOFT TISSUE
Status: CANCELLED | OUTPATIENT
Start: 2020-10-08

## 2020-10-08 NOTE — TELEPHONE ENCOUNTER
----- Message from Lyric Husain MA sent at 10/8/2020  1:50 PM CDT -----  Type: Needs Medical Advice  Who Called:  Lenny Mccloud Call Back Number: [phone]  Additional Information: patient needs medical clearance for a procedure on Tuesday Oct 15.  Please call to discuss

## 2020-10-08 NOTE — TELEPHONE ENCOUNTER
----- Message from Lyric Husain MA sent at 10/8/2020  1:50 PM CDT -----  Type: Needs Medical Advice  Who Called:  Lenny Mccloud Call Back Number: [phone]  Additional Information: patient needs medical clearance for a procedure on Tuesday Oct 15.  Please call to discuss

## 2020-10-08 NOTE — TELEPHONE ENCOUNTER
Gave patient appt for 10/9/2020 at 9:00 am for surgical clearance.      ----- Message from Heydi Rodriguez sent at 10/8/2020  2:53 PM CDT -----  Type: Needs Medical Advice  Who Called:  Patient  Best Call Back Number:   Additional Information: Calling to speak with the nurse about getting clearance for his upcoming procedure.

## 2020-10-09 ENCOUNTER — PATIENT OUTREACH (OUTPATIENT)
Dept: ADMINISTRATIVE | Facility: OTHER | Age: 75
End: 2020-10-09

## 2020-10-09 ENCOUNTER — OFFICE VISIT (OUTPATIENT)
Dept: PULMONOLOGY | Facility: CLINIC | Age: 75
End: 2020-10-09
Payer: MEDICARE

## 2020-10-09 ENCOUNTER — HOSPITAL ENCOUNTER (OUTPATIENT)
Dept: PULMONOLOGY | Facility: HOSPITAL | Age: 75
Discharge: HOME | End: 2020-10-09
Attending: NURSE PRACTITIONER
Payer: MEDICARE

## 2020-10-09 VITALS
WEIGHT: 255.38 LBS | BODY MASS INDEX: 35.62 KG/M2 | OXYGEN SATURATION: 95 % | SYSTOLIC BLOOD PRESSURE: 119 MMHG | DIASTOLIC BLOOD PRESSURE: 69 MMHG | HEART RATE: 68 BPM

## 2020-10-09 DIAGNOSIS — R06.02 SHORTNESS OF BREATH: ICD-10-CM

## 2020-10-09 DIAGNOSIS — J82.83 EOSINOPHILIC ASTHMA: Primary | ICD-10-CM

## 2020-10-09 PROCEDURE — 94010 BREATHING CAPACITY TEST: CPT | Mod: 26,HCNC,, | Performed by: INTERNAL MEDICINE

## 2020-10-09 PROCEDURE — 99213 PR OFFICE/OUTPT VISIT, EST, LEVL III, 20-29 MIN: ICD-10-PCS | Mod: HCNC,S$GLB,, | Performed by: NURSE PRACTITIONER

## 2020-10-09 PROCEDURE — 1101F PR PT FALLS ASSESS DOC 0-1 FALLS W/OUT INJ PAST YR: ICD-10-PCS | Mod: HCNC,CPTII,S$GLB, | Performed by: NURSE PRACTITIONER

## 2020-10-09 PROCEDURE — 3078F DIAST BP <80 MM HG: CPT | Mod: HCNC,CPTII,S$GLB, | Performed by: NURSE PRACTITIONER

## 2020-10-09 PROCEDURE — 94727 GAS DIL/WSHOT DETER LNG VOL: CPT | Mod: 26,HCNC,, | Performed by: INTERNAL MEDICINE

## 2020-10-09 PROCEDURE — 99999 PR PBB SHADOW E&M-EST. PATIENT-LVL V: ICD-10-PCS | Mod: PBBFAC,HCNC,, | Performed by: NURSE PRACTITIONER

## 2020-10-09 PROCEDURE — 94010 BREATHING CAPACITY TEST: CPT | Mod: HCNC

## 2020-10-09 PROCEDURE — 1101F PT FALLS ASSESS-DOCD LE1/YR: CPT | Mod: HCNC,CPTII,S$GLB, | Performed by: NURSE PRACTITIONER

## 2020-10-09 PROCEDURE — 94010 BREATHING CAPACITY TEST: ICD-10-PCS | Mod: 26,HCNC,, | Performed by: INTERNAL MEDICINE

## 2020-10-09 PROCEDURE — 99999 PR PBB SHADOW E&M-EST. PATIENT-LVL V: CPT | Mod: PBBFAC,HCNC,, | Performed by: NURSE PRACTITIONER

## 2020-10-09 PROCEDURE — 94727 PR PULM FUNCTION TEST BY GAS: ICD-10-PCS | Mod: 26,HCNC,, | Performed by: INTERNAL MEDICINE

## 2020-10-09 PROCEDURE — 1159F PR MEDICATION LIST DOCUMENTED IN MEDICAL RECORD: ICD-10-PCS | Mod: HCNC,S$GLB,, | Performed by: NURSE PRACTITIONER

## 2020-10-09 PROCEDURE — 3078F PR MOST RECENT DIASTOLIC BLOOD PRESSURE < 80 MM HG: ICD-10-PCS | Mod: HCNC,CPTII,S$GLB, | Performed by: NURSE PRACTITIONER

## 2020-10-09 PROCEDURE — 1159F MED LIST DOCD IN RCRD: CPT | Mod: HCNC,S$GLB,, | Performed by: NURSE PRACTITIONER

## 2020-10-09 PROCEDURE — 3074F PR MOST RECENT SYSTOLIC BLOOD PRESSURE < 130 MM HG: ICD-10-PCS | Mod: HCNC,CPTII,S$GLB, | Performed by: NURSE PRACTITIONER

## 2020-10-09 PROCEDURE — 94727 GAS DIL/WSHOT DETER LNG VOL: CPT | Mod: HCNC

## 2020-10-09 PROCEDURE — 99213 OFFICE O/P EST LOW 20 MIN: CPT | Mod: HCNC,S$GLB,, | Performed by: NURSE PRACTITIONER

## 2020-10-09 PROCEDURE — 1125F AMNT PAIN NOTED PAIN PRSNT: CPT | Mod: HCNC,S$GLB,, | Performed by: NURSE PRACTITIONER

## 2020-10-09 PROCEDURE — 99499 RISK ADDL DX/OHS AUDIT: ICD-10-PCS | Mod: S$GLB,,, | Performed by: NURSE PRACTITIONER

## 2020-10-09 PROCEDURE — 1125F PR PAIN SEVERITY QUANTIFIED, PAIN PRESENT: ICD-10-PCS | Mod: HCNC,S$GLB,, | Performed by: NURSE PRACTITIONER

## 2020-10-09 PROCEDURE — 3074F SYST BP LT 130 MM HG: CPT | Mod: HCNC,CPTII,S$GLB, | Performed by: NURSE PRACTITIONER

## 2020-10-09 PROCEDURE — 99499 UNLISTED E&M SERVICE: CPT | Mod: S$GLB,,, | Performed by: NURSE PRACTITIONER

## 2020-10-09 RX ORDER — ARFORMOTEROL TARTRATE 15 UG/2ML
15 SOLUTION RESPIRATORY (INHALATION) 2 TIMES DAILY
Qty: 120 ML | Refills: 11 | Status: SHIPPED | OUTPATIENT
Start: 2020-10-09 | End: 2020-10-09 | Stop reason: ALTCHOICE

## 2020-10-09 RX ORDER — FLUTICASONE PROPIONATE 220 UG/1
1 AEROSOL, METERED RESPIRATORY (INHALATION) 2 TIMES DAILY
Qty: 12 G | Refills: 11 | Status: SHIPPED | OUTPATIENT
Start: 2020-10-09 | End: 2021-07-13 | Stop reason: SDUPTHER
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
## 2020-10-09 NOTE — PATIENT INSTRUCTIONS
Continue current asthma medication regiment    Use brovana twice a day every 12 hours.   Asthma Action plan  Prednisone 20 mg pills, Take one pill a day for three days, repeat for shortness of breath or wheeze    Albuterol Inhaler 1-2 puffs every 4 hours, for cough or shortness of breath    Surgery clearance letter written

## 2020-10-09 NOTE — LETTER
October 9, 2020        No Recipients             [facility]  [address]  [place] [address]  Phone: [phone]  Fax: [phone]   Patient: Lenny Lopez   MR Number: [id number]   YOB: 1945   Date of Visit: 10/9/2020       To whom it concerns,      Mr. Lopez is followed by [facility] for severe persistent asthma. He is currently treated with biologic injections and daily nebulizer treatments. Surgery clearance is based on the physical exam. His lung sounds are clear and oxygen saturation stable on room air. Mr. Lopez is clear for surgery and anesthesia.     Sincerely,          Pratibha Quesada, NP            CC  No Recipients    Enclosure

## 2020-10-10 ENCOUNTER — LAB VISIT (OUTPATIENT)
Dept: PRIMARY CARE CLINIC | Facility: CLINIC | Age: 75
End: 2020-10-10
Payer: MEDICARE

## 2020-10-10 DIAGNOSIS — J38.3 LESION OF TRUE VOCAL CORD: ICD-10-CM

## 2020-10-10 PROCEDURE — U0003 INFECTIOUS AGENT DETECTION BY NUCLEIC ACID (DNA OR RNA); SEVERE ACUTE RESPIRATORY SYNDROME CORONAVIRUS 2 (SARS-COV-2) (CORONAVIRUS DISEASE [COVID-19]), AMPLIFIED PROBE TECHNIQUE, MAKING USE OF HIGH THROUGHPUT TECHNOLOGIES AS DESCRIBED BY CMS-2020-01-R: HCPCS | Mod: HCNC

## 2020-10-11 LAB — SARS-COV-2 RNA RESP QL NAA+PROBE: NOT DETECTED

## 2020-10-12 ENCOUNTER — ANESTHESIA EVENT (OUTPATIENT)
Dept: SURGERY | Facility: AMBULARY SURGERY CENTER | Age: 75
End: 2020-10-12
Payer: MEDICARE

## 2020-10-12 NOTE — OR NURSING
Pt with multiple anesthesia triggers noted on pre op phone call. Spoke with Dr. Alonzo form anesthesia. Reviewed labs, xrays, cardiac clearance with stress test and echo results. Pt still pending pulmonology clearance. Dr. Alonzo stated that pt should bring CPAP, take inhalers day of, and as long as cleared from pulmonologist, ok to proceed with surgery.   Pt received pulmonary clearance on Fri 9/9.

## 2020-10-13 ENCOUNTER — ANESTHESIA (OUTPATIENT)
Dept: SURGERY | Facility: AMBULARY SURGERY CENTER | Age: 75
End: 2020-10-13
Payer: MEDICARE

## 2020-10-13 ENCOUNTER — TELEPHONE (OUTPATIENT)
Dept: OTOLARYNGOLOGY | Facility: CLINIC | Age: 75
End: 2020-10-13

## 2020-10-13 ENCOUNTER — HOSPITAL ENCOUNTER (OUTPATIENT)
Facility: AMBULARY SURGERY CENTER | Age: 75
Discharge: HOME | End: 2020-10-13
Attending: OTOLARYNGOLOGY | Admitting: OTOLARYNGOLOGY
Payer: MEDICARE

## 2020-10-13 DIAGNOSIS — J38.3 VOCAL CORD MASS: Primary | ICD-10-CM

## 2020-10-13 LAB
BRPFT: NORMAL
ERVN2 LLN: -16449.03
ERVN2 PRE REF: 22.3 %
ERVN2 PRE: 0.22 L
ERVN2 REF: 0.97
FEF 25 75 LLN: 0.83
FEF 25 75 PRE REF: 65.4 %
FEF 25 75 REF: 2.12
FEV1 FVC LLN: 60
FEV1 FVC PRE REF: 101.8 %
FEV1 FVC REF: 75
FEV1 LLN: 2.07
FEV1 PRE REF: 56.5 %
FEV1 REF: 2.96
FRCN2 LLN: 2.78
FRCN2 PRE REF: 51.2 %
FRCN2 REF: 3.77
FVC LLN: 2.89
FVC PRE REF: 55.1 %
FVC REF: 3.98
MVV LLN: 100
MVV PRE REF: 63.1 %
MVV REF: 117
PEF LLN: 5.3
PEF PRE REF: 80.9 %
PEF REF: 7.62
POCT GLUCOSE: 149 MG/DL (ref 70–110)
PRE FEF 25 75: 1.39 L/S
PRE FET 100: 6.1 SEC
PRE FEV1 FVC: 76.28 %
PRE FEV1: 1.67 L
PRE FRC N2: 1.93 L
PRE FVC: 2.19 L
PRE MVV: 74 L/MIN
PRE PEF: 6.17 L/S
RVN2 LLN: 2.12
RVN2 PRE REF: 62 %
RVN2 PRE: 1.73 L
RVN2 REF: 2.8
RVN2TLCN2 LLN: 35.01
RVN2TLCN2 PRE REF: 94.5 %
RVN2TLCN2 PRE: 41.58 %
RVN2TLCN2 REF: 43.99
TLCN2 LLN: 5.99
TLCN2 PRE REF: 58.3 %
TLCN2 PRE: 4.17 L
TLCN2 REF: 7.14
VCMAXN2 LLN: 2.89
VCMAXN2 PRE REF: 61.1 %
VCMAXN2 PRE: 2.43 L
VCMAXN2 REF: 3.98

## 2020-10-13 PROCEDURE — 88305 TISSUE EXAM BY PATHOLOGIST: ICD-10-PCS | Mod: 26,HCNC,, | Performed by: STUDENT IN AN ORGANIZED HEALTH CARE EDUCATION/TRAINING PROGRAM

## 2020-10-13 PROCEDURE — 88305 TISSUE EXAM BY PATHOLOGIST: CPT | Mod: HCNC | Performed by: STUDENT IN AN ORGANIZED HEALTH CARE EDUCATION/TRAINING PROGRAM

## 2020-10-13 PROCEDURE — D9220A PRA ANESTHESIA: Mod: ANES,,, | Performed by: ANESTHESIOLOGY

## 2020-10-13 PROCEDURE — 31541 LARYNSCOP W/TUMR EXC + SCOPE: CPT | Mod: HCNC,,, | Performed by: OTOLARYNGOLOGY

## 2020-10-13 PROCEDURE — 31541 PR LARYNGOSCOPY,DIRCT,OP SCOP,EXC TUMR: ICD-10-PCS | Mod: HCNC,,, | Performed by: OTOLARYNGOLOGY

## 2020-10-13 PROCEDURE — D9220A PRA ANESTHESIA: Mod: CRNA,,, | Performed by: NURSE ANESTHETIST, CERTIFIED REGISTERED

## 2020-10-13 PROCEDURE — 88305 TISSUE EXAM BY PATHOLOGIST: CPT | Mod: 26,HCNC,, | Performed by: STUDENT IN AN ORGANIZED HEALTH CARE EDUCATION/TRAINING PROGRAM

## 2020-10-13 PROCEDURE — 31536 LARYNGOSCOPY W/BX & OP SCOPE: CPT | Performed by: OTOLARYNGOLOGY

## 2020-10-13 PROCEDURE — D9220A PRA ANESTHESIA: ICD-10-PCS | Mod: CRNA,,, | Performed by: NURSE ANESTHETIST, CERTIFIED REGISTERED

## 2020-10-13 PROCEDURE — D9220A PRA ANESTHESIA: ICD-10-PCS | Mod: ANES,,, | Performed by: ANESTHESIOLOGY

## 2020-10-13 RX ORDER — ACETAMINOPHEN 325 MG/1
325 TABLET ORAL EVERY 6 HOURS PRN
Status: DISCONTINUED | OUTPATIENT
Start: 2020-10-13 | End: 2020-10-13 | Stop reason: HOSPADM

## 2020-10-13 RX ORDER — SODIUM CHLORIDE 0.9 % (FLUSH) 0.9 %
3 SYRINGE (ML) INJECTION EVERY 8 HOURS
Status: DISCONTINUED | OUTPATIENT
Start: 2020-10-13 | End: 2020-10-13 | Stop reason: HOSPADM

## 2020-10-13 RX ORDER — DEXAMETHASONE SODIUM PHOSPHATE 4 MG/ML
INJECTION, SOLUTION INTRA-ARTICULAR; INTRALESIONAL; INTRAMUSCULAR; INTRAVENOUS; SOFT TISSUE
Status: DISCONTINUED | OUTPATIENT
Start: 2020-10-13 | End: 2020-10-13

## 2020-10-13 RX ORDER — ONDANSETRON 2 MG/ML
INJECTION INTRAMUSCULAR; INTRAVENOUS
Status: DISCONTINUED | OUTPATIENT
Start: 2020-10-13 | End: 2020-10-13

## 2020-10-13 RX ORDER — LIDOCAINE HYDROCHLORIDE 10 MG/ML
1 INJECTION, SOLUTION EPIDURAL; INFILTRATION; INTRACAUDAL; PERINEURAL ONCE
Status: DISCONTINUED | OUTPATIENT
Start: 2020-10-13 | End: 2020-10-13 | Stop reason: HOSPADM

## 2020-10-13 RX ORDER — FENTANYL CITRATE 50 UG/ML
25 INJECTION, SOLUTION INTRAMUSCULAR; INTRAVENOUS EVERY 5 MIN PRN
Status: DISCONTINUED | OUTPATIENT
Start: 2020-10-13 | End: 2020-10-13 | Stop reason: HOSPADM

## 2020-10-13 RX ORDER — EPHEDRINE SULFATE 50 MG/ML
INJECTION, SOLUTION INTRAVENOUS
Status: DISCONTINUED | OUTPATIENT
Start: 2020-10-13 | End: 2020-10-13

## 2020-10-13 RX ORDER — LIDOCAINE HYDROCHLORIDE 10 MG/ML
1 INJECTION, SOLUTION EPIDURAL; INFILTRATION; INTRACAUDAL; PERINEURAL ONCE
Status: ACTIVE | OUTPATIENT
Start: 2020-10-13

## 2020-10-13 RX ORDER — SODIUM CHLORIDE, SODIUM LACTATE, POTASSIUM CHLORIDE, CALCIUM CHLORIDE 600; 310; 30; 20 MG/100ML; MG/100ML; MG/100ML; MG/100ML
500 INJECTION, SOLUTION INTRAVENOUS ONCE
Status: COMPLETED | OUTPATIENT
Start: 2020-10-13 | End: 2020-10-13

## 2020-10-13 RX ORDER — SUCCINYLCHOLINE CHLORIDE 20 MG/ML
INJECTION INTRAMUSCULAR; INTRAVENOUS
Status: DISCONTINUED | OUTPATIENT
Start: 2020-10-13 | End: 2020-10-13

## 2020-10-13 RX ORDER — OXYMETAZOLINE HCL 0.05 %
SPRAY, NON-AEROSOL (ML) NASAL
Status: DISCONTINUED
Start: 2020-10-13 | End: 2020-10-13 | Stop reason: HOSPADM

## 2020-10-13 RX ORDER — PROPOFOL 10 MG/ML
VIAL (ML) INTRAVENOUS
Status: DISCONTINUED | OUTPATIENT
Start: 2020-10-13 | End: 2020-10-13

## 2020-10-13 RX ORDER — LIDOCAINE HYDROCHLORIDE 20 MG/ML
INJECTION, SOLUTION EPIDURAL; INFILTRATION; INTRACAUDAL; PERINEURAL
Status: DISCONTINUED
Start: 2020-10-13 | End: 2020-10-13 | Stop reason: HOSPADM

## 2020-10-13 RX ORDER — MIDAZOLAM HYDROCHLORIDE 1 MG/ML
INJECTION, SOLUTION INTRAMUSCULAR; INTRAVENOUS
Status: DISCONTINUED | OUTPATIENT
Start: 2020-10-13 | End: 2020-10-13

## 2020-10-13 RX ORDER — OXYMETAZOLINE HCL 0.05 %
SPRAY, NON-AEROSOL (ML) NASAL
Status: DISCONTINUED | OUTPATIENT
Start: 2020-10-13 | End: 2020-10-13 | Stop reason: HOSPADM

## 2020-10-13 RX ORDER — LIDOCAINE HYDROCHLORIDE 20 MG/ML
INJECTION, SOLUTION EPIDURAL; INFILTRATION; INTRACAUDAL; PERINEURAL
Status: DISCONTINUED | OUTPATIENT
Start: 2020-10-13 | End: 2020-10-13 | Stop reason: HOSPADM

## 2020-10-13 RX ORDER — LIDOCAINE HYDROCHLORIDE 20 MG/ML
INJECTION INTRAVENOUS
Status: DISCONTINUED | OUTPATIENT
Start: 2020-10-13 | End: 2020-10-13

## 2020-10-13 RX ORDER — ROCURONIUM BROMIDE 10 MG/ML
INJECTION, SOLUTION INTRAVENOUS
Status: DISCONTINUED | OUTPATIENT
Start: 2020-10-13 | End: 2020-10-13

## 2020-10-13 RX ORDER — OXYCODONE HYDROCHLORIDE 5 MG/1
5 TABLET ORAL ONCE AS NEEDED
Status: DISCONTINUED | OUTPATIENT
Start: 2020-10-13 | End: 2020-10-13 | Stop reason: HOSPADM

## 2020-10-13 RX ORDER — CEFAZOLIN SODIUM 1 G/3ML
2 INJECTION, POWDER, FOR SOLUTION INTRAMUSCULAR; INTRAVENOUS
Status: COMPLETED | OUTPATIENT
Start: 2020-10-13 | End: 2020-10-13

## 2020-10-13 RX ORDER — FENTANYL CITRATE 50 UG/ML
INJECTION, SOLUTION INTRAMUSCULAR; INTRAVENOUS
Status: DISCONTINUED | OUTPATIENT
Start: 2020-10-13 | End: 2020-10-13

## 2020-10-13 RX ADMIN — FENTANYL CITRATE 50 MCG: 50 INJECTION, SOLUTION INTRAMUSCULAR; INTRAVENOUS at 07:10

## 2020-10-13 RX ADMIN — ROCURONIUM BROMIDE 20 MG: 10 INJECTION, SOLUTION INTRAVENOUS at 07:10

## 2020-10-13 RX ADMIN — Medication 150 MG: at 07:10

## 2020-10-13 RX ADMIN — CEFAZOLIN SODIUM 2 G: 1 INJECTION, POWDER, FOR SOLUTION INTRAMUSCULAR; INTRAVENOUS at 07:10

## 2020-10-13 RX ADMIN — DEXAMETHASONE SODIUM PHOSPHATE 8 MG: 4 INJECTION, SOLUTION INTRA-ARTICULAR; INTRALESIONAL; INTRAMUSCULAR; INTRAVENOUS; SOFT TISSUE at 07:10

## 2020-10-13 RX ADMIN — ROCURONIUM BROMIDE 10 MG: 10 INJECTION, SOLUTION INTRAVENOUS at 07:10

## 2020-10-13 RX ADMIN — ONDANSETRON 4 MG: 2 INJECTION INTRAMUSCULAR; INTRAVENOUS at 07:10

## 2020-10-13 RX ADMIN — ACETAMINOPHEN 325 MG: 325 TABLET ORAL at 08:10

## 2020-10-13 RX ADMIN — SODIUM CHLORIDE, SODIUM LACTATE, POTASSIUM CHLORIDE, CALCIUM CHLORIDE 500 ML: 600; 310; 30; 20 INJECTION, SOLUTION INTRAVENOUS at 06:10

## 2020-10-13 RX ADMIN — LIDOCAINE HYDROCHLORIDE 100 MG: 20 INJECTION INTRAVENOUS at 07:10

## 2020-10-13 RX ADMIN — SUCCINYLCHOLINE CHLORIDE 120 MG: 20 INJECTION INTRAMUSCULAR; INTRAVENOUS at 07:10

## 2020-10-13 RX ADMIN — EPHEDRINE SULFATE 5 MG: 50 INJECTION, SOLUTION INTRAVENOUS at 07:10

## 2020-10-13 RX ADMIN — MIDAZOLAM HYDROCHLORIDE 2 MG: 1 INJECTION, SOLUTION INTRAMUSCULAR; INTRAVENOUS at 07:10

## 2020-10-13 NOTE — PLAN OF CARE
Patient is being driven home by his grandson. He was wearing his glasses upon discharge. Dr Casa Grove messaged her office to set up a follow up appointment. His CPAP machine was sent home with him. All questions were answered.

## 2020-10-13 NOTE — BRIEF OP NOTE
[facility]  Brief Operative Note    Surgery Date: 10/13/2020     Surgeon(s) and Role:     * Deandra Diaz MD - Primary    Assisting Surgeon: None    Pre-op Diagnosis:  Lesion of true vocal cord [J38.3]    Post-op Diagnosis:  Post-Op Diagnosis Codes:     * Lesion of true vocal cord [J38.3]    Procedure(s) (LRB):  MICROLARYNGOSCOPY, WITH VOCAL CORD BIOPSY (N/A)    Anesthesia: General    Description of the findings of the procedure(s): right-sided vocal cord polypoid mass of anterior 1/3 of true vocal cord    Estimated Blood Loss: * No values recorded between 10/13/2020  7:17 AM and 10/13/2020  7:45 AM *         Specimens:   Specimen (12h ago, onward)    None            Discharge Note    OUTCOME: Patient tolerated treatment/procedure well without complication and is now ready for discharge.    DISPOSITION: Home or Self Care    FINAL DIAGNOSIS:  Vocal cord mass    FOLLOWUP: In clinic    DISCHARGE INSTRUCTIONS:    Discharge Procedure Orders   Diet Adult Regular     Notify your health care provider if you experience any of the following:  severe uncontrolled pain     Notify your health care provider if you experience any of the following:  difficulty breathing or increased cough     No dressing needed     Activity as tolerated   Order Comments: Limit talking (though you can still talk); no yelling, no whispering for 1 week. Take tylenol or ibuprofen for pain control

## 2020-10-13 NOTE — ADDENDUM NOTE
Addendum  created 10/13/20 1317 by Abrahan Guillermo, CRNA    Intraprocedure Event deleted, Intraprocedure Meds edited, Orders acknowledged in Narrator

## 2020-10-13 NOTE — OP NOTE
DATE OF PROCEDURE: 10/13/2020        PREOPERATIVE DIAGNOSES:   Right vocal cord lesion     POSTOPERATIVE DIAGNOSES:   Same     SURGEON:  Surgeon(s) and Role:     * Deandra Diaz MD - Primary        PROCEDURES PERFORMED:   1. Direct laryngoscopy with biopsy       ANESTHESIA: General        INDICATIONS FOR PROCEDURE:   Lenny Lopez is a 75 y.o. M who recently presented to me with hoarseness. Flexible fiberoptic laryngoscopy revealed a mass of the right anterior true vocal cord. He was apprised of the risks, benefits and alternatives to direct laryngoscopy with biopsy.  In spite of the risk inherent to surgery,he provided informed consent for the aforementioned procedures.      PROCEDURE IN DETAIL:  The patient was taken to the operating room and placed on the operating table in the supine position.  General endotracheal anesthesia was induced by the anesthesia team.    The operating room table was turned 90 degrees. A tooth guard was placed. A dedo laryngoscope was placed into the oral cavity and used to expose the larynx. This was suspended from the delgado stand. The operating telescope was introduced for further inspection. A polypoid lesion of the right anterior vocal cord was noted. Intraoperative photographs were taken. Next the microscope was brought into the field. Under microscopic vision, the lesion was grasped and excised using endoscopic scissors. This was sent for permanent section. Afrin-soaked patties were placed to achieve hemostasis. Approximately 3 ml of 2% lidocaine was sprayed onto the vocal cords. At this point this procedure was deemed complete. The dedo laryngoscope was removed and the patient was returned to anesthesia and awakened without difficulty.         ESTIMATED BLOOD LOSS:  1 cc     SPECIMENS: right true vocal cord lesion

## 2020-10-13 NOTE — TRANSFER OF CARE
"Anesthesia Transfer of Care Note    Patient: Lenny Lopez    Procedure(s) Performed: Procedure(s) (LRB):  MICROLARYNGOSCOPY, WITH VOCAL CORD BIOPSY (N/A)    Patient location: PACU    Anesthesia Type: general    Transport from OR: Transported from OR on 2-3 L/min O2 by NC with adequate spontaneous ventilation    Post pain: adequate analgesia    Post assessment: no apparent anesthetic complications and tolerated procedure well    Post vital signs: stable    Level of consciousness: awake    Nausea/Vomiting: no nausea/vomiting    Complications: none    Transfer of care protocol was followed      Last vitals:   Visit Vitals  BP (!) 140/69   Pulse 67   Temp 36.4 °C (97.5 °F) (Skin)   Resp 18   Ht 5' 11" (1.803 m)   Wt 115 kg (253 lb 8.5 oz)   SpO2 97%   BMI 35.36 kg/m²     "

## 2020-10-13 NOTE — ANESTHESIA POSTPROCEDURE EVALUATION
Anesthesia Post Evaluation    Patient: Lenny Lopez    Procedure(s) Performed: Procedure(s) (LRB):  MICROLARYNGOSCOPY, WITH VOCAL CORD BIOPSY (N/A)    Final Anesthesia Type: general    Patient location during evaluation: PACU  Patient participation: Yes- Able to Participate  Level of consciousness: awake and alert and oriented  Post-procedure vital signs: reviewed and stable  Pain management: adequate  Airway patency: patent  LUZ mitigation strategies: Extubation while patient is awake, Verification of full reversal of neuromuscular block and Extubation and recovery carried out in lateral, semiupright, or other nonsupine position  PONV status at discharge: No PONV  Anesthetic complications: no      Cardiovascular status: blood pressure returned to baseline  Respiratory status: unassisted, spontaneous ventilation and room air  Hydration status: euvolemic  Follow-up not needed.          Vitals Value Taken Time   /68 10/13/20 0810   Temp 36.6 °C (97.9 °F) 10/13/20 0805   Pulse 68 10/13/20 0810   Resp 20 10/13/20 0810   SpO2 93 % 10/13/20 0810   Vitals shown include unvalidated device data.      No case tracking events are documented in the log.      Pain/Олег Score: No data recorded

## 2020-10-13 NOTE — ANESTHESIA PROCEDURE NOTES
Intubation  Performed by: Abrahan Guillermo CRNA  Authorized by: Braeden Martinez MD     Intubation:     Induction:  Intravenous    Intubated:  Postinduction    Mask Ventilation:  Easy mask    Attempts:  1    Attempted By:  CRNA    Method of Intubation:  Direct    Blade:  [place] 2    Laryngeal View Grade: Grade IIA - cords partially seen      Difficult Airway Encountered?: No      Complications:  None    Airway Device:  Oral endotracheal tube    Airway Device Size:  6.0    Style/Cuff Inflation:  Cuffed    Secured at:  The lips    Placement Verified By:  Capnometry    Complicating Factors:  Anterior larynx

## 2020-10-14 ENCOUNTER — TELEPHONE (OUTPATIENT)
Dept: PULMONOLOGY | Facility: CLINIC | Age: 75
End: 2020-10-14

## 2020-10-14 VITALS
OXYGEN SATURATION: 95 % | HEART RATE: 65 BPM | RESPIRATION RATE: 16 BRPM | TEMPERATURE: 98 F | WEIGHT: 253.5 LBS | BODY MASS INDEX: 35.49 KG/M2 | SYSTOLIC BLOOD PRESSURE: 135 MMHG | HEIGHT: 71 IN | DIASTOLIC BLOOD PRESSURE: 65 MMHG

## 2020-10-14 NOTE — TELEPHONE ENCOUNTER
----- Message from Pratibha Quesada NP sent at 10/14/2020  2:14 PM CDT -----  Lung function test shows restrictive lung disease. Continue current treatment plan. Will review images and results at next appointment.

## 2020-10-16 LAB
FINAL PATHOLOGIC DIAGNOSIS: NORMAL
GROSS: NORMAL
Lab: NORMAL
MICROSCOPIC EXAM: NORMAL

## 2020-10-30 ENCOUNTER — HOSPITAL ENCOUNTER (OUTPATIENT)
Dept: RADIOLOGY | Facility: CLINIC | Age: 75
Discharge: HOME | End: 2020-10-30
Attending: NURSE PRACTITIONER
Payer: MEDICARE

## 2020-10-30 ENCOUNTER — OFFICE VISIT (OUTPATIENT)
Dept: FAMILY MEDICINE | Facility: CLINIC | Age: 75
End: 2020-10-30
Payer: MEDICARE

## 2020-10-30 ENCOUNTER — TELEPHONE (OUTPATIENT)
Dept: FAMILY MEDICINE | Facility: CLINIC | Age: 75
End: 2020-10-30

## 2020-10-30 ENCOUNTER — PATIENT MESSAGE (OUTPATIENT)
Dept: FAMILY MEDICINE | Facility: CLINIC | Age: 75
End: 2020-10-30

## 2020-10-30 VITALS
HEART RATE: 53 BPM | SYSTOLIC BLOOD PRESSURE: 128 MMHG | WEIGHT: 258.63 LBS | DIASTOLIC BLOOD PRESSURE: 54 MMHG | TEMPERATURE: 97 F | OXYGEN SATURATION: 94 % | HEIGHT: 71 IN | BODY MASS INDEX: 36.21 KG/M2

## 2020-10-30 DIAGNOSIS — N18.31 STAGE 3A CHRONIC KIDNEY DISEASE: ICD-10-CM

## 2020-10-30 DIAGNOSIS — J44.1 COPD EXACERBATION: Primary | ICD-10-CM

## 2020-10-30 DIAGNOSIS — J44.1 COPD EXACERBATION: ICD-10-CM

## 2020-10-30 PROCEDURE — 71046 X-RAY EXAM CHEST 2 VIEWS: CPT | Mod: 26,HCNC,, | Performed by: RADIOLOGY

## 2020-10-30 PROCEDURE — 1101F PT FALLS ASSESS-DOCD LE1/YR: CPT | Mod: HCNC,CPTII,S$GLB, | Performed by: NURSE PRACTITIONER

## 2020-10-30 PROCEDURE — 1126F AMNT PAIN NOTED NONE PRSNT: CPT | Mod: HCNC,S$GLB,, | Performed by: NURSE PRACTITIONER

## 2020-10-30 PROCEDURE — 3074F PR MOST RECENT SYSTOLIC BLOOD PRESSURE < 130 MM HG: ICD-10-PCS | Mod: HCNC,CPTII,S$GLB, | Performed by: NURSE PRACTITIONER

## 2020-10-30 PROCEDURE — 99214 PR OFFICE/OUTPT VISIT, EST, LEVL IV, 30-39 MIN: ICD-10-PCS | Mod: HCNC,25,S$GLB, | Performed by: NURSE PRACTITIONER

## 2020-10-30 PROCEDURE — 3078F PR MOST RECENT DIASTOLIC BLOOD PRESSURE < 80 MM HG: ICD-10-PCS | Mod: HCNC,CPTII,S$GLB, | Performed by: NURSE PRACTITIONER

## 2020-10-30 PROCEDURE — 1126F PR PAIN SEVERITY QUANTIFIED, NO PAIN PRESENT: ICD-10-PCS | Mod: HCNC,S$GLB,, | Performed by: NURSE PRACTITIONER

## 2020-10-30 PROCEDURE — 96372 THER/PROPH/DIAG INJ SC/IM: CPT | Mod: HCNC,S$GLB,, | Performed by: NURSE PRACTITIONER

## 2020-10-30 PROCEDURE — 71046 X-RAY EXAM CHEST 2 VIEWS: CPT | Mod: TC,HCNC,FY,PO

## 2020-10-30 PROCEDURE — 1101F PR PT FALLS ASSESS DOC 0-1 FALLS W/OUT INJ PAST YR: ICD-10-PCS | Mod: HCNC,CPTII,S$GLB, | Performed by: NURSE PRACTITIONER

## 2020-10-30 PROCEDURE — 99214 OFFICE O/P EST MOD 30 MIN: CPT | Mod: HCNC,25,S$GLB, | Performed by: NURSE PRACTITIONER

## 2020-10-30 PROCEDURE — 3078F DIAST BP <80 MM HG: CPT | Mod: HCNC,CPTII,S$GLB, | Performed by: NURSE PRACTITIONER

## 2020-10-30 PROCEDURE — 1159F MED LIST DOCD IN RCRD: CPT | Mod: HCNC,S$GLB,, | Performed by: NURSE PRACTITIONER

## 2020-10-30 PROCEDURE — 1159F PR MEDICATION LIST DOCUMENTED IN MEDICAL RECORD: ICD-10-PCS | Mod: HCNC,S$GLB,, | Performed by: NURSE PRACTITIONER

## 2020-10-30 PROCEDURE — 71046 XR CHEST PA AND LATERAL: ICD-10-PCS | Mod: 26,HCNC,, | Performed by: RADIOLOGY

## 2020-10-30 PROCEDURE — 99999 PR PBB SHADOW E&M-EST. PATIENT-LVL V: ICD-10-PCS | Mod: PBBFAC,HCNC,, | Performed by: NURSE PRACTITIONER

## 2020-10-30 PROCEDURE — 99999 PR PBB SHADOW E&M-EST. PATIENT-LVL V: CPT | Mod: PBBFAC,HCNC,, | Performed by: NURSE PRACTITIONER

## 2020-10-30 PROCEDURE — 96372 PR INJECTION,THERAP/PROPH/DIAG2ST, IM OR SUBCUT: ICD-10-PCS | Mod: HCNC,S$GLB,, | Performed by: NURSE PRACTITIONER

## 2020-10-30 PROCEDURE — 3074F SYST BP LT 130 MM HG: CPT | Mod: HCNC,CPTII,S$GLB, | Performed by: NURSE PRACTITIONER

## 2020-10-30 RX ORDER — DOXYCYCLINE HYCLATE 100 MG
100 TABLET ORAL 2 TIMES DAILY
Qty: 20 TABLET | Refills: 0 | Status: SHIPPED | OUTPATIENT
Start: 2020-10-30 | End: 2020-11-12

## 2020-10-30 NOTE — PROGRESS NOTES
This dictation has been generated using Modal Fluency Dictation some phonetic errors may occur. Please contact author for clarification if needed.     Problem List Items Addressed This Visit     CKD (chronic kidney disease) stage 3, GFR 39.7 ml/min    Relevant Orders    Basic Metabolic Panel      Other Visit Diagnoses     COPD exacerbation    -  Primary    Relevant Orders    X-Ray Chest PA And Lateral    CBC Auto Differential          Orders Placed This Encounter    X-Ray Chest PA And Lateral    CBC Auto Differential    Basic Metabolic Panel    doxycycline (VIBRA-TABS) 100 MG tablet    methylPREDNISolone sod suc(PF) injection 125 mg     COPD exacerbation check chest x-ray as above rule out pneumonia.  CBC rule out infectious etiology.  Chronic renal disease check BMP.  I will review his and address accordingly.    No follow-ups on file.    ________________________________________________________________  ________________________________________________________________      Chief Complaint   Patient presents with    Shortness of Breath     History of present illness  This 75 y.o. presents today for complaint of wheezes shortness of breath.  Notes COPD.  Not utilizing nebulizers until recently.  Notes exacerbation of shortness of breath and wheezing within the last 6 days.  Noted a tight cough which has loosened since she nebulizer use.  He is also on day 4 prednisone.  Has COPD and follows with Dr. Allan.  Cough productive of white thick sputum.  Patient notes dust exposure prior to current exacerbation.  Review of systems  No fever chills  No loss of taste or smell  No nausea vomiting diarrhea  Shortness of breath and wheeze.  No chest pain.  No rash  Past medical social surgical history reviewed patient new to me  Past Medical History:   Diagnosis Date    Angina pectoris     Arthritis     Asthma     Atrial fibrillation     Back pain     Cardiac pacemaker     Cataract     Chronic bronchitis     COPD  (chronic obstructive pulmonary disease)     Coronary artery disease     DM (diabetes mellitus), type 2, 2000 12/12/2014    Gout     Hoarseness of voice     Hyperlipidemia     Hypertension     Kidney stones 12/17/2012    Nephrolithiasis     Obesity     Renal manifestation of secondary diabetes mellitus     Rheumatoid factor positive 03/08/2017    Negative work up with Dr. Choudhury    Sleep apnea     Thyroid disease     Unspecified disorder of kidney and ureter     Urinary incontinence     Vocal cord polyp        Past Surgical History:   Procedure Laterality Date    APPENDECTOMY      CARDIAC PACEMAKER PLACEMENT  2018    CORONARY STENT PLACEMENT  2018    EYE SURGERY      left eye secondary to trauma    FRACTURE SURGERY      right arm    KNEE SURGERY      screw    MICROLARYNGOSCOPY WITH BIOPSY OF VOCAL CORD N/A 10/13/2020    Procedure: MICROLARYNGOSCOPY, WITH VOCAL CORD BIOPSY;  Surgeon: Deandra Diaz MD;  Location: [facility] OR;  Service: ENT;  Laterality: N/A;    TONSILLECTOMY, ADENOIDECTOMY         Family History   Problem Relation Age of Onset    Thyroid disease Other     Cancer Mother     Hypertension Mother     Osteoarthritis Mother     Heart disease Father     Hypertension Father     Cancer Paternal Uncle         lung    Hypertension Sister     Hypertension Brother     Cancer Brother         colon?    Diabetes Maternal Aunt     Cancer Brother         pancreatic    Kidney disease Daughter     Stroke Daughter     No Known Problems Son     No Known Problems Daughter     Collagen disease Neg Hx     Amblyopia Neg Hx     Blindness Neg Hx     Cataracts Neg Hx     Glaucoma Neg Hx     Macular degeneration Neg Hx     Retinal detachment Neg Hx     Strabismus Neg Hx        Social History     Socioeconomic History    Marital status:      Spouse name: Not on file    Number of children: Not on file    Years of education: Not on file    Highest education level: Not on  file   Occupational History    Not on file   Social Needs    Financial resource strain: Not on file    Food insecurity     Worry: Not on file     Inability: Not on file    Transportation needs     Medical: Not on file     Non-medical: Not on file   Tobacco Use    Smoking status: Former Smoker     Types: Cigars    Smokeless tobacco: Current User     Types: Chew    Tobacco comment: smoked a few cigars in his 20s   Substance and Sexual Activity    Alcohol use: Yes     Comment: a few beers during holidays    Drug use: No    Sexual activity: Not on file   Lifestyle    Physical activity     Days per week: Not on file     Minutes per session: Not on file    Stress: Not on file   Relationships    Social connections     Talks on phone: Not on file     Gets together: Not on file     Attends Latter day service: Not on file     Active member of club or organization: Not on file     Attends meetings of clubs or organizations: Not on file     Relationship status: Not on file   Other Topics Concern    Not on file   Social History Narrative    Not on file       Current Outpatient Medications   Medication Sig Dispense Refill    acarbose (PRECOSE) 25 MG Tab Take 1 tablet (25 mg total) by mouth 3 (three) times daily with meals. 270 tablet 3    albuterol-ipratropium (DUO-NEB) 2.5 mg-0.5 mg/3 mL nebulizer solution Take 3 mLs by nebulization every 6 (six) hours as needed for Wheezing. Rescue 1 Box 4    aspirin 81 mg Tab Take 1 tablet by mouth once daily.       atorvastatin (LIPITOR) 40 MG tablet       benralizumab (FASENRA PEN) 30 mg/mL AtIn Inject 30 mg into the skin every 8 weeks. 1 mL 6    blood sugar diagnostic Strp 1 strip by Misc.(Non-Drug; Combo Route) route 3 (three) times daily. DX: E11.29   Dispense test strips that are covered by insurance 300 strip 3    ELIQUIS 5 mg Tab Take 5 mg by mouth 2 (two) times daily.       FASENRA 30 mg/mL Syrg injection Inject 1 mL (30 mg total) into the skin every 8 weeks. 1  Syringe 6    finasteride (PROSCAR) 5 mg tablet Take 1 tablet (5 mg total) by mouth once daily. 30 tablet 11    fluticasone propionate (FLOVENT HFA) 220 mcg/actuation inhaler Inhale 1 puff into the lungs 2 (two) times daily. Controller 12 g 11    furosemide (LASIX) 20 MG tablet Take 1 tablet (20 mg total) by mouth once daily. 90 tablet 3    lancets (FREESTYLE LANCETS) 28 gauge Misc 1 lancet by Misc.(Non-Drug; Combo Route) route 3 (three) times daily. 300 each 3    montelukast (SINGULAIR) 10 mg tablet Take 1 tablet (10 mg total) by mouth every evening. 30 tablet 11    MYRBETRIQ 50 mg Tb24 TAKE 1 TABLET BY MOUTH ONCE DAILY 30 tablet 11    nitroGLYCERIN (NITROSTAT) 0.4 MG SL tablet Place 1 tablet (0.4 mg total) under the tongue every 5 (five) minutes as needed for Chest pain. 25 tablet 3    omega-3 fatty acids-vitamin E (FISH OIL) 1,000 mg Cap 1 capsule once daily. Three times a day      potassium chloride (KLOR-CON) 10 MEQ TbSR 1 tab with furosemide. 180 tablet 5    sotalol (BETAPACE) 80 MG tablet Take 0.5 tablets (40 mg total) by mouth 2 (two) times daily. for 7 days 7 tablet 0    tamsulosin (FLOMAX) 0.4 mg Cap Take 1 capsule (0.4 mg total) by mouth once daily. 30 capsule 11    traMADoL (ULTRAM) 50 mg tablet Take 1 tablet (50 mg total) by mouth every 6 (six) hours as needed for Pain. 7 days for acute pain. 15 tablet 1    turmeric root extract 500 mg Cap Take 1 capsule by mouth once daily.      VENTOLIN HFA 90 mcg/actuation inhaler Inhale 1-2 puffs into the lungs every 4 (four) hours as needed for Wheezing or Shortness of Breath. 1 each 11    allopurinol (ZYLOPRIM) 100 MG tablet Take 2 tablets (200 mg total) by mouth once daily. (Patient not taking: Reported on 10/30/2020) 180 tablet 0    blood-glucose meter kit Use as instructed to check blood sugar daily 1 each 0    doxycycline (VIBRA-TABS) 100 MG tablet Take 1 tablet (100 mg total) by mouth 2 (two) times daily. 20 tablet 0     No current  facility-administered medications for this visit.      Facility-Administered Medications Ordered in Other Visits   Medication Dose Route Frequency Provider Last Rate Last Dose    lidocaine (PF) 10 mg/ml (1%) injection 10 mg  1 mL Intradermal Once Deandra Diaz MD           Review of patient's allergies indicates:   Allergen Reactions    No known drug allergies        Physical examination  Vitals Reviewed\  Vitals:    10/30/20 1541   BP: (!) 128/54   Pulse: (!) 53   Temp: 97.4 °F (36.3 °C)     Weight: 117.3 kg (258 lb 9.6 oz)    Gen. Well-dressed well-nourished   Skin warm dry and intact.  No rashes noted.  Neck is supple without adenopathy  Chest.  Respirations are even unlabored.  Lungs are clear to auscultation.  Cardiac regular rate and rhythm.  No chest wall adenopathy noted.  Neuro. Awake alert oriented x4.  Normal judgment and cognition noted.  Extremities no clubbing cyanosis or edema noted.     Call or return to clinic prn if these symptoms worsen or fail to improve as anticipated.

## 2020-10-30 NOTE — TELEPHONE ENCOUNTER
Scheduled patient w/HIEU Atkins for today @3:40. Patient states he has been taking PO steroid for 3 days & neb Tx's to no avail. Wheezing has been ongoing for 7 days.

## 2020-10-30 NOTE — TELEPHONE ENCOUNTER
----- Message from Vicente Sebastian sent at 10/30/2020  9:35 AM CDT -----  Regarding: appt access  Contact: PT  Type:  Patient Returning Call    Who Called:  pt  Does the patient know what this is regarding?:  would like office to follow up as soon as possible stating wheezing entirely  Best Call Back Number:    Additional Information:  Please follow up. Thank you

## 2020-11-04 ENCOUNTER — OFFICE VISIT (OUTPATIENT)
Dept: OTOLARYNGOLOGY | Facility: CLINIC | Age: 75
End: 2020-11-04
Payer: MEDICARE

## 2020-11-04 VITALS
HEART RATE: 82 BPM | DIASTOLIC BLOOD PRESSURE: 71 MMHG | OXYGEN SATURATION: 95 % | SYSTOLIC BLOOD PRESSURE: 129 MMHG | BODY MASS INDEX: 36.33 KG/M2 | WEIGHT: 259.5 LBS | HEIGHT: 71 IN

## 2020-11-04 DIAGNOSIS — J38.1 VOCAL CORD POLYP: Primary | ICD-10-CM

## 2020-11-04 PROCEDURE — 99024 POSTOP FOLLOW-UP VISIT: CPT | Mod: HCNC,S$GLB,, | Performed by: OTOLARYNGOLOGY

## 2020-11-04 PROCEDURE — 99024 PR POST-OP FOLLOW-UP VISIT: ICD-10-PCS | Mod: HCNC,S$GLB,, | Performed by: OTOLARYNGOLOGY

## 2020-11-04 NOTE — PROGRESS NOTES
Lenny Lopez is a 76 yo man presenting for follow up s/p microsuspension laryngoscopy with removal of vocal cord polyp 10/13. Has been doing well since surgery. No dysphagia or sore throat.    Pathology  True vocal cord, right, biopsy:   - Vocal cord polyp   - Negative for dysplasia or malignancy   - Fragments morphologically consistent with Actinomyces (likely contaminant)     intraop photos:    Voice is strong  Neck soft  Trachea midline    76 yo man s/p MSL w/ excision of right vocal cord polyp    -return as needed

## 2020-11-11 ENCOUNTER — LAB VISIT (OUTPATIENT)
Dept: LAB | Facility: HOSPITAL | Age: 75
End: 2020-11-11
Attending: PHYSICIAN ASSISTANT
Payer: MEDICARE

## 2020-11-11 ENCOUNTER — PATIENT OUTREACH (OUTPATIENT)
Dept: ADMINISTRATIVE | Facility: OTHER | Age: 75
End: 2020-11-11

## 2020-11-11 DIAGNOSIS — I15.2 HYPERTENSION ASSOCIATED WITH DIABETES: ICD-10-CM

## 2020-11-11 DIAGNOSIS — R80.9 TYPE 2 DIABETES MELLITUS WITH MICROALBUMINURIA, WITHOUT LONG-TERM CURRENT USE OF INSULIN: ICD-10-CM

## 2020-11-11 DIAGNOSIS — E11.59 HYPERTENSION ASSOCIATED WITH DIABETES: ICD-10-CM

## 2020-11-11 DIAGNOSIS — N18.30 CKD (CHRONIC KIDNEY DISEASE) STAGE 3, GFR 30-59 ML/MIN: ICD-10-CM

## 2020-11-11 DIAGNOSIS — Z11.59 ENCOUNTER FOR HEPATITIS C SCREENING TEST FOR LOW RISK PATIENT: ICD-10-CM

## 2020-11-11 DIAGNOSIS — E11.29 TYPE 2 DIABETES MELLITUS WITH MICROALBUMINURIA, WITHOUT LONG-TERM CURRENT USE OF INSULIN: ICD-10-CM

## 2020-11-11 DIAGNOSIS — E21.3 HYPERPARATHYROIDISM: ICD-10-CM

## 2020-11-11 LAB
25(OH)D3+25(OH)D2 SERPL-MCNC: 16 NG/ML (ref 30–96)
ALBUMIN SERPL BCP-MCNC: 3.4 G/DL (ref 3.5–5.2)
ALP SERPL-CCNC: 97 U/L (ref 55–135)
ALT SERPL W/O P-5'-P-CCNC: 18 U/L (ref 10–44)
ANION GAP SERPL CALC-SCNC: 10 MMOL/L (ref 8–16)
AST SERPL-CCNC: 17 U/L (ref 10–40)
BASOPHILS # BLD AUTO: 0 K/UL (ref 0–0.2)
BASOPHILS NFR BLD: 0 % (ref 0–1.9)
BILIRUB SERPL-MCNC: 0.9 MG/DL (ref 0.1–1)
BUN SERPL-MCNC: 32 MG/DL (ref 8–23)
CALCIUM SERPL-MCNC: 9.5 MG/DL (ref 8.7–10.5)
CHLORIDE SERPL-SCNC: 103 MMOL/L (ref 95–110)
CO2 SERPL-SCNC: 26 MMOL/L (ref 23–29)
CREAT SERPL-MCNC: 1.4 MG/DL (ref 0.5–1.4)
DIFFERENTIAL METHOD: ABNORMAL
EOSINOPHIL # BLD AUTO: 0 K/UL (ref 0–0.5)
EOSINOPHIL NFR BLD: 0 % (ref 0–8)
ERYTHROCYTE [DISTWIDTH] IN BLOOD BY AUTOMATED COUNT: 13.5 % (ref 11.5–14.5)
EST. GFR  (AFRICAN AMERICAN): 55.6 ML/MIN/1.73 M^2
EST. GFR  (NON AFRICAN AMERICAN): 48.1 ML/MIN/1.73 M^2
ESTIMATED AVG GLUCOSE: 160 MG/DL (ref 68–131)
GLUCOSE SERPL-MCNC: 162 MG/DL (ref 70–110)
HBA1C MFR BLD HPLC: 7.2 % (ref 4–5.6)
HCT VFR BLD AUTO: 42.1 % (ref 40–54)
HGB BLD-MCNC: 13.2 G/DL (ref 14–18)
IMM GRANULOCYTES # BLD AUTO: 0.03 K/UL (ref 0–0.04)
IMM GRANULOCYTES NFR BLD AUTO: 0.4 % (ref 0–0.5)
LYMPHOCYTES # BLD AUTO: 1.4 K/UL (ref 1–4.8)
LYMPHOCYTES NFR BLD: 19.9 % (ref 18–48)
MCH RBC QN AUTO: 28.5 PG (ref 27–31)
MCHC RBC AUTO-ENTMCNC: 31.4 G/DL (ref 32–36)
MCV RBC AUTO: 91 FL (ref 82–98)
MONOCYTES # BLD AUTO: 0.6 K/UL (ref 0.3–1)
MONOCYTES NFR BLD: 8.3 % (ref 4–15)
NEUTROPHILS # BLD AUTO: 4.8 K/UL (ref 1.8–7.7)
NEUTROPHILS NFR BLD: 71.4 % (ref 38–73)
NRBC BLD-RTO: 0 /100 WBC
PLATELET # BLD AUTO: 123 K/UL (ref 150–350)
PMV BLD AUTO: 12.9 FL (ref 9.2–12.9)
POTASSIUM SERPL-SCNC: 4.2 MMOL/L (ref 3.5–5.1)
PROT SERPL-MCNC: 6.6 G/DL (ref 6–8.4)
PTH-INTACT SERPL-MCNC: 105 PG/ML (ref 9–77)
RBC # BLD AUTO: 4.63 M/UL (ref 4.6–6.2)
SODIUM SERPL-SCNC: 139 MMOL/L (ref 136–145)
WBC # BLD AUTO: 6.77 K/UL (ref 3.9–12.7)

## 2020-11-11 PROCEDURE — 83036 HEMOGLOBIN GLYCOSYLATED A1C: CPT | Mod: HCNC

## 2020-11-11 PROCEDURE — 85025 COMPLETE CBC W/AUTO DIFF WBC: CPT | Mod: HCNC

## 2020-11-11 PROCEDURE — 36415 COLL VENOUS BLD VENIPUNCTURE: CPT | Mod: HCNC,PO

## 2020-11-11 PROCEDURE — 80053 COMPREHEN METABOLIC PANEL: CPT | Mod: HCNC

## 2020-11-11 PROCEDURE — 82306 VITAMIN D 25 HYDROXY: CPT | Mod: HCNC

## 2020-11-11 PROCEDURE — 86803 HEPATITIS C AB TEST: CPT | Mod: HCNC

## 2020-11-11 PROCEDURE — 83970 ASSAY OF PARATHORMONE: CPT | Mod: HCNC

## 2020-11-12 ENCOUNTER — OFFICE VISIT (OUTPATIENT)
Dept: UROLOGY | Facility: CLINIC | Age: 75
End: 2020-11-12
Payer: MEDICARE

## 2020-11-12 ENCOUNTER — HOSPITAL ENCOUNTER (OUTPATIENT)
Dept: RADIOLOGY | Facility: HOSPITAL | Age: 75
Discharge: HOME | End: 2020-11-12
Attending: UROLOGY
Payer: MEDICARE

## 2020-11-12 VITALS
DIASTOLIC BLOOD PRESSURE: 70 MMHG | HEART RATE: 46 BPM | SYSTOLIC BLOOD PRESSURE: 140 MMHG | HEIGHT: 71 IN | TEMPERATURE: 98 F | RESPIRATION RATE: 18 BRPM | BODY MASS INDEX: 36.33 KG/M2 | WEIGHT: 259.5 LBS

## 2020-11-12 DIAGNOSIS — N20.0 RENAL CALCULUS: ICD-10-CM

## 2020-11-12 DIAGNOSIS — N40.0 BENIGN PROSTATIC HYPERPLASIA WITHOUT LOWER URINARY TRACT SYMPTOMS: ICD-10-CM

## 2020-11-12 DIAGNOSIS — N20.0 RENAL CALCULUS: Primary | ICD-10-CM

## 2020-11-12 DIAGNOSIS — N32.81 OAB (OVERACTIVE BLADDER): ICD-10-CM

## 2020-11-12 LAB — HCV AB SERPL QL IA: NEGATIVE

## 2020-11-12 PROCEDURE — 1101F PT FALLS ASSESS-DOCD LE1/YR: CPT | Mod: HCNC,CPTII,S$GLB, | Performed by: UROLOGY

## 2020-11-12 PROCEDURE — 99999 PR PBB SHADOW E&M-EST. PATIENT-LVL III: CPT | Mod: PBBFAC,HCNC,, | Performed by: UROLOGY

## 2020-11-12 PROCEDURE — 74018 XR ABDOMEN AP 1 VIEW: ICD-10-PCS | Mod: 26,HCNC,, | Performed by: RADIOLOGY

## 2020-11-12 PROCEDURE — 99214 PR OFFICE/OUTPT VISIT, EST, LEVL IV, 30-39 MIN: ICD-10-PCS | Mod: HCNC,S$GLB,, | Performed by: UROLOGY

## 2020-11-12 PROCEDURE — 1101F PR PT FALLS ASSESS DOC 0-1 FALLS W/OUT INJ PAST YR: ICD-10-PCS | Mod: HCNC,CPTII,S$GLB, | Performed by: UROLOGY

## 2020-11-12 PROCEDURE — 1159F PR MEDICATION LIST DOCUMENTED IN MEDICAL RECORD: ICD-10-PCS | Mod: HCNC,S$GLB,, | Performed by: UROLOGY

## 2020-11-12 PROCEDURE — 3077F PR MOST RECENT SYSTOLIC BLOOD PRESSURE >= 140 MM HG: ICD-10-PCS | Mod: HCNC,CPTII,S$GLB, | Performed by: UROLOGY

## 2020-11-12 PROCEDURE — 3288F PR FALLS RISK ASSESSMENT DOCUMENTED: ICD-10-PCS | Mod: HCNC,CPTII,S$GLB, | Performed by: UROLOGY

## 2020-11-12 PROCEDURE — 1126F PR PAIN SEVERITY QUANTIFIED, NO PAIN PRESENT: ICD-10-PCS | Mod: HCNC,S$GLB,, | Performed by: UROLOGY

## 2020-11-12 PROCEDURE — 74018 RADEX ABDOMEN 1 VIEW: CPT | Mod: TC,HCNC,FY

## 2020-11-12 PROCEDURE — 74018 RADEX ABDOMEN 1 VIEW: CPT | Mod: 26,HCNC,, | Performed by: RADIOLOGY

## 2020-11-12 PROCEDURE — 3078F PR MOST RECENT DIASTOLIC BLOOD PRESSURE < 80 MM HG: ICD-10-PCS | Mod: HCNC,CPTII,S$GLB, | Performed by: UROLOGY

## 2020-11-12 PROCEDURE — 3288F FALL RISK ASSESSMENT DOCD: CPT | Mod: HCNC,CPTII,S$GLB, | Performed by: UROLOGY

## 2020-11-12 PROCEDURE — 3078F DIAST BP <80 MM HG: CPT | Mod: HCNC,CPTII,S$GLB, | Performed by: UROLOGY

## 2020-11-12 PROCEDURE — 1159F MED LIST DOCD IN RCRD: CPT | Mod: HCNC,S$GLB,, | Performed by: UROLOGY

## 2020-11-12 PROCEDURE — 1126F AMNT PAIN NOTED NONE PRSNT: CPT | Mod: HCNC,S$GLB,, | Performed by: UROLOGY

## 2020-11-12 PROCEDURE — 99214 OFFICE O/P EST MOD 30 MIN: CPT | Mod: HCNC,S$GLB,, | Performed by: UROLOGY

## 2020-11-12 PROCEDURE — 3077F SYST BP >= 140 MM HG: CPT | Mod: HCNC,CPTII,S$GLB, | Performed by: UROLOGY

## 2020-11-12 PROCEDURE — 99999 PR PBB SHADOW E&M-EST. PATIENT-LVL III: ICD-10-PCS | Mod: PBBFAC,HCNC,, | Performed by: UROLOGY

## 2020-11-12 NOTE — PROGRESS NOTES
OFFICE NOTE  [unfilled]  [id number]  11/12/2020       CHIEF COMPLAINT:   renal calculi, BPH, overactive bladder     HISTORY OF PRESENT ILLNESS:   this 75-year-old male returns for recheck.  He has a history of renal calculi had undergone ESWL in the past.  Recent CT scan and KUB revealed a 1.6 cm proximal left ureteral calculus but no evidence any obstruction and patient denies any flank pain and no problems and he decided not to undergo any procedures related to the stone such as another ESWL.  On today's visit he is completely symptom free and denies any complaints.  He also continues to take Proscar for his BPH and also continues to take Myrbetriq for overactive bladder with which appears to be doing well.  He is not on Flomax at this time.    Physical exam:  Abdomen - soft benign nontender no masses no hernias no organomegaly                                    PSA  - 3.5 on 09/03/2020          FINAL IMPRESSION:  Renal calculi, BPH, overactive bladder       RECOMMENDATIONS:   KUB.  Continue on Proscar.  Continue Myrbetriq.

## 2020-11-18 ENCOUNTER — HOSPITAL ENCOUNTER (OUTPATIENT)
Dept: RADIOLOGY | Facility: HOSPITAL | Age: 75
Discharge: HOME | End: 2020-11-18
Attending: PHYSICIAN ASSISTANT
Payer: MEDICARE

## 2020-11-18 ENCOUNTER — OFFICE VISIT (OUTPATIENT)
Dept: FAMILY MEDICINE | Facility: CLINIC | Age: 75
End: 2020-11-18
Payer: MEDICARE

## 2020-11-18 VITALS
HEART RATE: 56 BPM | OXYGEN SATURATION: 98 % | RESPIRATION RATE: 16 BRPM | BODY MASS INDEX: 36.79 KG/M2 | TEMPERATURE: 97 F | HEIGHT: 71 IN | WEIGHT: 262.81 LBS | DIASTOLIC BLOOD PRESSURE: 66 MMHG | SYSTOLIC BLOOD PRESSURE: 126 MMHG

## 2020-11-18 DIAGNOSIS — E21.3 HYPERPARATHYROIDISM: ICD-10-CM

## 2020-11-18 DIAGNOSIS — E55.9 VITAMIN D DEFICIENCY: ICD-10-CM

## 2020-11-18 DIAGNOSIS — M79.89 LEFT LEG SWELLING: ICD-10-CM

## 2020-11-18 DIAGNOSIS — R60.0 LOCALIZED EDEMA: ICD-10-CM

## 2020-11-18 DIAGNOSIS — E78.5 HYPERLIPIDEMIA ASSOCIATED WITH TYPE 2 DIABETES MELLITUS: ICD-10-CM

## 2020-11-18 DIAGNOSIS — I15.2 HYPERTENSION ASSOCIATED WITH DIABETES: ICD-10-CM

## 2020-11-18 DIAGNOSIS — E66.01 SEVERE OBESITY (BMI 35.0-39.9) WITH COMORBIDITY: ICD-10-CM

## 2020-11-18 DIAGNOSIS — R80.9 TYPE 2 DIABETES MELLITUS WITH MICROALBUMINURIA, WITHOUT LONG-TERM CURRENT USE OF INSULIN: Primary | ICD-10-CM

## 2020-11-18 DIAGNOSIS — E11.69 HYPERLIPIDEMIA ASSOCIATED WITH TYPE 2 DIABETES MELLITUS: ICD-10-CM

## 2020-11-18 DIAGNOSIS — E11.29 TYPE 2 DIABETES MELLITUS WITH MICROALBUMINURIA, WITHOUT LONG-TERM CURRENT USE OF INSULIN: Primary | ICD-10-CM

## 2020-11-18 DIAGNOSIS — Z12.11 COLON CANCER SCREENING: ICD-10-CM

## 2020-11-18 DIAGNOSIS — J82.83 EOSINOPHILIC ASTHMA: ICD-10-CM

## 2020-11-18 DIAGNOSIS — E11.59 HYPERTENSION ASSOCIATED WITH DIABETES: ICD-10-CM

## 2020-11-18 PROCEDURE — 3288F PR FALLS RISK ASSESSMENT DOCUMENTED: ICD-10-PCS | Mod: HCNC,CPTII,S$GLB, | Performed by: PHYSICIAN ASSISTANT

## 2020-11-18 PROCEDURE — 3074F SYST BP LT 130 MM HG: CPT | Mod: HCNC,CPTII,S$GLB, | Performed by: PHYSICIAN ASSISTANT

## 2020-11-18 PROCEDURE — 90662 IIV NO PRSV INCREASED AG IM: CPT | Mod: HCNC,S$GLB,, | Performed by: PHYSICIAN ASSISTANT

## 2020-11-18 PROCEDURE — 93971 EXTREMITY STUDY: CPT | Mod: TC,HCNC,LT

## 2020-11-18 PROCEDURE — 3074F PR MOST RECENT SYSTOLIC BLOOD PRESSURE < 130 MM HG: ICD-10-PCS | Mod: HCNC,CPTII,S$GLB, | Performed by: PHYSICIAN ASSISTANT

## 2020-11-18 PROCEDURE — 1126F PR PAIN SEVERITY QUANTIFIED, NO PAIN PRESENT: ICD-10-PCS | Mod: HCNC,S$GLB,, | Performed by: PHYSICIAN ASSISTANT

## 2020-11-18 PROCEDURE — 99999 PR PBB SHADOW E&M-EST. PATIENT-LVL V: CPT | Mod: PBBFAC,HCNC,, | Performed by: PHYSICIAN ASSISTANT

## 2020-11-18 PROCEDURE — 1101F PT FALLS ASSESS-DOCD LE1/YR: CPT | Mod: HCNC,CPTII,S$GLB, | Performed by: PHYSICIAN ASSISTANT

## 2020-11-18 PROCEDURE — 99214 PR OFFICE/OUTPT VISIT, EST, LEVL IV, 30-39 MIN: ICD-10-PCS | Mod: HCNC,25,S$GLB, | Performed by: PHYSICIAN ASSISTANT

## 2020-11-18 PROCEDURE — 3078F DIAST BP <80 MM HG: CPT | Mod: HCNC,CPTII,S$GLB, | Performed by: PHYSICIAN ASSISTANT

## 2020-11-18 PROCEDURE — 99999 PR PBB SHADOW E&M-EST. PATIENT-LVL V: ICD-10-PCS | Mod: PBBFAC,HCNC,, | Performed by: PHYSICIAN ASSISTANT

## 2020-11-18 PROCEDURE — 3051F HG A1C>EQUAL 7.0%<8.0%: CPT | Mod: HCNC,CPTII,S$GLB, | Performed by: PHYSICIAN ASSISTANT

## 2020-11-18 PROCEDURE — 1126F AMNT PAIN NOTED NONE PRSNT: CPT | Mod: HCNC,S$GLB,, | Performed by: PHYSICIAN ASSISTANT

## 2020-11-18 PROCEDURE — 90662 FLU VACCINE - HIGH DOSE (65+) PRESERVATIVE FREE IM: ICD-10-PCS | Mod: HCNC,S$GLB,, | Performed by: PHYSICIAN ASSISTANT

## 2020-11-18 PROCEDURE — 1159F PR MEDICATION LIST DOCUMENTED IN MEDICAL RECORD: ICD-10-PCS | Mod: HCNC,S$GLB,, | Performed by: PHYSICIAN ASSISTANT

## 2020-11-18 PROCEDURE — 3288F FALL RISK ASSESSMENT DOCD: CPT | Mod: HCNC,CPTII,S$GLB, | Performed by: PHYSICIAN ASSISTANT

## 2020-11-18 PROCEDURE — 1159F MED LIST DOCD IN RCRD: CPT | Mod: HCNC,S$GLB,, | Performed by: PHYSICIAN ASSISTANT

## 2020-11-18 PROCEDURE — 99214 OFFICE O/P EST MOD 30 MIN: CPT | Mod: HCNC,25,S$GLB, | Performed by: PHYSICIAN ASSISTANT

## 2020-11-18 PROCEDURE — 3051F PR MOST RECENT HEMOGLOBIN A1C LEVEL 7.0 - < 8.0%: ICD-10-PCS | Mod: HCNC,CPTII,S$GLB, | Performed by: PHYSICIAN ASSISTANT

## 2020-11-18 PROCEDURE — 3078F PR MOST RECENT DIASTOLIC BLOOD PRESSURE < 80 MM HG: ICD-10-PCS | Mod: HCNC,CPTII,S$GLB, | Performed by: PHYSICIAN ASSISTANT

## 2020-11-18 PROCEDURE — 93971 EXTREMITY STUDY: CPT | Mod: 26,HCNC,LT, | Performed by: RADIOLOGY

## 2020-11-18 PROCEDURE — 93971 US LOWER EXTREMITY VEINS LEFT: ICD-10-PCS | Mod: 26,HCNC,LT, | Performed by: RADIOLOGY

## 2020-11-18 PROCEDURE — G0008 FLU VACCINE - HIGH DOSE (65+) PRESERVATIVE FREE IM: ICD-10-PCS | Mod: HCNC,S$GLB,, | Performed by: PHYSICIAN ASSISTANT

## 2020-11-18 PROCEDURE — G0008 ADMIN INFLUENZA VIRUS VAC: HCPCS | Mod: HCNC,S$GLB,, | Performed by: PHYSICIAN ASSISTANT

## 2020-11-18 PROCEDURE — 1101F PR PT FALLS ASSESS DOC 0-1 FALLS W/OUT INJ PAST YR: ICD-10-PCS | Mod: HCNC,CPTII,S$GLB, | Performed by: PHYSICIAN ASSISTANT

## 2020-11-18 RX ORDER — ERGOCALCIFEROL 1.25 MG/1
50000 CAPSULE ORAL WEEKLY
Qty: 4 CAPSULE | Refills: 5 | Status: SHIPPED | OUTPATIENT
Start: 2020-11-18 | End: 2021-11-18

## 2020-11-18 NOTE — PROGRESS NOTES
Subjective:       Patient ID: Lenny Lopez is a 75 y.o. male.    Chief Complaint: Follow-up (3 month f/u )    Lenny Lopez is a 74 yo male who presents for follow up of several chronic problems. He is followed by pulmonology, Dr. Allan, and urology, Dr. Wheatley. He reports improvement in COPD since exacerbation last month. He no longer requires breathing treatments. He states the Fansenra injection is working well but begins to feel his symptoms return before it is due. He is not checking his blood sugar at home. He is having trouble remembering to take his precose as prescribed because he does not eat meals frequently enough. He is going to try to set a reminder to take it as scheduled. The patient also reports increased leg swelling for the last 4 months. This is a chronic issue, but it has recently worsened in his left leg. The swelling does not go down with elevation and he sometimes takes a fluid pill with moderate improvement. He is due for eye exam, colonoscopy, foot exam. The patient would like his flu shot today. His recent labs show low vitamin D but are otherwise stable. He does not take any Vit D supplements.     Review of Systems   Constitutional: Positive for activity change. Negative for chills, fatigue and fever.   HENT: Negative for nasal congestion, postnasal drip, sinus pressure/congestion and sore throat.    Eyes: Negative for pain and visual disturbance.   Respiratory: Positive for shortness of breath (chronic). Negative for cough and wheezing.    Cardiovascular: Positive for leg swelling. Negative for chest pain and palpitations.   Gastrointestinal: Negative for abdominal pain, constipation and diarrhea.   Genitourinary: Negative for bladder incontinence, frequency, hematuria and urgency.   Musculoskeletal: Positive for back pain (chronic) and leg pain. Negative for arthralgias and myalgias.   Neurological: Positive for headaches (chronic issue). Negative for dizziness, weakness and  numbness.   Psychiatric/Behavioral: Negative for confusion. The patient is not nervous/anxious.          Objective:      Physical Exam  Vitals signs reviewed.   Constitutional:       General: He is not in acute distress.     Appearance: Normal appearance. He is not ill-appearing or toxic-appearing.   HENT:      Head: Normocephalic and atraumatic.      Nose:      Right Sinus: No maxillary sinus tenderness or frontal sinus tenderness.      Left Sinus: No maxillary sinus tenderness or frontal sinus tenderness.   Eyes:      Extraocular Movements: Extraocular movements intact.      Conjunctiva/sclera: Conjunctivae normal.      Pupils: Pupils are equal, round, and reactive to light.   Neck:      Vascular: No carotid bruit.   Cardiovascular:      Rate and Rhythm: Normal rate and regular rhythm.      Pulses:           Dorsalis pedis pulses are 2+ on the right side and 2+ on the left side.        Posterior tibial pulses are 2+ on the right side and 1+ on the left side.      Heart sounds: Normal heart sounds. No murmur. No friction rub. No gallop.    Pulmonary:      Effort: Pulmonary effort is normal. No respiratory distress.      Breath sounds: No wheezing, rhonchi or rales.   Abdominal:      General: Bowel sounds are normal.      Palpations: Abdomen is soft.      Tenderness: There is no abdominal tenderness.   Musculoskeletal:         General: No deformity or signs of injury.      Right lower le+ Edema present.      Left lower le+ Pitting Edema present.   Feet:      Right foot:      Protective Sensation: 6 sites tested. 3 sites sensed.      Skin integrity: Skin integrity normal.      Toenail Condition: Right toenails are abnormally thick and long.      Left foot:      Protective Sensation: 6 sites tested. 4 sites sensed.      Skin integrity: Skin integrity normal.      Toenail Condition: Left toenails are abnormally thick and long.   Lymphadenopathy:      Cervical: No cervical adenopathy.   Skin:     General: Skin  is warm.      Findings: Erythema (left calf with TTP) present.   Neurological:      Mental Status: He is alert and oriented to person, place, and time.   Psychiatric:         Mood and Affect: Mood normal.         Behavior: Behavior normal.         Assessment:       1. Type 2 diabetes mellitus with microalbuminuria, without long-term current use of insulin    2. Severe obesity (BMI 35.0-39.9) with comorbidity    3. Hypertension associated with diabetes    4. Hyperparathyroidism    5. Hyperlipidemia associated with type 2 diabetes mellitus    6. Eosinophilic asthma    7. Vitamin D deficiency    8. Colon cancer screening    9. Left leg swelling    10. Localized edema         Plan:   Lenny was seen today for follow-up.    Diagnoses and all orders for this visit:    Type 2 diabetes mellitus with microalbuminuria, without long-term current use of insulin  -     Ambulatory referral/consult to Optometry; Future  -     Microalbumin/Creatinine Ratio, Urine; Future  -     Comprehensive Metabolic Panel; Future  -     Hemoglobin A1C; Future  Compliance with diabetic diet and medications encouraged.   Severe obesity (BMI 35.0-39.9) with comorbidity  -     CBC Auto Differential; Future    Hypertension associated with diabetes  -     Comprehensive Metabolic Panel; Future  -     CBC Auto Differential; Future    Hyperparathyroidism  -     Vitamin D; Future  -     PTH, Intact; Future    Hyperlipidemia associated with type 2 diabetes mellitus  High fiber diet  Continue current medication  Eosinophilic asthma  Continue follow up with pulmonology.   Continue current medication  Vitamin D deficiency  -     ergocalciferol (ERGOCALCIFEROL) 50,000 unit Cap; Take 1 capsule (50,000 Units total) by mouth once a week.  -     Vitamin D; Future  -     PTH, Intact; Future    Colon cancer screening  -     Ambulatory referral/consult to Gastroenterology; Future    Left leg swelling  -     US Lower Extremity Veins Left; Future    Localized edema   -      US Lower Extremity Veins Left; Future    Other orders  -     Influenza - High Dose (65+) (PF) (IM)    Patient will be notified when labs return and further recommendations will be given at that time.

## 2020-11-18 NOTE — PROGRESS NOTES
Subjective:       Patient ID: Lenny Lopez is a 75 y.o. male.    Chief Complaint: Follow-up (3 month f/u )    Follow-up      Review of patient's allergies indicates:   Allergen Reactions    No known drug allergies          Current Outpatient Medications:     acarbose (PRECOSE) 25 MG Tab, Take 1 tablet (25 mg total) by mouth 3 (three) times daily with meals., Disp: 270 tablet, Rfl: 3    albuterol-ipratropium (DUO-NEB) 2.5 mg-0.5 mg/3 mL nebulizer solution, Take 3 mLs by nebulization every 6 (six) hours as needed for Wheezing. Rescue, Disp: 1 Box, Rfl: 4    allopurinol (ZYLOPRIM) 100 MG tablet, Take 2 tablets (200 mg total) by mouth once daily., Disp: 180 tablet, Rfl: 0    aspirin 81 mg Tab, Take 1 tablet by mouth once daily. , Disp: , Rfl:     atorvastatin (LIPITOR) 40 MG tablet, , Disp: , Rfl:     benralizumab (FASENRA PEN) 30 mg/mL AtIn, Inject 30 mg into the skin every 8 weeks., Disp: 1 mL, Rfl: 6    blood sugar diagnostic Strp, 1 strip by Misc.(Non-Drug; Combo Route) route 3 (three) times daily. DX: E11.29   Dispense test strips that are covered by insurance, Disp: 300 strip, Rfl: 3    ELIQUIS 5 mg Tab, Take 5 mg by mouth 2 (two) times daily. , Disp: , Rfl:     FASENRA 30 mg/mL Syrg injection, Inject 1 mL (30 mg total) into the skin every 8 weeks., Disp: 1 Syringe, Rfl: 6    finasteride (PROSCAR) 5 mg tablet, Take 1 tablet (5 mg total) by mouth once daily., Disp: 30 tablet, Rfl: 11    fluticasone propionate (FLOVENT HFA) 220 mcg/actuation inhaler, Inhale 1 puff into the lungs 2 (two) times daily. Controller, Disp: 12 g, Rfl: 11    furosemide (LASIX) 20 MG tablet, Take 1 tablet (20 mg total) by mouth once daily., Disp: 90 tablet, Rfl: 3    lancets (FREESTYLE LANCETS) 28 gauge Misc, 1 lancet by Misc.(Non-Drug; Combo Route) route 3 (three) times daily., Disp: 300 each, Rfl: 3    montelukast (SINGULAIR) 10 mg tablet, Take 1 tablet (10 mg total) by mouth every evening., Disp: 30 tablet, Rfl: 11     MYRBETRIQ 50 mg Tb24, TAKE 1 TABLET BY MOUTH ONCE DAILY, Disp: 30 tablet, Rfl: 11    nitroGLYCERIN (NITROSTAT) 0.4 MG SL tablet, Place 1 tablet (0.4 mg total) under the tongue every 5 (five) minutes as needed for Chest pain., Disp: 25 tablet, Rfl: 3    omega-3 fatty acids-vitamin E (FISH OIL) 1,000 mg Cap, 1 capsule once daily. Three times a day, Disp: , Rfl:     potassium chloride (KLOR-CON) 10 MEQ TbSR, 1 tab with furosemide., Disp: 180 tablet, Rfl: 5    sotalol (BETAPACE) 80 MG tablet, Take 0.5 tablets (40 mg total) by mouth 2 (two) times daily. for 7 days, Disp: 7 tablet, Rfl: 0    tamsulosin (FLOMAX) 0.4 mg Cap, Take 1 capsule (0.4 mg total) by mouth once daily., Disp: 30 capsule, Rfl: 11    traMADoL (ULTRAM) 50 mg tablet, Take 1 tablet (50 mg total) by mouth every 6 (six) hours as needed for Pain. 7 days for acute pain., Disp: 15 tablet, Rfl: 1    turmeric root extract 500 mg Cap, Take 1 capsule by mouth once daily., Disp: , Rfl:     VENTOLIN HFA 90 mcg/actuation inhaler, Inhale 1-2 puffs into the lungs every 4 (four) hours as needed for Wheezing or Shortness of Breath., Disp: 1 each, Rfl: 11    blood-glucose meter kit, Use as instructed to check blood sugar daily, Disp: 1 each, Rfl: 0    ergocalciferol (ERGOCALCIFEROL) 50,000 unit Cap, Take 1 capsule (50,000 Units total) by mouth once a week., Disp: 4 capsule, Rfl: 5  No current facility-administered medications for this visit.     Facility-Administered Medications Ordered in Other Visits:     lidocaine (PF) 10 mg/ml (1%) injection 10 mg, 1 mL, Intradermal, Once, Deandra Diaz MD    Lab Results   Component Value Date    WBC 6.77 11/11/2020    HGB 13.2 (L) 11/11/2020    HCT 42.1 11/11/2020     (L) 11/11/2020    CHOL 113 (L) 08/06/2020    TRIG 114 08/06/2020    HDL 29 (L) 08/06/2020    ALT 18 11/11/2020    AST 17 11/11/2020     11/11/2020    K 4.2 11/11/2020     11/11/2020    CREATININE 1.4 11/11/2020    BUN 32 (H)  11/11/2020    CO2 26 11/11/2020    TSH 3.464 12/10/2013    PSA 3.5 12/01/2015    INR 1.1 09/03/2020    HGBA1C 7.2 (H) 11/11/2020       Review of Systems    Objective:      Physical Exam    Assessment:       1. Type 2 diabetes mellitus with microalbuminuria, without long-term current use of insulin    2. Severe obesity (BMI 35.0-39.9) with comorbidity    3. Hypertension associated with diabetes    4. Hyperparathyroidism    5. Hyperlipidemia associated with type 2 diabetes mellitus    6. Eosinophilic asthma    7. Vitamin D deficiency    8. Colon cancer screening    9. Left leg swelling    10. Localized edema         Plan:       ***

## 2020-11-18 NOTE — PROGRESS NOTES
Patient verified by name and . Patient received Influenza High dose in R deltoid. Patient tolerated injection well. Patient advised to wait in clinic for 15 minutes in case of adverse reactions. Patient verbalized understanding.

## 2020-11-20 ENCOUNTER — OFFICE VISIT (OUTPATIENT)
Dept: UROLOGY | Facility: CLINIC | Age: 75
End: 2020-11-20
Payer: MEDICARE

## 2020-11-20 ENCOUNTER — PATIENT MESSAGE (OUTPATIENT)
Dept: GASTROENTEROLOGY | Facility: CLINIC | Age: 75
End: 2020-11-20

## 2020-11-20 ENCOUNTER — TELEPHONE (OUTPATIENT)
Dept: FAMILY MEDICINE | Facility: CLINIC | Age: 75
End: 2020-11-20

## 2020-11-20 VITALS
TEMPERATURE: 98 F | RESPIRATION RATE: 18 BRPM | WEIGHT: 262.81 LBS | DIASTOLIC BLOOD PRESSURE: 66 MMHG | HEART RATE: 68 BPM | HEIGHT: 71 IN | SYSTOLIC BLOOD PRESSURE: 125 MMHG | BODY MASS INDEX: 36.79 KG/M2

## 2020-11-20 DIAGNOSIS — N20.0 RENAL CALCULUS: Primary | ICD-10-CM

## 2020-11-20 PROCEDURE — 99999 PR PBB SHADOW E&M-EST. PATIENT-LVL III: CPT | Mod: PBBFAC,HCNC,, | Performed by: UROLOGY

## 2020-11-20 PROCEDURE — 1101F PT FALLS ASSESS-DOCD LE1/YR: CPT | Mod: HCNC,CPTII,S$GLB, | Performed by: UROLOGY

## 2020-11-20 PROCEDURE — 99214 OFFICE O/P EST MOD 30 MIN: CPT | Mod: HCNC,S$GLB,, | Performed by: UROLOGY

## 2020-11-20 PROCEDURE — 3078F DIAST BP <80 MM HG: CPT | Mod: HCNC,CPTII,S$GLB, | Performed by: UROLOGY

## 2020-11-20 PROCEDURE — 3074F SYST BP LT 130 MM HG: CPT | Mod: HCNC,CPTII,S$GLB, | Performed by: UROLOGY

## 2020-11-20 PROCEDURE — 3074F PR MOST RECENT SYSTOLIC BLOOD PRESSURE < 130 MM HG: ICD-10-PCS | Mod: HCNC,CPTII,S$GLB, | Performed by: UROLOGY

## 2020-11-20 PROCEDURE — 1101F PR PT FALLS ASSESS DOC 0-1 FALLS W/OUT INJ PAST YR: ICD-10-PCS | Mod: HCNC,CPTII,S$GLB, | Performed by: UROLOGY

## 2020-11-20 PROCEDURE — 1159F PR MEDICATION LIST DOCUMENTED IN MEDICAL RECORD: ICD-10-PCS | Mod: HCNC,S$GLB,, | Performed by: UROLOGY

## 2020-11-20 PROCEDURE — 99214 PR OFFICE/OUTPT VISIT, EST, LEVL IV, 30-39 MIN: ICD-10-PCS | Mod: HCNC,S$GLB,, | Performed by: UROLOGY

## 2020-11-20 PROCEDURE — 1126F PR PAIN SEVERITY QUANTIFIED, NO PAIN PRESENT: ICD-10-PCS | Mod: HCNC,S$GLB,, | Performed by: UROLOGY

## 2020-11-20 PROCEDURE — 3078F PR MOST RECENT DIASTOLIC BLOOD PRESSURE < 80 MM HG: ICD-10-PCS | Mod: HCNC,CPTII,S$GLB, | Performed by: UROLOGY

## 2020-11-20 PROCEDURE — 1159F MED LIST DOCD IN RCRD: CPT | Mod: HCNC,S$GLB,, | Performed by: UROLOGY

## 2020-11-20 PROCEDURE — 1126F AMNT PAIN NOTED NONE PRSNT: CPT | Mod: HCNC,S$GLB,, | Performed by: UROLOGY

## 2020-11-20 PROCEDURE — 3288F FALL RISK ASSESSMENT DOCD: CPT | Mod: HCNC,CPTII,S$GLB, | Performed by: UROLOGY

## 2020-11-20 PROCEDURE — 99999 PR PBB SHADOW E&M-EST. PATIENT-LVL III: ICD-10-PCS | Mod: PBBFAC,HCNC,, | Performed by: UROLOGY

## 2020-11-20 PROCEDURE — 3288F PR FALLS RISK ASSESSMENT DOCUMENTED: ICD-10-PCS | Mod: HCNC,CPTII,S$GLB, | Performed by: UROLOGY

## 2020-11-20 NOTE — TELEPHONE ENCOUNTER
----- Message from Khari Brock sent at 11/20/2020  1:18 PM CST -----  Type: Needs Medical Advice    Who Called:  Patient  Best Call Back Number: [phone]  Additional Information: Patient would like to discuss US results. Please call to advise. Thanks!

## 2020-11-20 NOTE — PROGRESS NOTES
OFFICE NOTE  [unfilled]  [id number]  11/20/2020       CHIEF COMPLAINT:   renal calculus     HISTORY OF PRESENT ILLNESS:   this 76-year-old male returns routine recheck.  He has history renal calculi and most recent CT on 08/26/2020 revealed a 1.6 cm proximal left ureteral calculus with no evidence any obstruction.  Most recent KUB on 11/12/2020 again revealed similar findings with no central changes.  There are in addition 2 small left intrarenal calculi in addition to the 1.7 cm left UPJ stone.  Patient denies any complaints and no flank pain.  He had undergone ESWL in the past we had discussed at the last meeting and also again today whether the consider  ESWL on the current stone but the patient had decided that he does not want to undergo any further procedures especially since he is having no complaints and no pain.  We did discuss the possibility of obtaining a renal nuclear scan to evaluate the split renal function and degree of obstruction to which he agreed.     FINAL IMPRESSION:   renal calculi with 1.6 cm proximal left ureteral calculus but no obstruction     RECOMMENDATIONS:   renal nuclear scan and will contact patient with the findings.

## 2020-11-30 ENCOUNTER — HOSPITAL ENCOUNTER (OUTPATIENT)
Dept: RADIOLOGY | Facility: HOSPITAL | Age: 75
Discharge: HOME | End: 2020-11-30
Attending: UROLOGY
Payer: MEDICARE

## 2020-11-30 DIAGNOSIS — N20.0 RENAL CALCULUS: ICD-10-CM

## 2020-11-30 DIAGNOSIS — J45.909 PERSISTENT ASTHMA WITHOUT COMPLICATION, UNSPECIFIED ASTHMA SEVERITY: ICD-10-CM

## 2020-11-30 PROCEDURE — A9562 TC99M MERTIATIDE: HCPCS | Mod: HCNC

## 2020-11-30 PROCEDURE — 78707 K FLOW/FUNCT IMAGE W/O DRUG: CPT | Mod: 26,HCNC,, | Performed by: RADIOLOGY

## 2020-11-30 PROCEDURE — 78707 NM KIDNEY WITH FLOW AND FUNCTION: ICD-10-PCS | Mod: 26,HCNC,, | Performed by: RADIOLOGY

## 2020-11-30 RX ORDER — MONTELUKAST SODIUM 10 MG/1
10 TABLET ORAL NIGHTLY
Qty: 30 TABLET | Refills: 11 | Status: SHIPPED | OUTPATIENT
Start: 2020-11-30 | End: 2021-09-15 | Stop reason: SDUPTHER

## 2020-11-30 NOTE — TELEPHONE ENCOUNTER
The doctor will approve this by the end of the day.      ----- Message from Misty Cabrera sent at 11/30/2020 11:10 AM CST -----  Contact: call pt dennis harvey [phone]   Type:  RX Refill Request    Who Called:  pt  Refill RX Name and Strength:  singular 10 mg/ 1 a day //90 day   Preferred Pharmacy with phone number:    [id number] - [place] - [address]  [place] [address]  Phone: [phone] Fax: [phone]  Best Call Back Number:  call pt dennis harvey [phone]  Additional Information:  please call  for details

## 2020-12-02 ENCOUNTER — TELEPHONE (OUTPATIENT)
Dept: UROLOGY | Facility: CLINIC | Age: 75
End: 2020-12-02

## 2020-12-02 NOTE — TELEPHONE ENCOUNTER
Spoke to pt and informed him Dr. Zaragoza is out of clinic at this time. Pt states he will continue with the treatment plan of Dr. Wheatley once he has had his f/u appt on Friday to discuss results and the procedure is explained to him.       ----- Message from Marques Mcnair sent at 12/2/2020  1:29 PM CST -----  Type: Needs Medical Advice  Who Called:  Patient    Best Call Back Number: [phone]  Additional Information: Patient states that he would like a callback regarding wanting a second opinion on a procedure that Dr. Wheatley wants to perform.

## 2020-12-04 ENCOUNTER — OFFICE VISIT (OUTPATIENT)
Dept: UROLOGY | Facility: CLINIC | Age: 75
End: 2020-12-04
Payer: MEDICARE

## 2020-12-04 VITALS — TEMPERATURE: 98 F | DIASTOLIC BLOOD PRESSURE: 63 MMHG | SYSTOLIC BLOOD PRESSURE: 139 MMHG | HEART RATE: 66 BPM

## 2020-12-04 DIAGNOSIS — N20.0 RENAL CALCULI: Primary | ICD-10-CM

## 2020-12-04 PROCEDURE — 99214 OFFICE O/P EST MOD 30 MIN: CPT | Mod: HCNC,S$GLB,, | Performed by: UROLOGY

## 2020-12-04 PROCEDURE — 3075F SYST BP GE 130 - 139MM HG: CPT | Mod: HCNC,CPTII,S$GLB, | Performed by: UROLOGY

## 2020-12-04 PROCEDURE — 99999 PR PBB SHADOW E&M-EST. PATIENT-LVL III: CPT | Mod: PBBFAC,HCNC,, | Performed by: UROLOGY

## 2020-12-04 PROCEDURE — 3078F PR MOST RECENT DIASTOLIC BLOOD PRESSURE < 80 MM HG: ICD-10-PCS | Mod: HCNC,CPTII,S$GLB, | Performed by: UROLOGY

## 2020-12-04 PROCEDURE — 99214 PR OFFICE/OUTPT VISIT, EST, LEVL IV, 30-39 MIN: ICD-10-PCS | Mod: HCNC,S$GLB,, | Performed by: UROLOGY

## 2020-12-04 PROCEDURE — 1126F AMNT PAIN NOTED NONE PRSNT: CPT | Mod: HCNC,S$GLB,, | Performed by: UROLOGY

## 2020-12-04 PROCEDURE — 1159F PR MEDICATION LIST DOCUMENTED IN MEDICAL RECORD: ICD-10-PCS | Mod: HCNC,S$GLB,, | Performed by: UROLOGY

## 2020-12-04 PROCEDURE — 3075F PR MOST RECENT SYSTOLIC BLOOD PRESS GE 130-139MM HG: ICD-10-PCS | Mod: HCNC,CPTII,S$GLB, | Performed by: UROLOGY

## 2020-12-04 PROCEDURE — 99999 PR PBB SHADOW E&M-EST. PATIENT-LVL III: ICD-10-PCS | Mod: PBBFAC,HCNC,, | Performed by: UROLOGY

## 2020-12-04 PROCEDURE — 3078F DIAST BP <80 MM HG: CPT | Mod: HCNC,CPTII,S$GLB, | Performed by: UROLOGY

## 2020-12-04 PROCEDURE — 1126F PR PAIN SEVERITY QUANTIFIED, NO PAIN PRESENT: ICD-10-PCS | Mod: HCNC,S$GLB,, | Performed by: UROLOGY

## 2020-12-04 PROCEDURE — 1159F MED LIST DOCD IN RCRD: CPT | Mod: HCNC,S$GLB,, | Performed by: UROLOGY

## 2020-12-04 NOTE — PROGRESS NOTES
OFFICE NOTE  [unfilled]  [id number]  12/4/2020       CHIEF COMPLAINT:   renal calculus     HISTORY OF PRESENT ILLNESS:   this 76-year-old male returns routine recheck.  He has history renal calculi most recent CT scan on 08/26/2020 revealed a 1.6 cm proximal left ureteral calculus with no evidence any any obstruction.  Subsequent follow-up KUB on 11/12/2020 revealed similar findings were no essential changes.  On follow-up visit today he is overall doing well denies any complaints although he does experience occasional episodes of flank pain.  He has undergone ESWL in the past and we had discussed the possibility of repeat ESWL versus possible cystoscopy retrogrades and ureteroscopy and stone extraction.    Physical exam:  Abdomen - soft benign nontender no masses no hernias no organomegaly                                  FINAL IMPRESSION:   renal calculi with 1.6 cm left ureteral calculus     RECOMMENDATIONS:  CT scan abdomen pelvis and subsequent decision will be made concerning further treatment and management of the findings.

## 2020-12-10 ENCOUNTER — HOSPITAL ENCOUNTER (OUTPATIENT)
Dept: RADIOLOGY | Facility: HOSPITAL | Age: 75
Discharge: HOME | End: 2020-12-10
Attending: UROLOGY
Payer: MEDICARE

## 2020-12-10 DIAGNOSIS — N20.0 RENAL CALCULI: ICD-10-CM

## 2020-12-10 PROCEDURE — 74178 CT ABD&PLV WO CNTR FLWD CNTR: CPT | Mod: 26,HCNC,, | Performed by: RADIOLOGY

## 2020-12-10 PROCEDURE — 25500020 PHARM REV CODE 255: Mod: HCNC

## 2020-12-10 PROCEDURE — 74178 CT ABDOMEN PELVIS W WO CONTRAST: ICD-10-PCS | Mod: 26,HCNC,, | Performed by: RADIOLOGY

## 2020-12-10 PROCEDURE — 74178 CT ABD&PLV WO CNTR FLWD CNTR: CPT | Mod: TC,HCNC

## 2020-12-10 RX ADMIN — IOHEXOL 100 ML: 350 INJECTION, SOLUTION INTRAVENOUS at 09:12

## 2020-12-11 ENCOUNTER — TELEPHONE (OUTPATIENT)
Dept: UROLOGY | Facility: CLINIC | Age: 75
End: 2020-12-11

## 2020-12-11 ENCOUNTER — PATIENT MESSAGE (OUTPATIENT)
Dept: OTHER | Facility: OTHER | Age: 75
End: 2020-12-11

## 2020-12-11 NOTE — TELEPHONE ENCOUNTER
I spoke with the pt and advised him Dr Wheatley is out of town until MOnday but we will call back with results and recommendations once they are reviewed. Understanding was verbalized.

## 2020-12-11 NOTE — TELEPHONE ENCOUNTER
----- Message from Daisy Morley sent at 12/11/2020  1:26 PM CST -----  Contact: self  Type:  Test Results    Who Called:  patient   Name of Test (Lab/Mammo/Etc):  labs and CT  Date of Test:  12/10  Ordering Provider:  Dr Wheatley  Where the test was performed:  [facility]  Best Call Back Number:  [phone] (home)   Additional Information:  Thanks

## 2020-12-14 ENCOUNTER — TELEPHONE (OUTPATIENT)
Dept: UROLOGY | Facility: CLINIC | Age: 75
End: 2020-12-14

## 2020-12-14 NOTE — TELEPHONE ENCOUNTER
----- Message from Lyric Husain MA sent at 12/14/2020  4:16 PM CST -----  Type:  Test Results    Who Called:  Best dixon  Name of Test (Lab/Mammo/Etc):  CT Scan  Date of Test:  12/10  Ordering Provider:  RAUL Wheatley  Where the test was performed:  [facility]  Best Call Back Number:  [phone]  Additional Information:   patient was told last week that he would receive his results today when Dr Wheatley returned from vacation

## 2020-12-14 NOTE — TELEPHONE ENCOUNTER
Returned call and spoke with patient, imaging results given with advisement. Patient upset and wants to know exactly needs to be done. Offered patient appt to discuss. Patient feels an appt will be useless. Offered for the MD to call the patient, patient agreed, will give message to MD to call and speak directly to patient, patient verbally understood.

## 2020-12-16 ENCOUNTER — TELEPHONE (OUTPATIENT)
Dept: UROLOGY | Facility: CLINIC | Age: 75
End: 2020-12-16

## 2020-12-16 NOTE — TELEPHONE ENCOUNTER
----- Message from Beronica Wheatley MD sent at 12/16/2020  2:09 PM CST -----  CT scan renal scan findings were discussed with the patient.  They revealed a 1.6 cm proximal left ureteral calculus with no evidence of any obstruction with 57% of the function right kidney and 43% function left kidney.  The options to consider include cystoscopy with retrogrades and stent placement and ESWL or continue with observation as the patient mentioned he is not experiencing any pain or discomfort and wanted to hold off on any procedures at this time.  He will subsequently notify us of his final decision or otherwise make a follow-up appointment in approximately 3 months.

## 2020-12-23 ENCOUNTER — TELEPHONE (OUTPATIENT)
Dept: FAMILY MEDICINE | Facility: CLINIC | Age: 75
End: 2020-12-23

## 2020-12-23 ENCOUNTER — PATIENT MESSAGE (OUTPATIENT)
Dept: FAMILY MEDICINE | Facility: CLINIC | Age: 75
End: 2020-12-23

## 2020-12-23 ENCOUNTER — PATIENT MESSAGE (OUTPATIENT)
Dept: PULMONOLOGY | Facility: CLINIC | Age: 75
End: 2020-12-23

## 2020-12-23 DIAGNOSIS — B96.89 MORAXELLA CATARRHALIS BRONCHITIS: ICD-10-CM

## 2020-12-23 DIAGNOSIS — J45.50 SEVERE PERSISTENT ASTHMA, UNSPECIFIED WHETHER COMPLICATED: ICD-10-CM

## 2020-12-23 DIAGNOSIS — J20.9 ACUTE BRONCHITIS: ICD-10-CM

## 2020-12-23 DIAGNOSIS — J40 MORAXELLA CATARRHALIS BRONCHITIS: ICD-10-CM

## 2020-12-23 DIAGNOSIS — J44.1 COPD EXACERBATION: ICD-10-CM

## 2020-12-23 RX ORDER — ALBUTEROL SULFATE 90 UG/1
1-2 AEROSOL, METERED RESPIRATORY (INHALATION) EVERY 4 HOURS PRN
Qty: 18 G | Refills: 3 | Status: SHIPPED | OUTPATIENT
Start: 2020-12-23 | End: 2023-11-15 | Stop reason: SDUPTHER

## 2020-12-23 RX ORDER — AMOXICILLIN AND CLAVULANATE POTASSIUM 875; 125 MG/1; MG/1
1 TABLET, FILM COATED ORAL 2 TIMES DAILY
Qty: 20 TABLET | Refills: 0 | Status: ON HOLD | OUTPATIENT
Start: 2020-12-23 | End: 2021-01-07 | Stop reason: HOSPADM

## 2020-12-23 RX ORDER — AZITHROMYCIN 250 MG/1
TABLET, FILM COATED ORAL
Qty: 6 TABLET | Refills: 0 | Status: CANCELLED | OUTPATIENT
Start: 2020-12-23 | End: 2020-12-28

## 2020-12-23 NOTE — TELEPHONE ENCOUNTER
Patient scheduled to be tested at pharmacy for Covid. Having congestion & taking neb tx's. In the past patient has been prescribed Z-pack & prednisone. He is wondering if this could be called into pharmacy. Will let Dr. Shah know.

## 2020-12-23 NOTE — TELEPHONE ENCOUNTER
----- Message from Lorelei Coulter sent at 12/22/2020  4:45 PM CST -----  Regarding: Orders  Contact: Best pt's grandson  Orders      Who Called:  pt's grandson Best  Symptoms (please be specific):  n/a  How long has patient had these symptoms:  n/a  Pharmacy name and phone #:  n/a  Best Call Back Number: [phone]    Additional Information: please out order in for covid test, pt was exposed 2 days ago. Please call to advise

## 2020-12-23 NOTE — TELEPHONE ENCOUNTER
----- Message from Libby Ibarra sent at 12/23/2020 11:07 AM CST -----  Type: Needs Medical Advice    Who Called:  Patient  Best Call Back Number: [phone]  Additional Information:  Patient's grandson was diagnosed with Covid/patient was told to call [facility]/stated no one answers, only gets recording/patient stated he is a high risk patient /has COPD also his wife would like to be tested/please call back to advise.

## 2020-12-24 ENCOUNTER — PATIENT OUTREACH (OUTPATIENT)
Dept: ADMINISTRATIVE | Facility: OTHER | Age: 75
End: 2020-12-24

## 2020-12-24 NOTE — PROGRESS NOTES
Chart was reviewed for overdue Proactive [facility] Encounters ([facility])  topics  Updates were requested from care everywhere  Health Maintenance has been updated  [place] immunization registry triggered

## 2020-12-28 ENCOUNTER — TELEPHONE (OUTPATIENT)
Dept: FAMILY MEDICINE | Facility: CLINIC | Age: 75
End: 2020-12-28

## 2020-12-28 DIAGNOSIS — U07.1 LOWER RESPIRATORY TRACT INFECTION DUE TO COVID-19 VIRUS: Primary | ICD-10-CM

## 2020-12-28 DIAGNOSIS — J22 LOWER RESPIRATORY TRACT INFECTION DUE TO COVID-19 VIRUS: Primary | ICD-10-CM

## 2020-12-28 NOTE — TELEPHONE ENCOUNTER
son states that both patient & patient spouse have tested + for Covid via [facility] on 12/24 or 12/25. Asking for any treatment options available. Symptoms: fatigue, cough & congestion, body aches. Amoxicillin prescribed by Dr. Allan. Interested in getting them a shot of Bamlanivumab. Will forward to Dr. Shah

## 2020-12-30 NOTE — TELEPHONE ENCOUNTER
Lower respiratory tract infection due to COVID-19 virus  -     Ambulatory referral/consult to [facility]; Future; Expected date: 12/31/2020  -     COVID-19 Surveillance Program  -     pulse oximeter (PULSE OXIMETER) device; by Apply Externally route 2 (two) times a day. Use twice daily at 8 AM and 3 PM and record the value in MyChart as directed.  Dispense: 1 each; Refill: 0

## 2021-01-01 ENCOUNTER — PATIENT MESSAGE (OUTPATIENT)
Dept: FAMILY MEDICINE | Facility: CLINIC | Age: 76
End: 2021-01-01

## 2021-01-01 ENCOUNTER — HOSPITAL ENCOUNTER (INPATIENT)
Facility: HOSPITAL | Age: 76
LOS: 6 days | Discharge: HOME | DRG: 177 | End: 2021-01-07
Attending: EMERGENCY MEDICINE | Admitting: INTERNAL MEDICINE
Payer: MEDICARE

## 2021-01-01 DIAGNOSIS — U07.1 PNEUMONIA DUE TO COVID-19 VIRUS: Primary | ICD-10-CM

## 2021-01-01 DIAGNOSIS — R94.31 QT PROLONGATION: ICD-10-CM

## 2021-01-01 DIAGNOSIS — J18.9 PNEUMONIA OF BOTH LUNGS DUE TO INFECTIOUS ORGANISM, UNSPECIFIED PART OF LUNG: ICD-10-CM

## 2021-01-01 DIAGNOSIS — Z20.822 SUSPECTED COVID-19 VIRUS INFECTION: ICD-10-CM

## 2021-01-01 DIAGNOSIS — J96.01 ACUTE HYPOXEMIC RESPIRATORY FAILURE: ICD-10-CM

## 2021-01-01 DIAGNOSIS — J12.82 PNEUMONIA DUE TO COVID-19 VIRUS: Primary | ICD-10-CM

## 2021-01-01 DIAGNOSIS — J45.50 SEVERE PERSISTENT ASTHMA WITHOUT COMPLICATION: ICD-10-CM

## 2021-01-01 DIAGNOSIS — R09.02 HYPOXIA: ICD-10-CM

## 2021-01-01 DIAGNOSIS — R07.9 CHEST PAIN: ICD-10-CM

## 2021-01-01 PROBLEM — E87.1 HYPONATREMIA: Status: ACTIVE | Noted: 2021-01-01

## 2021-01-01 PROBLEM — J44.9 COPD (CHRONIC OBSTRUCTIVE PULMONARY DISEASE): Status: ACTIVE | Noted: 2021-01-01

## 2021-01-01 PROBLEM — R79.89 ELEVATED TROPONIN: Status: ACTIVE | Noted: 2021-01-01

## 2021-01-01 LAB
ABO + RH BLD: NORMAL
ALBUMIN SERPL BCP-MCNC: 3.2 G/DL (ref 3.5–5.2)
ALLENS TEST: ABNORMAL
ALP SERPL-CCNC: 57 U/L (ref 55–135)
ALT SERPL W/O P-5'-P-CCNC: 25 U/L (ref 10–44)
ANION GAP SERPL CALC-SCNC: 8 MMOL/L (ref 8–16)
AST SERPL-CCNC: 39 U/L (ref 10–40)
BASOPHILS # BLD AUTO: 0 K/UL (ref 0–0.2)
BASOPHILS NFR BLD: 0 % (ref 0–1.9)
BILIRUB SERPL-MCNC: 1.2 MG/DL (ref 0.1–1)
BNP SERPL-MCNC: 99 PG/ML (ref 0–99)
BUN SERPL-MCNC: 14 MG/DL (ref 8–23)
CALCIUM SERPL-MCNC: 8.2 MG/DL (ref 8.7–10.5)
CHLORIDE SERPL-SCNC: 93 MMOL/L (ref 95–110)
CK SERPL-CCNC: 247 U/L (ref 20–200)
CO2 SERPL-SCNC: 27 MMOL/L (ref 23–29)
CREAT SERPL-MCNC: 1 MG/DL (ref 0.5–1.4)
CRP SERPL-MCNC: 11.58 MG/DL
D DIMER PPP IA.FEU-MCNC: 0.66 UG/ML FEU
DELSYS: ABNORMAL
DIFFERENTIAL METHOD: ABNORMAL
EOSINOPHIL # BLD AUTO: 0 K/UL (ref 0–0.5)
EOSINOPHIL NFR BLD: 0 % (ref 0–8)
ERYTHROCYTE [DISTWIDTH] IN BLOOD BY AUTOMATED COUNT: 12.8 % (ref 11.5–14.5)
ERYTHROCYTE [SEDIMENTATION RATE] IN BLOOD BY WESTERGREN METHOD: 26 MM/HR (ref 0–10)
EST. GFR  (AFRICAN AMERICAN): >60 ML/MIN/1.73 M^2
EST. GFR  (NON AFRICAN AMERICAN): >60 ML/MIN/1.73 M^2
FERRITIN SERPL-MCNC: 442 NG/ML (ref 20–300)
FLOW: 3
GLUCOSE SERPL-MCNC: 119 MG/DL (ref 70–110)
GLUCOSE SERPL-MCNC: 119 MG/DL (ref 70–110)
GLUCOSE SERPL-MCNC: 143 MG/DL (ref 70–110)
HCO3 UR-SCNC: 27.5 MMOL/L (ref 24–28)
HCT VFR BLD AUTO: 37.9 % (ref 40–54)
HCT VFR BLD CALC: 38 %PCV (ref 36–54)
HGB BLD-MCNC: 12.3 G/DL (ref 14–18)
IMM GRANULOCYTES # BLD AUTO: 0.03 K/UL (ref 0–0.04)
IMM GRANULOCYTES NFR BLD AUTO: 0.5 % (ref 0–0.5)
LACTATE SERPL-SCNC: 1.2 MMOL/L (ref 0.5–1.9)
LDH SERPL L TO P-CCNC: 247 U/L (ref 110–260)
LYMPHOCYTES # BLD AUTO: 0.6 K/UL (ref 1–4.8)
LYMPHOCYTES NFR BLD: 10.6 % (ref 18–48)
MCH RBC QN AUTO: 27.4 PG (ref 27–31)
MCHC RBC AUTO-ENTMCNC: 32.5 G/DL (ref 32–36)
MCV RBC AUTO: 84 FL (ref 82–98)
MODE: ABNORMAL
MONOCYTES # BLD AUTO: 0.3 K/UL (ref 0.3–1)
MONOCYTES NFR BLD: 4.7 % (ref 4–15)
NEUTROPHILS # BLD AUTO: 4.7 K/UL (ref 1.8–7.7)
NEUTROPHILS NFR BLD: 84.2 % (ref 38–73)
NRBC BLD-RTO: 0 /100 WBC
PCO2 BLDA: 40.5 MMHG (ref 35–45)
PH SMN: 7.44 [PH] (ref 7.35–7.45)
PLATELET # BLD AUTO: 109 K/UL (ref 150–350)
PMV BLD AUTO: 11.2 FL (ref 9.2–12.9)
PO2 BLDA: 77 MMHG (ref 80–100)
POC BE: 3 MMOL/L
POC IONIZED CALCIUM: 1.13 MMOL/L (ref 1.06–1.42)
POC SATURATED O2: 96 % (ref 95–100)
POC TCO2: 29 MMOL/L (ref 23–27)
POTASSIUM BLD-SCNC: 3.5 MMOL/L (ref 3.5–5.1)
POTASSIUM SERPL-SCNC: 3.5 MMOL/L (ref 3.5–5.1)
PROCALCITONIN SERPL IA-MCNC: 0.11 NG/ML (ref 0–0.5)
PROT SERPL-MCNC: 6.6 G/DL (ref 6–8.4)
RBC # BLD AUTO: 4.49 M/UL (ref 4.6–6.2)
SAMPLE: ABNORMAL
SARS-COV-2 RDRP RESP QL NAA+PROBE: POSITIVE
SITE: ABNORMAL
SODIUM BLD-SCNC: 129 MMOL/L (ref 136–145)
SODIUM SERPL-SCNC: 128 MMOL/L (ref 136–145)
TROPONIN I SERPL DL<=0.01 NG/ML-MCNC: 0.04 NG/ML
TROPONIN I SERPL DL<=0.01 NG/ML-MCNC: 0.05 NG/ML
TROPONIN I SERPL DL<=0.01 NG/ML-MCNC: 0.05 NG/ML
WBC # BLD AUTO: 5.58 K/UL (ref 3.9–12.7)

## 2021-01-01 PROCEDURE — 83615 LACTATE (LD) (LDH) ENZYME: CPT

## 2021-01-01 PROCEDURE — 99900035 HC TECH TIME PER 15 MIN (STAT)

## 2021-01-01 PROCEDURE — 93010 EKG 12-LEAD: ICD-10-PCS | Mod: ,,, | Performed by: INTERNAL MEDICINE

## 2021-01-01 PROCEDURE — 93005 ELECTROCARDIOGRAM TRACING: CPT | Performed by: INTERNAL MEDICINE

## 2021-01-01 PROCEDURE — 99223 1ST HOSP IP/OBS HIGH 75: CPT | Mod: ,,, | Performed by: INTERNAL MEDICINE

## 2021-01-01 PROCEDURE — 99223 PR INITIAL HOSPITAL CARE,LEVL III: ICD-10-PCS | Mod: ,,, | Performed by: INTERNAL MEDICINE

## 2021-01-01 PROCEDURE — 83880 ASSAY OF NATRIURETIC PEPTIDE: CPT

## 2021-01-01 PROCEDURE — 25000242 PHARM REV CODE 250 ALT 637 W/ HCPCS: Performed by: EMERGENCY MEDICINE

## 2021-01-01 PROCEDURE — 25000003 PHARM REV CODE 250: Performed by: EMERGENCY MEDICINE

## 2021-01-01 PROCEDURE — 94799 UNLISTED PULMONARY SVC/PX: CPT

## 2021-01-01 PROCEDURE — 87040 BLOOD CULTURE FOR BACTERIA: CPT | Mod: 59

## 2021-01-01 PROCEDURE — 85014 HEMATOCRIT: CPT

## 2021-01-01 PROCEDURE — 82803 BLOOD GASES ANY COMBINATION: CPT

## 2021-01-01 PROCEDURE — 86900 BLOOD TYPING SEROLOGIC ABO: CPT

## 2021-01-01 PROCEDURE — 84484 ASSAY OF TROPONIN QUANT: CPT | Mod: 91

## 2021-01-01 PROCEDURE — 82330 ASSAY OF CALCIUM: CPT

## 2021-01-01 PROCEDURE — 84295 ASSAY OF SERUM SODIUM: CPT

## 2021-01-01 PROCEDURE — 94640 AIRWAY INHALATION TREATMENT: CPT

## 2021-01-01 PROCEDURE — 82728 ASSAY OF FERRITIN: CPT

## 2021-01-01 PROCEDURE — 99900031 HC PATIENT EDUCATION (STAT)

## 2021-01-01 PROCEDURE — 85651 RBC SED RATE NONAUTOMATED: CPT

## 2021-01-01 PROCEDURE — 27000221 HC OXYGEN, UP TO 24 HOURS

## 2021-01-01 PROCEDURE — 63600175 PHARM REV CODE 636 W HCPCS: Performed by: EMERGENCY MEDICINE

## 2021-01-01 PROCEDURE — 25000003 PHARM REV CODE 250: Performed by: NURSE PRACTITIONER

## 2021-01-01 PROCEDURE — 25000003 PHARM REV CODE 250: Performed by: INTERNAL MEDICINE

## 2021-01-01 PROCEDURE — 83605 ASSAY OF LACTIC ACID: CPT

## 2021-01-01 PROCEDURE — 99291 CRITICAL CARE FIRST HOUR: CPT

## 2021-01-01 PROCEDURE — 12000002 HC ACUTE/MED SURGE SEMI-PRIVATE ROOM

## 2021-01-01 PROCEDURE — 84484 ASSAY OF TROPONIN QUANT: CPT

## 2021-01-01 PROCEDURE — 36415 COLL VENOUS BLD VENIPUNCTURE: CPT

## 2021-01-01 PROCEDURE — 85379 FIBRIN DEGRADATION QUANT: CPT

## 2021-01-01 PROCEDURE — 80053 COMPREHEN METABOLIC PANEL: CPT

## 2021-01-01 PROCEDURE — 85025 COMPLETE CBC W/AUTO DIFF WBC: CPT

## 2021-01-01 PROCEDURE — 84145 PROCALCITONIN (PCT): CPT

## 2021-01-01 PROCEDURE — U0002 COVID-19 LAB TEST NON-CDC: HCPCS

## 2021-01-01 PROCEDURE — 93010 ELECTROCARDIOGRAM REPORT: CPT | Mod: ,,, | Performed by: INTERNAL MEDICINE

## 2021-01-01 PROCEDURE — 84132 ASSAY OF SERUM POTASSIUM: CPT

## 2021-01-01 PROCEDURE — 94761 N-INVAS EAR/PLS OXIMETRY MLT: CPT

## 2021-01-01 PROCEDURE — 36600 WITHDRAWAL OF ARTERIAL BLOOD: CPT

## 2021-01-01 PROCEDURE — 82550 ASSAY OF CK (CPK): CPT

## 2021-01-01 PROCEDURE — 96374 THER/PROPH/DIAG INJ IV PUSH: CPT

## 2021-01-01 PROCEDURE — 86140 C-REACTIVE PROTEIN: CPT

## 2021-01-01 RX ORDER — POTASSIUM CHLORIDE 7.45 MG/ML
40 INJECTION INTRAVENOUS
Status: DISCONTINUED | OUTPATIENT
Start: 2021-01-01 | End: 2021-01-07 | Stop reason: HOSPADM

## 2021-01-01 RX ORDER — ONDANSETRON 2 MG/ML
4 INJECTION INTRAMUSCULAR; INTRAVENOUS EVERY 8 HOURS PRN
Status: DISCONTINUED | OUTPATIENT
Start: 2021-01-01 | End: 2021-01-07 | Stop reason: HOSPADM

## 2021-01-01 RX ORDER — ALBUTEROL SULFATE 90 UG/1
2 AEROSOL, METERED RESPIRATORY (INHALATION)
Status: DISCONTINUED | OUTPATIENT
Start: 2021-01-01 | End: 2021-01-07 | Stop reason: HOSPADM

## 2021-01-01 RX ORDER — SODIUM CHLORIDE 0.9 % (FLUSH) 0.9 %
10 SYRINGE (ML) INJECTION
Status: DISCONTINUED | OUTPATIENT
Start: 2021-01-01 | End: 2021-01-07 | Stop reason: HOSPADM

## 2021-01-01 RX ORDER — ASCORBIC ACID 500 MG
500 TABLET ORAL DAILY
Status: DISCONTINUED | OUTPATIENT
Start: 2021-01-02 | End: 2021-01-07 | Stop reason: HOSPADM

## 2021-01-01 RX ORDER — GLUCAGON 1 MG
1 KIT INJECTION
Status: DISCONTINUED | OUTPATIENT
Start: 2021-01-01 | End: 2021-01-07 | Stop reason: HOSPADM

## 2021-01-01 RX ORDER — POTASSIUM CHLORIDE 20 MEQ/1
40 TABLET, EXTENDED RELEASE ORAL
Status: DISCONTINUED | OUTPATIENT
Start: 2021-01-01 | End: 2021-01-07 | Stop reason: HOSPADM

## 2021-01-01 RX ORDER — ACETAMINOPHEN 325 MG/1
650 TABLET ORAL EVERY 4 HOURS PRN
Status: DISCONTINUED | OUTPATIENT
Start: 2021-01-01 | End: 2021-01-07 | Stop reason: HOSPADM

## 2021-01-01 RX ORDER — LEVOFLOXACIN 500 MG/1
500 TABLET, FILM COATED ORAL DAILY
Status: DISCONTINUED | OUTPATIENT
Start: 2021-01-02 | End: 2021-01-02

## 2021-01-01 RX ORDER — MAGNESIUM SULFATE HEPTAHYDRATE 40 MG/ML
2 INJECTION, SOLUTION INTRAVENOUS
Status: DISCONTINUED | OUTPATIENT
Start: 2021-01-01 | End: 2021-01-07 | Stop reason: HOSPADM

## 2021-01-01 RX ORDER — POLYETHYLENE GLYCOL 3350 17 G/17G
17 POWDER, FOR SOLUTION ORAL 2 TIMES DAILY PRN
Status: DISCONTINUED | OUTPATIENT
Start: 2021-01-01 | End: 2021-01-07 | Stop reason: HOSPADM

## 2021-01-01 RX ORDER — MAGNESIUM SULFATE HEPTAHYDRATE 40 MG/ML
4 INJECTION, SOLUTION INTRAVENOUS
Status: DISCONTINUED | OUTPATIENT
Start: 2021-01-01 | End: 2021-01-07 | Stop reason: HOSPADM

## 2021-01-01 RX ORDER — INSULIN ASPART 100 [IU]/ML
0-5 INJECTION, SOLUTION INTRAVENOUS; SUBCUTANEOUS
Status: DISCONTINUED | OUTPATIENT
Start: 2021-01-01 | End: 2021-01-06

## 2021-01-01 RX ORDER — SOTALOL HYDROCHLORIDE 80 MG/1
40 TABLET ORAL 2 TIMES DAILY
Status: DISCONTINUED | OUTPATIENT
Start: 2021-01-01 | End: 2021-01-07 | Stop reason: HOSPADM

## 2021-01-01 RX ORDER — POTASSIUM CHLORIDE 20 MEQ/1
20 TABLET, EXTENDED RELEASE ORAL
Status: DISCONTINUED | OUTPATIENT
Start: 2021-01-01 | End: 2021-01-07 | Stop reason: HOSPADM

## 2021-01-01 RX ORDER — IBUPROFEN 200 MG
24 TABLET ORAL
Status: DISCONTINUED | OUTPATIENT
Start: 2021-01-01 | End: 2021-01-07 | Stop reason: HOSPADM

## 2021-01-01 RX ORDER — FUROSEMIDE 20 MG/1
20 TABLET ORAL DAILY
Status: DISCONTINUED | OUTPATIENT
Start: 2021-01-02 | End: 2021-01-07 | Stop reason: HOSPADM

## 2021-01-01 RX ORDER — CALCIUM CHLORIDE IN 0.9 % NACL 1 G/100 ML
1 INTRAVENOUS SOLUTION, PIGGYBACK (ML) INTRAVENOUS
Status: DISCONTINUED | OUTPATIENT
Start: 2021-01-01 | End: 2021-01-07 | Stop reason: HOSPADM

## 2021-01-01 RX ORDER — DEXAMETHASONE SODIUM PHOSPHATE 4 MG/ML
6 INJECTION, SOLUTION INTRA-ARTICULAR; INTRALESIONAL; INTRAMUSCULAR; INTRAVENOUS; SOFT TISSUE EVERY 24 HOURS
Status: DISCONTINUED | OUTPATIENT
Start: 2021-01-02 | End: 2021-01-07 | Stop reason: HOSPADM

## 2021-01-01 RX ORDER — MAGNESIUM SULFATE 1 G/100ML
1 INJECTION INTRAVENOUS
Status: DISCONTINUED | OUTPATIENT
Start: 2021-01-01 | End: 2021-01-07 | Stop reason: HOSPADM

## 2021-01-01 RX ORDER — TRAMADOL HYDROCHLORIDE 50 MG/1
50 TABLET ORAL EVERY 6 HOURS PRN
Status: DISCONTINUED | OUTPATIENT
Start: 2021-01-01 | End: 2021-01-07 | Stop reason: HOSPADM

## 2021-01-01 RX ORDER — TALC
6 POWDER (GRAM) TOPICAL NIGHTLY
Status: DISCONTINUED | OUTPATIENT
Start: 2021-01-01 | End: 2021-01-07 | Stop reason: HOSPADM

## 2021-01-01 RX ORDER — TAMSULOSIN HYDROCHLORIDE 0.4 MG/1
0.4 CAPSULE ORAL DAILY
Status: DISCONTINUED | OUTPATIENT
Start: 2021-01-02 | End: 2021-01-07 | Stop reason: HOSPADM

## 2021-01-01 RX ORDER — ATORVASTATIN CALCIUM 40 MG/1
40 TABLET, FILM COATED ORAL NIGHTLY
Status: DISCONTINUED | OUTPATIENT
Start: 2021-01-01 | End: 2021-01-07 | Stop reason: HOSPADM

## 2021-01-01 RX ORDER — DEXAMETHASONE SODIUM PHOSPHATE 4 MG/ML
6 INJECTION, SOLUTION INTRA-ARTICULAR; INTRALESIONAL; INTRAMUSCULAR; INTRAVENOUS; SOFT TISSUE
Status: COMPLETED | OUTPATIENT
Start: 2021-01-01 | End: 2021-01-01

## 2021-01-01 RX ORDER — CHOLECALCIFEROL (VITAMIN D3) 25 MCG
2000 TABLET ORAL DAILY
Status: DISCONTINUED | OUTPATIENT
Start: 2021-01-02 | End: 2021-01-07 | Stop reason: HOSPADM

## 2021-01-01 RX ORDER — ALBUTEROL SULFATE 90 UG/1
2 AEROSOL, METERED RESPIRATORY (INHALATION) EVERY 8 HOURS
Status: DISCONTINUED | OUTPATIENT
Start: 2021-01-01 | End: 2021-01-01

## 2021-01-01 RX ORDER — ALLOPURINOL 100 MG/1
200 TABLET ORAL DAILY
Status: DISCONTINUED | OUTPATIENT
Start: 2021-01-02 | End: 2021-01-07 | Stop reason: HOSPADM

## 2021-01-01 RX ORDER — LANOLIN ALCOHOL/MO/W.PET/CERES
800 CREAM (GRAM) TOPICAL
Status: DISCONTINUED | OUTPATIENT
Start: 2021-01-01 | End: 2021-01-07 | Stop reason: HOSPADM

## 2021-01-01 RX ORDER — NAPROXEN SODIUM 220 MG/1
81 TABLET, FILM COATED ORAL DAILY
Status: DISCONTINUED | OUTPATIENT
Start: 2021-01-02 | End: 2021-01-07 | Stop reason: HOSPADM

## 2021-01-01 RX ORDER — ACETAMINOPHEN 500 MG
1000 TABLET ORAL
Status: COMPLETED | OUTPATIENT
Start: 2021-01-01 | End: 2021-01-01

## 2021-01-01 RX ORDER — IBUPROFEN 200 MG
16 TABLET ORAL
Status: DISCONTINUED | OUTPATIENT
Start: 2021-01-01 | End: 2021-01-07 | Stop reason: HOSPADM

## 2021-01-01 RX ORDER — ALBUTEROL SULFATE 90 UG/1
2 AEROSOL, METERED RESPIRATORY (INHALATION) EVERY 4 HOURS PRN
Status: DISCONTINUED | OUTPATIENT
Start: 2021-01-01 | End: 2021-01-07 | Stop reason: HOSPADM

## 2021-01-01 RX ORDER — MONTELUKAST SODIUM 10 MG/1
10 TABLET ORAL NIGHTLY
Status: DISCONTINUED | OUTPATIENT
Start: 2021-01-01 | End: 2021-01-07 | Stop reason: HOSPADM

## 2021-01-01 RX ORDER — POTASSIUM CHLORIDE 7.45 MG/ML
20 INJECTION INTRAVENOUS
Status: DISCONTINUED | OUTPATIENT
Start: 2021-01-01 | End: 2021-01-07 | Stop reason: HOSPADM

## 2021-01-01 RX ORDER — FINASTERIDE 5 MG/1
5 TABLET, FILM COATED ORAL DAILY
Status: DISCONTINUED | OUTPATIENT
Start: 2021-01-02 | End: 2021-01-07 | Stop reason: HOSPADM

## 2021-01-01 RX ORDER — NITROGLYCERIN 0.4 MG/1
0.4 TABLET SUBLINGUAL EVERY 5 MIN PRN
Status: DISCONTINUED | OUTPATIENT
Start: 2021-01-01 | End: 2021-01-07 | Stop reason: HOSPADM

## 2021-01-01 RX ORDER — HYDROCODONE BITARTRATE AND ACETAMINOPHEN 500; 5 MG/1; MG/1
TABLET ORAL
Status: DISCONTINUED | OUTPATIENT
Start: 2021-01-01 | End: 2021-01-07 | Stop reason: HOSPADM

## 2021-01-01 RX ORDER — OXYBUTYNIN CHLORIDE 10 MG/1
10 TABLET, EXTENDED RELEASE ORAL DAILY
Status: DISCONTINUED | OUTPATIENT
Start: 2021-01-02 | End: 2021-01-07 | Stop reason: HOSPADM

## 2021-01-01 RX ORDER — ZINC SULFATE 50(220)MG
220 CAPSULE ORAL DAILY
Status: DISCONTINUED | OUTPATIENT
Start: 2021-01-02 | End: 2021-01-07 | Stop reason: HOSPADM

## 2021-01-01 RX ADMIN — MONTELUKAST 10 MG: 10 TABLET, FILM COATED ORAL at 09:01

## 2021-01-01 RX ADMIN — SOTALOL HYDROCHLORIDE 40 MG: 80 TABLET ORAL at 09:01

## 2021-01-01 RX ADMIN — ALBUTEROL SULFATE 2 PUFF: 90 AEROSOL, METERED RESPIRATORY (INHALATION) at 03:01

## 2021-01-01 RX ADMIN — DEXAMETHASONE SODIUM PHOSPHATE 6 MG: 4 INJECTION, SOLUTION INTRA-ARTICULAR; INTRALESIONAL; INTRAMUSCULAR; INTRAVENOUS; SOFT TISSUE at 02:01

## 2021-01-01 RX ADMIN — REMDESIVIR 200 MG: 100 INJECTION, POWDER, LYOPHILIZED, FOR SOLUTION INTRAVENOUS at 09:01

## 2021-01-01 RX ADMIN — ATORVASTATIN CALCIUM 40 MG: 40 TABLET, FILM COATED ORAL at 09:01

## 2021-01-01 RX ADMIN — APIXABAN 5 MG: 5 TABLET, FILM COATED ORAL at 09:01

## 2021-01-01 RX ADMIN — ACETAMINOPHEN 1000 MG: 500 TABLET, FILM COATED ORAL at 02:01

## 2021-01-01 RX ADMIN — ALBUTEROL SULFATE 2 PUFF: 90 AEROSOL, METERED RESPIRATORY (INHALATION) at 06:01

## 2021-01-01 RX ADMIN — MELATONIN 6 MG: at 09:01

## 2021-01-02 LAB
ALBUMIN SERPL BCP-MCNC: 3.2 G/DL (ref 3.5–5.2)
ALBUMIN SERPL BCP-MCNC: 3.2 G/DL (ref 3.5–5.2)
ALP SERPL-CCNC: 60 U/L (ref 55–135)
ALP SERPL-CCNC: 60 U/L (ref 55–135)
ALT SERPL W/O P-5'-P-CCNC: 26 U/L (ref 10–44)
ALT SERPL W/O P-5'-P-CCNC: 26 U/L (ref 10–44)
ANION GAP SERPL CALC-SCNC: 13 MMOL/L (ref 8–16)
ANION GAP SERPL CALC-SCNC: 13 MMOL/L (ref 8–16)
AST SERPL-CCNC: 36 U/L (ref 10–40)
AST SERPL-CCNC: 36 U/L (ref 10–40)
BASOPHILS # BLD AUTO: 0 K/UL (ref 0–0.2)
BASOPHILS NFR BLD: 0 % (ref 0–1.9)
BILIRUB SERPL-MCNC: 1.1 MG/DL (ref 0.1–1)
BILIRUB SERPL-MCNC: 1.1 MG/DL (ref 0.1–1)
BLD PROD TYP BPU: NORMAL
BLD PROD TYP BPU: NORMAL
BLOOD UNIT EXPIRATION DATE: NORMAL
BLOOD UNIT EXPIRATION DATE: NORMAL
BLOOD UNIT TYPE CODE: 6200
BLOOD UNIT TYPE CODE: 6200
BLOOD UNIT TYPE: NORMAL
BLOOD UNIT TYPE: NORMAL
BUN SERPL-MCNC: 20 MG/DL (ref 8–23)
BUN SERPL-MCNC: 20 MG/DL (ref 8–23)
CALCIUM SERPL-MCNC: 8.9 MG/DL (ref 8.7–10.5)
CALCIUM SERPL-MCNC: 8.9 MG/DL (ref 8.7–10.5)
CHLORIDE SERPL-SCNC: 95 MMOL/L (ref 95–110)
CHLORIDE SERPL-SCNC: 95 MMOL/L (ref 95–110)
CO2 SERPL-SCNC: 25 MMOL/L (ref 23–29)
CO2 SERPL-SCNC: 25 MMOL/L (ref 23–29)
CODING SYSTEM: NORMAL
CODING SYSTEM: NORMAL
CREAT SERPL-MCNC: 0.9 MG/DL (ref 0.5–1.4)
CREAT SERPL-MCNC: 0.9 MG/DL (ref 0.5–1.4)
CRP SERPL-MCNC: 10.16 MG/DL
D DIMER PPP IA.FEU-MCNC: 0.76 UG/ML FEU
DIFFERENTIAL METHOD: ABNORMAL
DISPENSE STATUS: NORMAL
DISPENSE STATUS: NORMAL
EOSINOPHIL # BLD AUTO: 0 K/UL (ref 0–0.5)
EOSINOPHIL NFR BLD: 0 % (ref 0–8)
ERYTHROCYTE [DISTWIDTH] IN BLOOD BY AUTOMATED COUNT: 12.9 % (ref 11.5–14.5)
EST. GFR  (AFRICAN AMERICAN): >60 ML/MIN/1.73 M^2
EST. GFR  (AFRICAN AMERICAN): >60 ML/MIN/1.73 M^2
EST. GFR  (NON AFRICAN AMERICAN): >60 ML/MIN/1.73 M^2
EST. GFR  (NON AFRICAN AMERICAN): >60 ML/MIN/1.73 M^2
FERRITIN SERPL-MCNC: 651 NG/ML (ref 20–300)
GLUCOSE SERPL-MCNC: 159 MG/DL (ref 70–110)
GLUCOSE SERPL-MCNC: 169 MG/DL (ref 70–110)
GLUCOSE SERPL-MCNC: 169 MG/DL (ref 70–110)
GLUCOSE SERPL-MCNC: 172 MG/DL (ref 70–110)
GLUCOSE SERPL-MCNC: 203 MG/DL (ref 70–110)
GLUCOSE SERPL-MCNC: 208 MG/DL (ref 70–110)
HCT VFR BLD AUTO: 39.1 % (ref 40–54)
HGB BLD-MCNC: 12.6 G/DL (ref 14–18)
IMM GRANULOCYTES # BLD AUTO: 0.02 K/UL (ref 0–0.04)
IMM GRANULOCYTES NFR BLD AUTO: 0.6 % (ref 0–0.5)
LYMPHOCYTES # BLD AUTO: 0.5 K/UL (ref 1–4.8)
LYMPHOCYTES NFR BLD: 13.5 % (ref 18–48)
MAGNESIUM SERPL-MCNC: 1.9 MG/DL (ref 1.6–2.6)
MCH RBC QN AUTO: 27.3 PG (ref 27–31)
MCHC RBC AUTO-ENTMCNC: 32.2 G/DL (ref 32–36)
MCV RBC AUTO: 85 FL (ref 82–98)
MONOCYTES # BLD AUTO: 0.2 K/UL (ref 0.3–1)
MONOCYTES NFR BLD: 5 % (ref 4–15)
NEUTROPHILS # BLD AUTO: 2.8 K/UL (ref 1.8–7.7)
NEUTROPHILS NFR BLD: 80.9 % (ref 38–73)
NRBC BLD-RTO: 0 /100 WBC
PLATELET # BLD AUTO: 105 K/UL (ref 150–350)
PMV BLD AUTO: 11.5 FL (ref 9.2–12.9)
POTASSIUM SERPL-SCNC: 3.6 MMOL/L (ref 3.5–5.1)
POTASSIUM SERPL-SCNC: 3.6 MMOL/L (ref 3.5–5.1)
PROCALCITONIN SERPL IA-MCNC: 0.08 NG/ML (ref 0–0.5)
PROT SERPL-MCNC: 7 G/DL (ref 6–8.4)
PROT SERPL-MCNC: 7 G/DL (ref 6–8.4)
RBC # BLD AUTO: 4.62 M/UL (ref 4.6–6.2)
SODIUM SERPL-SCNC: 133 MMOL/L (ref 136–145)
SODIUM SERPL-SCNC: 133 MMOL/L (ref 136–145)
UNIT NUMBER: NORMAL
UNIT NUMBER: NORMAL
WBC # BLD AUTO: 3.42 K/UL (ref 3.9–12.7)

## 2021-01-02 PROCEDURE — 12000002 HC ACUTE/MED SURGE SEMI-PRIVATE ROOM

## 2021-01-02 PROCEDURE — 83735 ASSAY OF MAGNESIUM: CPT

## 2021-01-02 PROCEDURE — 85379 FIBRIN DEGRADATION QUANT: CPT

## 2021-01-02 PROCEDURE — 99900035 HC TECH TIME PER 15 MIN (STAT)

## 2021-01-02 PROCEDURE — 25000003 PHARM REV CODE 250: Performed by: INTERNAL MEDICINE

## 2021-01-02 PROCEDURE — 94761 N-INVAS EAR/PLS OXIMETRY MLT: CPT

## 2021-01-02 PROCEDURE — 93010 ELECTROCARDIOGRAM REPORT: CPT | Mod: ,,, | Performed by: INTERNAL MEDICINE

## 2021-01-02 PROCEDURE — 80053 COMPREHEN METABOLIC PANEL: CPT

## 2021-01-02 PROCEDURE — 85025 COMPLETE CBC W/AUTO DIFF WBC: CPT

## 2021-01-02 PROCEDURE — 86140 C-REACTIVE PROTEIN: CPT

## 2021-01-02 PROCEDURE — 36415 COLL VENOUS BLD VENIPUNCTURE: CPT

## 2021-01-02 PROCEDURE — 63600175 PHARM REV CODE 636 W HCPCS: Performed by: NURSE PRACTITIONER

## 2021-01-02 PROCEDURE — 99900031 HC PATIENT EDUCATION (STAT)

## 2021-01-02 PROCEDURE — 25000003 PHARM REV CODE 250: Performed by: NURSE PRACTITIONER

## 2021-01-02 PROCEDURE — 82962 GLUCOSE BLOOD TEST: CPT

## 2021-01-02 PROCEDURE — 93005 ELECTROCARDIOGRAM TRACING: CPT | Performed by: INTERNAL MEDICINE

## 2021-01-02 PROCEDURE — 99233 PR SUBSEQUENT HOSPITAL CARE,LEVL III: ICD-10-PCS | Mod: ,,, | Performed by: INTERNAL MEDICINE

## 2021-01-02 PROCEDURE — 99233 SBSQ HOSP IP/OBS HIGH 50: CPT | Mod: ,,, | Performed by: INTERNAL MEDICINE

## 2021-01-02 PROCEDURE — 27000221 HC OXYGEN, UP TO 24 HOURS

## 2021-01-02 PROCEDURE — 93010 EKG 12-LEAD: ICD-10-PCS | Mod: ,,, | Performed by: INTERNAL MEDICINE

## 2021-01-02 PROCEDURE — 82728 ASSAY OF FERRITIN: CPT

## 2021-01-02 PROCEDURE — 94640 AIRWAY INHALATION TREATMENT: CPT

## 2021-01-02 PROCEDURE — 84145 PROCALCITONIN (PCT): CPT

## 2021-01-02 RX ORDER — BENZONATATE 100 MG/1
100 CAPSULE ORAL 3 TIMES DAILY PRN
Status: DISCONTINUED | OUTPATIENT
Start: 2021-01-02 | End: 2021-01-07 | Stop reason: HOSPADM

## 2021-01-02 RX ADMIN — Medication 2000 UNITS: at 09:01

## 2021-01-02 RX ADMIN — OXYCODONE HYDROCHLORIDE AND ACETAMINOPHEN 500 MG: 500 TABLET ORAL at 09:01

## 2021-01-02 RX ADMIN — APIXABAN 5 MG: 5 TABLET, FILM COATED ORAL at 08:01

## 2021-01-02 RX ADMIN — ATORVASTATIN CALCIUM 40 MG: 40 TABLET, FILM COATED ORAL at 08:01

## 2021-01-02 RX ADMIN — LEVOFLOXACIN 500 MG: 500 TABLET, FILM COATED ORAL at 09:01

## 2021-01-02 RX ADMIN — DEXAMETHASONE SODIUM PHOSPHATE 6 MG: 4 INJECTION, SOLUTION INTRA-ARTICULAR; INTRALESIONAL; INTRAMUSCULAR; INTRAVENOUS; SOFT TISSUE at 09:01

## 2021-01-02 RX ADMIN — SOTALOL HYDROCHLORIDE 40 MG: 80 TABLET ORAL at 08:01

## 2021-01-02 RX ADMIN — BENZONATATE 100 MG: 100 CAPSULE ORAL at 02:01

## 2021-01-02 RX ADMIN — ALBUTEROL SULFATE 2 PUFF: 90 AEROSOL, METERED RESPIRATORY (INHALATION) at 02:01

## 2021-01-02 RX ADMIN — FINASTERIDE 5 MG: 5 TABLET, FILM COATED ORAL at 09:01

## 2021-01-02 RX ADMIN — INSULIN ASPART 2 UNITS: 100 INJECTION, SOLUTION INTRAVENOUS; SUBCUTANEOUS at 05:01

## 2021-01-02 RX ADMIN — ASPIRIN 81 MG: 81 TABLET, CHEWABLE ORAL at 09:01

## 2021-01-02 RX ADMIN — FUROSEMIDE 20 MG: 20 TABLET ORAL at 09:01

## 2021-01-02 RX ADMIN — ALBUTEROL SULFATE 2 PUFF: 90 AEROSOL, METERED RESPIRATORY (INHALATION) at 10:01

## 2021-01-02 RX ADMIN — APIXABAN 5 MG: 5 TABLET, FILM COATED ORAL at 09:01

## 2021-01-02 RX ADMIN — ALBUTEROL SULFATE 2 PUFF: 90 AEROSOL, METERED RESPIRATORY (INHALATION) at 08:01

## 2021-01-02 RX ADMIN — REMDESIVIR 100 MG: 100 INJECTION, POWDER, LYOPHILIZED, FOR SOLUTION INTRAVENOUS at 08:01

## 2021-01-02 RX ADMIN — OXYBUTYNIN CHLORIDE 10 MG: 10 TABLET, EXTENDED RELEASE ORAL at 09:01

## 2021-01-02 RX ADMIN — MELATONIN 6 MG: at 08:01

## 2021-01-02 RX ADMIN — ALLOPURINOL 200 MG: 100 TABLET ORAL at 09:01

## 2021-01-02 RX ADMIN — TAMSULOSIN HYDROCHLORIDE 0.4 MG: 0.4 CAPSULE ORAL at 09:01

## 2021-01-02 RX ADMIN — MONTELUKAST 10 MG: 10 TABLET, FILM COATED ORAL at 08:01

## 2021-01-02 RX ADMIN — ZINC SULFATE 220 MG (50 MG) CAPSULE 220 MG: CAPSULE at 09:01

## 2021-01-03 LAB
ALBUMIN SERPL BCP-MCNC: 3.2 G/DL (ref 3.5–5.2)
ALBUMIN SERPL BCP-MCNC: 3.2 G/DL (ref 3.5–5.2)
ALP SERPL-CCNC: 60 U/L (ref 55–135)
ALP SERPL-CCNC: 60 U/L (ref 55–135)
ALT SERPL W/O P-5'-P-CCNC: 41 U/L (ref 10–44)
ALT SERPL W/O P-5'-P-CCNC: 41 U/L (ref 10–44)
ANION GAP SERPL CALC-SCNC: 11 MMOL/L (ref 8–16)
ANION GAP SERPL CALC-SCNC: 11 MMOL/L (ref 8–16)
AST SERPL-CCNC: 53 U/L (ref 10–40)
AST SERPL-CCNC: 53 U/L (ref 10–40)
BASOPHILS # BLD AUTO: 0 K/UL (ref 0–0.2)
BASOPHILS NFR BLD: 0 % (ref 0–1.9)
BILIRUB SERPL-MCNC: 1.1 MG/DL (ref 0.1–1)
BILIRUB SERPL-MCNC: 1.1 MG/DL (ref 0.1–1)
BUN SERPL-MCNC: 29 MG/DL (ref 8–23)
BUN SERPL-MCNC: 29 MG/DL (ref 8–23)
CALCIUM SERPL-MCNC: 8.8 MG/DL (ref 8.7–10.5)
CALCIUM SERPL-MCNC: 8.8 MG/DL (ref 8.7–10.5)
CHLORIDE SERPL-SCNC: 94 MMOL/L (ref 95–110)
CHLORIDE SERPL-SCNC: 94 MMOL/L (ref 95–110)
CO2 SERPL-SCNC: 28 MMOL/L (ref 23–29)
CO2 SERPL-SCNC: 28 MMOL/L (ref 23–29)
CREAT SERPL-MCNC: 1 MG/DL (ref 0.5–1.4)
CREAT SERPL-MCNC: 1 MG/DL (ref 0.5–1.4)
CRP SERPL-MCNC: 6.77 MG/DL
D DIMER PPP IA.FEU-MCNC: 0.8 UG/ML FEU
DIFFERENTIAL METHOD: ABNORMAL
EOSINOPHIL # BLD AUTO: 0 K/UL (ref 0–0.5)
EOSINOPHIL NFR BLD: 0 % (ref 0–8)
ERYTHROCYTE [DISTWIDTH] IN BLOOD BY AUTOMATED COUNT: 13 % (ref 11.5–14.5)
EST. GFR  (AFRICAN AMERICAN): >60 ML/MIN/1.73 M^2
EST. GFR  (AFRICAN AMERICAN): >60 ML/MIN/1.73 M^2
EST. GFR  (NON AFRICAN AMERICAN): >60 ML/MIN/1.73 M^2
EST. GFR  (NON AFRICAN AMERICAN): >60 ML/MIN/1.73 M^2
FERRITIN SERPL-MCNC: 768 NG/ML (ref 20–300)
GLUCOSE SERPL-MCNC: 177 MG/DL (ref 70–110)
GLUCOSE SERPL-MCNC: 181 MG/DL (ref 70–110)
GLUCOSE SERPL-MCNC: 181 MG/DL (ref 70–110)
GLUCOSE SERPL-MCNC: 204 MG/DL (ref 70–110)
GLUCOSE SERPL-MCNC: 227 MG/DL (ref 70–110)
HCT VFR BLD AUTO: 41.4 % (ref 40–54)
HGB BLD-MCNC: 13.5 G/DL (ref 14–18)
IMM GRANULOCYTES # BLD AUTO: 0.06 K/UL (ref 0–0.04)
IMM GRANULOCYTES NFR BLD AUTO: 0.9 % (ref 0–0.5)
LYMPHOCYTES # BLD AUTO: 0.5 K/UL (ref 1–4.8)
LYMPHOCYTES NFR BLD: 7.9 % (ref 18–48)
MAGNESIUM SERPL-MCNC: 2 MG/DL (ref 1.6–2.6)
MCH RBC QN AUTO: 27.4 PG (ref 27–31)
MCHC RBC AUTO-ENTMCNC: 32.6 G/DL (ref 32–36)
MCV RBC AUTO: 84 FL (ref 82–98)
MONOCYTES # BLD AUTO: 0.3 K/UL (ref 0.3–1)
MONOCYTES NFR BLD: 4.6 % (ref 4–15)
NEUTROPHILS # BLD AUTO: 5.9 K/UL (ref 1.8–7.7)
NEUTROPHILS NFR BLD: 86.6 % (ref 38–73)
NRBC BLD-RTO: 0 /100 WBC
PLATELET # BLD AUTO: 165 K/UL (ref 150–350)
PLATELET BLD QL SMEAR: ABNORMAL
PMV BLD AUTO: 11.9 FL (ref 9.2–12.9)
POTASSIUM SERPL-SCNC: 3.4 MMOL/L (ref 3.5–5.1)
POTASSIUM SERPL-SCNC: 3.4 MMOL/L (ref 3.5–5.1)
PROT SERPL-MCNC: 7 G/DL (ref 6–8.4)
PROT SERPL-MCNC: 7 G/DL (ref 6–8.4)
RBC # BLD AUTO: 4.92 M/UL (ref 4.6–6.2)
SODIUM SERPL-SCNC: 133 MMOL/L (ref 136–145)
SODIUM SERPL-SCNC: 133 MMOL/L (ref 136–145)
WBC # BLD AUTO: 6.81 K/UL (ref 3.9–12.7)

## 2021-01-03 PROCEDURE — 85379 FIBRIN DEGRADATION QUANT: CPT

## 2021-01-03 PROCEDURE — 36415 COLL VENOUS BLD VENIPUNCTURE: CPT

## 2021-01-03 PROCEDURE — 25000003 PHARM REV CODE 250: Performed by: INTERNAL MEDICINE

## 2021-01-03 PROCEDURE — 63600175 PHARM REV CODE 636 W HCPCS: Performed by: NURSE PRACTITIONER

## 2021-01-03 PROCEDURE — 86140 C-REACTIVE PROTEIN: CPT

## 2021-01-03 PROCEDURE — 82728 ASSAY OF FERRITIN: CPT

## 2021-01-03 PROCEDURE — 99232 SBSQ HOSP IP/OBS MODERATE 35: CPT | Mod: ,,, | Performed by: INTERNAL MEDICINE

## 2021-01-03 PROCEDURE — 25000003 PHARM REV CODE 250: Performed by: NURSE PRACTITIONER

## 2021-01-03 PROCEDURE — 99232 PR SUBSEQUENT HOSPITAL CARE,LEVL II: ICD-10-PCS | Mod: ,,, | Performed by: INTERNAL MEDICINE

## 2021-01-03 PROCEDURE — 27000221 HC OXYGEN, UP TO 24 HOURS

## 2021-01-03 PROCEDURE — 85025 COMPLETE CBC W/AUTO DIFF WBC: CPT

## 2021-01-03 PROCEDURE — 94640 AIRWAY INHALATION TREATMENT: CPT

## 2021-01-03 PROCEDURE — 94761 N-INVAS EAR/PLS OXIMETRY MLT: CPT

## 2021-01-03 PROCEDURE — 83735 ASSAY OF MAGNESIUM: CPT

## 2021-01-03 PROCEDURE — 12000002 HC ACUTE/MED SURGE SEMI-PRIVATE ROOM

## 2021-01-03 PROCEDURE — 80053 COMPREHEN METABOLIC PANEL: CPT

## 2021-01-03 PROCEDURE — 99900035 HC TECH TIME PER 15 MIN (STAT)

## 2021-01-03 RX ORDER — FLUTICASONE FUROATE AND VILANTEROL 200; 25 UG/1; UG/1
1 POWDER RESPIRATORY (INHALATION) DAILY
Status: DISCONTINUED | OUTPATIENT
Start: 2021-01-03 | End: 2021-01-03

## 2021-01-03 RX ORDER — FLUTICASONE FUROATE AND VILANTEROL 200; 25 UG/1; UG/1
1 POWDER RESPIRATORY (INHALATION) DAILY
Status: DISCONTINUED | OUTPATIENT
Start: 2021-01-04 | End: 2021-01-07 | Stop reason: HOSPADM

## 2021-01-03 RX ORDER — ZOLPIDEM TARTRATE 5 MG/1
5 TABLET ORAL NIGHTLY PRN
Status: DISCONTINUED | OUTPATIENT
Start: 2021-01-03 | End: 2021-01-07 | Stop reason: HOSPADM

## 2021-01-03 RX ADMIN — MELATONIN 6 MG: at 08:01

## 2021-01-03 RX ADMIN — ALBUTEROL SULFATE 2 PUFF: 90 AEROSOL, METERED RESPIRATORY (INHALATION) at 07:01

## 2021-01-03 RX ADMIN — INSULIN ASPART 2 UNITS: 100 INJECTION, SOLUTION INTRAVENOUS; SUBCUTANEOUS at 12:01

## 2021-01-03 RX ADMIN — ZINC SULFATE 220 MG (50 MG) CAPSULE 220 MG: CAPSULE at 09:01

## 2021-01-03 RX ADMIN — ALLOPURINOL 200 MG: 100 TABLET ORAL at 09:01

## 2021-01-03 RX ADMIN — OXYCODONE HYDROCHLORIDE AND ACETAMINOPHEN 500 MG: 500 TABLET ORAL at 09:01

## 2021-01-03 RX ADMIN — REMDESIVIR 100 MG: 100 INJECTION, POWDER, LYOPHILIZED, FOR SOLUTION INTRAVENOUS at 09:01

## 2021-01-03 RX ADMIN — ATORVASTATIN CALCIUM 40 MG: 40 TABLET, FILM COATED ORAL at 08:01

## 2021-01-03 RX ADMIN — ASPIRIN 81 MG: 81 TABLET, CHEWABLE ORAL at 09:01

## 2021-01-03 RX ADMIN — Medication 2000 UNITS: at 09:01

## 2021-01-03 RX ADMIN — TAMSULOSIN HYDROCHLORIDE 0.4 MG: 0.4 CAPSULE ORAL at 09:01

## 2021-01-03 RX ADMIN — MONTELUKAST 10 MG: 10 TABLET, FILM COATED ORAL at 08:01

## 2021-01-03 RX ADMIN — SOTALOL HYDROCHLORIDE 40 MG: 80 TABLET ORAL at 08:01

## 2021-01-03 RX ADMIN — APIXABAN 5 MG: 5 TABLET, FILM COATED ORAL at 09:01

## 2021-01-03 RX ADMIN — FUROSEMIDE 20 MG: 20 TABLET ORAL at 09:01

## 2021-01-03 RX ADMIN — ALBUTEROL SULFATE 2 PUFF: 90 AEROSOL, METERED RESPIRATORY (INHALATION) at 02:01

## 2021-01-03 RX ADMIN — APIXABAN 5 MG: 5 TABLET, FILM COATED ORAL at 08:01

## 2021-01-03 RX ADMIN — OXYBUTYNIN CHLORIDE 10 MG: 10 TABLET, EXTENDED RELEASE ORAL at 09:01

## 2021-01-03 RX ADMIN — DEXAMETHASONE SODIUM PHOSPHATE 6 MG: 4 INJECTION, SOLUTION INTRA-ARTICULAR; INTRALESIONAL; INTRAMUSCULAR; INTRAVENOUS; SOFT TISSUE at 09:01

## 2021-01-03 RX ADMIN — INSULIN ASPART 2 UNITS: 100 INJECTION, SOLUTION INTRAVENOUS; SUBCUTANEOUS at 04:01

## 2021-01-03 RX ADMIN — FINASTERIDE 5 MG: 5 TABLET, FILM COATED ORAL at 09:01

## 2021-01-04 LAB
ALBUMIN SERPL BCP-MCNC: 3.2 G/DL (ref 3.5–5.2)
ALBUMIN SERPL BCP-MCNC: 3.2 G/DL (ref 3.5–5.2)
ALP SERPL-CCNC: 56 U/L (ref 55–135)
ALP SERPL-CCNC: 56 U/L (ref 55–135)
ALT SERPL W/O P-5'-P-CCNC: 56 U/L (ref 10–44)
ALT SERPL W/O P-5'-P-CCNC: 56 U/L (ref 10–44)
ANION GAP SERPL CALC-SCNC: 9 MMOL/L (ref 8–16)
ANION GAP SERPL CALC-SCNC: 9 MMOL/L (ref 8–16)
AST SERPL-CCNC: 63 U/L (ref 10–40)
AST SERPL-CCNC: 63 U/L (ref 10–40)
BASOPHILS # BLD AUTO: 0 K/UL (ref 0–0.2)
BASOPHILS NFR BLD: 0 % (ref 0–1.9)
BILIRUB SERPL-MCNC: 1 MG/DL (ref 0.1–1)
BILIRUB SERPL-MCNC: 1 MG/DL (ref 0.1–1)
BUN SERPL-MCNC: 35 MG/DL (ref 8–23)
BUN SERPL-MCNC: 35 MG/DL (ref 8–23)
CALCIUM SERPL-MCNC: 8.7 MG/DL (ref 8.7–10.5)
CALCIUM SERPL-MCNC: 8.7 MG/DL (ref 8.7–10.5)
CHLORIDE SERPL-SCNC: 97 MMOL/L (ref 95–110)
CHLORIDE SERPL-SCNC: 97 MMOL/L (ref 95–110)
CO2 SERPL-SCNC: 28 MMOL/L (ref 23–29)
CO2 SERPL-SCNC: 28 MMOL/L (ref 23–29)
CREAT SERPL-MCNC: 1 MG/DL (ref 0.5–1.4)
CREAT SERPL-MCNC: 1 MG/DL (ref 0.5–1.4)
CRP SERPL-MCNC: 4.15 MG/DL
D DIMER PPP IA.FEU-MCNC: 0.66 UG/ML FEU
DIFFERENTIAL METHOD: ABNORMAL
EOSINOPHIL # BLD AUTO: 0 K/UL (ref 0–0.5)
EOSINOPHIL NFR BLD: 0 % (ref 0–8)
ERYTHROCYTE [DISTWIDTH] IN BLOOD BY AUTOMATED COUNT: 13 % (ref 11.5–14.5)
EST. GFR  (AFRICAN AMERICAN): >60 ML/MIN/1.73 M^2
EST. GFR  (AFRICAN AMERICAN): >60 ML/MIN/1.73 M^2
EST. GFR  (NON AFRICAN AMERICAN): >60 ML/MIN/1.73 M^2
EST. GFR  (NON AFRICAN AMERICAN): >60 ML/MIN/1.73 M^2
FERRITIN SERPL-MCNC: 656 NG/ML (ref 20–300)
GLUCOSE SERPL-MCNC: 183 MG/DL (ref 70–110)
GLUCOSE SERPL-MCNC: 183 MG/DL (ref 70–110)
GLUCOSE SERPL-MCNC: 214 MG/DL (ref 70–110)
GLUCOSE SERPL-MCNC: 232 MG/DL (ref 70–110)
GLUCOSE SERPL-MCNC: 301 MG/DL (ref 70–110)
HCT VFR BLD AUTO: 39.7 % (ref 40–54)
HGB BLD-MCNC: 13 G/DL (ref 14–18)
IMM GRANULOCYTES # BLD AUTO: 0.05 K/UL (ref 0–0.04)
IMM GRANULOCYTES NFR BLD AUTO: 0.8 % (ref 0–0.5)
LYMPHOCYTES # BLD AUTO: 0.6 K/UL (ref 1–4.8)
LYMPHOCYTES NFR BLD: 10.1 % (ref 18–48)
MAGNESIUM SERPL-MCNC: 2.1 MG/DL (ref 1.6–2.6)
MCH RBC QN AUTO: 28.3 PG (ref 27–31)
MCHC RBC AUTO-ENTMCNC: 32.7 G/DL (ref 32–36)
MCV RBC AUTO: 86 FL (ref 82–98)
MONOCYTES # BLD AUTO: 0.5 K/UL (ref 0.3–1)
MONOCYTES NFR BLD: 8.2 % (ref 4–15)
NEUTROPHILS # BLD AUTO: 4.9 K/UL (ref 1.8–7.7)
NEUTROPHILS NFR BLD: 80.9 % (ref 38–73)
NRBC BLD-RTO: 0 /100 WBC
PLATELET # BLD AUTO: 160 K/UL (ref 150–350)
PMV BLD AUTO: 11.9 FL (ref 9.2–12.9)
POTASSIUM SERPL-SCNC: 3.9 MMOL/L (ref 3.5–5.1)
POTASSIUM SERPL-SCNC: 3.9 MMOL/L (ref 3.5–5.1)
PROT SERPL-MCNC: 6.9 G/DL (ref 6–8.4)
PROT SERPL-MCNC: 6.9 G/DL (ref 6–8.4)
RBC # BLD AUTO: 4.6 M/UL (ref 4.6–6.2)
SODIUM SERPL-SCNC: 134 MMOL/L (ref 136–145)
SODIUM SERPL-SCNC: 134 MMOL/L (ref 136–145)
WBC # BLD AUTO: 6.11 K/UL (ref 3.9–12.7)

## 2021-01-04 PROCEDURE — 99900035 HC TECH TIME PER 15 MIN (STAT)

## 2021-01-04 PROCEDURE — 83735 ASSAY OF MAGNESIUM: CPT

## 2021-01-04 PROCEDURE — 82962 GLUCOSE BLOOD TEST: CPT

## 2021-01-04 PROCEDURE — 82728 ASSAY OF FERRITIN: CPT

## 2021-01-04 PROCEDURE — 12000002 HC ACUTE/MED SURGE SEMI-PRIVATE ROOM

## 2021-01-04 PROCEDURE — 36415 COLL VENOUS BLD VENIPUNCTURE: CPT

## 2021-01-04 PROCEDURE — 94640 AIRWAY INHALATION TREATMENT: CPT

## 2021-01-04 PROCEDURE — 85379 FIBRIN DEGRADATION QUANT: CPT

## 2021-01-04 PROCEDURE — 99900031 HC PATIENT EDUCATION (STAT)

## 2021-01-04 PROCEDURE — 80053 COMPREHEN METABOLIC PANEL: CPT

## 2021-01-04 PROCEDURE — 27000221 HC OXYGEN, UP TO 24 HOURS

## 2021-01-04 PROCEDURE — 85025 COMPLETE CBC W/AUTO DIFF WBC: CPT

## 2021-01-04 PROCEDURE — 86140 C-REACTIVE PROTEIN: CPT

## 2021-01-04 PROCEDURE — 63600175 PHARM REV CODE 636 W HCPCS: Performed by: NURSE PRACTITIONER

## 2021-01-04 PROCEDURE — 94761 N-INVAS EAR/PLS OXIMETRY MLT: CPT

## 2021-01-04 PROCEDURE — 25000003 PHARM REV CODE 250: Performed by: INTERNAL MEDICINE

## 2021-01-04 PROCEDURE — 25000003 PHARM REV CODE 250: Performed by: NURSE PRACTITIONER

## 2021-01-04 RX ADMIN — ALLOPURINOL 200 MG: 100 TABLET ORAL at 08:01

## 2021-01-04 RX ADMIN — APIXABAN 5 MG: 5 TABLET, FILM COATED ORAL at 08:01

## 2021-01-04 RX ADMIN — Medication 2000 UNITS: at 08:01

## 2021-01-04 RX ADMIN — SOTALOL HYDROCHLORIDE 40 MG: 80 TABLET ORAL at 09:01

## 2021-01-04 RX ADMIN — ALBUTEROL SULFATE 2 PUFF: 90 AEROSOL, METERED RESPIRATORY (INHALATION) at 08:01

## 2021-01-04 RX ADMIN — FUROSEMIDE 20 MG: 20 TABLET ORAL at 08:01

## 2021-01-04 RX ADMIN — REMDESIVIR 100 MG: 100 INJECTION, POWDER, LYOPHILIZED, FOR SOLUTION INTRAVENOUS at 09:01

## 2021-01-04 RX ADMIN — OXYCODONE HYDROCHLORIDE AND ACETAMINOPHEN 500 MG: 500 TABLET ORAL at 08:01

## 2021-01-04 RX ADMIN — OXYBUTYNIN CHLORIDE 10 MG: 10 TABLET, EXTENDED RELEASE ORAL at 08:01

## 2021-01-04 RX ADMIN — INSULIN ASPART 2 UNITS: 100 INJECTION, SOLUTION INTRAVENOUS; SUBCUTANEOUS at 11:01

## 2021-01-04 RX ADMIN — APIXABAN 5 MG: 5 TABLET, FILM COATED ORAL at 09:01

## 2021-01-04 RX ADMIN — MELATONIN 6 MG: at 09:01

## 2021-01-04 RX ADMIN — FINASTERIDE 5 MG: 5 TABLET, FILM COATED ORAL at 08:01

## 2021-01-04 RX ADMIN — ZINC SULFATE 220 MG (50 MG) CAPSULE 220 MG: CAPSULE at 08:01

## 2021-01-04 RX ADMIN — SOTALOL HYDROCHLORIDE 40 MG: 80 TABLET ORAL at 08:01

## 2021-01-04 RX ADMIN — MONTELUKAST 10 MG: 10 TABLET, FILM COATED ORAL at 09:01

## 2021-01-04 RX ADMIN — ATORVASTATIN CALCIUM 40 MG: 40 TABLET, FILM COATED ORAL at 09:01

## 2021-01-04 RX ADMIN — TAMSULOSIN HYDROCHLORIDE 0.4 MG: 0.4 CAPSULE ORAL at 08:01

## 2021-01-04 RX ADMIN — DEXAMETHASONE SODIUM PHOSPHATE 6 MG: 4 INJECTION, SOLUTION INTRA-ARTICULAR; INTRALESIONAL; INTRAMUSCULAR; INTRAVENOUS; SOFT TISSUE at 08:01

## 2021-01-04 RX ADMIN — ALBUTEROL SULFATE 2 PUFF: 90 AEROSOL, METERED RESPIRATORY (INHALATION) at 02:01

## 2021-01-04 RX ADMIN — ASPIRIN 81 MG: 81 TABLET, CHEWABLE ORAL at 08:01

## 2021-01-05 ENCOUNTER — PATIENT MESSAGE (OUTPATIENT)
Dept: GASTROENTEROLOGY | Facility: CLINIC | Age: 76
End: 2021-01-05

## 2021-01-05 LAB
ALBUMIN SERPL BCP-MCNC: 3.3 G/DL (ref 3.5–5.2)
ALBUMIN SERPL BCP-MCNC: 3.3 G/DL (ref 3.5–5.2)
ALP SERPL-CCNC: 58 U/L (ref 55–135)
ALP SERPL-CCNC: 58 U/L (ref 55–135)
ALT SERPL W/O P-5'-P-CCNC: 62 U/L (ref 10–44)
ALT SERPL W/O P-5'-P-CCNC: 62 U/L (ref 10–44)
ANION GAP SERPL CALC-SCNC: 10 MMOL/L (ref 8–16)
ANION GAP SERPL CALC-SCNC: 10 MMOL/L (ref 8–16)
AST SERPL-CCNC: 58 U/L (ref 10–40)
AST SERPL-CCNC: 58 U/L (ref 10–40)
BACTERIA #/AREA URNS HPF: NORMAL /HPF
BASOPHILS # BLD AUTO: 0 K/UL (ref 0–0.2)
BASOPHILS NFR BLD: 0 % (ref 0–1.9)
BILIRUB SERPL-MCNC: 0.9 MG/DL (ref 0.1–1)
BILIRUB SERPL-MCNC: 0.9 MG/DL (ref 0.1–1)
BILIRUB UR QL STRIP: NEGATIVE
BUN SERPL-MCNC: 35 MG/DL (ref 8–23)
BUN SERPL-MCNC: 35 MG/DL (ref 8–23)
CALCIUM SERPL-MCNC: 8.8 MG/DL (ref 8.7–10.5)
CALCIUM SERPL-MCNC: 8.8 MG/DL (ref 8.7–10.5)
CHLORIDE SERPL-SCNC: 101 MMOL/L (ref 95–110)
CHLORIDE SERPL-SCNC: 101 MMOL/L (ref 95–110)
CLARITY UR: CLEAR
CO2 SERPL-SCNC: 29 MMOL/L (ref 23–29)
CO2 SERPL-SCNC: 29 MMOL/L (ref 23–29)
COLOR UR: YELLOW
CREAT SERPL-MCNC: 1 MG/DL (ref 0.5–1.4)
CREAT SERPL-MCNC: 1 MG/DL (ref 0.5–1.4)
CRP SERPL-MCNC: 2.61 MG/DL
DIFFERENTIAL METHOD: ABNORMAL
EOSINOPHIL # BLD AUTO: 0 K/UL (ref 0–0.5)
EOSINOPHIL NFR BLD: 0 % (ref 0–8)
ERYTHROCYTE [DISTWIDTH] IN BLOOD BY AUTOMATED COUNT: 13 % (ref 11.5–14.5)
EST. GFR  (AFRICAN AMERICAN): >60 ML/MIN/1.73 M^2
EST. GFR  (AFRICAN AMERICAN): >60 ML/MIN/1.73 M^2
EST. GFR  (NON AFRICAN AMERICAN): >60 ML/MIN/1.73 M^2
EST. GFR  (NON AFRICAN AMERICAN): >60 ML/MIN/1.73 M^2
FERRITIN SERPL-MCNC: 555 NG/ML (ref 20–300)
GLUCOSE SERPL-MCNC: 189 MG/DL (ref 70–110)
GLUCOSE SERPL-MCNC: 189 MG/DL (ref 70–110)
GLUCOSE SERPL-MCNC: 244 MG/DL (ref 70–110)
GLUCOSE SERPL-MCNC: 254 MG/DL (ref 70–110)
GLUCOSE UR QL STRIP: NEGATIVE
HCT VFR BLD AUTO: 41.8 % (ref 40–54)
HGB BLD-MCNC: 13.7 G/DL (ref 14–18)
HGB UR QL STRIP: NEGATIVE
HYALINE CASTS #/AREA URNS LPF: 0 /LPF
IMM GRANULOCYTES # BLD AUTO: 0.05 K/UL (ref 0–0.04)
IMM GRANULOCYTES NFR BLD AUTO: 0.8 % (ref 0–0.5)
KETONES UR QL STRIP: NEGATIVE
LEUKOCYTE ESTERASE UR QL STRIP: NEGATIVE
LYMPHOCYTES # BLD AUTO: 0.6 K/UL (ref 1–4.8)
LYMPHOCYTES NFR BLD: 10.3 % (ref 18–48)
MCH RBC QN AUTO: 28.1 PG (ref 27–31)
MCHC RBC AUTO-ENTMCNC: 32.8 G/DL (ref 32–36)
MCV RBC AUTO: 86 FL (ref 82–98)
MICROSCOPIC COMMENT: NORMAL
MONOCYTES # BLD AUTO: 0.5 K/UL (ref 0.3–1)
MONOCYTES NFR BLD: 8.5 % (ref 4–15)
NEUTROPHILS # BLD AUTO: 5 K/UL (ref 1.8–7.7)
NEUTROPHILS NFR BLD: 80.4 % (ref 38–73)
NITRITE UR QL STRIP: NEGATIVE
NRBC BLD-RTO: 0 /100 WBC
PH UR STRIP: 6 [PH] (ref 5–8)
PLATELET # BLD AUTO: 176 K/UL (ref 150–350)
PMV BLD AUTO: 11.1 FL (ref 9.2–12.9)
POTASSIUM SERPL-SCNC: 3.6 MMOL/L (ref 3.5–5.1)
POTASSIUM SERPL-SCNC: 3.6 MMOL/L (ref 3.5–5.1)
PROT SERPL-MCNC: 6.8 G/DL (ref 6–8.4)
PROT SERPL-MCNC: 6.8 G/DL (ref 6–8.4)
PROT UR QL STRIP: ABNORMAL
RBC # BLD AUTO: 4.87 M/UL (ref 4.6–6.2)
RBC #/AREA URNS HPF: 1 /HPF (ref 0–4)
SODIUM SERPL-SCNC: 140 MMOL/L (ref 136–145)
SODIUM SERPL-SCNC: 140 MMOL/L (ref 136–145)
SP GR UR STRIP: 1.02 (ref 1–1.03)
URN SPEC COLLECT METH UR: ABNORMAL
UROBILINOGEN UR STRIP-ACNC: NEGATIVE EU/DL
WBC # BLD AUTO: 6.23 K/UL (ref 3.9–12.7)
WBC #/AREA URNS HPF: 1 /HPF (ref 0–5)

## 2021-01-05 PROCEDURE — 81001 URINALYSIS AUTO W/SCOPE: CPT

## 2021-01-05 PROCEDURE — 99900035 HC TECH TIME PER 15 MIN (STAT)

## 2021-01-05 PROCEDURE — 12000002 HC ACUTE/MED SURGE SEMI-PRIVATE ROOM

## 2021-01-05 PROCEDURE — 80053 COMPREHEN METABOLIC PANEL: CPT

## 2021-01-05 PROCEDURE — 25000003 PHARM REV CODE 250: Performed by: NURSE PRACTITIONER

## 2021-01-05 PROCEDURE — 94640 AIRWAY INHALATION TREATMENT: CPT

## 2021-01-05 PROCEDURE — 82728 ASSAY OF FERRITIN: CPT

## 2021-01-05 PROCEDURE — 86140 C-REACTIVE PROTEIN: CPT

## 2021-01-05 PROCEDURE — 99900031 HC PATIENT EDUCATION (STAT)

## 2021-01-05 PROCEDURE — 27000221 HC OXYGEN, UP TO 24 HOURS

## 2021-01-05 PROCEDURE — 25000242 PHARM REV CODE 250 ALT 637 W/ HCPCS: Performed by: HOSPITALIST

## 2021-01-05 PROCEDURE — 36415 COLL VENOUS BLD VENIPUNCTURE: CPT

## 2021-01-05 PROCEDURE — 85025 COMPLETE CBC W/AUTO DIFF WBC: CPT

## 2021-01-05 PROCEDURE — 82962 GLUCOSE BLOOD TEST: CPT

## 2021-01-05 PROCEDURE — 94761 N-INVAS EAR/PLS OXIMETRY MLT: CPT

## 2021-01-05 PROCEDURE — 63600175 PHARM REV CODE 636 W HCPCS: Performed by: NURSE PRACTITIONER

## 2021-01-05 PROCEDURE — 25000003 PHARM REV CODE 250: Performed by: INTERNAL MEDICINE

## 2021-01-05 RX ADMIN — ALLOPURINOL 200 MG: 100 TABLET ORAL at 08:01

## 2021-01-05 RX ADMIN — FLUTICASONE FUROATE AND VILANTEROL TRIFENATATE 1 PUFF: 200; 25 POWDER RESPIRATORY (INHALATION) at 09:01

## 2021-01-05 RX ADMIN — TAMSULOSIN HYDROCHLORIDE 0.4 MG: 0.4 CAPSULE ORAL at 08:01

## 2021-01-05 RX ADMIN — INSULIN ASPART 3 UNITS: 100 INJECTION, SOLUTION INTRAVENOUS; SUBCUTANEOUS at 04:01

## 2021-01-05 RX ADMIN — DEXAMETHASONE SODIUM PHOSPHATE 6 MG: 4 INJECTION, SOLUTION INTRA-ARTICULAR; INTRALESIONAL; INTRAMUSCULAR; INTRAVENOUS; SOFT TISSUE at 08:01

## 2021-01-05 RX ADMIN — ATORVASTATIN CALCIUM 40 MG: 40 TABLET, FILM COATED ORAL at 09:01

## 2021-01-05 RX ADMIN — ALBUTEROL SULFATE 2 PUFF: 90 AEROSOL, METERED RESPIRATORY (INHALATION) at 02:01

## 2021-01-05 RX ADMIN — ALBUTEROL SULFATE 2 PUFF: 90 AEROSOL, METERED RESPIRATORY (INHALATION) at 07:01

## 2021-01-05 RX ADMIN — Medication 2000 UNITS: at 08:01

## 2021-01-05 RX ADMIN — FINASTERIDE 5 MG: 5 TABLET, FILM COATED ORAL at 08:01

## 2021-01-05 RX ADMIN — ASPIRIN 81 MG: 81 TABLET, CHEWABLE ORAL at 08:01

## 2021-01-05 RX ADMIN — POTASSIUM CHLORIDE 20 MEQ: 20 TABLET, EXTENDED RELEASE ORAL at 08:01

## 2021-01-05 RX ADMIN — MONTELUKAST 10 MG: 10 TABLET, FILM COATED ORAL at 09:01

## 2021-01-05 RX ADMIN — MELATONIN 6 MG: at 09:01

## 2021-01-05 RX ADMIN — ZINC SULFATE 220 MG (50 MG) CAPSULE 220 MG: CAPSULE at 08:01

## 2021-01-05 RX ADMIN — ALBUTEROL SULFATE 2 PUFF: 90 AEROSOL, METERED RESPIRATORY (INHALATION) at 08:01

## 2021-01-05 RX ADMIN — REMDESIVIR 100 MG: 100 INJECTION, POWDER, LYOPHILIZED, FOR SOLUTION INTRAVENOUS at 09:01

## 2021-01-05 RX ADMIN — APIXABAN 5 MG: 5 TABLET, FILM COATED ORAL at 09:01

## 2021-01-05 RX ADMIN — OXYBUTYNIN CHLORIDE 10 MG: 10 TABLET, EXTENDED RELEASE ORAL at 08:01

## 2021-01-05 RX ADMIN — FLUTICASONE FUROATE AND VILANTEROL TRIFENATATE 1 PUFF: 200; 25 POWDER RESPIRATORY (INHALATION) at 07:01

## 2021-01-05 RX ADMIN — FUROSEMIDE 20 MG: 20 TABLET ORAL at 08:01

## 2021-01-05 RX ADMIN — OXYCODONE HYDROCHLORIDE AND ACETAMINOPHEN 500 MG: 500 TABLET ORAL at 08:01

## 2021-01-05 RX ADMIN — APIXABAN 5 MG: 5 TABLET, FILM COATED ORAL at 08:01

## 2021-01-06 LAB
ALBUMIN SERPL BCP-MCNC: 3.2 G/DL (ref 3.5–5.2)
ALBUMIN SERPL BCP-MCNC: 3.2 G/DL (ref 3.5–5.2)
ALP SERPL-CCNC: 63 U/L (ref 55–135)
ALP SERPL-CCNC: 63 U/L (ref 55–135)
ALT SERPL W/O P-5'-P-CCNC: 59 U/L (ref 10–44)
ALT SERPL W/O P-5'-P-CCNC: 59 U/L (ref 10–44)
ANION GAP SERPL CALC-SCNC: 11 MMOL/L (ref 8–16)
ANION GAP SERPL CALC-SCNC: 11 MMOL/L (ref 8–16)
AST SERPL-CCNC: 47 U/L (ref 10–40)
AST SERPL-CCNC: 47 U/L (ref 10–40)
BACTERIA BLD CULT: NORMAL
BACTERIA BLD CULT: NORMAL
BASOPHILS # BLD AUTO: 0.02 K/UL (ref 0–0.2)
BASOPHILS NFR BLD: 0.3 % (ref 0–1.9)
BILIRUB SERPL-MCNC: 1 MG/DL (ref 0.1–1)
BILIRUB SERPL-MCNC: 1 MG/DL (ref 0.1–1)
BUN SERPL-MCNC: 39 MG/DL (ref 8–23)
BUN SERPL-MCNC: 39 MG/DL (ref 8–23)
CALCIUM SERPL-MCNC: 9.1 MG/DL (ref 8.7–10.5)
CALCIUM SERPL-MCNC: 9.1 MG/DL (ref 8.7–10.5)
CHLORIDE SERPL-SCNC: 98 MMOL/L (ref 95–110)
CHLORIDE SERPL-SCNC: 98 MMOL/L (ref 95–110)
CO2 SERPL-SCNC: 30 MMOL/L (ref 23–29)
CO2 SERPL-SCNC: 30 MMOL/L (ref 23–29)
CREAT SERPL-MCNC: 1 MG/DL (ref 0.5–1.4)
CREAT SERPL-MCNC: 1 MG/DL (ref 0.5–1.4)
CRP SERPL-MCNC: 1.77 MG/DL
DIFFERENTIAL METHOD: ABNORMAL
EOSINOPHIL # BLD AUTO: 0 K/UL (ref 0–0.5)
EOSINOPHIL NFR BLD: 0 % (ref 0–8)
ERYTHROCYTE [DISTWIDTH] IN BLOOD BY AUTOMATED COUNT: 13.1 % (ref 11.5–14.5)
EST. GFR  (AFRICAN AMERICAN): >60 ML/MIN/1.73 M^2
EST. GFR  (AFRICAN AMERICAN): >60 ML/MIN/1.73 M^2
EST. GFR  (NON AFRICAN AMERICAN): >60 ML/MIN/1.73 M^2
EST. GFR  (NON AFRICAN AMERICAN): >60 ML/MIN/1.73 M^2
ESTIMATED AVG GLUCOSE: 177 MG/DL (ref 68–131)
FERRITIN SERPL-MCNC: 441 NG/ML (ref 20–300)
GLUCOSE SERPL-MCNC: 169 MG/DL (ref 70–110)
GLUCOSE SERPL-MCNC: 169 MG/DL (ref 70–110)
GLUCOSE SERPL-MCNC: 193 MG/DL (ref 70–110)
GLUCOSE SERPL-MCNC: 205 MG/DL (ref 70–110)
GLUCOSE SERPL-MCNC: 215 MG/DL (ref 70–110)
GLUCOSE SERPL-MCNC: 255 MG/DL (ref 70–110)
HBA1C MFR BLD HPLC: 7.8 % (ref 4.5–6.2)
HCT VFR BLD AUTO: 40.4 % (ref 40–54)
HGB BLD-MCNC: 12.9 G/DL (ref 14–18)
IMM GRANULOCYTES # BLD AUTO: 0.06 K/UL (ref 0–0.04)
IMM GRANULOCYTES NFR BLD AUTO: 0.9 % (ref 0–0.5)
LYMPHOCYTES # BLD AUTO: 0.7 K/UL (ref 1–4.8)
LYMPHOCYTES NFR BLD: 10.5 % (ref 18–48)
MCH RBC QN AUTO: 27.7 PG (ref 27–31)
MCHC RBC AUTO-ENTMCNC: 31.9 G/DL (ref 32–36)
MCV RBC AUTO: 87 FL (ref 82–98)
MONOCYTES # BLD AUTO: 0.6 K/UL (ref 0.3–1)
MONOCYTES NFR BLD: 9.1 % (ref 4–15)
NEUTROPHILS # BLD AUTO: 5.1 K/UL (ref 1.8–7.7)
NEUTROPHILS NFR BLD: 79.2 % (ref 38–73)
NRBC BLD-RTO: 0 /100 WBC
PLATELET # BLD AUTO: 178 K/UL (ref 150–350)
PMV BLD AUTO: 11 FL (ref 9.2–12.9)
POTASSIUM SERPL-SCNC: 3.9 MMOL/L (ref 3.5–5.1)
POTASSIUM SERPL-SCNC: 3.9 MMOL/L (ref 3.5–5.1)
PROT SERPL-MCNC: 6.7 G/DL (ref 6–8.4)
PROT SERPL-MCNC: 6.7 G/DL (ref 6–8.4)
RBC # BLD AUTO: 4.65 M/UL (ref 4.6–6.2)
SODIUM SERPL-SCNC: 139 MMOL/L (ref 136–145)
SODIUM SERPL-SCNC: 139 MMOL/L (ref 136–145)
WBC # BLD AUTO: 6.38 K/UL (ref 3.9–12.7)

## 2021-01-06 PROCEDURE — 83036 HEMOGLOBIN GLYCOSYLATED A1C: CPT

## 2021-01-06 PROCEDURE — 82728 ASSAY OF FERRITIN: CPT

## 2021-01-06 PROCEDURE — 86140 C-REACTIVE PROTEIN: CPT

## 2021-01-06 PROCEDURE — 36415 COLL VENOUS BLD VENIPUNCTURE: CPT

## 2021-01-06 PROCEDURE — 27000221 HC OXYGEN, UP TO 24 HOURS

## 2021-01-06 PROCEDURE — 94799 UNLISTED PULMONARY SVC/PX: CPT

## 2021-01-06 PROCEDURE — 80053 COMPREHEN METABOLIC PANEL: CPT

## 2021-01-06 PROCEDURE — 94761 N-INVAS EAR/PLS OXIMETRY MLT: CPT

## 2021-01-06 PROCEDURE — 94640 AIRWAY INHALATION TREATMENT: CPT

## 2021-01-06 PROCEDURE — 99900035 HC TECH TIME PER 15 MIN (STAT)

## 2021-01-06 PROCEDURE — 25000003 PHARM REV CODE 250: Performed by: NURSE PRACTITIONER

## 2021-01-06 PROCEDURE — 63600175 PHARM REV CODE 636 W HCPCS: Performed by: INTERNAL MEDICINE

## 2021-01-06 PROCEDURE — 63600175 PHARM REV CODE 636 W HCPCS: Performed by: NURSE PRACTITIONER

## 2021-01-06 PROCEDURE — 99900031 HC PATIENT EDUCATION (STAT)

## 2021-01-06 PROCEDURE — 12000002 HC ACUTE/MED SURGE SEMI-PRIVATE ROOM

## 2021-01-06 PROCEDURE — 85025 COMPLETE CBC W/AUTO DIFF WBC: CPT

## 2021-01-06 RX ADMIN — FLUTICASONE FUROATE AND VILANTEROL TRIFENATATE 1 PUFF: 200; 25 POWDER RESPIRATORY (INHALATION) at 09:01

## 2021-01-06 RX ADMIN — ALLOPURINOL 200 MG: 100 TABLET ORAL at 09:01

## 2021-01-06 RX ADMIN — FUROSEMIDE 20 MG: 20 TABLET ORAL at 09:01

## 2021-01-06 RX ADMIN — APIXABAN 5 MG: 5 TABLET, FILM COATED ORAL at 09:01

## 2021-01-06 RX ADMIN — Medication 2000 UNITS: at 09:01

## 2021-01-06 RX ADMIN — ATORVASTATIN CALCIUM 40 MG: 40 TABLET, FILM COATED ORAL at 10:01

## 2021-01-06 RX ADMIN — ZINC SULFATE 220 MG (50 MG) CAPSULE 220 MG: CAPSULE at 09:01

## 2021-01-06 RX ADMIN — INSULIN ASPART 2 UNITS: 100 INJECTION, SOLUTION INTRAVENOUS; SUBCUTANEOUS at 10:01

## 2021-01-06 RX ADMIN — MELATONIN 6 MG: at 10:01

## 2021-01-06 RX ADMIN — SOTALOL HYDROCHLORIDE 40 MG: 80 TABLET ORAL at 09:01

## 2021-01-06 RX ADMIN — ALBUTEROL SULFATE 2 PUFF: 90 AEROSOL, METERED RESPIRATORY (INHALATION) at 03:01

## 2021-01-06 RX ADMIN — HUMAN INSULIN 4 UNITS: 100 INJECTION, SOLUTION SUBCUTANEOUS at 12:01

## 2021-01-06 RX ADMIN — ASPIRIN 81 MG: 81 TABLET, CHEWABLE ORAL at 09:01

## 2021-01-06 RX ADMIN — ALBUTEROL SULFATE 2 PUFF: 90 AEROSOL, METERED RESPIRATORY (INHALATION) at 09:01

## 2021-01-06 RX ADMIN — OXYBUTYNIN CHLORIDE 10 MG: 10 TABLET, EXTENDED RELEASE ORAL at 09:01

## 2021-01-06 RX ADMIN — APIXABAN 5 MG: 5 TABLET, FILM COATED ORAL at 10:01

## 2021-01-06 RX ADMIN — OXYCODONE HYDROCHLORIDE AND ACETAMINOPHEN 500 MG: 500 TABLET ORAL at 09:01

## 2021-01-06 RX ADMIN — MONTELUKAST 10 MG: 10 TABLET, FILM COATED ORAL at 10:01

## 2021-01-06 RX ADMIN — TAMSULOSIN HYDROCHLORIDE 0.4 MG: 0.4 CAPSULE ORAL at 09:01

## 2021-01-06 RX ADMIN — FINASTERIDE 5 MG: 5 TABLET, FILM COATED ORAL at 09:01

## 2021-01-06 RX ADMIN — HUMAN INSULIN 2 UNITS: 100 INJECTION, SOLUTION SUBCUTANEOUS at 10:01

## 2021-01-06 RX ADMIN — DEXAMETHASONE SODIUM PHOSPHATE 6 MG: 4 INJECTION, SOLUTION INTRA-ARTICULAR; INTRALESIONAL; INTRAMUSCULAR; INTRAVENOUS; SOFT TISSUE at 09:01

## 2021-01-07 ENCOUNTER — PATIENT MESSAGE (OUTPATIENT)
Dept: PULMONOLOGY | Facility: CLINIC | Age: 76
End: 2021-01-07

## 2021-01-07 ENCOUNTER — TELEPHONE (OUTPATIENT)
Dept: ADMINISTRATIVE | Facility: CLINIC | Age: 76
End: 2021-01-07

## 2021-01-07 ENCOUNTER — NURSE TRIAGE (OUTPATIENT)
Dept: ADMINISTRATIVE | Facility: CLINIC | Age: 76
End: 2021-01-07

## 2021-01-07 VITALS
TEMPERATURE: 99 F | OXYGEN SATURATION: 92 % | WEIGHT: 315 LBS | DIASTOLIC BLOOD PRESSURE: 78 MMHG | HEART RATE: 78 BPM | BODY MASS INDEX: 44.1 KG/M2 | RESPIRATION RATE: 16 BRPM | SYSTOLIC BLOOD PRESSURE: 135 MMHG | HEIGHT: 71 IN

## 2021-01-07 LAB
ALBUMIN SERPL BCP-MCNC: 3.2 G/DL (ref 3.5–5.2)
ALP SERPL-CCNC: 61 U/L (ref 55–135)
ALT SERPL W/O P-5'-P-CCNC: 62 U/L (ref 10–44)
ANION GAP SERPL CALC-SCNC: 9 MMOL/L (ref 8–16)
AST SERPL-CCNC: 44 U/L (ref 10–40)
BASOPHILS # BLD AUTO: 0.01 K/UL (ref 0–0.2)
BASOPHILS NFR BLD: 0.1 % (ref 0–1.9)
BILIRUB SERPL-MCNC: 1.3 MG/DL (ref 0.1–1)
BUN SERPL-MCNC: 36 MG/DL (ref 8–23)
CALCIUM SERPL-MCNC: 8.9 MG/DL (ref 8.7–10.5)
CHLORIDE SERPL-SCNC: 98 MMOL/L (ref 95–110)
CO2 SERPL-SCNC: 30 MMOL/L (ref 23–29)
CREAT SERPL-MCNC: 1 MG/DL (ref 0.5–1.4)
CRP SERPL-MCNC: 1.51 MG/DL
DIFFERENTIAL METHOD: ABNORMAL
EOSINOPHIL # BLD AUTO: 0 K/UL (ref 0–0.5)
EOSINOPHIL NFR BLD: 0 % (ref 0–8)
ERYTHROCYTE [DISTWIDTH] IN BLOOD BY AUTOMATED COUNT: 13 % (ref 11.5–14.5)
EST. GFR  (AFRICAN AMERICAN): >60 ML/MIN/1.73 M^2
EST. GFR  (NON AFRICAN AMERICAN): >60 ML/MIN/1.73 M^2
FERRITIN SERPL-MCNC: 418 NG/ML (ref 20–300)
GLUCOSE SERPL-MCNC: 141 MG/DL (ref 70–110)
GLUCOSE SERPL-MCNC: 151 MG/DL (ref 70–110)
HCT VFR BLD AUTO: 39.8 % (ref 40–54)
HGB BLD-MCNC: 13 G/DL (ref 14–18)
IMM GRANULOCYTES # BLD AUTO: 0.07 K/UL (ref 0–0.04)
IMM GRANULOCYTES NFR BLD AUTO: 1 % (ref 0–0.5)
LYMPHOCYTES # BLD AUTO: 0.8 K/UL (ref 1–4.8)
LYMPHOCYTES NFR BLD: 10.9 % (ref 18–48)
MCH RBC QN AUTO: 28.1 PG (ref 27–31)
MCHC RBC AUTO-ENTMCNC: 32.7 G/DL (ref 32–36)
MCV RBC AUTO: 86 FL (ref 82–98)
MONOCYTES # BLD AUTO: 0.5 K/UL (ref 0.3–1)
MONOCYTES NFR BLD: 7.4 % (ref 4–15)
NEUTROPHILS # BLD AUTO: 5.5 K/UL (ref 1.8–7.7)
NEUTROPHILS NFR BLD: 80.6 % (ref 38–73)
NRBC BLD-RTO: 0 /100 WBC
PLATELET # BLD AUTO: 199 K/UL (ref 150–350)
PMV BLD AUTO: 11.2 FL (ref 9.2–12.9)
POTASSIUM SERPL-SCNC: 3.6 MMOL/L (ref 3.5–5.1)
PROT SERPL-MCNC: 6.7 G/DL (ref 6–8.4)
RBC # BLD AUTO: 4.63 M/UL (ref 4.6–6.2)
SODIUM SERPL-SCNC: 137 MMOL/L (ref 136–145)
WBC # BLD AUTO: 6.88 K/UL (ref 3.9–12.7)

## 2021-01-07 PROCEDURE — 85025 COMPLETE CBC W/AUTO DIFF WBC: CPT

## 2021-01-07 PROCEDURE — 63600175 PHARM REV CODE 636 W HCPCS: Performed by: NURSE PRACTITIONER

## 2021-01-07 PROCEDURE — 82728 ASSAY OF FERRITIN: CPT

## 2021-01-07 PROCEDURE — 80053 COMPREHEN METABOLIC PANEL: CPT

## 2021-01-07 PROCEDURE — 25000003 PHARM REV CODE 250: Performed by: NURSE PRACTITIONER

## 2021-01-07 PROCEDURE — 86140 C-REACTIVE PROTEIN: CPT

## 2021-01-07 PROCEDURE — 36415 COLL VENOUS BLD VENIPUNCTURE: CPT

## 2021-01-07 RX ORDER — LANCETS
1 EACH MISCELLANEOUS 2 TIMES DAILY WITH MEALS
Qty: 100 EACH | Refills: 1 | Status: SHIPPED | OUTPATIENT
Start: 2021-01-07 | End: 2021-03-08

## 2021-01-07 RX ORDER — METFORMIN HYDROCHLORIDE 500 MG/1
500 TABLET ORAL
Qty: 30 TABLET | Refills: 0 | Status: SHIPPED | OUTPATIENT
Start: 2021-01-07 | End: 2021-01-11

## 2021-01-07 RX ORDER — LANCING DEVICE
1 EACH MISCELLANEOUS 2 TIMES DAILY WITH MEALS
Qty: 100 EACH | Refills: 0 | Status: SHIPPED | OUTPATIENT
Start: 2021-01-07 | End: 2023-02-23

## 2021-01-07 RX ORDER — DEXAMETHASONE 6 MG/1
6 TABLET ORAL
Qty: 3 TABLET | Refills: 0 | Status: SHIPPED | OUTPATIENT
Start: 2021-01-07 | End: 2021-01-10

## 2021-01-07 RX ADMIN — ALBUTEROL SULFATE 2 PUFF: 90 AEROSOL, METERED RESPIRATORY (INHALATION) at 08:01

## 2021-01-07 RX ADMIN — FUROSEMIDE 20 MG: 20 TABLET ORAL at 09:01

## 2021-01-07 RX ADMIN — TAMSULOSIN HYDROCHLORIDE 0.4 MG: 0.4 CAPSULE ORAL at 09:01

## 2021-01-07 RX ADMIN — ALLOPURINOL 200 MG: 100 TABLET ORAL at 09:01

## 2021-01-07 RX ADMIN — FINASTERIDE 5 MG: 5 TABLET, FILM COATED ORAL at 09:01

## 2021-01-07 RX ADMIN — APIXABAN 5 MG: 5 TABLET, FILM COATED ORAL at 09:01

## 2021-01-07 RX ADMIN — DEXAMETHASONE SODIUM PHOSPHATE 6 MG: 4 INJECTION, SOLUTION INTRA-ARTICULAR; INTRALESIONAL; INTRAMUSCULAR; INTRAVENOUS; SOFT TISSUE at 09:01

## 2021-01-07 RX ADMIN — SOTALOL HYDROCHLORIDE 40 MG: 80 TABLET ORAL at 09:01

## 2021-01-07 RX ADMIN — ASPIRIN 81 MG: 81 TABLET, CHEWABLE ORAL at 09:01

## 2021-01-07 RX ADMIN — FLUTICASONE FUROATE AND VILANTEROL TRIFENATATE 1 PUFF: 200; 25 POWDER RESPIRATORY (INHALATION) at 09:01

## 2021-01-07 RX ADMIN — OXYBUTYNIN CHLORIDE 10 MG: 10 TABLET, EXTENDED RELEASE ORAL at 09:01

## 2021-01-07 RX ADMIN — OXYCODONE HYDROCHLORIDE AND ACETAMINOPHEN 500 MG: 500 TABLET ORAL at 09:01

## 2021-01-07 RX ADMIN — ZINC SULFATE 220 MG (50 MG) CAPSULE 220 MG: CAPSULE at 09:01

## 2021-01-07 RX ADMIN — Medication 2000 UNITS: at 09:01

## 2021-01-09 ENCOUNTER — PATIENT MESSAGE (OUTPATIENT)
Dept: FAMILY MEDICINE | Facility: CLINIC | Age: 76
End: 2021-01-09

## 2021-01-09 DIAGNOSIS — E11.29 TYPE 2 DIABETES MELLITUS WITH MICROALBUMINURIA, WITHOUT LONG-TERM CURRENT USE OF INSULIN: Primary | ICD-10-CM

## 2021-01-09 DIAGNOSIS — R80.9 TYPE 2 DIABETES MELLITUS WITH MICROALBUMINURIA, WITHOUT LONG-TERM CURRENT USE OF INSULIN: Primary | ICD-10-CM

## 2021-01-11 RX ORDER — METFORMIN HYDROCHLORIDE 500 MG/1
500 TABLET ORAL 2 TIMES DAILY WITH MEALS
Qty: 60 TABLET | Refills: 0
Start: 2021-01-11 | End: 2021-02-04 | Stop reason: SDUPTHER

## 2021-01-11 RX ORDER — GLIMEPIRIDE 2 MG/1
2 TABLET ORAL
Qty: 90 TABLET | Refills: 3 | Status: SHIPPED | OUTPATIENT
Start: 2021-01-11 | End: 2021-10-13 | Stop reason: ALTCHOICE

## 2021-01-13 ENCOUNTER — PATIENT MESSAGE (OUTPATIENT)
Dept: PULMONOLOGY | Facility: CLINIC | Age: 76
End: 2021-01-13

## 2021-01-14 ENCOUNTER — PATIENT MESSAGE (OUTPATIENT)
Dept: PULMONOLOGY | Facility: CLINIC | Age: 76
End: 2021-01-14

## 2021-01-14 ENCOUNTER — HOSPITAL ENCOUNTER (OUTPATIENT)
Dept: RADIOLOGY | Facility: HOSPITAL | Age: 76
Discharge: HOME | End: 2021-01-14
Attending: NURSE PRACTITIONER
Payer: MEDICARE

## 2021-01-14 ENCOUNTER — OFFICE VISIT (OUTPATIENT)
Dept: FAMILY MEDICINE | Facility: CLINIC | Age: 76
End: 2021-01-14
Payer: MEDICARE

## 2021-01-14 DIAGNOSIS — E11.69 HYPERLIPIDEMIA ASSOCIATED WITH TYPE 2 DIABETES MELLITUS: ICD-10-CM

## 2021-01-14 DIAGNOSIS — K74.60 HEPATIC CIRRHOSIS, UNSPECIFIED HEPATIC CIRRHOSIS TYPE, UNSPECIFIED WHETHER ASCITES PRESENT: ICD-10-CM

## 2021-01-14 DIAGNOSIS — E78.5 HYPERLIPIDEMIA ASSOCIATED WITH TYPE 2 DIABETES MELLITUS: ICD-10-CM

## 2021-01-14 DIAGNOSIS — J12.82 PNEUMONIA DUE TO COVID-19 VIRUS: ICD-10-CM

## 2021-01-14 DIAGNOSIS — U07.1 PNEUMONIA DUE TO COVID-19 VIRUS: ICD-10-CM

## 2021-01-14 DIAGNOSIS — R80.9 TYPE 2 DIABETES MELLITUS WITH MICROALBUMINURIA, WITHOUT LONG-TERM CURRENT USE OF INSULIN: ICD-10-CM

## 2021-01-14 DIAGNOSIS — J96.01 ACUTE HYPOXEMIC RESPIRATORY FAILURE: ICD-10-CM

## 2021-01-14 DIAGNOSIS — E66.01 OBESITY, MORBID, BMI 40.0-49.9: ICD-10-CM

## 2021-01-14 DIAGNOSIS — I15.2 HYPERTENSION ASSOCIATED WITH DIABETES: ICD-10-CM

## 2021-01-14 DIAGNOSIS — U07.1 PNEUMONIA DUE TO COVID-19 VIRUS: Primary | ICD-10-CM

## 2021-01-14 DIAGNOSIS — N18.30 STAGE 3 CHRONIC KIDNEY DISEASE, UNSPECIFIED WHETHER STAGE 3A OR 3B CKD: ICD-10-CM

## 2021-01-14 DIAGNOSIS — E11.59 HYPERTENSION ASSOCIATED WITH DIABETES: ICD-10-CM

## 2021-01-14 DIAGNOSIS — E11.29 TYPE 2 DIABETES MELLITUS WITH MICROALBUMINURIA, WITHOUT LONG-TERM CURRENT USE OF INSULIN: ICD-10-CM

## 2021-01-14 DIAGNOSIS — J44.1 COPD EXACERBATION: ICD-10-CM

## 2021-01-14 DIAGNOSIS — J12.82 PNEUMONIA DUE TO COVID-19 VIRUS: Primary | ICD-10-CM

## 2021-01-14 PROCEDURE — 99214 PR OFFICE/OUTPT VISIT, EST, LEVL IV, 30-39 MIN: ICD-10-PCS | Mod: 95,,, | Performed by: NURSE PRACTITIONER

## 2021-01-14 PROCEDURE — 99499 RISK ADDL DX/OHS AUDIT: ICD-10-PCS | Mod: HCNC,95,, | Performed by: NURSE PRACTITIONER

## 2021-01-14 PROCEDURE — 99499 UNLISTED E&M SERVICE: CPT | Mod: HCNC,95,, | Performed by: NURSE PRACTITIONER

## 2021-01-14 PROCEDURE — 3051F PR MOST RECENT HEMOGLOBIN A1C LEVEL 7.0 - < 8.0%: ICD-10-PCS | Mod: CPTII,95,, | Performed by: NURSE PRACTITIONER

## 2021-01-14 PROCEDURE — 71046 X-RAY EXAM CHEST 2 VIEWS: CPT | Mod: 26,HCNC,, | Performed by: RADIOLOGY

## 2021-01-14 PROCEDURE — 1159F MED LIST DOCD IN RCRD: CPT | Mod: 95,,, | Performed by: NURSE PRACTITIONER

## 2021-01-14 PROCEDURE — 71046 X-RAY EXAM CHEST 2 VIEWS: CPT | Mod: TC,HCNC,FY

## 2021-01-14 PROCEDURE — 71046 XR CHEST PA AND LATERAL: ICD-10-PCS | Mod: 26,HCNC,, | Performed by: RADIOLOGY

## 2021-01-14 PROCEDURE — 1159F PR MEDICATION LIST DOCUMENTED IN MEDICAL RECORD: ICD-10-PCS | Mod: 95,,, | Performed by: NURSE PRACTITIONER

## 2021-01-14 PROCEDURE — 3051F HG A1C>EQUAL 7.0%<8.0%: CPT | Mod: CPTII,95,, | Performed by: NURSE PRACTITIONER

## 2021-01-14 PROCEDURE — 99214 OFFICE O/P EST MOD 30 MIN: CPT | Mod: 95,,, | Performed by: NURSE PRACTITIONER

## 2021-01-15 ENCOUNTER — PATIENT MESSAGE (OUTPATIENT)
Dept: FAMILY MEDICINE | Facility: CLINIC | Age: 76
End: 2021-01-15

## 2021-01-15 ENCOUNTER — TELEPHONE (OUTPATIENT)
Dept: FAMILY MEDICINE | Facility: CLINIC | Age: 76
End: 2021-01-15

## 2021-01-18 ENCOUNTER — PATIENT MESSAGE (OUTPATIENT)
Dept: PULMONOLOGY | Facility: CLINIC | Age: 76
End: 2021-01-18

## 2021-01-19 DIAGNOSIS — U07.1 PNEUMONIA DUE TO COVID-19 VIRUS: Primary | ICD-10-CM

## 2021-01-19 DIAGNOSIS — J12.82 PNEUMONIA DUE TO COVID-19 VIRUS: Primary | ICD-10-CM

## 2021-01-19 RX ORDER — DOXYCYCLINE 100 MG/1
100 CAPSULE ORAL 2 TIMES DAILY
Qty: 14 CAPSULE | Refills: 0 | Status: SHIPPED | OUTPATIENT
Start: 2021-01-19 | End: 2021-01-26

## 2021-01-20 ENCOUNTER — PATIENT MESSAGE (OUTPATIENT)
Dept: PULMONOLOGY | Facility: CLINIC | Age: 76
End: 2021-01-20

## 2021-01-25 ENCOUNTER — PATIENT OUTREACH (OUTPATIENT)
Dept: ADMINISTRATIVE | Facility: OTHER | Age: 76
End: 2021-01-25

## 2021-01-27 ENCOUNTER — PATIENT MESSAGE (OUTPATIENT)
Dept: PULMONOLOGY | Facility: CLINIC | Age: 76
End: 2021-01-27

## 2021-01-27 ENCOUNTER — OFFICE VISIT (OUTPATIENT)
Dept: PULMONOLOGY | Facility: CLINIC | Age: 76
End: 2021-01-27
Payer: MEDICARE

## 2021-01-27 VITALS
BODY MASS INDEX: 34.21 KG/M2 | WEIGHT: 244.38 LBS | HEIGHT: 71 IN | SYSTOLIC BLOOD PRESSURE: 149 MMHG | OXYGEN SATURATION: 93 % | HEART RATE: 79 BPM | DIASTOLIC BLOOD PRESSURE: 73 MMHG

## 2021-01-27 DIAGNOSIS — J82.83 EOSINOPHILIC ASTHMA: ICD-10-CM

## 2021-01-27 DIAGNOSIS — J12.82 PNEUMONIA DUE TO COVID-19 VIRUS: ICD-10-CM
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
## 2021-01-27 DIAGNOSIS — J96.01 ACUTE HYPOXEMIC RESPIRATORY FAILURE: ICD-10-CM

## 2021-01-27 DIAGNOSIS — G47.33 OSA ON CPAP: Primary | ICD-10-CM

## 2021-01-27 DIAGNOSIS — J45.50 SEVERE PERSISTENT ASTHMA WITHOUT COMPLICATION: ICD-10-CM

## 2021-01-27 DIAGNOSIS — U07.1 PNEUMONIA DUE TO COVID-19 VIRUS: ICD-10-CM

## 2021-01-27 PROCEDURE — 3288F PR FALLS RISK ASSESSMENT DOCUMENTED: ICD-10-PCS | Mod: CPTII,S$GLB,, | Performed by: INTERNAL MEDICINE

## 2021-01-27 PROCEDURE — 99499 UNLISTED E&M SERVICE: CPT | Mod: S$GLB,,, | Performed by: INTERNAL MEDICINE

## 2021-01-27 PROCEDURE — 99214 PR OFFICE/OUTPT VISIT, EST, LEVL IV, 30-39 MIN: ICD-10-PCS | Mod: S$GLB,,, | Performed by: INTERNAL MEDICINE

## 2021-01-27 PROCEDURE — 1126F PR PAIN SEVERITY QUANTIFIED, NO PAIN PRESENT: ICD-10-PCS | Mod: S$GLB,,, | Performed by: INTERNAL MEDICINE

## 2021-01-27 PROCEDURE — 3288F FALL RISK ASSESSMENT DOCD: CPT | Mod: CPTII,S$GLB,, | Performed by: INTERNAL MEDICINE

## 2021-01-27 PROCEDURE — 3077F PR MOST RECENT SYSTOLIC BLOOD PRESSURE >= 140 MM HG: ICD-10-PCS | Mod: CPTII,S$GLB,, | Performed by: INTERNAL MEDICINE

## 2021-01-27 PROCEDURE — 99999 PR PBB SHADOW E&M-EST. PATIENT-LVL V: CPT | Mod: PBBFAC,,, | Performed by: INTERNAL MEDICINE

## 2021-01-27 PROCEDURE — 3078F DIAST BP <80 MM HG: CPT | Mod: CPTII,S$GLB,, | Performed by: INTERNAL MEDICINE

## 2021-01-27 PROCEDURE — 99499 RISK ADDL DX/OHS AUDIT: ICD-10-PCS | Mod: S$GLB,,, | Performed by: INTERNAL MEDICINE

## 2021-01-27 PROCEDURE — 1159F PR MEDICATION LIST DOCUMENTED IN MEDICAL RECORD: ICD-10-PCS | Mod: S$GLB,,, | Performed by: INTERNAL MEDICINE

## 2021-01-27 PROCEDURE — 1159F MED LIST DOCD IN RCRD: CPT | Mod: S$GLB,,, | Performed by: INTERNAL MEDICINE

## 2021-01-27 PROCEDURE — 1101F PT FALLS ASSESS-DOCD LE1/YR: CPT | Mod: CPTII,S$GLB,, | Performed by: INTERNAL MEDICINE

## 2021-01-27 PROCEDURE — 99999 PR PBB SHADOW E&M-EST. PATIENT-LVL V: ICD-10-PCS | Mod: PBBFAC,,, | Performed by: INTERNAL MEDICINE

## 2021-01-27 PROCEDURE — 99214 OFFICE O/P EST MOD 30 MIN: CPT | Mod: S$GLB,,, | Performed by: INTERNAL MEDICINE

## 2021-01-27 PROCEDURE — 3078F PR MOST RECENT DIASTOLIC BLOOD PRESSURE < 80 MM HG: ICD-10-PCS | Mod: CPTII,S$GLB,, | Performed by: INTERNAL MEDICINE

## 2021-01-27 PROCEDURE — 3077F SYST BP >= 140 MM HG: CPT | Mod: CPTII,S$GLB,, | Performed by: INTERNAL MEDICINE

## 2021-01-27 PROCEDURE — 1126F AMNT PAIN NOTED NONE PRSNT: CPT | Mod: S$GLB,,, | Performed by: INTERNAL MEDICINE

## 2021-01-27 PROCEDURE — 1101F PR PT FALLS ASSESS DOC 0-1 FALLS W/OUT INJ PAST YR: ICD-10-PCS | Mod: CPTII,S$GLB,, | Performed by: INTERNAL MEDICINE

## 2021-01-27 RX ORDER — IPRATROPIUM BROMIDE AND ALBUTEROL SULFATE 2.5; .5 MG/3ML; MG/3ML
3 SOLUTION RESPIRATORY (INHALATION) EVERY 6 HOURS PRN
Qty: 1 BOX | Refills: 4 | Status: SHIPPED | OUTPATIENT
Start: 2021-01-27 | End: 2021-07-13 | Stop reason: SDUPTHER

## 2021-01-27 RX ORDER — ALBUTEROL SULFATE 90 UG/1
AEROSOL, METERED RESPIRATORY (INHALATION)
Qty: 18 G | Refills: 11 | Status: SHIPPED | OUTPATIENT
Start: 2021-01-27 | End: 2022-04-20 | Stop reason: SDUPTHER

## 2021-01-27 RX ORDER — BLOOD-GLUCOSE METER
EACH MISCELLANEOUS
COMMUNITY
Start: 2021-01-07

## 2021-01-27 RX ORDER — BENRALIZUMAB 30 MG/ML
30 INJECTION, SOLUTION SUBCUTANEOUS
Qty: 1 ML | Refills: 6 | Status: SHIPPED | OUTPATIENT
Start: 2021-01-27 | End: 2022-02-08 | Stop reason: SDUPTHER

## 2021-01-27 RX ORDER — BUDESONIDE AND FORMOTEROL FUMARATE DIHYDRATE 160; 4.5 UG/1; UG/1
2 AEROSOL RESPIRATORY (INHALATION) EVERY 12 HOURS
Qty: 1 INHALER | Refills: 11 | Status: SHIPPED | OUTPATIENT
Start: 2021-01-27 | End: 2021-02-18

## 2021-01-27 RX ORDER — PREDNISONE 20 MG/1
TABLET ORAL
Qty: 21 TABLET | Refills: 0 | Status: SHIPPED | OUTPATIENT
Start: 2021-01-27 | End: 2021-07-13 | Stop reason: SDUPTHER

## 2021-01-28 ENCOUNTER — TELEPHONE (OUTPATIENT)
Dept: PULMONOLOGY | Facility: CLINIC | Age: 76
End: 2021-01-28

## 2021-01-28 ENCOUNTER — PATIENT MESSAGE (OUTPATIENT)
Dept: FAMILY MEDICINE | Facility: CLINIC | Age: 76
End: 2021-01-28

## 2021-02-04 ENCOUNTER — PATIENT MESSAGE (OUTPATIENT)
Dept: FAMILY MEDICINE | Facility: CLINIC | Age: 76
End: 2021-02-04

## 2021-02-04 DIAGNOSIS — E11.29 TYPE 2 DIABETES MELLITUS WITH MICROALBUMINURIA, WITHOUT LONG-TERM CURRENT USE OF INSULIN: ICD-10-CM

## 2021-02-04 DIAGNOSIS — R80.9 TYPE 2 DIABETES MELLITUS WITH MICROALBUMINURIA, WITHOUT LONG-TERM CURRENT USE OF INSULIN: ICD-10-CM

## 2021-02-04 RX ORDER — METFORMIN HYDROCHLORIDE 500 MG/1
500 TABLET ORAL 2 TIMES DAILY WITH MEALS
Qty: 60 TABLET | Refills: 3 | Status: SHIPPED | OUTPATIENT
Start: 2021-02-04 | End: 2021-11-28

## 2021-02-15 ENCOUNTER — PES CALL (OUTPATIENT)
Dept: ADMINISTRATIVE | Facility: CLINIC | Age: 76
End: 2021-02-15

## 2021-02-17 ENCOUNTER — LAB VISIT (OUTPATIENT)
Dept: LAB | Facility: HOSPITAL | Age: 76
End: 2021-02-17
Attending: PHYSICIAN ASSISTANT
Payer: MEDICARE

## 2021-02-17 ENCOUNTER — TELEPHONE (OUTPATIENT)
Dept: FAMILY MEDICINE | Facility: CLINIC | Age: 76
End: 2021-02-17

## 2021-02-17 DIAGNOSIS — E55.9 VITAMIN D DEFICIENCY: ICD-10-CM

## 2021-02-17 DIAGNOSIS — R80.9 TYPE 2 DIABETES MELLITUS WITH MICROALBUMINURIA, WITHOUT LONG-TERM CURRENT USE OF INSULIN: ICD-10-CM

## 2021-02-17 DIAGNOSIS — E11.29 TYPE 2 DIABETES MELLITUS WITH MICROALBUMINURIA, WITHOUT LONG-TERM CURRENT USE OF INSULIN: ICD-10-CM

## 2021-02-17 DIAGNOSIS — E11.59 HYPERTENSION ASSOCIATED WITH DIABETES: ICD-10-CM

## 2021-02-17 DIAGNOSIS — E21.3 HYPERPARATHYROIDISM: ICD-10-CM

## 2021-02-17 DIAGNOSIS — I15.2 HYPERTENSION ASSOCIATED WITH DIABETES: ICD-10-CM

## 2021-02-17 DIAGNOSIS — E66.01 SEVERE OBESITY (BMI 35.0-39.9) WITH COMORBIDITY: ICD-10-CM

## 2021-02-17 LAB
25(OH)D3+25(OH)D2 SERPL-MCNC: 27 NG/ML (ref 30–96)
ALBUMIN SERPL BCP-MCNC: 3.4 G/DL (ref 3.5–5.2)
ALP SERPL-CCNC: 95 U/L (ref 55–135)
ALT SERPL W/O P-5'-P-CCNC: 32 U/L (ref 10–44)
ANION GAP SERPL CALC-SCNC: 7 MMOL/L (ref 8–16)
AST SERPL-CCNC: 23 U/L (ref 10–40)
BASOPHILS # BLD AUTO: 0.01 K/UL (ref 0–0.2)
BASOPHILS NFR BLD: 0.1 % (ref 0–1.9)
BILIRUB SERPL-MCNC: 0.9 MG/DL (ref 0.1–1)
BUN SERPL-MCNC: 25 MG/DL (ref 8–23)
CALCIUM SERPL-MCNC: 9 MG/DL (ref 8.7–10.5)
CHLORIDE SERPL-SCNC: 102 MMOL/L (ref 95–110)
CO2 SERPL-SCNC: 29 MMOL/L (ref 23–29)
CREAT SERPL-MCNC: 1.4 MG/DL (ref 0.5–1.4)
DIFFERENTIAL METHOD: ABNORMAL
EOSINOPHIL # BLD AUTO: 0 K/UL (ref 0–0.5)
EOSINOPHIL NFR BLD: 0 % (ref 0–8)
ERYTHROCYTE [DISTWIDTH] IN BLOOD BY AUTOMATED COUNT: 15.2 % (ref 11.5–14.5)
EST. GFR  (AFRICAN AMERICAN): 55.2 ML/MIN/1.73 M^2
EST. GFR  (NON AFRICAN AMERICAN): 47.8 ML/MIN/1.73 M^2
ESTIMATED AVG GLUCOSE: 148 MG/DL (ref 68–131)
GLUCOSE SERPL-MCNC: 147 MG/DL (ref 70–110)
HBA1C MFR BLD: 6.8 % (ref 4–5.6)
HCT VFR BLD AUTO: 41.8 % (ref 40–54)
HGB BLD-MCNC: 13 G/DL (ref 14–18)
IMM GRANULOCYTES # BLD AUTO: 0.05 K/UL (ref 0–0.04)
IMM GRANULOCYTES NFR BLD AUTO: 0.7 % (ref 0–0.5)
LYMPHOCYTES # BLD AUTO: 1.5 K/UL (ref 1–4.8)
LYMPHOCYTES NFR BLD: 19.4 % (ref 18–48)
MCH RBC QN AUTO: 28.1 PG (ref 27–31)
MCHC RBC AUTO-ENTMCNC: 31.1 G/DL (ref 32–36)
MCV RBC AUTO: 91 FL (ref 82–98)
MONOCYTES # BLD AUTO: 0.7 K/UL (ref 0.3–1)
MONOCYTES NFR BLD: 8.7 % (ref 4–15)
NEUTROPHILS # BLD AUTO: 5.4 K/UL (ref 1.8–7.7)
NEUTROPHILS NFR BLD: 71.1 % (ref 38–73)
NRBC BLD-RTO: 0 /100 WBC
PLATELET # BLD AUTO: 136 K/UL (ref 150–350)
PMV BLD AUTO: 12.7 FL (ref 9.2–12.9)
POTASSIUM SERPL-SCNC: 4.3 MMOL/L (ref 3.5–5.1)
PROT SERPL-MCNC: 7 G/DL (ref 6–8.4)
PTH-INTACT SERPL-MCNC: 119 PG/ML (ref 9–77)
RBC # BLD AUTO: 4.62 M/UL (ref 4.6–6.2)
SODIUM SERPL-SCNC: 138 MMOL/L (ref 136–145)
WBC # BLD AUTO: 7.59 K/UL (ref 3.9–12.7)

## 2021-02-17 PROCEDURE — 82306 VITAMIN D 25 HYDROXY: CPT

## 2021-02-17 PROCEDURE — 36415 COLL VENOUS BLD VENIPUNCTURE: CPT | Mod: PO

## 2021-02-17 PROCEDURE — 83036 HEMOGLOBIN GLYCOSYLATED A1C: CPT

## 2021-02-17 PROCEDURE — 80053 COMPREHEN METABOLIC PANEL: CPT

## 2021-02-17 PROCEDURE — 83970 ASSAY OF PARATHORMONE: CPT

## 2021-02-17 PROCEDURE — 85025 COMPLETE CBC W/AUTO DIFF WBC: CPT

## 2021-02-18 ENCOUNTER — OFFICE VISIT (OUTPATIENT)
Dept: FAMILY MEDICINE | Facility: CLINIC | Age: 76
End: 2021-02-18
Payer: MEDICARE

## 2021-02-18 VITALS
SYSTOLIC BLOOD PRESSURE: 125 MMHG | OXYGEN SATURATION: 98 % | WEIGHT: 244.25 LBS | BODY MASS INDEX: 34.19 KG/M2 | HEART RATE: 67 BPM | RESPIRATION RATE: 18 BRPM | DIASTOLIC BLOOD PRESSURE: 70 MMHG | TEMPERATURE: 96 F | HEIGHT: 71 IN

## 2021-02-18 DIAGNOSIS — E11.319 DIABETIC RETINOPATHY ASSOCIATED WITH CONTROLLED TYPE 2 DIABETES MELLITUS: ICD-10-CM

## 2021-02-18 DIAGNOSIS — M05.79 RHEUMATOID ARTHRITIS INVOLVING MULTIPLE SITES WITH POSITIVE RHEUMATOID FACTOR: ICD-10-CM

## 2021-02-18 DIAGNOSIS — Z99.81 DEPENDENCE ON SUPPLEMENTAL OXYGEN: ICD-10-CM

## 2021-02-18 DIAGNOSIS — I77.9 BILATERAL CAROTID ARTERY DISEASE, UNSPECIFIED TYPE: ICD-10-CM

## 2021-02-18 DIAGNOSIS — D69.6 THROMBOCYTOPENIA, UNSPECIFIED: ICD-10-CM

## 2021-02-18 DIAGNOSIS — I11.0 HYPERTENSIVE HEART DISEASE WITH HEART FAILURE: ICD-10-CM

## 2021-02-18 DIAGNOSIS — Z00.00 ENCOUNTER FOR PREVENTIVE HEALTH EXAMINATION: Primary | ICD-10-CM

## 2021-02-18 DIAGNOSIS — E21.3 HYPERPARATHYROIDISM: ICD-10-CM

## 2021-02-18 DIAGNOSIS — R63.4 WEIGHT LOSS, ABNORMAL: ICD-10-CM

## 2021-02-18 DIAGNOSIS — I25.118 CORONARY ARTERY DISEASE OF NATIVE ARTERY OF NATIVE HEART WITH STABLE ANGINA PECTORIS: ICD-10-CM

## 2021-02-18 PROBLEM — U07.1 PNEUMONIA DUE TO COVID-19 VIRUS: Status: RESOLVED | Noted: 2021-01-01 | Resolved: 2021-02-18

## 2021-02-18 PROBLEM — E66.01 SEVERE OBESITY (BMI 35.0-39.9) WITH COMORBIDITY: Status: RESOLVED | Noted: 2019-03-27 | Resolved: 2021-02-18

## 2021-02-18 PROBLEM — J96.01 ACUTE HYPOXEMIC RESPIRATORY FAILURE: Status: RESOLVED | Noted: 2021-01-01 | Resolved: 2021-02-18

## 2021-02-18 PROBLEM — R79.89 ELEVATED TROPONIN: Status: RESOLVED | Noted: 2021-01-01 | Resolved: 2021-02-18

## 2021-02-18 PROBLEM — J12.82 PNEUMONIA DUE TO COVID-19 VIRUS: Status: RESOLVED | Noted: 2021-01-01 | Resolved: 2021-02-18

## 2021-02-18 PROBLEM — R79.89 AZOTEMIA: Status: RESOLVED | Noted: 2018-02-20 | Resolved: 2021-02-18

## 2021-02-18 PROBLEM — E87.1 HYPONATREMIA: Status: RESOLVED | Noted: 2021-01-01 | Resolved: 2021-02-18

## 2021-02-18 PROCEDURE — 1158F ADVNC CARE PLAN TLK DOCD: CPT | Mod: S$GLB,,, | Performed by: NURSE PRACTITIONER

## 2021-02-18 PROCEDURE — G0439 PPPS, SUBSEQ VISIT: HCPCS | Mod: S$GLB,,, | Performed by: NURSE PRACTITIONER

## 2021-02-18 PROCEDURE — 1126F AMNT PAIN NOTED NONE PRSNT: CPT | Mod: S$GLB,,, | Performed by: NURSE PRACTITIONER

## 2021-02-18 PROCEDURE — G0439 PR MEDICARE ANNUAL WELLNESS SUBSEQUENT VISIT: ICD-10-PCS | Mod: S$GLB,,, | Performed by: NURSE PRACTITIONER

## 2021-02-18 PROCEDURE — 1158F PR ADVANCE CARE PLANNING DISCUSS DOCUMENTED IN MEDICAL RECORD: ICD-10-PCS | Mod: S$GLB,,, | Performed by: NURSE PRACTITIONER

## 2021-02-18 PROCEDURE — 1126F PR PAIN SEVERITY QUANTIFIED, NO PAIN PRESENT: ICD-10-PCS | Mod: S$GLB,,, | Performed by: NURSE PRACTITIONER

## 2021-02-23 ENCOUNTER — TELEPHONE (OUTPATIENT)
Dept: GASTROENTEROLOGY | Facility: CLINIC | Age: 76
End: 2021-02-23

## 2021-02-23 ENCOUNTER — OFFICE VISIT (OUTPATIENT)
Dept: FAMILY MEDICINE | Facility: CLINIC | Age: 76
End: 2021-02-23
Payer: MEDICARE

## 2021-02-23 VITALS
OXYGEN SATURATION: 95 % | HEIGHT: 71 IN | DIASTOLIC BLOOD PRESSURE: 62 MMHG | WEIGHT: 247.56 LBS | HEART RATE: 59 BPM | SYSTOLIC BLOOD PRESSURE: 120 MMHG | BODY MASS INDEX: 34.66 KG/M2 | TEMPERATURE: 98 F | RESPIRATION RATE: 16 BRPM

## 2021-02-23 DIAGNOSIS — J45.50 SEVERE PERSISTENT ASTHMA WITHOUT COMPLICATION: ICD-10-CM

## 2021-02-23 DIAGNOSIS — R80.9 TYPE 2 DIABETES MELLITUS WITH MICROALBUMINURIA, WITHOUT LONG-TERM CURRENT USE OF INSULIN: Primary | ICD-10-CM

## 2021-02-23 DIAGNOSIS — I11.0 HYPERTENSIVE HEART DISEASE WITH HEART FAILURE: ICD-10-CM

## 2021-02-23 DIAGNOSIS — E21.3 HYPERPARATHYROIDISM: ICD-10-CM

## 2021-02-23 DIAGNOSIS — E11.69 HYPERLIPIDEMIA ASSOCIATED WITH TYPE 2 DIABETES MELLITUS: ICD-10-CM

## 2021-02-23 DIAGNOSIS — Z86.010 HISTORY OF COLON POLYPS: ICD-10-CM

## 2021-02-23 DIAGNOSIS — E11.29 TYPE 2 DIABETES MELLITUS WITH MICROALBUMINURIA, WITHOUT LONG-TERM CURRENT USE OF INSULIN: Primary | ICD-10-CM

## 2021-02-23 DIAGNOSIS — Z12.11 COLON CANCER SCREENING: ICD-10-CM

## 2021-02-23 DIAGNOSIS — Z86.010 HISTORY OF COLON POLYPS: Primary | ICD-10-CM

## 2021-02-23 DIAGNOSIS — E78.5 HYPERLIPIDEMIA ASSOCIATED WITH TYPE 2 DIABETES MELLITUS: ICD-10-CM

## 2021-02-23 DIAGNOSIS — E11.59 HYPERTENSION ASSOCIATED WITH DIABETES: ICD-10-CM

## 2021-02-23 DIAGNOSIS — I15.2 HYPERTENSION ASSOCIATED WITH DIABETES: ICD-10-CM

## 2021-02-23 PROCEDURE — 1159F PR MEDICATION LIST DOCUMENTED IN MEDICAL RECORD: ICD-10-PCS | Mod: S$GLB,,, | Performed by: PHYSICIAN ASSISTANT

## 2021-02-23 PROCEDURE — 3078F PR MOST RECENT DIASTOLIC BLOOD PRESSURE < 80 MM HG: ICD-10-PCS | Mod: CPTII,S$GLB,, | Performed by: PHYSICIAN ASSISTANT

## 2021-02-23 PROCEDURE — 3074F PR MOST RECENT SYSTOLIC BLOOD PRESSURE < 130 MM HG: ICD-10-PCS | Mod: CPTII,S$GLB,, | Performed by: PHYSICIAN ASSISTANT

## 2021-02-23 PROCEDURE — 3078F DIAST BP <80 MM HG: CPT | Mod: CPTII,S$GLB,, | Performed by: PHYSICIAN ASSISTANT

## 2021-02-23 PROCEDURE — 3074F SYST BP LT 130 MM HG: CPT | Mod: CPTII,S$GLB,, | Performed by: PHYSICIAN ASSISTANT

## 2021-02-23 PROCEDURE — 1126F AMNT PAIN NOTED NONE PRSNT: CPT | Mod: S$GLB,,, | Performed by: PHYSICIAN ASSISTANT

## 2021-02-23 PROCEDURE — 1101F PR PT FALLS ASSESS DOC 0-1 FALLS W/OUT INJ PAST YR: ICD-10-PCS | Mod: CPTII,S$GLB,, | Performed by: PHYSICIAN ASSISTANT

## 2021-02-23 PROCEDURE — 1126F PR PAIN SEVERITY QUANTIFIED, NO PAIN PRESENT: ICD-10-PCS | Mod: S$GLB,,, | Performed by: PHYSICIAN ASSISTANT

## 2021-02-23 PROCEDURE — 3288F PR FALLS RISK ASSESSMENT DOCUMENTED: ICD-10-PCS | Mod: CPTII,S$GLB,, | Performed by: PHYSICIAN ASSISTANT

## 2021-02-23 PROCEDURE — 99214 PR OFFICE/OUTPT VISIT, EST, LEVL IV, 30-39 MIN: ICD-10-PCS | Mod: S$GLB,,, | Performed by: PHYSICIAN ASSISTANT

## 2021-02-23 PROCEDURE — 99999 PR PBB SHADOW E&M-EST. PATIENT-LVL V: CPT | Mod: PBBFAC,,, | Performed by: PHYSICIAN ASSISTANT

## 2021-02-23 PROCEDURE — 3288F FALL RISK ASSESSMENT DOCD: CPT | Mod: CPTII,S$GLB,, | Performed by: PHYSICIAN ASSISTANT

## 2021-02-23 PROCEDURE — 99214 OFFICE O/P EST MOD 30 MIN: CPT | Mod: S$GLB,,, | Performed by: PHYSICIAN ASSISTANT

## 2021-02-23 PROCEDURE — 99999 PR PBB SHADOW E&M-EST. PATIENT-LVL V: ICD-10-PCS | Mod: PBBFAC,,, | Performed by: PHYSICIAN ASSISTANT

## 2021-02-23 PROCEDURE — 1159F MED LIST DOCD IN RCRD: CPT | Mod: S$GLB,,, | Performed by: PHYSICIAN ASSISTANT

## 2021-02-23 PROCEDURE — 1101F PT FALLS ASSESS-DOCD LE1/YR: CPT | Mod: CPTII,S$GLB,, | Performed by: PHYSICIAN ASSISTANT

## 2021-02-26 ENCOUNTER — TELEPHONE (OUTPATIENT)
Dept: FAMILY MEDICINE | Facility: CLINIC | Age: 76
End: 2021-02-26

## 2021-03-03 ENCOUNTER — ANESTHESIA EVENT (OUTPATIENT)
Dept: ENDOSCOPY | Facility: HOSPITAL | Age: 76
End: 2021-03-03
Payer: MEDICARE

## 2021-03-03 ENCOUNTER — HOSPITAL ENCOUNTER (OUTPATIENT)
Facility: HOSPITAL | Age: 76
Discharge: HOME | End: 2021-03-03
Attending: INTERNAL MEDICINE | Admitting: INTERNAL MEDICINE
Payer: MEDICARE

## 2021-03-03 ENCOUNTER — ANESTHESIA (OUTPATIENT)
Dept: ENDOSCOPY | Facility: HOSPITAL | Age: 76
End: 2021-03-03
Payer: MEDICARE

## 2021-03-03 DIAGNOSIS — Z86.010 HISTORY OF COLON POLYPS: ICD-10-CM

## 2021-03-03 DIAGNOSIS — K63.5 POLYP OF COLON, UNSPECIFIED PART OF COLON, UNSPECIFIED TYPE: Primary | ICD-10-CM

## 2021-03-03 DIAGNOSIS — K64.8 INTERNAL HEMORRHOIDS: ICD-10-CM

## 2021-03-03 PROBLEM — Z86.0100 HISTORY OF COLON POLYPS: Status: ACTIVE | Noted: 2021-03-03

## 2021-03-03 PROCEDURE — D9220A PRA ANESTHESIA: ICD-10-PCS | Mod: PT,CRNA,, | Performed by: NURSE ANESTHETIST, CERTIFIED REGISTERED

## 2021-03-03 PROCEDURE — 37000009 HC ANESTHESIA EA ADD 15 MINS: Performed by: INTERNAL MEDICINE

## 2021-03-03 PROCEDURE — 45385 COLONOSCOPY W/LESION REMOVAL: CPT | Performed by: INTERNAL MEDICINE

## 2021-03-03 PROCEDURE — 25000003 PHARM REV CODE 250: Performed by: NURSE ANESTHETIST, CERTIFIED REGISTERED

## 2021-03-03 PROCEDURE — 63600175 PHARM REV CODE 636 W HCPCS: Performed by: NURSE ANESTHETIST, CERTIFIED REGISTERED

## 2021-03-03 PROCEDURE — D9220A PRA ANESTHESIA: ICD-10-PCS | Mod: PT,ANES,, | Performed by: ANESTHESIOLOGY

## 2021-03-03 PROCEDURE — 88305 TISSUE EXAM BY PATHOLOGIST: CPT | Mod: 59 | Performed by: PATHOLOGY

## 2021-03-03 PROCEDURE — 27201089 HC SNARE, DISP (ANY): Performed by: INTERNAL MEDICINE

## 2021-03-03 PROCEDURE — 27201012 HC FORCEPS, HOT/COLD, DISP: Performed by: INTERNAL MEDICINE

## 2021-03-03 PROCEDURE — 45380 PR COLONOSCOPY,BIOPSY: ICD-10-PCS | Mod: 59,,, | Performed by: INTERNAL MEDICINE

## 2021-03-03 PROCEDURE — 25000003 PHARM REV CODE 250: Performed by: INTERNAL MEDICINE

## 2021-03-03 PROCEDURE — 88305 TISSUE EXAM BY PATHOLOGIST: CPT | Mod: 26,,, | Performed by: PATHOLOGY

## 2021-03-03 PROCEDURE — 45380 COLONOSCOPY AND BIOPSY: CPT | Mod: 59,,, | Performed by: INTERNAL MEDICINE

## 2021-03-03 PROCEDURE — 37000008 HC ANESTHESIA 1ST 15 MINUTES: Performed by: INTERNAL MEDICINE

## 2021-03-03 PROCEDURE — 88305 TISSUE EXAM BY PATHOLOGIST: ICD-10-PCS | Mod: 26,,, | Performed by: PATHOLOGY

## 2021-03-03 PROCEDURE — 45380 COLONOSCOPY AND BIOPSY: CPT | Performed by: INTERNAL MEDICINE

## 2021-03-03 PROCEDURE — 45385 COLONOSCOPY W/LESION REMOVAL: CPT | Mod: 22,,, | Performed by: INTERNAL MEDICINE

## 2021-03-03 PROCEDURE — 45385 PR COLONOSCOPY,REMV LESN,SNARE: ICD-10-PCS | Mod: 22,,, | Performed by: INTERNAL MEDICINE

## 2021-03-03 PROCEDURE — D9220A PRA ANESTHESIA: Mod: PT,CRNA,, | Performed by: NURSE ANESTHETIST, CERTIFIED REGISTERED

## 2021-03-03 PROCEDURE — D9220A PRA ANESTHESIA: Mod: PT,ANES,, | Performed by: ANESTHESIOLOGY

## 2021-03-03 RX ORDER — LIDOCAINE HCL/PF 100 MG/5ML
SYRINGE (ML) INTRAVENOUS
Status: DISCONTINUED | OUTPATIENT
Start: 2021-03-03 | End: 2021-03-03

## 2021-03-03 RX ORDER — SODIUM CHLORIDE 9 MG/ML
INJECTION, SOLUTION INTRAVENOUS CONTINUOUS
Status: DISCONTINUED | OUTPATIENT
Start: 2021-03-03 | End: 2021-03-03 | Stop reason: HOSPADM

## 2021-03-03 RX ORDER — PROPOFOL 10 MG/ML
VIAL (ML) INTRAVENOUS
Status: DISCONTINUED | OUTPATIENT
Start: 2021-03-03 | End: 2021-03-03

## 2021-03-03 RX ADMIN — PROPOFOL 30 MG: 10 INJECTION, EMULSION INTRAVENOUS at 10:03

## 2021-03-03 RX ADMIN — SODIUM CHLORIDE: 0.9 INJECTION, SOLUTION INTRAVENOUS at 09:03

## 2021-03-03 RX ADMIN — LIDOCAINE HYDROCHLORIDE 100 MG: 20 INJECTION INTRAVENOUS at 10:03

## 2021-03-03 RX ADMIN — PROPOFOL 100 MG: 10 INJECTION, EMULSION INTRAVENOUS at 10:03

## 2021-03-04 VITALS
OXYGEN SATURATION: 98 % | HEIGHT: 71 IN | WEIGHT: 246 LBS | SYSTOLIC BLOOD PRESSURE: 115 MMHG | HEART RATE: 69 BPM | RESPIRATION RATE: 16 BRPM | TEMPERATURE: 98 F | DIASTOLIC BLOOD PRESSURE: 56 MMHG | BODY MASS INDEX: 34.44 KG/M2

## 2021-03-04 LAB
FINAL PATHOLOGIC DIAGNOSIS: NORMAL
GROSS: NORMAL
Lab: NORMAL

## 2021-03-05 ENCOUNTER — PATIENT MESSAGE (OUTPATIENT)
Dept: GASTROENTEROLOGY | Facility: CLINIC | Age: 76
End: 2021-03-05

## 2021-03-07 ENCOUNTER — PATIENT OUTREACH (OUTPATIENT)
Dept: ADMINISTRATIVE | Facility: OTHER | Age: 76
End: 2021-03-07

## 2021-03-08 ENCOUNTER — OFFICE VISIT (OUTPATIENT)
Dept: OPHTHALMOLOGY | Facility: CLINIC | Age: 76
End: 2021-03-08
Payer: MEDICARE

## 2021-03-08 ENCOUNTER — PATIENT MESSAGE (OUTPATIENT)
Dept: PULMONOLOGY | Facility: CLINIC | Age: 76
End: 2021-03-08

## 2021-03-08 DIAGNOSIS — H11.003 PTERYGIUM, BILATERAL: ICD-10-CM

## 2021-03-08 DIAGNOSIS — H04.123 DRY EYE SYNDROME, BILATERAL: ICD-10-CM

## 2021-03-08 DIAGNOSIS — H43.813 POSTERIOR VITREOUS DETACHMENT OF BOTH EYES: ICD-10-CM

## 2021-03-08 DIAGNOSIS — E11.9 TYPE 2 DIABETES MELLITUS WITHOUT RETINOPATHY: Primary | ICD-10-CM

## 2021-03-08 DIAGNOSIS — H25.13 NUCLEAR SCLEROTIC CATARACT, BILATERAL: ICD-10-CM

## 2021-03-08 PROCEDURE — 92004 COMPRE OPH EXAM NEW PT 1/>: CPT | Mod: S$GLB,,, | Performed by: OPHTHALMOLOGY

## 2021-03-08 PROCEDURE — 1126F PR PAIN SEVERITY QUANTIFIED, NO PAIN PRESENT: ICD-10-PCS | Mod: S$GLB,,, | Performed by: OPHTHALMOLOGY

## 2021-03-08 PROCEDURE — 99999 PR PBB SHADOW E&M-EST. PATIENT-LVL IV: CPT | Mod: PBBFAC,,, | Performed by: OPHTHALMOLOGY

## 2021-03-08 PROCEDURE — 92004 PR EYE EXAM, NEW PATIENT,COMPREHESV: ICD-10-PCS | Mod: S$GLB,,, | Performed by: OPHTHALMOLOGY

## 2021-03-08 PROCEDURE — 3288F PR FALLS RISK ASSESSMENT DOCUMENTED: ICD-10-PCS | Mod: CPTII,S$GLB,, | Performed by: OPHTHALMOLOGY

## 2021-03-08 PROCEDURE — 99999 PR PBB SHADOW E&M-EST. PATIENT-LVL IV: ICD-10-PCS | Mod: PBBFAC,,, | Performed by: OPHTHALMOLOGY

## 2021-03-08 PROCEDURE — 1126F AMNT PAIN NOTED NONE PRSNT: CPT | Mod: S$GLB,,, | Performed by: OPHTHALMOLOGY

## 2021-03-08 PROCEDURE — 1101F PT FALLS ASSESS-DOCD LE1/YR: CPT | Mod: CPTII,S$GLB,, | Performed by: OPHTHALMOLOGY

## 2021-03-08 PROCEDURE — 2023F DILAT RTA XM W/O RTNOPTHY: CPT | Mod: S$GLB,,, | Performed by: OPHTHALMOLOGY

## 2021-03-08 PROCEDURE — 2023F PR DILATED RETINAL EXAM W/O EVID OF RETINOPATHY: ICD-10-PCS | Mod: S$GLB,,, | Performed by: OPHTHALMOLOGY

## 2021-03-08 PROCEDURE — 3288F FALL RISK ASSESSMENT DOCD: CPT | Mod: CPTII,S$GLB,, | Performed by: OPHTHALMOLOGY

## 2021-03-08 PROCEDURE — 1101F PR PT FALLS ASSESS DOC 0-1 FALLS W/OUT INJ PAST YR: ICD-10-PCS | Mod: CPTII,S$GLB,, | Performed by: OPHTHALMOLOGY

## 2021-04-12 ENCOUNTER — OFFICE VISIT (OUTPATIENT)
Dept: PULMONOLOGY | Facility: CLINIC | Age: 76
End: 2021-04-12
Payer: MEDICARE

## 2021-04-12 ENCOUNTER — LAB VISIT (OUTPATIENT)
Dept: LAB | Facility: HOSPITAL | Age: 76
End: 2021-04-12
Attending: INTERNAL MEDICINE
Payer: MEDICARE

## 2021-04-12 VITALS
WEIGHT: 255.19 LBS | SYSTOLIC BLOOD PRESSURE: 132 MMHG | HEIGHT: 71 IN | BODY MASS INDEX: 35.73 KG/M2 | OXYGEN SATURATION: 98 % | DIASTOLIC BLOOD PRESSURE: 64 MMHG | HEART RATE: 63 BPM

## 2021-04-12 DIAGNOSIS — J82.83 EOSINOPHILIC ASTHMA: ICD-10-CM

## 2021-04-12 DIAGNOSIS — N39.41 URGE URINARY INCONTINENCE: ICD-10-CM

## 2021-04-12 DIAGNOSIS — R60.0 BILATERAL LEG EDEMA: ICD-10-CM

## 2021-04-12 DIAGNOSIS — N40.0 PROSTATISM: ICD-10-CM

## 2021-04-12 DIAGNOSIS — J45.50 SEVERE PERSISTENT ASTHMA WITHOUT COMPLICATION: ICD-10-CM

## 2021-04-12 DIAGNOSIS — R60.0 BILATERAL LEG EDEMA: Primary | ICD-10-CM

## 2021-04-12 PROCEDURE — 36415 COLL VENOUS BLD VENIPUNCTURE: CPT | Mod: PO | Performed by: INTERNAL MEDICINE

## 2021-04-12 PROCEDURE — 99999 PR PBB SHADOW E&M-EST. PATIENT-LVL V: CPT | Mod: PBBFAC,,, | Performed by: INTERNAL MEDICINE

## 2021-04-12 PROCEDURE — 99499 UNLISTED E&M SERVICE: CPT | Mod: S$GLB,,, | Performed by: INTERNAL MEDICINE

## 2021-04-12 PROCEDURE — 99499 RISK ADDL DX/OHS AUDIT: ICD-10-PCS | Mod: S$GLB,,, | Performed by: INTERNAL MEDICINE

## 2021-04-12 PROCEDURE — 1159F MED LIST DOCD IN RCRD: CPT | Mod: S$GLB,,, | Performed by: INTERNAL MEDICINE

## 2021-04-12 PROCEDURE — 1159F PR MEDICATION LIST DOCUMENTED IN MEDICAL RECORD: ICD-10-PCS | Mod: S$GLB,,, | Performed by: INTERNAL MEDICINE

## 2021-04-12 PROCEDURE — 80048 BASIC METABOLIC PNL TOTAL CA: CPT | Performed by: INTERNAL MEDICINE

## 2021-04-12 PROCEDURE — 1126F AMNT PAIN NOTED NONE PRSNT: CPT | Mod: S$GLB,,, | Performed by: INTERNAL MEDICINE

## 2021-04-12 PROCEDURE — 1126F PR PAIN SEVERITY QUANTIFIED, NO PAIN PRESENT: ICD-10-PCS | Mod: S$GLB,,, | Performed by: INTERNAL MEDICINE

## 2021-04-12 PROCEDURE — 3288F PR FALLS RISK ASSESSMENT DOCUMENTED: ICD-10-PCS | Mod: CPTII,S$GLB,, | Performed by: INTERNAL MEDICINE

## 2021-04-12 PROCEDURE — 99213 PR OFFICE/OUTPT VISIT, EST, LEVL III, 20-29 MIN: ICD-10-PCS | Mod: S$GLB,,, | Performed by: INTERNAL MEDICINE

## 2021-04-12 PROCEDURE — 1101F PR PT FALLS ASSESS DOC 0-1 FALLS W/OUT INJ PAST YR: ICD-10-PCS | Mod: CPTII,S$GLB,, | Performed by: INTERNAL MEDICINE

## 2021-04-12 PROCEDURE — 99999 PR PBB SHADOW E&M-EST. PATIENT-LVL V: ICD-10-PCS | Mod: PBBFAC,,, | Performed by: INTERNAL MEDICINE

## 2021-04-12 PROCEDURE — 3288F FALL RISK ASSESSMENT DOCD: CPT | Mod: CPTII,S$GLB,, | Performed by: INTERNAL MEDICINE

## 2021-04-12 PROCEDURE — 1101F PT FALLS ASSESS-DOCD LE1/YR: CPT | Mod: CPTII,S$GLB,, | Performed by: INTERNAL MEDICINE

## 2021-04-12 PROCEDURE — 99213 OFFICE O/P EST LOW 20 MIN: CPT | Mod: S$GLB,,, | Performed by: INTERNAL MEDICINE

## 2021-04-13 LAB
ANION GAP SERPL CALC-SCNC: 9 MMOL/L (ref 8–16)
BUN SERPL-MCNC: 28 MG/DL (ref 8–23)
CALCIUM SERPL-MCNC: 9.5 MG/DL (ref 8.7–10.5)
CHLORIDE SERPL-SCNC: 103 MMOL/L (ref 95–110)
CO2 SERPL-SCNC: 28 MMOL/L (ref 23–29)
CREAT SERPL-MCNC: 1.2 MG/DL (ref 0.5–1.4)
EST. GFR  (AFRICAN AMERICAN): >60 ML/MIN/1.73 M^2
EST. GFR  (NON AFRICAN AMERICAN): 57.6 ML/MIN/1.73 M^2
GLUCOSE SERPL-MCNC: 118 MG/DL (ref 70–110)
POTASSIUM SERPL-SCNC: 4.2 MMOL/L (ref 3.5–5.1)
SODIUM SERPL-SCNC: 140 MMOL/L (ref 136–145)

## 2021-05-12 ENCOUNTER — TELEPHONE (OUTPATIENT)
Dept: FAMILY MEDICINE | Facility: CLINIC | Age: 76
End: 2021-05-12

## 2021-05-13 ENCOUNTER — OFFICE VISIT (OUTPATIENT)
Dept: FAMILY MEDICINE | Facility: CLINIC | Age: 76
End: 2021-05-13
Payer: MEDICARE

## 2021-05-13 ENCOUNTER — HOSPITAL ENCOUNTER (OUTPATIENT)
Dept: RADIOLOGY | Facility: CLINIC | Age: 76
Discharge: HOME | End: 2021-05-13
Attending: FAMILY MEDICINE
Payer: MEDICARE

## 2021-05-13 ENCOUNTER — HOSPITAL ENCOUNTER (OUTPATIENT)
Dept: PULMONOLOGY | Facility: HOSPITAL | Age: 76
Discharge: HOME | End: 2021-05-13
Attending: FAMILY MEDICINE
Payer: MEDICARE

## 2021-05-13 ENCOUNTER — TELEPHONE (OUTPATIENT)
Dept: GASTROENTEROLOGY | Facility: CLINIC | Age: 76
End: 2021-05-13

## 2021-05-13 ENCOUNTER — HOSPITAL ENCOUNTER (OUTPATIENT)
Dept: RADIOLOGY | Facility: HOSPITAL | Age: 76
Discharge: HOME | End: 2021-05-13
Attending: FAMILY MEDICINE
Payer: MEDICARE

## 2021-05-13 VITALS
HEIGHT: 71 IN | RESPIRATION RATE: 12 BRPM | SYSTOLIC BLOOD PRESSURE: 126 MMHG | HEART RATE: 83 BPM | DIASTOLIC BLOOD PRESSURE: 64 MMHG | BODY MASS INDEX: 35.71 KG/M2 | WEIGHT: 255.06 LBS | TEMPERATURE: 98 F | OXYGEN SATURATION: 98 %

## 2021-05-13 DIAGNOSIS — R80.9 TYPE 2 DIABETES MELLITUS WITH MICROALBUMINURIA, WITHOUT LONG-TERM CURRENT USE OF INSULIN: ICD-10-CM

## 2021-05-13 DIAGNOSIS — M25.562 CHRONIC PAIN OF LEFT KNEE: Primary | ICD-10-CM

## 2021-05-13 DIAGNOSIS — D69.6 THROMBOCYTOPENIA, UNSPECIFIED: ICD-10-CM

## 2021-05-13 DIAGNOSIS — K74.60 HEPATIC CIRRHOSIS, UNSPECIFIED HEPATIC CIRRHOSIS TYPE, UNSPECIFIED WHETHER ASCITES PRESENT: ICD-10-CM

## 2021-05-13 DIAGNOSIS — J44.1 COPD EXACERBATION: ICD-10-CM

## 2021-05-13 DIAGNOSIS — M17.32 POST-TRAUMATIC OSTEOARTHRITIS OF LEFT KNEE: ICD-10-CM

## 2021-05-13 DIAGNOSIS — G89.29 CHRONIC PAIN OF LEFT KNEE: Primary | ICD-10-CM

## 2021-05-13 DIAGNOSIS — E21.3 HYPERPARATHYROIDISM: ICD-10-CM

## 2021-05-13 DIAGNOSIS — J18.9 PNEUMONIA, UNSPECIFIED ORGANISM: ICD-10-CM

## 2021-05-13 DIAGNOSIS — Z72.0 CHEWS TOBACCO REGULARLY: Primary | ICD-10-CM

## 2021-05-13 DIAGNOSIS — G89.29 CHRONIC PAIN OF LEFT KNEE: ICD-10-CM

## 2021-05-13 DIAGNOSIS — N18.30 STAGE 3 CHRONIC KIDNEY DISEASE, UNSPECIFIED WHETHER STAGE 3A OR 3B CKD: ICD-10-CM

## 2021-05-13 DIAGNOSIS — M25.562 CHRONIC PAIN OF LEFT KNEE: ICD-10-CM

## 2021-05-13 DIAGNOSIS — E66.01 OBESITY, MORBID, BMI 40.0-49.9: ICD-10-CM

## 2021-05-13 DIAGNOSIS — E11.29 TYPE 2 DIABETES MELLITUS WITH MICROALBUMINURIA, WITHOUT LONG-TERM CURRENT USE OF INSULIN: ICD-10-CM

## 2021-05-13 DIAGNOSIS — I11.0 HYPERTENSIVE HEART DISEASE WITH HEART FAILURE: ICD-10-CM

## 2021-05-13 DIAGNOSIS — J45.50 SEVERE PERSISTENT ASTHMA WITHOUT COMPLICATION: ICD-10-CM

## 2021-05-13 PROCEDURE — 3288F FALL RISK ASSESSMENT DOCD: CPT | Mod: CPTII,S$GLB,, | Performed by: FAMILY MEDICINE

## 2021-05-13 PROCEDURE — 1126F PR PAIN SEVERITY QUANTIFIED, NO PAIN PRESENT: ICD-10-PCS | Mod: S$GLB,,, | Performed by: FAMILY MEDICINE

## 2021-05-13 PROCEDURE — 71046 X-RAY EXAM CHEST 2 VIEWS: CPT | Mod: 26,,, | Performed by: RADIOLOGY

## 2021-05-13 PROCEDURE — 94729 DIFFUSING CAPACITY: CPT | Mod: 26,,, | Performed by: INTERNAL MEDICINE

## 2021-05-13 PROCEDURE — 94727 PR PULM FUNCTION TEST BY GAS: ICD-10-PCS | Mod: 26,,, | Performed by: INTERNAL MEDICINE

## 2021-05-13 PROCEDURE — 71046 X-RAY EXAM CHEST 2 VIEWS: CPT | Mod: TC,FY,PO

## 2021-05-13 PROCEDURE — 99999 PR PBB SHADOW E&M-EST. PATIENT-LVL V: CPT | Mod: PBBFAC,,, | Performed by: FAMILY MEDICINE

## 2021-05-13 PROCEDURE — 94060 EVALUATION OF WHEEZING: CPT

## 2021-05-13 PROCEDURE — 94729 DIFFUSING CAPACITY: CPT

## 2021-05-13 PROCEDURE — 71046 XR CHEST PA AND LATERAL: ICD-10-PCS | Mod: 26,,, | Performed by: RADIOLOGY

## 2021-05-13 PROCEDURE — 94727 GAS DIL/WSHOT DETER LNG VOL: CPT

## 2021-05-13 PROCEDURE — 1101F PT FALLS ASSESS-DOCD LE1/YR: CPT | Mod: CPTII,S$GLB,, | Performed by: FAMILY MEDICINE

## 2021-05-13 PROCEDURE — 1126F AMNT PAIN NOTED NONE PRSNT: CPT | Mod: S$GLB,,, | Performed by: FAMILY MEDICINE

## 2021-05-13 PROCEDURE — 94060 PR EVAL OF BRONCHOSPASM: ICD-10-PCS | Mod: 26,,, | Performed by: INTERNAL MEDICINE

## 2021-05-13 PROCEDURE — 1159F MED LIST DOCD IN RCRD: CPT | Mod: S$GLB,,, | Performed by: FAMILY MEDICINE

## 2021-05-13 PROCEDURE — 1101F PR PT FALLS ASSESS DOC 0-1 FALLS W/OUT INJ PAST YR: ICD-10-PCS | Mod: CPTII,S$GLB,, | Performed by: FAMILY MEDICINE

## 2021-05-13 PROCEDURE — 99999 PR PBB SHADOW E&M-EST. PATIENT-LVL V: ICD-10-PCS | Mod: PBBFAC,,, | Performed by: FAMILY MEDICINE

## 2021-05-13 PROCEDURE — 3051F HG A1C>EQUAL 7.0%<8.0%: CPT | Mod: CPTII,S$GLB,, | Performed by: FAMILY MEDICINE

## 2021-05-13 PROCEDURE — 99214 PR OFFICE/OUTPT VISIT, EST, LEVL IV, 30-39 MIN: ICD-10-PCS | Mod: S$GLB,,, | Performed by: FAMILY MEDICINE

## 2021-05-13 PROCEDURE — 99499 UNLISTED E&M SERVICE: CPT | Mod: HCNC,S$GLB,, | Performed by: FAMILY MEDICINE

## 2021-05-13 PROCEDURE — 94060 EVALUATION OF WHEEZING: CPT | Mod: 26,,, | Performed by: INTERNAL MEDICINE

## 2021-05-13 PROCEDURE — 71250 CT THORAX DX C-: CPT | Mod: TC

## 2021-05-13 PROCEDURE — 94727 GAS DIL/WSHOT DETER LNG VOL: CPT | Mod: 26,,, | Performed by: INTERNAL MEDICINE

## 2021-05-13 PROCEDURE — 3288F PR FALLS RISK ASSESSMENT DOCUMENTED: ICD-10-PCS | Mod: CPTII,S$GLB,, | Performed by: FAMILY MEDICINE

## 2021-05-13 PROCEDURE — 99214 OFFICE O/P EST MOD 30 MIN: CPT | Mod: S$GLB,,, | Performed by: FAMILY MEDICINE

## 2021-05-13 PROCEDURE — 71250 CT THORAX DX C-: CPT | Mod: 26,,, | Performed by: RADIOLOGY

## 2021-05-13 PROCEDURE — 99499 RISK ADDL DX/OHS AUDIT: ICD-10-PCS | Mod: HCNC,S$GLB,, | Performed by: FAMILY MEDICINE

## 2021-05-13 PROCEDURE — 1159F PR MEDICATION LIST DOCUMENTED IN MEDICAL RECORD: ICD-10-PCS | Mod: S$GLB,,, | Performed by: FAMILY MEDICINE

## 2021-05-13 PROCEDURE — 71250 CT CHEST WITHOUT CONTRAST: ICD-10-PCS | Mod: 26,,, | Performed by: RADIOLOGY

## 2021-05-13 PROCEDURE — 3051F PR MOST RECENT HEMOGLOBIN A1C LEVEL 7.0 - < 8.0%: ICD-10-PCS | Mod: CPTII,S$GLB,, | Performed by: FAMILY MEDICINE

## 2021-05-13 PROCEDURE — 94729 PR C02/MEMBANE DIFFUSE CAPACITY: ICD-10-PCS | Mod: 26,,, | Performed by: INTERNAL MEDICINE

## 2021-05-13 RX ORDER — ACARBOSE 25 MG/1
25 TABLET ORAL
Qty: 180 TABLET | Refills: 3 | Status: SHIPPED | OUTPATIENT
Start: 2021-05-13 | End: 2021-10-13

## 2021-05-13 RX ORDER — TRAMADOL HYDROCHLORIDE 50 MG/1
50 TABLET ORAL EVERY 6 HOURS PRN
Qty: 28 TABLET | Refills: 1 | Status: SHIPPED | OUTPATIENT
Start: 2021-05-13 | End: 2022-01-18 | Stop reason: ALTCHOICE

## 2021-05-14 ENCOUNTER — HOSPITAL ENCOUNTER (OUTPATIENT)
Dept: RADIOLOGY | Facility: HOSPITAL | Age: 76
Discharge: HOME | End: 2021-05-14
Attending: ORTHOPAEDIC SURGERY
Payer: MEDICARE

## 2021-05-14 ENCOUNTER — OFFICE VISIT (OUTPATIENT)
Dept: ORTHOPEDICS | Facility: CLINIC | Age: 76
End: 2021-05-14
Payer: MEDICARE

## 2021-05-14 VITALS — BODY MASS INDEX: 35.7 KG/M2 | HEIGHT: 71 IN | RESPIRATION RATE: 16 BRPM | WEIGHT: 255 LBS

## 2021-05-14 DIAGNOSIS — M17.11 ARTHRITIS OF KNEE, RIGHT: Primary | ICD-10-CM

## 2021-05-14 DIAGNOSIS — M17.32 POST-TRAUMATIC OSTEOARTHRITIS OF LEFT KNEE: ICD-10-CM

## 2021-05-14 DIAGNOSIS — G89.29 CHRONIC PAIN OF LEFT KNEE: ICD-10-CM

## 2021-05-14 DIAGNOSIS — M25.562 CHRONIC PAIN OF LEFT KNEE: ICD-10-CM

## 2021-05-14 DIAGNOSIS — M17.0 PRIMARY OSTEOARTHRITIS OF KNEES, BILATERAL: Primary | ICD-10-CM

## 2021-05-14 PROCEDURE — 73564 X-RAY EXAM KNEE 4 OR MORE: CPT | Mod: 26,LT,, | Performed by: RADIOLOGY

## 2021-05-14 PROCEDURE — 99213 OFFICE O/P EST LOW 20 MIN: CPT | Mod: 25,S$GLB,, | Performed by: ORTHOPAEDIC SURGERY

## 2021-05-14 PROCEDURE — 1159F MED LIST DOCD IN RCRD: CPT | Mod: S$GLB,,, | Performed by: ORTHOPAEDIC SURGERY

## 2021-05-14 PROCEDURE — 20610 DRAIN/INJ JOINT/BURSA W/O US: CPT | Mod: 50,S$GLB,, | Performed by: ORTHOPAEDIC SURGERY

## 2021-05-14 PROCEDURE — 1159F PR MEDICATION LIST DOCUMENTED IN MEDICAL RECORD: ICD-10-PCS | Mod: S$GLB,,, | Performed by: ORTHOPAEDIC SURGERY

## 2021-05-14 PROCEDURE — 73562 X-RAY EXAM OF KNEE 3: CPT | Mod: 26,RT,, | Performed by: RADIOLOGY

## 2021-05-14 PROCEDURE — 99999 PR PBB SHADOW E&M-EST. PATIENT-LVL V: CPT | Mod: PBBFAC,,, | Performed by: ORTHOPAEDIC SURGERY

## 2021-05-14 PROCEDURE — 99999 PR PBB SHADOW E&M-EST. PATIENT-LVL V: ICD-10-PCS | Mod: PBBFAC,,, | Performed by: ORTHOPAEDIC SURGERY

## 2021-05-14 PROCEDURE — 1101F PR PT FALLS ASSESS DOC 0-1 FALLS W/OUT INJ PAST YR: ICD-10-PCS | Mod: CPTII,S$GLB,, | Performed by: ORTHOPAEDIC SURGERY

## 2021-05-14 PROCEDURE — 1126F PR PAIN SEVERITY QUANTIFIED, NO PAIN PRESENT: ICD-10-PCS | Mod: S$GLB,,, | Performed by: ORTHOPAEDIC SURGERY

## 2021-05-14 PROCEDURE — 3288F PR FALLS RISK ASSESSMENT DOCUMENTED: ICD-10-PCS | Mod: CPTII,S$GLB,, | Performed by: ORTHOPAEDIC SURGERY

## 2021-05-14 PROCEDURE — 73564 XR KNEE ORTHO LEFT WITH FLEXION: ICD-10-PCS | Mod: 26,LT,, | Performed by: RADIOLOGY

## 2021-05-14 PROCEDURE — 99213 PR OFFICE/OUTPT VISIT, EST, LEVL III, 20-29 MIN: ICD-10-PCS | Mod: 25,S$GLB,, | Performed by: ORTHOPAEDIC SURGERY

## 2021-05-14 PROCEDURE — 3288F FALL RISK ASSESSMENT DOCD: CPT | Mod: CPTII,S$GLB,, | Performed by: ORTHOPAEDIC SURGERY

## 2021-05-14 PROCEDURE — 73562 XR KNEE ORTHO LEFT WITH FLEXION: ICD-10-PCS | Mod: 26,RT,, | Performed by: RADIOLOGY

## 2021-05-14 PROCEDURE — 20610 LARGE JOINT ASPIRATION/INJECTION: BILATERAL KNEE: ICD-10-PCS | Mod: 50,S$GLB,, | Performed by: ORTHOPAEDIC SURGERY

## 2021-05-14 PROCEDURE — 1126F AMNT PAIN NOTED NONE PRSNT: CPT | Mod: S$GLB,,, | Performed by: ORTHOPAEDIC SURGERY

## 2021-05-14 PROCEDURE — 73564 X-RAY EXAM KNEE 4 OR MORE: CPT | Mod: TC,PN,LT

## 2021-05-14 PROCEDURE — 1101F PT FALLS ASSESS-DOCD LE1/YR: CPT | Mod: CPTII,S$GLB,, | Performed by: ORTHOPAEDIC SURGERY

## 2021-05-14 RX ORDER — TRIAMCINOLONE ACETONIDE 40 MG/ML
40 INJECTION, SUSPENSION INTRA-ARTICULAR; INTRAMUSCULAR
Status: DISCONTINUED | OUTPATIENT
Start: 2021-05-14 | End: 2021-05-14 | Stop reason: HOSPADM

## 2021-05-14 RX ADMIN — TRIAMCINOLONE ACETONIDE 40 MG: 40 INJECTION, SUSPENSION INTRA-ARTICULAR; INTRAMUSCULAR at 09:05

## 2021-05-17 LAB
BRPFT: NORMAL
DLCO ADJ PRE: 12.24 ML/(MIN*MMHG)
DLCO SINGLE BREATH LLN: 19.41
DLCO SINGLE BREATH PRE REF: 46.5 %
DLCO SINGLE BREATH REF: 26.34
DLCOC SBVA LLN: 2.5
DLCOC SBVA PRE REF: 107.2 %
DLCOC SBVA REF: 3.61
DLCOC SINGLE BREATH LLN: 19.41
DLCOC SINGLE BREATH PRE REF: 46.5 %
DLCOC SINGLE BREATH REF: 26.34
DLCOVA LLN: 2.5
DLCOVA PRE REF: 107.2 %
DLCOVA PRE: 3.87 ML/(MIN*MMHG*L)
DLCOVA REF: 3.61
DLVAADJ PRE: 3.87 ML/(MIN*MMHG*L)
ERVN2 LLN: -16449.02
ERVN2 PRE REF: 38.9 %
ERVN2 PRE: 0.38 L
ERVN2 REF: 0.98
FEF 25 75 CHG: 26.5 %
FEF 25 75 LLN: 0.83
FEF 25 75 POST REF: 43.2 %
FEF 25 75 PRE REF: 34.1 %
FEF 25 75 REF: 2.13
FET100 CHG: -11 %
FEV1 CHG: 6.9 %
FEV1 FVC CHG: 5.7 %
FEV1 FVC LLN: 60
FEV1 FVC POST REF: 95.9 %
FEV1 FVC PRE REF: 90.7 %
FEV1 FVC REF: 75
FEV1 LLN: 2.1
FEV1 POST REF: 46.6 %
FEV1 PRE REF: 43.6 %
FEV1 REF: 3.01
FRCN2 LLN: 2.84
FRCN2 PRE REF: 42.7 %
FRCN2 REF: 3.82
FVC CHG: 1.1 %
FVC LLN: 2.95
FVC POST REF: 48.2 %
FVC PRE REF: 47.7 %
FVC REF: 4.07
IVC PRE: 1.72 L
IVC SINGLE BREATH LLN: 2.95
IVC SINGLE BREATH PRE REF: 42.3 %
IVC SINGLE BREATH REF: 4.07
MVV LLN: 102
MVV PRE REF: 45.2 %
MVV REF: 120
PEF CHG: -8.4 %
PEF LLN: 5.3
PEF POST REF: 69.9 %
PEF PRE REF: 76.3 %
PEF REF: 7.68
POST FEF 25 75: 0.92 L/S
POST FET 100: 7.85 SEC
POST FEV1 FVC: 71.61 %
POST FEV1: 1.41 L
POST FVC: 1.96 L
POST PEF: 5.37 L/S
PRE DLCO: 12.24 ML/(MIN*MMHG)
PRE FEF 25 75: 0.73 L/S
PRE FET 100: 8.82 SEC
PRE FEV1 FVC: 67.73 %
PRE FEV1: 1.31 L
PRE FRC N2: 1.63 L
PRE FVC: 1.94 L
PRE MVV: 54 L/MIN
PRE PEF: 5.86 L/S
RVN2 LLN: 2.17
RVN2 PRE REF: 45.3 %
RVN2 PRE: 1.29 L
RVN2 REF: 2.84
RVN2TLCN2 LLN: 35.4
RVN2TLCN2 PRE REF: 88.4 %
RVN2TLCN2 PRE: 39.24 %
RVN2TLCN2 REF: 44.38
TLCN2 LLN: 6.15
TLCN2 PRE REF: 44.9 %
TLCN2 PRE: 3.28 L
TLCN2 REF: 7.3
VA PRE: 3.17 L
VA SINGLE BREATH LLN: 7.15
VA SINGLE BREATH PRE REF: 44.4 %
VA SINGLE BREATH REF: 7.15
VCMAXN2 LLN: 2.95
VCMAXN2 PRE REF: 49 %
VCMAXN2 PRE: 1.99 L
VCMAXN2 REF: 4.07

## 2021-05-28 ENCOUNTER — OFFICE VISIT (OUTPATIENT)
Dept: ORTHOPEDICS | Facility: CLINIC | Age: 76
End: 2021-05-28
Payer: MEDICARE

## 2021-05-28 VITALS — WEIGHT: 255 LBS | BODY MASS INDEX: 35.7 KG/M2 | HEIGHT: 71 IN | RESPIRATION RATE: 20 BRPM

## 2021-05-28 DIAGNOSIS — M17.0 PRIMARY OSTEOARTHRITIS OF KNEES, BILATERAL: Primary | ICD-10-CM

## 2021-05-28 PROCEDURE — 20610 DRAIN/INJ JOINT/BURSA W/O US: CPT | Mod: 50,S$GLB,, | Performed by: ORTHOPAEDIC SURGERY

## 2021-05-28 PROCEDURE — 3288F FALL RISK ASSESSMENT DOCD: CPT | Mod: CPTII,S$GLB,, | Performed by: ORTHOPAEDIC SURGERY

## 2021-05-28 PROCEDURE — 99499 UNLISTED E&M SERVICE: CPT | Mod: S$GLB,,, | Performed by: ORTHOPAEDIC SURGERY

## 2021-05-28 PROCEDURE — 99499 NO LOS: ICD-10-PCS | Mod: S$GLB,,, | Performed by: ORTHOPAEDIC SURGERY

## 2021-05-28 PROCEDURE — 1101F PT FALLS ASSESS-DOCD LE1/YR: CPT | Mod: CPTII,S$GLB,, | Performed by: ORTHOPAEDIC SURGERY

## 2021-05-28 PROCEDURE — 3288F PR FALLS RISK ASSESSMENT DOCUMENTED: ICD-10-PCS | Mod: CPTII,S$GLB,, | Performed by: ORTHOPAEDIC SURGERY

## 2021-05-28 PROCEDURE — 99999 PR PBB SHADOW E&M-EST. PATIENT-LVL IV: CPT | Mod: PBBFAC,,, | Performed by: ORTHOPAEDIC SURGERY

## 2021-05-28 PROCEDURE — 1101F PR PT FALLS ASSESS DOC 0-1 FALLS W/OUT INJ PAST YR: ICD-10-PCS | Mod: CPTII,S$GLB,, | Performed by: ORTHOPAEDIC SURGERY

## 2021-05-28 PROCEDURE — 99999 PR PBB SHADOW E&M-EST. PATIENT-LVL IV: ICD-10-PCS | Mod: PBBFAC,,, | Performed by: ORTHOPAEDIC SURGERY

## 2021-05-28 PROCEDURE — 20610 LARGE JOINT ASPIRATION/INJECTION: BILATERAL KNEE: ICD-10-PCS | Mod: 50,S$GLB,, | Performed by: ORTHOPAEDIC SURGERY

## 2021-06-07 ENCOUNTER — OFFICE VISIT (OUTPATIENT)
Dept: FAMILY MEDICINE | Facility: CLINIC | Age: 76
End: 2021-06-07
Payer: MEDICARE

## 2021-06-07 VITALS
TEMPERATURE: 98 F | OXYGEN SATURATION: 96 % | HEIGHT: 71 IN | BODY MASS INDEX: 34.94 KG/M2 | WEIGHT: 249.56 LBS | DIASTOLIC BLOOD PRESSURE: 68 MMHG | HEART RATE: 82 BPM | SYSTOLIC BLOOD PRESSURE: 132 MMHG | RESPIRATION RATE: 18 BRPM

## 2021-06-07 DIAGNOSIS — E78.5 HYPERLIPIDEMIA ASSOCIATED WITH TYPE 2 DIABETES MELLITUS: ICD-10-CM

## 2021-06-07 DIAGNOSIS — E11.59 HYPERTENSION ASSOCIATED WITH DIABETES: ICD-10-CM

## 2021-06-07 DIAGNOSIS — R80.9 TYPE 2 DIABETES MELLITUS WITH MICROALBUMINURIA, WITHOUT LONG-TERM CURRENT USE OF INSULIN: Primary | ICD-10-CM

## 2021-06-07 DIAGNOSIS — I15.2 HYPERTENSION ASSOCIATED WITH DIABETES: ICD-10-CM

## 2021-06-07 DIAGNOSIS — E11.69 HYPERLIPIDEMIA ASSOCIATED WITH TYPE 2 DIABETES MELLITUS: ICD-10-CM

## 2021-06-07 DIAGNOSIS — I25.118 CORONARY ARTERY DISEASE OF NATIVE ARTERY OF NATIVE HEART WITH STABLE ANGINA PECTORIS: ICD-10-CM

## 2021-06-07 DIAGNOSIS — I48.91 ATRIAL FIBRILLATION, UNSPECIFIED TYPE: ICD-10-CM

## 2021-06-07 DIAGNOSIS — J45.50 SEVERE PERSISTENT ASTHMA WITHOUT COMPLICATION: ICD-10-CM

## 2021-06-07 DIAGNOSIS — J98.4 PNEUMONITIS: ICD-10-CM

## 2021-06-07 DIAGNOSIS — J44.9 CHRONIC OBSTRUCTIVE PULMONARY DISEASE, UNSPECIFIED COPD TYPE: ICD-10-CM

## 2021-06-07 DIAGNOSIS — J18.1 LUNG CONSOLIDATION: ICD-10-CM

## 2021-06-07 DIAGNOSIS — E11.29 TYPE 2 DIABETES MELLITUS WITH MICROALBUMINURIA, WITHOUT LONG-TERM CURRENT USE OF INSULIN: Primary | ICD-10-CM

## 2021-06-07 PROCEDURE — 3051F HG A1C>EQUAL 7.0%<8.0%: CPT | Mod: CPTII,S$GLB,, | Performed by: PHYSICIAN ASSISTANT

## 2021-06-07 PROCEDURE — 1126F PR PAIN SEVERITY QUANTIFIED, NO PAIN PRESENT: ICD-10-PCS | Mod: S$GLB,,, | Performed by: PHYSICIAN ASSISTANT

## 2021-06-07 PROCEDURE — 1101F PR PT FALLS ASSESS DOC 0-1 FALLS W/OUT INJ PAST YR: ICD-10-PCS | Mod: CPTII,S$GLB,, | Performed by: PHYSICIAN ASSISTANT

## 2021-06-07 PROCEDURE — 3288F FALL RISK ASSESSMENT DOCD: CPT | Mod: CPTII,S$GLB,, | Performed by: PHYSICIAN ASSISTANT

## 2021-06-07 PROCEDURE — 1159F PR MEDICATION LIST DOCUMENTED IN MEDICAL RECORD: ICD-10-PCS | Mod: S$GLB,,, | Performed by: PHYSICIAN ASSISTANT

## 2021-06-07 PROCEDURE — 99999 PR PBB SHADOW E&M-EST. PATIENT-LVL V: CPT | Mod: PBBFAC,,, | Performed by: PHYSICIAN ASSISTANT

## 2021-06-07 PROCEDURE — 1126F AMNT PAIN NOTED NONE PRSNT: CPT | Mod: S$GLB,,, | Performed by: PHYSICIAN ASSISTANT

## 2021-06-07 PROCEDURE — 99499 UNLISTED E&M SERVICE: CPT | Mod: S$GLB,,, | Performed by: PHYSICIAN ASSISTANT

## 2021-06-07 PROCEDURE — 99214 PR OFFICE/OUTPT VISIT, EST, LEVL IV, 30-39 MIN: ICD-10-PCS | Mod: S$GLB,,, | Performed by: PHYSICIAN ASSISTANT

## 2021-06-07 PROCEDURE — 99999 PR PBB SHADOW E&M-EST. PATIENT-LVL V: ICD-10-PCS | Mod: PBBFAC,,, | Performed by: PHYSICIAN ASSISTANT

## 2021-06-07 PROCEDURE — 99499 RISK ADDL DX/OHS AUDIT: ICD-10-PCS | Mod: S$GLB,,, | Performed by: PHYSICIAN ASSISTANT

## 2021-06-07 PROCEDURE — 1159F MED LIST DOCD IN RCRD: CPT | Mod: S$GLB,,, | Performed by: PHYSICIAN ASSISTANT

## 2021-06-07 PROCEDURE — 3288F PR FALLS RISK ASSESSMENT DOCUMENTED: ICD-10-PCS | Mod: CPTII,S$GLB,, | Performed by: PHYSICIAN ASSISTANT

## 2021-06-07 PROCEDURE — 1101F PT FALLS ASSESS-DOCD LE1/YR: CPT | Mod: CPTII,S$GLB,, | Performed by: PHYSICIAN ASSISTANT

## 2021-06-07 PROCEDURE — 3051F PR MOST RECENT HEMOGLOBIN A1C LEVEL 7.0 - < 8.0%: ICD-10-PCS | Mod: CPTII,S$GLB,, | Performed by: PHYSICIAN ASSISTANT

## 2021-06-07 PROCEDURE — 99214 OFFICE O/P EST MOD 30 MIN: CPT | Mod: S$GLB,,, | Performed by: PHYSICIAN ASSISTANT

## 2021-06-07 RX ORDER — LISINOPRIL 2.5 MG/1
2.5 TABLET ORAL DAILY
Qty: 90 TABLET | Refills: 3 | Status: SHIPPED | OUTPATIENT
Start: 2021-06-07 | End: 2021-09-15 | Stop reason: SDUPTHER

## 2021-06-17 ENCOUNTER — OFFICE VISIT (OUTPATIENT)
Dept: GASTROENTEROLOGY | Facility: CLINIC | Age: 76
End: 2021-06-17
Payer: MEDICARE

## 2021-06-17 ENCOUNTER — TELEPHONE (OUTPATIENT)
Dept: GASTROENTEROLOGY | Facility: CLINIC | Age: 76
End: 2021-06-17

## 2021-06-17 VITALS
WEIGHT: 250.44 LBS | DIASTOLIC BLOOD PRESSURE: 62 MMHG | BODY MASS INDEX: 34.93 KG/M2 | SYSTOLIC BLOOD PRESSURE: 128 MMHG | HEART RATE: 83 BPM

## 2021-06-17 DIAGNOSIS — K74.60 HEPATIC CIRRHOSIS, UNSPECIFIED HEPATIC CIRRHOSIS TYPE, UNSPECIFIED WHETHER ASCITES PRESENT: ICD-10-CM

## 2021-06-17 DIAGNOSIS — N18.30 STAGE 3 CHRONIC KIDNEY DISEASE, UNSPECIFIED WHETHER STAGE 3A OR 3B CKD: ICD-10-CM

## 2021-06-17 DIAGNOSIS — G47.33 OSA ON CPAP: Primary | ICD-10-CM

## 2021-06-17 DIAGNOSIS — Z86.010 HISTORY OF COLON POLYPS: ICD-10-CM

## 2021-06-17 DIAGNOSIS — I85.00 ESOPHAGEAL VARICES WITHOUT BLEEDING, UNSPECIFIED ESOPHAGEAL VARICES TYPE: Primary | ICD-10-CM

## 2021-06-17 PROCEDURE — 1101F PT FALLS ASSESS-DOCD LE1/YR: CPT | Mod: CPTII,S$GLB,, | Performed by: INTERNAL MEDICINE

## 2021-06-17 PROCEDURE — 1101F PR PT FALLS ASSESS DOC 0-1 FALLS W/OUT INJ PAST YR: ICD-10-PCS | Mod: CPTII,S$GLB,, | Performed by: INTERNAL MEDICINE

## 2021-06-17 PROCEDURE — 99214 OFFICE O/P EST MOD 30 MIN: CPT | Mod: S$GLB,,, | Performed by: INTERNAL MEDICINE

## 2021-06-17 PROCEDURE — 3288F FALL RISK ASSESSMENT DOCD: CPT | Mod: CPTII,S$GLB,, | Performed by: INTERNAL MEDICINE

## 2021-06-17 PROCEDURE — 1159F MED LIST DOCD IN RCRD: CPT | Mod: S$GLB,,, | Performed by: INTERNAL MEDICINE

## 2021-06-17 PROCEDURE — 99999 PR PBB SHADOW E&M-EST. PATIENT-LVL V: ICD-10-PCS | Mod: PBBFAC,,, | Performed by: INTERNAL MEDICINE

## 2021-06-17 PROCEDURE — 99999 PR PBB SHADOW E&M-EST. PATIENT-LVL V: CPT | Mod: PBBFAC,,, | Performed by: INTERNAL MEDICINE

## 2021-06-17 PROCEDURE — 99214 PR OFFICE/OUTPT VISIT, EST, LEVL IV, 30-39 MIN: ICD-10-PCS | Mod: S$GLB,,, | Performed by: INTERNAL MEDICINE

## 2021-06-17 PROCEDURE — 3288F PR FALLS RISK ASSESSMENT DOCUMENTED: ICD-10-PCS | Mod: CPTII,S$GLB,, | Performed by: INTERNAL MEDICINE

## 2021-06-17 PROCEDURE — 1159F PR MEDICATION LIST DOCUMENTED IN MEDICAL RECORD: ICD-10-PCS | Mod: S$GLB,,, | Performed by: INTERNAL MEDICINE

## 2021-06-29 ENCOUNTER — TELEPHONE (OUTPATIENT)
Dept: FAMILY MEDICINE | Facility: CLINIC | Age: 76
End: 2021-06-29

## 2021-06-30 RX ORDER — ATORVASTATIN CALCIUM 40 MG/1
40 TABLET, FILM COATED ORAL DAILY
Qty: 90 TABLET | Refills: 3 | Status: SHIPPED | OUTPATIENT
Start: 2021-06-30 | End: 2022-03-17 | Stop reason: SDUPTHER

## 2021-07-13 DIAGNOSIS — J82.83 EOSINOPHILIC ASTHMA: ICD-10-CM

## 2021-07-13 DIAGNOSIS — J45.50 SEVERE PERSISTENT ASTHMA WITHOUT COMPLICATION: ICD-10-CM

## 2021-07-13 DIAGNOSIS — R06.02 SHORTNESS OF BREATH: ICD-10-CM

## 2021-07-13 DIAGNOSIS — J20.9 ACUTE BRONCHITIS: ICD-10-CM

## 2021-07-13 RX ORDER — AZITHROMYCIN 250 MG/1
TABLET, FILM COATED ORAL
Qty: 6 TABLET | Refills: 0 | Status: SHIPPED | OUTPATIENT
Start: 2021-07-13 | End: 2021-07-18

## 2021-07-13 RX ORDER — FLUTICASONE PROPIONATE 220 UG/1
1 AEROSOL, METERED RESPIRATORY (INHALATION) 2 TIMES DAILY
Qty: 12 G | Refills: 11 | Status: SHIPPED | OUTPATIENT
Start: 2021-07-13 | End: 2021-11-24

## 2021-07-13 RX ORDER — IPRATROPIUM BROMIDE AND ALBUTEROL SULFATE 2.5; .5 MG/3ML; MG/3ML
3 SOLUTION RESPIRATORY (INHALATION) EVERY 6 HOURS PRN
Qty: 1 BOX | Refills: 4 | Status: SHIPPED | OUTPATIENT
Start: 2021-07-13 | End: 2022-01-13 | Stop reason: SDUPTHER

## 2021-07-13 RX ORDER — PREDNISONE 20 MG/1
TABLET ORAL
Qty: 21 TABLET | Refills: 0 | Status: SHIPPED | OUTPATIENT
Start: 2021-07-13 | End: 2021-11-24 | Stop reason: SDUPTHER

## 2021-08-10 NOTE — ADDENDUM NOTE
Addended by: SALAS OTTO on: 4/12/2017 01:45 PM     Modules accepted: Orders     Please call pt to reschedule appointment.

## 2021-09-15 DIAGNOSIS — R80.9 TYPE 2 DIABETES MELLITUS WITH MICROALBUMINURIA, WITHOUT LONG-TERM CURRENT USE OF INSULIN: ICD-10-CM

## 2021-09-15 DIAGNOSIS — I15.2 HYPERTENSION ASSOCIATED WITH DIABETES: ICD-10-CM

## 2021-09-15 DIAGNOSIS — J45.909 PERSISTENT ASTHMA WITHOUT COMPLICATION, UNSPECIFIED ASTHMA SEVERITY: ICD-10-CM

## 2021-09-15 DIAGNOSIS — E11.59 HYPERTENSION ASSOCIATED WITH DIABETES: ICD-10-CM

## 2021-09-15 DIAGNOSIS — R60.0 BILATERAL LEG EDEMA: ICD-10-CM

## 2021-09-15 DIAGNOSIS — N40.0 PROSTATISM: ICD-10-CM

## 2021-09-15 DIAGNOSIS — E11.29 TYPE 2 DIABETES MELLITUS WITH MICROALBUMINURIA, WITHOUT LONG-TERM CURRENT USE OF INSULIN: ICD-10-CM

## 2021-09-15 RX ORDER — FINASTERIDE 5 MG/1
5 TABLET, FILM COATED ORAL DAILY
Qty: 30 TABLET | Refills: 11 | Status: SHIPPED | OUTPATIENT
Start: 2021-09-15 | End: 2022-09-27 | Stop reason: SDUPTHER

## 2021-09-15 RX ORDER — MONTELUKAST SODIUM 10 MG/1
10 TABLET ORAL NIGHTLY
Qty: 30 TABLET | Refills: 11 | Status: SHIPPED | OUTPATIENT
Start: 2021-09-15 | End: 2022-09-22 | Stop reason: SDUPTHER

## 2021-09-15 RX ORDER — LISINOPRIL 2.5 MG/1
2.5 TABLET ORAL DAILY
Qty: 90 TABLET | Refills: 3 | Status: SHIPPED | OUTPATIENT
Start: 2021-09-15 | End: 2022-03-17

## 2021-09-15 RX ORDER — TAMSULOSIN HYDROCHLORIDE 0.4 MG/1
0.4 CAPSULE ORAL DAILY
Qty: 30 CAPSULE | Refills: 11 | Status: SHIPPED | OUTPATIENT
Start: 2021-09-15 | End: 2021-11-01 | Stop reason: SDUPTHER

## 2021-09-15 RX ORDER — POTASSIUM CHLORIDE 750 MG/1
TABLET, EXTENDED RELEASE ORAL
Qty: 180 TABLET | Refills: 5 | Status: SHIPPED | OUTPATIENT
Start: 2021-09-15

## 2021-10-07 ENCOUNTER — LAB VISIT (OUTPATIENT)
Dept: LAB | Facility: HOSPITAL | Age: 76
End: 2021-10-07
Attending: PHYSICIAN ASSISTANT
Payer: MEDICARE

## 2021-10-07 DIAGNOSIS — I15.2 HYPERTENSION ASSOCIATED WITH DIABETES: ICD-10-CM

## 2021-10-07 DIAGNOSIS — E78.5 HYPERLIPIDEMIA ASSOCIATED WITH TYPE 2 DIABETES MELLITUS: ICD-10-CM

## 2021-10-07 DIAGNOSIS — R80.9 TYPE 2 DIABETES MELLITUS WITH MICROALBUMINURIA, WITHOUT LONG-TERM CURRENT USE OF INSULIN: ICD-10-CM

## 2021-10-07 DIAGNOSIS — E11.29 TYPE 2 DIABETES MELLITUS WITH MICROALBUMINURIA, WITHOUT LONG-TERM CURRENT USE OF INSULIN: ICD-10-CM

## 2021-10-07 DIAGNOSIS — E11.59 HYPERTENSION ASSOCIATED WITH DIABETES: ICD-10-CM

## 2021-10-07 DIAGNOSIS — E11.69 HYPERLIPIDEMIA ASSOCIATED WITH TYPE 2 DIABETES MELLITUS: ICD-10-CM

## 2021-10-07 LAB
ALBUMIN SERPL BCP-MCNC: 3.5 G/DL (ref 3.5–5.2)
ALP SERPL-CCNC: 76 U/L (ref 55–135)
ALT SERPL W/O P-5'-P-CCNC: 20 U/L (ref 10–44)
ANION GAP SERPL CALC-SCNC: 9 MMOL/L (ref 8–16)
AST SERPL-CCNC: 18 U/L (ref 10–40)
BILIRUB SERPL-MCNC: 0.7 MG/DL (ref 0.1–1)
BUN SERPL-MCNC: 35 MG/DL (ref 8–23)
CALCIUM SERPL-MCNC: 9.3 MG/DL (ref 8.7–10.5)
CHLORIDE SERPL-SCNC: 104 MMOL/L (ref 95–110)
CHOLEST SERPL-MCNC: 106 MG/DL (ref 120–199)
CHOLEST/HDLC SERPL: 3.5 {RATIO} (ref 2–5)
CO2 SERPL-SCNC: 24 MMOL/L (ref 23–29)
CREAT SERPL-MCNC: 1.4 MG/DL (ref 0.5–1.4)
EST. GFR  (AFRICAN AMERICAN): 55.2 ML/MIN/1.73 M^2
EST. GFR  (NON AFRICAN AMERICAN): 47.8 ML/MIN/1.73 M^2
ESTIMATED AVG GLUCOSE: 143 MG/DL (ref 68–131)
GLUCOSE SERPL-MCNC: 129 MG/DL (ref 70–110)
HBA1C MFR BLD: 6.6 % (ref 4–5.6)
HDLC SERPL-MCNC: 30 MG/DL (ref 40–75)
HDLC SERPL: 28.3 % (ref 20–50)
LDLC SERPL CALC-MCNC: 61.2 MG/DL (ref 63–159)
NONHDLC SERPL-MCNC: 76 MG/DL
POTASSIUM SERPL-SCNC: 4.3 MMOL/L (ref 3.5–5.1)
PROT SERPL-MCNC: 6.4 G/DL (ref 6–8.4)
SODIUM SERPL-SCNC: 137 MMOL/L (ref 136–145)
TRIGL SERPL-MCNC: 74 MG/DL (ref 30–150)

## 2021-10-07 PROCEDURE — 36415 COLL VENOUS BLD VENIPUNCTURE: CPT | Mod: HCNC,PO | Performed by: PHYSICIAN ASSISTANT

## 2021-10-07 PROCEDURE — 80061 LIPID PANEL: CPT | Mod: HCNC | Performed by: PHYSICIAN ASSISTANT

## 2021-10-07 PROCEDURE — 80053 COMPREHEN METABOLIC PANEL: CPT | Mod: HCNC | Performed by: PHYSICIAN ASSISTANT

## 2021-10-07 PROCEDURE — 83036 HEMOGLOBIN GLYCOSYLATED A1C: CPT | Mod: HCNC | Performed by: PHYSICIAN ASSISTANT

## 2021-10-11 ENCOUNTER — PATIENT OUTREACH (OUTPATIENT)
Dept: ADMINISTRATIVE | Facility: OTHER | Age: 76
End: 2021-10-11

## 2021-10-13 ENCOUNTER — OFFICE VISIT (OUTPATIENT)
Dept: FAMILY MEDICINE | Facility: CLINIC | Age: 76
End: 2021-10-13
Payer: MEDICARE

## 2021-10-13 ENCOUNTER — OFFICE VISIT (OUTPATIENT)
Dept: PULMONOLOGY | Facility: CLINIC | Age: 76
End: 2021-10-13
Payer: MEDICARE

## 2021-10-13 VITALS
OXYGEN SATURATION: 95 % | TEMPERATURE: 98 F | BODY MASS INDEX: 35.89 KG/M2 | WEIGHT: 256.38 LBS | RESPIRATION RATE: 16 BRPM | SYSTOLIC BLOOD PRESSURE: 128 MMHG | DIASTOLIC BLOOD PRESSURE: 60 MMHG | HEIGHT: 71 IN | HEART RATE: 77 BPM

## 2021-10-13 VITALS
BODY MASS INDEX: 35.99 KG/M2 | HEIGHT: 71 IN | OXYGEN SATURATION: 97 % | SYSTOLIC BLOOD PRESSURE: 119 MMHG | HEART RATE: 66 BPM | WEIGHT: 257.06 LBS | DIASTOLIC BLOOD PRESSURE: 59 MMHG

## 2021-10-13 DIAGNOSIS — N40.0 PROSTATISM: ICD-10-CM

## 2021-10-13 DIAGNOSIS — J82.83 EOSINOPHILIC ASTHMA: ICD-10-CM

## 2021-10-13 DIAGNOSIS — E11.29 TYPE 2 DIABETES MELLITUS WITH MICROALBUMINURIA, WITHOUT LONG-TERM CURRENT USE OF INSULIN: Primary | ICD-10-CM

## 2021-10-13 DIAGNOSIS — R80.9 TYPE 2 DIABETES MELLITUS WITH MICROALBUMINURIA, WITHOUT LONG-TERM CURRENT USE OF INSULIN: Primary | ICD-10-CM

## 2021-10-13 DIAGNOSIS — D69.6 THROMBOCYTOPENIA, UNSPECIFIED: ICD-10-CM

## 2021-10-13 DIAGNOSIS — M16.0 OSTEOARTHRITIS OF BOTH HIPS, UNSPECIFIED OSTEOARTHRITIS TYPE: ICD-10-CM

## 2021-10-13 DIAGNOSIS — E21.3 HYPERPARATHYROIDISM: ICD-10-CM

## 2021-10-13 DIAGNOSIS — Z23 INFLUENZA VACCINATION ADMINISTERED AT CURRENT VISIT: ICD-10-CM

## 2021-10-13 DIAGNOSIS — N18.31 STAGE 3A CHRONIC KIDNEY DISEASE: ICD-10-CM

## 2021-10-13 DIAGNOSIS — I48.91 ATRIAL FIBRILLATION, UNSPECIFIED TYPE: ICD-10-CM

## 2021-10-13 DIAGNOSIS — R60.0 BILATERAL LEG EDEMA: Primary | ICD-10-CM

## 2021-10-13 DIAGNOSIS — F17.228 CHEWING TOBACCO NICOTINE DEPENDENCE WITH OTHER NICOTINE-INDUCED DISORDER: ICD-10-CM

## 2021-10-13 PROCEDURE — 99214 OFFICE O/P EST MOD 30 MIN: CPT | Mod: HCNC,S$GLB,, | Performed by: FAMILY MEDICINE

## 2021-10-13 PROCEDURE — 1126F PR PAIN SEVERITY QUANTIFIED, NO PAIN PRESENT: ICD-10-PCS | Mod: HCNC,CPTII,S$GLB, | Performed by: INTERNAL MEDICINE

## 2021-10-13 PROCEDURE — 99499 UNLISTED E&M SERVICE: CPT | Mod: HCNC,S$GLB,, | Performed by: INTERNAL MEDICINE

## 2021-10-13 PROCEDURE — 90694 VACC AIIV4 NO PRSRV 0.5ML IM: CPT | Mod: HCNC,S$GLB,, | Performed by: FAMILY MEDICINE

## 2021-10-13 PROCEDURE — 3078F PR MOST RECENT DIASTOLIC BLOOD PRESSURE < 80 MM HG: ICD-10-PCS | Mod: HCNC,CPTII,S$GLB, | Performed by: FAMILY MEDICINE

## 2021-10-13 PROCEDURE — 1101F PR PT FALLS ASSESS DOC 0-1 FALLS W/OUT INJ PAST YR: ICD-10-PCS | Mod: HCNC,CPTII,S$GLB, | Performed by: INTERNAL MEDICINE

## 2021-10-13 PROCEDURE — 99999 PR PBB SHADOW E&M-EST. PATIENT-LVL V: CPT | Mod: PBBFAC,HCNC,, | Performed by: FAMILY MEDICINE

## 2021-10-13 PROCEDURE — 99499 UNLISTED E&M SERVICE: CPT | Mod: HCNC,S$GLB,, | Performed by: FAMILY MEDICINE

## 2021-10-13 PROCEDURE — 99499 RISK ADDL DX/OHS AUDIT: ICD-10-PCS | Mod: HCNC,S$GLB,, | Performed by: FAMILY MEDICINE

## 2021-10-13 PROCEDURE — 99499 RISK ADDL DX/OHS AUDIT: ICD-10-PCS | Mod: HCNC,S$GLB,, | Performed by: INTERNAL MEDICINE

## 2021-10-13 PROCEDURE — 3074F PR MOST RECENT SYSTOLIC BLOOD PRESSURE < 130 MM HG: ICD-10-PCS | Mod: HCNC,CPTII,S$GLB, | Performed by: INTERNAL MEDICINE

## 2021-10-13 PROCEDURE — 1126F AMNT PAIN NOTED NONE PRSNT: CPT | Mod: HCNC,CPTII,S$GLB, | Performed by: INTERNAL MEDICINE

## 2021-10-13 PROCEDURE — 99999 PR PBB SHADOW E&M-EST. PATIENT-LVL V: ICD-10-PCS | Mod: PBBFAC,HCNC,, | Performed by: INTERNAL MEDICINE

## 2021-10-13 PROCEDURE — 1126F AMNT PAIN NOTED NONE PRSNT: CPT | Mod: HCNC,CPTII,S$GLB, | Performed by: FAMILY MEDICINE

## 2021-10-13 PROCEDURE — 3074F SYST BP LT 130 MM HG: CPT | Mod: HCNC,CPTII,S$GLB, | Performed by: FAMILY MEDICINE

## 2021-10-13 PROCEDURE — 1126F PR PAIN SEVERITY QUANTIFIED, NO PAIN PRESENT: ICD-10-PCS | Mod: HCNC,CPTII,S$GLB, | Performed by: FAMILY MEDICINE

## 2021-10-13 PROCEDURE — 90694 FLU VACCINE - QUADRIVALENT - ADJUVANTED: ICD-10-PCS | Mod: HCNC,S$GLB,, | Performed by: FAMILY MEDICINE

## 2021-10-13 PROCEDURE — 3288F PR FALLS RISK ASSESSMENT DOCUMENTED: ICD-10-PCS | Mod: HCNC,CPTII,S$GLB, | Performed by: FAMILY MEDICINE

## 2021-10-13 PROCEDURE — 3288F FALL RISK ASSESSMENT DOCD: CPT | Mod: HCNC,CPTII,S$GLB, | Performed by: INTERNAL MEDICINE

## 2021-10-13 PROCEDURE — 1159F MED LIST DOCD IN RCRD: CPT | Mod: HCNC,CPTII,S$GLB, | Performed by: INTERNAL MEDICINE

## 2021-10-13 PROCEDURE — 99214 PR OFFICE/OUTPT VISIT, EST, LEVL IV, 30-39 MIN: ICD-10-PCS | Mod: HCNC,S$GLB,, | Performed by: FAMILY MEDICINE

## 2021-10-13 PROCEDURE — 3078F DIAST BP <80 MM HG: CPT | Mod: HCNC,CPTII,S$GLB, | Performed by: FAMILY MEDICINE

## 2021-10-13 PROCEDURE — 1159F PR MEDICATION LIST DOCUMENTED IN MEDICAL RECORD: ICD-10-PCS | Mod: HCNC,CPTII,S$GLB, | Performed by: INTERNAL MEDICINE

## 2021-10-13 PROCEDURE — 3074F PR MOST RECENT SYSTOLIC BLOOD PRESSURE < 130 MM HG: ICD-10-PCS | Mod: HCNC,CPTII,S$GLB, | Performed by: FAMILY MEDICINE

## 2021-10-13 PROCEDURE — 3288F FALL RISK ASSESSMENT DOCD: CPT | Mod: HCNC,CPTII,S$GLB, | Performed by: FAMILY MEDICINE

## 2021-10-13 PROCEDURE — 99999 PR PBB SHADOW E&M-EST. PATIENT-LVL V: ICD-10-PCS | Mod: PBBFAC,HCNC,, | Performed by: FAMILY MEDICINE

## 2021-10-13 PROCEDURE — G0008 FLU VACCINE - QUADRIVALENT - ADJUVANTED: ICD-10-PCS | Mod: HCNC,S$GLB,, | Performed by: FAMILY MEDICINE

## 2021-10-13 PROCEDURE — 3288F PR FALLS RISK ASSESSMENT DOCUMENTED: ICD-10-PCS | Mod: HCNC,CPTII,S$GLB, | Performed by: INTERNAL MEDICINE

## 2021-10-13 PROCEDURE — 1101F PR PT FALLS ASSESS DOC 0-1 FALLS W/OUT INJ PAST YR: ICD-10-PCS | Mod: HCNC,CPTII,S$GLB, | Performed by: FAMILY MEDICINE

## 2021-10-13 PROCEDURE — 99214 PR OFFICE/OUTPT VISIT, EST, LEVL IV, 30-39 MIN: ICD-10-PCS | Mod: HCNC,S$GLB,, | Performed by: INTERNAL MEDICINE

## 2021-10-13 PROCEDURE — 1101F PT FALLS ASSESS-DOCD LE1/YR: CPT | Mod: HCNC,CPTII,S$GLB, | Performed by: FAMILY MEDICINE

## 2021-10-13 PROCEDURE — 99999 PR PBB SHADOW E&M-EST. PATIENT-LVL V: CPT | Mod: PBBFAC,HCNC,, | Performed by: INTERNAL MEDICINE

## 2021-10-13 PROCEDURE — 1101F PT FALLS ASSESS-DOCD LE1/YR: CPT | Mod: HCNC,CPTII,S$GLB, | Performed by: INTERNAL MEDICINE

## 2021-10-13 PROCEDURE — 3074F SYST BP LT 130 MM HG: CPT | Mod: HCNC,CPTII,S$GLB, | Performed by: INTERNAL MEDICINE

## 2021-10-13 PROCEDURE — 3078F DIAST BP <80 MM HG: CPT | Mod: HCNC,CPTII,S$GLB, | Performed by: INTERNAL MEDICINE

## 2021-10-13 PROCEDURE — 99214 OFFICE O/P EST MOD 30 MIN: CPT | Mod: HCNC,S$GLB,, | Performed by: INTERNAL MEDICINE

## 2021-10-13 PROCEDURE — G0008 ADMIN INFLUENZA VIRUS VAC: HCPCS | Mod: HCNC,S$GLB,, | Performed by: FAMILY MEDICINE

## 2021-10-13 PROCEDURE — 3078F PR MOST RECENT DIASTOLIC BLOOD PRESSURE < 80 MM HG: ICD-10-PCS | Mod: HCNC,CPTII,S$GLB, | Performed by: INTERNAL MEDICINE

## 2021-10-13 RX ORDER — FUROSEMIDE 20 MG/1
40 TABLET ORAL 2 TIMES DAILY
Qty: 120 TABLET | Refills: 11 | Status: SHIPPED | OUTPATIENT
Start: 2021-10-13 | End: 2022-02-24

## 2021-10-13 RX ORDER — ACARBOSE 25 MG/1
25 TABLET ORAL
Qty: 180 TABLET | Refills: 3
Start: 2021-10-13 | End: 2022-01-12

## 2021-10-13 RX ORDER — MUPIROCIN 20 MG/G
OINTMENT TOPICAL
COMMUNITY
Start: 2021-08-04 | End: 2023-11-15 | Stop reason: ALTCHOICE

## 2021-10-13 RX ORDER — TRIAMCINOLONE ACETONIDE 40 MG/ML
INJECTION, SUSPENSION INTRA-ARTICULAR; INTRAMUSCULAR
COMMUNITY
Start: 2021-05-14 | End: 2022-03-17 | Stop reason: ALTCHOICE

## 2021-10-14 ENCOUNTER — OFFICE VISIT (OUTPATIENT)
Dept: UROLOGY | Facility: CLINIC | Age: 76
End: 2021-10-14
Payer: MEDICARE

## 2021-10-14 VITALS
HEIGHT: 71 IN | SYSTOLIC BLOOD PRESSURE: 125 MMHG | BODY MASS INDEX: 36.22 KG/M2 | WEIGHT: 258.69 LBS | HEART RATE: 70 BPM | DIASTOLIC BLOOD PRESSURE: 64 MMHG

## 2021-10-14 DIAGNOSIS — N39.41 URGE URINARY INCONTINENCE: ICD-10-CM

## 2021-10-14 DIAGNOSIS — N32.81 OAB (OVERACTIVE BLADDER): Primary | ICD-10-CM

## 2021-10-14 DIAGNOSIS — M25.552 BILATERAL HIP PAIN: Primary | ICD-10-CM

## 2021-10-14 DIAGNOSIS — N20.0 RENAL CALCULUS: ICD-10-CM

## 2021-10-14 DIAGNOSIS — N40.0 PROSTATISM: ICD-10-CM

## 2021-10-14 DIAGNOSIS — M25.551 BILATERAL HIP PAIN: Primary | ICD-10-CM

## 2021-10-14 PROCEDURE — 3078F PR MOST RECENT DIASTOLIC BLOOD PRESSURE < 80 MM HG: ICD-10-PCS | Mod: HCNC,CPTII,S$GLB, | Performed by: UROLOGY

## 2021-10-14 PROCEDURE — 99999 PR PBB SHADOW E&M-EST. PATIENT-LVL V: CPT | Mod: PBBFAC,HCNC,, | Performed by: UROLOGY

## 2021-10-14 PROCEDURE — 99999 PR PBB SHADOW E&M-EST. PATIENT-LVL V: ICD-10-PCS | Mod: PBBFAC,HCNC,, | Performed by: UROLOGY

## 2021-10-14 PROCEDURE — 1160F RVW MEDS BY RX/DR IN RCRD: CPT | Mod: HCNC,CPTII,S$GLB, | Performed by: UROLOGY

## 2021-10-14 PROCEDURE — 1101F PT FALLS ASSESS-DOCD LE1/YR: CPT | Mod: HCNC,CPTII,S$GLB, | Performed by: UROLOGY

## 2021-10-14 PROCEDURE — 1101F PR PT FALLS ASSESS DOC 0-1 FALLS W/OUT INJ PAST YR: ICD-10-PCS | Mod: HCNC,CPTII,S$GLB, | Performed by: UROLOGY

## 2021-10-14 PROCEDURE — 3078F DIAST BP <80 MM HG: CPT | Mod: HCNC,CPTII,S$GLB, | Performed by: UROLOGY

## 2021-10-14 PROCEDURE — 1126F PR PAIN SEVERITY QUANTIFIED, NO PAIN PRESENT: ICD-10-PCS | Mod: HCNC,CPTII,S$GLB, | Performed by: UROLOGY

## 2021-10-14 PROCEDURE — 1159F MED LIST DOCD IN RCRD: CPT | Mod: HCNC,CPTII,S$GLB, | Performed by: UROLOGY

## 2021-10-14 PROCEDURE — 1126F AMNT PAIN NOTED NONE PRSNT: CPT | Mod: HCNC,CPTII,S$GLB, | Performed by: UROLOGY

## 2021-10-14 PROCEDURE — 99215 PR OFFICE/OUTPT VISIT, EST, LEVL V, 40-54 MIN: ICD-10-PCS | Mod: HCNC,S$GLB,, | Performed by: UROLOGY

## 2021-10-14 PROCEDURE — 3074F PR MOST RECENT SYSTOLIC BLOOD PRESSURE < 130 MM HG: ICD-10-PCS | Mod: HCNC,CPTII,S$GLB, | Performed by: UROLOGY

## 2021-10-14 PROCEDURE — 1159F PR MEDICATION LIST DOCUMENTED IN MEDICAL RECORD: ICD-10-PCS | Mod: HCNC,CPTII,S$GLB, | Performed by: UROLOGY

## 2021-10-14 PROCEDURE — 99215 OFFICE O/P EST HI 40 MIN: CPT | Mod: HCNC,S$GLB,, | Performed by: UROLOGY

## 2021-10-14 PROCEDURE — 3074F SYST BP LT 130 MM HG: CPT | Mod: HCNC,CPTII,S$GLB, | Performed by: UROLOGY

## 2021-10-14 PROCEDURE — 3288F FALL RISK ASSESSMENT DOCD: CPT | Mod: HCNC,CPTII,S$GLB, | Performed by: UROLOGY

## 2021-10-14 PROCEDURE — 3288F PR FALLS RISK ASSESSMENT DOCUMENTED: ICD-10-PCS | Mod: HCNC,CPTII,S$GLB, | Performed by: UROLOGY

## 2021-10-14 PROCEDURE — 1160F PR REVIEW ALL MEDS BY PRESCRIBER/CLIN PHARMACIST DOCUMENTED: ICD-10-PCS | Mod: HCNC,CPTII,S$GLB, | Performed by: UROLOGY

## 2021-10-18 ENCOUNTER — PATIENT MESSAGE (OUTPATIENT)
Dept: ORTHOPEDICS | Facility: CLINIC | Age: 76
End: 2021-10-18

## 2021-10-18 ENCOUNTER — OFFICE VISIT (OUTPATIENT)
Dept: ORTHOPEDICS | Facility: CLINIC | Age: 76
End: 2021-10-18
Payer: MEDICARE

## 2021-10-18 ENCOUNTER — HOSPITAL ENCOUNTER (OUTPATIENT)
Dept: RADIOLOGY | Facility: HOSPITAL | Age: 76
Discharge: HOME | End: 2021-10-18
Attending: ORTHOPAEDIC SURGERY
Payer: MEDICARE

## 2021-10-18 VITALS — HEIGHT: 71 IN | BODY MASS INDEX: 36.12 KG/M2 | RESPIRATION RATE: 16 BRPM | WEIGHT: 258 LBS

## 2021-10-18 DIAGNOSIS — M25.552 BILATERAL HIP PAIN: ICD-10-CM

## 2021-10-18 DIAGNOSIS — M54.50 CHRONIC BILATERAL LOW BACK PAIN WITHOUT SCIATICA: ICD-10-CM

## 2021-10-18 DIAGNOSIS — G89.29 CHRONIC BILATERAL LOW BACK PAIN WITHOUT SCIATICA: ICD-10-CM

## 2021-10-18 DIAGNOSIS — M25.551 BILATERAL HIP PAIN: ICD-10-CM

## 2021-10-18 DIAGNOSIS — M54.9 DORSALGIA, UNSPECIFIED: ICD-10-CM

## 2021-10-18 PROCEDURE — 1126F PR PAIN SEVERITY QUANTIFIED, NO PAIN PRESENT: ICD-10-PCS | Mod: HCNC,CPTII,S$GLB, | Performed by: ORTHOPAEDIC SURGERY

## 2021-10-18 PROCEDURE — 1159F MED LIST DOCD IN RCRD: CPT | Mod: HCNC,CPTII,S$GLB, | Performed by: ORTHOPAEDIC SURGERY

## 2021-10-18 PROCEDURE — 99204 PR OFFICE/OUTPT VISIT, NEW, LEVL IV, 45-59 MIN: ICD-10-PCS | Mod: HCNC,S$GLB,, | Performed by: ORTHOPAEDIC SURGERY

## 2021-10-18 PROCEDURE — 1159F PR MEDICATION LIST DOCUMENTED IN MEDICAL RECORD: ICD-10-PCS | Mod: HCNC,CPTII,S$GLB, | Performed by: ORTHOPAEDIC SURGERY

## 2021-10-18 PROCEDURE — 99999 PR PBB SHADOW E&M-EST. PATIENT-LVL V: CPT | Mod: PBBFAC,HCNC,, | Performed by: ORTHOPAEDIC SURGERY

## 2021-10-18 PROCEDURE — 1160F PR REVIEW ALL MEDS BY PRESCRIBER/CLIN PHARMACIST DOCUMENTED: ICD-10-PCS | Mod: HCNC,CPTII,S$GLB, | Performed by: ORTHOPAEDIC SURGERY

## 2021-10-18 PROCEDURE — 73521 XR HIPS BILATERAL 2 VIEW INCL AP PELVIS: ICD-10-PCS | Mod: 26,HCNC,, | Performed by: RADIOLOGY

## 2021-10-18 PROCEDURE — 73521 X-RAY EXAM HIPS BI 2 VIEWS: CPT | Mod: 26,HCNC,, | Performed by: RADIOLOGY

## 2021-10-18 PROCEDURE — 1160F RVW MEDS BY RX/DR IN RCRD: CPT | Mod: HCNC,CPTII,S$GLB, | Performed by: ORTHOPAEDIC SURGERY

## 2021-10-18 PROCEDURE — 99204 OFFICE O/P NEW MOD 45 MIN: CPT | Mod: HCNC,S$GLB,, | Performed by: ORTHOPAEDIC SURGERY

## 2021-10-18 PROCEDURE — 3288F PR FALLS RISK ASSESSMENT DOCUMENTED: ICD-10-PCS | Mod: HCNC,CPTII,S$GLB, | Performed by: ORTHOPAEDIC SURGERY

## 2021-10-18 PROCEDURE — 99999 PR PBB SHADOW E&M-EST. PATIENT-LVL V: ICD-10-PCS | Mod: PBBFAC,HCNC,, | Performed by: ORTHOPAEDIC SURGERY

## 2021-10-18 PROCEDURE — 3288F FALL RISK ASSESSMENT DOCD: CPT | Mod: HCNC,CPTII,S$GLB, | Performed by: ORTHOPAEDIC SURGERY

## 2021-10-18 PROCEDURE — 1101F PT FALLS ASSESS-DOCD LE1/YR: CPT | Mod: HCNC,CPTII,S$GLB, | Performed by: ORTHOPAEDIC SURGERY

## 2021-10-18 PROCEDURE — 1101F PR PT FALLS ASSESS DOC 0-1 FALLS W/OUT INJ PAST YR: ICD-10-PCS | Mod: HCNC,CPTII,S$GLB, | Performed by: ORTHOPAEDIC SURGERY

## 2021-10-18 PROCEDURE — 73521 X-RAY EXAM HIPS BI 2 VIEWS: CPT | Mod: TC,HCNC,PN

## 2021-10-18 PROCEDURE — 1126F AMNT PAIN NOTED NONE PRSNT: CPT | Mod: HCNC,CPTII,S$GLB, | Performed by: ORTHOPAEDIC SURGERY

## 2021-10-18 RX ORDER — GLIMEPIRIDE 2 MG/1
TABLET ORAL
COMMUNITY
Start: 2021-10-16 | End: 2022-05-04 | Stop reason: ALTCHOICE

## 2021-10-27 ENCOUNTER — LAB VISIT (OUTPATIENT)
Dept: LAB | Facility: HOSPITAL | Age: 76
End: 2021-10-27
Attending: FAMILY MEDICINE
Payer: MEDICARE

## 2021-10-27 DIAGNOSIS — R60.0 BILATERAL LEG EDEMA: ICD-10-CM

## 2021-10-27 LAB
ANION GAP SERPL CALC-SCNC: 8 MMOL/L (ref 8–16)
BUN SERPL-MCNC: 43 MG/DL (ref 8–23)
CALCIUM SERPL-MCNC: 9.9 MG/DL (ref 8.7–10.5)
CHLORIDE SERPL-SCNC: 98 MMOL/L (ref 95–110)
CO2 SERPL-SCNC: 29 MMOL/L (ref 23–29)
CREAT SERPL-MCNC: 1.5 MG/DL (ref 0.5–1.4)
EST. GFR  (AFRICAN AMERICAN): 50.8 ML/MIN/1.73 M^2
EST. GFR  (NON AFRICAN AMERICAN): 44 ML/MIN/1.73 M^2
GLUCOSE SERPL-MCNC: 148 MG/DL (ref 70–110)
POTASSIUM SERPL-SCNC: 4.5 MMOL/L (ref 3.5–5.1)
SODIUM SERPL-SCNC: 135 MMOL/L (ref 136–145)

## 2021-10-27 PROCEDURE — 36415 COLL VENOUS BLD VENIPUNCTURE: CPT | Mod: HCNC | Performed by: INTERNAL MEDICINE

## 2021-10-27 PROCEDURE — 80048 BASIC METABOLIC PNL TOTAL CA: CPT | Mod: HCNC | Performed by: INTERNAL MEDICINE

## 2021-11-01 DIAGNOSIS — N40.0 PROSTATISM: ICD-10-CM

## 2021-11-01 RX ORDER — TAMSULOSIN HYDROCHLORIDE 0.4 MG/1
0.8 CAPSULE ORAL DAILY
Qty: 60 CAPSULE | Refills: 11 | Status: SHIPPED | OUTPATIENT
Start: 2021-11-01 | End: 2021-11-24 | Stop reason: SDUPTHER

## 2021-11-17 ENCOUNTER — LAB VISIT (OUTPATIENT)
Dept: LAB | Facility: HOSPITAL | Age: 76
End: 2021-11-17
Attending: INTERNAL MEDICINE
Payer: MEDICARE

## 2021-11-17 DIAGNOSIS — R60.0 BILATERAL LEG EDEMA: ICD-10-CM

## 2021-11-17 LAB
ANION GAP SERPL CALC-SCNC: 8 MMOL/L (ref 8–16)
BUN SERPL-MCNC: 37 MG/DL (ref 8–23)
CALCIUM SERPL-MCNC: 9.6 MG/DL (ref 8.7–10.5)
CHLORIDE SERPL-SCNC: 101 MMOL/L (ref 95–110)
CO2 SERPL-SCNC: 28 MMOL/L (ref 23–29)
CREAT SERPL-MCNC: 1.6 MG/DL (ref 0.5–1.4)
EST. GFR  (AFRICAN AMERICAN): 47 ML/MIN/1.73 M^2
EST. GFR  (NON AFRICAN AMERICAN): 40.7 ML/MIN/1.73 M^2
GLUCOSE SERPL-MCNC: 147 MG/DL (ref 70–110)
POTASSIUM SERPL-SCNC: 4.6 MMOL/L (ref 3.5–5.1)
SODIUM SERPL-SCNC: 137 MMOL/L (ref 136–145)

## 2021-11-17 PROCEDURE — 80048 BASIC METABOLIC PNL TOTAL CA: CPT | Mod: HCNC | Performed by: INTERNAL MEDICINE

## 2021-11-17 PROCEDURE — 36415 COLL VENOUS BLD VENIPUNCTURE: CPT | Mod: HCNC,PO | Performed by: INTERNAL MEDICINE

## 2021-11-18 ENCOUNTER — PATIENT MESSAGE (OUTPATIENT)
Dept: PULMONOLOGY | Facility: CLINIC | Age: 76
End: 2021-11-18

## 2021-11-24 ENCOUNTER — OFFICE VISIT (OUTPATIENT)
Dept: PULMONOLOGY | Facility: CLINIC | Age: 76
End: 2021-11-24
Payer: MEDICARE

## 2021-11-24 ENCOUNTER — PATIENT OUTREACH (OUTPATIENT)
Dept: ADMINISTRATIVE | Facility: OTHER | Age: 76
End: 2021-11-24

## 2021-11-24 VITALS
WEIGHT: 257.94 LBS | HEIGHT: 71 IN | DIASTOLIC BLOOD PRESSURE: 60 MMHG | BODY MASS INDEX: 36.11 KG/M2 | HEART RATE: 75 BPM | SYSTOLIC BLOOD PRESSURE: 130 MMHG | OXYGEN SATURATION: 96 %

## 2021-11-24 DIAGNOSIS — J45.50 SEVERE PERSISTENT ASTHMA WITHOUT COMPLICATION: Primary | ICD-10-CM

## 2021-11-24 DIAGNOSIS — J82.83 EOSINOPHILIC ASTHMA: ICD-10-CM

## 2021-11-24 DIAGNOSIS — G47.33 OSA ON CPAP: ICD-10-CM

## 2021-11-24 DIAGNOSIS — N40.0 PROSTATISM: ICD-10-CM

## 2021-11-24 PROCEDURE — 99999 PR PBB SHADOW E&M-EST. PATIENT-LVL V: ICD-10-PCS | Mod: PBBFAC,HCNC,, | Performed by: INTERNAL MEDICINE

## 2021-11-24 PROCEDURE — 99499 RISK ADDL DX/OHS AUDIT: ICD-10-PCS | Mod: HCNC,S$GLB,, | Performed by: INTERNAL MEDICINE

## 2021-11-24 PROCEDURE — 99213 PR OFFICE/OUTPT VISIT, EST, LEVL III, 20-29 MIN: ICD-10-PCS | Mod: HCNC,S$GLB,, | Performed by: INTERNAL MEDICINE

## 2021-11-24 PROCEDURE — 99499 UNLISTED E&M SERVICE: CPT | Mod: HCNC,S$GLB,, | Performed by: INTERNAL MEDICINE

## 2021-11-24 PROCEDURE — 99213 OFFICE O/P EST LOW 20 MIN: CPT | Mod: HCNC,S$GLB,, | Performed by: INTERNAL MEDICINE

## 2021-11-24 PROCEDURE — 99999 PR PBB SHADOW E&M-EST. PATIENT-LVL V: CPT | Mod: PBBFAC,HCNC,, | Performed by: INTERNAL MEDICINE

## 2021-11-24 RX ORDER — TAMSULOSIN HYDROCHLORIDE 0.4 MG/1
0.8 CAPSULE ORAL DAILY
Qty: 180 CAPSULE | Refills: 3 | Status: SHIPPED | OUTPATIENT
Start: 2021-11-24 | End: 2023-02-07 | Stop reason: SDUPTHER

## 2021-11-24 RX ORDER — PREDNISONE 20 MG/1
TABLET ORAL
Qty: 21 TABLET | Refills: 0 | Status: SHIPPED | OUTPATIENT
Start: 2021-11-24 | End: 2023-01-10 | Stop reason: SDUPTHER

## 2021-11-26 ENCOUNTER — TELEPHONE (OUTPATIENT)
Dept: PULMONOLOGY | Facility: CLINIC | Age: 76
End: 2021-11-26

## 2021-11-26 DIAGNOSIS — Z01.818 OTHER SPECIFIED PRE-OPERATIVE EXAMINATION: Primary | ICD-10-CM

## 2021-11-28 DIAGNOSIS — E11.29 TYPE 2 DIABETES MELLITUS WITH MICROALBUMINURIA, WITHOUT LONG-TERM CURRENT USE OF INSULIN: ICD-10-CM

## 2021-11-28 DIAGNOSIS — R80.9 TYPE 2 DIABETES MELLITUS WITH MICROALBUMINURIA, WITHOUT LONG-TERM CURRENT USE OF INSULIN: ICD-10-CM

## 2021-11-28 RX ORDER — METFORMIN HYDROCHLORIDE 500 MG/1
TABLET ORAL
Qty: 180 TABLET | Refills: 3 | Status: SHIPPED | OUTPATIENT
Start: 2021-11-28 | End: 2022-11-28 | Stop reason: SDUPTHER

## 2021-12-02 ENCOUNTER — OFFICE VISIT (OUTPATIENT)
Dept: SPINE | Facility: CLINIC | Age: 76
End: 2021-12-02
Payer: MEDICARE

## 2021-12-02 VITALS — WEIGHT: 257.94 LBS | BODY MASS INDEX: 36.11 KG/M2 | HEIGHT: 71 IN

## 2021-12-02 DIAGNOSIS — G89.29 CHRONIC BILATERAL LOW BACK PAIN WITHOUT SCIATICA: Primary | ICD-10-CM

## 2021-12-02 DIAGNOSIS — M54.50 CHRONIC BILATERAL LOW BACK PAIN WITHOUT SCIATICA: Primary | ICD-10-CM

## 2021-12-02 PROCEDURE — 99204 OFFICE O/P NEW MOD 45 MIN: CPT | Mod: HCNC,S$GLB,, | Performed by: PHYSICAL MEDICINE & REHABILITATION

## 2021-12-02 PROCEDURE — 99204 PR OFFICE/OUTPT VISIT, NEW, LEVL IV, 45-59 MIN: ICD-10-PCS | Mod: HCNC,S$GLB,, | Performed by: PHYSICAL MEDICINE & REHABILITATION

## 2021-12-06 ENCOUNTER — HOSPITAL ENCOUNTER (OUTPATIENT)
Dept: RADIOLOGY | Facility: HOSPITAL | Age: 76
Discharge: HOME | End: 2021-12-06
Attending: ORTHOPAEDIC SURGERY
Payer: MEDICARE

## 2021-12-06 DIAGNOSIS — M54.9 DORSALGIA, UNSPECIFIED: ICD-10-CM

## 2021-12-06 PROCEDURE — 72148 MRI LUMBAR SPINE W/O DYE: CPT | Mod: TC

## 2021-12-09 ENCOUNTER — OFFICE VISIT (OUTPATIENT)
Dept: SPINE | Facility: CLINIC | Age: 76
End: 2021-12-09
Payer: MEDICARE

## 2021-12-09 DIAGNOSIS — M48.062 SPINAL STENOSIS OF LUMBAR REGION WITH NEUROGENIC CLAUDICATION: Primary | ICD-10-CM

## 2021-12-09 DIAGNOSIS — M51.36 DDD (DEGENERATIVE DISC DISEASE), LUMBAR: ICD-10-CM

## 2021-12-09 PROCEDURE — 99214 OFFICE O/P EST MOD 30 MIN: CPT | Mod: HCNC,S$GLB,, | Performed by: PHYSICAL MEDICINE & REHABILITATION

## 2021-12-09 PROCEDURE — 99214 PR OFFICE/OUTPT VISIT, EST, LEVL IV, 30-39 MIN: ICD-10-PCS | Mod: HCNC,S$GLB,, | Performed by: PHYSICAL MEDICINE & REHABILITATION

## 2021-12-16 ENCOUNTER — PROCEDURE VISIT (OUTPATIENT)
Dept: SLEEP MEDICINE | Facility: HOSPITAL | Age: 76
End: 2021-12-16
Attending: INTERNAL MEDICINE
Payer: MEDICARE

## 2021-12-16 DIAGNOSIS — G47.33 OSA ON CPAP: ICD-10-CM

## 2021-12-16 PROCEDURE — 95811 POLYSOM 6/>YRS CPAP 4/> PARM: CPT

## 2021-12-21 DIAGNOSIS — G47.33 OBSTRUCTIVE SLEEP APNEA: Primary | ICD-10-CM

## 2022-01-08 ENCOUNTER — PATIENT MESSAGE (OUTPATIENT)
Dept: FAMILY MEDICINE | Facility: CLINIC | Age: 77
End: 2022-01-08

## 2022-01-09 DIAGNOSIS — R80.9 TYPE 2 DIABETES MELLITUS WITH MICROALBUMINURIA, WITHOUT LONG-TERM CURRENT USE OF INSULIN: ICD-10-CM

## 2022-01-09 DIAGNOSIS — E11.29 TYPE 2 DIABETES MELLITUS WITH MICROALBUMINURIA, WITHOUT LONG-TERM CURRENT USE OF INSULIN: ICD-10-CM

## 2022-01-09 NOTE — TELEPHONE ENCOUNTER
No new care gaps identified.  Powered by [employer]. Reference number: [id number].   1/09/2022 12:40:59 PM CST

## 2022-01-10 ENCOUNTER — PATIENT MESSAGE (OUTPATIENT)
Dept: PULMONOLOGY | Facility: CLINIC | Age: 77
End: 2022-01-10

## 2022-01-11 ENCOUNTER — LAB VISIT (OUTPATIENT)
Dept: LAB | Facility: HOSPITAL | Age: 77
End: 2022-01-11
Attending: FAMILY MEDICINE
Payer: MEDICARE

## 2022-01-11 DIAGNOSIS — R80.9 TYPE 2 DIABETES MELLITUS WITH MICROALBUMINURIA, WITHOUT LONG-TERM CURRENT USE OF INSULIN: ICD-10-CM

## 2022-01-11 DIAGNOSIS — E11.29 TYPE 2 DIABETES MELLITUS WITH MICROALBUMINURIA, WITHOUT LONG-TERM CURRENT USE OF INSULIN: ICD-10-CM

## 2022-01-11 DIAGNOSIS — D69.6 THROMBOCYTOPENIA, UNSPECIFIED: ICD-10-CM

## 2022-01-11 DIAGNOSIS — N18.31 STAGE 3A CHRONIC KIDNEY DISEASE: ICD-10-CM

## 2022-01-11 LAB
ALBUMIN SERPL BCP-MCNC: 3.4 G/DL (ref 3.5–5.2)
ALP SERPL-CCNC: 79 U/L (ref 55–135)
ALT SERPL W/O P-5'-P-CCNC: 21 U/L (ref 10–44)
ANION GAP SERPL CALC-SCNC: 10 MMOL/L (ref 8–16)
AST SERPL-CCNC: 16 U/L (ref 10–40)
BASOPHILS # BLD AUTO: 0.01 K/UL (ref 0–0.2)
BASOPHILS NFR BLD: 0.2 % (ref 0–1.9)
BILIRUB SERPL-MCNC: 0.4 MG/DL (ref 0.1–1)
BUN SERPL-MCNC: 32 MG/DL (ref 8–23)
CA-I BLDV-SCNC: 1.25 MMOL/L (ref 1.06–1.42)
CALCIUM SERPL-MCNC: 9.9 MG/DL (ref 8.7–10.5)
CHLORIDE SERPL-SCNC: 100 MMOL/L (ref 95–110)
CO2 SERPL-SCNC: 29 MMOL/L (ref 23–29)
CREAT SERPL-MCNC: 1.3 MG/DL (ref 0.5–1.4)
DIFFERENTIAL METHOD: ABNORMAL
EOSINOPHIL # BLD AUTO: 0 K/UL (ref 0–0.5)
EOSINOPHIL NFR BLD: 0 % (ref 0–8)
ERYTHROCYTE [DISTWIDTH] IN BLOOD BY AUTOMATED COUNT: 13.4 % (ref 11.5–14.5)
EST. GFR  (AFRICAN AMERICAN): >60 ML/MIN/1.73 M^2
EST. GFR  (NON AFRICAN AMERICAN): 52.3 ML/MIN/1.73 M^2
ESTIMATED AVG GLUCOSE: 166 MG/DL (ref 68–131)
GLUCOSE SERPL-MCNC: 162 MG/DL (ref 70–110)
HBA1C MFR BLD: 7.4 % (ref 4–5.6)
HCT VFR BLD AUTO: 36 % (ref 40–54)
HGB BLD-MCNC: 11.6 G/DL (ref 14–18)
IMM GRANULOCYTES # BLD AUTO: 0.03 K/UL (ref 0–0.04)
IMM GRANULOCYTES NFR BLD AUTO: 0.5 % (ref 0–0.5)
LYMPHOCYTES # BLD AUTO: 1.5 K/UL (ref 1–4.8)
LYMPHOCYTES NFR BLD: 23.6 % (ref 18–48)
MCH RBC QN AUTO: 29.4 PG (ref 27–31)
MCHC RBC AUTO-ENTMCNC: 32.2 G/DL (ref 32–36)
MCV RBC AUTO: 91 FL (ref 82–98)
MONOCYTES # BLD AUTO: 0.7 K/UL (ref 0.3–1)
MONOCYTES NFR BLD: 11.7 % (ref 4–15)
NEUTROPHILS # BLD AUTO: 4 K/UL (ref 1.8–7.7)
NEUTROPHILS NFR BLD: 64 % (ref 38–73)
NRBC BLD-RTO: 0 /100 WBC
PHOSPHATE SERPL-MCNC: 3.9 MG/DL (ref 2.7–4.5)
PLATELET # BLD AUTO: 120 K/UL (ref 150–450)
PMV BLD AUTO: 12.3 FL (ref 9.2–12.9)
POTASSIUM SERPL-SCNC: 4.3 MMOL/L (ref 3.5–5.1)
PROT SERPL-MCNC: 6.7 G/DL (ref 6–8.4)
PTH-INTACT SERPL-MCNC: 110.1 PG/ML (ref 9–77)
RBC # BLD AUTO: 3.95 M/UL (ref 4.6–6.2)
SODIUM SERPL-SCNC: 139 MMOL/L (ref 136–145)
WBC # BLD AUTO: 6.22 K/UL (ref 3.9–12.7)

## 2022-01-11 PROCEDURE — 83970 ASSAY OF PARATHORMONE: CPT | Mod: HCNC | Performed by: FAMILY MEDICINE

## 2022-01-11 PROCEDURE — 36415 COLL VENOUS BLD VENIPUNCTURE: CPT | Mod: HCNC,PO | Performed by: FAMILY MEDICINE

## 2022-01-11 PROCEDURE — 84100 ASSAY OF PHOSPHORUS: CPT | Mod: HCNC | Performed by: FAMILY MEDICINE

## 2022-01-11 PROCEDURE — 85025 COMPLETE CBC W/AUTO DIFF WBC: CPT | Mod: HCNC | Performed by: FAMILY MEDICINE

## 2022-01-11 PROCEDURE — 83036 HEMOGLOBIN GLYCOSYLATED A1C: CPT | Mod: HCNC | Performed by: FAMILY MEDICINE

## 2022-01-11 PROCEDURE — 82330 ASSAY OF CALCIUM: CPT | Mod: HCNC | Performed by: FAMILY MEDICINE

## 2022-01-11 PROCEDURE — 80053 COMPREHEN METABOLIC PANEL: CPT | Mod: HCNC | Performed by: FAMILY MEDICINE

## 2022-01-12 ENCOUNTER — TELEPHONE (OUTPATIENT)
Dept: PULMONOLOGY | Facility: CLINIC | Age: 77
End: 2022-01-12

## 2022-01-12 ENCOUNTER — PATIENT MESSAGE (OUTPATIENT)
Dept: PULMONOLOGY | Facility: CLINIC | Age: 77
End: 2022-01-12

## 2022-01-12 RX ORDER — ACARBOSE 25 MG/1
TABLET ORAL
Qty: 270 TABLET | Refills: 1 | Status: SHIPPED | OUTPATIENT
Start: 2022-01-12 | End: 2023-07-11 | Stop reason: SDUPTHER

## 2022-01-12 NOTE — TELEPHONE ENCOUNTER
Refill Authorization Note   Lenny Lopez  is requesting a refill authorization.  Brief Assessment and Rationale for Refill:  Approve     Medication Therapy Plan:       Medication Reconciliation Completed: No   Comments:   --->Care Gap information included below if applicable.   Orders Placed This Encounter    acarbose (PRECOSE) 25 MG Tab      Requested Prescriptions   Signed Prescriptions Disp Refills    acarbose (PRECOSE) 25 MG Tab 270 tablet 1     Sig: TAKE 1 TABLET BY MOUTH THREE TIMES DAILY WITH MEALS       Endocrinology:  Diabetes - Alpha Glucosidase Inhibitors - acarbose Passed - 1/12/2022  9:55 AM        Passed - Patient is at least 18 years old        Passed - Valid encounter within last 15 months     Recent Visits  Date Type Provider Dept   10/13/21 Office Visit Blue Shah MD [facility]   05/13/21 Office Visit Blue Shah MD [facility]   08/11/20 Office Visit Blue Shah MD [facility] Medicine   01/30/20 Office Visit Blue Shah MD [facility]   Showing recent visits within past 720 days and meeting all other requirements  Future Appointments  No visits were found meeting these conditions.  Showing future appointments within next 150 days and meeting all other requirements      Future Appointments              In 6 days MD [facility] - Family Medicine, [place]    In 1 month Nathaniel Allan MD [place] [facility], [place] [facility]    In 3 months Mathieu Benavides Jr., MD [place] - Urology, [place]                Passed - HBA1C within 180 days     Lab Results   Component Value Date    HGBA1C 7.4 (H) 01/11/2022    HGBA1C 6.6 (H) 10/07/2021    HGBA1C 7.0 (H) 05/13/2021              Passed - AST is 119 or below and within 360 days     AST   Date Value Ref Range Status   01/11/2022 16 10 - 40 U/L Final   10/07/2021 18 10 - 40 U/L Final   02/17/2021 23 10 - 40 U/L Final              Passed - ALT is 131 or below and within 360 days     ALT   Date Value Ref Range  Status   01/11/2022 21 10 - 44 U/L Final   10/07/2021 20 10 - 44 U/L Final   02/17/2021 32 10 - 44 U/L Final              Passed - eGFR is 25 or above and within 360 days     Lab Results   Component Value Date    EGFRNONAA 52.3 (A) 01/11/2022    EGFRNONAA 40.7 (A) 11/17/2021    EGFRNONAA 44.0 (A) 10/27/2021                Passed - Cr is 2 or below and within 360 days     Lab Results   Component Value Date    CREATININE 1.3 01/11/2022    CREATININE 1.6 (H) 11/17/2021    CREATININE 1.5 (H) 10/27/2021                  Appointments  past 12m or future 3m with PCP    Date Provider   Last Visit   10/13/2021 Blue Shah MD   Next Visit   1/18/2022 Blue Shah MD   ED visits in past 90 days: 0     Note composed:10:01 AM 01/12/2022

## 2022-01-13 DIAGNOSIS — J45.50 SEVERE PERSISTENT ASTHMA WITHOUT COMPLICATION: ICD-10-CM

## 2022-01-13 RX ORDER — IPRATROPIUM BROMIDE AND ALBUTEROL SULFATE 2.5; .5 MG/3ML; MG/3ML
3 SOLUTION RESPIRATORY (INHALATION) EVERY 6 HOURS PRN
Qty: 1 EACH | Refills: 4 | Status: SHIPPED | OUTPATIENT
Start: 2022-01-13 | End: 2022-04-20 | Stop reason: SDUPTHER

## 2022-01-18 ENCOUNTER — PATIENT MESSAGE (OUTPATIENT)
Dept: FAMILY MEDICINE | Facility: CLINIC | Age: 77
End: 2022-01-18

## 2022-01-18 ENCOUNTER — HOSPITAL ENCOUNTER (OUTPATIENT)
Dept: RADIOLOGY | Facility: CLINIC | Age: 77
Discharge: HOME | End: 2022-01-18
Attending: FAMILY MEDICINE
Payer: MEDICARE

## 2022-01-18 ENCOUNTER — OFFICE VISIT (OUTPATIENT)
Dept: FAMILY MEDICINE | Facility: CLINIC | Age: 77
End: 2022-01-18
Payer: MEDICARE

## 2022-01-18 VITALS
RESPIRATION RATE: 16 BRPM | BODY MASS INDEX: 37.04 KG/M2 | TEMPERATURE: 98 F | DIASTOLIC BLOOD PRESSURE: 62 MMHG | HEART RATE: 71 BPM | WEIGHT: 264.56 LBS | HEIGHT: 71 IN | OXYGEN SATURATION: 97 % | SYSTOLIC BLOOD PRESSURE: 138 MMHG

## 2022-01-18 DIAGNOSIS — I77.9 BILATERAL CAROTID ARTERY DISEASE, UNSPECIFIED TYPE: ICD-10-CM

## 2022-01-18 DIAGNOSIS — E11.29 TYPE 2 DIABETES MELLITUS WITH MICROALBUMINURIA, WITHOUT LONG-TERM CURRENT USE OF INSULIN: Primary | ICD-10-CM

## 2022-01-18 DIAGNOSIS — E21.3 HYPERPARATHYROIDISM: ICD-10-CM

## 2022-01-18 DIAGNOSIS — J44.9 CHRONIC OBSTRUCTIVE PULMONARY DISEASE, UNSPECIFIED COPD TYPE: ICD-10-CM

## 2022-01-18 DIAGNOSIS — M19.071 PRIMARY OSTEOARTHRITIS OF RIGHT ANKLE: ICD-10-CM

## 2022-01-18 DIAGNOSIS — K74.60 HEPATIC CIRRHOSIS, UNSPECIFIED HEPATIC CIRRHOSIS TYPE, UNSPECIFIED WHETHER ASCITES PRESENT: ICD-10-CM

## 2022-01-18 DIAGNOSIS — I11.0 HYPERTENSIVE HEART DISEASE WITH HEART FAILURE: ICD-10-CM

## 2022-01-18 DIAGNOSIS — D69.6 THROMBOCYTOPENIA, UNSPECIFIED: ICD-10-CM

## 2022-01-18 DIAGNOSIS — E66.01 OBESITY, MORBID, BMI 40.0-49.9: ICD-10-CM

## 2022-01-18 DIAGNOSIS — M05.79 RHEUMATOID ARTHRITIS INVOLVING MULTIPLE SITES WITH POSITIVE RHEUMATOID FACTOR: ICD-10-CM

## 2022-01-18 DIAGNOSIS — R80.9 TYPE 2 DIABETES MELLITUS WITH MICROALBUMINURIA, WITHOUT LONG-TERM CURRENT USE OF INSULIN: Primary | ICD-10-CM

## 2022-01-18 DIAGNOSIS — N18.31 STAGE 3A CHRONIC KIDNEY DISEASE: ICD-10-CM

## 2022-01-18 DIAGNOSIS — I48.91 ATRIAL FIBRILLATION, UNSPECIFIED TYPE: ICD-10-CM

## 2022-01-18 DIAGNOSIS — I25.118 CORONARY ARTERY DISEASE OF NATIVE ARTERY OF NATIVE HEART WITH STABLE ANGINA PECTORIS: ICD-10-CM

## 2022-01-18 PROCEDURE — 1101F PR PT FALLS ASSESS DOC 0-1 FALLS W/OUT INJ PAST YR: ICD-10-PCS | Mod: HCNC,CPTII,S$GLB, | Performed by: FAMILY MEDICINE

## 2022-01-18 PROCEDURE — 99499 RISK ADDL DX/OHS AUDIT: ICD-10-PCS | Mod: S$PBB,,, | Performed by: FAMILY MEDICINE

## 2022-01-18 PROCEDURE — 1101F PT FALLS ASSESS-DOCD LE1/YR: CPT | Mod: HCNC,CPTII,S$GLB, | Performed by: FAMILY MEDICINE

## 2022-01-18 PROCEDURE — 1159F MED LIST DOCD IN RCRD: CPT | Mod: HCNC,CPTII,S$GLB, | Performed by: FAMILY MEDICINE

## 2022-01-18 PROCEDURE — 1125F PR PAIN SEVERITY QUANTIFIED, PAIN PRESENT: ICD-10-PCS | Mod: HCNC,CPTII,S$GLB, | Performed by: FAMILY MEDICINE

## 2022-01-18 PROCEDURE — 99214 PR OFFICE/OUTPT VISIT, EST, LEVL IV, 30-39 MIN: ICD-10-PCS | Mod: HCNC,S$GLB,, | Performed by: FAMILY MEDICINE

## 2022-01-18 PROCEDURE — 1159F PR MEDICATION LIST DOCUMENTED IN MEDICAL RECORD: ICD-10-PCS | Mod: HCNC,CPTII,S$GLB, | Performed by: FAMILY MEDICINE

## 2022-01-18 PROCEDURE — 3288F FALL RISK ASSESSMENT DOCD: CPT | Mod: HCNC,CPTII,S$GLB, | Performed by: FAMILY MEDICINE

## 2022-01-18 PROCEDURE — 3078F PR MOST RECENT DIASTOLIC BLOOD PRESSURE < 80 MM HG: ICD-10-PCS | Mod: HCNC,CPTII,S$GLB, | Performed by: FAMILY MEDICINE

## 2022-01-18 PROCEDURE — 3051F HG A1C>EQUAL 7.0%<8.0%: CPT | Mod: HCNC,CPTII,S$GLB, | Performed by: FAMILY MEDICINE

## 2022-01-18 PROCEDURE — 73630 XR FOOT COMPLETE 3 VIEW RIGHT: ICD-10-PCS | Mod: 26,HCNC,RT,S$GLB | Performed by: RADIOLOGY

## 2022-01-18 PROCEDURE — 99999 PR PBB SHADOW E&M-EST. PATIENT-LVL V: ICD-10-PCS | Mod: PBBFAC,HCNC,, | Performed by: FAMILY MEDICINE

## 2022-01-18 PROCEDURE — 73630 X-RAY EXAM OF FOOT: CPT | Mod: TC,HCNC,FY,PO,RT

## 2022-01-18 PROCEDURE — 3072F PR LOW RISK FOR RETINOPATHY: ICD-10-PCS | Mod: HCNC,CPTII,S$GLB, | Performed by: FAMILY MEDICINE

## 2022-01-18 PROCEDURE — 99499 UNLISTED E&M SERVICE: CPT | Mod: S$PBB,,, | Performed by: FAMILY MEDICINE

## 2022-01-18 PROCEDURE — 3072F LOW RISK FOR RETINOPATHY: CPT | Mod: HCNC,CPTII,S$GLB, | Performed by: FAMILY MEDICINE

## 2022-01-18 PROCEDURE — 3075F SYST BP GE 130 - 139MM HG: CPT | Mod: HCNC,CPTII,S$GLB, | Performed by: FAMILY MEDICINE

## 2022-01-18 PROCEDURE — 99999 PR PBB SHADOW E&M-EST. PATIENT-LVL V: CPT | Mod: PBBFAC,HCNC,, | Performed by: FAMILY MEDICINE

## 2022-01-18 PROCEDURE — 3075F PR MOST RECENT SYSTOLIC BLOOD PRESS GE 130-139MM HG: ICD-10-PCS | Mod: HCNC,CPTII,S$GLB, | Performed by: FAMILY MEDICINE

## 2022-01-18 PROCEDURE — 1125F AMNT PAIN NOTED PAIN PRSNT: CPT | Mod: HCNC,CPTII,S$GLB, | Performed by: FAMILY MEDICINE

## 2022-01-18 PROCEDURE — 99214 OFFICE O/P EST MOD 30 MIN: CPT | Mod: HCNC,S$GLB,, | Performed by: FAMILY MEDICINE

## 2022-01-18 PROCEDURE — 3051F PR MOST RECENT HEMOGLOBIN A1C LEVEL 7.0 - < 8.0%: ICD-10-PCS | Mod: HCNC,CPTII,S$GLB, | Performed by: FAMILY MEDICINE

## 2022-01-18 PROCEDURE — 3288F PR FALLS RISK ASSESSMENT DOCUMENTED: ICD-10-PCS | Mod: HCNC,CPTII,S$GLB, | Performed by: FAMILY MEDICINE

## 2022-01-18 PROCEDURE — 73630 X-RAY EXAM OF FOOT: CPT | Mod: 26,HCNC,RT,S$GLB | Performed by: RADIOLOGY

## 2022-01-18 PROCEDURE — 3078F DIAST BP <80 MM HG: CPT | Mod: HCNC,CPTII,S$GLB, | Performed by: FAMILY MEDICINE

## 2022-01-18 RX ORDER — ALLOPURINOL 100 MG/1
200 TABLET ORAL DAILY
Qty: 30 TABLET | Refills: 3 | Status: SHIPPED | OUTPATIENT
Start: 2022-01-18 | End: 2023-01-26 | Stop reason: SDUPTHER

## 2022-01-18 RX ORDER — HYDROCODONE BITARTRATE AND ACETAMINOPHEN 7.5; 325 MG/1; MG/1
1 TABLET ORAL EVERY 6 HOURS PRN
Qty: 28 TABLET | Refills: 0 | Status: SHIPPED | OUTPATIENT
Start: 2022-03-19 | End: 2022-01-20

## 2022-01-18 NOTE — PROGRESS NOTES
Subjective:       Patient ID: Lenny Lopez is a 77 y.o. male.    Chief Complaint: Follow-up    SUBJECTIVE: Lenny Lopez is a 77 y.o. male seen for a follow up visit; he has  Controlled diabetes II controlled hypertension, mixed hyperlipidemia, preserved EF CHF, peripheral vascular disease and obesity.  He wishes to also address some pain in the joints and feet at today's visit. These have been mild-to-moderate in nature, gradual in onset. Exam shows mild generalized muscle tenderness and reduced range of motion of feet. These pains seem benign and are likely related to osteoarthritis and tendonitis. OTC or prescription NSAID's are recommended for PRN use, side effects are discussed. Return for further discussion if these persist or worsen.    Current Outpatient Medications:  ACCU-CHEK GUIDE GLUCOSE METER Misc, USE DEVICE TO TEST BLOOD SUGAR TWO TIMES DAILY, Disp: , Rfl:   albuterol (PROVENTIL/VENTOLIN HFA) 90 mcg/actuation inhaler, 2 puffs every 4 hours as needed for cough, wheeze, or shortness of breath, Disp: 18 g, Rfl: 11  albuterol-ipratropium (DUO-NEB) 2.5 mg-0.5 mg/3 mL nebulizer solution, Take 3 mLs by nebulization every 6 (six) hours as needed for Wheezing. Rescue, Disp: 1 Box, Rfl: 4  aspirin 81 mg Tab, Take 1 tablet by mouth once daily. , Disp: , Rfl:   atorvastatin (LIPITOR) 40 MG tablet, Take 1 tablet (40 mg total) by mouth once daily., Disp: 90 tablet, Rfl: 3  benralizumab (FASENRA PEN) 30 mg/mL AtIn, Inject 30 mg into the skin every 8 weeks., Disp: 1 mL, Rfl: 6  blood sugar diagnostic Strp, 1 strip by Misc.(Non-Drug; Combo Route) route 3 (three) times daily. DX: E11.29   Dispense test strips that are covered by insurance, Disp: 300 strip, Rfl: 3  blood sugar diagnostic, drum (ACCU-CHEK COMPACT PLUS TEST) Strp, 1 strip by Misc.(Non-Drug; Combo Route) route 2 (two) times daily with meals., Disp: 100 strip, Rfl: 1  blood-glucose meter, drum-type Kit, 1 Device by Misc.(Non-Drug; Combo Route) route  2 (two) times daily with meals., Disp: 1 kit, Rfl: 0  ELIQUIS 5 mg Tab, Take 5 mg by mouth 2 (two) times daily. , Disp: , Rfl:   ergocalciferol (ERGOCALCIFEROL) 50,000 unit Cap, Take 1 capsule (50,000 Units total) by mouth once a week., Disp: 4 capsule, Rfl: 5  finasteride (PROSCAR) 5 mg tablet, Take 1 tablet (5 mg total) by mouth once daily., Disp: 30 tablet, Rfl: 11  fluticasone propionate (FLOVENT HFA) 220 mcg/actuation inhaler, Inhale 1 puff into the lungs 2 (two) times daily. Controller, Disp: 12 g, Rfl: 11  furosemide (LASIX) 20 MG tablet, Take 1 tablet (20 mg total) by mouth once daily., Disp: 90 tablet, Rfl: 3  furosemide (LASIX) 20 MG tablet, Take 2 tablets (40 mg total) by mouth 2 (two) times daily., Disp: 120 tablet, Rfl: 11  KENALOG 40 mg/mL injection, , Disp: , Rfl:   lancets (FREESTYLE LANCETS) 28 gauge Misc, 1 lancet by Misc.(Non-Drug; Combo Route) route 3 (three) times daily., Disp: 300 each, Rfl: 3  lancing device Misc, 1 Device by Misc.(Non-Drug; Combo Route) route 2 (two) times daily with meals., Disp: 100 each, Rfl: 0  lisinopriL (PRINIVIL,ZESTRIL) 2.5 MG tablet, Take 1 tablet (2.5 mg total) by mouth once daily., Disp: 90 tablet, Rfl: 3  metFORMIN (GLUCOPHAGE) 500 MG tablet, Take 1 tablet (500 mg total) by mouth 2 (two) times daily with meals., Disp: 60 tablet, Rfl: 3  montelukast (SINGULAIR) 10 mg tablet, Take 1 tablet (10 mg total) by mouth every evening., Disp: 30 tablet, Rfl: 11  mupirocin (BACTROBAN) 2 % ointment, APPLY OINTMENT TOPICALLY TO AFFECTED AREA THREE TIMES DAILY FOR 5 DAYS, Disp: , Rfl:   MYRBETRIQ 50 mg Tb24, TAKE 1 TABLET BY MOUTH ONCE DAILY, Disp: 30 tablet, Rfl: 11  omega-3 fatty acids-vitamin E (FISH OIL) 1,000 mg Cap, Take 1 capsule by mouth once daily. Three times a day, Disp: , Rfl:   potassium chloride (KLOR-CON) 10 MEQ TbSR, 1 tab with furosemide., Disp: 180 tablet, Rfl: 5  predniSONE (DELTASONE) 20 MG tablet, Take one daily for 3 days and may repeat for shortness of  breath., Disp: 21 tablet, Rfl: 0  pulse oximeter (PULSE OXIMETER) device, by Apply Externally route 2 (two) times a day. Use twice daily at 8 AM and 3 PM and record the value in MyChart as directed., Disp: 1 each, Rfl: 0  sotalol (BETAPACE) 80 MG tablet, Take 0.5 tablets (40 mg total) by mouth 2 (two) times daily. for 7 days, Disp: 7 tablet, Rfl: 0  tamsulosin (FLOMAX) 0.4 mg Cap, Take 1 capsule (0.4 mg total) by mouth once daily., Disp: 30 capsule, Rfl: 11  traMADoL (ULTRAM) 50 mg tablet, Take 1 tablet (50 mg total) by mouth every 6 (six) hours as needed for Pain. 7 days for acute pain., Disp: 28 tablet, Rfl: 1  turmeric root extract 500 mg Cap, Take 1 capsule by mouth once daily., Disp: , Rfl:   VENTOLIN HFA 90 mcg/actuation inhaler, Inhale 1-2 puffs into the lungs every 4 (four) hours as needed for Wheezing or Shortness of Breath., Disp: 18 g, Rfl: 3  acarbose (PRECOSE) 25 MG Tab, Take 1 tablet (25 mg total) by mouth 2 (two) times daily before meals., Disp: 180 tablet, Rfl: 3    No current facility-administered medications for this visit.  Facility-Administered Medications Ordered in Other Visits:  lidocaine (PF) 10 mg/ml (1%) injection 10 mg, 1 mL, Intradermal, Once, Deandra Diaz MD      Patient Active Problem List:     Osteoarthritis     Hypertension associated with diabetes     CKD (chronic kidney disease) stage 3, GFR 39.7 ml/min     Atrial fibrillation     Constipation due to pain medication     Gout     Low testosterone     Metabolic syndrome     LVH (left ventricular hypertrophy) due to hypertensive disease     Cardiovascular risk factor, ASCVD 10-year risk 20.7%, ideal 14.1%     Chews tobacco regularly, about can a day     Type 2 diabetes mellitus with microalbuminuria, without long-term current use of insulin     Microalbuminuria     LUZ on CPAP, 100% use, 2016     Hyperlipidemia associated with type 2 diabetes mellitus     Prostatism     Bilateral leg edema     Calcified granuloma of lung      Bilateral carotid artery disease     Statin intolerance     Severe persistent asthma without complication     Eosinophilic asthma     Coronary artery disease of native artery of native heart with stable angina pectoris     Urge urinary incontinence     Rheumatoid factor positive     Hyperparathyroidism     Chronic pain of left knee     Vocal cord mass     COPD (chronic obstructive pulmonary disease)     Fall     Rheumatoid arthritis involving multiple sites with positive rheumatoid factor     Thrombocytopenia, unspecified     Hypertensive heart disease with heart failure     Diabetic retinopathy associated with controlled type 2 diabetes mellitus     History of colon polyps      Review of Systems   Constitutional: Positive for fatigue. Negative for unexpected weight change.   Respiratory: Positive for shortness of breath. Negative for chest tightness.    Cardiovascular: Positive for leg swelling. Negative for chest pain and palpitations.   Gastrointestinal: Negative for abdominal pain.   Musculoskeletal: Negative for arthralgias.   Neurological: Negative for dizziness, syncope, light-headedness and headaches.       Patient Active Problem List   Diagnosis    Osteoarthritis    Hypertension associated with diabetes    CKD (chronic kidney disease) stage 3, GFR 39.7 ml/min    Atrial fibrillation    Constipation due to pain medication    Gout    Low testosterone    Metabolic syndrome    LVH (left ventricular hypertrophy) due to hypertensive disease    Cardiovascular risk factor, ASCVD 10-year risk 20.7%, ideal 14.1%    Chews tobacco regularly, about can a day    Type 2 diabetes mellitus with microalbuminuria, without long-term current use of insulin    Microalbuminuria    LUZ on CPAP, 100% use, 2016    Hyperlipidemia associated with type 2 diabetes mellitus    Prostatism    Bilateral leg edema    Calcified granuloma of lung    Bilateral carotid artery disease    Statin intolerance    Severe persistent  asthma without complication    Eosinophilic asthma    Coronary artery disease of native artery of native heart with stable angina pectoris    Urge urinary incontinence    Rheumatoid factor positive    Obesity, morbid, BMI 40.0-49.9    Hyperparathyroidism    Chronic pain of left knee    Vocal cord mass    COPD (chronic obstructive pulmonary disease)    Fall    Rheumatoid arthritis involving multiple sites with positive rheumatoid factor    Thrombocytopenia, unspecified    Hypertensive heart disease with heart failure    Diabetic retinopathy associated with controlled type 2 diabetes mellitus    History of colon polyps    Hepatic cirrhosis, unspecified hepatic cirrhosis type, unspecified whether ascites present       Objective:      Physical Exam  Vitals reviewed.   Constitutional:       General: He is not in acute distress.     Appearance: He is well-developed. He is obese. He is not diaphoretic.   HENT:      Head: Normocephalic and atraumatic.      Right Ear: External ear normal.      Left Ear: External ear normal.      Nose: Nose normal.      Mouth/Throat:      Pharynx: No oropharyngeal exudate.   Eyes:      General: No scleral icterus.        Right eye: No discharge.         Left eye: No discharge.      Conjunctiva/sclera: Conjunctivae normal.      Pupils: Pupils are equal, round, and reactive to light.   Neck:      Thyroid: No thyromegaly.      Vascular: No JVD.      Trachea: No tracheal deviation.   Cardiovascular:      Rate and Rhythm: Normal rate.      Chest Wall: PMI is displaced.      Heart sounds: Heart sounds are distant. No murmur heard.     Pulmonary:      Effort: Pulmonary effort is normal. No respiratory distress.      Breath sounds: Decreased air movement present. Examination of the right-lower field reveals decreased breath sounds. Examination of the left-lower field reveals decreased breath sounds. Decreased breath sounds present. No wheezing or rales.   Abdominal:      General:  Bowel sounds are normal. There is no distension.      Palpations: Abdomen is soft. There is no mass.      Tenderness: There is no abdominal tenderness. There is no guarding or rebound.   Musculoskeletal:         General: No tenderness.      Cervical back: Neck supple. Crepitus present. Spinous process tenderness present. Decreased range of motion.      Comments:   Bilateral swollen feet, swollen lat malleolar and both first proximal phalangeal metatarsal bunion.     Lymphadenopathy:      Cervical: No cervical adenopathy.   Skin:     General: Skin is warm and dry.      Coloration: Skin is not pale.      Findings: No erythema or rash.   Neurological:      Mental Status: He is alert and oriented to person, place, and time.      Cranial Nerves: No cranial nerve deficit.      Coordination: Coordination normal.   Psychiatric:         Behavior: Behavior normal.         Thought Content: Thought content normal.         Judgment: Judgment normal.         Lab Results   Component Value Date    WBC 6.22 01/11/2022    HGB 11.6 (L) 01/11/2022    HCT 36.0 (L) 01/11/2022     (L) 01/11/2022    CHOL 106 (L) 10/07/2021    TRIG 74 10/07/2021    HDL 30 (L) 10/07/2021    ALT 21 01/11/2022    AST 16 01/11/2022     01/11/2022    K 4.3 01/11/2022     01/11/2022    CREATININE 1.3 01/11/2022    BUN 32 (H) 01/11/2022    CO2 29 01/11/2022    TSH 4.662 (H) 05/13/2021    PSA 3.5 12/01/2015    INR 1.1 09/03/2020    HGBA1C 7.4 (H) 01/11/2022     The ASCVD Risk score (Kenn DC Jr., et al., 2013) failed to calculate for the following reasons:    The valid total cholesterol range is 130 to 320 mg/dL    Assessment:       1. Type 2 diabetes mellitus with microalbuminuria, without long-term current use of insulin    2. Stage 3a chronic kidney disease    3. Thrombocytopenia, unspecified    4. Primary osteoarthritis of right ankle    5. Hepatic cirrhosis, unspecified hepatic cirrhosis type, unspecified whether ascites present    6.  Hyperparathyroidism    7. Rheumatoid arthritis involving multiple sites with positive rheumatoid factor    8. Hypertensive heart disease with heart failure    9. Coronary artery disease of native artery of native heart with stable angina pectoris    10. Atrial fibrillation, unspecified type    11. Bilateral carotid artery disease, unspecified type    12. Chronic obstructive pulmonary disease, unspecified COPD type    13. Obesity, morbid, BMI 40.0-49.9        Plan:       Type 2 diabetes mellitus with microalbuminuria, without long-term current use of insulin    Stage 3a chronic kidney disease    Thrombocytopenia, unspecified    Primary osteoarthritis of right ankle  -     Discontinue: HYDROcodone-acetaminophen (NORCO) 7.5-325 mg per tablet; Take 1 tablet by mouth every 6 (six) hours as needed for Pain.  Dispense: 28 tablet; Refill: 0  -     allopurinoL (ZYLOPRIM) 100 MG tablet; Take 2 tablets (200 mg total) by mouth once daily.  Dispense: 30 tablet; Refill: 3  -     X-Ray Foot Complete 3 view Right; Future; Expected date: 01/18/2022    Hepatic cirrhosis, unspecified hepatic cirrhosis type, unspecified whether ascites present    Hyperparathyroidism    Rheumatoid arthritis involving multiple sites with positive rheumatoid factor    Hypertensive heart disease with heart failure    Coronary artery disease of native artery of native heart with stable angina pectoris    Atrial fibrillation, unspecified type    Bilateral carotid artery disease, unspecified type    Chronic obstructive pulmonary disease, unspecified COPD type    Obesity, morbid, BMI 40.0-49.9      I have reviewed diabetes in detail with the patient today. All questions have been answered to his satisfaction. We have discussed the following concepts: low cholesterol diet, weight control and daily exercise discussed, home glucose monitoring emphasized, all medications, side effects and compliance discussed carefully, diabetic Sick Day rules reviewed, handout  given, foot care discussed and Podiatry visits discussed, annual eye examinations at Ophthalmology discussed, glycohemoglobin and other lab monitoring discussed, long term diabetic complications discussed and labs immediately prior to next visit. The diabetic Sick Day rules are reviewed with him verbally and in writing. If following usual diet, stay on same dose of diabetic medication, maintain high fluid intake, and perform home glucose monitoring QID. If not able to maintain normal diet due to illness maintain hydration with high fluid intake and call MD if glucose above 400. Insulin: instructions given on proper technique of injection, storing medication, timing of dose. The patient was able to demonstrate adequate skill with doing self injections and home glucose monitoring. Blue Shah 1/23/2022 3:46 PM    35-minute visit. 10 minutes spent counseling patient on diet, exercise, and weight loss.  This has been fully explained to the patient, who indicates understanding.    Discussed health maintenance guidelines appropriate for age.  Discussed health maintenance guidelines appropriate for age.    10

## 2022-01-19 ENCOUNTER — PATIENT MESSAGE (OUTPATIENT)
Dept: FAMILY MEDICINE | Facility: CLINIC | Age: 77
End: 2022-01-19

## 2022-01-19 NOTE — PROGRESS NOTES
X-rays.  No erosion no fractures.  Spurs are incidental finding.  Use insoles both feet.  No signs of gout no rheumatoid arthritis

## 2022-01-20 ENCOUNTER — PATIENT MESSAGE (OUTPATIENT)
Dept: FAMILY MEDICINE | Facility: CLINIC | Age: 77
End: 2022-01-20

## 2022-01-20 ENCOUNTER — TELEPHONE (OUTPATIENT)
Dept: FAMILY MEDICINE | Facility: CLINIC | Age: 77
End: 2022-01-20

## 2022-01-20 DIAGNOSIS — M19.071 PRIMARY OSTEOARTHRITIS OF RIGHT ANKLE: ICD-10-CM

## 2022-01-20 RX ORDER — HYDROCODONE BITARTRATE AND ACETAMINOPHEN 7.5; 325 MG/1; MG/1
1 TABLET ORAL EVERY 6 HOURS PRN
Qty: 28 TABLET | Refills: 0 | Status: SHIPPED | OUTPATIENT
Start: 2022-01-20 | End: 2022-02-19

## 2022-01-23 ENCOUNTER — PATIENT MESSAGE (OUTPATIENT)
Dept: FAMILY MEDICINE | Facility: CLINIC | Age: 77
End: 2022-01-23

## 2022-01-23 PROBLEM — K74.60 HEPATIC CIRRHOSIS, UNSPECIFIED HEPATIC CIRRHOSIS TYPE, UNSPECIFIED WHETHER ASCITES PRESENT: Status: ACTIVE | Noted: 2022-01-23

## 2022-01-24 ENCOUNTER — TELEPHONE (OUTPATIENT)
Dept: PULMONOLOGY | Facility: CLINIC | Age: 77
End: 2022-01-24

## 2022-01-24 ENCOUNTER — OFFICE VISIT (OUTPATIENT)
Dept: FAMILY MEDICINE | Facility: CLINIC | Age: 77
End: 2022-01-24
Payer: MEDICARE

## 2022-01-24 ENCOUNTER — PATIENT MESSAGE (OUTPATIENT)
Dept: FAMILY MEDICINE | Facility: CLINIC | Age: 77
End: 2022-01-24

## 2022-01-24 DIAGNOSIS — H54.7 VISION LOSS: Primary | ICD-10-CM

## 2022-01-24 DIAGNOSIS — G43.109 MIGRAINE WITH AURA AND WITHOUT STATUS MIGRAINOSUS, NOT INTRACTABLE: ICD-10-CM

## 2022-01-24 PROCEDURE — 3072F LOW RISK FOR RETINOPATHY: CPT | Mod: HCNC,CPTII,95, | Performed by: NURSE PRACTITIONER

## 2022-01-24 PROCEDURE — 99215 PR OFFICE/OUTPT VISIT, EST, LEVL V, 40-54 MIN: ICD-10-PCS | Mod: HCNC,95,, | Performed by: NURSE PRACTITIONER

## 2022-01-24 PROCEDURE — 3072F PR LOW RISK FOR RETINOPATHY: ICD-10-PCS | Mod: HCNC,CPTII,95, | Performed by: NURSE PRACTITIONER

## 2022-01-24 PROCEDURE — 99215 OFFICE O/P EST HI 40 MIN: CPT | Mod: HCNC,95,, | Performed by: NURSE PRACTITIONER

## 2022-01-24 NOTE — PROGRESS NOTES
This dictation has been generated using Modal Fluency Dictation some phonetic errors may occur. Please contact author for clarification if needed.     Problem List Items Addressed This Visit    None     Visit Diagnoses     Vision loss    -  Primary    Migraine with aura and without status migrainosus, not intractable                   Vision change. Consider stroke, tumor, acute eye problem, migraine. Needs exam by optometry.   Consider neurology referral and/or imaging next.   Discussed with opt    No follow-ups on file.    ________________________________________________________________  ________________________________________________________________      No chief complaint on file.    History of present illness  This 77 y.o. presents today for complaint of eye problem. Notes acute vision loss about 2-3 days ago. Notes sudden onset on wavy vision that reolved. He later lost vision both eyes stating if he looked a someone on TV he only saw the right side. This lasted about 45 minutes. He did not have eye pain. He did have a headache above the right eye after that. He has had migraines for 37 years.   He had recent eye exam at a place for glasses.   No complaint of weakness, slurred speech, trouble swallowing, or gait problem.       Past Medical History:   Diagnosis Date    Acute hypoxemic respiratory failure 1/1/2021    Angina pectoris     Arthritis     Asthma     Atrial fibrillation     Back pain     Cardiac pacemaker     Cataract     Chronic bronchitis     COPD (chronic obstructive pulmonary disease)     Coronary artery disease     COVID-19     Diabetic retinopathy associated with controlled type 2 diabetes mellitus 2/18/2021    DM (diabetes mellitus), type 2, 2000 12/12/2014    Gout     Hepatic cirrhosis, unspecified hepatic cirrhosis type, unspecified whether ascites present 1/23/2022    Hoarseness of voice     Hyperlipidemia     Hypertension     Kidney stones 12/17/2012    Nephrolithiasis      Obesity     Pneumonia due to COVID-19 virus 1/1/2021    Renal manifestation of secondary diabetes mellitus     Rheumatoid factor positive 03/08/2017    Negative work up with Dr. Choudhury    Sleep apnea     Thyroid disease     Unspecified disorder of kidney and ureter     Urinary incontinence     Vocal cord polyp        Past Surgical History:   Procedure Laterality Date    APPENDECTOMY      CARDIAC PACEMAKER PLACEMENT  2018    COLONOSCOPY N/A 3/3/2021    Procedure: COLONOSCOPY;  Surgeon: Jesus Soliman MD;  Location: [facility];  Service: Endoscopy;  Laterality: N/A;    CORONARY STENT PLACEMENT  2018    EYE SURGERY      left eye secondary to trauma    FRACTURE SURGERY      right arm    KNEE SURGERY      screw    MICROLARYNGOSCOPY WITH BIOPSY OF VOCAL CORD N/A 10/13/2020    Procedure: MICROLARYNGOSCOPY, WITH VOCAL CORD BIOPSY;  Surgeon: Deandra Diaz MD;  [facility];  Service: ENT;  Laterality: N/A;    TONSILLECTOMY, ADENOIDECTOMY         Family History   Problem Relation Age of Onset    Thyroid disease Other     Cancer Mother     Hypertension Mother     Osteoarthritis Mother     Heart disease Father     Hypertension Father     Cancer Paternal Uncle         lung    Hypertension Sister     Hypertension Brother     Cancer Brother         colon?    Diabetes Maternal Aunt     Cancer Brother         pancreatic    Kidney disease Daughter     Stroke Daughter     No Known Problems Son     No Known Problems Daughter     Collagen disease Neg Hx     Amblyopia Neg Hx     Blindness Neg Hx     Cataracts Neg Hx     Glaucoma Neg Hx     Macular degeneration Neg Hx     Retinal detachment Neg Hx     Strabismus Neg Hx        Social History     Socioeconomic History    Marital status:      Spouse name: Halie    Number of children: 3    Years of education: 8    Highest education level: 8th grade   Occupational History    Occupation: Retired   Tobacco Use    Smoking  status: Former Smoker     Types: Cigars    Smokeless tobacco: Current User     Types: Chew    Tobacco comment: smoked a few cigars in his 20s   Substance and Sexual Activity    Alcohol use: Yes     Comment: a few beers during holidays    Drug use: No    Sexual activity: Not Currently     Partners: Female     Social Determinants of Health     Financial Resource Strain: Low Risk     Difficulty of Paying Living Expenses: Not very hard   Food Insecurity: No Food Insecurity    Worried About Running Out of Food in the Last Year: Never true    Ran Out of Food in the Last Year: Never true   Transportation Needs: No Transportation Needs    Lack of Transportation (Medical): No    Lack of Transportation (Non-Medical): No   Physical Activity: Inactive    Days of Exercise per Week: 0 days    Minutes of Exercise per Session: 0 min   Stress: No Stress Concern Present    Feeling of Stress : Not at all   Social Connections: Moderately Isolated    Frequency of Communication with Friends and Family: More than three times a week    Frequency of Social Gatherings with Friends and Family: More than three times a week    Attends Christianity Services: Never    Active Member of Clubs or Organizations: No    Attends Club or Organization Meetings: Never    Marital Status:    Housing Stability: Low Risk     Unable to Pay for Housing in the Last Year: No    Number of Places Lived in the Last Year: 1    Unstable Housing in the Last Year: No       Current Outpatient Medications   Medication Sig Dispense Refill    acarbose (PRECOSE) 25 MG Tab TAKE 1 TABLET BY MOUTH THREE TIMES DAILY WITH MEALS 270 tablet 1    ACCU-CHEK GUIDE GLUCOSE METER Misc USE DEVICE TO TEST BLOOD SUGAR TWO TIMES DAILY      albuterol (PROVENTIL/VENTOLIN HFA) 90 mcg/actuation inhaler 2 puffs every 4 hours as needed for cough, wheeze, or shortness of breath 18 g 11    albuterol-ipratropium (DUO-NEB) 2.5 mg-0.5 mg/3 mL nebulizer solution Take 3 mLs  by nebulization every 6 (six) hours as needed for Wheezing. Rescue 1 each 4    allopurinoL (ZYLOPRIM) 100 MG tablet Take 2 tablets (200 mg total) by mouth once daily. 30 tablet 3    aspirin 81 mg Tab Take 1 tablet by mouth once daily.       atorvastatin (LIPITOR) 40 MG tablet Take 1 tablet (40 mg total) by mouth once daily. 90 tablet 3    benralizumab (FASENRA PEN) 30 mg/mL AtIn Inject 30 mg into the skin every 8 weeks. 1 mL 6    blood sugar diagnostic Strp 1 strip by Misc.(Non-Drug; Combo Route) route 3 (three) times daily. DX: E11.29   Dispense test strips that are covered by insurance 300 strip 3    blood sugar diagnostic, drum (ACCU-CHEK COMPACT PLUS TEST) Strp 1 strip by Misc.(Non-Drug; Combo Route) route 2 (two) times daily with meals. 100 strip 1    blood-glucose meter, drum-type Kit 1 Device by Misc.(Non-Drug; Combo Route) route 2 (two) times daily with meals. 1 kit 0    ELIQUIS 5 mg Tab Take 5 mg by mouth 2 (two) times daily.       finasteride (PROSCAR) 5 mg tablet Take 1 tablet (5 mg total) by mouth once daily. 30 tablet 11    furosemide (LASIX) 20 MG tablet Take 1 tablet (20 mg total) by mouth once daily. 90 tablet 3    furosemide (LASIX) 20 MG tablet Take 2 tablets (40 mg total) by mouth 2 (two) times daily. 120 tablet 11    glimepiride (AMARYL) 2 MG tablet       HYDROcodone-acetaminophen (NORCO) 7.5-325 mg per tablet Take 1 tablet by mouth every 6 (six) hours as needed for Pain. 28 tablet 0    KENALOG 40 mg/mL injection       lancets (FREESTYLE LANCETS) 28 gauge Misc 1 lancet by Misc.(Non-Drug; Combo Route) route 3 (three) times daily. 300 each 3    lancing device Misc 1 Device by Misc.(Non-Drug; Combo Route) route 2 (two) times daily with meals. 100 each 0    lisinopriL (PRINIVIL,ZESTRIL) 2.5 MG tablet Take 1 tablet (2.5 mg total) by mouth once daily. 90 tablet 3    metFORMIN (GLUCOPHAGE) 500 MG tablet TAKE 1 TABLET BY MOUTH TWICE DAILY WITH MEALS 180 tablet 3    montelukast  (SINGULAIR) 10 mg tablet Take 1 tablet (10 mg total) by mouth every evening. 30 tablet 11    mupirocin (BACTROBAN) 2 % ointment APPLY OINTMENT TOPICALLY TO AFFECTED AREA THREE TIMES DAILY FOR 5 DAYS      MYRBETRIQ 50 mg Tb24 TAKE 1 TABLET BY MOUTH ONCE DAILY 30 tablet 11    omega-3 fatty acids-vitamin E 1,000 mg Cap Take 1 capsule by mouth once daily. Three times a day      potassium chloride (KLOR-CON) 10 MEQ TbSR 1 tab with furosemide. 180 tablet 5    predniSONE (DELTASONE) 20 MG tablet Take one daily for 3 days and may repeat for shortness of breath. 21 tablet 0    pulse oximeter (PULSE OXIMETER) device by Apply Externally route 2 (two) times a day. Use twice daily at 8 AM and 3 PM and record the value [facility] as directed. 1 each 0    sotalol (BETAPACE) 80 MG tablet Take 0.5 tablets (40 mg total) by mouth 2 (two) times daily. for 7 days 7 tablet 0    tamsulosin (FLOMAX) 0.4 mg Cap Take 2 capsules (0.8 mg total) by mouth once daily. 180 capsule 3    turmeric root extract 500 mg Cap Take 1 capsule by mouth once daily.      VENTOLIN HFA 90 mcg/actuation inhaler Inhale 1-2 puffs into the lungs every 4 (four) hours as needed for Wheezing or Shortness of Breath. 18 g 3    vibegron 75 mg Tab Take 75 mg by mouth once daily. 30 tablet 5     No current facility-administered medications for this visit.     Facility-Administered Medications Ordered in Other Visits   Medication Dose Route Frequency Provider Last Rate Last Admin    lidocaine (PF) 10 mg/ml (1%) injection 10 mg  1 mL Intradermal Once Deandra Diaz MD           Review of patient's allergies indicates:   Allergen Reactions    No known drug allergies        Physical examination  Vitals Reviewed\    Gen. Well-dressed well-nourished   Skin warm dry and intact.  No rashes noted.  HEENT.  Facial symmetry and speech acceptable.     Neck is supple without adenopathy  Chest.  Respirations are even unlabored.    Neuro. Awake alert oriented x4.   Normal judgment and cognition noted.  Extremities no clubbing cyanosis or edema noted.     Call or return to clinic prn if these symptoms worsen or fail to improve as anticipated.

## 2022-01-25 ENCOUNTER — PATIENT MESSAGE (OUTPATIENT)
Dept: FAMILY MEDICINE | Facility: CLINIC | Age: 77
End: 2022-01-25

## 2022-01-25 ENCOUNTER — PATIENT OUTREACH (OUTPATIENT)
Dept: ADMINISTRATIVE | Facility: OTHER | Age: 77
End: 2022-01-25

## 2022-01-25 ENCOUNTER — OFFICE VISIT (OUTPATIENT)
Dept: OPHTHALMOLOGY | Facility: CLINIC | Age: 77
End: 2022-01-25
Payer: MEDICARE

## 2022-01-25 ENCOUNTER — TELEPHONE (OUTPATIENT)
Dept: OPHTHALMOLOGY | Facility: CLINIC | Age: 77
End: 2022-01-25

## 2022-01-25 DIAGNOSIS — E11.9 TYPE 2 DIABETES MELLITUS WITHOUT RETINOPATHY: ICD-10-CM

## 2022-01-25 DIAGNOSIS — H53.9 TRANSIENT VISION DISTURBANCE, BILATERAL: Primary | ICD-10-CM

## 2022-01-25 PROCEDURE — 99999 PR PBB SHADOW E&M-EST. PATIENT-LVL IV: CPT | Mod: PBBFAC,HCNC,, | Performed by: OPHTHALMOLOGY

## 2022-01-25 PROCEDURE — 1101F PR PT FALLS ASSESS DOC 0-1 FALLS W/OUT INJ PAST YR: ICD-10-PCS | Mod: HCNC,CPTII,S$GLB, | Performed by: OPHTHALMOLOGY

## 2022-01-25 PROCEDURE — 1101F PT FALLS ASSESS-DOCD LE1/YR: CPT | Mod: HCNC,CPTII,S$GLB, | Performed by: OPHTHALMOLOGY

## 2022-01-25 PROCEDURE — 3288F FALL RISK ASSESSMENT DOCD: CPT | Mod: HCNC,CPTII,S$GLB, | Performed by: OPHTHALMOLOGY

## 2022-01-25 PROCEDURE — 1126F PR PAIN SEVERITY QUANTIFIED, NO PAIN PRESENT: ICD-10-PCS | Mod: HCNC,CPTII,S$GLB, | Performed by: OPHTHALMOLOGY

## 2022-01-25 PROCEDURE — 1160F PR REVIEW ALL MEDS BY PRESCRIBER/CLIN PHARMACIST DOCUMENTED: ICD-10-PCS | Mod: HCNC,CPTII,S$GLB, | Performed by: OPHTHALMOLOGY

## 2022-01-25 PROCEDURE — 99214 PR OFFICE/OUTPT VISIT, EST, LEVL IV, 30-39 MIN: ICD-10-PCS | Mod: HCNC,S$GLB,, | Performed by: OPHTHALMOLOGY

## 2022-01-25 PROCEDURE — 3288F PR FALLS RISK ASSESSMENT DOCUMENTED: ICD-10-PCS | Mod: HCNC,CPTII,S$GLB, | Performed by: OPHTHALMOLOGY

## 2022-01-25 PROCEDURE — 99214 OFFICE O/P EST MOD 30 MIN: CPT | Mod: HCNC,S$GLB,, | Performed by: OPHTHALMOLOGY

## 2022-01-25 PROCEDURE — 1126F AMNT PAIN NOTED NONE PRSNT: CPT | Mod: HCNC,CPTII,S$GLB, | Performed by: OPHTHALMOLOGY

## 2022-01-25 PROCEDURE — 1160F RVW MEDS BY RX/DR IN RCRD: CPT | Mod: HCNC,CPTII,S$GLB, | Performed by: OPHTHALMOLOGY

## 2022-01-25 PROCEDURE — 3051F PR MOST RECENT HEMOGLOBIN A1C LEVEL 7.0 - < 8.0%: ICD-10-PCS | Mod: HCNC,CPTII,S$GLB, | Performed by: OPHTHALMOLOGY

## 2022-01-25 PROCEDURE — 3051F HG A1C>EQUAL 7.0%<8.0%: CPT | Mod: HCNC,CPTII,S$GLB, | Performed by: OPHTHALMOLOGY

## 2022-01-25 PROCEDURE — 1159F PR MEDICATION LIST DOCUMENTED IN MEDICAL RECORD: ICD-10-PCS | Mod: HCNC,CPTII,S$GLB, | Performed by: OPHTHALMOLOGY

## 2022-01-25 PROCEDURE — 99999 PR PBB SHADOW E&M-EST. PATIENT-LVL IV: ICD-10-PCS | Mod: PBBFAC,HCNC,, | Performed by: OPHTHALMOLOGY

## 2022-01-25 PROCEDURE — 1159F MED LIST DOCD IN RCRD: CPT | Mod: HCNC,CPTII,S$GLB, | Performed by: OPHTHALMOLOGY

## 2022-01-25 NOTE — PROGRESS NOTES
HPI     Concerns About Ocular Health      Additional comments: Urgent Care-  States had episode started 3 days ago with not being able to see first two   letters when reading also when looking at tv he could not see all of the   people on the tv lasted 3 hours that day had a headache at the time but   wasn't severe headache. PCP that rec he have his eyes checked out. Does   not use any eye gtts currently.        Component                Value               Date                       HGBA1C                   7.4 (H)             01/11/2022                                             Last edited by Rimma Whiting on 1/25/2022 10:30 AM. (History)            Assessment /Plan     For exam results, see Encounter Report.    Transient vision disturbance, bilateral    Type 2 diabetes mellitus without retinopathy  -     Ambulatory referral/consult to Optometry      1. Transient vision disturbance, bilateral  Transient visual obscuration that lasted ~3 hours that seems to affect his central vision.  Eye exam is unremarkable.  No blood clots or changes to dilated eye exam.  Pt is on Eliquis.  Discussed this could be a blood flow issue, TIA, etc.  Will defer to PCP for work-up.  Could consider carotid eval, brain eval, echo, etc.    2. Type 2 diabetes mellitus without retinopathy  Diabetes without retinopathy, discussed with patient importance of glucose control and follow up.  Patient voices understanding.    F/u 1  Year for routine visit, sooner PRN

## 2022-01-25 NOTE — TELEPHONE ENCOUNTER
----- Message from Kale Tam MD sent at 1/24/2022  5:08 PM CST -----  I got a message from this patient's PCP that he has lost half of his vision, suddenly in the last few days.  Can we get him into clinic to be evaluated this week?

## 2022-02-08 DIAGNOSIS — J45.50 SEVERE PERSISTENT ASTHMA WITHOUT COMPLICATION: ICD-10-CM

## 2022-02-08 RX ORDER — BENRALIZUMAB 30 MG/ML
30 INJECTION, SOLUTION SUBCUTANEOUS
Qty: 1 ML | Refills: 6 | Status: SHIPPED | OUTPATIENT
Start: 2022-02-08 | End: 2022-02-09

## 2022-02-09 RX ORDER — BENRALIZUMAB 30 MG/ML
30 INJECTION, SOLUTION SUBCUTANEOUS
Qty: 1 ML | Refills: 6 | Status: SHIPPED | OUTPATIENT
Start: 2022-02-09 | End: 2022-09-28 | Stop reason: SDUPTHER

## 2022-02-21 ENCOUNTER — TELEPHONE (OUTPATIENT)
Dept: FAMILY MEDICINE | Facility: CLINIC | Age: 77
End: 2022-02-21

## 2022-02-21 NOTE — TELEPHONE ENCOUNTER
Call pt. Dr. Tam wanted us to see him after his last visit with dr tam.   Symptoms are concerning for TIA or occipital cortex blood flow issue.  You may want to consider carotid/blood flow eval, echo, etc

## 2022-02-21 NOTE — TELEPHONE ENCOUNTER
----- Message from Kale Tam MD sent at 1/25/2022 11:06 AM CST -----  Eye exam is stable, included dilated retinal exam.  Symptoms are concerning for TIA or occipital cortex blood flow issue.  You may want to consider carotid/blood flow eval, echo, etc.

## 2022-02-23 DIAGNOSIS — D84.9 IMMUNOSUPPRESSED STATUS: ICD-10-CM

## 2022-02-24 ENCOUNTER — OFFICE VISIT (OUTPATIENT)
Dept: PULMONOLOGY | Facility: CLINIC | Age: 77
End: 2022-02-24
Payer: MEDICARE

## 2022-02-24 VITALS
BODY MASS INDEX: 37.36 KG/M2 | SYSTOLIC BLOOD PRESSURE: 128 MMHG | DIASTOLIC BLOOD PRESSURE: 59 MMHG | HEIGHT: 71 IN | OXYGEN SATURATION: 98 % | HEART RATE: 68 BPM | WEIGHT: 266.88 LBS

## 2022-02-24 DIAGNOSIS — J44.89 ASTHMA-COPD OVERLAP SYNDROME: ICD-10-CM

## 2022-02-24 DIAGNOSIS — J45.50 SEVERE PERSISTENT ASTHMA WITHOUT COMPLICATION: ICD-10-CM

## 2022-02-24 DIAGNOSIS — R06.09 DOE (DYSPNEA ON EXERTION): ICD-10-CM

## 2022-02-24 DIAGNOSIS — G89.29 CHRONIC LEFT-SIDED LOW BACK PAIN WITHOUT SCIATICA: ICD-10-CM

## 2022-02-24 DIAGNOSIS — M54.50 CHRONIC LEFT-SIDED LOW BACK PAIN WITHOUT SCIATICA: ICD-10-CM

## 2022-02-24 DIAGNOSIS — R60.0 BILATERAL LEG EDEMA: Primary | ICD-10-CM

## 2022-02-24 DIAGNOSIS — J44.9 CHRONIC OBSTRUCTIVE PULMONARY DISEASE, UNSPECIFIED COPD TYPE: ICD-10-CM

## 2022-02-24 PROCEDURE — 1101F PT FALLS ASSESS-DOCD LE1/YR: CPT | Mod: HCNC,CPTII,S$GLB, | Performed by: INTERNAL MEDICINE

## 2022-02-24 PROCEDURE — 99999 PR PBB SHADOW E&M-EST. PATIENT-LVL V: CPT | Mod: PBBFAC,HCNC,, | Performed by: INTERNAL MEDICINE

## 2022-02-24 PROCEDURE — 3078F DIAST BP <80 MM HG: CPT | Mod: HCNC,CPTII,S$GLB, | Performed by: INTERNAL MEDICINE

## 2022-02-24 PROCEDURE — 3074F PR MOST RECENT SYSTOLIC BLOOD PRESSURE < 130 MM HG: ICD-10-PCS | Mod: HCNC,CPTII,S$GLB, | Performed by: INTERNAL MEDICINE

## 2022-02-24 PROCEDURE — 1125F AMNT PAIN NOTED PAIN PRSNT: CPT | Mod: HCNC,CPTII,S$GLB, | Performed by: INTERNAL MEDICINE

## 2022-02-24 PROCEDURE — 3288F PR FALLS RISK ASSESSMENT DOCUMENTED: ICD-10-PCS | Mod: HCNC,CPTII,S$GLB, | Performed by: INTERNAL MEDICINE

## 2022-02-24 PROCEDURE — 99214 PR OFFICE/OUTPT VISIT, EST, LEVL IV, 30-39 MIN: ICD-10-PCS | Mod: HCNC,S$GLB,, | Performed by: INTERNAL MEDICINE

## 2022-02-24 PROCEDURE — 99214 OFFICE O/P EST MOD 30 MIN: CPT | Mod: HCNC,S$GLB,, | Performed by: INTERNAL MEDICINE

## 2022-02-24 PROCEDURE — 3288F FALL RISK ASSESSMENT DOCD: CPT | Mod: HCNC,CPTII,S$GLB, | Performed by: INTERNAL MEDICINE

## 2022-02-24 PROCEDURE — 1159F PR MEDICATION LIST DOCUMENTED IN MEDICAL RECORD: ICD-10-PCS | Mod: HCNC,CPTII,S$GLB, | Performed by: INTERNAL MEDICINE

## 2022-02-24 PROCEDURE — 3072F LOW RISK FOR RETINOPATHY: CPT | Mod: HCNC,CPTII,S$GLB, | Performed by: INTERNAL MEDICINE

## 2022-02-24 PROCEDURE — 99499 RISK ADDL DX/OHS AUDIT: ICD-10-PCS | Mod: S$PBB,,, | Performed by: INTERNAL MEDICINE

## 2022-02-24 PROCEDURE — 1125F PR PAIN SEVERITY QUANTIFIED, PAIN PRESENT: ICD-10-PCS | Mod: HCNC,CPTII,S$GLB, | Performed by: INTERNAL MEDICINE

## 2022-02-24 PROCEDURE — 3074F SYST BP LT 130 MM HG: CPT | Mod: HCNC,CPTII,S$GLB, | Performed by: INTERNAL MEDICINE

## 2022-02-24 PROCEDURE — 1159F MED LIST DOCD IN RCRD: CPT | Mod: HCNC,CPTII,S$GLB, | Performed by: INTERNAL MEDICINE

## 2022-02-24 PROCEDURE — 1101F PR PT FALLS ASSESS DOC 0-1 FALLS W/OUT INJ PAST YR: ICD-10-PCS | Mod: HCNC,CPTII,S$GLB, | Performed by: INTERNAL MEDICINE

## 2022-02-24 PROCEDURE — 99999 PR PBB SHADOW E&M-EST. PATIENT-LVL V: ICD-10-PCS | Mod: PBBFAC,HCNC,, | Performed by: INTERNAL MEDICINE

## 2022-02-24 PROCEDURE — 3072F PR LOW RISK FOR RETINOPATHY: ICD-10-PCS | Mod: HCNC,CPTII,S$GLB, | Performed by: INTERNAL MEDICINE

## 2022-02-24 PROCEDURE — 99499 UNLISTED E&M SERVICE: CPT | Mod: S$PBB,,, | Performed by: INTERNAL MEDICINE

## 2022-02-24 PROCEDURE — 3078F PR MOST RECENT DIASTOLIC BLOOD PRESSURE < 80 MM HG: ICD-10-PCS | Mod: HCNC,CPTII,S$GLB, | Performed by: INTERNAL MEDICINE

## 2022-02-24 RX ORDER — FLUTICASONE FUROATE, UMECLIDINIUM BROMIDE AND VILANTEROL TRIFENATATE 200; 62.5; 25 UG/1; UG/1; UG/1
1 POWDER RESPIRATORY (INHALATION) DAILY
Qty: 60 EACH | Refills: 11 | Status: SHIPPED | OUTPATIENT
Start: 2022-02-24 | End: 2023-05-15 | Stop reason: SDUPTHER

## 2022-02-24 RX ORDER — FUROSEMIDE 40 MG/1
80 TABLET ORAL DAILY PRN
Qty: 120 TABLET | Refills: 11 | Status: SHIPPED | OUTPATIENT
Start: 2022-02-24 | End: 2022-09-23 | Stop reason: SDUPTHER

## 2022-02-24 RX ORDER — INFLUENZA VACCINE, ADJUVANTED 15; 15; 15; 15 UG/.5ML; UG/.5ML; UG/.5ML; UG/.5ML
INJECTION, SUSPENSION INTRAMUSCULAR
COMMUNITY
Start: 2021-10-13 | End: 2022-12-22

## 2022-02-24 NOTE — PATIENT INSTRUCTIONS
Walking oxygen level 99-- excellent    Lungs were 44% last yr-- not too bad.    Should be on 24/7 medication for lungs -- use trelegy once daily    We discuss and you wish to have epidural injection I recommend interlaminar injection at L3-4.  Will ask staff to notify Dr Read.    Consider radiofrequency ablation of the L4-5 and L5-S1 facet joints-- see how effective epidural will be.    Would increase lasix to 80 mg daily and check blood work every 1 wks for 4 times then every month.     Need to arrange follow up with Dr Greenwood for knees
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
## 2022-03-03 ENCOUNTER — LAB VISIT (OUTPATIENT)
Dept: LAB | Facility: HOSPITAL | Age: 77
End: 2022-03-03
Attending: FAMILY MEDICINE
Payer: MEDICARE

## 2022-03-03 ENCOUNTER — OFFICE VISIT (OUTPATIENT)
Dept: FAMILY MEDICINE | Facility: CLINIC | Age: 77
End: 2022-03-03
Payer: MEDICARE

## 2022-03-03 VITALS
OXYGEN SATURATION: 98 % | HEART RATE: 64 BPM | SYSTOLIC BLOOD PRESSURE: 122 MMHG | TEMPERATURE: 98 F | BODY MASS INDEX: 37.22 KG/M2 | HEIGHT: 71 IN | DIASTOLIC BLOOD PRESSURE: 60 MMHG | RESPIRATION RATE: 16 BRPM

## 2022-03-03 DIAGNOSIS — E11.29 TYPE 2 DIABETES MELLITUS WITH MICROALBUMINURIA, WITHOUT LONG-TERM CURRENT USE OF INSULIN: ICD-10-CM

## 2022-03-03 DIAGNOSIS — I73.9 PVD (PERIPHERAL VASCULAR DISEASE): ICD-10-CM

## 2022-03-03 DIAGNOSIS — J44.9 CHRONIC OBSTRUCTIVE PULMONARY DISEASE, UNSPECIFIED COPD TYPE: ICD-10-CM

## 2022-03-03 DIAGNOSIS — N18.31 STAGE 3A CHRONIC KIDNEY DISEASE: ICD-10-CM

## 2022-03-03 DIAGNOSIS — R80.9 TYPE 2 DIABETES MELLITUS WITH MICROALBUMINURIA, WITHOUT LONG-TERM CURRENT USE OF INSULIN: ICD-10-CM

## 2022-03-03 DIAGNOSIS — K74.60 HEPATIC CIRRHOSIS, UNSPECIFIED HEPATIC CIRRHOSIS TYPE, UNSPECIFIED WHETHER ASCITES PRESENT: Primary | ICD-10-CM

## 2022-03-03 DIAGNOSIS — I63.89 OTHER CEREBRAL INFARCTION: ICD-10-CM

## 2022-03-03 LAB
ANION GAP SERPL CALC-SCNC: 12 MMOL/L (ref 8–16)
BASOPHILS # BLD AUTO: 0 K/UL (ref 0–0.2)
BASOPHILS NFR BLD: 0 % (ref 0–1.9)
BNP SERPL-MCNC: 86 PG/ML (ref 0–99)
BUN SERPL-MCNC: 32 MG/DL (ref 8–23)
CALCIUM SERPL-MCNC: 9.6 MG/DL (ref 8.7–10.5)
CHLORIDE SERPL-SCNC: 96 MMOL/L (ref 95–110)
CO2 SERPL-SCNC: 31 MMOL/L (ref 23–29)
CREAT SERPL-MCNC: 1.5 MG/DL (ref 0.5–1.4)
DIFFERENTIAL METHOD: ABNORMAL
EOSINOPHIL # BLD AUTO: 0 K/UL (ref 0–0.5)
EOSINOPHIL NFR BLD: 0 % (ref 0–8)
ERYTHROCYTE [DISTWIDTH] IN BLOOD BY AUTOMATED COUNT: 13.2 % (ref 11.5–14.5)
EST. GFR  (AFRICAN AMERICAN): 50.5 ML/MIN/1.73 M^2
EST. GFR  (NON AFRICAN AMERICAN): 43.7 ML/MIN/1.73 M^2
GLUCOSE SERPL-MCNC: 112 MG/DL (ref 70–110)
HCT VFR BLD AUTO: 38.7 % (ref 40–54)
HGB BLD-MCNC: 12.1 G/DL (ref 14–18)
IMM GRANULOCYTES # BLD AUTO: 0.02 K/UL (ref 0–0.04)
IMM GRANULOCYTES NFR BLD AUTO: 0.3 % (ref 0–0.5)
LYMPHOCYTES # BLD AUTO: 1.7 K/UL (ref 1–4.8)
LYMPHOCYTES NFR BLD: 25.9 % (ref 18–48)
MCH RBC QN AUTO: 29.4 PG (ref 27–31)
MCHC RBC AUTO-ENTMCNC: 31.3 G/DL (ref 32–36)
MCV RBC AUTO: 94 FL (ref 82–98)
MONOCYTES # BLD AUTO: 0.6 K/UL (ref 0.3–1)
MONOCYTES NFR BLD: 9.8 % (ref 4–15)
NEUTROPHILS # BLD AUTO: 4.2 K/UL (ref 1.8–7.7)
NEUTROPHILS NFR BLD: 64 % (ref 38–73)
NRBC BLD-RTO: 0 /100 WBC
PLATELET # BLD AUTO: 140 K/UL (ref 150–450)
PMV BLD AUTO: 12.2 FL (ref 9.2–12.9)
POTASSIUM SERPL-SCNC: 4.3 MMOL/L (ref 3.5–5.1)
RBC # BLD AUTO: 4.11 M/UL (ref 4.6–6.2)
SODIUM SERPL-SCNC: 139 MMOL/L (ref 136–145)
WBC # BLD AUTO: 6.56 K/UL (ref 3.9–12.7)

## 2022-03-03 PROCEDURE — 3072F LOW RISK FOR RETINOPATHY: CPT | Mod: CPTII,S$GLB,, | Performed by: FAMILY MEDICINE

## 2022-03-03 PROCEDURE — 80048 BASIC METABOLIC PNL TOTAL CA: CPT | Performed by: FAMILY MEDICINE

## 2022-03-03 PROCEDURE — 83880 ASSAY OF NATRIURETIC PEPTIDE: CPT | Performed by: FAMILY MEDICINE

## 2022-03-03 PROCEDURE — 85025 COMPLETE CBC W/AUTO DIFF WBC: CPT | Performed by: FAMILY MEDICINE

## 2022-03-03 PROCEDURE — 3288F FALL RISK ASSESSMENT DOCD: CPT | Mod: CPTII,S$GLB,, | Performed by: FAMILY MEDICINE

## 2022-03-03 PROCEDURE — 3051F HG A1C>EQUAL 7.0%<8.0%: CPT | Mod: CPTII,S$GLB,, | Performed by: FAMILY MEDICINE

## 2022-03-03 PROCEDURE — 3078F PR MOST RECENT DIASTOLIC BLOOD PRESSURE < 80 MM HG: ICD-10-PCS | Mod: CPTII,S$GLB,, | Performed by: FAMILY MEDICINE

## 2022-03-03 PROCEDURE — 99214 PR OFFICE/OUTPT VISIT, EST, LEVL IV, 30-39 MIN: ICD-10-PCS | Mod: S$GLB,,, | Performed by: FAMILY MEDICINE

## 2022-03-03 PROCEDURE — 3288F PR FALLS RISK ASSESSMENT DOCUMENTED: ICD-10-PCS | Mod: CPTII,S$GLB,, | Performed by: FAMILY MEDICINE

## 2022-03-03 PROCEDURE — 3078F DIAST BP <80 MM HG: CPT | Mod: CPTII,S$GLB,, | Performed by: FAMILY MEDICINE

## 2022-03-03 PROCEDURE — 1125F AMNT PAIN NOTED PAIN PRSNT: CPT | Mod: CPTII,S$GLB,, | Performed by: FAMILY MEDICINE

## 2022-03-03 PROCEDURE — 3072F PR LOW RISK FOR RETINOPATHY: ICD-10-PCS | Mod: CPTII,S$GLB,, | Performed by: FAMILY MEDICINE

## 2022-03-03 PROCEDURE — 1101F PT FALLS ASSESS-DOCD LE1/YR: CPT | Mod: CPTII,S$GLB,, | Performed by: FAMILY MEDICINE

## 2022-03-03 PROCEDURE — 99214 OFFICE O/P EST MOD 30 MIN: CPT | Mod: S$GLB,,, | Performed by: FAMILY MEDICINE

## 2022-03-03 PROCEDURE — 36415 COLL VENOUS BLD VENIPUNCTURE: CPT | Mod: PO | Performed by: FAMILY MEDICINE

## 2022-03-03 PROCEDURE — 3074F SYST BP LT 130 MM HG: CPT | Mod: CPTII,S$GLB,, | Performed by: FAMILY MEDICINE

## 2022-03-03 PROCEDURE — 99499 UNLISTED E&M SERVICE: CPT | Mod: S$PBB,HCNC,, | Performed by: FAMILY MEDICINE

## 2022-03-03 PROCEDURE — 3074F PR MOST RECENT SYSTOLIC BLOOD PRESSURE < 130 MM HG: ICD-10-PCS | Mod: CPTII,S$GLB,, | Performed by: FAMILY MEDICINE

## 2022-03-03 PROCEDURE — 99999 PR PBB SHADOW E&M-EST. PATIENT-LVL V: CPT | Mod: PBBFAC,,, | Performed by: FAMILY MEDICINE

## 2022-03-03 PROCEDURE — 99999 PR PBB SHADOW E&M-EST. PATIENT-LVL V: ICD-10-PCS | Mod: PBBFAC,,, | Performed by: FAMILY MEDICINE

## 2022-03-03 PROCEDURE — 1101F PR PT FALLS ASSESS DOC 0-1 FALLS W/OUT INJ PAST YR: ICD-10-PCS | Mod: CPTII,S$GLB,, | Performed by: FAMILY MEDICINE

## 2022-03-03 PROCEDURE — 3051F PR MOST RECENT HEMOGLOBIN A1C LEVEL 7.0 - < 8.0%: ICD-10-PCS | Mod: CPTII,S$GLB,, | Performed by: FAMILY MEDICINE

## 2022-03-03 PROCEDURE — 99499 RISK ADDL DX/OHS AUDIT: ICD-10-PCS | Mod: S$PBB,HCNC,, | Performed by: FAMILY MEDICINE

## 2022-03-03 PROCEDURE — 1125F PR PAIN SEVERITY QUANTIFIED, PAIN PRESENT: ICD-10-PCS | Mod: CPTII,S$GLB,, | Performed by: FAMILY MEDICINE

## 2022-03-03 NOTE — PROGRESS NOTES
Subjective:       Patient ID: Lenny Lopez is a 77 y.o. male.    Chief Complaint: Follow-up    SUBJECTIVE: Lenny Lopez is a 77 y.o. male seen for a follow up visit; he has  Controlled diabetes II controlled hypertension, mixed hyperlipidemia, preserved EF CHF, peripheral vascular disease and obesity.  He wishes to also address some pain in the joints and feet at today's visit. These have been mild-to-moderate in nature, gradual in onset. Exam shows mild generalized muscle tenderness and reduced range of motion of feet. These pains seem benign and are likely related to osteoarthritis and tendonitis. OTC or prescription NSAID's are recommended for PRN use, side effects are discussed. Return for further discussion if these persist or worsen.    Current Outpatient Medications:  ACCU-CHEK GUIDE GLUCOSE METER Misc, USE DEVICE TO TEST BLOOD SUGAR TWO TIMES DAILY, Disp: , Rfl:   albuterol (PROVENTIL/VENTOLIN HFA) 90 mcg/actuation inhaler, 2 puffs every 4 hours as needed for cough, wheeze, or shortness of breath, Disp: 18 g, Rfl: 11  albuterol-ipratropium (DUO-NEB) 2.5 mg-0.5 mg/3 mL nebulizer solution, Take 3 mLs by nebulization every 6 (six) hours as needed for Wheezing. Rescue, Disp: 1 Box, Rfl: 4  aspirin 81 mg Tab, Take 1 tablet by mouth once daily. , Disp: , Rfl:   atorvastatin (LIPITOR) 40 MG tablet, Take 1 tablet (40 mg total) by mouth once daily., Disp: 90 tablet, Rfl: 3  benralizumab (FASENRA PEN) 30 mg/mL AtIn, Inject 30 mg into the skin every 8 weeks., Disp: 1 mL, Rfl: 6  blood sugar diagnostic Strp, 1 strip by Misc.(Non-Drug; Combo Route) route 3 (three) times daily. DX: E11.29   Dispense test strips that are covered by insurance, Disp: 300 strip, Rfl: 3  blood sugar diagnostic, drum (ACCU-CHEK COMPACT PLUS TEST) Strp, 1 strip by Misc.(Non-Drug; Combo Route) route 2 (two) times daily with meals., Disp: 100 strip, Rfl: 1  blood-glucose meter, drum-type Kit, 1 Device by Misc.(Non-Drug; Combo Route) route  2 (two) times daily with meals., Disp: 1 kit, Rfl: 0  ELIQUIS 5 mg Tab, Take 5 mg by mouth 2 (two) times daily. , Disp: , Rfl:   ergocalciferol (ERGOCALCIFEROL) 50,000 unit Cap, Take 1 capsule (50,000 Units total) by mouth once a week., Disp: 4 capsule, Rfl: 5  finasteride (PROSCAR) 5 mg tablet, Take 1 tablet (5 mg total) by mouth once daily., Disp: 30 tablet, Rfl: 11  fluticasone propionate (FLOVENT HFA) 220 mcg/actuation inhaler, Inhale 1 puff into the lungs 2 (two) times daily. Controller, Disp: 12 g, Rfl: 11  furosemide (LASIX) 20 MG tablet, Take 1 tablet (20 mg total) by mouth once daily., Disp: 90 tablet, Rfl: 3  furosemide (LASIX) 20 MG tablet, Take 2 tablets (40 mg total) by mouth 2 (two) times daily., Disp: 120 tablet, Rfl: 11  KENALOG 40 mg/mL injection, , Disp: , Rfl:   lancets (FREESTYLE LANCETS) 28 gauge Misc, 1 lancet by Misc.(Non-Drug; Combo Route) route 3 (three) times daily., Disp: 300 each, Rfl: 3  lancing device Misc, 1 Device by Misc.(Non-Drug; Combo Route) route 2 (two) times daily with meals., Disp: 100 each, Rfl: 0  lisinopriL (PRINIVIL,ZESTRIL) 2.5 MG tablet, Take 1 tablet (2.5 mg total) by mouth once daily., Disp: 90 tablet, Rfl: 3  metFORMIN (GLUCOPHAGE) 500 MG tablet, Take 1 tablet (500 mg total) by mouth 2 (two) times daily with meals., Disp: 60 tablet, Rfl: 3  montelukast (SINGULAIR) 10 mg tablet, Take 1 tablet (10 mg total) by mouth every evening., Disp: 30 tablet, Rfl: 11  mupirocin (BACTROBAN) 2 % ointment, APPLY OINTMENT TOPICALLY TO AFFECTED AREA THREE TIMES DAILY FOR 5 DAYS, Disp: , Rfl:   MYRBETRIQ 50 mg Tb24, TAKE 1 TABLET BY MOUTH ONCE DAILY, Disp: 30 tablet, Rfl: 11  omega-3 fatty acids-vitamin E (FISH OIL) 1,000 mg Cap, Take 1 capsule by mouth once daily. Three times a day, Disp: , Rfl:   potassium chloride (KLOR-CON) 10 MEQ TbSR, 1 tab with furosemide., Disp: 180 tablet, Rfl: 5  predniSONE (DELTASONE) 20 MG tablet, Take one daily for 3 days and may repeat for shortness of  breath., Disp: 21 tablet, Rfl: 0  pulse oximeter (PULSE OXIMETER) device, by Apply Externally route 2 (two) times a day. Use twice daily at 8 AM and 3 PM and record the value in MyChart as directed., Disp: 1 each, Rfl: 0  sotalol (BETAPACE) 80 MG tablet, Take 0.5 tablets (40 mg total) by mouth 2 (two) times daily. for 7 days, Disp: 7 tablet, Rfl: 0  tamsulosin (FLOMAX) 0.4 mg Cap, Take 1 capsule (0.4 mg total) by mouth once daily., Disp: 30 capsule, Rfl: 11  traMADoL (ULTRAM) 50 mg tablet, Take 1 tablet (50 mg total) by mouth every 6 (six) hours as needed for Pain. 7 days for acute pain., Disp: 28 tablet, Rfl: 1  turmeric root extract 500 mg Cap, Take 1 capsule by mouth once daily., Disp: , Rfl:   VENTOLIN HFA 90 mcg/actuation inhaler, Inhale 1-2 puffs into the lungs every 4 (four) hours as needed for Wheezing or Shortness of Breath., Disp: 18 g, Rfl: 3  acarbose (PRECOSE) 25 MG Tab, Take 1 tablet (25 mg total) by mouth 2 (two) times daily before meals., Disp: 180 tablet, Rfl: 3    No current facility-administered medications for this visit.  Facility-Administered Medications Ordered in Other Visits:  lidocaine (PF) 10 mg/ml (1%) injection 10 mg, 1 mL, Intradermal, Once, Deandra Diaz MD      Patient Active Problem List:     Osteoarthritis     Hypertension associated with diabetes     CKD (chronic kidney disease) stage 3, GFR 39.7 ml/min     Atrial fibrillation     Constipation due to pain medication     Gout     Low testosterone     Metabolic syndrome     LVH (left ventricular hypertrophy) due to hypertensive disease     Cardiovascular risk factor, ASCVD 10-year risk 20.7%, ideal 14.1%     Chews tobacco regularly, about can a day     Type 2 diabetes mellitus with microalbuminuria, without long-term current use of insulin     Microalbuminuria     LUZ on CPAP, 100% use, 2016     Hyperlipidemia associated with type 2 diabetes mellitus     Prostatism     Bilateral leg edema     Calcified granuloma of lung      Bilateral carotid artery disease     Statin intolerance     Severe persistent asthma without complication     Eosinophilic asthma     Coronary artery disease of native artery of native heart with stable angina pectoris     Urge urinary incontinence     Rheumatoid factor positive     Hyperparathyroidism     Chronic pain of left knee     Vocal cord mass     COPD (chronic obstructive pulmonary disease)     Fall     Rheumatoid arthritis involving multiple sites with positive rheumatoid factor     Thrombocytopenia, unspecified     Hypertensive heart disease with heart failure     Diabetic retinopathy associated with controlled type 2 diabetes mellitus     History of colon polyps      Review of Systems   Constitutional: Positive for fatigue. Negative for unexpected weight change.   Respiratory: Positive for shortness of breath. Negative for chest tightness.    Cardiovascular: Positive for leg swelling. Negative for chest pain and palpitations.   Gastrointestinal: Negative for abdominal pain.   Musculoskeletal: Negative for arthralgias.   Neurological: Negative for dizziness, syncope, light-headedness and headaches.       Patient Active Problem List   Diagnosis    Osteoarthritis    Hypertension associated with diabetes    CKD (chronic kidney disease) stage 3, GFR 39.7 ml/min    Atrial fibrillation    Constipation due to pain medication    Gout    Low testosterone    Metabolic syndrome    LVH (left ventricular hypertrophy) due to hypertensive disease    Cardiovascular risk factor, ASCVD 10-year risk 20.7%, ideal 14.1%    Chews tobacco regularly, about can a day    Type 2 diabetes mellitus with microalbuminuria, without long-term current use of insulin    Microalbuminuria    LUZ on CPAP, 100% use, 2016    Hyperlipidemia associated with type 2 diabetes mellitus    Prostatism    Bilateral leg edema    Calcified granuloma of lung    Bilateral carotid artery disease    Statin intolerance    Severe persistent  asthma without complication    Eosinophilic asthma    Coronary artery disease of native artery of native heart with stable angina pectoris    Urge urinary incontinence    Rheumatoid factor positive    Obesity, morbid, BMI 40.0-49.9    Hyperparathyroidism    Chronic pain of left knee    Vocal cord mass    COPD (chronic obstructive pulmonary disease)    Fall    Rheumatoid arthritis involving multiple sites with positive rheumatoid factor    Thrombocytopenia, unspecified    Hypertensive heart disease with heart failure    Diabetic retinopathy associated with controlled type 2 diabetes mellitus    History of colon polyps    Hepatic cirrhosis, unspecified hepatic cirrhosis type, unspecified whether ascites present    TONEY (dyspnea on exertion)    Asthma-COPD overlap syndrome    Chronic left-sided low back pain without sciatica       Objective:      Physical Exam  Vitals reviewed.   Constitutional:       General: He is not in acute distress.     Appearance: He is well-developed. He is obese. He is not diaphoretic.   HENT:      Head: Normocephalic and atraumatic.      Right Ear: External ear normal.      Left Ear: External ear normal.      Nose: Nose normal.      Mouth/Throat:      Pharynx: No oropharyngeal exudate.   Eyes:      General: No scleral icterus.        Right eye: No discharge.         Left eye: No discharge.      Conjunctiva/sclera: Conjunctivae normal.      Pupils: Pupils are equal, round, and reactive to light.   Neck:      Thyroid: No thyromegaly.      Vascular: No JVD.      Trachea: No tracheal deviation.   Cardiovascular:      Rate and Rhythm: Normal rate.      Chest Wall: PMI is displaced.      Heart sounds: Heart sounds are distant. No murmur heard.     Pulmonary:      Effort: Pulmonary effort is normal. No respiratory distress.      Breath sounds: Decreased air movement present. Examination of the right-lower field reveals decreased breath sounds. Examination of the left-lower field  reveals decreased breath sounds. Decreased breath sounds present. No wheezing or rales.   Abdominal:      General: Bowel sounds are normal. There is no distension.      Palpations: Abdomen is soft. There is no mass.      Tenderness: There is no abdominal tenderness. There is no guarding or rebound.   Musculoskeletal:         General: No tenderness.      Cervical back: Neck supple. Crepitus present. Spinous process tenderness present. Decreased range of motion.      Comments:   Bilateral swollen feet, swollen lat malleolar and both first proximal phalangeal metatarsal bunion.     Lymphadenopathy:      Cervical: No cervical adenopathy.   Skin:     General: Skin is warm and dry.      Coloration: Skin is not pale.      Findings: No erythema or rash.   Neurological:      Mental Status: He is alert and oriented to person, place, and time.      Cranial Nerves: No cranial nerve deficit.      Coordination: Coordination normal.   Psychiatric:         Behavior: Behavior normal.         Thought Content: Thought content normal.         Judgment: Judgment normal.         Lab Results   Component Value Date    WBC 6.22 01/11/2022    HGB 11.6 (L) 01/11/2022    HCT 36.0 (L) 01/11/2022     (L) 01/11/2022    CHOL 106 (L) 10/07/2021    TRIG 74 10/07/2021    HDL 30 (L) 10/07/2021    ALT 21 01/11/2022    AST 16 01/11/2022     01/11/2022    K 4.3 01/11/2022     01/11/2022    CREATININE 1.3 01/11/2022    BUN 32 (H) 01/11/2022    CO2 29 01/11/2022    TSH 4.662 (H) 05/13/2021    PSA 3.5 12/01/2015    INR 1.1 09/03/2020    HGBA1C 7.4 (H) 01/11/2022     The ASCVD Risk score (Kenn JEREMY Jr., et al., 2013) failed to calculate for the following reasons:    The valid total cholesterol range is 130 to 320 mg/dL    Assessment:       1. Hepatic cirrhosis, unspecified hepatic cirrhosis type, unspecified whether ascites present    2. Chronic obstructive pulmonary disease, unspecified COPD type    3. PVD (peripheral vascular disease)     4. Type 2 diabetes mellitus with microalbuminuria, without long-term current use of insulin    5. Stage 3a chronic kidney disease    6. Other cerebral infarction        Plan:       Hepatic cirrhosis, unspecified hepatic cirrhosis type, unspecified whether ascites present    Chronic obstructive pulmonary disease, unspecified COPD type    PVD (peripheral vascular disease)  -     Basic Metabolic Panel; Future; Expected date: 03/03/2022  -     CBC Auto Differential; Future; Expected date: 03/03/2022  -     BNP; Future; Expected date: 03/03/2022    Type 2 diabetes mellitus with microalbuminuria, without long-term current use of insulin    Stage 3a chronic kidney disease  -     Basic Metabolic Panel; Future; Expected date: 03/03/2022  -     CBC Auto Differential; Future; Expected date: 03/03/2022  -     BNP; Future; Expected date: 03/03/2022    Other cerebral infarction  -     CTA Neck; Future; Expected date: 03/03/2022  -     Cancel: CTA HEAD; Future; Expected date: 03/03/2022      I have reviewed diabetes in detail with the patient today. All questions have been answered to his satisfaction. We have discussed the following concepts: low cholesterol diet, weight control and daily exercise discussed, home glucose monitoring emphasized, all medications, side effects and compliance discussed carefully, diabetic Sick Day rules reviewed, handout given, foot care discussed and Podiatry visits discussed, annual eye examinations at Ophthalmology discussed, glycohemoglobin and other lab monitoring discussed, long term diabetic complications discussed and labs immediately prior to next visit. The diabetic Sick Day rules are reviewed with him verbally and in writing. If following usual diet, stay on same dose of diabetic medication, maintain high fluid intake, and perform home glucose monitoring QID. If not able to maintain normal diet due to illness maintain hydration with high fluid intake and call MD if glucose above 400.  Insulin: instructions given on proper technique of injection, storing medication, timing of dose. The patient was able to demonstrate adequate skill with doing self injections and home glucose monitoring. Blue Shah 3/3/2022 3:46 PM    35-minute visit. 10 minutes spent counseling patient on diet, exercise, and weight loss.  This has been fully explained to the patient, who indicates understanding.    Discussed health maintenance guidelines appropriate for age.  Discussed health maintenance guidelines appropriate for age.    10  Discussed health maintenance guidelines appropriate for age.

## 2022-03-07 ENCOUNTER — HOSPITAL ENCOUNTER (OUTPATIENT)
Dept: RADIOLOGY | Facility: HOSPITAL | Age: 77
Discharge: HOME | End: 2022-03-07
Attending: FAMILY MEDICINE
Payer: MEDICARE

## 2022-03-07 DIAGNOSIS — I63.89 OTHER CEREBRAL INFARCTION: ICD-10-CM

## 2022-03-07 PROCEDURE — 70496 CT ANGIOGRAPHY HEAD: CPT | Mod: 26,,, | Performed by: RADIOLOGY

## 2022-03-07 PROCEDURE — 70496 CTA HEAD AND NECK (XPD): ICD-10-PCS | Mod: 26,,, | Performed by: RADIOLOGY

## 2022-03-07 PROCEDURE — 25500020 PHARM REV CODE 255: Performed by: FAMILY MEDICINE

## 2022-03-07 PROCEDURE — 70496 CT ANGIOGRAPHY HEAD: CPT | Mod: TC

## 2022-03-07 PROCEDURE — 70498 CTA HEAD AND NECK (XPD): ICD-10-PCS | Mod: 26,,, | Performed by: RADIOLOGY

## 2022-03-07 PROCEDURE — 70498 CT ANGIOGRAPHY NECK: CPT | Mod: 26,,, | Performed by: RADIOLOGY

## 2022-03-07 RX ADMIN — IOHEXOL 75 ML: 350 INJECTION, SOLUTION INTRAVENOUS at 04:03

## 2022-03-08 ENCOUNTER — PATIENT MESSAGE (OUTPATIENT)
Dept: FAMILY MEDICINE | Facility: CLINIC | Age: 77
End: 2022-03-08

## 2022-03-08 ENCOUNTER — TELEPHONE (OUTPATIENT)
Dept: FAMILY MEDICINE | Facility: CLINIC | Age: 77
End: 2022-03-08

## 2022-03-08 NOTE — TELEPHONE ENCOUNTER
----- Message from Danielle Aguirre sent at 3/8/2022  8:17 AM CST -----  Contact: pt  Type: Return Call    Who Called: pt  Who Left Message for Pt: nurse  Does the patient know what this is regarding: yes  Best Call Back Number: [phone]  Thank you~

## 2022-03-10 ENCOUNTER — LAB VISIT (OUTPATIENT)
Dept: LAB | Facility: HOSPITAL | Age: 77
End: 2022-03-10
Attending: INTERNAL MEDICINE
Payer: MEDICARE

## 2022-03-10 DIAGNOSIS — R60.0 BILATERAL LEG EDEMA: ICD-10-CM

## 2022-03-10 LAB
ANION GAP SERPL CALC-SCNC: 12 MMOL/L (ref 8–16)
BUN SERPL-MCNC: 36 MG/DL (ref 8–23)
CALCIUM SERPL-MCNC: 9.4 MG/DL (ref 8.7–10.5)
CHLORIDE SERPL-SCNC: 95 MMOL/L (ref 95–110)
CO2 SERPL-SCNC: 30 MMOL/L (ref 23–29)
CREAT SERPL-MCNC: 1.6 MG/DL (ref 0.5–1.4)
EST. GFR  (AFRICAN AMERICAN): 46.7 ML/MIN/1.73 M^2
EST. GFR  (NON AFRICAN AMERICAN): 40.4 ML/MIN/1.73 M^2
GLUCOSE SERPL-MCNC: 167 MG/DL (ref 70–110)
POTASSIUM SERPL-SCNC: 4.1 MMOL/L (ref 3.5–5.1)
SODIUM SERPL-SCNC: 137 MMOL/L (ref 136–145)

## 2022-03-10 PROCEDURE — 36415 COLL VENOUS BLD VENIPUNCTURE: CPT | Mod: PO | Performed by: INTERNAL MEDICINE

## 2022-03-10 PROCEDURE — 80048 BASIC METABOLIC PNL TOTAL CA: CPT | Performed by: INTERNAL MEDICINE

## 2022-03-11 ENCOUNTER — PATIENT MESSAGE (OUTPATIENT)
Dept: PULMONOLOGY | Facility: CLINIC | Age: 77
End: 2022-03-11

## 2022-03-17 ENCOUNTER — TELEPHONE (OUTPATIENT)
Dept: FAMILY MEDICINE | Facility: CLINIC | Age: 77
End: 2022-03-17

## 2022-03-17 ENCOUNTER — PATIENT MESSAGE (OUTPATIENT)
Dept: FAMILY MEDICINE | Facility: CLINIC | Age: 77
End: 2022-03-17

## 2022-03-17 ENCOUNTER — LAB VISIT (OUTPATIENT)
Dept: LAB | Facility: HOSPITAL | Age: 77
End: 2022-03-17
Attending: INTERNAL MEDICINE
Payer: MEDICARE

## 2022-03-17 ENCOUNTER — OFFICE VISIT (OUTPATIENT)
Dept: FAMILY MEDICINE | Facility: CLINIC | Age: 77
End: 2022-03-17
Payer: MEDICARE

## 2022-03-17 VITALS
OXYGEN SATURATION: 98 % | BODY MASS INDEX: 36.73 KG/M2 | RESPIRATION RATE: 16 BRPM | HEART RATE: 76 BPM | SYSTOLIC BLOOD PRESSURE: 134 MMHG | DIASTOLIC BLOOD PRESSURE: 62 MMHG | HEIGHT: 71 IN | TEMPERATURE: 98 F | WEIGHT: 262.38 LBS

## 2022-03-17 DIAGNOSIS — I15.2 HYPERTENSION ASSOCIATED WITH DIABETES: ICD-10-CM

## 2022-03-17 DIAGNOSIS — E11.59 HYPERTENSION ASSOCIATED WITH DIABETES: ICD-10-CM

## 2022-03-17 DIAGNOSIS — I73.9 PVD (PERIPHERAL VASCULAR DISEASE): Primary | ICD-10-CM

## 2022-03-17 DIAGNOSIS — E11.29 TYPE 2 DIABETES MELLITUS WITH MICROALBUMINURIA, WITHOUT LONG-TERM CURRENT USE OF INSULIN: ICD-10-CM

## 2022-03-17 DIAGNOSIS — D69.6 THROMBOCYTOPENIA, UNSPECIFIED: ICD-10-CM

## 2022-03-17 DIAGNOSIS — L03.116 CELLULITIS OF LEFT LOWER EXTREMITY: ICD-10-CM

## 2022-03-17 DIAGNOSIS — R80.9 TYPE 2 DIABETES MELLITUS WITH MICROALBUMINURIA, WITHOUT LONG-TERM CURRENT USE OF INSULIN: ICD-10-CM

## 2022-03-17 DIAGNOSIS — I67.89 ISCHEMIC ENCEPHALOPATHY: ICD-10-CM

## 2022-03-17 DIAGNOSIS — R60.0 LOCALIZED EDEMA: ICD-10-CM

## 2022-03-17 DIAGNOSIS — R60.0 BILATERAL LEG EDEMA: ICD-10-CM

## 2022-03-17 LAB
ANION GAP SERPL CALC-SCNC: 11 MMOL/L (ref 8–16)
BUN SERPL-MCNC: 42 MG/DL (ref 8–23)
CALCIUM SERPL-MCNC: 9.7 MG/DL (ref 8.7–10.5)
CHLORIDE SERPL-SCNC: 100 MMOL/L (ref 95–110)
CO2 SERPL-SCNC: 27 MMOL/L (ref 23–29)
CREAT SERPL-MCNC: 1.3 MG/DL (ref 0.5–1.4)
EST. GFR  (AFRICAN AMERICAN): 60 ML/MIN/1.73 M^2
EST. GFR  (NON AFRICAN AMERICAN): 51.9 ML/MIN/1.73 M^2
GLUCOSE SERPL-MCNC: 161 MG/DL (ref 70–110)
POTASSIUM SERPL-SCNC: 4.5 MMOL/L (ref 3.5–5.1)
SODIUM SERPL-SCNC: 138 MMOL/L (ref 136–145)

## 2022-03-17 PROCEDURE — 1159F MED LIST DOCD IN RCRD: CPT | Mod: CPTII,S$GLB,, | Performed by: FAMILY MEDICINE

## 2022-03-17 PROCEDURE — 3075F SYST BP GE 130 - 139MM HG: CPT | Mod: CPTII,S$GLB,, | Performed by: FAMILY MEDICINE

## 2022-03-17 PROCEDURE — 3078F DIAST BP <80 MM HG: CPT | Mod: CPTII,S$GLB,, | Performed by: FAMILY MEDICINE

## 2022-03-17 PROCEDURE — 3051F PR MOST RECENT HEMOGLOBIN A1C LEVEL 7.0 - < 8.0%: ICD-10-PCS | Mod: CPTII,S$GLB,, | Performed by: FAMILY MEDICINE

## 2022-03-17 PROCEDURE — 1101F PT FALLS ASSESS-DOCD LE1/YR: CPT | Mod: CPTII,S$GLB,, | Performed by: FAMILY MEDICINE

## 2022-03-17 PROCEDURE — 1101F PR PT FALLS ASSESS DOC 0-1 FALLS W/OUT INJ PAST YR: ICD-10-PCS | Mod: CPTII,S$GLB,, | Performed by: FAMILY MEDICINE

## 2022-03-17 PROCEDURE — 3078F PR MOST RECENT DIASTOLIC BLOOD PRESSURE < 80 MM HG: ICD-10-PCS | Mod: CPTII,S$GLB,, | Performed by: FAMILY MEDICINE

## 2022-03-17 PROCEDURE — 99213 PR OFFICE/OUTPT VISIT, EST, LEVL III, 20-29 MIN: ICD-10-PCS | Mod: S$GLB,,, | Performed by: FAMILY MEDICINE

## 2022-03-17 PROCEDURE — 99213 OFFICE O/P EST LOW 20 MIN: CPT | Mod: S$GLB,,, | Performed by: FAMILY MEDICINE

## 2022-03-17 PROCEDURE — 36415 COLL VENOUS BLD VENIPUNCTURE: CPT | Mod: PO | Performed by: INTERNAL MEDICINE

## 2022-03-17 PROCEDURE — 3288F PR FALLS RISK ASSESSMENT DOCUMENTED: ICD-10-PCS | Mod: CPTII,S$GLB,, | Performed by: FAMILY MEDICINE

## 2022-03-17 PROCEDURE — 3075F PR MOST RECENT SYSTOLIC BLOOD PRESS GE 130-139MM HG: ICD-10-PCS | Mod: CPTII,S$GLB,, | Performed by: FAMILY MEDICINE

## 2022-03-17 PROCEDURE — 3288F FALL RISK ASSESSMENT DOCD: CPT | Mod: CPTII,S$GLB,, | Performed by: FAMILY MEDICINE

## 2022-03-17 PROCEDURE — 1126F AMNT PAIN NOTED NONE PRSNT: CPT | Mod: CPTII,S$GLB,, | Performed by: FAMILY MEDICINE

## 2022-03-17 PROCEDURE — 3072F PR LOW RISK FOR RETINOPATHY: ICD-10-PCS | Mod: CPTII,S$GLB,, | Performed by: FAMILY MEDICINE

## 2022-03-17 PROCEDURE — 3072F LOW RISK FOR RETINOPATHY: CPT | Mod: CPTII,S$GLB,, | Performed by: FAMILY MEDICINE

## 2022-03-17 PROCEDURE — 99999 PR PBB SHADOW E&M-EST. PATIENT-LVL V: ICD-10-PCS | Mod: PBBFAC,,, | Performed by: FAMILY MEDICINE

## 2022-03-17 PROCEDURE — 1159F PR MEDICATION LIST DOCUMENTED IN MEDICAL RECORD: ICD-10-PCS | Mod: CPTII,S$GLB,, | Performed by: FAMILY MEDICINE

## 2022-03-17 PROCEDURE — 3051F HG A1C>EQUAL 7.0%<8.0%: CPT | Mod: CPTII,S$GLB,, | Performed by: FAMILY MEDICINE

## 2022-03-17 PROCEDURE — 1126F PR PAIN SEVERITY QUANTIFIED, NO PAIN PRESENT: ICD-10-PCS | Mod: CPTII,S$GLB,, | Performed by: FAMILY MEDICINE

## 2022-03-17 PROCEDURE — 99999 PR PBB SHADOW E&M-EST. PATIENT-LVL V: CPT | Mod: PBBFAC,,, | Performed by: FAMILY MEDICINE

## 2022-03-17 PROCEDURE — 80048 BASIC METABOLIC PNL TOTAL CA: CPT | Performed by: INTERNAL MEDICINE

## 2022-03-17 RX ORDER — DICLOXACILLIN SODIUM 500 MG/1
500 CAPSULE ORAL 2 TIMES DAILY
Qty: 20 CAPSULE | Refills: 1 | Status: SHIPPED | OUTPATIENT
Start: 2022-03-17 | End: 2022-03-27

## 2022-03-17 RX ORDER — ATORVASTATIN CALCIUM 80 MG/1
80 TABLET, FILM COATED ORAL DAILY
Qty: 90 TABLET | Refills: 4 | Status: SHIPPED | OUTPATIENT
Start: 2022-03-17 | End: 2022-10-19 | Stop reason: SDUPTHER

## 2022-03-17 RX ORDER — LISINOPRIL 5 MG/1
5 TABLET ORAL DAILY
Qty: 90 TABLET | Refills: 3 | Status: SHIPPED | OUTPATIENT
Start: 2022-03-17 | End: 2023-01-23

## 2022-03-17 RX ORDER — CEFUROXIME AXETIL 500 MG/1
500 TABLET ORAL EVERY 12 HOURS
Qty: 20 TABLET | Refills: 0 | Status: SHIPPED | OUTPATIENT
Start: 2022-03-17 | End: 2022-03-17

## 2022-03-17 NOTE — Clinical Note
I place new orders for US within the next 5 days, orders for PT, and new antibiotics.Lymphedema therapy due to chronic swollen legs.May needs leg pumps at home.

## 2022-03-17 NOTE — TELEPHONE ENCOUNTER
----- Message from Blue Shah MD sent at 3/17/2022  5:00 PM CDT -----  I place new orders for US within the next 5 days, orders for PT, and new antibiotics.Lymphedema therapy due to chronic swollen legs.May needs leg pumps at home.

## 2022-03-17 NOTE — PROGRESS NOTES
Subjective:       Patient ID: Lenny Lopez is a 77 y.o. male.    Chief Complaint: Follow-up    SUBJECTIVE: Lenny Lopez is a 77 y.o. male seen for a follow up visit; he has  Controlled diabetes II controlled hypertension, mixed hyperlipidemia, preserved EF CHF, peripheral vascular disease and obesity.  Edema: Patient complains of edema. The location of the edema is lower leg(s) bilateral.  The edema has been severe.  Onset of symptoms was several months ago, gradually improving since that time. The edema is present all day. The patient states the problem is long-standing.  The swelling has been aggravated by dependency of involved area and increased salt intake, relieved by diuretics, support stockings, elevation of involved area, low-salt diet, and been associated with diagnosis of heart failure, shortness of breath, venous insufficiency and weight gain. Cardiac risk factors include advanced age (older than 55 for men, 65 for women), diabetes mellitus, dyslipidemia, family history of premature cardiovascular disease, hypertension, male gender, microalbuminuria, obesity (BMI >= 30 kg/m2) and sedentary lifestyle.        Current Outpatient Medications:  ACCU-CHEK GUIDE GLUCOSE METER Misc, USE DEVICE TO TEST BLOOD SUGAR TWO TIMES DAILY, Disp: , Rfl:   albuterol (PROVENTIL/VENTOLIN HFA) 90 mcg/actuation inhaler, 2 puffs every 4 hours as needed for cough, wheeze, or shortness of breath, Disp: 18 g, Rfl: 11  albuterol-ipratropium (DUO-NEB) 2.5 mg-0.5 mg/3 mL nebulizer solution, Take 3 mLs by nebulization every 6 (six) hours as needed for Wheezing. Rescue, Disp: 1 Box, Rfl: 4  aspirin 81 mg Tab, Take 1 tablet by mouth once daily. , Disp: , Rfl:   atorvastatin (LIPITOR) 40 MG tablet, Take 1 tablet (40 mg total) by mouth once daily., Disp: 90 tablet, Rfl: 3  benralizumab (FASENRA PEN) 30 mg/mL AtIn, Inject 30 mg into the skin every 8 weeks., Disp: 1 mL, Rfl: 6  blood sugar diagnostic Strp, 1 strip by Misc.(Non-Drug;  Combo Route) route 3 (three) times daily. DX: E11.29   Dispense test strips that are covered by insurance, Disp: 300 strip, Rfl: 3  blood sugar diagnostic, drum (ACCU-CHEK COMPACT PLUS TEST) Strp, 1 strip by Misc.(Non-Drug; Combo Route) route 2 (two) times daily with meals., Disp: 100 strip, Rfl: 1  blood-glucose meter, drum-type Kit, 1 Device by Misc.(Non-Drug; Combo Route) route 2 (two) times daily with meals., Disp: 1 kit, Rfl: 0  ELIQUIS 5 mg Tab, Take 5 mg by mouth 2 (two) times daily. , Disp: , Rfl:   ergocalciferol (ERGOCALCIFEROL) 50,000 unit Cap, Take 1 capsule (50,000 Units total) by mouth once a week., Disp: 4 capsule, Rfl: 5  finasteride (PROSCAR) 5 mg tablet, Take 1 tablet (5 mg total) by mouth once daily., Disp: 30 tablet, Rfl: 11  fluticasone propionate (FLOVENT HFA) 220 mcg/actuation inhaler, Inhale 1 puff into the lungs 2 (two) times daily. Controller, Disp: 12 g, Rfl: 11  furosemide (LASIX) 20 MG tablet, Take 1 tablet (20 mg total) by mouth once daily., Disp: 90 tablet, Rfl: 3  furosemide (LASIX) 20 MG tablet, Take 2 tablets (40 mg total) by mouth 2 (two) times daily., Disp: 120 tablet, Rfl: 11  KENALOG 40 mg/mL injection, , Disp: , Rfl:   lancets (FREESTYLE LANCETS) 28 gauge Misc, 1 lancet by Misc.(Non-Drug; Combo Route) route 3 (three) times daily., Disp: 300 each, Rfl: 3  lancing device Misc, 1 Device by Misc.(Non-Drug; Combo Route) route 2 (two) times daily with meals., Disp: 100 each, Rfl: 0  lisinopriL (PRINIVIL,ZESTRIL) 2.5 MG tablet, Take 1 tablet (2.5 mg total) by mouth once daily., Disp: 90 tablet, Rfl: 3  metFORMIN (GLUCOPHAGE) 500 MG tablet, Take 1 tablet (500 mg total) by mouth 2 (two) times daily with meals., Disp: 60 tablet, Rfl: 3  montelukast (SINGULAIR) 10 mg tablet, Take 1 tablet (10 mg total) by mouth every evening., Disp: 30 tablet, Rfl: 11  mupirocin (BACTROBAN) 2 % ointment, APPLY OINTMENT TOPICALLY TO AFFECTED AREA THREE TIMES DAILY FOR 5 DAYS, Disp: , Rfl:   MYRBETRIQ 50 mg  Tb24, TAKE 1 TABLET BY MOUTH ONCE DAILY, Disp: 30 tablet, Rfl: 11  omega-3 fatty acids-vitamin E (FISH OIL) 1,000 mg Cap, Take 1 capsule by mouth once daily. Three times a day, Disp: , Rfl:   potassium chloride (KLOR-CON) 10 MEQ TbSR, 1 tab with furosemide., Disp: 180 tablet, Rfl: 5  predniSONE (DELTASONE) 20 MG tablet, Take one daily for 3 days and may repeat for shortness of breath., Disp: 21 tablet, Rfl: 0  pulse oximeter (PULSE OXIMETER) device, by Apply Externally route 2 (two) times a day. Use twice daily at 8 AM and 3 PM and [facility]., Disp: 1 each, Rfl: 0  sotalol (BETAPACE) 80 MG tablet, Take 0.5 tablets (40 mg total) by mouth 2 (two) times daily. for 7 days, Disp: 7 tablet, Rfl: 0  tamsulosin (FLOMAX) 0.4 mg Cap, Take 1 capsule (0.4 mg total) by mouth once daily., Disp: 30 capsule, Rfl: 11  traMADoL (ULTRAM) 50 mg tablet, Take 1 tablet (50 mg total) by mouth every 6 (six) hours as needed for Pain. 7 days for acute pain., Disp: 28 tablet, Rfl: 1  turmeric root extract 500 mg Cap, Take 1 capsule by mouth once daily., Disp: , Rfl:   VENTOLIN HFA 90 mcg/actuation inhaler, Inhale 1-2 puffs into the lungs every 4 (four) hours as needed for Wheezing or Shortness of Breath., Disp: 18 g, Rfl: 3  acarbose (PRECOSE) 25 MG Tab, Take 1 tablet (25 mg total) by mouth 2 (two) times daily before meals., Disp: 180 tablet, Rfl: 3    No current facility-administered medications for this visit.  Facility-Administered Medications Ordered in Other Visits:  lidocaine (PF) 10 mg/ml (1%) injection 10 mg, 1 mL, Intradermal, Once, Deandra Diaz MD      Patient Active Problem List:     Osteoarthritis     Hypertension associated with diabetes     CKD (chronic kidney disease) stage 3, GFR 39.7 ml/min     Atrial fibrillation     Constipation due to pain medication     Gout     Low testosterone     Metabolic syndrome     LVH (left ventricular hypertrophy) due to hypertensive disease      Cardiovascular risk factor, ASCVD 10-year risk 20.7%, ideal 14.1%     Chews tobacco regularly, about can a day     Type 2 diabetes mellitus with microalbuminuria, without long-term current use of insulin     Microalbuminuria     LUZ on CPAP, 100% use, 2016     Hyperlipidemia associated with type 2 diabetes mellitus     Prostatism     Bilateral leg edema     Calcified granuloma of lung     Bilateral carotid artery disease     Statin intolerance     Severe persistent asthma without complication     Eosinophilic asthma     Coronary artery disease of native artery of native heart with stable angina pectoris     Urge urinary incontinence     Rheumatoid factor positive     Hyperparathyroidism     Chronic pain of left knee     Vocal cord mass     COPD (chronic obstructive pulmonary disease)     Fall     Rheumatoid arthritis involving multiple sites with positive rheumatoid factor     Thrombocytopenia, unspecified     Hypertensive heart disease with heart failure     Diabetic retinopathy associated with controlled type 2 diabetes mellitus     History of colon polyps      Review of Systems   Constitutional: Positive for fatigue. Negative for unexpected weight change.   Respiratory: Positive for shortness of breath. Negative for chest tightness.    Cardiovascular: Positive for leg swelling. Negative for chest pain and palpitations.   Gastrointestinal: Negative for abdominal pain.   Musculoskeletal: Negative for arthralgias.   Neurological: Negative for dizziness, syncope, light-headedness and headaches.       Patient Active Problem List   Diagnosis    Osteoarthritis    Hypertension associated with diabetes    CKD (chronic kidney disease) stage 3, GFR 39.7 ml/min    Atrial fibrillation    Constipation due to pain medication    Gout    Low testosterone    Metabolic syndrome    LVH (left ventricular hypertrophy) due to hypertensive disease    Cardiovascular risk factor, ASCVD 10-year risk 20.7%, ideal 14.1%    Chews  tobacco regularly, about can a day    Type 2 diabetes mellitus with microalbuminuria, without long-term current use of insulin    Microalbuminuria    LUZ on CPAP, 100% use, 2016    Hyperlipidemia associated with type 2 diabetes mellitus    Prostatism    Bilateral leg edema    Calcified granuloma of lung    Bilateral carotid artery disease    Statin intolerance    Severe persistent asthma without complication    Eosinophilic asthma    Coronary artery disease of native artery of native heart with stable angina pectoris    Urge urinary incontinence    Rheumatoid factor positive    Obesity, morbid, BMI 40.0-49.9    Hyperparathyroidism    Chronic pain of left knee    Vocal cord mass    COPD (chronic obstructive pulmonary disease)    Fall    Rheumatoid arthritis involving multiple sites with positive rheumatoid factor    Thrombocytopenia, unspecified    Hypertensive heart disease with heart failure    Diabetic retinopathy associated with controlled type 2 diabetes mellitus    History of colon polyps    Hepatic cirrhosis, unspecified hepatic cirrhosis type, unspecified whether ascites present    TONEY (dyspnea on exertion)    Asthma-COPD overlap syndrome    Chronic left-sided low back pain without sciatica       Objective:      Physical Exam  Vitals reviewed.   Constitutional:       General: He is not in acute distress.     Appearance: He is well-developed. He is obese. He is not diaphoretic.   HENT:      Head: Normocephalic and atraumatic.      Right Ear: External ear normal.      Left Ear: External ear normal.      Nose: Nose normal.      Mouth/Throat:      Pharynx: No oropharyngeal exudate.   Eyes:      General: No scleral icterus.        Right eye: No discharge.         Left eye: No discharge.      Conjunctiva/sclera: Conjunctivae normal.      Pupils: Pupils are equal, round, and reactive to light.   Neck:      Thyroid: No thyromegaly.      Vascular: No JVD.      Trachea: No tracheal  deviation.   Cardiovascular:      Rate and Rhythm: Normal rate.      Chest Wall: PMI is displaced.      Heart sounds: Heart sounds are distant. No murmur heard.     Pulmonary:      Effort: Pulmonary effort is normal. No respiratory distress.      Breath sounds: Decreased air movement present. Examination of the right-lower field reveals decreased breath sounds. Examination of the left-lower field reveals decreased breath sounds. Decreased breath sounds present. No wheezing or rales.   Abdominal:      General: Bowel sounds are normal. There is no distension.      Palpations: Abdomen is soft. There is no mass.      Tenderness: There is no abdominal tenderness. There is no guarding or rebound.   Musculoskeletal:         General: No tenderness.      Cervical back: Neck supple. Crepitus present. Spinous process tenderness present. Decreased range of motion.      Comments:   Bilateral swollen feet, swollen lat malleolar and both first proximal phalangeal metatarsal bunion.Left leg with lower pitting 2/3 moderate erythema.     Lymphadenopathy:      Cervical: No cervical adenopathy.   Skin:     General: Skin is warm and dry.      Coloration: Skin is not pale.      Findings: No erythema or rash.   Neurological:      Mental Status: He is alert and oriented to person, place, and time.      Cranial Nerves: No cranial nerve deficit.      Coordination: Coordination normal.   Psychiatric:         Behavior: Behavior normal.         Thought Content: Thought content normal.         Judgment: Judgment normal.         Lab Results   Component Value Date    WBC 6.56 03/03/2022    HGB 12.1 (L) 03/03/2022    HCT 38.7 (L) 03/03/2022     (L) 03/03/2022    CHOL 106 (L) 10/07/2021    TRIG 74 10/07/2021    HDL 30 (L) 10/07/2021    ALT 21 01/11/2022    AST 16 01/11/2022     03/10/2022    K 4.1 03/10/2022    CL 95 03/10/2022    CREATININE 1.6 (H) 03/10/2022    BUN 36 (H) 03/10/2022    CO2 30 (H) 03/10/2022    TSH 4.662 (H) 05/13/2021     PSA 3.5 12/01/2015    INR 1.1 09/03/2020    HGBA1C 7.4 (H) 01/11/2022     The ASCVD Risk score (Denton JEREMY Jr., et al., 2013) failed to calculate for the following reasons:    The valid total cholesterol range is 130 to 320 mg/dL  CLINICAL HISTORY:  Stroke, follow up;Other cerebral infarction     TECHNIQUE:  Non contrast low dose axial images were obtained through the head.  CT angiogram was performed from the level of the alejandro to the top of the head following the IV administration of 75mL of Omnipaque 350.   Sagittal and coronal reconstructions and maximum intensity projection reconstructions were performed. Arterial stenosis percentages are based on NASCET measurement criteria.     COMPARISON:  None     FINDINGS:  Noncontrast head CT demonstrates no intracranial hemorrhage.  Gray-white differentiation is well maintained.  There is no mass or midline shift.  The ventricles are nondilated.     There is moderate plaque formation of the origins of the arch vessels, without significant narrowing.  The bilateral common carotid arteries demonstrate mild scattered plaque formation without significant narrowing.     There is moderate plaque formation of the right carotid bulb resulting in 50% narrowing.  The right cervical internal carotid artery is patent.  The left internal carotid artery demonstrates no significant narrowing.     The bilateral vertebral arteries are codominant and widely patent.  There is no evidence for arterial dissection.     The intracranial internal carotid arteries exhibit moderate calcified plaque formation with multifocal mild narrowing.  The bilateral middle cerebral and anterior cerebral arteries are patent without significant stenosis.  The bilateral posterior cerebral arteries are patent without significant stenosis.  The posterior communicating arteries are not well seen and may be congenitally absent or hypoplastic.  A patent anterior communicating artery is present.  The basilar artery  is normal in caliber and widely patent.  The distal vertebral arteries are widely patent.     A left chest cardiac pacer is partially imaged.  The heart is enlarged.     Impression:     Moderate atherosclerosis without high-grade arterial stenosis within the head or neck.  50% narrowing of the right ICA origin is present.        Electronically signed by: Best Hutton MD  Date:                                            03/07/2022  Assessment:       1. PVD (peripheral vascular disease)    2. Hypertension associated with diabetes    3. Type 2 diabetes mellitus with microalbuminuria, without long-term current use of insulin    4. Cellulitis of left lower extremity    5. Localized edema     6. Ischemic encephalopathy        Plan:       PVD (peripheral vascular disease)  -     lisinopriL (PRINIVIL,ZESTRIL) 5 MG tablet; Take 1 tablet (5 mg total) by mouth once daily.  Dispense: 90 tablet; Refill: 3  -     Discontinue: cefUROXime (CEFTIN) 500 MG tablet; Take 1 tablet (500 mg total) by mouth every 12 (twelve) hours.  Dispense: 20 tablet; Refill: 0  -     Ambulatory referral/consult to Physical/Occupational Therapy; Future; Expected date: 03/24/2022  -     US Lower Extremity Veins Left; Future; Expected date: 03/17/2022    Hypertension associated with diabetes  -     lisinopriL (PRINIVIL,ZESTRIL) 5 MG tablet; Take 1 tablet (5 mg total) by mouth once daily.  Dispense: 90 tablet; Refill: 3    Type 2 diabetes mellitus with microalbuminuria, without long-term current use of insulin  -     lisinopriL (PRINIVIL,ZESTRIL) 5 MG tablet; Take 1 tablet (5 mg total) by mouth once daily.  Dispense: 90 tablet; Refill: 3  -     atorvastatin (LIPITOR) 80 MG tablet; Take 1 tablet (80 mg total) by mouth once daily.  Dispense: 90 tablet; Refill: 4    Cellulitis of left lower extremity  -     Discontinue: cefUROXime (CEFTIN) 500 MG tablet; Take 1 tablet (500 mg total) by mouth every 12 (twelve) hours.  Dispense: 20 tablet; Refill: 0  -      Ambulatory referral/consult to Physical/Occupational Therapy; Future; Expected date: 03/24/2022  -     US Lower Extremity Veins Left; Future; Expected date: 03/17/2022  -     dicloxacillin (DYNAPEN) 500 MG capsule; Take 1 capsule (500 mg total) by mouth 2 (two) times daily. for 10 days  Dispense: 20 capsule; Refill: 1    Localized edema   -     US Lower Extremity Veins Left; Future; Expected date: 03/17/2022    Ischemic encephalopathy    Thrombocytopenia, unspecified    Severe risk factors for a stroke or heart attack, or pneumonia. Goal /75. No lower than 125/70.  I have reviewed diabetes in detail with the patient today. All questions have been answered to his satisfaction. We have discussed the following concepts: low cholesterol diet, weight control and daily exercise discussed, home glucose monitoring emphasized, all medications, side effects and compliance discussed carefully, diabetic Sick Day rules reviewed, handout given, foot care discussed and Podiatry visits discussed, annual eye examinations at Ophthalmology discussed, glycohemoglobin and other lab monitoring discussed, long term diabetic complications discussed and labs immediately prior to next visit. The diabetic Sick Day rules are reviewed with him verbally and in writing. If following usual diet, stay on same dose of diabetic medication, maintain high fluid intake, and perform home glucose monitoring QID. If not able to maintain normal diet due to illness maintain hydration with high fluid intake and call MD if glucose above 400. Insulin: instructions given on proper technique of injection, storing medication, timing of dose. The patient was able to demonstrate adequate skill with doing self injections and home glucose monitoring. Blue Shah 3/17/2022 3:46 PM    30-minute visit. 10 minutes spent counseling patient on diet, exercise, and weight loss.  This has been fully explained to the patient, who indicates understanding.    Discussed  health maintenance guidelines appropriate for age.  Discussed health maintenance guidelines appropriate for age.    10  Discussed health maintenance guidelines appropriate for age.  Discussed health maintenance guidelines appropriate for age.

## 2022-03-18 ENCOUNTER — PATIENT MESSAGE (OUTPATIENT)
Dept: PULMONOLOGY | Facility: CLINIC | Age: 77
End: 2022-03-18

## 2022-03-18 ENCOUNTER — HOSPITAL ENCOUNTER (OUTPATIENT)
Dept: RADIOLOGY | Facility: HOSPITAL | Age: 77
Discharge: HOME | End: 2022-03-18
Attending: FAMILY MEDICINE
Payer: MEDICARE

## 2022-03-18 DIAGNOSIS — I73.9 PVD (PERIPHERAL VASCULAR DISEASE): ICD-10-CM

## 2022-03-18 DIAGNOSIS — R60.0 LOCALIZED EDEMA: ICD-10-CM

## 2022-03-18 DIAGNOSIS — L03.116 CELLULITIS OF LEFT LOWER EXTREMITY: ICD-10-CM

## 2022-03-18 PROCEDURE — 93971 EXTREMITY STUDY: CPT | Mod: TC,LT

## 2022-03-24 ENCOUNTER — LAB VISIT (OUTPATIENT)
Dept: LAB | Facility: HOSPITAL | Age: 77
End: 2022-03-24
Attending: INTERNAL MEDICINE
Payer: MEDICARE

## 2022-03-24 DIAGNOSIS — R60.0 BILATERAL LEG EDEMA: ICD-10-CM

## 2022-03-24 LAB
ANION GAP SERPL CALC-SCNC: 10 MMOL/L (ref 8–16)
BUN SERPL-MCNC: 37 MG/DL (ref 8–23)
CALCIUM SERPL-MCNC: 9.7 MG/DL (ref 8.7–10.5)
CHLORIDE SERPL-SCNC: 101 MMOL/L (ref 95–110)
CO2 SERPL-SCNC: 26 MMOL/L (ref 23–29)
CREAT SERPL-MCNC: 1.2 MG/DL (ref 0.5–1.4)
EST. GFR  (AFRICAN AMERICAN): >60 ML/MIN/1.73 M^2
EST. GFR  (NON AFRICAN AMERICAN): 57.2 ML/MIN/1.73 M^2
GLUCOSE SERPL-MCNC: 178 MG/DL (ref 70–110)
POTASSIUM SERPL-SCNC: 4.7 MMOL/L (ref 3.5–5.1)
SODIUM SERPL-SCNC: 137 MMOL/L (ref 136–145)

## 2022-03-24 PROCEDURE — 80048 BASIC METABOLIC PNL TOTAL CA: CPT | Performed by: INTERNAL MEDICINE

## 2022-03-24 PROCEDURE — 36415 COLL VENOUS BLD VENIPUNCTURE: CPT | Mod: PO | Performed by: INTERNAL MEDICINE

## 2022-03-29 ENCOUNTER — PATIENT MESSAGE (OUTPATIENT)
Dept: PULMONOLOGY | Facility: CLINIC | Age: 77
End: 2022-03-29

## 2022-04-07 ENCOUNTER — PATIENT MESSAGE (OUTPATIENT)
Dept: FAMILY MEDICINE | Facility: CLINIC | Age: 77
End: 2022-04-07

## 2022-04-20 ENCOUNTER — LAB VISIT (OUTPATIENT)
Dept: LAB | Facility: HOSPITAL | Age: 77
End: 2022-04-20
Attending: INTERNAL MEDICINE
Payer: MEDICARE

## 2022-04-20 ENCOUNTER — OFFICE VISIT (OUTPATIENT)
Dept: FAMILY MEDICINE | Facility: CLINIC | Age: 77
End: 2022-04-20
Payer: MEDICARE

## 2022-04-20 VITALS
BODY MASS INDEX: 37.04 KG/M2 | SYSTOLIC BLOOD PRESSURE: 112 MMHG | DIASTOLIC BLOOD PRESSURE: 56 MMHG | WEIGHT: 264.56 LBS | HEART RATE: 80 BPM | HEIGHT: 71 IN | OXYGEN SATURATION: 95 %

## 2022-04-20 DIAGNOSIS — J20.8 ACUTE BACTERIAL BRONCHITIS: ICD-10-CM

## 2022-04-20 DIAGNOSIS — R60.0 BILATERAL LEG EDEMA: ICD-10-CM

## 2022-04-20 DIAGNOSIS — B96.89 ACUTE BACTERIAL BRONCHITIS: ICD-10-CM

## 2022-04-20 DIAGNOSIS — J45.50 SEVERE PERSISTENT ASTHMA WITHOUT COMPLICATION: ICD-10-CM

## 2022-04-20 DIAGNOSIS — J44.1 COPD EXACERBATION: Primary | ICD-10-CM

## 2022-04-20 LAB
ANION GAP SERPL CALC-SCNC: 12 MMOL/L (ref 8–16)
BUN SERPL-MCNC: 41 MG/DL (ref 8–23)
CALCIUM SERPL-MCNC: 9.1 MG/DL (ref 8.7–10.5)
CHLORIDE SERPL-SCNC: 101 MMOL/L (ref 95–110)
CO2 SERPL-SCNC: 26 MMOL/L (ref 23–29)
CREAT SERPL-MCNC: 1.3 MG/DL (ref 0.5–1.4)
EST. GFR  (AFRICAN AMERICAN): 60 ML/MIN/1.73 M^2
EST. GFR  (NON AFRICAN AMERICAN): 51.9 ML/MIN/1.73 M^2
GLUCOSE SERPL-MCNC: 152 MG/DL (ref 70–110)
POTASSIUM SERPL-SCNC: 4.3 MMOL/L (ref 3.5–5.1)
SODIUM SERPL-SCNC: 139 MMOL/L (ref 136–145)

## 2022-04-20 PROCEDURE — 3072F PR LOW RISK FOR RETINOPATHY: ICD-10-PCS | Mod: CPTII,S$GLB,, | Performed by: FAMILY MEDICINE

## 2022-04-20 PROCEDURE — 99214 OFFICE O/P EST MOD 30 MIN: CPT | Mod: 25,S$GLB,, | Performed by: FAMILY MEDICINE

## 2022-04-20 PROCEDURE — 96372 THER/PROPH/DIAG INJ SC/IM: CPT | Mod: S$GLB,,, | Performed by: FAMILY MEDICINE

## 2022-04-20 PROCEDURE — 99999 PR PBB SHADOW E&M-EST. PATIENT-LVL V: CPT | Mod: PBBFAC,,, | Performed by: FAMILY MEDICINE

## 2022-04-20 PROCEDURE — 3078F PR MOST RECENT DIASTOLIC BLOOD PRESSURE < 80 MM HG: ICD-10-PCS | Mod: CPTII,S$GLB,, | Performed by: FAMILY MEDICINE

## 2022-04-20 PROCEDURE — 3072F LOW RISK FOR RETINOPATHY: CPT | Mod: CPTII,S$GLB,, | Performed by: FAMILY MEDICINE

## 2022-04-20 PROCEDURE — 99214 PR OFFICE/OUTPT VISIT, EST, LEVL IV, 30-39 MIN: ICD-10-PCS | Mod: 25,S$GLB,, | Performed by: FAMILY MEDICINE

## 2022-04-20 PROCEDURE — 1160F PR REVIEW ALL MEDS BY PRESCRIBER/CLIN PHARMACIST DOCUMENTED: ICD-10-PCS | Mod: CPTII,S$GLB,, | Performed by: FAMILY MEDICINE

## 2022-04-20 PROCEDURE — 1160F RVW MEDS BY RX/DR IN RCRD: CPT | Mod: CPTII,S$GLB,, | Performed by: FAMILY MEDICINE

## 2022-04-20 PROCEDURE — 99999 PR PBB SHADOW E&M-EST. PATIENT-LVL V: ICD-10-PCS | Mod: PBBFAC,,, | Performed by: FAMILY MEDICINE

## 2022-04-20 PROCEDURE — 96372 PR INJECTION,THERAP/PROPH/DIAG2ST, IM OR SUBCUT: ICD-10-PCS | Mod: S$GLB,,, | Performed by: FAMILY MEDICINE

## 2022-04-20 PROCEDURE — 3288F FALL RISK ASSESSMENT DOCD: CPT | Mod: CPTII,S$GLB,, | Performed by: FAMILY MEDICINE

## 2022-04-20 PROCEDURE — 3078F DIAST BP <80 MM HG: CPT | Mod: CPTII,S$GLB,, | Performed by: FAMILY MEDICINE

## 2022-04-20 PROCEDURE — 80048 BASIC METABOLIC PNL TOTAL CA: CPT | Performed by: INTERNAL MEDICINE

## 2022-04-20 PROCEDURE — 1101F PT FALLS ASSESS-DOCD LE1/YR: CPT | Mod: CPTII,S$GLB,, | Performed by: FAMILY MEDICINE

## 2022-04-20 PROCEDURE — 3074F SYST BP LT 130 MM HG: CPT | Mod: CPTII,S$GLB,, | Performed by: FAMILY MEDICINE

## 2022-04-20 PROCEDURE — 1159F PR MEDICATION LIST DOCUMENTED IN MEDICAL RECORD: ICD-10-PCS | Mod: CPTII,S$GLB,, | Performed by: FAMILY MEDICINE

## 2022-04-20 PROCEDURE — 3074F PR MOST RECENT SYSTOLIC BLOOD PRESSURE < 130 MM HG: ICD-10-PCS | Mod: CPTII,S$GLB,, | Performed by: FAMILY MEDICINE

## 2022-04-20 PROCEDURE — 36415 COLL VENOUS BLD VENIPUNCTURE: CPT | Mod: PO | Performed by: INTERNAL MEDICINE

## 2022-04-20 PROCEDURE — 1101F PR PT FALLS ASSESS DOC 0-1 FALLS W/OUT INJ PAST YR: ICD-10-PCS | Mod: CPTII,S$GLB,, | Performed by: FAMILY MEDICINE

## 2022-04-20 PROCEDURE — 3288F PR FALLS RISK ASSESSMENT DOCUMENTED: ICD-10-PCS | Mod: CPTII,S$GLB,, | Performed by: FAMILY MEDICINE

## 2022-04-20 PROCEDURE — 1159F MED LIST DOCD IN RCRD: CPT | Mod: CPTII,S$GLB,, | Performed by: FAMILY MEDICINE

## 2022-04-20 RX ORDER — BETAMETHASONE SODIUM PHOSPHATE AND BETAMETHASONE ACETATE 3; 3 MG/ML; MG/ML
9 INJECTION, SUSPENSION INTRA-ARTICULAR; INTRALESIONAL; INTRAMUSCULAR; SOFT TISSUE
Status: COMPLETED | OUTPATIENT
Start: 2022-04-20 | End: 2022-04-20

## 2022-04-20 RX ORDER — ALBUTEROL SULFATE 90 UG/1
AEROSOL, METERED RESPIRATORY (INHALATION)
Qty: 18 G | Refills: 11 | Status: SHIPPED | OUTPATIENT
Start: 2022-04-20 | End: 2024-02-05 | Stop reason: SDUPTHER

## 2022-04-20 RX ORDER — DOXYCYCLINE 100 MG/1
100 CAPSULE ORAL 2 TIMES DAILY
Qty: 20 CAPSULE | Refills: 0 | Status: SHIPPED | OUTPATIENT
Start: 2022-04-20 | End: 2022-05-04 | Stop reason: ALTCHOICE

## 2022-04-20 RX ORDER — IPRATROPIUM BROMIDE AND ALBUTEROL SULFATE 2.5; .5 MG/3ML; MG/3ML
3 SOLUTION RESPIRATORY (INHALATION) EVERY 6 HOURS PRN
Qty: 1 EACH | Refills: 4 | Status: SHIPPED | OUTPATIENT
Start: 2022-04-20 | End: 2023-02-23 | Stop reason: SDUPTHER

## 2022-04-20 RX ADMIN — BETAMETHASONE SODIUM PHOSPHATE AND BETAMETHASONE ACETATE 9 MG: 3; 3 INJECTION, SUSPENSION INTRA-ARTICULAR; INTRALESIONAL; INTRAMUSCULAR; SOFT TISSUE at 02:04

## 2022-04-20 NOTE — PROGRESS NOTES
Subjective:       Patient ID: Lenny Lopez is a 77 y.o. male.    Chief Complaint: No chief complaint on file.    New to me patient here for UC visit.  Exac of COPD past week; took Prednisone 30 mg x 6 day with some relief but still has discolored sputu some SOB/TONEY and wheezing.  Has been out of town owrking and no access to [facility].  No fever or blood in sputum.   Recent imbalance type dizziness - has resolved.    Review of Systems   Constitutional: Negative for fever.   Respiratory: Positive for cough, shortness of breath and wheezing.    Cardiovascular: Negative for chest pain.   Gastrointestinal: Negative for abdominal pain and nausea.   Skin: Negative for rash.   Neurological: Negative for dizziness and numbness.   All other systems reviewed and are negative.      Objective:      Physical Exam  Constitutional:       General: He is not in acute distress.     Appearance: He is well-developed.   Cardiovascular:      Rate and Rhythm: Normal rate and regular rhythm.      Heart sounds: No murmur heard.  Pulmonary:      Effort: Pulmonary effort is normal.      Breath sounds: Wheezing and rhonchi present.      Comments: Scattered wheezes and rhonchii        Assessment:       1. COPD exacerbation    2. Severe persistent asthma without complication    3. Acute bacterial bronchitis        Plan:       COPD exacerbation  -     betamethasone acetate-betamethasone sodium phosphate injection 9 mg  -     albuterol-ipratropium (DUO-NEB) 2.5 mg-0.5 mg/3 mL nebulizer solution; Take 3 mLs by nebulization every 6 (six) hours as needed for Wheezing. Rescue  Dispense: 1 each; Refill: 4    Severe persistent asthma without complication  -     albuterol-ipratropium (DUO-NEB) 2.5 mg-0.5 mg/3 mL nebulizer solution; Take 3 mLs by nebulization every 6 (six) hours as needed for Wheezing. Rescue  Dispense: 1 each; Refill: 4  -     albuterol (PROVENTIL/VENTOLIN HFA) 90 mcg/actuation inhaler; 2 puffs every 4 hours as needed for cough, wheeze,  or shortness of breath  Dispense: 18 g; Refill: 11    Acute bacterial bronchitis  -     doxycycline (MONODOX) 100 MG capsule; Take 1 capsule (100 mg total) by mouth 2 (two) times daily.  Dispense: 20 capsule; Refill: 0      Patient Instructions   Push fluids intake.  Drink plenty of water.   Goal of four 16 oz bottles a day.

## 2022-04-20 NOTE — PROGRESS NOTES
Identified name and . Administered 9 mg Celestone,  IM. Patient tolerated well, aseptic technique maintained. Pain scale 0/10 with injection. No residual bleeding at insertion site noted.

## 2022-04-21 ENCOUNTER — PATIENT MESSAGE (OUTPATIENT)
Dept: PULMONOLOGY | Facility: CLINIC | Age: 77
End: 2022-04-21

## 2022-04-21 ENCOUNTER — TELEPHONE (OUTPATIENT)
Dept: PULMONOLOGY | Facility: CLINIC | Age: 77
End: 2022-04-21

## 2022-04-21 NOTE — TELEPHONE ENCOUNTER
Returned call. Labs look okay.   Call back with questions or concerns.    ----- Message from Amber Quintanilla sent at 4/21/2022 10:51 AM CDT -----  Contact: Patient [phone]  Type:  Patient Returning Call    Who Called: Patient    Who Left Message for Patient: Nyla    Does the patient know what this is regarding?: Returning call     Would the patient rather a call back or a response via [facility]? Call back    Best Call Back Number: [phone]

## 2022-05-04 ENCOUNTER — OFFICE VISIT (OUTPATIENT)
Dept: PULMONOLOGY | Facility: CLINIC | Age: 77
End: 2022-05-04
Payer: MEDICARE

## 2022-05-04 VITALS
HEART RATE: 77 BPM | DIASTOLIC BLOOD PRESSURE: 63 MMHG | OXYGEN SATURATION: 99 % | WEIGHT: 264.69 LBS | BODY MASS INDEX: 36.91 KG/M2 | SYSTOLIC BLOOD PRESSURE: 139 MMHG

## 2022-05-04 DIAGNOSIS — M25.562 CHRONIC PAIN OF LEFT KNEE: ICD-10-CM

## 2022-05-04 DIAGNOSIS — G89.29 CHRONIC PAIN OF LEFT KNEE: ICD-10-CM

## 2022-05-04 DIAGNOSIS — R60.0 BILATERAL LEG EDEMA: ICD-10-CM

## 2022-05-04 DIAGNOSIS — J45.50 SEVERE PERSISTENT ASTHMA WITHOUT COMPLICATION: Primary | ICD-10-CM

## 2022-05-04 PROCEDURE — 99499 UNLISTED E&M SERVICE: CPT | Mod: S$PBB,,, | Performed by: INTERNAL MEDICINE

## 2022-05-04 PROCEDURE — 3078F PR MOST RECENT DIASTOLIC BLOOD PRESSURE < 80 MM HG: ICD-10-PCS | Mod: CPTII,S$GLB,, | Performed by: INTERNAL MEDICINE

## 2022-05-04 PROCEDURE — 1101F PT FALLS ASSESS-DOCD LE1/YR: CPT | Mod: CPTII,S$GLB,, | Performed by: INTERNAL MEDICINE

## 2022-05-04 PROCEDURE — 1125F PR PAIN SEVERITY QUANTIFIED, PAIN PRESENT: ICD-10-PCS | Mod: CPTII,S$GLB,, | Performed by: INTERNAL MEDICINE

## 2022-05-04 PROCEDURE — 3288F FALL RISK ASSESSMENT DOCD: CPT | Mod: CPTII,S$GLB,, | Performed by: INTERNAL MEDICINE

## 2022-05-04 PROCEDURE — 1159F PR MEDICATION LIST DOCUMENTED IN MEDICAL RECORD: ICD-10-PCS | Mod: CPTII,S$GLB,, | Performed by: INTERNAL MEDICINE

## 2022-05-04 PROCEDURE — 99999 PR PBB SHADOW E&M-EST. PATIENT-LVL V: CPT | Mod: PBBFAC,,, | Performed by: INTERNAL MEDICINE

## 2022-05-04 PROCEDURE — 3288F PR FALLS RISK ASSESSMENT DOCUMENTED: ICD-10-PCS | Mod: CPTII,S$GLB,, | Performed by: INTERNAL MEDICINE

## 2022-05-04 PROCEDURE — 3075F PR MOST RECENT SYSTOLIC BLOOD PRESS GE 130-139MM HG: ICD-10-PCS | Mod: CPTII,S$GLB,, | Performed by: INTERNAL MEDICINE

## 2022-05-04 PROCEDURE — 99499 RISK ADDL DX/OHS AUDIT: ICD-10-PCS | Mod: S$PBB,,, | Performed by: INTERNAL MEDICINE

## 2022-05-04 PROCEDURE — 99213 OFFICE O/P EST LOW 20 MIN: CPT | Mod: S$GLB,,, | Performed by: INTERNAL MEDICINE

## 2022-05-04 PROCEDURE — 3072F PR LOW RISK FOR RETINOPATHY: ICD-10-PCS | Mod: CPTII,S$GLB,, | Performed by: INTERNAL MEDICINE

## 2022-05-04 PROCEDURE — 1125F AMNT PAIN NOTED PAIN PRSNT: CPT | Mod: CPTII,S$GLB,, | Performed by: INTERNAL MEDICINE

## 2022-05-04 PROCEDURE — 3078F DIAST BP <80 MM HG: CPT | Mod: CPTII,S$GLB,, | Performed by: INTERNAL MEDICINE

## 2022-05-04 PROCEDURE — 1101F PR PT FALLS ASSESS DOC 0-1 FALLS W/OUT INJ PAST YR: ICD-10-PCS | Mod: CPTII,S$GLB,, | Performed by: INTERNAL MEDICINE

## 2022-05-04 PROCEDURE — 3075F SYST BP GE 130 - 139MM HG: CPT | Mod: CPTII,S$GLB,, | Performed by: INTERNAL MEDICINE

## 2022-05-04 PROCEDURE — 1159F MED LIST DOCD IN RCRD: CPT | Mod: CPTII,S$GLB,, | Performed by: INTERNAL MEDICINE

## 2022-05-04 PROCEDURE — 99213 PR OFFICE/OUTPT VISIT, EST, LEVL III, 20-29 MIN: ICD-10-PCS | Mod: S$GLB,,, | Performed by: INTERNAL MEDICINE

## 2022-05-04 PROCEDURE — 99999 PR PBB SHADOW E&M-EST. PATIENT-LVL V: ICD-10-PCS | Mod: PBBFAC,,, | Performed by: INTERNAL MEDICINE

## 2022-05-04 PROCEDURE — 3072F LOW RISK FOR RETINOPATHY: CPT | Mod: CPTII,S$GLB,, | Performed by: INTERNAL MEDICINE
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
## 2022-05-04 NOTE — PATIENT INSTRUCTIONS
Could see back in 6 wks for consideration shot?    Will order ortho consult -- should have knee attention.       Need to control edema best able.      Continue cpap device use.

## 2022-05-18 DIAGNOSIS — M25.561 PAIN IN BOTH KNEES, UNSPECIFIED CHRONICITY: Primary | ICD-10-CM

## 2022-05-18 DIAGNOSIS — M25.562 PAIN IN BOTH KNEES, UNSPECIFIED CHRONICITY: Primary | ICD-10-CM

## 2022-05-19 ENCOUNTER — OFFICE VISIT (OUTPATIENT)
Dept: ORTHOPEDICS | Facility: CLINIC | Age: 77
End: 2022-05-19
Payer: MEDICARE

## 2022-05-19 ENCOUNTER — HOSPITAL ENCOUNTER (OUTPATIENT)
Dept: RADIOLOGY | Facility: HOSPITAL | Age: 77
Discharge: HOME | End: 2022-05-19
Attending: ORTHOPAEDIC SURGERY
Payer: MEDICARE

## 2022-05-19 VITALS — BODY MASS INDEX: 37.06 KG/M2 | WEIGHT: 264.75 LBS | HEIGHT: 71 IN

## 2022-05-19 DIAGNOSIS — M17.0 BILATERAL PRIMARY OSTEOARTHRITIS OF KNEE: Primary | ICD-10-CM

## 2022-05-19 DIAGNOSIS — M25.562 PAIN IN BOTH KNEES, UNSPECIFIED CHRONICITY: Primary | ICD-10-CM

## 2022-05-19 DIAGNOSIS — M25.561 PAIN IN BOTH KNEES, UNSPECIFIED CHRONICITY: ICD-10-CM

## 2022-05-19 DIAGNOSIS — M25.561 PAIN IN BOTH KNEES, UNSPECIFIED CHRONICITY: Primary | ICD-10-CM

## 2022-05-19 DIAGNOSIS — M25.562 PAIN IN BOTH KNEES, UNSPECIFIED CHRONICITY: ICD-10-CM

## 2022-05-19 PROCEDURE — 73564 X-RAY EXAM KNEE 4 OR MORE: CPT | Mod: TC,50,PN

## 2022-05-19 PROCEDURE — 20610 DRAIN/INJ JOINT/BURSA W/O US: CPT | Mod: 50,S$GLB,, | Performed by: ORTHOPAEDIC SURGERY

## 2022-05-19 PROCEDURE — 1159F PR MEDICATION LIST DOCUMENTED IN MEDICAL RECORD: ICD-10-PCS | Mod: CPTII,S$GLB,, | Performed by: ORTHOPAEDIC SURGERY

## 2022-05-19 PROCEDURE — 73564 XR KNEE ORTHO BILAT WITH FLEXION: ICD-10-PCS | Mod: 26,50,, | Performed by: RADIOLOGY

## 2022-05-19 PROCEDURE — 99214 PR OFFICE/OUTPT VISIT, EST, LEVL IV, 30-39 MIN: ICD-10-PCS | Mod: 25,S$GLB,, | Performed by: ORTHOPAEDIC SURGERY

## 2022-05-19 PROCEDURE — 3072F LOW RISK FOR RETINOPATHY: CPT | Mod: CPTII,S$GLB,, | Performed by: ORTHOPAEDIC SURGERY

## 2022-05-19 PROCEDURE — 1125F AMNT PAIN NOTED PAIN PRSNT: CPT | Mod: CPTII,S$GLB,, | Performed by: ORTHOPAEDIC SURGERY

## 2022-05-19 PROCEDURE — 1101F PR PT FALLS ASSESS DOC 0-1 FALLS W/OUT INJ PAST YR: ICD-10-PCS | Mod: CPTII,S$GLB,, | Performed by: ORTHOPAEDIC SURGERY

## 2022-05-19 PROCEDURE — 1125F PR PAIN SEVERITY QUANTIFIED, PAIN PRESENT: ICD-10-PCS | Mod: CPTII,S$GLB,, | Performed by: ORTHOPAEDIC SURGERY

## 2022-05-19 PROCEDURE — 99999 PR PBB SHADOW E&M-EST. PATIENT-LVL IV: CPT | Mod: PBBFAC,,, | Performed by: ORTHOPAEDIC SURGERY

## 2022-05-19 PROCEDURE — 1159F MED LIST DOCD IN RCRD: CPT | Mod: CPTII,S$GLB,, | Performed by: ORTHOPAEDIC SURGERY

## 2022-05-19 PROCEDURE — 1101F PT FALLS ASSESS-DOCD LE1/YR: CPT | Mod: CPTII,S$GLB,, | Performed by: ORTHOPAEDIC SURGERY

## 2022-05-19 PROCEDURE — 73564 X-RAY EXAM KNEE 4 OR MORE: CPT | Mod: 26,50,, | Performed by: RADIOLOGY

## 2022-05-19 PROCEDURE — 99214 OFFICE O/P EST MOD 30 MIN: CPT | Mod: 25,S$GLB,, | Performed by: ORTHOPAEDIC SURGERY

## 2022-05-19 PROCEDURE — 1160F RVW MEDS BY RX/DR IN RCRD: CPT | Mod: CPTII,S$GLB,, | Performed by: ORTHOPAEDIC SURGERY

## 2022-05-19 PROCEDURE — 20610 LARGE JOINT ASPIRATION/INJECTION: BILATERAL KNEE: ICD-10-PCS | Mod: 50,S$GLB,, | Performed by: ORTHOPAEDIC SURGERY

## 2022-05-19 PROCEDURE — 3288F FALL RISK ASSESSMENT DOCD: CPT | Mod: CPTII,S$GLB,, | Performed by: ORTHOPAEDIC SURGERY

## 2022-05-19 PROCEDURE — 3072F PR LOW RISK FOR RETINOPATHY: ICD-10-PCS | Mod: CPTII,S$GLB,, | Performed by: ORTHOPAEDIC SURGERY

## 2022-05-19 PROCEDURE — 3288F PR FALLS RISK ASSESSMENT DOCUMENTED: ICD-10-PCS | Mod: CPTII,S$GLB,, | Performed by: ORTHOPAEDIC SURGERY

## 2022-05-19 PROCEDURE — 1160F PR REVIEW ALL MEDS BY PRESCRIBER/CLIN PHARMACIST DOCUMENTED: ICD-10-PCS | Mod: CPTII,S$GLB,, | Performed by: ORTHOPAEDIC SURGERY

## 2022-05-19 PROCEDURE — 99999 PR PBB SHADOW E&M-EST. PATIENT-LVL IV: ICD-10-PCS | Mod: PBBFAC,,, | Performed by: ORTHOPAEDIC SURGERY

## 2022-05-19 RX ORDER — METHYLPREDNISOLONE ACETATE 40 MG/ML
40 INJECTION, SUSPENSION INTRA-ARTICULAR; INTRALESIONAL; INTRAMUSCULAR; SOFT TISSUE
Status: DISCONTINUED | OUTPATIENT
Start: 2022-05-19 | End: 2022-05-19 | Stop reason: HOSPADM

## 2022-05-19 RX ADMIN — METHYLPREDNISOLONE ACETATE 40 MG: 40 INJECTION, SUSPENSION INTRA-ARTICULAR; INTRALESIONAL; INTRAMUSCULAR; SOFT TISSUE at 11:05

## 2022-05-19 NOTE — PROCEDURES
Large Joint Aspiration/Injection: bilateral knee    Date/Time: 5/19/2022 11:15 AM  Performed by: rGabiel Núñez II, MD  Authorized by: Grabiel Núñez II, MD     Consent Done?:  Yes (Verbal)  Indications:  Pain and arthritis  Timeout: prior to procedure the correct patient, procedure, and site was verified    Prep: patient was prepped and draped in usual sterile fashion      Local anesthesia used?: Yes    Local anesthetic:  Topical anesthetic    Details:  Needle Size:  22 G  Approach:  Anterolateral  Location:  Knee  Laterality:  Bilateral  Site:  Bilateral knee  Medications (Right):  40 mg methylPREDNISolone acetate 40 mg/mL  Medications (Left):  40 mg methylPREDNISolone acetate 40 mg/mL  Patient tolerance:  Patient tolerated the procedure well with no immediate complications

## 2022-05-19 NOTE — PROGRESS NOTES
CC:  77-year-old male presents for evaluation of bilateral knee pain.  The patient has had chronic pain in both of his knees for quite some time.  Does have a history of arthritis of both knees.  He has previously had steroid injections and viscosupplementation.  He states the Visco did not really work but the steroid injections usually give him good relief.  It has been quite sometime since he has had a steroid injection in his knees.  He currently rates pain as a 6/10.    Past Medical History:   Diagnosis Date    Acute hypoxemic respiratory failure 1/1/2021    Angina pectoris     Arthritis     Asthma     Atrial fibrillation     Back pain     Cardiac pacemaker     Cataract     Chronic bronchitis     COPD (chronic obstructive pulmonary disease)     Coronary artery disease     COVID-19     Diabetic retinopathy associated with controlled type 2 diabetes mellitus 2/18/2021    DM (diabetes mellitus), type 2, 2000 12/12/2014    Gout     Hepatic cirrhosis, unspecified hepatic cirrhosis type, unspecified whether ascites present 1/23/2022    Hoarseness of voice     Hyperlipidemia     Hypertension     Kidney stones 12/17/2012    Nephrolithiasis     Obesity     Pneumonia due to COVID-19 virus 1/1/2021    Renal manifestation of secondary diabetes mellitus     Rheumatoid factor positive 03/08/2017    Negative work up with Dr. Choudhury    Sleep apnea     Thyroid disease     Unspecified disorder of kidney and ureter     Urinary incontinence     Vocal cord polyp      Past Surgical History:   Procedure Laterality Date    APPENDECTOMY      CARDIAC PACEMAKER PLACEMENT  2018    COLONOSCOPY N/A 3/3/2021    Procedure: COLONOSCOPY;  Surgeon: Jesus Soliman MD;  Location: [facility];  Service: Endoscopy;  Laterality: N/A;    CORONARY STENT PLACEMENT  2018    EYE SURGERY      left eye secondary to trauma    FRACTURE SURGERY      right arm    KNEE SURGERY      screw    MICROLARYNGOSCOPY WITH BIOPSY OF  VOCAL CORD N/A 10/13/2020    Procedure: MICROLARYNGOSCOPY, WITH VOCAL CORD BIOPSY;  Surgeon: Deandra Diaz MD;  Location: [facility] OR;  Service: ENT;  Laterality: N/A;    TONSILLECTOMY, ADENOIDECTOMY         Current Outpatient Medications on File Prior to Visit   Medication Sig Dispense Refill    acarbose (PRECOSE) 25 MG Tab TAKE 1 TABLET BY MOUTH THREE TIMES DAILY WITH MEALS 270 tablet 1    ACCU-CHEK GUIDE GLUCOSE METER Misc USE DEVICE TO TEST BLOOD SUGAR TWO TIMES DAILY      albuterol (PROVENTIL/VENTOLIN HFA) 90 mcg/actuation inhaler 2 puffs every 4 hours as needed for cough, wheeze, or shortness of breath 18 g 11    albuterol-ipratropium (DUO-NEB) 2.5 mg-0.5 mg/3 mL nebulizer solution Take 3 mLs by nebulization every 6 (six) hours as needed for Wheezing. Rescue 1 each 4    allopurinoL (ZYLOPRIM) 100 MG tablet Take 2 tablets (200 mg total) by mouth once daily. 30 tablet 3    aspirin 81 mg Tab Take 1 tablet by mouth once daily.       atorvastatin (LIPITOR) 80 MG tablet Take 1 tablet (80 mg total) by mouth once daily. 90 tablet 4    benralizumab (FASENRA PEN) 30 mg/mL AtIn Inject 30 mg into the skin every 8 weeks. 1 mL 6    blood sugar diagnostic Strp 1 strip by Misc.(Non-Drug; Combo Route) route 3 (three) times daily. DX: E11.29   Dispense test strips that are covered by insurance (Patient taking differently: 1 strip by Misc.(Non-Drug; Combo Route) route 3 (three) times daily. DX: E11.29   Dispense test strips that are covered by insurance) 300 strip 3    blood sugar diagnostic, drum (ACCU-CHEK COMPACT PLUS TEST) Strp 1 strip by Misc.(Non-Drug; Combo Route) route 2 (two) times daily with meals. 100 strip 1    ELIQUIS 5 mg Tab Take 5 mg by mouth 2 (two) times daily.       finasteride (PROSCAR) 5 mg tablet Take 1 tablet (5 mg total) by mouth once daily. 30 tablet 11    FLUAD QUAD 2021-22,65Y UP,,PF, 60 mcg (15 mcg x 4)/0.5 mL Syrg       fluticasone-umeclidin-vilanter (TRELEGY ELLIPTA) 200-62.5-25  mcg inhaler Inhale 1 puff into the lungs once daily. 60 each 11    furosemide (LASIX) 40 MG tablet Take 2 tablets (80 mg total) by mouth daily as needed (edema). 120 tablet 11    lancets (FREESTYLE LANCETS) 28 gauge Misc 1 lancet by Misc.(Non-Drug; Combo Route) route 3 (three) times daily. 300 each 3    lisinopriL (PRINIVIL,ZESTRIL) 5 MG tablet Take 1 tablet (5 mg total) by mouth once daily. 90 tablet 3    metFORMIN (GLUCOPHAGE) 500 MG tablet TAKE 1 TABLET BY MOUTH TWICE DAILY WITH MEALS 180 tablet 3    montelukast (SINGULAIR) 10 mg tablet Take 1 tablet (10 mg total) by mouth every evening. 30 tablet 11    mupirocin (BACTROBAN) 2 % ointment APPLY OINTMENT TOPICALLY TO AFFECTED AREA THREE TIMES DAILY FOR 5 DAYS      omega-3 fatty acids-vitamin E 1,000 mg Cap Take 1 capsule by mouth once daily. Three times a day      potassium chloride (KLOR-CON) 10 MEQ TbSR 1 tab with furosemide. 180 tablet 5    predniSONE (DELTASONE) 20 MG tablet Take one daily for 3 days and may repeat for shortness of breath. 21 tablet 0    pulse oximeter (PULSE OXIMETER) device by Apply Externally route 2 (two) times a day. Use twice daily at 8 AM and 3 PM and record the value in [facility] as directed. 1 each 0    tamsulosin (FLOMAX) 0.4 mg Cap Take 2 capsules (0.8 mg total) by mouth once daily. 180 capsule 3    turmeric root extract 500 mg Cap Take 1 capsule by mouth once daily.      VENTOLIN HFA 90 mcg/actuation inhaler Inhale 1-2 puffs into the lungs every 4 (four) hours as needed for Wheezing or Shortness of Breath. 18 g 3    blood-glucose meter, drum-type Kit 1 Device by Misc.(Non-Drug; Combo Route) route 2 (two) times daily with meals. 1 kit 0    lancing device Misc 1 Device by Misc.(Non-Drug; Combo Route) route 2 (two) times daily with meals. 100 each 0    MYRBETRIQ 50 mg Tb24 TAKE 1 TABLET BY MOUTH ONCE DAILY 30 tablet 11    sotalol (BETAPACE) 80 MG tablet Take 0.5 tablets (40 mg total) by mouth 2 (two) times daily. for 7  days 7 tablet 0     Current Facility-Administered Medications on File Prior to Visit   Medication Dose Route Frequency Provider Last Rate Last Admin    lidocaine (PF) 10 mg/ml (1%) injection 10 mg  1 mL Intradermal Once Deandra Diaz MD           ROS:    Constitution: Denies chills, fever, and sweats.  HENT: Denies headaches or blurry vision.  Cardiovascular: Denies chest pain or irregular heart beat.  Respiratory: Denies cough or shortness of breath.  Gastrointestinal: Denies abdominal pain, nausea, or vomiting.  Genitourinary:  Denies urinary incontinence, bladder and kidney issues  Musculoskeletal:  Denies muscle cramps.  Neurological: Denies dizziness or focal weakness.  Psychiatric/Behavioral: Normal mental status.  Hematologic/Lymphatic: Denies bleeding problem or easy bruising/bleeding.  Skin: Denies rash or suspicious lesions.    Physical examination     Gen - No acute distress, well nourished, well groomed   Eyes - Extraoccular motions intact, pupils equally round and reactive to light and accommodation   ENT - normocephalic, atruamtic, oropharynx clear   Neck - Supple, no abnormal masses   Cardiovascular - regular rate and rhythm   Pulmonary - clear to auscultation bilaterally, no wheezes, ronchi, or rales   Abdomen - soft, non-tender, non-distended, positive bowel sounds   Psych - The patient is alert and oriented x3 with normal mood and affect    Examination of the Right Lower Extremity:     Skin intact throughout.  Motor function is intact distally EHL/FHL/TA/slava   +2 dorsalis pedis and posterior tibial pulses   Sensation to light touch intact distally dorsal, plantar, and first web space     Examination of the Right knee:    ROM 0 - 150   Effusion positive  Tenderness to palpation at the joint line positive  Pain during range of motion positive  Crepitation during range of motion positive     positive increased pain noted with flexion past 90   positive antalgic gait noted   negative  Lachman's Test   negative Anterior Drawer Test   negative Posterior Drawer Test   positive McMurrays Test   positive Disco Test   negative Varus/Valgus instability    Examination of the Left Lower Extremity:     Skin intact throughout.  Motor function is intact distally EHL/FHL/TA/slava   +2 dorsalis pedis and posterior tibial pulses   Sensation to light touch intact distally dorsal, plantar, and first web space     Examination of the Left knee:    ROM 0 - 150   Effusion positive  Tenderness to palpation at the joint line positive  Pain during range of motion positive  Crepitation during range of motion positive     positive increased pain noted with flexion past 90   positive antalgic gait noted   negative Lachman's Test   negative Anterior Drawer Test   negative Posterior Drawer Test   positive McMurrays Test   positive Disco Test   negative Varus/Valgus instability    X-ray images were examined and personally interpreted by me.  Multiple views of bilateral knees dated 05/19/2022 show osteoarthritis of both knees with loss of joint space, periarticular osteophytes, and subchondral sclerosis.  In the left knee there is a screw and washer transfixing the proximal tibia.    Dx:  Bilateral knee osteoarthritis    Plan:  Offered the patient an injection in his knees and he agreed.  We injected both the right and left knee with a mixture of 2, 2, 1.  Tolerated that well.  Follow up p.r.n..

## 2022-06-20 ENCOUNTER — PATIENT MESSAGE (OUTPATIENT)
Dept: FAMILY MEDICINE | Facility: CLINIC | Age: 77
End: 2022-06-20

## 2022-07-18 ENCOUNTER — PATIENT MESSAGE (OUTPATIENT)
Dept: FAMILY MEDICINE | Facility: CLINIC | Age: 77
End: 2022-07-18

## 2022-07-19 ENCOUNTER — PATIENT MESSAGE (OUTPATIENT)
Dept: FAMILY MEDICINE | Facility: CLINIC | Age: 77
End: 2022-07-19

## 2022-08-03 ENCOUNTER — TELEPHONE (OUTPATIENT)
Dept: PULMONOLOGY | Facility: CLINIC | Age: 77
End: 2022-08-03

## 2022-08-03 NOTE — TELEPHONE ENCOUNTER
Spoke with patient's wife. Advised of nothing open until 8/25 for both patients. Offered appointment with NP for sooner date. Patient's wife declined. Only wants to see Dr. Allan. Patient added to 8/25/2022    ----- Message from Chichi Doll, Patient Care Assistant sent at 8/2/2022 11:49 AM CDT -----  Regarding: appointment  Contact: pt  Type:  Sooner Appointment Request    Caller is requesting a sooner appointment.  Caller declined first available appointment listed below.  Caller will not accept being placed on the waitlist and is requesting a message be sent to doctor.    Name of Caller:  pt   When is the first available appointment?  09/2022  Symptoms: f/u   Best Call Back Number:  [phone] (home)     Additional Information:  please call pt to advise. Thanks!

## 2022-08-08 ENCOUNTER — TELEPHONE (OUTPATIENT)
Dept: PULMONOLOGY | Facility: CLINIC | Age: 77
End: 2022-08-08

## 2022-08-08 ENCOUNTER — LAB VISIT (OUTPATIENT)
Dept: LAB | Facility: HOSPITAL | Age: 77
End: 2022-08-08
Attending: SPECIALIST
Payer: MEDICARE

## 2022-08-08 DIAGNOSIS — I10 ESSENTIAL HYPERTENSION, MALIGNANT: ICD-10-CM

## 2022-08-08 DIAGNOSIS — R60.0 LOCALIZED EDEMA: Primary | ICD-10-CM

## 2022-08-08 LAB
ANION GAP SERPL CALC-SCNC: 11 MMOL/L (ref 8–16)
BASOPHILS # BLD AUTO: 0 K/UL (ref 0–0.2)
BASOPHILS NFR BLD: 0 % (ref 0–1.9)
BUN SERPL-MCNC: 38 MG/DL (ref 8–23)
CALCIUM SERPL-MCNC: 9.6 MG/DL (ref 8.7–10.5)
CHLORIDE SERPL-SCNC: 98 MMOL/L (ref 95–110)
CO2 SERPL-SCNC: 27 MMOL/L (ref 23–29)
CREAT SERPL-MCNC: 1.5 MG/DL (ref 0.5–1.4)
DIFFERENTIAL METHOD: ABNORMAL
EOSINOPHIL # BLD AUTO: 0 K/UL (ref 0–0.5)
EOSINOPHIL NFR BLD: 0 % (ref 0–8)
ERYTHROCYTE [DISTWIDTH] IN BLOOD BY AUTOMATED COUNT: 14.1 % (ref 11.5–14.5)
EST. GFR  (NO RACE VARIABLE): 47.1 ML/MIN/1.73 M^2
GLUCOSE SERPL-MCNC: 147 MG/DL (ref 70–110)
HCT VFR BLD AUTO: 36.3 % (ref 40–54)
HGB BLD-MCNC: 11.7 G/DL (ref 14–18)
IMM GRANULOCYTES # BLD AUTO: 0.04 K/UL (ref 0–0.04)
IMM GRANULOCYTES NFR BLD AUTO: 0.6 % (ref 0–0.5)
LYMPHOCYTES # BLD AUTO: 1.6 K/UL (ref 1–4.8)
LYMPHOCYTES NFR BLD: 23 % (ref 18–48)
MAGNESIUM SERPL-MCNC: 1.9 MG/DL (ref 1.6–2.6)
MCH RBC QN AUTO: 29.3 PG (ref 27–31)
MCHC RBC AUTO-ENTMCNC: 32.2 G/DL (ref 32–36)
MCV RBC AUTO: 91 FL (ref 82–98)
MONOCYTES # BLD AUTO: 0.7 K/UL (ref 0.3–1)
MONOCYTES NFR BLD: 9.9 % (ref 4–15)
NEUTROPHILS # BLD AUTO: 4.6 K/UL (ref 1.8–7.7)
NEUTROPHILS NFR BLD: 66.5 % (ref 38–73)
NRBC BLD-RTO: 0 /100 WBC
PLATELET # BLD AUTO: 162 K/UL (ref 150–450)
PMV BLD AUTO: 12.1 FL (ref 9.2–12.9)
POTASSIUM SERPL-SCNC: 4.5 MMOL/L (ref 3.5–5.1)
RBC # BLD AUTO: 4 M/UL (ref 4.6–6.2)
SODIUM SERPL-SCNC: 136 MMOL/L (ref 136–145)
T4 FREE SERPL-MCNC: 0.86 NG/DL (ref 0.71–1.51)
TSH SERPL DL<=0.005 MIU/L-ACNC: 4.44 UIU/ML (ref 0.4–4)
WBC # BLD AUTO: 6.88 K/UL (ref 3.9–12.7)

## 2022-08-08 PROCEDURE — 85025 COMPLETE CBC W/AUTO DIFF WBC: CPT | Performed by: SPECIALIST

## 2022-08-08 PROCEDURE — 36415 COLL VENOUS BLD VENIPUNCTURE: CPT | Mod: PO | Performed by: SPECIALIST

## 2022-08-08 PROCEDURE — 84439 ASSAY OF FREE THYROXINE: CPT | Performed by: SPECIALIST

## 2022-08-08 PROCEDURE — 80048 BASIC METABOLIC PNL TOTAL CA: CPT | Performed by: SPECIALIST

## 2022-08-08 PROCEDURE — 84443 ASSAY THYROID STIM HORMONE: CPT | Performed by: SPECIALIST

## 2022-08-08 PROCEDURE — 83735 ASSAY OF MAGNESIUM: CPT | Performed by: SPECIALIST

## 2022-08-08 NOTE — TELEPHONE ENCOUNTER
Spoke with patient. Straps on CPAP mask are bothering him. Needs order for a new mask.       ----- Message from Jessica Herr sent at 8/8/2022 12:02 PM CDT -----  Contact: Pt  Type: Needs Medical Advice    Who Called:  Pt    Best Call Back Number: [phone]    Additional Information: Please call back regarding CPAP.  Thanks.

## 2022-08-25 ENCOUNTER — OFFICE VISIT (OUTPATIENT)
Dept: PULMONOLOGY | Facility: CLINIC | Age: 77
End: 2022-08-25
Payer: MEDICARE

## 2022-08-25 ENCOUNTER — TELEPHONE (OUTPATIENT)
Dept: PULMONOLOGY | Facility: CLINIC | Age: 77
End: 2022-08-25

## 2022-08-25 VITALS
HEIGHT: 71 IN | DIASTOLIC BLOOD PRESSURE: 55 MMHG | OXYGEN SATURATION: 99 % | SYSTOLIC BLOOD PRESSURE: 110 MMHG | BODY MASS INDEX: 36.96 KG/M2 | HEART RATE: 62 BPM | WEIGHT: 264 LBS

## 2022-08-25 DIAGNOSIS — J45.50 SEVERE PERSISTENT ASTHMA WITHOUT COMPLICATION: ICD-10-CM

## 2022-08-25 DIAGNOSIS — G47.33 OSA ON CPAP: ICD-10-CM

## 2022-08-25 DIAGNOSIS — N18.30 STAGE 3 CHRONIC KIDNEY DISEASE, UNSPECIFIED WHETHER STAGE 3A OR 3B CKD: ICD-10-CM

## 2022-08-25 DIAGNOSIS — M10.9 GOUT, UNSPECIFIED CAUSE, UNSPECIFIED CHRONICITY, UNSPECIFIED SITE: ICD-10-CM

## 2022-08-25 DIAGNOSIS — R60.0 BILATERAL LEG EDEMA: Primary | ICD-10-CM

## 2022-08-25 PROCEDURE — 99999 PR PBB SHADOW E&M-EST. PATIENT-LVL V: CPT | Mod: PBBFAC,,, | Performed by: INTERNAL MEDICINE

## 2022-08-25 PROCEDURE — 99499 UNLISTED E&M SERVICE: CPT | Mod: S$PBB,,, | Performed by: INTERNAL MEDICINE

## 2022-08-25 PROCEDURE — 3072F LOW RISK FOR RETINOPATHY: CPT | Mod: CPTII,S$GLB,, | Performed by: INTERNAL MEDICINE

## 2022-08-25 PROCEDURE — 3288F FALL RISK ASSESSMENT DOCD: CPT | Mod: CPTII,S$GLB,, | Performed by: INTERNAL MEDICINE

## 2022-08-25 PROCEDURE — 3078F PR MOST RECENT DIASTOLIC BLOOD PRESSURE < 80 MM HG: ICD-10-PCS | Mod: CPTII,S$GLB,, | Performed by: INTERNAL MEDICINE

## 2022-08-25 PROCEDURE — 1101F PR PT FALLS ASSESS DOC 0-1 FALLS W/OUT INJ PAST YR: ICD-10-PCS | Mod: CPTII,S$GLB,, | Performed by: INTERNAL MEDICINE

## 2022-08-25 PROCEDURE — 3074F SYST BP LT 130 MM HG: CPT | Mod: CPTII,S$GLB,, | Performed by: INTERNAL MEDICINE

## 2022-08-25 PROCEDURE — 99214 PR OFFICE/OUTPT VISIT, EST, LEVL IV, 30-39 MIN: ICD-10-PCS | Mod: S$GLB,,, | Performed by: INTERNAL MEDICINE

## 2022-08-25 PROCEDURE — 1126F AMNT PAIN NOTED NONE PRSNT: CPT | Mod: CPTII,S$GLB,, | Performed by: INTERNAL MEDICINE

## 2022-08-25 PROCEDURE — 3288F PR FALLS RISK ASSESSMENT DOCUMENTED: ICD-10-PCS | Mod: CPTII,S$GLB,, | Performed by: INTERNAL MEDICINE

## 2022-08-25 PROCEDURE — 3072F PR LOW RISK FOR RETINOPATHY: ICD-10-PCS | Mod: CPTII,S$GLB,, | Performed by: INTERNAL MEDICINE

## 2022-08-25 PROCEDURE — 1159F PR MEDICATION LIST DOCUMENTED IN MEDICAL RECORD: ICD-10-PCS | Mod: CPTII,S$GLB,, | Performed by: INTERNAL MEDICINE

## 2022-08-25 PROCEDURE — 3074F PR MOST RECENT SYSTOLIC BLOOD PRESSURE < 130 MM HG: ICD-10-PCS | Mod: CPTII,S$GLB,, | Performed by: INTERNAL MEDICINE

## 2022-08-25 PROCEDURE — 1159F MED LIST DOCD IN RCRD: CPT | Mod: CPTII,S$GLB,, | Performed by: INTERNAL MEDICINE

## 2022-08-25 PROCEDURE — 99999 PR PBB SHADOW E&M-EST. PATIENT-LVL V: ICD-10-PCS | Mod: PBBFAC,,, | Performed by: INTERNAL MEDICINE

## 2022-08-25 PROCEDURE — 3078F DIAST BP <80 MM HG: CPT | Mod: CPTII,S$GLB,, | Performed by: INTERNAL MEDICINE

## 2022-08-25 PROCEDURE — 99214 OFFICE O/P EST MOD 30 MIN: CPT | Mod: S$GLB,,, | Performed by: INTERNAL MEDICINE

## 2022-08-25 PROCEDURE — 99499 RISK ADDL DX/OHS AUDIT: ICD-10-PCS | Mod: S$PBB,,, | Performed by: INTERNAL MEDICINE

## 2022-08-25 PROCEDURE — 1126F PR PAIN SEVERITY QUANTIFIED, NO PAIN PRESENT: ICD-10-PCS | Mod: CPTII,S$GLB,, | Performed by: INTERNAL MEDICINE

## 2022-08-25 PROCEDURE — 1101F PT FALLS ASSESS-DOCD LE1/YR: CPT | Mod: CPTII,S$GLB,, | Performed by: INTERNAL MEDICINE

## 2022-08-25 RX ORDER — PREDNISONE 10 MG/1
TABLET ORAL
Qty: 30 TABLET | Refills: 1 | Status: SHIPPED | OUTPATIENT
Start: 2022-08-25 | End: 2022-12-22

## 2022-08-25 NOTE — TELEPHONE ENCOUNTER
Returned call to pharmacy and advised pharmacist Amelia that the patient may repeat dose as needed for his asthma. TORB given.
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
## 2022-08-25 NOTE — PATIENT INSTRUCTIONS
Call in booster fluid pill -- would like to know name.    Check urine and chemistries now,  will place standing order for blood check in future-- should check blood if edema increases and after increase in diuretic dose.    Would be best to stay at 40 day furosemide/lasix    Would be good to boost lasix up to 80 twice daily for increased edema.  Should check blood within a week. May be good to use booster in future but risky.    Check urine, kidney function may worsen with diabetes?  Your thyroid test is off but not bad.      Gout attacks may respond to prednisone 30 mg daily for 5 days.      Would check oxygen sleeping to assure oxygen not low as low oxygen may cause edema.    Fasenra needs renewed in feb-- need to be on regular trelegy shots to have fasenra renewed.     Need to see Dr Shah soon

## 2022-08-26 ENCOUNTER — LAB VISIT (OUTPATIENT)
Dept: LAB | Facility: HOSPITAL | Age: 77
End: 2022-08-26
Attending: INTERNAL MEDICINE
Payer: MEDICARE

## 2022-08-26 DIAGNOSIS — R60.0 BILATERAL LEG EDEMA: ICD-10-CM

## 2022-08-26 DIAGNOSIS — N18.30 STAGE 3 CHRONIC KIDNEY DISEASE, UNSPECIFIED WHETHER STAGE 3A OR 3B CKD: ICD-10-CM

## 2022-08-26 LAB
ANION GAP SERPL CALC-SCNC: 11 MMOL/L (ref 8–16)
BUN SERPL-MCNC: 57 MG/DL (ref 8–23)
CALCIUM SERPL-MCNC: 9.5 MG/DL (ref 8.7–10.5)
CHLORIDE SERPL-SCNC: 101 MMOL/L (ref 95–110)
CO2 SERPL-SCNC: 23 MMOL/L (ref 23–29)
CREAT SERPL-MCNC: 1.6 MG/DL (ref 0.5–1.4)
EST. GFR  (NO RACE VARIABLE): 43.6 ML/MIN/1.73 M^2
GLUCOSE SERPL-MCNC: 122 MG/DL (ref 70–110)
POTASSIUM SERPL-SCNC: 5.5 MMOL/L (ref 3.5–5.1)
SODIUM SERPL-SCNC: 135 MMOL/L (ref 136–145)

## 2022-08-26 PROCEDURE — 80048 BASIC METABOLIC PNL TOTAL CA: CPT | Performed by: INTERNAL MEDICINE

## 2022-08-26 PROCEDURE — 36415 COLL VENOUS BLD VENIPUNCTURE: CPT | Mod: PO | Performed by: INTERNAL MEDICINE

## 2022-08-31 ENCOUNTER — TELEPHONE (OUTPATIENT)
Dept: PULMONOLOGY | Facility: CLINIC | Age: 77
End: 2022-08-31

## 2022-08-31 NOTE — TELEPHONE ENCOUNTER
----- Message from Nathaniel Allan MD sent at 8/29/2022  4:43 PM CDT -----    Notify blood work was reasonable.  Patient needs to stop potassium supplements.

## 2022-09-22 DIAGNOSIS — J45.909 PERSISTENT ASTHMA WITHOUT COMPLICATION, UNSPECIFIED ASTHMA SEVERITY: ICD-10-CM

## 2022-09-22 RX ORDER — MONTELUKAST SODIUM 10 MG/1
10 TABLET ORAL NIGHTLY
Qty: 30 TABLET | Refills: 11 | Status: SHIPPED | OUTPATIENT
Start: 2022-09-22 | End: 2022-09-23 | Stop reason: SDUPTHER

## 2022-09-23 DIAGNOSIS — J45.909 PERSISTENT ASTHMA WITHOUT COMPLICATION, UNSPECIFIED ASTHMA SEVERITY: ICD-10-CM

## 2022-09-23 DIAGNOSIS — R60.0 BILATERAL LEG EDEMA: ICD-10-CM

## 2022-09-23 RX ORDER — FUROSEMIDE 40 MG/1
40 TABLET ORAL DAILY PRN
Qty: 30 TABLET | Refills: 0 | Status: SHIPPED | OUTPATIENT
Start: 2022-09-23 | End: 2022-12-20 | Stop reason: SDUPTHER

## 2022-09-23 RX ORDER — MONTELUKAST SODIUM 10 MG/1
10 TABLET ORAL NIGHTLY
Qty: 30 TABLET | Refills: 0 | Status: SHIPPED | OUTPATIENT
Start: 2022-09-23 | End: 2022-10-24 | Stop reason: SDUPTHER

## 2022-09-23 NOTE — TELEPHONE ENCOUNTER
----- Message from Anam Mahoney sent at 9/23/2022  1:18 PM CDT -----  Regarding: pt called  Can the clinic reply in [facility]: [place] [[id number]]      Please refill the medication(s) listed below. Please call the patient when the prescription(s) is ready for  at this phone number         Medication #1 montelukast (SINGULAIR) 10 mg tablet    Medication #2 furosemide (LASIX) 40 MG tablet      Preferred Pharmacy:    [facility]     [address] [place] [address]  [phone]

## 2022-09-27 RX ORDER — FINASTERIDE 5 MG/1
5 TABLET, FILM COATED ORAL DAILY
Qty: 30 TABLET | Refills: 5 | Status: SHIPPED | OUTPATIENT
Start: 2022-09-27 | End: 2023-03-20

## 2022-09-27 NOTE — TELEPHONE ENCOUNTER
----- Message from Jessica Herr sent at 9/27/2022  2:28 PM CDT -----  Contact: dennis Kamara  Type:  RX Refill Request    Who Called:  Son    Refill or New Rx:  Refill    RX Name and Strength:  finasteride (PROSCAR) 5 mg tablet    Preferred Pharmacy with phone number:    [facility]   [address]  [place], [place]  Phone: [phone]  Fax: [phone]    Best Call Back Number:  [phone]    Additional Information:  PLEASE NOTE:  Different pharmacy.      Please call back.  Thanks.

## 2022-09-27 NOTE — TELEPHONE ENCOUNTER
Care Due:                  Date            Visit Type   Department     Provider  --------------------------------------------------------------------------------                                SAME DAY -                              ESTABLISHED   [facility]  Last Visit: 04-      PATIENT      MEDICINE       Grabiel Parr                              EP -                              PRIMARY      [facility]  Next Visit: 10-      CARE ([facility])   MEDICINE       Blue Shah                                                            Last  Test          Frequency    Reason                     Performed    Due Date  --------------------------------------------------------------------------------    HBA1C.......  6 months...  acarbose, metFORMIN......  01-   07-    Lipid Panel.  12 months..  atorvastatin.............  10-   10-    Uric Acid...  12 months..  allopurinoL..............  Not Found    Overdue    [employer] Embedded Care Gaps. Reference number: [id number]. 9/27/2022   3:30:46 PM CDT

## 2022-09-28 ENCOUNTER — TELEPHONE (OUTPATIENT)
Dept: PULMONOLOGY | Facility: CLINIC | Age: 77
End: 2022-09-28

## 2022-09-28 DIAGNOSIS — J45.50 SEVERE PERSISTENT ASTHMA WITHOUT COMPLICATION: ICD-10-CM

## 2022-09-28 RX ORDER — BENRALIZUMAB 30 MG/ML
30 INJECTION, SOLUTION SUBCUTANEOUS
Qty: 1 ML | Refills: 6 | Status: SHIPPED | OUTPATIENT
Start: 2022-09-28 | End: 2024-02-05 | Stop reason: SDUPTHER

## 2022-10-19 DIAGNOSIS — R80.9 TYPE 2 DIABETES MELLITUS WITH MICROALBUMINURIA, WITHOUT LONG-TERM CURRENT USE OF INSULIN: ICD-10-CM

## 2022-10-19 DIAGNOSIS — E11.29 TYPE 2 DIABETES MELLITUS WITH MICROALBUMINURIA, WITHOUT LONG-TERM CURRENT USE OF INSULIN: ICD-10-CM

## 2022-10-19 RX ORDER — ATORVASTATIN CALCIUM 80 MG/1
80 TABLET, FILM COATED ORAL DAILY
Qty: 90 TABLET | Refills: 0 | Status: SHIPPED | OUTPATIENT
Start: 2022-10-19 | End: 2023-01-17 | Stop reason: SDUPTHER

## 2022-10-19 NOTE — TELEPHONE ENCOUNTER
Care Due:                  Date            Visit Type   Department     Provider  --------------------------------------------------------------------------------                                SAME DAY -                              ESTABLISHED   [facility]  Last Visit: 04-      PATIENT      MEDICINE       Grbaiel Parr                              EP -                              PRIMARY      [facility]  Next Visit: 12-      CARE ([facility])   MEDICINE       Blue Shah                                                            Last  Test          Frequency    Reason                     Performed    Due Date  --------------------------------------------------------------------------------    CMP.........  12 months..  acarbose, allopurinoL,     01- 01-                             atorvastatin.............    [employer] Embedded Care Gaps. Reference number: [id number]. 10/19/2022   4:36:45 PM CDT

## 2022-10-19 NOTE — TELEPHONE ENCOUNTER
----- Message from Kandace Villagran sent at 10/19/2022  4:29 PM CDT -----  Contact: Son  Type:  Needs Medical Advice    Who Called:  Son     Would the patient rather a call back or a response via [facility]?  Call     Best Call Back Number: [phone]    Additional Information: Son stated that patient needs a refill on Simvastin but I do not see it on his list.         [facility]   [address] [place] [address]   Phone number [phone]   Fax number-n [phone]    Please call to advise

## 2022-10-24 DIAGNOSIS — J45.909 PERSISTENT ASTHMA WITHOUT COMPLICATION, UNSPECIFIED ASTHMA SEVERITY: ICD-10-CM

## 2022-10-24 NOTE — TELEPHONE ENCOUNTER
No new care gaps identified.  [facility] Embedded Care Gaps. Reference number: [id number]. 10/24/2022   2:43:50 PM CDT

## 2022-10-24 NOTE — TELEPHONE ENCOUNTER
----- Message from Stephanie Samuel sent at 10/24/2022 12:19 PM CDT -----  Regarding: medication request  Please refill the medication(s) listed below. Please call the patient when the prescription(s) is ready for  at this phone number     [facility] [[id number]]  [phone] (home)          Medication #1 montelukast (SINGULAIR) 10 mg tablet    Preferred Pharmacy:    [facility] [address] [place] [address] [place] [address] [facility]  [address] [facility] [address]  Phone: [phone] Fax: [phone]

## 2022-10-25 RX ORDER — MONTELUKAST SODIUM 10 MG/1
10 TABLET ORAL NIGHTLY
Qty: 30 TABLET | Refills: 0 | Status: SHIPPED | OUTPATIENT
Start: 2022-10-25 | End: 2022-11-22 | Stop reason: SDUPTHER

## 2022-11-22 DIAGNOSIS — J45.909 PERSISTENT ASTHMA WITHOUT COMPLICATION, UNSPECIFIED ASTHMA SEVERITY: ICD-10-CM

## 2022-11-22 RX ORDER — MONTELUKAST SODIUM 10 MG/1
10 TABLET ORAL NIGHTLY
Qty: 30 TABLET | Refills: 3 | Status: SHIPPED | OUTPATIENT
Start: 2022-11-22 | End: 2023-02-07

## 2022-11-22 NOTE — TELEPHONE ENCOUNTER
No new care gaps identified.  [facility] Embedded Care Gaps. Reference number: [id number]. 11/22/2022   3:40:10 PM CST

## 2022-11-28 DIAGNOSIS — R80.9 TYPE 2 DIABETES MELLITUS WITH MICROALBUMINURIA, WITHOUT LONG-TERM CURRENT USE OF INSULIN: ICD-10-CM

## 2022-11-28 DIAGNOSIS — E11.29 TYPE 2 DIABETES MELLITUS WITH MICROALBUMINURIA, WITHOUT LONG-TERM CURRENT USE OF INSULIN: ICD-10-CM

## 2022-11-28 NOTE — TELEPHONE ENCOUNTER
Care Due:                  Date            Visit Type   Department     Provider  --------------------------------------------------------------------------------                                SAME DAY -                              ESTABLISHED   [facility]  Last Visit: 04-      PATIENT      MEDICINE       Grabiel Parr                              EP -                              PRIMARY      [facility]  Next Visit: 12-      CARE ([facility])   MEDICINE       Blue Shah                                                            Last  Test          Frequency    Reason                     Performed    Due Date  --------------------------------------------------------------------------------    HBA1C.......  6 months...  acarbose, metFORMIN......  01-   07-    Lipid Panel.  12 months..  atorvastatin.............  10-   10-    Uric Acid...  12 months..  allopurinoL..............  Not Found    Overdue    [employer] Embedded Care Gaps. Reference number: [id number]. 11/28/2022   11:06:15 AM CST

## 2022-11-30 RX ORDER — METFORMIN HYDROCHLORIDE 500 MG/1
500 TABLET ORAL 2 TIMES DAILY WITH MEALS
Qty: 180 TABLET | Refills: 3 | Status: SHIPPED | OUTPATIENT
Start: 2022-11-30

## 2022-12-19 ENCOUNTER — TELEPHONE (OUTPATIENT)
Dept: PULMONOLOGY | Facility: CLINIC | Age: 77
End: 2022-12-19

## 2022-12-20 DIAGNOSIS — R60.0 BILATERAL LEG EDEMA: ICD-10-CM

## 2022-12-20 RX ORDER — FUROSEMIDE 40 MG/1
40 TABLET ORAL DAILY PRN
Qty: 30 TABLET | Refills: 0 | Status: SHIPPED | OUTPATIENT
Start: 2022-12-20 | End: 2023-01-17 | Stop reason: SDUPTHER

## 2022-12-20 NOTE — TELEPHONE ENCOUNTER
----- Message from Tanesha Charlton sent at 12/20/2022 11:15 AM CST -----  Regarding: ot called  Can the clinic reply in [facility]: NO          Please refill the medication(s) listed below. Please call the patient when the prescription(s) is ready for  at this phone number      [phone] (home) there is a disrepency in the medication and it was shown to be discontinued however he needs a new RX sent to the pharmacy. his medication is showing refills up to feb/2023          Medication #1 furosemide (LASIX) 40 MG tablet (Discontinued) 120 tablet 11         Medication #2          Preferred Pharmacy:   [id number] [place] [address]  [place] [address]  Phone: [phone] Fax: [phone]

## 2022-12-22 ENCOUNTER — OFFICE VISIT (OUTPATIENT)
Dept: FAMILY MEDICINE | Facility: CLINIC | Age: 77
End: 2022-12-22
Payer: MEDICARE

## 2022-12-22 VITALS
HEIGHT: 71 IN | BODY MASS INDEX: 36.67 KG/M2 | DIASTOLIC BLOOD PRESSURE: 58 MMHG | OXYGEN SATURATION: 97 % | HEART RATE: 69 BPM | WEIGHT: 261.94 LBS | SYSTOLIC BLOOD PRESSURE: 116 MMHG | TEMPERATURE: 98 F

## 2022-12-22 DIAGNOSIS — I73.9 PVD (PERIPHERAL VASCULAR DISEASE): ICD-10-CM

## 2022-12-22 DIAGNOSIS — M1A.3710 CHRONIC GOUT OF RIGHT ANKLE DUE TO RENAL IMPAIRMENT WITHOUT TOPHUS: ICD-10-CM

## 2022-12-22 DIAGNOSIS — R80.9 TYPE 2 DIABETES MELLITUS WITH MICROALBUMINURIA, WITHOUT LONG-TERM CURRENT USE OF INSULIN: Primary | ICD-10-CM

## 2022-12-22 DIAGNOSIS — N18.31 STAGE 3A CHRONIC KIDNEY DISEASE: ICD-10-CM

## 2022-12-22 DIAGNOSIS — J44.1 COPD EXACERBATION: ICD-10-CM

## 2022-12-22 DIAGNOSIS — E11.29 TYPE 2 DIABETES MELLITUS WITH MICROALBUMINURIA, WITHOUT LONG-TERM CURRENT USE OF INSULIN: Primary | ICD-10-CM

## 2022-12-22 DIAGNOSIS — M51.36 DDD (DEGENERATIVE DISC DISEASE), LUMBAR: ICD-10-CM

## 2022-12-22 PROCEDURE — 99999 PR PBB SHADOW E&M-EST. PATIENT-LVL III: CPT | Mod: PBBFAC,,, | Performed by: FAMILY MEDICINE

## 2022-12-22 PROCEDURE — 99214 OFFICE O/P EST MOD 30 MIN: CPT | Mod: S$PBB,,, | Performed by: FAMILY MEDICINE

## 2022-12-22 PROCEDURE — 3072F PR LOW RISK FOR RETINOPATHY: ICD-10-PCS | Mod: ,,, | Performed by: FAMILY MEDICINE

## 2022-12-22 PROCEDURE — 99214 PR OFFICE/OUTPT VISIT, EST, LEVL IV, 30-39 MIN: ICD-10-PCS | Mod: S$PBB,,, | Performed by: FAMILY MEDICINE

## 2022-12-22 PROCEDURE — 99999 PR PBB SHADOW E&M-EST. PATIENT-LVL III: ICD-10-PCS | Mod: PBBFAC,,, | Performed by: FAMILY MEDICINE

## 2022-12-22 PROCEDURE — 3072F LOW RISK FOR RETINOPATHY: CPT | Mod: ,,, | Performed by: FAMILY MEDICINE

## 2022-12-22 RX ORDER — HYDROCODONE BITARTRATE AND ACETAMINOPHEN 7.5; 325 MG/1; MG/1
1 TABLET ORAL EVERY 6 HOURS PRN
Qty: 28 TABLET | Refills: 0 | Status: SHIPPED | OUTPATIENT
Start: 2022-12-22 | End: 2023-12-05 | Stop reason: SDUPTHER

## 2022-12-22 RX ORDER — METOLAZONE 2.5 MG/1
2.5 TABLET ORAL DAILY PRN
COMMUNITY
Start: 2022-11-14 | End: 2024-03-27 | Stop reason: SDUPTHER

## 2022-12-22 RX ORDER — COLCHICINE 0.6 MG/1
1 CAPSULE ORAL DAILY PRN
Qty: 5 CAPSULE | Refills: 3 | Status: SHIPPED | OUTPATIENT
Start: 2022-12-22 | End: 2023-01-27 | Stop reason: SDUPTHER

## 2022-12-22 RX ORDER — DICLOFENAC SODIUM 10 MG/G
GEL TOPICAL
COMMUNITY
Start: 2022-08-19 | End: 2024-01-09

## 2022-12-22 RX ORDER — COLCHICINE 0.6 MG/1
0.6 TABLET ORAL 2 TIMES DAILY PRN
COMMUNITY
Start: 2022-08-19 | End: 2022-12-22 | Stop reason: SDUPTHER

## 2022-12-23 ENCOUNTER — PATIENT MESSAGE (OUTPATIENT)
Dept: ADMINISTRATIVE | Facility: HOSPITAL | Age: 77
End: 2022-12-23

## 2023-01-03 ENCOUNTER — TELEPHONE (OUTPATIENT)
Dept: FAMILY MEDICINE | Facility: CLINIC | Age: 78
End: 2023-01-03
Payer: MEDICARE

## 2023-01-09 ENCOUNTER — LAB VISIT (OUTPATIENT)
Dept: LAB | Facility: HOSPITAL | Age: 78
End: 2023-01-09
Attending: FAMILY MEDICINE
Payer: MEDICARE

## 2023-01-09 DIAGNOSIS — M1A.3710 CHRONIC GOUT OF RIGHT ANKLE DUE TO RENAL IMPAIRMENT WITHOUT TOPHUS: ICD-10-CM

## 2023-01-09 DIAGNOSIS — E11.29 TYPE 2 DIABETES MELLITUS WITH MICROALBUMINURIA, WITHOUT LONG-TERM CURRENT USE OF INSULIN: ICD-10-CM

## 2023-01-09 DIAGNOSIS — R80.9 TYPE 2 DIABETES MELLITUS WITH MICROALBUMINURIA, WITHOUT LONG-TERM CURRENT USE OF INSULIN: ICD-10-CM

## 2023-01-09 LAB
ALBUMIN SERPL BCP-MCNC: 3.4 G/DL (ref 3.5–5.2)
ALP SERPL-CCNC: 79 U/L (ref 55–135)
ALT SERPL W/O P-5'-P-CCNC: 16 U/L (ref 10–44)
ANION GAP SERPL CALC-SCNC: 10 MMOL/L (ref 8–16)
AST SERPL-CCNC: 16 U/L (ref 10–40)
BILIRUB SERPL-MCNC: 0.5 MG/DL (ref 0.1–1)
BUN SERPL-MCNC: 53 MG/DL (ref 8–23)
CALCIUM SERPL-MCNC: 9.3 MG/DL (ref 8.7–10.5)
CHLORIDE SERPL-SCNC: 105 MMOL/L (ref 95–110)
CHOLEST SERPL-MCNC: 111 MG/DL (ref 120–199)
CHOLEST/HDLC SERPL: 3.7 {RATIO} (ref 2–5)
CO2 SERPL-SCNC: 24 MMOL/L (ref 23–29)
CREAT SERPL-MCNC: 1.9 MG/DL (ref 0.5–1.4)
EST. GFR  (NO RACE VARIABLE): 35.4 ML/MIN/1.73 M^2
GLUCOSE SERPL-MCNC: 149 MG/DL (ref 70–110)
HDLC SERPL-MCNC: 30 MG/DL (ref 40–75)
HDLC SERPL: 27 % (ref 20–50)
LDLC SERPL CALC-MCNC: 64.6 MG/DL (ref 63–159)
NONHDLC SERPL-MCNC: 81 MG/DL
POTASSIUM SERPL-SCNC: 4.7 MMOL/L (ref 3.5–5.1)
PROT SERPL-MCNC: 6.4 G/DL (ref 6–8.4)
SODIUM SERPL-SCNC: 139 MMOL/L (ref 136–145)
T4 FREE SERPL-MCNC: 0.86 NG/DL (ref 0.71–1.51)
TRIGL SERPL-MCNC: 82 MG/DL (ref 30–150)
TSH SERPL DL<=0.005 MIU/L-ACNC: 4.72 UIU/ML (ref 0.4–4)
URATE SERPL-MCNC: 10.3 MG/DL (ref 3.4–7)

## 2023-01-09 PROCEDURE — 80053 COMPREHEN METABOLIC PANEL: CPT | Mod: HCNC | Performed by: FAMILY MEDICINE

## 2023-01-09 PROCEDURE — 80061 LIPID PANEL: CPT | Mod: HCNC | Performed by: FAMILY MEDICINE

## 2023-01-09 PROCEDURE — 82985 ASSAY OF GLYCATED PROTEIN: CPT | Mod: HCNC | Performed by: FAMILY MEDICINE

## 2023-01-09 PROCEDURE — 84550 ASSAY OF BLOOD/URIC ACID: CPT | Mod: HCNC | Performed by: FAMILY MEDICINE

## 2023-01-09 PROCEDURE — 84443 ASSAY THYROID STIM HORMONE: CPT | Mod: HCNC | Performed by: FAMILY MEDICINE

## 2023-01-09 PROCEDURE — 84439 ASSAY OF FREE THYROXINE: CPT | Mod: HCNC | Performed by: FAMILY MEDICINE

## 2023-01-10 ENCOUNTER — OFFICE VISIT (OUTPATIENT)
Dept: PULMONOLOGY | Facility: CLINIC | Age: 78
End: 2023-01-10
Payer: MEDICARE

## 2023-01-10 VITALS
DIASTOLIC BLOOD PRESSURE: 70 MMHG | WEIGHT: 265.19 LBS | HEART RATE: 64 BPM | HEIGHT: 71 IN | SYSTOLIC BLOOD PRESSURE: 117 MMHG | BODY MASS INDEX: 37.13 KG/M2 | OXYGEN SATURATION: 97 %

## 2023-01-10 DIAGNOSIS — G47.33 OSA ON CPAP: Primary | ICD-10-CM

## 2023-01-10 DIAGNOSIS — J44.89 ASTHMA-COPD OVERLAP SYNDROME: ICD-10-CM

## 2023-01-10 DIAGNOSIS — M19.071 PRIMARY OSTEOARTHRITIS OF RIGHT ANKLE: ICD-10-CM

## 2023-01-10 DIAGNOSIS — J45.50 SEVERE PERSISTENT ASTHMA WITHOUT COMPLICATION: ICD-10-CM

## 2023-01-10 PROCEDURE — 1159F MED LIST DOCD IN RCRD: CPT | Mod: HCNC,CPTII,S$GLB, | Performed by: INTERNAL MEDICINE

## 2023-01-10 PROCEDURE — 1159F PR MEDICATION LIST DOCUMENTED IN MEDICAL RECORD: ICD-10-PCS | Mod: HCNC,CPTII,S$GLB, | Performed by: INTERNAL MEDICINE

## 2023-01-10 PROCEDURE — 3078F PR MOST RECENT DIASTOLIC BLOOD PRESSURE < 80 MM HG: ICD-10-PCS | Mod: HCNC,CPTII,S$GLB, | Performed by: INTERNAL MEDICINE

## 2023-01-10 PROCEDURE — 3074F SYST BP LT 130 MM HG: CPT | Mod: HCNC,CPTII,S$GLB, | Performed by: INTERNAL MEDICINE

## 2023-01-10 PROCEDURE — 1125F AMNT PAIN NOTED PAIN PRSNT: CPT | Mod: HCNC,CPTII,S$GLB, | Performed by: INTERNAL MEDICINE

## 2023-01-10 PROCEDURE — 99214 OFFICE O/P EST MOD 30 MIN: CPT | Mod: HCNC,S$GLB,, | Performed by: INTERNAL MEDICINE

## 2023-01-10 PROCEDURE — 1125F PR PAIN SEVERITY QUANTIFIED, PAIN PRESENT: ICD-10-PCS | Mod: HCNC,CPTII,S$GLB, | Performed by: INTERNAL MEDICINE

## 2023-01-10 PROCEDURE — 3288F FALL RISK ASSESSMENT DOCD: CPT | Mod: HCNC,CPTII,S$GLB, | Performed by: INTERNAL MEDICINE

## 2023-01-10 PROCEDURE — 3078F DIAST BP <80 MM HG: CPT | Mod: HCNC,CPTII,S$GLB, | Performed by: INTERNAL MEDICINE

## 2023-01-10 PROCEDURE — 99999 PR PBB SHADOW E&M-EST. PATIENT-LVL V: CPT | Mod: PBBFAC,HCNC,, | Performed by: INTERNAL MEDICINE

## 2023-01-10 PROCEDURE — 1101F PR PT FALLS ASSESS DOC 0-1 FALLS W/OUT INJ PAST YR: ICD-10-PCS | Mod: HCNC,CPTII,S$GLB, | Performed by: INTERNAL MEDICINE

## 2023-01-10 PROCEDURE — 1101F PT FALLS ASSESS-DOCD LE1/YR: CPT | Mod: HCNC,CPTII,S$GLB, | Performed by: INTERNAL MEDICINE

## 2023-01-10 PROCEDURE — 99214 PR OFFICE/OUTPT VISIT, EST, LEVL IV, 30-39 MIN: ICD-10-PCS | Mod: HCNC,S$GLB,, | Performed by: INTERNAL MEDICINE

## 2023-01-10 PROCEDURE — 99499 UNLISTED E&M SERVICE: CPT | Mod: HCNC,S$GLB,, | Performed by: INTERNAL MEDICINE

## 2023-01-10 PROCEDURE — 3074F PR MOST RECENT SYSTOLIC BLOOD PRESSURE < 130 MM HG: ICD-10-PCS | Mod: HCNC,CPTII,S$GLB, | Performed by: INTERNAL MEDICINE

## 2023-01-10 PROCEDURE — 99499 RISK ADDL DX/OHS AUDIT: ICD-10-PCS | Mod: HCNC,S$GLB,, | Performed by: INTERNAL MEDICINE

## 2023-01-10 PROCEDURE — 99999 PR PBB SHADOW E&M-EST. PATIENT-LVL V: ICD-10-PCS | Mod: PBBFAC,HCNC,, | Performed by: INTERNAL MEDICINE

## 2023-01-10 PROCEDURE — 3288F PR FALLS RISK ASSESSMENT DOCUMENTED: ICD-10-PCS | Mod: HCNC,CPTII,S$GLB, | Performed by: INTERNAL MEDICINE

## 2023-01-10 RX ORDER — FLUTICASONE PROPIONATE AND SALMETEROL 250; 50 UG/1; UG/1
1 POWDER RESPIRATORY (INHALATION) 2 TIMES DAILY
Qty: 60 EACH | Refills: 11 | Status: SHIPPED | OUTPATIENT
Start: 2023-01-10 | End: 2023-11-15 | Stop reason: ALTCHOICE

## 2023-01-10 RX ORDER — PREDNISONE 20 MG/1
TABLET ORAL
Qty: 21 TABLET | Refills: 0 | Status: SHIPPED | OUTPATIENT
Start: 2023-01-10
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
## 2023-01-10 NOTE — PATIENT INSTRUCTIONS
Will order another cpap mask to use.      Kidney function is worsened    Need to use allopurinol     Need urology or kidney doctor follow up.    Diabetes not too bad.       Fasenra helping--would try advair over trelegy if more cost effective.     Edema not too bad.      June 2021 ct viewed - no issues

## 2023-01-12 ENCOUNTER — PATIENT MESSAGE (OUTPATIENT)
Dept: FAMILY MEDICINE | Facility: CLINIC | Age: 78
End: 2023-01-12
Payer: MEDICARE

## 2023-01-12 LAB — FRUCTOSAMINE SERPL-SCNC: 266 UMOL /L

## 2023-01-12 NOTE — PROGRESS NOTES
Patient, Lenny Lopez (MRN #[id number]), presented with a recorded BMI of 36.53 kg/m^2 and a documented comorbidity(s):  - Diabetes Mellitus Type 2  to which the severe obesity is a contributing factor. This is consistent with the definition of severe obesity (BMI 35.0-39.9) with comorbidity (ICD-10 E66.01, Z68.35). The patient's severe obesity was monitored, evaluated, addressed and/or treated. This addendum to the medical record is made on 01/12/2023.

## 2023-01-17 DIAGNOSIS — R80.9 TYPE 2 DIABETES MELLITUS WITH MICROALBUMINURIA, WITHOUT LONG-TERM CURRENT USE OF INSULIN: ICD-10-CM

## 2023-01-17 DIAGNOSIS — R60.0 BILATERAL LEG EDEMA: ICD-10-CM

## 2023-01-17 DIAGNOSIS — E11.29 TYPE 2 DIABETES MELLITUS WITH MICROALBUMINURIA, WITHOUT LONG-TERM CURRENT USE OF INSULIN: ICD-10-CM

## 2023-01-17 RX ORDER — FUROSEMIDE 40 MG/1
40 TABLET ORAL DAILY PRN
Qty: 30 TABLET | Refills: 0 | Status: SHIPPED | OUTPATIENT
Start: 2023-01-17 | End: 2023-02-16 | Stop reason: SDUPTHER

## 2023-01-17 NOTE — TELEPHONE ENCOUNTER
Refill sent to provider.     ----- Message from Marques Mcnair sent at 1/17/2023 11:49 AM CST -----  Type:  RX Refill Request    Who Called:  Son/ Kamara  Refill or New Rx:  Refill  RX Name and Strength:    furosemide (LASIX) 40 MG tablet  1XDay, 30 days          Preferred Pharmacy with phone number:    [id number] - [place], [place] - [address] [address]  Phone: [phone] Fax: [phone]          Local or Mail Order:  Local  Ordering Provider:  Jerrell Mccloud Call Back Number:    Additional Information:

## 2023-01-17 NOTE — TELEPHONE ENCOUNTER
----- Message from Marques Mcnair sent at 1/17/2023 11:52 AM CST -----  Type:  RX Refill Request    Who Called:  Son/ Kamara  Refill or New Rx:  Refill  RX Name and Strength:  atorvastatin (LIPITOR) 80 MG tablet      How is the patient currently taking it? (ex. 1XDay):  1XDay  Is this a 30 day or 90 day RX:  90    Preferred Pharmacy with phone number:    [id number] - [place] - [address]  [place] [address]  Phone: [phone] Fax: [phone]          Local or Mail Order:  Local  Ordering Provider:  Pablo Mccloud Call Back Number: [phone]   Additional Information:

## 2023-01-17 NOTE — TELEPHONE ENCOUNTER
No new care gaps identified.  [facility] Embedded Care Gaps. Reference number: [id number]. 1/17/2023   11:56:55 AM CST

## 2023-01-18 ENCOUNTER — PATIENT MESSAGE (OUTPATIENT)
Dept: ADMINISTRATIVE | Facility: HOSPITAL | Age: 78
End: 2023-01-18
Payer: MEDICARE

## 2023-01-18 RX ORDER — ATORVASTATIN CALCIUM 80 MG/1
80 TABLET, FILM COATED ORAL DAILY
Qty: 90 TABLET | Refills: 3 | Status: SHIPPED | OUTPATIENT
Start: 2023-01-18 | End: 2024-01-11 | Stop reason: SDUPTHER

## 2023-01-18 NOTE — TELEPHONE ENCOUNTER
Refill Decision Note   Lenny John  is requesting a refill authorization.  Brief Assessment and Rationale for Refill:  Approve     Medication Therapy Plan:       Medication Reconciliation Completed: No   Comments:     No Care Gaps recommended.     Note composed:7:22 AM 01/18/2023

## 2023-01-24 ENCOUNTER — HOSPITAL ENCOUNTER (OUTPATIENT)
Dept: RADIOLOGY | Facility: HOSPITAL | Age: 78
Discharge: HOME | End: 2023-01-24
Attending: ANESTHESIOLOGY
Payer: MEDICARE

## 2023-01-24 ENCOUNTER — OFFICE VISIT (OUTPATIENT)
Dept: PAIN MEDICINE | Facility: CLINIC | Age: 78
End: 2023-01-24
Payer: MEDICARE

## 2023-01-24 VITALS
BODY MASS INDEX: 36.47 KG/M2 | HEIGHT: 71 IN | WEIGHT: 260.5 LBS | DIASTOLIC BLOOD PRESSURE: 70 MMHG | SYSTOLIC BLOOD PRESSURE: 169 MMHG | HEART RATE: 65 BPM

## 2023-01-24 DIAGNOSIS — M47.816 LUMBAR SPONDYLOSIS: ICD-10-CM

## 2023-01-24 DIAGNOSIS — M54.9 DORSALGIA: ICD-10-CM

## 2023-01-24 DIAGNOSIS — M48.062 SPINAL STENOSIS OF LUMBAR REGION WITH NEUROGENIC CLAUDICATION: ICD-10-CM

## 2023-01-24 DIAGNOSIS — M48.062 SPINAL STENOSIS OF LUMBAR REGION WITH NEUROGENIC CLAUDICATION: Primary | ICD-10-CM

## 2023-01-24 PROCEDURE — 3288F FALL RISK ASSESSMENT DOCD: CPT | Mod: HCNC,CPTII,S$GLB, | Performed by: ANESTHESIOLOGY

## 2023-01-24 PROCEDURE — 99204 OFFICE O/P NEW MOD 45 MIN: CPT | Mod: HCNC,S$GLB,, | Performed by: ANESTHESIOLOGY

## 2023-01-24 PROCEDURE — 1159F MED LIST DOCD IN RCRD: CPT | Mod: HCNC,CPTII,S$GLB, | Performed by: ANESTHESIOLOGY

## 2023-01-24 PROCEDURE — 99999 PR PBB SHADOW E&M-EST. PATIENT-LVL V: CPT | Mod: PBBFAC,HCNC,, | Performed by: ANESTHESIOLOGY

## 2023-01-24 PROCEDURE — 72114 X-RAY EXAM L-S SPINE BENDING: CPT | Mod: TC,HCNC,PO

## 2023-01-24 PROCEDURE — 3077F PR MOST RECENT SYSTOLIC BLOOD PRESSURE >= 140 MM HG: ICD-10-PCS | Mod: HCNC,CPTII,S$GLB, | Performed by: ANESTHESIOLOGY

## 2023-01-24 PROCEDURE — 1159F PR MEDICATION LIST DOCUMENTED IN MEDICAL RECORD: ICD-10-PCS | Mod: HCNC,CPTII,S$GLB, | Performed by: ANESTHESIOLOGY

## 2023-01-24 PROCEDURE — 72114 XR LUMBAR SPINE 5 VIEW WITH FLEX AND EXT: ICD-10-PCS | Mod: 26,HCNC,, | Performed by: RADIOLOGY

## 2023-01-24 PROCEDURE — 72114 X-RAY EXAM L-S SPINE BENDING: CPT | Mod: 26,HCNC,, | Performed by: RADIOLOGY

## 2023-01-24 PROCEDURE — 3077F SYST BP >= 140 MM HG: CPT | Mod: HCNC,CPTII,S$GLB, | Performed by: ANESTHESIOLOGY

## 2023-01-24 PROCEDURE — 1101F PT FALLS ASSESS-DOCD LE1/YR: CPT | Mod: HCNC,CPTII,S$GLB, | Performed by: ANESTHESIOLOGY

## 2023-01-24 PROCEDURE — 3078F DIAST BP <80 MM HG: CPT | Mod: HCNC,CPTII,S$GLB, | Performed by: ANESTHESIOLOGY

## 2023-01-24 PROCEDURE — 1125F AMNT PAIN NOTED PAIN PRSNT: CPT | Mod: HCNC,CPTII,S$GLB, | Performed by: ANESTHESIOLOGY

## 2023-01-24 PROCEDURE — 3078F PR MOST RECENT DIASTOLIC BLOOD PRESSURE < 80 MM HG: ICD-10-PCS | Mod: HCNC,CPTII,S$GLB, | Performed by: ANESTHESIOLOGY

## 2023-01-24 PROCEDURE — 3288F PR FALLS RISK ASSESSMENT DOCUMENTED: ICD-10-PCS | Mod: HCNC,CPTII,S$GLB, | Performed by: ANESTHESIOLOGY

## 2023-01-24 PROCEDURE — 99204 PR OFFICE/OUTPT VISIT, NEW, LEVL IV, 45-59 MIN: ICD-10-PCS | Mod: HCNC,S$GLB,, | Performed by: ANESTHESIOLOGY

## 2023-01-24 PROCEDURE — 1101F PR PT FALLS ASSESS DOC 0-1 FALLS W/OUT INJ PAST YR: ICD-10-PCS | Mod: HCNC,CPTII,S$GLB, | Performed by: ANESTHESIOLOGY

## 2023-01-24 PROCEDURE — 1125F PR PAIN SEVERITY QUANTIFIED, PAIN PRESENT: ICD-10-PCS | Mod: HCNC,CPTII,S$GLB, | Performed by: ANESTHESIOLOGY

## 2023-01-24 PROCEDURE — 99999 PR PBB SHADOW E&M-EST. PATIENT-LVL V: ICD-10-PCS | Mod: PBBFAC,HCNC,, | Performed by: ANESTHESIOLOGY

## 2023-01-24 RX ORDER — GABAPENTIN 300 MG/1
300 CAPSULE ORAL 2 TIMES DAILY
Qty: 60 CAPSULE | Refills: 1 | Status: SHIPPED | OUTPATIENT
Start: 2023-01-24 | End: 2024-01-17

## 2023-01-24 NOTE — PROGRESS NOTES
[facility] New Patient Evaluation      Referred by: Dr. Blue Shah    PCP:     CC:   Chief Complaint   Patient presents with    Back Pain     C/o pain in back     Hip Pain     C/o pain in both hips      No flowsheet data found.      HPI:   Lenny Lopez is a 78 y.o. male who presents with back pain.  He has been dealing with this pain for the last couple years and has been gradually worsening.  Today he reports his pain is 3/10, intermittent, aching.  The pain radiates down the bilateral buttocks and bilateral hips to the thighs.  His pain is worse with standing and walking and can be relieved with rest.  He also does not feel much discomfort when he is lying in bed and sleeping.  Can feel like his legs feel a little weak but denies any numbness.      Pain Intervention History:      Past Spine Surgical History:      Past and current medications:  Antineuropathics:  NSAIDs:  Physical therapy:  Antidepressants:  Muscle relaxers:  Opioids: hydrocodone  Antiplatelets/Anticoagulants: eliquis, aspirin    History:    Current Outpatient Medications:     acarbose (PRECOSE) 25 MG Tab, TAKE 1 TABLET BY MOUTH THREE TIMES DAILY WITH MEALS, Disp: 270 tablet, Rfl: 1    ACCU-CHEK GUIDE GLUCOSE METER Misc, USE DEVICE TO TEST BLOOD SUGAR TWO TIMES DAILY, Disp: , Rfl:     albuterol (PROVENTIL/VENTOLIN HFA) 90 mcg/actuation inhaler, 2 puffs every 4 hours as needed for cough, wheeze, or shortness of breath, Disp: 18 g, Rfl: 11    albuterol-ipratropium (DUO-NEB) 2.5 mg-0.5 mg/3 mL nebulizer solution, Take 3 mLs by nebulization every 6 (six) hours as needed for Wheezing. Rescue, Disp: 1 each, Rfl: 4    allopurinoL (ZYLOPRIM) 100 MG tablet, Take 2 tablets (200 mg total) by mouth once daily., Disp: 30 tablet, Rfl: 3    aspirin 81 mg Tab, Take 1 tablet by mouth once daily. , Disp: , Rfl:     atorvastatin (LIPITOR) 80 MG tablet, Take 1 tablet (80 mg total) by mouth once daily., Disp: 90 tablet, Rfl: 3    benralizumab (FASENRA  PEN) 30 mg/mL AtIn, Inject 30 mg into the skin every 8 weeks., Disp: 1 mL, Rfl: 6    blood sugar diagnostic Strp, 1 strip by Misc.(Non-Drug; Combo Route) route 3 (three) times daily. DX: E11.29   Dispense test strips that are covered by insurance (Patient taking differently: 1 strip by Misc.(Non-Drug; Combo Route) route 3 (three) times daily. DX: E11.29   Dispense test strips that are covered by insurance), Disp: 300 strip, Rfl: 3    blood sugar diagnostic, drum (ACCU-CHEK COMPACT PLUS TEST) Strp, 1 strip by Misc.(Non-Drug; Combo Route) route 2 (two) times daily with meals., Disp: 100 strip, Rfl: 1    colchicine (MITIGARE) 0.6 mg Cap, Take 1 capsule (0.6 mg total) by mouth daily as needed (gout)., Disp: 5 capsule, Rfl: 3    diclofenac sodium (VOLTAREN) 1 % Gel, SMARTSI Gram(s) Topical 3 Times Daily, Disp: , Rfl:     ELIQUIS 5 mg Tab, Take 5 mg by mouth 2 (two) times daily. , Disp: , Rfl:     finasteride (PROSCAR) 5 mg tablet, Take 1 tablet (5 mg total) by mouth once daily., Disp: 30 tablet, Rfl: 5    fluticasone-salmeterol diskus inhaler 250-50 mcg, Inhale 1 puff into the lungs 2 (two) times daily. Controller, Disp: 60 each, Rfl: 11    fluticasone-umeclidin-vilanter (TRELEGY ELLIPTA) 200-62.5-25 mcg inhaler, Inhale 1 puff into the lungs once daily., Disp: 60 each, Rfl: 11    furosemide (LASIX) 40 MG tablet, Take 1 tablet (40 mg total) by mouth daily as needed (edema)., Disp: 30 tablet, Rfl: 0    HYDROcodone-acetaminophen (NORCO) 7.5-325 mg per tablet, Take 1 tablet by mouth every 6 (six) hours as needed for Pain., Disp: 28 tablet, Rfl: 0    lancets (FREESTYLE LANCETS) 28 gauge Misc, 1 lancet by Misc.(Non-Drug; Combo Route) route 3 (three) times daily., Disp: 300 each, Rfl: 3    lisinopriL (PRINIVIL,ZESTRIL) 5 MG tablet, Take 1 tablet by mouth once daily, Disp: 90 tablet, Rfl: 3    metFORMIN (GLUCOPHAGE) 500 MG tablet, Take 1 tablet (500 mg total) by mouth 2 (two) times daily with meals., Disp: 180 tablet, Rfl:  3    metOLazone (ZAROXOLYN) 2.5 MG tablet, Take 2.5 mg by mouth daily as needed., Disp: , Rfl:     montelukast (SINGULAIR) 10 mg tablet, Take 1 tablet (10 mg total) by mouth every evening., Disp: 30 tablet, Rfl: 3    mupirocin (BACTROBAN) 2 % ointment, APPLY OINTMENT TOPICALLY TO AFFECTED AREA THREE TIMES DAILY FOR 5 DAYS, Disp: , Rfl:     omega-3 fatty acids-vitamin E 1,000 mg Cap, Take 1 capsule by mouth once daily. Three times a day, Disp: , Rfl:     potassium chloride (KLOR-CON) 10 MEQ TbSR, 1 tab with furosemide., Disp: 180 tablet, Rfl: 5    predniSONE (DELTASONE) 20 MG tablet, Take one daily for 3 days and may repeat for shortness of breath., Disp: 21 tablet, Rfl: 0    pulse oximeter (PULSE OXIMETER) device, by Apply Externally route 2 (two) times a day. Use twice daily at 8 AM and 3 PM and [facility]., Disp: 1 each, Rfl: 0    turmeric root extract 500 mg Cap, Take 1 capsule by mouth once daily., Disp: , Rfl:     VENTOLIN HFA 90 mcg/actuation inhaler, Inhale 1-2 puffs into the lungs every 4 (four) hours as needed for Wheezing or Shortness of Breath., Disp: 18 g, Rfl: 3    blood-glucose meter, drum-type Kit, 1 Device by Misc.(Non-Drug; Combo Route) route 2 (two) times daily with meals., Disp: 1 kit, Rfl: 0    gabapentin (NEURONTIN) 300 MG capsule, Take 1 capsule (300 mg total) by mouth 2 (two) times daily., Disp: 60 capsule, Rfl: 1    lancing device Misc, 1 Device by Misc.(Non-Drug; Combo Route) route 2 (two) times daily with meals., Disp: 100 each, Rfl: 0    MYRBETRIQ 50 mg Tb24, TAKE 1 TABLET BY MOUTH ONCE DAILY, Disp: 30 tablet, Rfl: 11    sotalol (BETAPACE) 80 MG tablet, Take 0.5 tablets (40 mg total) by mouth 2 (two) times daily. for 7 days, Disp: 7 tablet, Rfl: 0    tamsulosin (FLOMAX) 0.4 mg Cap, Take 2 capsules (0.8 mg total) by mouth once daily., Disp: 180 capsule, Rfl: 3  No current facility-administered medications for this visit.    Facility-Administered Medications  Ordered in Other Visits:     lidocaine (PF) 10 mg/ml (1%) injection 10 mg, 1 mL, Intradermal, Once, Deandra Diaz MD    Past Medical History:   Diagnosis Date    Acute hypoxemic respiratory failure 1/1/2021    Angina pectoris     Arthritis     Asthma     Atrial fibrillation     Back pain     Cardiac pacemaker     Cataract     Chronic bronchitis     COPD (chronic obstructive pulmonary disease)     Coronary artery disease     COVID-19     Diabetic retinopathy associated with controlled type 2 diabetes mellitus 2/18/2021    DM (diabetes mellitus), type 2, 2000 12/12/2014    Gout     Hepatic cirrhosis, unspecified hepatic cirrhosis type, unspecified whether ascites present 1/23/2022    Hoarseness of voice     Hyperlipidemia     Hypertension     Kidney stones 12/17/2012    Nephrolithiasis     Obesity     Pneumonia due to COVID-19 virus 1/1/2021    Renal manifestation of secondary diabetes mellitus     Rheumatoid factor positive 03/08/2017    Negative work up with Dr. Choudhury    Sleep apnea     Thyroid disease     Unspecified disorder of kidney and ureter     Urinary incontinence     Vocal cord polyp        Past Surgical History:   Procedure Laterality Date    APPENDECTOMY      CARDIAC PACEMAKER PLACEMENT  2018    COLONOSCOPY N/A 3/3/2021    Procedure: COLONOSCOPY;  Surgeon: Jesus Soliman MD;  Location: [facility];  Service: Endoscopy;  Laterality: N/A;    CORONARY STENT PLACEMENT  2018    EYE SURGERY      left eye secondary to trauma    FRACTURE SURGERY      right arm    KNEE SURGERY      screw    MICROLARYNGOSCOPY WITH BIOPSY OF VOCAL CORD N/A 10/13/2020    Procedure: MICROLARYNGOSCOPY, WITH VOCAL CORD BIOPSY;  Surgeon: Deandra Diaz MD;  Location: [facility];  Service: ENT;  Laterality: N/A;    TONSILLECTOMY, ADENOIDECTOMY         Family History   Problem Relation Age of Onset    Thyroid disease Other     Cancer Mother     Hypertension Mother     Osteoarthritis Mother     Heart disease Father      Hypertension Father     Cancer Paternal Uncle         lung    Hypertension Sister     Hypertension Brother     Cancer Brother         colon?    Diabetes Maternal Aunt     Cancer Brother         pancreatic    Kidney disease Daughter     Stroke Daughter     No Known Problems Son     No Known Problems Daughter     Collagen disease Neg Hx     Amblyopia Neg Hx     Blindness Neg Hx     Cataracts Neg Hx     Glaucoma Neg Hx     Macular degeneration Neg Hx     Retinal detachment Neg Hx     Strabismus Neg Hx        Social History     Socioeconomic History    Marital status:      Spouse name: Halie    Number of children: 3    Years of education: 8    Highest education level: 8th grade   Occupational History    Occupation: Retired   Tobacco Use    Smoking status: Former     Types: Cigars    Smokeless tobacco: Current     Types: Chew    Tobacco comments:     smoked a few cigars in his 20s   Substance and Sexual Activity    Alcohol use: Yes     Comment: a few beers during holidays    Drug use: No    Sexual activity: Not Currently     Partners: Female     Social Determinants of Health     Financial Resource Strain: Low Risk     Difficulty of Paying Living Expenses: Not very hard   Food Insecurity: No Food Insecurity    Worried About Running Out of Food in the Last Year: Never true    Ran Out of Food in the Last Year: Never true   Transportation Needs: No Transportation Needs    Lack of Transportation (Medical): No    Lack of Transportation (Non-Medical): No   Physical Activity: Inactive    Days of Exercise per Week: 0 days    Minutes of Exercise per Session: 0 min   Stress: No Stress Concern Present    Feeling of Stress : Not at all   Social Connections: Unknown    Frequency of Communication with Friends and Family: More than three times a week    Frequency of Social Gatherings with Friends and Family: More than three times a week    Active Member of Clubs or Organizations: No    Attends Club or Organization Meetings: Never  "   Marital Status:    Housing Stability: Unknown    Unable to Pay for Housing in the Last Year: No    Unstable Housing in the Last Year: No       Review of patient's allergies indicates:   Allergen Reactions    No known drug allergies        Review of Systems:  12 point review of systems is negative.    Physical Exam:  Vitals:    01/24/23 0830   BP: (!) 169/70   Pulse: 65   Weight: 118.2 kg (260 lb 7.6 oz)   Height: 5' 11" (1.803 m)   PainSc:   8   PainLoc: Back     Body mass index is 36.33 kg/m².    Gen: NAD  Psych: mood appropriate for given condition  HEENT: eyes anicteric   CV: RRR, 2+ radial pulse  HEENT: anicteric   Respiratory: non-labored, no signs of respiratory distress  Abd: non-distended  Skin: warm, dry and intact.  Gait: No antalgic gait.     Pain with axial lumbar back extension    Sensory:  Intact and symmetrical to light touch in L1-S1 dermatomes bilaterally.    Motor:       Right Left   L2/3 Iliacus Hip flexion  5  5   L3/4 Qudratus Femoris Knee Extension  5  5   L4/5 Tib Anterior Ankle Dorsiflexion   5  5   L5/S1 Extensor Hallicus Longus Great toe extension  5  5   S1/S2 Gastroc/Soleus Plantar Flexion  5  5      Right Left                  Patellar DTR 0 0   Achilles DTR 0 0                      Imaging:  MRI lumbar 12/6/21  FINDINGS:  This report assumes that there are 5 non-rib bearing lumbar vertebral bodies with the L5 vertebral body articulating with the sacrum. Accurate numbering of the spine levels would require imaging of the thoracolumbar spine to count ribs.     The prevertebral soft tissues are normal. There is approximately 7 degrees levoscoliosis of lumbar spine. Individual vertebral height is maintained. Marrow signal is within normal limits. Conus is normal in caliber and signal. The conus terminates at L1     At T12-L1 there is moderate disc height loss with a small broad-based posterior disc bulge. There is no facet arthropathy or ligamentum flavum hypertrophy. There is " minimal spinal canal/neural foraminal stenosis.     At L1-2, there is moderate disc height loss with a tiny broad-based posterior disc bulge. There is mild bilateral facet arthropathy with ligamentum flavum hypertrophy. There is very mild spinal canal/neural foraminal stenosis.     At L2-3, the disc shows moderate disc height loss with a small broad-based posterior disc bulge study more eccentric to the neural foramina. There is moderate facet arthropathy with ligament flavum hypertrophy. This results in moderate to severe right neural foraminal stenosis, mild to moderate left neural foraminal stenosis and minimal spinal canal stenosis.     At L3-4, shows mild disc height loss with a moderate-sized broad-based posterior disc bulge. There is a small superimposed left neural foraminal hyperintensity zone/annular fissure images (sagittal image 15). There is moderate facet arthropathy and moderate ligamentum flavum hypertrophy. These findings in combination result in severe spinal canal stenosis with crowding of the nerve root centrally. There is moderate bilateral neural foraminal stenosis as well. There is some waviness of the nerve roots at this level suggestive of nerve root impingement (and less likely arachnoiditis)     At L4-5, the disc shows mild disc height loss with a moderate-sized broad-based posterior disc bulge. There appears to be a tiny superimposed left neural foraminal high intensity zone/annular fissure images (sagittal image 15). There is moderate bilateral facet arthropathy with mild ligamentum flavum hypertrophy. This results in severe left neural foraminal stenosis, moderate right neural foraminal stenosis and mild spinal canal stenosis.     At L5-S1, the disc shows moderate disc height loss with a moderate-sized broad-based posterior disc bulge/disc osteophyte complex which is more eccentric to the neural foramina. There is moderate facet arthropathy and mild ligamentum flavum hypertrophy. These  findings in combination result in moderate to severe bilateral neural foraminal stenosis and mild spinal canal stenosis. The facet osteophytes appear to impinge upon the left greater than right lateral recess and possibly upon the traversing S1 nerve roots in the lateral recesses.     The SI joints and visualized pelvis are within normal limits.    Labs:  Lab Results   Component Value Date    HGBA1C 7.4 (H) 01/11/2022       Lab Results   Component Value Date    WBC 6.88 08/08/2022    HGB 11.7 (L) 08/08/2022    HCT 36.3 (L) 08/08/2022    MCV 91 08/08/2022     08/08/2022         Assessment:   Problem List Items Addressed This Visit    None  Visit Diagnoses       Spinal stenosis of lumbar region with neurogenic claudication    -  Primary    Relevant Orders    X-Ray Lumbar Complete Including Flex And Ext    Ambulatory referral/consult to Physical/Occupational Therapy    Lumbar spondylosis        Dorsalgia        Relevant Orders    Ambulatory referral/consult to Physical/Occupational Therapy              78 y.o. year old male with PMH HTN, AFib, CAD, CKD, COPD who presents with back pain.  He has been dealing with this pain for the last couple years and has been gradually worsening.  Today he reports his pain is 3/10, intermittent, aching.  The pain radiates down the bilateral buttocks and bilateral hips to the thighs.  His pain is worse with standing and walking and can be relieved with rest.  He also does not feel much discomfort when he is lying in bed and sleeping.  Can feel like his legs feel a little weak but denies any numbness.    - on exam he has full strength and intact sensation to light touch bilateral L2-S1.  0 bilateral Achilles and patellar DTR.  He has pain with lumbar back extension  - I independently reviewed his lumbar MRI and is consistent with multilevel bilateral facet arthropathy.  He also has severe central canal narrowing at L3-4  - I discussed treatment options with the patient and son.   At this time I recommend that we maximize conservative options.  I have placed a referral for him to start formal PT.  I have also prescribed him gabapentin 300 mg a day twice a day for the neuropathic component of his pain.  He understands side effect could drowsiness  - he will follow-up in 4-6 weeks.  If he fails to get relief with the conservative measures then we can consider a lumbar RICKY.  We will need to get clearance for him told his aspirin and Eliquis    : Not applicable    Tavares Nguyen M.D.  Interventional Pain Medicine / Anesthesiology    This note was completed with dictation software and grammatical errors may exist.

## 2023-01-25 ENCOUNTER — PATIENT MESSAGE (OUTPATIENT)
Dept: FAMILY MEDICINE | Facility: CLINIC | Age: 78
End: 2023-01-25
Payer: MEDICARE

## 2023-01-25 DIAGNOSIS — M19.071 PRIMARY OSTEOARTHRITIS OF RIGHT ANKLE: ICD-10-CM

## 2023-01-26 RX ORDER — ALLOPURINOL 300 MG/1
300 TABLET ORAL DAILY
Qty: 90 TABLET | Refills: 3 | Status: SHIPPED | OUTPATIENT
Start: 2023-01-26 | End: 2024-01-09 | Stop reason: SDUPTHER

## 2023-01-26 NOTE — TELEPHONE ENCOUNTER
No new care gaps identified.  [facility] Embedded Care Gaps. Reference number: [id number]. 1/26/2023   7:35:26 AM CST

## 2023-01-27 ENCOUNTER — OFFICE VISIT (OUTPATIENT)
Dept: FAMILY MEDICINE | Facility: CLINIC | Age: 78
End: 2023-01-27
Payer: MEDICARE

## 2023-01-27 ENCOUNTER — LAB VISIT (OUTPATIENT)
Dept: LAB | Facility: HOSPITAL | Age: 78
End: 2023-01-27
Attending: PHYSICIAN ASSISTANT
Payer: MEDICARE

## 2023-01-27 VITALS
SYSTOLIC BLOOD PRESSURE: 110 MMHG | WEIGHT: 268.94 LBS | OXYGEN SATURATION: 99 % | BODY MASS INDEX: 37.51 KG/M2 | HEART RATE: 80 BPM | DIASTOLIC BLOOD PRESSURE: 58 MMHG | RESPIRATION RATE: 18 BRPM

## 2023-01-27 DIAGNOSIS — R80.9 TYPE 2 DIABETES MELLITUS WITH MICROALBUMINURIA, WITHOUT LONG-TERM CURRENT USE OF INSULIN: ICD-10-CM

## 2023-01-27 DIAGNOSIS — M25.531 RIGHT WRIST PAIN: ICD-10-CM

## 2023-01-27 DIAGNOSIS — E11.29 TYPE 2 DIABETES MELLITUS WITH MICROALBUMINURIA, WITHOUT LONG-TERM CURRENT USE OF INSULIN: ICD-10-CM

## 2023-01-27 DIAGNOSIS — I15.2 HYPERTENSION ASSOCIATED WITH DIABETES: ICD-10-CM

## 2023-01-27 DIAGNOSIS — N18.30 STAGE 3 CHRONIC KIDNEY DISEASE, UNSPECIFIED WHETHER STAGE 3A OR 3B CKD: ICD-10-CM

## 2023-01-27 DIAGNOSIS — E11.59 HYPERTENSION ASSOCIATED WITH DIABETES: ICD-10-CM

## 2023-01-27 DIAGNOSIS — M1A.3710 CHRONIC GOUT OF RIGHT ANKLE DUE TO RENAL IMPAIRMENT WITHOUT TOPHUS: ICD-10-CM

## 2023-01-27 DIAGNOSIS — M10.9 GOUT, UNSPECIFIED CAUSE, UNSPECIFIED CHRONICITY, UNSPECIFIED SITE: ICD-10-CM

## 2023-01-27 DIAGNOSIS — M10.9 GOUT, UNSPECIFIED CAUSE, UNSPECIFIED CHRONICITY, UNSPECIFIED SITE: Primary | ICD-10-CM

## 2023-01-27 LAB
ESTIMATED AVG GLUCOSE: 157 MG/DL (ref 68–131)
HBA1C MFR BLD: 7.1 % (ref 4–5.6)

## 2023-01-27 PROCEDURE — 96372 PR INJECTION,THERAP/PROPH/DIAG2ST, IM OR SUBCUT: ICD-10-PCS | Mod: HCNC,S$GLB,, | Performed by: PHYSICIAN ASSISTANT

## 2023-01-27 PROCEDURE — 99999 PR PBB SHADOW E&M-EST. PATIENT-LVL V: CPT | Mod: PBBFAC,HCNC,, | Performed by: PHYSICIAN ASSISTANT

## 2023-01-27 PROCEDURE — 1160F RVW MEDS BY RX/DR IN RCRD: CPT | Mod: HCNC,CPTII,S$GLB, | Performed by: PHYSICIAN ASSISTANT

## 2023-01-27 PROCEDURE — 1101F PT FALLS ASSESS-DOCD LE1/YR: CPT | Mod: HCNC,CPTII,S$GLB, | Performed by: PHYSICIAN ASSISTANT

## 2023-01-27 PROCEDURE — 1159F PR MEDICATION LIST DOCUMENTED IN MEDICAL RECORD: ICD-10-PCS | Mod: HCNC,CPTII,S$GLB, | Performed by: PHYSICIAN ASSISTANT

## 2023-01-27 PROCEDURE — 99214 OFFICE O/P EST MOD 30 MIN: CPT | Mod: HCNC,25,S$GLB, | Performed by: PHYSICIAN ASSISTANT

## 2023-01-27 PROCEDURE — 3078F PR MOST RECENT DIASTOLIC BLOOD PRESSURE < 80 MM HG: ICD-10-PCS | Mod: HCNC,CPTII,S$GLB, | Performed by: PHYSICIAN ASSISTANT

## 2023-01-27 PROCEDURE — 1125F PR PAIN SEVERITY QUANTIFIED, PAIN PRESENT: ICD-10-PCS | Mod: HCNC,CPTII,S$GLB, | Performed by: PHYSICIAN ASSISTANT

## 2023-01-27 PROCEDURE — 1160F PR REVIEW ALL MEDS BY PRESCRIBER/CLIN PHARMACIST DOCUMENTED: ICD-10-PCS | Mod: HCNC,CPTII,S$GLB, | Performed by: PHYSICIAN ASSISTANT

## 2023-01-27 PROCEDURE — 36415 COLL VENOUS BLD VENIPUNCTURE: CPT | Mod: HCNC,PO | Performed by: PHYSICIAN ASSISTANT

## 2023-01-27 PROCEDURE — 1125F AMNT PAIN NOTED PAIN PRSNT: CPT | Mod: HCNC,CPTII,S$GLB, | Performed by: PHYSICIAN ASSISTANT

## 2023-01-27 PROCEDURE — 99999 PR PBB SHADOW E&M-EST. PATIENT-LVL V: ICD-10-PCS | Mod: PBBFAC,HCNC,, | Performed by: PHYSICIAN ASSISTANT

## 2023-01-27 PROCEDURE — 85025 COMPLETE CBC W/AUTO DIFF WBC: CPT | Mod: HCNC | Performed by: PHYSICIAN ASSISTANT

## 2023-01-27 PROCEDURE — 3288F FALL RISK ASSESSMENT DOCD: CPT | Mod: HCNC,CPTII,S$GLB, | Performed by: PHYSICIAN ASSISTANT

## 2023-01-27 PROCEDURE — 3288F PR FALLS RISK ASSESSMENT DOCUMENTED: ICD-10-PCS | Mod: HCNC,CPTII,S$GLB, | Performed by: PHYSICIAN ASSISTANT

## 2023-01-27 PROCEDURE — 96372 THER/PROPH/DIAG INJ SC/IM: CPT | Mod: HCNC,S$GLB,, | Performed by: PHYSICIAN ASSISTANT

## 2023-01-27 PROCEDURE — 3074F PR MOST RECENT SYSTOLIC BLOOD PRESSURE < 130 MM HG: ICD-10-PCS | Mod: HCNC,CPTII,S$GLB, | Performed by: PHYSICIAN ASSISTANT

## 2023-01-27 PROCEDURE — 1101F PR PT FALLS ASSESS DOC 0-1 FALLS W/OUT INJ PAST YR: ICD-10-PCS | Mod: HCNC,CPTII,S$GLB, | Performed by: PHYSICIAN ASSISTANT

## 2023-01-27 PROCEDURE — 83036 HEMOGLOBIN GLYCOSYLATED A1C: CPT | Mod: HCNC | Performed by: PHYSICIAN ASSISTANT

## 2023-01-27 PROCEDURE — 3078F DIAST BP <80 MM HG: CPT | Mod: HCNC,CPTII,S$GLB, | Performed by: PHYSICIAN ASSISTANT

## 2023-01-27 PROCEDURE — 84550 ASSAY OF BLOOD/URIC ACID: CPT | Mod: HCNC | Performed by: PHYSICIAN ASSISTANT

## 2023-01-27 PROCEDURE — 3074F SYST BP LT 130 MM HG: CPT | Mod: HCNC,CPTII,S$GLB, | Performed by: PHYSICIAN ASSISTANT

## 2023-01-27 PROCEDURE — 99214 PR OFFICE/OUTPT VISIT, EST, LEVL IV, 30-39 MIN: ICD-10-PCS | Mod: HCNC,25,S$GLB, | Performed by: PHYSICIAN ASSISTANT

## 2023-01-27 PROCEDURE — 1159F MED LIST DOCD IN RCRD: CPT | Mod: HCNC,CPTII,S$GLB, | Performed by: PHYSICIAN ASSISTANT

## 2023-01-27 PROCEDURE — 80053 COMPREHEN METABOLIC PANEL: CPT | Mod: HCNC | Performed by: PHYSICIAN ASSISTANT

## 2023-01-27 RX ORDER — DEXAMETHASONE SODIUM PHOSPHATE 4 MG/ML
8 INJECTION, SOLUTION INTRA-ARTICULAR; INTRALESIONAL; INTRAMUSCULAR; INTRAVENOUS; SOFT TISSUE
Status: COMPLETED | OUTPATIENT
Start: 2023-01-27 | End: 2023-01-27

## 2023-01-27 RX ORDER — LANCETS
EACH MISCELLANEOUS
Qty: 200 EACH | Refills: 0 | Status: SHIPPED | OUTPATIENT
Start: 2023-01-27

## 2023-01-27 RX ORDER — COLCHICINE 0.6 MG/1
CAPSULE ORAL
Qty: 30 CAPSULE | Refills: 0 | Status: SHIPPED | OUTPATIENT
Start: 2023-01-27 | End: 2024-01-09 | Stop reason: SDUPTHER

## 2023-01-27 RX ORDER — INSULIN PUMP SYRINGE, 3 ML
EACH MISCELLANEOUS
Qty: 1 EACH | Refills: 0 | Status: SHIPPED | OUTPATIENT
Start: 2023-01-27 | End: 2024-01-27

## 2023-01-27 RX ADMIN — DEXAMETHASONE SODIUM PHOSPHATE 8 MG: 4 INJECTION, SOLUTION INTRA-ARTICULAR; INTRALESIONAL; INTRAMUSCULAR; INTRAVENOUS; SOFT TISSUE at 02:01

## 2023-01-27 NOTE — PROGRESS NOTES
Identified name and . Administered 8 mg Dexamethasone, IM. Patient tolerated well, aseptic technique maintained. Pain scale 0/10 with injection. No residual bleeding at insertion site noted.

## 2023-01-27 NOTE — PATIENT INSTRUCTIONS
Edd Galvez,     If you are due for any health screening(s) below please notify me so we can arrange them to be ordered and scheduled to maintain your health. Most healthy patients complete it. Don't lose out on improving your health.     All of your core healthy metrics are met.                 Diabetic Retinal Eye Exam    Diabetes is the #1 cause of blindness in the [place] - early detection before signs or symptoms develop can prevent debilitating blindness.    Once-a-year screening is recommended for all diabetic patients. This exam can prevent and treat diabetes complications in the eye before developing symptoms. This can be done with a special camera is used to take photographs of the back of your eye without having to dilate them, or you can see an eye doctor for a full dilated exam.    Although you are still overdue for this important screening, due to the COVID-19 pandemic, we recommend scheduling it in the near future.  Diabetic A1c Testing    Your chart identifies you as having diabetes. It is recommended that all diabetes patients have an A1c test done at least once a year, but [facility] recommends twice a year for most patients. This helps your doctor to better help you manage your diabetes. This is a non-fasting lab test which can be completed at any time. Your result will be sent to your Primary Care Provider for review and that office will contact you with the results.

## 2023-01-27 NOTE — PROGRESS NOTES
Subjective:       Patient ID: Lenny Lopez is a 78 y.o. male.    Chief Complaint: Wrist Pain    Wrist Pain   The pain is present in the right wrist. This is a new problem. The current episode started in the past 7 days. There has been no history of extremity trauma. The problem has been gradually worsening. The quality of the pain is described as aching. The pain is moderate. Associated symptoms include joint swelling, a limited range of motion and stiffness. Pertinent negatives include no fever, inability to bear weight, itching, joint locking, numbness or tingling. The symptoms are aggravated by activity. He has tried nothing for the symptoms. The treatment provided mild relief. Family history does not include rheumatoid arthritis. His past medical history is significant for gout and rheumatoid arthritis.   Review of patient's allergies indicates:   Allergen Reactions    No known drug allergies          Current Outpatient Medications:     acarbose (PRECOSE) 25 MG Tab, TAKE 1 TABLET BY MOUTH THREE TIMES DAILY WITH MEALS, Disp: 270 tablet, Rfl: 1    ACCU-CHEK GUIDE GLUCOSE METER Misc, USE DEVICE TO TEST BLOOD SUGAR TWO TIMES DAILY, Disp: , Rfl:     albuterol (PROVENTIL/VENTOLIN HFA) 90 mcg/actuation inhaler, 2 puffs every 4 hours as needed for cough, wheeze, or shortness of breath, Disp: 18 g, Rfl: 11    albuterol-ipratropium (DUO-NEB) 2.5 mg-0.5 mg/3 mL nebulizer solution, Take 3 mLs by nebulization every 6 (six) hours as needed for Wheezing. Rescue, Disp: 1 each, Rfl: 4    allopurinoL (ZYLOPRIM) 300 MG tablet, Take 1 tablet (300 mg total) by mouth once daily., Disp: 90 tablet, Rfl: 3    aspirin 81 mg Tab, Take 1 tablet by mouth once daily. , Disp: , Rfl:     atorvastatin (LIPITOR) 80 MG tablet, Take 1 tablet (80 mg total) by mouth once daily., Disp: 90 tablet, Rfl: 3    benralizumab (FASENRA PEN) 30 mg/mL AtIn, Inject 30 mg into the skin every 8 weeks., Disp: 1 mL, Rfl: 6    blood sugar diagnostic Strp, 1  strip by Misc.(Non-Drug; Combo Route) route 3 (three) times daily. DX: E11.29   Dispense test strips that are covered by insurance (Patient taking differently: 1 strip by Misc.(Non-Drug; Combo Route) route 3 (three) times daily. DX: E11.29   Dispense test strips that are covered by insurance), Disp: 300 strip, Rfl: 3    blood sugar diagnostic, drum (ACCU-CHEK COMPACT PLUS TEST) Strp, 1 strip by Misc.(Non-Drug; Combo Route) route 2 (two) times daily with meals., Disp: 100 strip, Rfl: 1    diclofenac sodium (VOLTAREN) 1 % Gel, SMARTSI Gram(s) Topical 3 Times Daily, Disp: , Rfl:     ELIQUIS 5 mg Tab, Take 5 mg by mouth 2 (two) times daily. , Disp: , Rfl:     finasteride (PROSCAR) 5 mg tablet, Take 1 tablet (5 mg total) by mouth once daily., Disp: 30 tablet, Rfl: 5    fluticasone-umeclidin-vilanter (TRELEGY ELLIPTA) 200-62.5-25 mcg inhaler, Inhale 1 puff into the lungs once daily., Disp: 60 each, Rfl: 11    furosemide (LASIX) 40 MG tablet, Take 1 tablet (40 mg total) by mouth daily as needed (edema)., Disp: 30 tablet, Rfl: 0    gabapentin (NEURONTIN) 300 MG capsule, Take 1 capsule (300 mg total) by mouth 2 (two) times daily., Disp: 60 capsule, Rfl: 1    HYDROcodone-acetaminophen (NORCO) 7.5-325 mg per tablet, Take 1 tablet by mouth every 6 (six) hours as needed for Pain., Disp: 28 tablet, Rfl: 0    lancets (FREESTYLE LANCETS) 28 gauge Misc, 1 lancet by Misc.(Non-Drug; Combo Route) route 3 (three) times daily., Disp: 300 each, Rfl: 3    lisinopriL (PRINIVIL,ZESTRIL) 5 MG tablet, Take 1 tablet by mouth once daily, Disp: 90 tablet, Rfl: 3    metFORMIN (GLUCOPHAGE) 500 MG tablet, Take 1 tablet (500 mg total) by mouth 2 (two) times daily with meals., Disp: 180 tablet, Rfl: 3    montelukast (SINGULAIR) 10 mg tablet, Take 1 tablet (10 mg total) by mouth every evening., Disp: 30 tablet, Rfl: 3    mupirocin (BACTROBAN) 2 % ointment, APPLY OINTMENT TOPICALLY TO AFFECTED AREA THREE TIMES DAILY FOR 5 DAYS, Disp: , Rfl:      omega-3 fatty acids-vitamin E 1,000 mg Cap, Take 1 capsule by mouth once daily. Three times a day, Disp: , Rfl:     potassium chloride (KLOR-CON) 10 MEQ TbSR, 1 tab with furosemide., Disp: 180 tablet, Rfl: 5    turmeric root extract 500 mg Cap, Take 1 capsule by mouth once daily., Disp: , Rfl:     blood sugar diagnostic Strp, To check BG 3 times daily, to use with insurance preferred meter, Disp: 200 each, Rfl: 0    blood-glucose meter kit, To check BG 3 times daily, to use with insurance preferred meter, Disp: 1 each, Rfl: 0    blood-glucose meter, drum-type Kit, 1 Device by Misc.(Non-Drug; Combo Route) route 2 (two) times daily with meals., Disp: 1 kit, Rfl: 0    colchicine (MITIGARE) 0.6 mg Cap, Take 2 caps PO. Take additional 1 cap one hour later. Then take  1 cap PO BID until flare resolves., Disp: 30 capsule, Rfl: 0    fluticasone-salmeterol diskus inhaler 250-50 mcg, Inhale 1 puff into the lungs 2 (two) times daily. Controller, Disp: 60 each, Rfl: 11    lancets [facility] [place] To check BG 3 times daily, to use with insurance preferred meter, Disp: 200 each, Rfl: 0    lancing device Misc, 1 Device by Misc.(Non-Drug; Combo Route) route 2 (two) times daily with meals., Disp: 100 each, Rfl: 0    metOLazone (ZAROXOLYN) 2.5 MG tablet, Take 2.5 mg by mouth daily as needed., Disp: , Rfl:     MYRBETRIQ 50 mg Tb24, TAKE 1 TABLET BY MOUTH ONCE DAILY, Disp: 30 tablet, Rfl: 11    predniSONE (DELTASONE) 20 MG tablet, Take one daily for 3 days and may repeat for shortness of breath. (Patient not taking: Reported on 1/27/2023), Disp: 21 tablet, Rfl: 0    pulse oximeter (PULSE OXIMETER) device, by Apply Externally route 2 (two) times a day. Use twice daily at 8 AM and 3 PM and [facility]. (Patient not taking: Reported on 1/27/2023), Disp: 1 each, Rfl: 0    sotalol (BETAPACE) 80 MG tablet, Take 0.5 tablets (40 mg total) by mouth 2 (two) times daily. for 7 days, Disp: 7 tablet, Rfl: 0    tamsulosin (FLOMAX)  0.4 mg Cap, Take 2 capsules (0.8 mg total) by mouth once daily., Disp: 180 capsule, Rfl: 3    VENTOLIN HFA 90 mcg/actuation inhaler, Inhale 1-2 puffs into the lungs every 4 (four) hours as needed for Wheezing or Shortness of Breath., Disp: 18 g, Rfl: 3    Current Facility-Administered Medications:     dexAMETHasone injection 8 mg, 8 mg, Intramuscular, 1 time in Clinic/HOD, Kandace Davidson PA-C    Facility-Administered Medications Ordered in Other Visits:     lidocaine (PF) 10 mg/ml (1%) injection 10 mg, 1 mL, Intradermal, Once, Deandra Diaz MD    Lab Results   Component Value Date    WBC 6.88 08/08/2022    HGB 11.7 (L) 08/08/2022    HCT 36.3 (L) 08/08/2022     08/08/2022    CHOL 111 (L) 01/09/2023    TRIG 82 01/09/2023    HDL 30 (L) 01/09/2023    ALT 16 01/09/2023    AST 16 01/09/2023     01/09/2023    K 4.7 01/09/2023     01/09/2023    CREATININE 1.9 (H) 01/09/2023    BUN 53 (H) 01/09/2023    CO2 24 01/09/2023    TSH 4.719 (H) 01/09/2023    PSA 3.5 12/01/2015    INR 1.1 09/03/2020    HGBA1C 7.4 (H) 01/11/2022       Review of Systems   Constitutional:  Negative for activity change, appetite change and fever.   HENT:  Negative for postnasal drip, rhinorrhea and sinus pressure.    Eyes:  Negative for visual disturbance.   Respiratory:  Negative for cough and shortness of breath.    Cardiovascular:  Negative for chest pain.   Gastrointestinal:  Negative for abdominal distention and abdominal pain.   Genitourinary:  Negative for difficulty urinating and dysuria.   Musculoskeletal:  Positive for arthralgias, gout, joint swelling and stiffness. Negative for myalgias.   Skin:  Negative for itching.   Neurological:  Negative for tingling, numbness and headaches.   Hematological:  Negative for adenopathy.   Psychiatric/Behavioral:  The patient is not nervous/anxious.      Objective:      Physical Exam  Constitutional:       Appearance: Normal appearance.   HENT:      Head: Normocephalic and  atraumatic.   Eyes:      Conjunctiva/sclera: Conjunctivae normal.   Cardiovascular:      Rate and Rhythm: Normal rate and regular rhythm.   Pulmonary:      Effort: Pulmonary effort is normal. No respiratory distress.      Breath sounds: Normal breath sounds. No wheezing.   Abdominal:      General: There is no distension.      Palpations: There is no mass.      Tenderness: There is no abdominal tenderness.   Musculoskeletal:         General: Swelling and tenderness present.      Right lower leg: No edema.      Left lower leg: No edema.      Comments: Right wrist mild edema and mild TTP   Skin:     Findings: No erythema.   Neurological:      Mental Status: He is alert and oriented to person, place, and time.   Psychiatric:         Behavior: Behavior normal.       Assessment:       1. Gout, unspecified cause, unspecified chronicity, unspecified site    2. Hypertension associated with diabetes    3. Type 2 diabetes mellitus with microalbuminuria, without long-term current use of insulin    4. Stage 3 chronic kidney disease, unspecified whether stage 3a or 3b CKD    5. Right wrist pain    6. 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    Diagnoses and all orders for this visit:    Gout, unspecified cause, unspecified chronicity, unspecified site  -     Uric Acid; Future  -     dexAMETHasone injection 8 mg  -     colchicine (MITIGARE) 0.6 mg Cap; Take 2 caps PO. Take additional 1 cap one hour later. Then take  1 cap PO BID until flare resolves.    Hypertension associated with diabetes  -     Hemoglobin A1C; Future  -     Comprehensive Metabolic Panel; Future  -     CBC Auto Differential; Future    Type 2 diabetes mellitus with microalbuminuria, without long-term current use of insulin  -     Hemoglobin A1C; Future  -     Comprehensive Metabolic Panel; Future  -     CBC Auto Differential; Future  -     blood-glucose meter kit; To check BG 3 times  daily, to use with insurance preferred meter  -     lancets Misc; To check BG 3 times daily, to use with insurance preferred meter  -     blood sugar diagnostic Strp; To check BG 3 times daily, to use with insurance preferred meter    Stage 3 chronic kidney disease, unspecified whether stage 3a or 3b CKD  -     Comprehensive Metabolic Panel; Future  -     CBC Auto Differential; Future    Right wrist pain  -     Uric Acid; Future  -     dexAMETHasone injection 8 mg  -     colchicine (MITIGARE) 0.6 mg Cap; Take 2 caps PO. Take additional 1 cap one hour later. Then take  1 cap PO BID until flare resolves.    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 Take additional 1 cap one hour later. Then take  1 cap PO BID until flare resolves.      Patient will be notified when labs return and further recommendations will be given at that time.

## 2023-01-28 LAB
ALBUMIN SERPL BCP-MCNC: 3.6 G/DL (ref 3.5–5.2)
ALP SERPL-CCNC: 98 U/L (ref 55–135)
ALT SERPL W/O P-5'-P-CCNC: 19 U/L (ref 10–44)
ANION GAP SERPL CALC-SCNC: 12 MMOL/L (ref 8–16)
AST SERPL-CCNC: 17 U/L (ref 10–40)
BASOPHILS # BLD AUTO: 0.01 K/UL (ref 0–0.2)
BASOPHILS NFR BLD: 0.1 % (ref 0–1.9)
BILIRUB SERPL-MCNC: 0.4 MG/DL (ref 0.1–1)
BUN SERPL-MCNC: 56 MG/DL (ref 8–23)
CALCIUM SERPL-MCNC: 9.3 MG/DL (ref 8.7–10.5)
CHLORIDE SERPL-SCNC: 102 MMOL/L (ref 95–110)
CO2 SERPL-SCNC: 23 MMOL/L (ref 23–29)
CREAT SERPL-MCNC: 2 MG/DL (ref 0.5–1.4)
DIFFERENTIAL METHOD: ABNORMAL
EOSINOPHIL # BLD AUTO: 0 K/UL (ref 0–0.5)
EOSINOPHIL NFR BLD: 0 % (ref 0–8)
ERYTHROCYTE [DISTWIDTH] IN BLOOD BY AUTOMATED COUNT: 14.5 % (ref 11.5–14.5)
EST. GFR  (NO RACE VARIABLE): 33.1 ML/MIN/1.73 M^2
GLUCOSE SERPL-MCNC: 149 MG/DL (ref 70–110)
HCT VFR BLD AUTO: 38.3 % (ref 40–54)
HGB BLD-MCNC: 11.8 G/DL (ref 14–18)
IMM GRANULOCYTES # BLD AUTO: 0.03 K/UL (ref 0–0.04)
IMM GRANULOCYTES NFR BLD AUTO: 0.4 % (ref 0–0.5)
LYMPHOCYTES # BLD AUTO: 1.7 K/UL (ref 1–4.8)
LYMPHOCYTES NFR BLD: 20.4 % (ref 18–48)
MCH RBC QN AUTO: 28.3 PG (ref 27–31)
MCHC RBC AUTO-ENTMCNC: 30.8 G/DL (ref 32–36)
MCV RBC AUTO: 92 FL (ref 82–98)
MONOCYTES # BLD AUTO: 0.7 K/UL (ref 0.3–1)
MONOCYTES NFR BLD: 8.4 % (ref 4–15)
NEUTROPHILS # BLD AUTO: 6 K/UL (ref 1.8–7.7)
NEUTROPHILS NFR BLD: 70.7 % (ref 38–73)
NRBC BLD-RTO: 0 /100 WBC
PLATELET # BLD AUTO: 155 K/UL (ref 150–450)
PMV BLD AUTO: 13 FL (ref 9.2–12.9)
POTASSIUM SERPL-SCNC: 4.3 MMOL/L (ref 3.5–5.1)
PROT SERPL-MCNC: 7.1 G/DL (ref 6–8.4)
RBC # BLD AUTO: 4.17 M/UL (ref 4.6–6.2)
SODIUM SERPL-SCNC: 137 MMOL/L (ref 136–145)
URATE SERPL-MCNC: 8.7 MG/DL (ref 3.4–7)
WBC # BLD AUTO: 8.54 K/UL (ref 3.9–12.7)

## 2023-02-07 DIAGNOSIS — Z00.00 ENCOUNTER FOR MEDICARE ANNUAL WELLNESS EXAM: ICD-10-CM

## 2023-02-07 DIAGNOSIS — N40.0 PROSTATISM: ICD-10-CM

## 2023-02-07 RX ORDER — TAMSULOSIN HYDROCHLORIDE 0.4 MG/1
0.8 CAPSULE ORAL DAILY
Qty: 180 CAPSULE | Refills: 3 | Status: SHIPPED | OUTPATIENT
Start: 2023-02-07 | End: 2023-08-11 | Stop reason: SDUPTHER

## 2023-02-07 NOTE — TELEPHONE ENCOUNTER
Medication requesting - Flomax 0.4MG CAP.   Last Rx-08/07/2022 quanity -180 refill-0   Last office visit -01/10/2023  Next Office Visit-06/20/2023

## 2023-02-09 DIAGNOSIS — Z00.00 ENCOUNTER FOR MEDICARE ANNUAL WELLNESS EXAM: ICD-10-CM

## 2023-02-16 DIAGNOSIS — R60.0 BILATERAL LEG EDEMA: ICD-10-CM

## 2023-02-16 RX ORDER — FUROSEMIDE 40 MG/1
40 TABLET ORAL DAILY PRN
Qty: 30 TABLET | Refills: 0 | Status: SHIPPED | OUTPATIENT
Start: 2023-02-16 | End: 2023-03-20 | Stop reason: SDUPTHER

## 2023-02-16 NOTE — TELEPHONE ENCOUNTER
Medication requesting - Lasix 40mg tab.   Last Rx-01/17/2023 quanity -30 refill-0   Last office visit -01/10/2023  Next Office Visit-06/20/2023

## 2023-02-23 ENCOUNTER — OFFICE VISIT (OUTPATIENT)
Dept: FAMILY MEDICINE | Facility: CLINIC | Age: 78
End: 2023-02-23
Payer: MEDICARE

## 2023-02-23 VITALS
WEIGHT: 264.75 LBS | TEMPERATURE: 97 F | BODY MASS INDEX: 37.06 KG/M2 | DIASTOLIC BLOOD PRESSURE: 62 MMHG | HEIGHT: 71 IN | HEART RATE: 74 BPM | OXYGEN SATURATION: 92 % | SYSTOLIC BLOOD PRESSURE: 116 MMHG

## 2023-02-23 DIAGNOSIS — J44.1 COPD EXACERBATION: Primary | ICD-10-CM

## 2023-02-23 DIAGNOSIS — B96.89 ACUTE BACTERIAL BRONCHITIS: ICD-10-CM

## 2023-02-23 DIAGNOSIS — J06.9 ACUTE URI: ICD-10-CM

## 2023-02-23 DIAGNOSIS — J20.8 ACUTE BACTERIAL BRONCHITIS: ICD-10-CM

## 2023-02-23 PROCEDURE — 99214 PR OFFICE/OUTPT VISIT, EST, LEVL IV, 30-39 MIN: ICD-10-PCS | Mod: 25,S$GLB,CS, | Performed by: FAMILY MEDICINE

## 2023-02-23 PROCEDURE — 3288F PR FALLS RISK ASSESSMENT DOCUMENTED: ICD-10-PCS | Mod: CPTII,S$GLB,, | Performed by: FAMILY MEDICINE

## 2023-02-23 PROCEDURE — 1159F MED LIST DOCD IN RCRD: CPT | Mod: CPTII,S$GLB,, | Performed by: FAMILY MEDICINE

## 2023-02-23 PROCEDURE — 99999 PR PBB SHADOW E&M-EST. PATIENT-LVL V: ICD-10-PCS | Mod: PBBFAC,HCNC,, | Performed by: FAMILY MEDICINE

## 2023-02-23 PROCEDURE — 3078F DIAST BP <80 MM HG: CPT | Mod: CPTII,S$GLB,, | Performed by: FAMILY MEDICINE

## 2023-02-23 PROCEDURE — 1160F PR REVIEW ALL MEDS BY PRESCRIBER/CLIN PHARMACIST DOCUMENTED: ICD-10-PCS | Mod: CPTII,S$GLB,, | Performed by: FAMILY MEDICINE

## 2023-02-23 PROCEDURE — 1160F RVW MEDS BY RX/DR IN RCRD: CPT | Mod: CPTII,S$GLB,, | Performed by: FAMILY MEDICINE

## 2023-02-23 PROCEDURE — 1159F PR MEDICATION LIST DOCUMENTED IN MEDICAL RECORD: ICD-10-PCS | Mod: CPTII,S$GLB,, | Performed by: FAMILY MEDICINE

## 2023-02-23 PROCEDURE — 3078F PR MOST RECENT DIASTOLIC BLOOD PRESSURE < 80 MM HG: ICD-10-PCS | Mod: CPTII,S$GLB,, | Performed by: FAMILY MEDICINE

## 2023-02-23 PROCEDURE — 1101F PT FALLS ASSESS-DOCD LE1/YR: CPT | Mod: CPTII,S$GLB,, | Performed by: FAMILY MEDICINE

## 2023-02-23 PROCEDURE — 96372 THER/PROPH/DIAG INJ SC/IM: CPT | Mod: S$GLB,,, | Performed by: FAMILY MEDICINE

## 2023-02-23 PROCEDURE — 1126F PR PAIN SEVERITY QUANTIFIED, NO PAIN PRESENT: ICD-10-PCS | Mod: CPTII,S$GLB,, | Performed by: FAMILY MEDICINE

## 2023-02-23 PROCEDURE — 99999 PR PBB SHADOW E&M-EST. PATIENT-LVL V: CPT | Mod: PBBFAC,HCNC,, | Performed by: FAMILY MEDICINE

## 2023-02-23 PROCEDURE — 1126F AMNT PAIN NOTED NONE PRSNT: CPT | Mod: CPTII,S$GLB,, | Performed by: FAMILY MEDICINE

## 2023-02-23 PROCEDURE — 1101F PR PT FALLS ASSESS DOC 0-1 FALLS W/OUT INJ PAST YR: ICD-10-PCS | Mod: CPTII,S$GLB,, | Performed by: FAMILY MEDICINE

## 2023-02-23 PROCEDURE — 3074F PR MOST RECENT SYSTOLIC BLOOD PRESSURE < 130 MM HG: ICD-10-PCS | Mod: CPTII,S$GLB,, | Performed by: FAMILY MEDICINE

## 2023-02-23 PROCEDURE — 96372 PR INJECTION,THERAP/PROPH/DIAG2ST, IM OR SUBCUT: ICD-10-PCS | Mod: S$GLB,,, | Performed by: FAMILY MEDICINE

## 2023-02-23 PROCEDURE — 99214 OFFICE O/P EST MOD 30 MIN: CPT | Mod: 25,S$GLB,CS, | Performed by: FAMILY MEDICINE

## 2023-02-23 PROCEDURE — 3288F FALL RISK ASSESSMENT DOCD: CPT | Mod: CPTII,S$GLB,, | Performed by: FAMILY MEDICINE

## 2023-02-23 PROCEDURE — 3074F SYST BP LT 130 MM HG: CPT | Mod: CPTII,S$GLB,, | Performed by: FAMILY MEDICINE

## 2023-02-23 RX ORDER — BETAMETHASONE SODIUM PHOSPHATE AND BETAMETHASONE ACETATE 3; 3 MG/ML; MG/ML
9 INJECTION, SUSPENSION INTRA-ARTICULAR; INTRALESIONAL; INTRAMUSCULAR; SOFT TISSUE
Status: COMPLETED | OUTPATIENT
Start: 2023-02-23 | End: 2023-02-23

## 2023-02-23 RX ORDER — IPRATROPIUM BROMIDE AND ALBUTEROL SULFATE 2.5; .5 MG/3ML; MG/3ML
3 SOLUTION RESPIRATORY (INHALATION) EVERY 4 HOURS PRN
Qty: 50 EACH | Refills: 4 | Status: SHIPPED | OUTPATIENT
Start: 2023-02-23

## 2023-02-23 RX ORDER — DOXYCYCLINE 100 MG/1
100 CAPSULE ORAL 2 TIMES DAILY
Qty: 20 CAPSULE | Refills: 0 | Status: SHIPPED | OUTPATIENT
Start: 2023-02-23 | End: 2023-08-03

## 2023-02-23 RX ADMIN — BETAMETHASONE SODIUM PHOSPHATE AND BETAMETHASONE ACETATE 9 MG: 3; 3 INJECTION, SUSPENSION INTRA-ARTICULAR; INTRALESIONAL; INTRAMUSCULAR; SOFT TISSUE at 09:02

## 2023-02-23 NOTE — PROGRESS NOTES
Patient verified by name and . Patient received 9mg Celestone in left Ventrogluteal. Patient tolerated injection well. Patient advised to wait in clinic for 15 minutes in case of adverse reactions. Patient demonstrated understanding.

## 2023-02-23 NOTE — PATIENT INSTRUCTIONS
Push fluids intake.  Drink plenty of water.     Use the Duo-Neb treatments up to 4 times a day.

## 2023-02-23 NOTE — PROGRESS NOTES
Subjective:       Patient ID: Lenny Lopez is a 78 y.o. male.    Chief Complaint: Nasal Congestion (X 6 days, taking prednisone for the last 4 days. )    Patient here for UC visit.  1 week of increased SOB with cough productive of yellow-green sputum and wheezing.  No fever or pl pain.  Doing nebs BID; on day four of Prednisone 20 mg a day.  Covid negative here today   Review of Systems   Constitutional:  Negative for fever.   Respiratory:  Positive for cough, shortness of breath and wheezing.    Cardiovascular:  Negative for chest pain.   Gastrointestinal:  Negative for abdominal pain and nausea.   Skin:  Negative for rash.   Neurological:  Negative for numbness.   All other systems reviewed and are negative.    Objective:      Physical Exam  Vitals reviewed.   Constitutional:       General: He is not in acute distress.     Appearance: He is well-developed. He is not ill-appearing.   HENT:      Right Ear: Tympanic membrane normal.      Left Ear: Tympanic membrane normal.      Nose: Mucosal edema present.      Mouth/Throat:      Pharynx: Posterior oropharyngeal erythema present.   Cardiovascular:      Rate and Rhythm: Normal rate and regular rhythm.      Heart sounds: No murmur heard.  Pulmonary:      Effort: Pulmonary effort is normal.      Breath sounds: Wheezing present. No rales.   Musculoskeletal:      Cervical back: Neck supple.   Lymphadenopathy:      Cervical: No cervical adenopathy.   Skin:     General: Skin is warm and dry.   Neurological:      Mental Status: He is alert.       Assessment:       1. COPD exacerbation    2. Acute bacterial bronchitis    3. Acute URI          Plan:       COPD exacerbation  -     albuterol-ipratropium (DUO-NEB) 2.5 mg-0.5 mg/3 mL nebulizer solution; Take 3 mLs by nebulization every 4 (four) hours as needed for Wheezing or Shortness of Breath.  Dispense: 50 each; Refill: 4  -     betamethasone acetate-betamethasone sodium phosphate injection 9 mg    Acute bacterial  bronchitis  -     doxycycline (MONODOX) 100 MG capsule; Take 1 capsule (100 mg total) by mouth 2 (two) times daily.  Dispense: 20 capsule; Refill: 0    Acute URI  -     POCT COVID-19 Rapid Screening      Patient Instructions   Push fluids intake.  Drink plenty of water.     Use the Duo-Neb treatments up to 4 times a day.

## 2023-03-02 ENCOUNTER — TELEPHONE (OUTPATIENT)
Dept: PULMONOLOGY | Facility: CLINIC | Age: 78
End: 2023-03-02
Payer: MEDICARE

## 2023-03-02 NOTE — TELEPHONE ENCOUNTER
----- Message from Julia Anthony sent at 3/2/2023  7:51 AM CST -----  Regarding: apppointment  Contact: dennis Best Hartman  Type:  Same Day Appointment Request    Caller is requesting a same day appointment.  Caller declined first available appointment listed below.      Name of Caller:  destiny Best Minnie  When is the first available appointment?    Symptoms:  worsening cough and wheezing  Best Call Back Number:  [phone]  Additional Information:   Destiny would like to get patient and spouse in today. Please call patient's grandson to advise.Thanks!

## 2023-03-06 ENCOUNTER — PES CALL (OUTPATIENT)
Dept: ADMINISTRATIVE | Facility: CLINIC | Age: 78
End: 2023-03-06
Payer: MEDICARE

## 2023-03-07 ENCOUNTER — CLINICAL SUPPORT (OUTPATIENT)
Dept: REHABILITATION | Facility: HOSPITAL | Age: 78
End: 2023-03-07
Attending: ANESTHESIOLOGY
Payer: MEDICARE

## 2023-03-07 DIAGNOSIS — M53.86 DECREASED RANGE OF MOTION OF LUMBAR SPINE: Primary | ICD-10-CM

## 2023-03-07 DIAGNOSIS — M48.062 SPINAL STENOSIS OF LUMBAR REGION WITH NEUROGENIC CLAUDICATION: ICD-10-CM

## 2023-03-07 DIAGNOSIS — Z74.09 IMPAIRED MOBILITY AND ADLS: ICD-10-CM

## 2023-03-07 DIAGNOSIS — Z78.9 IMPAIRED MOBILITY AND ADLS: ICD-10-CM

## 2023-03-07 DIAGNOSIS — M54.9 DORSALGIA: ICD-10-CM

## 2023-03-07 PROCEDURE — 97110 THERAPEUTIC EXERCISES: CPT | Mod: HCNC,PN

## 2023-03-07 PROCEDURE — 97161 PT EVAL LOW COMPLEX 20 MIN: CPT | Mod: HCNC,PN
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
## 2023-03-08 ENCOUNTER — OFFICE VISIT (OUTPATIENT)
Dept: PAIN MEDICINE | Facility: CLINIC | Age: 78
End: 2023-03-08
Payer: MEDICARE

## 2023-03-08 VITALS
DIASTOLIC BLOOD PRESSURE: 60 MMHG | SYSTOLIC BLOOD PRESSURE: 131 MMHG | HEIGHT: 71 IN | TEMPERATURE: 99 F | HEART RATE: 68 BPM | BODY MASS INDEX: 37.04 KG/M2 | WEIGHT: 264.56 LBS

## 2023-03-08 DIAGNOSIS — M47.816 LUMBAR SPONDYLOSIS: Primary | ICD-10-CM

## 2023-03-08 DIAGNOSIS — M47.816 LUMBAR SPONDYLOSIS: ICD-10-CM

## 2023-03-08 DIAGNOSIS — M54.16 LUMBAR RADICULOPATHY: ICD-10-CM

## 2023-03-08 DIAGNOSIS — M48.062 SPINAL STENOSIS OF LUMBAR REGION WITH NEUROGENIC CLAUDICATION: ICD-10-CM

## 2023-03-08 DIAGNOSIS — M54.9 DORSALGIA: ICD-10-CM

## 2023-03-08 DIAGNOSIS — M48.062 SPINAL STENOSIS OF LUMBAR REGION WITH NEUROGENIC CLAUDICATION: Primary | ICD-10-CM

## 2023-03-08 PROCEDURE — 1125F AMNT PAIN NOTED PAIN PRSNT: CPT | Mod: HCNC,CPTII,S$GLB,

## 2023-03-08 PROCEDURE — 3075F SYST BP GE 130 - 139MM HG: CPT | Mod: HCNC,CPTII,S$GLB,

## 2023-03-08 PROCEDURE — 3288F FALL RISK ASSESSMENT DOCD: CPT | Mod: HCNC,CPTII,S$GLB,

## 2023-03-08 PROCEDURE — 99999 PR PBB SHADOW E&M-EST. PATIENT-LVL IV: ICD-10-PCS | Mod: PBBFAC,HCNC,,

## 2023-03-08 PROCEDURE — 3075F PR MOST RECENT SYSTOLIC BLOOD PRESS GE 130-139MM HG: ICD-10-PCS | Mod: HCNC,CPTII,S$GLB,

## 2023-03-08 PROCEDURE — 1125F PR PAIN SEVERITY QUANTIFIED, PAIN PRESENT: ICD-10-PCS | Mod: HCNC,CPTII,S$GLB,

## 2023-03-08 PROCEDURE — 1101F PR PT FALLS ASSESS DOC 0-1 FALLS W/OUT INJ PAST YR: ICD-10-PCS | Mod: HCNC,CPTII,S$GLB,

## 2023-03-08 PROCEDURE — 3288F PR FALLS RISK ASSESSMENT DOCUMENTED: ICD-10-PCS | Mod: HCNC,CPTII,S$GLB,

## 2023-03-08 PROCEDURE — 3078F PR MOST RECENT DIASTOLIC BLOOD PRESSURE < 80 MM HG: ICD-10-PCS | Mod: HCNC,CPTII,S$GLB,

## 2023-03-08 PROCEDURE — 1101F PT FALLS ASSESS-DOCD LE1/YR: CPT | Mod: HCNC,CPTII,S$GLB,

## 2023-03-08 PROCEDURE — 99214 PR OFFICE/OUTPT VISIT, EST, LEVL IV, 30-39 MIN: ICD-10-PCS | Mod: HCNC,S$GLB,,

## 2023-03-08 PROCEDURE — 3078F DIAST BP <80 MM HG: CPT | Mod: HCNC,CPTII,S$GLB,

## 2023-03-08 PROCEDURE — 1159F PR MEDICATION LIST DOCUMENTED IN MEDICAL RECORD: ICD-10-PCS | Mod: HCNC,CPTII,S$GLB,

## 2023-03-08 PROCEDURE — 99214 OFFICE O/P EST MOD 30 MIN: CPT | Mod: HCNC,S$GLB,,

## 2023-03-08 PROCEDURE — 1159F MED LIST DOCD IN RCRD: CPT | Mod: HCNC,CPTII,S$GLB,

## 2023-03-08 PROCEDURE — 99999 PR PBB SHADOW E&M-EST. PATIENT-LVL IV: CPT | Mod: PBBFAC,HCNC,,

## 2023-03-08 RX ORDER — PREGABALIN 25 MG/1
25 CAPSULE ORAL 2 TIMES DAILY
Qty: 60 CAPSULE | Refills: 1 | Status: SHIPPED | OUTPATIENT
Start: 2023-03-08 | End: 2023-08-10 | Stop reason: ALTCHOICE

## 2023-03-08 NOTE — TELEPHONE ENCOUNTER
I have reviewed the [place] [employer] website and there are no abberancies.        He did not tolerate the gabapentin 300 mg b.i.d..  I would like to trial low-dose Lyrica at this time.

## 2023-03-08 NOTE — PROGRESS NOTES
[facility] Follow Up Evaluation      Referred by: No ref. provider found    PCP:     CC:   Chief Complaint   Patient presents with    Hip Pain      No flowsheet data found.    Interval HPI 3/8/2023: Lenny Lopez returns to the clinic for follow up.  He returns for follow-up after trial of gabapentin 300 mg b.i.d. and formal physical therapy.  Reports not tolerating the gabapentin as after only a few doses he had dizziness and felt unsteady on his feet, he discontinued after 3 doses.  He has attended formal physical therapy twice with some relief.  Today he is reporting continued lower back pain 4/10, constant, waxes and wanes on certain days with no known aggravating factors.  He states most of his pain is in his back with radiation into bilateral hips, worsened with standing and walking and somewhat relieved with rest.  He denies any significant radiation down his legs, numbness, weakness or any changes to his bowel or bladder function.      HPI:   Lenny Lopez is a 78 y.o. male who presents with back pain.  He has been dealing with this pain for the last couple years and has been gradually worsening.  Today he reports his pain is 3/10, intermittent, aching.  The pain radiates down the bilateral buttocks and bilateral hips to the thighs.  His pain is worse with standing and walking and can be relieved with rest.  He also does not feel much discomfort when he is lying in bed and sleeping.  Can feel like his legs feel a little weak but denies any numbness.      Pain Intervention History:      Past Spine Surgical History:      Past and current medications:  Antineuropathics:  NSAIDs:  Physical therapy:  Antidepressants:  Muscle relaxers:  Opioids: hydrocodone  Antiplatelets/Anticoagulants: eliquis, aspirin    History:    Current Outpatient Medications:     ACCU-CHEK GUIDE GLUCOSE METER Misc, USE DEVICE TO TEST BLOOD SUGAR TWO TIMES DAILY, Disp: , Rfl:     albuterol (PROVENTIL/VENTOLIN HFA) 90  mcg/actuation inhaler, 2 puffs every 4 hours as needed for cough, wheeze, or shortness of breath, Disp: 18 g, Rfl: 11    albuterol-ipratropium (DUO-NEB) 2.5 mg-0.5 mg/3 mL nebulizer solution, Take 3 mLs by nebulization every 4 (four) hours as needed for Wheezing or Shortness of Breath., Disp: 50 each, Rfl: 4    allopurinoL (ZYLOPRIM) 300 MG tablet, Take 1 tablet (300 mg total) by mouth once daily., Disp: 90 tablet, Rfl: 3    aspirin 81 mg Tab, Take 1 tablet by mouth once daily. , Disp: , Rfl:     atorvastatin (LIPITOR) 80 MG tablet, Take 1 tablet (80 mg total) by mouth once daily., Disp: 90 tablet, Rfl: 3    benralizumab (FASENRA PEN) 30 mg/mL AtIn, Inject 30 mg into the skin every 8 weeks., Disp: 1 mL, Rfl: 6    blood sugar diagnostic Strp, 1 strip by Misc.(Non-Drug; Combo Route) route 3 (three) times daily. DX: E11.29   Dispense test strips that are covered by insurance (Patient taking differently: 1 strip by Misc.(Non-Drug; Combo Route) route 3 (three) times daily. DX: E11.29   Dispense test strips that are covered by insurance), Disp: 300 strip, Rfl: 3    blood sugar diagnostic Strp, To check BG 3 times daily, to use with insurance preferred meter, Disp: 200 each, Rfl: 0    blood sugar diagnostic, drum (ACCU-CHEK COMPACT PLUS TEST) Strp, 1 strip by Misc.(Non-Drug; Combo Route) route 2 (two) times daily with meals., Disp: 100 strip, Rfl: 1    blood-glucose meter kit, To check BG 3 times daily, to use with insurance preferred meter, Disp: 1 each, Rfl: 0    colchicine (MITIGARE) 0.6 mg Cap, Take 2 caps PO. Take additional 1 cap one hour later. Then take  1 cap PO BID until flare resolves., Disp: 30 capsule, Rfl: 0    diclofenac sodium (VOLTAREN) 1 % Gel, SMARTSI Gram(s) Topical 3 Times Daily, Disp: , Rfl:     doxycycline (MONODOX) 100 MG capsule, Take 1 capsule (100 mg total) by mouth 2 (two) times daily., Disp: 20 capsule, Rfl: 0    ELIQUIS 5 mg Tab, Take 5 mg by mouth 2 (two) times daily. , Disp: , Rfl:      finasteride (PROSCAR) 5 mg tablet, Take 1 tablet (5 mg total) by mouth once daily., Disp: 30 tablet, Rfl: 5    fluticasone-salmeterol diskus inhaler 250-50 mcg, Inhale 1 puff into the lungs 2 (two) times daily. Controller, Disp: 60 each, Rfl: 11    fluticasone-umeclidin-vilanter (TRELEGY ELLIPTA) 200-62.5-25 mcg inhaler, Inhale 1 puff into the lungs once daily., Disp: 60 each, Rfl: 11    furosemide (LASIX) 40 MG tablet, Take 1 tablet (40 mg total) by mouth daily as needed (edema)., Disp: 30 tablet, Rfl: 0    lancets (FREESTYLE LANCETS) 28 gauge Misc, 1 lancet by Misc.(Non-Drug; Combo Route) route 3 (three) times daily., Disp: 300 each, Rfl: 3    lancets Misc, To check BG 3 times daily, to use with insurance preferred meter, Disp: 200 each, Rfl: 0    lisinopriL (PRINIVIL,ZESTRIL) 5 MG tablet, Take 1 tablet by mouth once daily, Disp: 90 tablet, Rfl: 3    metFORMIN (GLUCOPHAGE) 500 MG tablet, Take 1 tablet (500 mg total) by mouth 2 (two) times daily with meals., Disp: 180 tablet, Rfl: 3    metOLazone (ZAROXOLYN) 2.5 MG tablet, Take 2.5 mg by mouth daily as needed., Disp: , Rfl:     montelukast (SINGULAIR) 10 mg tablet, TAKE 1 TABLET BY MOUTH ONCE DAILY IN THE EVENING, Disp: 90 tablet, Rfl: 3    mupirocin (BACTROBAN) 2 % ointment, APPLY OINTMENT TOPICALLY TO AFFECTED AREA THREE TIMES DAILY FOR 5 DAYS, Disp: , Rfl:     omega-3 fatty acids-vitamin E 1,000 mg Cap, Take 1 capsule by mouth once daily. Three times a day, Disp: , Rfl:     potassium chloride (KLOR-CON) 10 MEQ TbSR, 1 tab with furosemide., Disp: 180 tablet, Rfl: 5    predniSONE (DELTASONE) 20 MG tablet, Take one daily for 3 days and may repeat for shortness of breath., Disp: 21 tablet, Rfl: 0    pulse oximeter (PULSE OXIMETER) device, by Apply Externally route 2 (two) times a day. Use twice daily at 8 AM and 3 PM and record the value in MyChart as directed., Disp: 1 each, Rfl: 0    tamsulosin (FLOMAX) 0.4 mg Cap, Take 2 capsules (0.8 mg total) by mouth once  daily., Disp: 180 capsule, Rfl: 3    turmeric root extract 500 mg Cap, Take 1 capsule by mouth once daily., Disp: , Rfl:     VENTOLIN HFA 90 mcg/actuation inhaler, Inhale 1-2 puffs into the lungs every 4 (four) hours as needed for Wheezing or Shortness of Breath., Disp: 18 g, Rfl: 3    acarbose (PRECOSE) 25 MG Tab, TAKE 1 TABLET BY MOUTH THREE TIMES DAILY WITH MEALS (Patient not taking: Reported on 2/23/2023), Disp: 270 tablet, Rfl: 1    blood-glucose meter, drum-type Kit, 1 Device by Misc.(Non-Drug; Combo Route) route 2 (two) times daily with meals., Disp: 1 kit, Rfl: 0    gabapentin (NEURONTIN) 300 MG capsule, Take 1 capsule (300 mg total) by mouth 2 (two) times daily. (Patient not taking: Reported on 2/23/2023), Disp: 60 capsule, Rfl: 1    HYDROcodone-acetaminophen (NORCO) 7.5-325 mg per tablet, Take 1 tablet by mouth every 6 (six) hours as needed for Pain. (Patient not taking: Reported on 2/23/2023), Disp: 28 tablet, Rfl: 0    lancing device Misc, 1 Device by Misc.(Non-Drug; Combo Route) route 2 (two) times daily with meals., Disp: 100 each, Rfl: 0    MYRBETRIQ 50 mg Tb24, TAKE 1 TABLET BY MOUTH ONCE DAILY (Patient not taking: Reported on 2/23/2023), Disp: 30 tablet, Rfl: 11    sotalol (BETAPACE) 80 MG tablet, Take 0.5 tablets (40 mg total) by mouth 2 (two) times daily. for 7 days, Disp: 7 tablet, Rfl: 0  No current facility-administered medications for this visit.    Facility-Administered Medications Ordered in Other Visits:     lidocaine (PF) 10 mg/ml (1%) injection 10 mg, 1 mL, Intradermal, Once, Deandra Diaz MD    Past Medical History:   Diagnosis Date    Acute hypoxemic respiratory failure 1/1/2021    Angina pectoris     Arthritis     Asthma     Atrial fibrillation     Back pain     Cardiac pacemaker     Cataract     Chronic bronchitis     COPD (chronic obstructive pulmonary disease)     Coronary artery disease     COVID-19     Diabetic retinopathy associated with controlled type 2 diabetes  mellitus 2/18/2021    DM (diabetes mellitus), type 2, 2000 12/12/2014    Gout     Hepatic cirrhosis, unspecified hepatic cirrhosis type, unspecified whether ascites present 1/23/2022    Hoarseness of voice     Hyperlipidemia     Hypertension     Kidney stones 12/17/2012    Nephrolithiasis     Obesity     Pneumonia due to COVID-19 virus 1/1/2021    Renal manifestation of secondary diabetes mellitus     Rheumatoid factor positive 03/08/2017    Negative work up with Dr. Choudhury    Sleep apnea     Thyroid disease     Unspecified disorder of kidney and ureter     Urinary incontinence     Vocal cord polyp        Past Surgical History:   Procedure Laterality Date    APPENDECTOMY      CARDIAC PACEMAKER PLACEMENT  2018    COLONOSCOPY N/A 3/3/2021    Procedure: COLONOSCOPY;  Surgeon: Jesus Soliman MD;  Location: [facility];  Service: Endoscopy;  Laterality: N/A;    CORONARY STENT PLACEMENT  2018    EYE SURGERY      left eye secondary to trauma    FRACTURE SURGERY      right arm    KNEE SURGERY      screw    MICROLARYNGOSCOPY WITH BIOPSY OF VOCAL CORD N/A 10/13/2020    Procedure: MICROLARYNGOSCOPY, WITH VOCAL CORD BIOPSY;  Surgeon: Deandra Diaz MD;  Location: [facility];  Service: ENT;  Laterality: N/A;    TONSILLECTOMY, ADENOIDECTOMY         Family History   Problem Relation Age of Onset    Thyroid disease Other     Cancer Mother     Hypertension Mother     Osteoarthritis Mother     Heart disease Father     Hypertension Father     Cancer Paternal Uncle         lung    Hypertension Sister     Hypertension Brother     Cancer Brother         colon?    Diabetes Maternal Aunt     Cancer Brother         pancreatic    Kidney disease Daughter     Stroke Daughter     No Known Problems Son     No Known Problems Daughter     Collagen disease Neg Hx     Amblyopia Neg Hx     Blindness Neg Hx     Cataracts Neg Hx     Glaucoma Neg Hx     Macular degeneration Neg Hx     Retinal detachment Neg Hx     Strabismus Neg Hx        Social  "History     Socioeconomic History    Marital status:      Spouse name: Halie    Number of children: 3    Years of education: 8    Highest education level: 8th grade   Occupational History    Occupation: Retired   Tobacco Use    Smoking status: Former     Types: Cigars    Smokeless tobacco: Current     Types: Chew    Tobacco comments:     smoked a few cigars in his 20s   Substance and Sexual Activity    Alcohol use: Yes     Comment: a few beers during holidays    Drug use: No    Sexual activity: Not Currently     Partners: Female       Review of patient's allergies indicates:   Allergen Reactions    No known drug allergies        Review of Systems:  12 point review of systems is negative.    Physical Exam:  Vitals:    03/08/23 0931 03/08/23 0933   BP: 131/60    Pulse: 68    Temp: 98.6 °F (37 °C)    Weight: 120 kg (264 lb 8.8 oz)    Height: 5' 11" (1.803 m)    PainSc:   4   4   PainLoc: Back      Body mass index is 36.9 kg/m².    Gen: NAD  Psych: mood appropriate for given condition  HEENT: eyes anicteric   CV: RRR, 2+ radial pulse  HEENT: anicteric   Respiratory: non-labored, no signs of respiratory distress  Abd: non-distended  Skin: warm, dry and intact.  Gait: No antalgic gait.     Pain with axial lumbar back extension    Sensory:  Intact and symmetrical to light touch in L1-S1 dermatomes bilaterally.    Motor:       Right Left   L2/3 Iliacus Hip flexion  5  5   L3/4 Qudratus Femoris Knee Extension  5  5   L4/5 Tib Anterior Ankle Dorsiflexion   5  5   L5/S1 Extensor Hallicus Longus Great toe extension  5  5   S1/S2 Gastroc/Soleus Plantar Flexion  5  5      Right Left                  Patellar DTR 0 0   Achilles DTR 0 0                      Imaging:  MRI lumbar 12/6/21  FINDINGS:  This report assumes that there are 5 non-rib bearing lumbar vertebral bodies with the L5 vertebral body articulating with the sacrum. Accurate numbering of the spine levels would require imaging of the thoracolumbar spine to count " ribs.     The prevertebral soft tissues are normal. There is approximately 7 degrees levoscoliosis of lumbar spine. Individual vertebral height is maintained. Marrow signal is within normal limits. Conus is normal in caliber and signal. The conus terminates at L1     At T12-L1 there is moderate disc height loss with a small broad-based posterior disc bulge. There is no facet arthropathy or ligamentum flavum hypertrophy. There is minimal spinal canal/neural foraminal stenosis.     At L1-2, there is moderate disc height loss with a tiny broad-based posterior disc bulge. There is mild bilateral facet arthropathy with ligamentum flavum hypertrophy. There is very mild spinal canal/neural foraminal stenosis.     At L2-3, the disc shows moderate disc height loss with a small broad-based posterior disc bulge study more eccentric to the neural foramina. There is moderate facet arthropathy with ligament flavum hypertrophy. This results in moderate to severe right neural foraminal stenosis, mild to moderate left neural foraminal stenosis and minimal spinal canal stenosis.     At L3-4, shows mild disc height loss with a moderate-sized broad-based posterior disc bulge. There is a small superimposed left neural foraminal hyperintensity zone/annular fissure images (sagittal image 15). There is moderate facet arthropathy and moderate ligamentum flavum hypertrophy. These findings in combination result in severe spinal canal stenosis with crowding of the nerve root centrally. There is moderate bilateral neural foraminal stenosis as well. There is some waviness of the nerve roots at this level suggestive of nerve root impingement (and less likely arachnoiditis)     At L4-5, the disc shows mild disc height loss with a moderate-sized broad-based posterior disc bulge. There appears to be a tiny superimposed left neural foraminal high intensity zone/annular fissure images (sagittal image 15). There is moderate bilateral facet arthropathy  with mild ligamentum flavum hypertrophy. This results in severe left neural foraminal stenosis, moderate right neural foraminal stenosis and mild spinal canal stenosis.     At L5-S1, the disc shows moderate disc height loss with a moderate-sized broad-based posterior disc bulge/disc osteophyte complex which is more eccentric to the neural foramina. There is moderate facet arthropathy and mild ligamentum flavum hypertrophy. These findings in combination result in moderate to severe bilateral neural foraminal stenosis and mild spinal canal stenosis. The facet osteophytes appear to impinge upon the left greater than right lateral recess and possibly upon the traversing S1 nerve roots in the lateral recesses.     The SI joints and visualized pelvis are within normal limits.    Labs:  Lab Results   Component Value Date    HGBA1C 7.1 (H) 01/27/2023       Lab Results   Component Value Date    WBC 8.54 01/27/2023    HGB 11.8 (L) 01/27/2023    HCT 38.3 (L) 01/27/2023    MCV 92 01/27/2023     01/27/2023         Assessment:   Problem List Items Addressed This Visit    None  Visit Diagnoses       Spinal stenosis of lumbar region with neurogenic claudication    -  Primary    Lumbar spondylosis        Dorsalgia        Lumbar radiculopathy                    78 y.o. year old male with PMH HTN, AFib, CAD, CKD, COPD who presents with back pain.  He has been dealing with this pain for the last couple years and has been gradually worsening.  Today he reports his pain is 3/10, intermittent, aching.  The pain radiates down the bilateral buttocks and bilateral hips to the thighs.  His pain is worse with standing and walking and can be relieved with rest.  He also does not feel much discomfort when he is lying in bed and sleeping.  Can feel like his legs feel a little weak but denies any numbness.    3/8/2023: Lenny Lopez returns to the clinic for follow up.  He returns for follow-up after trial of gabapentin 300 mg b.i.d. and  formal physical therapy.  Reports not tolerating the gabapentin as after only a few doses he had dizziness and felt unsteady on his feet, he discontinued after 3 doses.  He has attended formal physical therapy twice with some relief.  Today he is reporting continued lower back pain 4/10, constant, waxes and wanes on certain days with no known aggravating factors.  He states most of his pain is in his back with radiation into bilateral hips, worsened with standing and walking and somewhat relieved with rest.  He denies any significant radiation down his legs, numbness, weakness or any changes to his bowel or bladder function.      - on exam he has full strength and intact sensation to light touch bilateral L2-S1.    - we reviewed his lumbar MRI in clinic today and is consistent with multilevel bilateral facet arthropathy.  He also has severe central canal narrowing at L3-4  - I believe his pain is likely originating from his severe central canal stenosis at L3/4.  - unfortunately he did not tolerate the gabapentin 300 mg b.i.d..  - today we discussed his pain and symptoms and at this time he feels like his pain is currently tolerable and not significantly limiting his quality of life.  - discussed options on maximizing conservative therapy with rest, medications and formal physical therapy.  - at this time he would like to continue with formal physical therapy and see how his pain progresses.  - I would like to trial him on Lyrica 25 mg b.i.d. for the neuropathic component of his pain.  Discussed with him like the gabapentin if he does not tolerate the Lyrica to discontinue immediately.  He verbalized understanding.  - continue with formal physical therapy.  - follow-up in 6 weeks.  If he fails to find significant relief with Lyrica and PT can consider lumbar RICKY at L4/5      : Not applicable      This note was completed with dictation software and grammatical errors may exist.    The total time spent for  evaluation and management on 03/08/2023 including reviewing separately obtained history, performing a medically appropriate exam and evaluation, documenting clinical information in the health record, independently interpreting results and communicating them to the patient/family/caregiver, and ordering medications/tests/procedures was between 30-39 minutes.

## 2023-03-20 DIAGNOSIS — R60.0 BILATERAL LEG EDEMA: ICD-10-CM

## 2023-03-20 RX ORDER — FUROSEMIDE 40 MG/1
40 TABLET ORAL DAILY PRN
Qty: 30 TABLET | Refills: 0 | Status: SHIPPED | OUTPATIENT
Start: 2023-03-20 | End: 2023-03-23 | Stop reason: SDUPTHER

## 2023-03-20 NOTE — TELEPHONE ENCOUNTER
----- Message from Nata Chatman sent at 3/20/2023 10:14 AM CDT -----  Contact: self  Type: RX Refill Request        Who Called: patient    Refill or New Rx: refill  RX Name and Strength: finasteride (PROSCAR) 5 mg tablet  furosemide (LASIX) 40 MG tablet  How is the patient currently taking it? (ex. 1XDay): as directed   Is this a 30 day or 90 day RX: n/a  Preferred Pharmacy with phone number:   [id number] - [place], [place] - [address] [address]  Phone: [phone] Fax: [phone]  Local or Mail Order: local   Ordering Provider: Dr. Patel, Dr. Jerrell Mccloud Call Back Number: [phone]  Additional Information: Plz refill for patient. Thanks

## 2023-03-23 DIAGNOSIS — R60.0 BILATERAL LEG EDEMA: ICD-10-CM

## 2023-03-23 RX ORDER — FUROSEMIDE 40 MG/1
40 TABLET ORAL DAILY PRN
Qty: 30 TABLET | Refills: 0 | Status: SHIPPED | OUTPATIENT
Start: 2023-03-23 | End: 2023-06-07 | Stop reason: SDUPTHER

## 2023-03-23 NOTE — TELEPHONE ENCOUNTER
Medication requesting - Furosemide 40mg TAB   Last Rx-02/16/2023 quanity -30 refill-0   Last office visit -01/10/2023  Next Office Visit-06/20/2023

## 2023-05-15 DIAGNOSIS — J44.89 ASTHMA-COPD OVERLAP SYNDROME: ICD-10-CM

## 2023-05-15 DIAGNOSIS — J45.50 SEVERE PERSISTENT ASTHMA WITHOUT COMPLICATION: ICD-10-CM

## 2023-05-15 DIAGNOSIS — J44.9 CHRONIC OBSTRUCTIVE PULMONARY DISEASE, UNSPECIFIED COPD TYPE: ICD-10-CM

## 2023-05-15 RX ORDER — FLUTICASONE FUROATE, UMECLIDINIUM BROMIDE AND VILANTEROL TRIFENATATE 200; 62.5; 25 UG/1; UG/1; UG/1
1 POWDER RESPIRATORY (INHALATION) DAILY
Qty: 60 EACH | Refills: 0 | Status: SHIPPED | OUTPATIENT
Start: 2023-05-15 | End: 2023-06-19 | Stop reason: SDUPTHER

## 2023-05-15 NOTE — TELEPHONE ENCOUNTER
----- Message from Zohreh Gatica sent at 5/15/2023 12:03 PM CDT -----  Regarding: refill  Can the clinic reply in [facility]:       Please refill the medication(s) listed below. Please call the patient when the prescription(s) is ready for  at this phone number   REG VINES [[id number] [phone] (home)         Medication #1 trilogy inhaler          Preferred Pharmacy:  [address] [address] [phone] fax [phone]

## 2023-05-23 NOTE — PROGRESS NOTES
Pre-Visit Chart Review  For Appointment Scheduled on 1/12/17    Health Maintenance Due   Topic Date Due    Foot Exam  01/13/1955    TETANUS VACCINE  01/13/1961    Zoster Vaccine  01/13/2003    Eye Exam  01/29/2017                    
[FreeTextEntry1] : JOSEPH MILLER is a 27 year old female with a positive family history of Hemophilia A / Factor VIII Deficiency (mother) who presents today for genetic testing for Hemophilia through [facility]. 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 Patient had copy of report on her phone. No surgical challenges, no upcoming procedure. never diagnosed with anemia, never required iron. Menses last <7 days, changes 2-3 pads/day, denies HMB. Currently on OCP\par Denies gum bleeds, nosebleed, hematuria, melena/hematochezia. \par \par -Discussed 50% chance of carrying hemophilia mutation despite having a normal FVIII level and absence of bleeding symptoms. verbalized understanding\par -Reviewed genetic testing information and timeline to get results back\par -patient signed consent. \par -declined repeat FVIII level\par \par plan\par labs ([facility] only), will call with results

## 2023-06-07 DIAGNOSIS — R60.0 BILATERAL LEG EDEMA: ICD-10-CM

## 2023-06-07 RX ORDER — FUROSEMIDE 40 MG/1
40 TABLET ORAL DAILY PRN
Qty: 30 TABLET | Refills: 0 | Status: SHIPPED | OUTPATIENT
Start: 2023-06-07 | End: 2023-07-11 | Stop reason: SDUPTHER

## 2023-06-07 NOTE — TELEPHONE ENCOUNTER
----- Message from Armani Charlton sent at 6/7/2023 10:13 AM CDT -----  Contact: pt son  Type:  RX Refill Request    Who Called:  pt son  Refill or New Rx:  refill  RX Name and Strength:  furosemide (LASIX) 40 MG tablet  How is the patient currently taking it? (ex. 1XDay):  1 x day  Is this a 30 day or 90 day RX:  30  Preferred Pharmacy with phone number:        [facility] [address] [place] [address] - [address] [facility]  [address] [address]  Phone: [phone] Fax: [phone]      Local or Mail Order:  local  Ordering Provider:  Dr. Jerrell Mccloud Call Back Number:  [phone]  Additional Information:  pt would like a refill. Please advise.

## 2023-06-19 DIAGNOSIS — J44.89 ASTHMA-COPD OVERLAP SYNDROME: ICD-10-CM

## 2023-06-19 DIAGNOSIS — J44.9 CHRONIC OBSTRUCTIVE PULMONARY DISEASE, UNSPECIFIED COPD TYPE: ICD-10-CM

## 2023-06-19 DIAGNOSIS — J45.50 SEVERE PERSISTENT ASTHMA WITHOUT COMPLICATION: ICD-10-CM

## 2023-06-19 RX ORDER — FLUTICASONE FUROATE, UMECLIDINIUM BROMIDE AND VILANTEROL TRIFENATATE 200; 62.5; 25 UG/1; UG/1; UG/1
1 POWDER RESPIRATORY (INHALATION) DAILY
Qty: 60 EACH | Refills: 0 | Status: SHIPPED | OUTPATIENT
Start: 2023-06-19 | End: 2023-07-18 | Stop reason: SDUPTHER

## 2023-06-19 NOTE — TELEPHONE ENCOUNTER
RX routed to Dr. Allan.    Last refilled:  5/15/2023  Last office visit:  01/10/2023  Next office visit:  None scheduled.

## 2023-07-10 DIAGNOSIS — R60.0 BILATERAL LEG EDEMA: ICD-10-CM

## 2023-07-10 DIAGNOSIS — E11.29 TYPE 2 DIABETES MELLITUS WITH MICROALBUMINURIA, WITHOUT LONG-TERM CURRENT USE OF INSULIN: ICD-10-CM

## 2023-07-10 DIAGNOSIS — R80.9 TYPE 2 DIABETES MELLITUS WITH MICROALBUMINURIA, WITHOUT LONG-TERM CURRENT USE OF INSULIN: ICD-10-CM

## 2023-07-10 RX ORDER — GLUCOSAM/CHONDRO/HERB 149/HYAL 750-100 MG
1 TABLET ORAL DAILY
COMMUNITY
End: 2023-11-15 | Stop reason: SDUPTHER

## 2023-07-10 RX ORDER — FLUTICASONE FUROATE AND VILANTEROL 100; 25 UG/1; UG/1
1 POWDER RESPIRATORY (INHALATION) DAILY
COMMUNITY
End: 2023-08-03

## 2023-07-10 RX ORDER — FLUTICASONE PROPIONATE 50 MCG
1 SPRAY, SUSPENSION (ML) NASAL
COMMUNITY

## 2023-07-10 RX ORDER — BETAMETHASONE SODIUM PHOSPHATE AND BETAMETHASONE ACETATE 3; 3 MG/ML; MG/ML
INJECTION, SUSPENSION INTRA-ARTICULAR; INTRALESIONAL; INTRAMUSCULAR; SOFT TISSUE
COMMUNITY
Start: 2023-02-23 | End: 2023-11-15 | Stop reason: ALTCHOICE

## 2023-07-10 RX ORDER — DEXAMETHASONE SODIUM PHOSPHATE 4 MG/ML
INJECTION, SOLUTION INTRA-ARTICULAR; INTRALESIONAL; INTRAMUSCULAR; INTRAVENOUS; SOFT TISSUE
COMMUNITY
Start: 2023-01-27 | End: 2023-11-15 | Stop reason: ALTCHOICE

## 2023-07-10 RX ORDER — ERGOCALCIFEROL 1.25 MG/1
50000 CAPSULE ORAL
COMMUNITY
End: 2023-11-15 | Stop reason: ALTCHOICE

## 2023-07-10 RX ORDER — ACARBOSE 25 MG/1
25 TABLET ORAL
Qty: 270 TABLET | Refills: 1 | Status: CANCELLED | OUTPATIENT
Start: 2023-07-10

## 2023-07-10 NOTE — TELEPHONE ENCOUNTER
Care Due:                  Date            Visit Type   Department     Provider  --------------------------------------------------------------------------------                                SAME DAY -                              ESTABLISHED   [facility]  Last Visit: 02-      PATIENT      MEDICINE       Grabiel Parr  Next Visit: None Scheduled  None         None Found                                                            Last  Test          Frequency    Reason                     Performed    Due Date  --------------------------------------------------------------------------------    HBA1C.......  6 months...  acarbose, metFORMIN......  01- 07-    [facility] Embedded Care Due Messages. Reference number: [id number].   7/10/2023 5:04:53 PM CDT

## 2023-07-10 NOTE — TELEPHONE ENCOUNTER
----- Message from Sigrid Tinoco sent at 7/10/2023  4:51 PM CDT -----  Regarding: refill  Type:  RX Refill Request    Who Called:  pt  Refill or New Rx:  refill  RX Name and Strength:  acarbose (PRECOSE) 25 MG Tab 270 tablet 1 1/12/2022    Sig: TAKE 1 TABLET BY MOUTH THREE TIMES DAILY WITH MEALS   Patient not taking: Reported on 2/23/2023       Sent to pharmacy as: acarbose (PRECOSE) 25 MG Tab       How is the patient currently taking it? (ex. 1XDay):  see above  Is this a 30 day or 90 day RX:  see above  Preferred Pharmacy with phone number:   [facility]   [address] [place] [address], [place] [address]  PHONE:[phone]  Local or Mail Order:  local  Ordering Provider:  Pablo Mccloud Call Back Number:  [phone]    Additional Information:

## 2023-07-11 ENCOUNTER — TELEPHONE (OUTPATIENT)
Dept: PULMONOLOGY | Facility: CLINIC | Age: 78
End: 2023-07-11
Payer: MEDICARE

## 2023-07-11 ENCOUNTER — PES CALL (OUTPATIENT)
Dept: ADMINISTRATIVE | Facility: CLINIC | Age: 78
End: 2023-07-11
Payer: MEDICARE

## 2023-07-11 DIAGNOSIS — R60.0 BILATERAL LEG EDEMA: ICD-10-CM

## 2023-07-11 RX ORDER — FUROSEMIDE 40 MG/1
40 TABLET ORAL DAILY PRN
Qty: 30 TABLET | Refills: 0 | Status: SHIPPED | OUTPATIENT
Start: 2023-07-11 | End: 2023-08-03 | Stop reason: SDUPTHER

## 2023-07-11 RX ORDER — FUROSEMIDE 40 MG/1
40 TABLET ORAL DAILY PRN
Qty: 30 TABLET | Refills: 0 | OUTPATIENT
Start: 2023-07-11 | End: 2023-08-10

## 2023-07-11 NOTE — TELEPHONE ENCOUNTER
----- Message from Pina Dias, Patient Care Assistant sent at 7/11/2023 12:23 PM CDT -----  Contact: Pt  Type:  RX Refill Request    Who Called:  Pt  Refill or New Rx:  Refill  RX Name and Strength:  furosemide (LASIX) 40 MG tablet,acarbose (PRECOSE) 25 MG Tab  How is the patient currently taking it? (ex. 1XDay):  As Directed  Is this a 30 day or 90 day RX:  90  Preferred Pharmacy with phone number:    [facility]  [address] [place] [address]  Phone: [phone]  Fax: [phone]  Local or Mail Order:  local  Ordering Provider:  Jerrell Mccloud Call Back Number:  [phone]  Additional Information:  Please contact pt upon completion-Thank you~

## 2023-07-11 NOTE — TELEPHONE ENCOUNTER
----- Message from Allen Bonilla MA sent at 7/10/2023  5:03 PM CDT -----  Regarding: FW: refill    ----- Message -----  From: Sigrid Tinoco  Sent: 7/10/2023   4:53 PM CDT  To: Jerrell CARTER Staff  Subject: refill                                           Type:  RX Refill Request    Who Called:  pt  Refill or New Rx:  refill  RX Name and Strength:  furosemide (LASIX) 40 MG tablet 30 tablet 0 6/7/2023 7/7/2023   Sig - Route: Take 1 tablet (40 mg total) by mouth daily as needed (edema). - Oral   Sent to pharmacy as: furosemide (LASIX) 40 MG tablet       How is the patient currently taking it? (ex. 1XDay):  see above  Is this a 30 day or 90 day RX:  90  Preferred Pharmacy with phone number:    [facility]    Local or Mail Order:  local  Ordering Provider:  Jerrell Mccloud Call Back Number:  [phone]    Additional Information:

## 2023-07-11 NOTE — TELEPHONE ENCOUNTER
Refill Routing Note   Medication(s) are not appropriate for processing by [facility] for the following reason(s):      Due for refill >6 months ago : Acarbose    [facility] action(s):  Quick Discontinue  Defer Labs due     Medication Therapy Plan:   Furosemide: E-Prescribing Status: Receipt confirmed by pharmacy (7/11/2023)   Per Epic data, last dispensed date of Acarbose 25 mg 90 d/s 9/28/22      Appointments  past 12m or future 3m with PCP    Date Provider   Last Visit   12/22/2022 Blue Shah MD   Next Visit   Visit date not found Blue Shah MD   ED visits in past 90 days: 0        Note composed:3:51 PM 07/11/2023

## 2023-07-11 NOTE — TELEPHONE ENCOUNTER
Spoke to the patient.  Informed him that Dr. Allan sent a RX to [facility] in [place].  Pt wanted it to be sent to [place].  Patient to call to see if they will transfer it to the [facility] in [place].  Verbalized understanding.

## 2023-07-11 NOTE — TELEPHONE ENCOUNTER
----- Message from Pina Dias, Patient Care Assistant sent at 7/11/2023 12:23 PM CDT -----  Contact: Pt  Type:  RX Refill Request    Who Called:  Pt  Refill or New Rx:  Refill  RX Name and Strength:  furosemide (LASIX) 40 MG tablet,acarbose (PRECOSE) 25 MG Tab  How is the patient currently taking it? (ex. 1XDay):  As Directed  Is this a 30 day or 90 day RX:  90  Preferred Pharmacy with phone number:    [facility]  [address] [place] [address]  Phone: [phone]  Fax: [phone]  Local or Mail Order:  local  Ordering Provider:  Jerrell Mccloud Call Back Number:  [phone]  Additional Information:  Please contact pt upon completion-Thank you~

## 2023-07-12 RX ORDER — ACARBOSE 25 MG/1
25 TABLET ORAL
Qty: 270 TABLET | Refills: 0 | Status: SHIPPED | OUTPATIENT
Start: 2023-07-12

## 2023-07-14 ENCOUNTER — TELEPHONE (OUTPATIENT)
Dept: PULMONOLOGY | Facility: CLINIC | Age: 78
End: 2023-07-14
Payer: MEDICARE

## 2023-07-14 NOTE — TELEPHONE ENCOUNTER
----- Message from Nathaniel Allan MD sent at 7/11/2023  1:09 PM CDT -----  Notify pt -- blood work needs to be follow up with diuretic therapy .   Get bmp asap.

## 2023-07-14 NOTE — TELEPHONE ENCOUNTER
Spoke to patient.  He stated that he's in the most busiest season of the year for 2 weeks.  I stressed the importance that the patient needs to get a BMP done asap.  He will get it done next week he said and thanked me for his concern.  Verbalized understanding.

## 2023-07-18 DIAGNOSIS — J45.50 SEVERE PERSISTENT ASTHMA WITHOUT COMPLICATION: ICD-10-CM

## 2023-07-18 DIAGNOSIS — J44.89 ASTHMA-COPD OVERLAP SYNDROME: ICD-10-CM

## 2023-07-18 DIAGNOSIS — J44.9 CHRONIC OBSTRUCTIVE PULMONARY DISEASE, UNSPECIFIED COPD TYPE: ICD-10-CM

## 2023-07-18 RX ORDER — FINASTERIDE 5 MG/1
5 TABLET, FILM COATED ORAL DAILY
Qty: 90 TABLET | Refills: 1 | Status: SHIPPED | OUTPATIENT
Start: 2023-07-18 | End: 2023-08-11

## 2023-07-18 RX ORDER — FLUTICASONE FUROATE, UMECLIDINIUM BROMIDE AND VILANTEROL TRIFENATATE 200; 62.5; 25 UG/1; UG/1; UG/1
1 POWDER RESPIRATORY (INHALATION) DAILY
Qty: 60 EACH | Refills: 0 | Status: SHIPPED | OUTPATIENT
Start: 2023-07-18 | End: 2023-08-24 | Stop reason: SDUPTHER

## 2023-07-18 NOTE — TELEPHONE ENCOUNTER
No care due was identified.  Health [facility] Embedded Care Due Messages. Reference number: [id number].   7/18/2023 9:57:55 AM CDT

## 2023-07-18 NOTE — TELEPHONE ENCOUNTER
----- Message from Sigrid Tinoco sent at 7/18/2023  9:44 AM CDT -----  Regarding: refill  Type:  RX Refill Request    Who Called:  pt  Refill or New Rx:  refill  RX Name and Strength:  fluticasone-umeclidin-vilanter (TRELEGY ELLIPTA) 200-62.5-25 mcg inhaler 60 each 0 6/19/2023    Sig - Route: Inhale 1 puff into the lungs once daily. - Inhalation   Sent to pharmacy as: fluticasone-umeclidin-vilanter (TRELEGY ELLIPTA) 200-62.5-25 mcg inhaler       How is the patient currently taking it? (ex. 1XDay):  see above  Is this a 30 day or 90 day RX:  see above  Preferred Pharmacy with phone number:      [facility] [address] [place] [address]  Phone: [phone] Fax: [phone]      Local or Mail Order:  local  Ordering Provider:  Jerrell Mccloud Call Back Number:  [phone]    Additional Information:

## 2023-07-18 NOTE — TELEPHONE ENCOUNTER
Refill Decision Note   Lenny John  is requesting a refill authorization.  Brief Assessment and Rationale for Refill:  Approve     Medication Therapy Plan:       Medication Reconciliation Completed: No   Comments:     No Care Gaps recommended.     Note composed:12:08 PM 07/18/2023

## 2023-07-18 NOTE — TELEPHONE ENCOUNTER
----- Message from Sigrid Tinoco sent at 7/18/2023  9:41 AM CDT -----  Regarding: refill  Type:  RX Refill Request    Who Called:  pt  Refill or New Rx:  refill  RX Name and Strength:  finasteride (PROSCAR) 5 mg tablet 30 tablet 3 3/20/2023    Sig: Take 1 tablet by mouth once daily   Sent to pharmacy as: finasteride (PROSCAR) 5 mg tablet       How is the patient currently taking it? (ex. 1XDay):  see above  Is this a 30 day or 90 day RX:  see above  Preferred Pharmacy with phone number:              [facility] [address] [place] [address] [address] [address]  Phone: [phone] Fax: [phone]          Local or Mail Order:  local  Ordering Provider:  Pablo Mccloud Call Back Number:  [phone]    Additional Information:

## 2023-08-01 ENCOUNTER — LAB VISIT (OUTPATIENT)
Dept: LAB | Facility: HOSPITAL | Age: 78
End: 2023-08-01
Attending: INTERNAL MEDICINE
Payer: MEDICARE

## 2023-08-01 DIAGNOSIS — R60.0 BILATERAL LEG EDEMA: ICD-10-CM

## 2023-08-01 DIAGNOSIS — N18.30 STAGE 3 CHRONIC KIDNEY DISEASE, UNSPECIFIED WHETHER STAGE 3A OR 3B CKD: ICD-10-CM

## 2023-08-01 PROCEDURE — 36415 COLL VENOUS BLD VENIPUNCTURE: CPT | Mod: HCNC,PO | Performed by: INTERNAL MEDICINE

## 2023-08-01 PROCEDURE — 80048 BASIC METABOLIC PNL TOTAL CA: CPT | Mod: HCNC | Performed by: INTERNAL MEDICINE

## 2023-08-02 ENCOUNTER — TELEPHONE (OUTPATIENT)
Dept: PULMONOLOGY | Facility: CLINIC | Age: 78
End: 2023-08-02
Payer: MEDICARE

## 2023-08-02 LAB
ANION GAP SERPL CALC-SCNC: 12 MMOL/L (ref 8–16)
BUN SERPL-MCNC: 44 MG/DL (ref 8–23)
CALCIUM SERPL-MCNC: 9 MG/DL (ref 8.7–10.5)
CHLORIDE SERPL-SCNC: 107 MMOL/L (ref 95–110)
CO2 SERPL-SCNC: 22 MMOL/L (ref 23–29)
CREAT SERPL-MCNC: 1.4 MG/DL (ref 0.5–1.4)
EST. GFR  (NO RACE VARIABLE): 50.8 ML/MIN/1.73 M^2
GLUCOSE SERPL-MCNC: 136 MG/DL (ref 70–110)
POTASSIUM SERPL-SCNC: 4.6 MMOL/L (ref 3.5–5.1)
SODIUM SERPL-SCNC: 141 MMOL/L (ref 136–145)

## 2023-08-02 NOTE — TELEPHONE ENCOUNTER
----- Message from Nakita Campbell LPN sent at 8/1/2023  5:03 PM CDT -----  Regarding: FW: Needing an Appt  Halie ritu  ----- Message -----  From: Lisa Dozier  Sent: 8/1/2023   1:54 PM CDT  To: Jerrell CARTER Staff  Subject: Needing an Appt                                  Anh Lopez can be reached at [phone]. Needs to schedule an appt for his father Lenny Lopez during the next couple of weeks. He needs to also schedule his mother for the same day so that way Dr. Allan can see them both.

## 2023-08-03 ENCOUNTER — OFFICE VISIT (OUTPATIENT)
Dept: PULMONOLOGY | Facility: CLINIC | Age: 78
End: 2023-08-03
Payer: MEDICARE

## 2023-08-03 VITALS
OXYGEN SATURATION: 97 % | WEIGHT: 262.13 LBS | SYSTOLIC BLOOD PRESSURE: 110 MMHG | DIASTOLIC BLOOD PRESSURE: 53 MMHG | BODY MASS INDEX: 36.56 KG/M2 | HEART RATE: 61 BPM

## 2023-08-03 DIAGNOSIS — J82.83 EOSINOPHILIC ASTHMA: ICD-10-CM

## 2023-08-03 DIAGNOSIS — N40.0 PROSTATISM: ICD-10-CM

## 2023-08-03 DIAGNOSIS — N39.41 URGE URINARY INCONTINENCE: Primary | ICD-10-CM

## 2023-08-03 DIAGNOSIS — R60.0 BILATERAL LEG EDEMA: ICD-10-CM

## 2023-08-03 DIAGNOSIS — J45.50 SEVERE PERSISTENT ASTHMA WITHOUT COMPLICATION: ICD-10-CM

## 2023-08-03 DIAGNOSIS — J44.89 ASTHMA-COPD OVERLAP SYNDROME: ICD-10-CM

## 2023-08-03 PROCEDURE — 99213 OFFICE O/P EST LOW 20 MIN: CPT | Mod: HCNC,S$GLB,, | Performed by: INTERNAL MEDICINE

## 2023-08-03 PROCEDURE — 99213 PR OFFICE/OUTPT VISIT, EST, LEVL III, 20-29 MIN: ICD-10-PCS | Mod: HCNC,S$GLB,, | Performed by: INTERNAL MEDICINE

## 2023-08-03 PROCEDURE — 99999 PR PBB SHADOW E&M-EST. PATIENT-LVL V: CPT | Mod: PBBFAC,HCNC,, | Performed by: INTERNAL MEDICINE

## 2023-08-03 PROCEDURE — 1101F PR PT FALLS ASSESS DOC 0-1 FALLS W/OUT INJ PAST YR: ICD-10-PCS | Mod: HCNC,CPTII,S$GLB, | Performed by: INTERNAL MEDICINE

## 2023-08-03 PROCEDURE — 3078F PR MOST RECENT DIASTOLIC BLOOD PRESSURE < 80 MM HG: ICD-10-PCS | Mod: HCNC,CPTII,S$GLB, | Performed by: INTERNAL MEDICINE

## 2023-08-03 PROCEDURE — 1159F MED LIST DOCD IN RCRD: CPT | Mod: HCNC,CPTII,S$GLB, | Performed by: INTERNAL MEDICINE

## 2023-08-03 PROCEDURE — 3078F DIAST BP <80 MM HG: CPT | Mod: HCNC,CPTII,S$GLB, | Performed by: INTERNAL MEDICINE

## 2023-08-03 PROCEDURE — 1101F PT FALLS ASSESS-DOCD LE1/YR: CPT | Mod: HCNC,CPTII,S$GLB, | Performed by: INTERNAL MEDICINE

## 2023-08-03 PROCEDURE — 3074F SYST BP LT 130 MM HG: CPT | Mod: HCNC,CPTII,S$GLB, | Performed by: INTERNAL MEDICINE

## 2023-08-03 PROCEDURE — 3074F PR MOST RECENT SYSTOLIC BLOOD PRESSURE < 130 MM HG: ICD-10-PCS | Mod: HCNC,CPTII,S$GLB, | Performed by: INTERNAL MEDICINE

## 2023-08-03 PROCEDURE — 3288F PR FALLS RISK ASSESSMENT DOCUMENTED: ICD-10-PCS | Mod: HCNC,CPTII,S$GLB, | Performed by: INTERNAL MEDICINE

## 2023-08-03 PROCEDURE — 99999 PR PBB SHADOW E&M-EST. PATIENT-LVL V: ICD-10-PCS | Mod: PBBFAC,HCNC,, | Performed by: INTERNAL MEDICINE

## 2023-08-03 PROCEDURE — 1159F PR MEDICATION LIST DOCUMENTED IN MEDICAL RECORD: ICD-10-PCS | Mod: HCNC,CPTII,S$GLB, | Performed by: INTERNAL MEDICINE

## 2023-08-03 PROCEDURE — 3288F FALL RISK ASSESSMENT DOCD: CPT | Mod: HCNC,CPTII,S$GLB, | Performed by: INTERNAL MEDICINE

## 2023-08-03 RX ORDER — FUROSEMIDE 40 MG/1
40 TABLET ORAL 2 TIMES DAILY PRN
Qty: 120 TABLET | Refills: 0 | Status: SHIPPED | OUTPATIENT
Start: 2023-08-03 | End: 2023-12-12 | Stop reason: SDUPTHER
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
## 2023-08-03 NOTE — PATIENT INSTRUCTIONS
Voltaren gel may worsen fluid control and edema.  Last kidney test was better but marginal.     Bladder scan may help as bladder distended on exam--     Fasenra dosed today lot [id number], exp 10/2025.    Edema left leg marginally controlled.  Recommend asking Dr Shah for compression device or evaluation...    Seamus has worked well  -- continue trelegy and fasenra

## 2023-08-08 ENCOUNTER — HOSPITAL ENCOUNTER (OUTPATIENT)
Dept: RADIOLOGY | Facility: HOSPITAL | Age: 78
Discharge: HOME | End: 2023-08-08
Attending: INTERNAL MEDICINE
Payer: MEDICARE

## 2023-08-08 DIAGNOSIS — N39.41 URGE URINARY INCONTINENCE: ICD-10-CM

## 2023-08-08 DIAGNOSIS — N40.0 PROSTATISM: ICD-10-CM

## 2023-08-08 PROCEDURE — 76857 US BLADDER: ICD-10-PCS | Mod: 26,,, | Performed by: RADIOLOGY

## 2023-08-08 PROCEDURE — 76857 US EXAM PELVIC LIMITED: CPT | Mod: 26,,, | Performed by: RADIOLOGY

## 2023-08-08 PROCEDURE — 76857 US EXAM PELVIC LIMITED: CPT | Mod: TC

## 2023-08-10 ENCOUNTER — OFFICE VISIT (OUTPATIENT)
Dept: FAMILY MEDICINE | Facility: CLINIC | Age: 78
End: 2023-08-10
Payer: MEDICARE

## 2023-08-10 VITALS
SYSTOLIC BLOOD PRESSURE: 104 MMHG | TEMPERATURE: 98 F | DIASTOLIC BLOOD PRESSURE: 58 MMHG | OXYGEN SATURATION: 97 % | HEART RATE: 63 BPM | BODY MASS INDEX: 36.56 KG/M2 | HEIGHT: 71 IN

## 2023-08-10 DIAGNOSIS — E66.01 OBESITY, MORBID, BMI 40.0-49.9: ICD-10-CM

## 2023-08-10 DIAGNOSIS — M05.79 RHEUMATOID ARTHRITIS INVOLVING MULTIPLE SITES WITH POSITIVE RHEUMATOID FACTOR: ICD-10-CM

## 2023-08-10 DIAGNOSIS — D69.6 THROMBOCYTOPENIA, UNSPECIFIED: ICD-10-CM

## 2023-08-10 DIAGNOSIS — I11.0 HYPERTENSIVE HEART DISEASE WITH HEART FAILURE: ICD-10-CM

## 2023-08-10 DIAGNOSIS — R80.9 TYPE 2 DIABETES MELLITUS WITH MICROALBUMINURIA, WITHOUT LONG-TERM CURRENT USE OF INSULIN: ICD-10-CM

## 2023-08-10 DIAGNOSIS — I73.9 PVD (PERIPHERAL VASCULAR DISEASE): ICD-10-CM

## 2023-08-10 DIAGNOSIS — E03.4 HYPOTHYROIDISM DUE TO ACQUIRED ATROPHY OF THYROID: ICD-10-CM

## 2023-08-10 DIAGNOSIS — E21.3 HYPERPARATHYROIDISM: ICD-10-CM

## 2023-08-10 DIAGNOSIS — K74.60 HEPATIC CIRRHOSIS, UNSPECIFIED HEPATIC CIRRHOSIS TYPE, UNSPECIFIED WHETHER ASCITES PRESENT: ICD-10-CM

## 2023-08-10 DIAGNOSIS — I25.118 CORONARY ARTERY DISEASE OF NATIVE ARTERY OF NATIVE HEART WITH STABLE ANGINA PECTORIS: ICD-10-CM

## 2023-08-10 DIAGNOSIS — N39.41 URGENCY INCONTINENCE: Primary | ICD-10-CM

## 2023-08-10 DIAGNOSIS — E11.29 TYPE 2 DIABETES MELLITUS WITH MICROALBUMINURIA, WITHOUT LONG-TERM CURRENT USE OF INSULIN: ICD-10-CM

## 2023-08-10 LAB
BILIRUB SERPL-MCNC: NEGATIVE MG/DL
BLOOD URINE, POC: NEGATIVE
CLARITY, POC UA: CLEAR
COLOR, POC UA: YELLOW
GLUCOSE UR QL STRIP: NEGATIVE
KETONES UR QL STRIP: NEGATIVE
LEUKOCYTE ESTERASE URINE, POC: NEGATIVE
NITRITE, POC UA: NEGATIVE
PH, POC UA: 5.5
PROTEIN, POC: NEGATIVE
SPECIFIC GRAVITY, POC UA: 1.01
UROBILINOGEN, POC UA: 0.2

## 2023-08-10 PROCEDURE — 3074F SYST BP LT 130 MM HG: CPT | Mod: HCNC,CPTII,S$GLB, | Performed by: FAMILY MEDICINE

## 2023-08-10 PROCEDURE — 99999 PR PBB SHADOW E&M-EST. PATIENT-LVL V: CPT | Mod: PBBFAC,HCNC,, | Performed by: FAMILY MEDICINE

## 2023-08-10 PROCEDURE — 3288F PR FALLS RISK ASSESSMENT DOCUMENTED: ICD-10-PCS | Mod: HCNC,CPTII,S$GLB, | Performed by: FAMILY MEDICINE

## 2023-08-10 PROCEDURE — 3072F LOW RISK FOR RETINOPATHY: CPT | Mod: HCNC,CPTII,S$GLB, | Performed by: FAMILY MEDICINE

## 2023-08-10 PROCEDURE — 3072F PR LOW RISK FOR RETINOPATHY: ICD-10-PCS | Mod: HCNC,CPTII,S$GLB, | Performed by: FAMILY MEDICINE

## 2023-08-10 PROCEDURE — 3074F PR MOST RECENT SYSTOLIC BLOOD PRESSURE < 130 MM HG: ICD-10-PCS | Mod: HCNC,CPTII,S$GLB, | Performed by: FAMILY MEDICINE

## 2023-08-10 PROCEDURE — 1101F PR PT FALLS ASSESS DOC 0-1 FALLS W/OUT INJ PAST YR: ICD-10-PCS | Mod: HCNC,CPTII,S$GLB, | Performed by: FAMILY MEDICINE

## 2023-08-10 PROCEDURE — 3288F FALL RISK ASSESSMENT DOCD: CPT | Mod: HCNC,CPTII,S$GLB, | Performed by: FAMILY MEDICINE

## 2023-08-10 PROCEDURE — 81002 URINALYSIS NONAUTO W/O SCOPE: CPT | Mod: HCNC,S$GLB,, | Performed by: FAMILY MEDICINE

## 2023-08-10 PROCEDURE — 81002 POCT URINE DIPSTICK WITHOUT MICROSCOPE: ICD-10-PCS | Mod: HCNC,S$GLB,, | Performed by: FAMILY MEDICINE

## 2023-08-10 PROCEDURE — 1101F PT FALLS ASSESS-DOCD LE1/YR: CPT | Mod: HCNC,CPTII,S$GLB, | Performed by: FAMILY MEDICINE

## 2023-08-10 PROCEDURE — 99214 PR OFFICE/OUTPT VISIT, EST, LEVL IV, 30-39 MIN: ICD-10-PCS | Mod: HCNC,S$GLB,, | Performed by: FAMILY MEDICINE

## 2023-08-10 PROCEDURE — 99214 OFFICE O/P EST MOD 30 MIN: CPT | Mod: HCNC,S$GLB,, | Performed by: FAMILY MEDICINE

## 2023-08-10 PROCEDURE — 1125F PR PAIN SEVERITY QUANTIFIED, PAIN PRESENT: ICD-10-PCS | Mod: HCNC,CPTII,S$GLB, | Performed by: FAMILY MEDICINE

## 2023-08-10 PROCEDURE — 3078F PR MOST RECENT DIASTOLIC BLOOD PRESSURE < 80 MM HG: ICD-10-PCS | Mod: HCNC,CPTII,S$GLB, | Performed by: FAMILY MEDICINE

## 2023-08-10 PROCEDURE — 3078F DIAST BP <80 MM HG: CPT | Mod: HCNC,CPTII,S$GLB, | Performed by: FAMILY MEDICINE

## 2023-08-10 PROCEDURE — 1125F AMNT PAIN NOTED PAIN PRSNT: CPT | Mod: HCNC,CPTII,S$GLB, | Performed by: FAMILY MEDICINE

## 2023-08-10 PROCEDURE — 99999 PR PBB SHADOW E&M-EST. PATIENT-LVL V: ICD-10-PCS | Mod: PBBFAC,HCNC,, | Performed by: FAMILY MEDICINE

## 2023-08-10 NOTE — PATIENT INSTRUCTIONS
DASH diet for Hypertension and Healthy Eating  Provided by the [facility]    Healthy Lifestyle   Nutrition and healthy eating  The DASH diet emphasizes portion size, eating a variety of foods and getting the right amount of nutrients. Discover how DASH can improve your health and lower your blood pressure.  By [facility] Staff   DASH stands for Dietary Approaches to Stop Hypertension. The DASH diet is a lifelong approach to healthy eating that's designed to help treat or prevent high blood pressure (hypertension). The DASH diet encourages you to reduce the sodium in your diet and eat a variety of foods rich in nutrients that help lower blood pressure, such as potassium, calcium and magnesium.  By following the DASH diet, you may be able to reduce your blood pressure by a few points in just two weeks. Over time, your systolic blood pressure could drop by eight to 14 points, which can make a significant difference in your health risks.  Because the DASH diet is a healthy way of eating, it offers health benefits besides just lowering blood pressure. The DASH diet is also in line with dietary recommendations to prevent osteoporosis, cancer, heart disease, stroke and diabetes.  The DASH diet emphasizes vegetables, fruits and low-fat dairy foods -- and moderate amounts of whole grains, fish, poultry and nuts.  In addition to the standard DASH diet, there is also a lower sodium version of the diet. You can choose the version of the diet that meets your health needs:  Standard DASH diet. You can consume up to 2,300 milligrams (mg) of sodium a day.   Lower sodium DASH diet. You can consume up to 1,500 mg of sodium a day.  Both versions of the DASH diet aim to reduce the amount of sodium in your diet compared with what you might get in a typical American diet, which can amount to a whopping 3,400 mg of sodium a day or more.  The standard DASH diet meets the recommendation from the Dietary Guidelines for Americans to keep  "daily sodium intake to less than 2,300 mg a day.  The American Heart Association recommends 1,500 mg a day of sodium as an upper limit for all adults. If you aren't sure what sodium level is right for you, talk to your doctor.  Both versions of the DASH diet include lots of whole grains, fruits, vegetables and low-fat dairy products. The DASH diet also includes some fish, poultry and legumes, and encourages a small amount of nuts and seeds a few times a week.   You can eat red meat, sweets and fats in small amounts. The DASH diet is low in saturated fat, cholesterol and total fat.  Here's a look at the recommended servings from each food group for the 2,073-[id number]-day DASH diet.  Grains: 6 to 8 servings a day  Grains include bread, cereal, rice and pasta. Examples of one serving of grains include 1 slice whole-wheat bread, 1 ounce dry cereal, or 1/2 cup cooked cereal, rice or pasta.  Focus on whole grains because they have more fiber and nutrients than do refined grains. For instance, use brown rice instead of white rice, whole-wheat pasta instead of regular pasta and whole-grain bread instead of white bread. Look for products labeled "100 percent whole grain" or "100 percent whole wheat."   Grains are naturally low in fat. Keep them this way by avoiding butter, cream and cheese sauces.  Vegetables: 4 to 5 servings a day  Tomatoes, carrots, broccoli, sweet potatoes, greens and other vegetables are full of fiber, vitamins, and such minerals as potassium and magnesium. Examples of one serving include 1 cup raw leafy green vegetables or 1/2 cup cut-up raw or cooked vegetables.  Don't think of vegetables only as side dishes -- a hearty blend of vegetables served over brown rice or whole-wheat noodles can serve as the main dish for a meal.   Fresh and frozen vegetables are both good choices. When buying frozen and canned vegetables, choose those labeled as low sodium or without added salt.   To increase the number of " servings you fit in daily, be creative. In a stir-rondon, for instance, cut the amount of meat in half and double up on the vegetables.  Fruits: 4 to 5 servings a day  Many fruits need little preparation to become a healthy part of a meal or snack. Like vegetables, they're packed with fiber, potassium and magnesium and are typically low in fat -- coconuts are an exception. Examples of one serving include one medium fruit, 1/2 cup fresh, frozen or canned fruit, or 4 ounces of juice.  Have a piece of fruit with meals and one as a snack, then round out your day with a dessert of fresh fruits topped with a dollop of low-fat yogurt.   Leave on edible peels whenever possible. The peels of apples, pears and most fruits with pits add interesting texture to recipes and contain healthy nutrients and fiber.   Remember that citrus fruits and juices, such as grapefruit, can interact with certain medications, so check with your doctor or pharmacist to see if they're OK for you.   If you choose canned fruit or juice, make sure no sugar is added.  Dairy: 2 to 3 servings a day  Milk, yogurt, cheese and other dairy products are major sources of calcium, vitamin D and protein. But the key is to make sure that you choose dairy products that are low fat or fat-free because otherwise they can be a major source of fat -- and most of it is saturated. Examples of one serving include 1 cup skim or 1 percent milk, 1 cup low fat yogurt, or 1 1/2 ounces part-skim cheese.  Low-fat or fat-free frozen yogurt can help you boost the amount of dairy products you eat while offering a sweet treat. Add fruit for a healthy twist.   If you have trouble digesting dairy products, choose lactose-free products or consider taking an over-the-counter product that contains the enzyme lactase, which can reduce or prevent the symptoms of lactose intolerance.   Go easy on regular and even fat-free cheeses because they are typically high in sodium.  Lean meat, poultry  and fish: 6 servings or fewer a day  Meat can be a rich source of protein, B vitamins, iron and zinc. Choose lean varieties and aim for no more than 6 ounces a day. Cutting back on your meat portion will allow room for more vegetables.  Trim away skin and fat from poultry and meat and then bake, broil, grill or roast instead of frying in fat.   Eat heart-healthy fish, such as salmon, herring and tuna. These types of fish are high in omega-3 fatty acids, which can help lower your total cholesterol.  Nuts, seeds and legumes: 4 to 5 servings a week  Almonds, sunflower seeds, kidney beans, peas, lentils and other foods in this family are good sources of magnesium, potassium and protein. They're also full of fiber and phytochemicals, which are plant compounds that may protect against some cancers and cardiovascular disease.  Serving sizes are small and are intended to be consumed only a few times a week because these foods are high in calories. Examples of one serving include 1/3 cup nuts, 2 tablespoons seeds, or 1/2 cup cooked beans or peas.   Nuts sometimes get a bad rap because of their fat content, but they contain healthy types of fat -- monounsaturated fat and omega-3 fatty acids. They're high in calories, however, so eat them in moderation. Try adding them to stir-fries, salads or cereals.   Soybean-based products, such as tofu and tempeh, can be a good alternative to meat because they contain all of the amino acids your body needs to make a complete protein, just like meat.  Fats and oils: 2 to 3 servings a day  Fat helps your body absorb essential vitamins and helps your body's immune system. But too much fat increases your risk of heart disease, diabetes and obesity. The DASH diet strives for a healthy balance by limiting total fat to less than 30 percent of daily calories from fat, with a focus on the healthier monounsaturated fats.  Examples of one serving include 1 teaspoon soft margarine, 1 tablespoon  mayonnaise or 2 tablespoons salad dressing.  Saturated fat and trans fat are the main dietary culprits in increasing your risk of coronary artery disease. DASH helps keep your daily saturated fat to less than 6 percent of your total calories by limiting use of meat, butter, cheese, whole milk, cream and eggs in your diet, along with foods made from lard, solid shortenings, and palm and coconut oils.   Avoid trans fat, commonly found in such processed foods as crackers, baked goods and fried items.   Read food labels on margarine and salad dressing so that you can choose those that are lowest in saturated fat and free of trans fat.  Sweets: 5 servings or fewer a week  You don't have to banish sweets entirely while following the DASH diet -- just go easy on them. Examples of one serving include 1 tablespoon sugar, jelly or jam, 1/2 cup sorbet, or 1 cup lemonade.  When you eat sweets, choose those that are fat-free or low-fat, such as sorbets, fruit ices, jelly beans, hard candy, shira crackers or low-fat cookies.   Artificial sweeteners such as aspartame (NutraSweet, Equal) and sucralose (Splenda) may help satisfy your sweet tooth while sparing the sugar. But remember that you still must use them sensibly. It's OK to swap a diet cola for a regular cola, but not in place of a more nutritious beverage such as low-fat milk or even plain water.   Cut back on added sugar, which has no nutritional value but can pack on calories.  Drinking too much alcohol can increase blood pressure. The Dietary Guidelines for Americans recommends that men limit alcohol to no more than two drinks a day and women to one or less.  The DASH diet doesn't address caffeine consumption. The influence of caffeine on blood pressure remains unclear. But caffeine can cause your blood pressure to rise at least temporarily. If you already have high blood pressure or if you think caffeine is affecting your blood pressure, talk to your doctor about your  "caffeine consumption.  While the DASH diet is not a weight-loss program, you may indeed lose unwanted pounds because it can help guide you toward healthier food choices.  The DASH diet generally includes about 2,000 calories a day. If you're trying to lose weight, you may need to eat fewer calories. You may also need to adjust your serving goals based on your individual circumstances -- something your health care team can help you decide.  The foods at the core of the DASH diet are naturally low in sodium. So just by following the DASH diet, you're likely to reduce your sodium intake. You also reduce sodium further by:  Using sodium-free spices or flavorings with your food instead of salt   Not adding salt when cooking rice, pasta or hot cereal   Rinsing canned foods to remove some of the sodium   Buying foods labeled "no salt added," "sodium-free," "low sodium" or "very low sodium"  One teaspoon of table salt has 2,325 mg of sodium. When you read food labels, you may be surprised at just how much sodium some processed foods contain. Even low-fat soups, canned vegetables, ready-to-eat cereals and sliced turkey from the local deli -- foods you may have considered healthy -- often have lots of sodium.  You may notice a difference in taste when you choose low-sodium food and beverages. If things seem too bland, gradually introduce low-sodium foods and cut back on table salt until you reach your sodium goal. That'll give your palate time to adjust.  Using salt-free seasoning blends or herbs and spices may also ease the transition. It can take several weeks for your taste buds to get used to less salty foods.  Try these strategies to get started on the DASH diet:   Change gradually. If you now eat only one or two servings of fruits or vegetables a day, try to add a serving at lunch and one at dinner. Rather than switching to all whole grains, start by making one or two of your grain servings whole grains. Increasing " fruits, vegetables and whole grains gradually can also help prevent bloating or diarrhea that may occur if you aren't used to eating a diet with lots of fiber. You can also try over-the-counter products to help reduce gas from beans and vegetables.   Reward successes and forgive slip-ups. Reward yourself with a nonfood treat for your accomplishments -- rent a movie, purchase a book or get together with a friend. Everyone slips, especially when learning something new. Remember that changing your lifestyle is a long-term process. Find out what triggered your setback and then just  where you left off with the DASH diet.   Add physical activity. To boost your blood pressure lowering efforts even more, consider increasing your physical activity in addition to following the DASH diet. Combining both the DASH diet and physical activity makes it more likely that you'll reduce your blood pressure.   Get support if you need it. If you're having trouble sticking to your diet, talk to your doctor or dietitian about it. You might get some tips that will help you stick to the DASH diet.  Remember, healthy eating isn't an all-or-nothing proposition. What's most important is that, on average, you eat healthier foods with plenty of variety -- both to keep your diet nutritious and to avoid boredom or extremes. And with the DASH diet, you can have both.

## 2023-08-11 ENCOUNTER — LAB VISIT (OUTPATIENT)
Dept: LAB | Facility: HOSPITAL | Age: 78
End: 2023-08-11
Attending: FAMILY MEDICINE
Payer: MEDICARE

## 2023-08-11 ENCOUNTER — OFFICE VISIT (OUTPATIENT)
Dept: UROLOGY | Facility: CLINIC | Age: 78
End: 2023-08-11
Payer: MEDICARE

## 2023-08-11 VITALS — HEIGHT: 71 IN | WEIGHT: 262 LBS | BODY MASS INDEX: 36.68 KG/M2

## 2023-08-11 DIAGNOSIS — N32.89 BLADDER MASS: ICD-10-CM

## 2023-08-11 DIAGNOSIS — M05.79 RHEUMATOID ARTHRITIS INVOLVING MULTIPLE SITES WITH POSITIVE RHEUMATOID FACTOR: ICD-10-CM

## 2023-08-11 DIAGNOSIS — E21.3 HYPERPARATHYROIDISM: ICD-10-CM

## 2023-08-11 DIAGNOSIS — D69.6 THROMBOCYTOPENIA, UNSPECIFIED: ICD-10-CM

## 2023-08-11 DIAGNOSIS — N40.0 PROSTATISM: ICD-10-CM

## 2023-08-11 DIAGNOSIS — N39.41 URGENCY INCONTINENCE: Primary | ICD-10-CM

## 2023-08-11 DIAGNOSIS — E03.4 HYPOTHYROIDISM DUE TO ACQUIRED ATROPHY OF THYROID: ICD-10-CM

## 2023-08-11 LAB
BASOPHILS # BLD AUTO: 0 K/UL (ref 0–0.2)
BASOPHILS NFR BLD: 0 % (ref 0–1.9)
DIFFERENTIAL METHOD: ABNORMAL
EOSINOPHIL # BLD AUTO: 0 K/UL (ref 0–0.5)
EOSINOPHIL NFR BLD: 0 % (ref 0–8)
ERYTHROCYTE [DISTWIDTH] IN BLOOD BY AUTOMATED COUNT: 13.9 % (ref 11.5–14.5)
HCT VFR BLD AUTO: 33.8 % (ref 40–54)
HGB BLD-MCNC: 10.7 G/DL (ref 14–18)
IMM GRANULOCYTES # BLD AUTO: 0.04 K/UL (ref 0–0.04)
IMM GRANULOCYTES NFR BLD AUTO: 0.6 % (ref 0–0.5)
LYMPHOCYTES # BLD AUTO: 1.5 K/UL (ref 1–4.8)
LYMPHOCYTES NFR BLD: 22.6 % (ref 18–48)
MCH RBC QN AUTO: 28.9 PG (ref 27–31)
MCHC RBC AUTO-ENTMCNC: 31.7 G/DL (ref 32–36)
MCV RBC AUTO: 91 FL (ref 82–98)
MONOCYTES # BLD AUTO: 0.7 K/UL (ref 0.3–1)
MONOCYTES NFR BLD: 10.9 % (ref 4–15)
NEUTROPHILS # BLD AUTO: 4.5 K/UL (ref 1.8–7.7)
NEUTROPHILS NFR BLD: 65.9 % (ref 38–73)
NRBC BLD-RTO: 0 /100 WBC
PLATELET # BLD AUTO: 145 K/UL (ref 150–450)
PMV BLD AUTO: 12.6 FL (ref 9.2–12.9)
POC RESIDUAL URINE VOLUME: 12 ML (ref 0–100)
RBC # BLD AUTO: 3.7 M/UL (ref 4.6–6.2)
WBC # BLD AUTO: 6.8 K/UL (ref 3.9–12.7)

## 2023-08-11 PROCEDURE — 85025 COMPLETE CBC W/AUTO DIFF WBC: CPT | Mod: HCNC | Performed by: FAMILY MEDICINE

## 2023-08-11 PROCEDURE — 1126F AMNT PAIN NOTED NONE PRSNT: CPT | Mod: HCNC,CPTII,S$GLB, | Performed by: UROLOGY

## 2023-08-11 PROCEDURE — 84550 ASSAY OF BLOOD/URIC ACID: CPT | Mod: HCNC | Performed by: FAMILY MEDICINE

## 2023-08-11 PROCEDURE — 51798 US URINE CAPACITY MEASURE: CPT | Mod: HCNC,S$GLB,, | Performed by: UROLOGY

## 2023-08-11 PROCEDURE — 3288F PR FALLS RISK ASSESSMENT DOCUMENTED: ICD-10-PCS | Mod: HCNC,CPTII,S$GLB, | Performed by: UROLOGY

## 2023-08-11 PROCEDURE — 1126F PR PAIN SEVERITY QUANTIFIED, NO PAIN PRESENT: ICD-10-PCS | Mod: HCNC,CPTII,S$GLB, | Performed by: UROLOGY

## 2023-08-11 PROCEDURE — 99214 OFFICE O/P EST MOD 30 MIN: CPT | Mod: HCNC,S$GLB,, | Performed by: UROLOGY

## 2023-08-11 PROCEDURE — 84443 ASSAY THYROID STIM HORMONE: CPT | Mod: HCNC | Performed by: FAMILY MEDICINE

## 2023-08-11 PROCEDURE — 99999 PR PBB SHADOW E&M-EST. PATIENT-LVL V: ICD-10-PCS | Mod: PBBFAC,HCNC,, | Performed by: UROLOGY

## 2023-08-11 PROCEDURE — 99999 PR PBB SHADOW E&M-EST. PATIENT-LVL V: CPT | Mod: PBBFAC,HCNC,, | Performed by: UROLOGY

## 2023-08-11 PROCEDURE — 1160F PR REVIEW ALL MEDS BY PRESCRIBER/CLIN PHARMACIST DOCUMENTED: ICD-10-PCS | Mod: HCNC,CPTII,S$GLB, | Performed by: UROLOGY

## 2023-08-11 PROCEDURE — 3072F PR LOW RISK FOR RETINOPATHY: ICD-10-PCS | Mod: HCNC,CPTII,S$GLB, | Performed by: UROLOGY

## 2023-08-11 PROCEDURE — 1101F PR PT FALLS ASSESS DOC 0-1 FALLS W/OUT INJ PAST YR: ICD-10-PCS | Mod: HCNC,CPTII,S$GLB, | Performed by: UROLOGY

## 2023-08-11 PROCEDURE — 84439 ASSAY OF FREE THYROXINE: CPT | Mod: HCNC | Performed by: FAMILY MEDICINE

## 2023-08-11 PROCEDURE — 1101F PT FALLS ASSESS-DOCD LE1/YR: CPT | Mod: HCNC,CPTII,S$GLB, | Performed by: UROLOGY

## 2023-08-11 PROCEDURE — 3288F FALL RISK ASSESSMENT DOCD: CPT | Mod: HCNC,CPTII,S$GLB, | Performed by: UROLOGY

## 2023-08-11 PROCEDURE — 1160F RVW MEDS BY RX/DR IN RCRD: CPT | Mod: HCNC,CPTII,S$GLB, | Performed by: UROLOGY

## 2023-08-11 PROCEDURE — 1159F PR MEDICATION LIST DOCUMENTED IN MEDICAL RECORD: ICD-10-PCS | Mod: HCNC,CPTII,S$GLB, | Performed by: UROLOGY

## 2023-08-11 PROCEDURE — 51798 POCT BLADDER SCAN: ICD-10-PCS | Mod: HCNC,S$GLB,, | Performed by: UROLOGY

## 2023-08-11 PROCEDURE — 36415 COLL VENOUS BLD VENIPUNCTURE: CPT | Mod: HCNC,PO | Performed by: FAMILY MEDICINE

## 2023-08-11 PROCEDURE — 80076 HEPATIC FUNCTION PANEL: CPT | Mod: HCNC | Performed by: FAMILY MEDICINE

## 2023-08-11 PROCEDURE — 3072F LOW RISK FOR RETINOPATHY: CPT | Mod: HCNC,CPTII,S$GLB, | Performed by: UROLOGY

## 2023-08-11 PROCEDURE — 1159F MED LIST DOCD IN RCRD: CPT | Mod: HCNC,CPTII,S$GLB, | Performed by: UROLOGY

## 2023-08-11 PROCEDURE — 99214 PR OFFICE/OUTPT VISIT, EST, LEVL IV, 30-39 MIN: ICD-10-PCS | Mod: HCNC,S$GLB,, | Performed by: UROLOGY

## 2023-08-11 RX ORDER — TAMSULOSIN HYDROCHLORIDE 0.4 MG/1
0.8 CAPSULE ORAL DAILY
Qty: 180 CAPSULE | Refills: 3 | Status: SHIPPED | OUTPATIENT
Start: 2023-08-11 | End: 2024-02-15 | Stop reason: SDUPTHER

## 2023-08-11 RX ORDER — OXYBUTYNIN CHLORIDE 15 MG/1
15 TABLET, EXTENDED RELEASE ORAL DAILY
Qty: 90 TABLET | Refills: 3 | Status: SHIPPED | OUTPATIENT
Start: 2023-08-11 | End: 2023-11-15 | Stop reason: ALTCHOICE

## 2023-08-11 NOTE — PROGRESS NOTES
[facility] Established Patient/H&P:    Lenny Lopez is a 78 y.o. male who presents for follow up for nephrolithiasis, lower urinary tract symptoms and abnormal imaging.     Patient with a history of COPD, DM, urge incontinence and nephrolithiasis who presents for urologic follow-up. On review of records he was previously managed by Dr. Zaragoza and Dr. Wheatley      He has a known 1.4 cm proximal left ureteral stone without hydronephrosis. Asymptomatic. Visible on CT abdomen pelvis in 12/2020. Not interested in any surgical intervention.  Has had ESWL in the past for his stone disease.     He reports bothersome lower urinary tract symptoms - urgency, urge incontinence, intermittency and nocturia x 1. He has tried Vesicare and Myrbetriq in the past with no improvement. Remains on Flomax 0.4 mg and Finasteride 5 mg PO daily. Does not wear pads or Depends. Significantly bothered by his symptoms. He is on Lasix daily.      Interval History    8/11/23: Here today for follow up. He was prescribed Gemtesa 75 mg at his last visit. Unclear if he ever took the medication. Here today with persistent frequency, intermittency, urgency with incontinence, straining and nocturia x 1 - 2. Significantly bothered by his symptoms. Remains on Flomax 0.8 mg and Finasteride 5 mg PO daily.     Ultrasound bladder with a bladder mass. There was intervesicular protrusion of the prostate on previous CT in 2020. However, ultrasound could not definitively contribute this to his prostate. Recommended CT urogram or cystoscopy.     Urine dipstick negative on 8/10/23.      Denies any fever, chills, gross hematuria, flank pain,  trauma or history of  malignancy.         PSA  3.5                   9/3/20     IPSS    QoL  17 5 8/11/23  17        6          10/14/21    PVR  12 mL  8/11/23    A1C  7.1  1/27/23      Past Medical History:   Diagnosis Date    Acute hypoxemic respiratory failure 1/1/2021    Angina pectoris      "Arthritis     Asthma     Atrial fibrillation     Back pain     Cardiac pacemaker     Cataract     Chronic bronchitis     COPD (chronic obstructive pulmonary disease)     Coronary artery disease     COVID-19     Diabetic retinopathy associated with controlled type 2 diabetes mellitus 2/18/2021    DM (diabetes mellitus), type 2, 2000 12/12/2014    Gout     Hepatic cirrhosis, unspecified hepatic cirrhosis type, unspecified whether ascites present 1/23/2022    Hoarseness of voice     Hyperlipidemia     Hypertension     Kidney stones 12/17/2012    Nephrolithiasis     Obesity     Pneumonia due to COVID-19 virus 1/1/2021    Renal manifestation of secondary diabetes mellitus     Rheumatoid factor positive 03/08/2017    Negative work up with Dr. Choudhury    Sleep apnea     Thyroid disease     Unspecified disorder of kidney and ureter     Urinary incontinence     Vocal cord polyp        Past Surgical History:   Procedure Laterality Date    APPENDECTOMY      CARDIAC PACEMAKER PLACEMENT  2018    COLONOSCOPY N/A 3/3/2021    Procedure: COLONOSCOPY;  Surgeon: Jesus Soliman MD;  Location: [facility];  Service: Endoscopy;  Laterality: N/A;    CORONARY STENT PLACEMENT  2018    EYE SURGERY      left eye secondary to trauma    FRACTURE SURGERY      right arm    KNEE SURGERY      screw    MICROLARYNGOSCOPY WITH BIOPSY OF VOCAL CORD N/A 10/13/2020    Procedure: MICROLARYNGOSCOPY, WITH VOCAL CORD BIOPSY;  Surgeon: Deandra Diaz MD;  Location: [facility];  Service: ENT;  Laterality: N/A;    TONSILLECTOMY, ADENOIDECTOMY         Review of patient's allergies indicates:   Allergen Reactions    No known drug allergies        Medications Reviewed: see MAR    FOCUSED PHYSICAL EXAM:    There were no vitals filed for this visit.  Body mass index is 36.54 kg/m². Weight: 118.8 kg (262 lb) Height: 5' 11" (180.3 cm)       General: Alert, cooperative, no distress, appears stated age  Abdomen: Soft, non-tender, no CVA tenderness, " non-distended        LABS:    Recent Results (from the past 336 hour(s))   Basic Metabolic Panel    Collection Time: 08/01/23 12:36 PM   Result Value Ref Range    Sodium 141 136 - 145 mmol/L    Potassium 4.6 3.5 - 5.1 mmol/L    Chloride 107 95 - 110 mmol/L    CO2 22 (L) 23 - 29 mmol/L    Glucose 136 (H) 70 - 110 mg/dL    BUN 44 (H) 8 - 23 mg/dL    Creatinine 1.4 0.5 - 1.4 mg/dL    Calcium 9.0 8.7 - 10.5 mg/dL    Anion Gap 12 8 - 16 mmol/L    eGFR 50.8 (A) >60 mL/min/1.73 m^2   POCT URINE DIPSTICK WITHOUT MICROSCOPE    Collection Time: 08/10/23  5:23 PM   Result Value Ref Range    Color, UA Yellow     pH, UA 5.5     WBC, UA negative     Nitrite, UA negative     Protein, POC negative     Glucose, UA negative     Ketones, UA negative     Urobilinogen, UA 0.2     Bilirubin, POC negative     Blood, UA negative     Clarity, UA Clear     Spec Grav UA 1.015    POCT Bladder Scan    Collection Time: 08/11/23  2:52 PM   Result Value Ref Range    POC Residual Urine Volume 12 0 - 100 mL         Assessment/Diagnosis:    1. Urgency incontinence  Ambulatory referral/consult to Urology    POCT Bladder Scan    tamsulosin (FLOMAX) 0.4 mg Cap    oxybutynin (DITROPAN XL) 15 MG TR24      2. Prostatism  tamsulosin (FLOMAX) 0.4 mg Cap      3. Bladder mass  CT Urogram Abd Pelvis W WO          Plans:    - I spent 30 minutes of the day of this encounter preparing for, treating and managing this patient. Extensive discussion with patient regarding the etiology and management of his lower urinary tract symptoms. Explained that LUTS are multifactorial and can be secondary to an enlarged prostate, PO intake of bladder irritants, overactive bladder, constipation, malignancy, trauma, infection, stones or medications.  - Discontinue Finasteride 5 mg PO daily.   - Start Ditropan 15 mg PO daily.   - Continue Flomax 0.8 mg PO daily.   - CT urogram to further evaluate possible bladder mass.   - RTC in 6 months with symptom score and PVR.

## 2023-08-12 LAB
ALBUMIN SERPL BCP-MCNC: 3.6 G/DL (ref 3.5–5.2)
ALP SERPL-CCNC: 79 U/L (ref 55–135)
ALT SERPL W/O P-5'-P-CCNC: 19 U/L (ref 10–44)
AST SERPL-CCNC: 22 U/L (ref 10–40)
BILIRUB DIRECT SERPL-MCNC: 0.3 MG/DL (ref 0.1–0.3)
BILIRUB SERPL-MCNC: 0.6 MG/DL (ref 0.1–1)
PROT SERPL-MCNC: 6.6 G/DL (ref 6–8.4)
T4 FREE SERPL-MCNC: 0.91 NG/DL (ref 0.71–1.51)
TSH SERPL DL<=0.005 MIU/L-ACNC: 4.25 UIU/ML (ref 0.4–4)
URATE SERPL-MCNC: 11.6 MG/DL (ref 3.4–7)

## 2023-08-17 ENCOUNTER — HOSPITAL ENCOUNTER (OUTPATIENT)
Dept: RADIOLOGY | Facility: HOSPITAL | Age: 78
Discharge: HOME | End: 2023-08-17
Attending: UROLOGY
Payer: MEDICARE

## 2023-08-17 DIAGNOSIS — N32.89 BLADDER MASS: ICD-10-CM

## 2023-08-17 PROCEDURE — 74178 CT ABD&PLV WO CNTR FLWD CNTR: CPT | Mod: TC

## 2023-08-17 PROCEDURE — 74178 CT ABD&PLV WO CNTR FLWD CNTR: CPT | Mod: 26,,, | Performed by: RADIOLOGY

## 2023-08-17 PROCEDURE — 74178 CT UROGRAM ABD PELVIS W WO: ICD-10-PCS | Mod: 26,,, | Performed by: RADIOLOGY

## 2023-08-17 PROCEDURE — 25500020 PHARM REV CODE 255

## 2023-08-17 RX ADMIN — IOHEXOL 125 ML: 350 INJECTION, SOLUTION INTRAVENOUS at 05:08

## 2023-08-18 ENCOUNTER — TELEPHONE (OUTPATIENT)
Dept: UROLOGY | Facility: CLINIC | Age: 78
End: 2023-08-18
Payer: MEDICARE

## 2023-08-18 ENCOUNTER — DOCUMENTATION ONLY (OUTPATIENT)
Dept: REHABILITATION | Facility: HOSPITAL | Age: 78
End: 2023-08-18

## 2023-08-18 ENCOUNTER — CLINICAL SUPPORT (OUTPATIENT)
Dept: REHABILITATION | Facility: HOSPITAL | Age: 78
End: 2023-08-18
Attending: FAMILY MEDICINE
Payer: MEDICARE

## 2023-08-18 DIAGNOSIS — M53.86 DECREASED RANGE OF MOTION OF LUMBAR SPINE: Primary | ICD-10-CM

## 2023-08-18 DIAGNOSIS — M05.79 RHEUMATOID ARTHRITIS INVOLVING MULTIPLE SITES WITH POSITIVE RHEUMATOID FACTOR: ICD-10-CM

## 2023-08-18 DIAGNOSIS — Z78.9 IMPAIRED MOBILITY AND ADLS: ICD-10-CM

## 2023-08-18 DIAGNOSIS — Z74.09 IMPAIRED MOBILITY AND ADLS: ICD-10-CM

## 2023-08-18 DIAGNOSIS — D69.6 THROMBOCYTOPENIA, UNSPECIFIED: ICD-10-CM

## 2023-08-18 NOTE — TELEPHONE ENCOUNTER
----- Message from Vianey Newman sent at 8/18/2023 11:54 AM CDT -----  Contact: Self  Type:  Patient Returning Call    Who Called:  Patient  Who Left Message for Patient:  Nakita?  Does the patient know what this is regarding?:  results  Best Call Back Number:  [phone]  Additional Information:  Thank You

## 2023-08-18 NOTE — TELEPHONE ENCOUNTER
Spoke with patient, informed CT Urogram test results. Patient stated he would like to further discuss removal of stones. Stated he would like to speak with MD

## 2023-08-18 NOTE — PROGRESS NOTES
Patient present for Dry Needle to low back today. Patient will be out of town for a few months starting next week. Do not think we should start a Dry Needle regimen that cant be followed up on for a few months. Told him when he is back in town that we can get a new referral and start his Dry Needle then.

## 2023-08-21 ENCOUNTER — CLINICAL SUPPORT (OUTPATIENT)
Dept: REHABILITATION | Facility: HOSPITAL | Age: 78
End: 2023-08-21
Attending: FAMILY MEDICINE
Payer: MEDICARE

## 2023-08-21 DIAGNOSIS — D69.6 THROMBOCYTOPENIA, UNSPECIFIED: ICD-10-CM

## 2023-08-21 DIAGNOSIS — I89.0 LYMPHEDEMA OF BOTH LOWER EXTREMITIES: Primary | ICD-10-CM

## 2023-08-21 PROCEDURE — 97166 OT EVAL MOD COMPLEX 45 MIN: CPT | Mod: HCNC,PN

## 2023-08-21 PROCEDURE — 97530 THERAPEUTIC ACTIVITIES: CPT | Mod: HCNC,PN

## 2023-08-22 ENCOUNTER — CLINICAL SUPPORT (OUTPATIENT)
Dept: REHABILITATION | Facility: HOSPITAL | Age: 78
End: 2023-08-22
Attending: FAMILY MEDICINE
Payer: MEDICARE

## 2023-08-22 DIAGNOSIS — I89.0 LYMPHEDEMA OF BOTH LOWER EXTREMITIES: Primary | ICD-10-CM

## 2023-08-22 DIAGNOSIS — D69.6 THROMBOCYTOPENIA, UNSPECIFIED: ICD-10-CM

## 2023-08-22 PROCEDURE — 97530 THERAPEUTIC ACTIVITIES: CPT | Mod: HCNC,PN

## 2023-08-22 PROCEDURE — 97140 MANUAL THERAPY 1/> REGIONS: CPT | Mod: HCNC,PN

## 2023-08-22 NOTE — PROGRESS NOTES
Patient came in for Dry Needle, he will be out of town for few months after today. Recommend he come back when he  will available for a few weeks in a row.  No charges for today

## 2023-08-24 ENCOUNTER — CLINICAL SUPPORT (OUTPATIENT)
Dept: REHABILITATION | Facility: HOSPITAL | Age: 78
End: 2023-08-24
Attending: FAMILY MEDICINE
Payer: MEDICARE

## 2023-08-24 DIAGNOSIS — J44.89 ASTHMA-COPD OVERLAP SYNDROME: ICD-10-CM

## 2023-08-24 DIAGNOSIS — I89.0 LYMPHEDEMA OF BOTH LOWER EXTREMITIES: Primary | ICD-10-CM

## 2023-08-24 DIAGNOSIS — J45.50 SEVERE PERSISTENT ASTHMA WITHOUT COMPLICATION: ICD-10-CM

## 2023-08-24 DIAGNOSIS — J44.9 CHRONIC OBSTRUCTIVE PULMONARY DISEASE, UNSPECIFIED COPD TYPE: ICD-10-CM

## 2023-08-24 PROCEDURE — 97530 THERAPEUTIC ACTIVITIES: CPT | Mod: HCNC,PN

## 2023-08-24 RX ORDER — FLUTICASONE FUROATE, UMECLIDINIUM BROMIDE AND VILANTEROL TRIFENATATE 200; 62.5; 25 UG/1; UG/1; UG/1
1 POWDER RESPIRATORY (INHALATION) DAILY
Qty: 60 EACH | Refills: 3 | Status: SHIPPED | OUTPATIENT
Start: 2023-08-24 | End: 2024-01-29 | Stop reason: SDUPTHER

## 2023-08-24 NOTE — PROGRESS NOTES
"  [facility]  Occupational Therapy Treatment Note    Date: 8/22/2023  Name: Lenny Lopez  Clinic Number: [id number]    Therapy Diagnosis:   Encounter Diagnoses   Name Primary?    Lymphedema of both lower extremities Yes    Thrombocytopenia, unspecified      Physician: Blue Shah MD  Physician Orders: evaluate and treat  Medical Diagnosis: thrombocytopenia,rheumatoid arthritis  Surgical Procedure and Date: na,   Evaluation Date: 8/21/2023  Insurance Authorization Period Expiration: 12-  Plan of Care Certification Period: 12-  Progress Note Due: same   Date of Return to MD: to be scheduled  Visit # / Visits authorized: 2/60  FOTO: intake done/    Precautions:  Standard and Diabetes      Time In: 0900  Time Out: 1000  Total Billable Time: 60 minutes    SUBJECTIVE     Pt reports: " The wraps don't bother me."    Response to previous treatment: this is first treatment  Functional change: na    Pain: 0/10  Location: no pain reported    OBJECTIVE         Treatment     Lenny received the treatments listed below:     Manual therapy techniques: Manual Lymphatic Drainage were applied for 30 minutes, including:  Patient presented  for first treatment . Skin care completed.  Initiated MLD with patient in  reclined it is position on mat .    MANUAL LYMPHATIC DRAINAGE:    reclined with lower extremities elevated:  Short Neck  Axillary lymph nodes   Activate and work over the INGUINAL-AXILLARY ANASTOMOSE  Deep abdominal techniques  Rework both INGUINAL-AXILLARY ANASTOMOSES  Activate "bulk flow" on lateral thigh  Leg treatment (anterior/lateral thigh, femoral vein vasa-vasorum, knee, lower leg, ankle, dorsum of foot)  Rework: leg, inguinal lymph nodes, INGUINAL-AXILLARY ANASTOMOSES, axillary lymph nodes    Therapeutic activities to improve functional performance for 30  minutes, including:  Compression bandages were applied over cast padding on left lower extremity.  Patient was " instructed to remove if painful  Patient Education and Home Exercises      Education provided:   1. Educated on definition of lymphedema.  2. Explained the Complete Decongestive Therapy protocol in depth  3. Educated on Phase 1 and 2 of protocol.  4. Reviewed treatment frequency and likely duration of weeks  5.Contraindications for treatment.  6. Plan of care and goals.  7. Educated on home management protocols.     Written Home Exercises Program: no, patient was verbally and physically instructed in the deep abdominal breathing technique.  Demonstrated good return demonstration.  Completed 3 sets amd instructed to do so daily.  Assessment     Pt would continue to benefit from skilled OT. Yes.      Lenny is progressing well towards his goals and there are no updates to goals at this time. Pt prognosis is Excellent.     Pt will continue to benefit from skilled outpatient occupational therapy to address the deficits listed in the problem list on initial evaluation provide pt/family education and to maximize pt's level of independence in the home and community environment.     Pt's spiritual, cultural and educational needs considered and pt agreeable to plan of care and goals.    Anticipated barriers to occupational therapy: frequently out of town        The following goals were discussed with the patient and patient is in agreement with them as to be addressed in the treatment plan.      Goals:   Short Term Goals for 1 weeks: (phase 1 of protocol)  Maximize decongestion left lower extremity - Ongoing 8/21/2023   Patient/son will be educated on lymphedema precautions and signs of infection. - Ongoing 8/21/2023   Patient will perform deep abdominal breathing TID- Ongoing 8/21/2023   Patient will tolerate daily activities with multilayered bandaging.- Ongoing 8/21/2023   Long Term Goals for 12 weeks: (Phase 2 of goals)  Patient /son will be independent with home management of this chronic condition.  Patient/son to  carissa/doff compression garment.   Patient will demonstrate compliance with all home management recommendations.  Patient will maintain reduction at monthly follow up appointments for 3 months   PLAN   Plan of Care Certification: 8/21/2023 to 12-.      Outpatient Occupational Therapy mom times weekly for 1 weeks to include the following interventions:  Complete decongestive therapy  .Will return when back in town for 12 weeks as indicated in goals above.         GILDARDO Garrido, CINTHIA/JOSELINE

## 2023-08-24 NOTE — PLAN OF CARE
[facility]  Occupational Therapy Initial Evaluation    Date: 8/21/2023  Name: Lenny Lopez  Clinic Number: [id number]    Therapy Diagnosis:   Encounter Diagnoses   Name Primary?    Thrombocytopenia, unspecified     Lymphedema of both lower extremities Yes     Physician: Blue Shah MD    Physician Orders: evaluate and treat  Medical Diagnosis: thrombocytopenia,rheumatoid arthritis  Surgical Procedure and Date: na,   Evaluation Date: 8/21/2023  Insurance Authorization Period Expiration: 8-  Plan of Care Certification Period: 12-  Progress Note Due: same   Date of Return to MD: to be scheduled  Visit # / Visits authorized: 1 / 1  FOTO: intake done/    Precautions:  Standard and Diabetes    Time In:1000  Time Out: 1100  Total Appointment Time (timed & untimed codes): 60 minutes    SUBJECTIVE     Date of Onset: over 5 years ago, worsening over time.    History of Current Condition/Mechanism of Injury: Lenny Lopez is a 78 y.o. male who presents to [facility] Outpatient Occupational Therapy for evaluation secondary to lymphedema. Patient was referred to therapy by Blue Shah MD , which is the patient's primary provider. Patient reports that the swelling has been present for many years, but getting worse.. Patient was accompanied to the evaluation by his son.    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 Lopez is concerned that the tightness in his legs will result in breaking the skin.    Medical History:   Past Medical History:   Diagnosis Date    Acute hypoxemic respiratory failure 1/1/2021    Angina pectoris     Arthritis     Asthma     Atrial fibrillation     Back pain     Cardiac pacemaker     Cataract     Chronic bronchitis     COPD (chronic obstructive pulmonary disease)     Coronary artery disease     COVID-19     Diabetic retinopathy associated with controlled type 2 diabetes mellitus 2/18/2021    DM (diabetes mellitus), type 2, 2000 12/12/2014    Gout     Hepatic cirrhosis, unspecified hepatic cirrhosis type, unspecified whether ascites present 1/23/2022    Hoarseness of voice     Hyperlipidemia     Hypertension     Kidney stones 12/17/2012    Nephrolithiasis     Obesity     Pneumonia due to COVID-19 virus 1/1/2021    Renal manifestation of secondary diabetes mellitus     Rheumatoid factor positive 03/08/2017    Negative work up with Dr. Choudhury    Sleep apnea     Thyroid disease     Unspecified disorder of kidney and ureter     Urinary incontinence     Vocal cord polyp        Surgical History:    has a past surgical history that includes Knee surgery; Appendectomy; TONSILLECTOMY, ADENOIDECTOMY; Fracture surgery; Eye surgery; Coronary stent placement (2018); Cardiac pacemaker placement (2018); Microlaryngoscopy with biopsy of vocal cord (N/A, 10/13/2020); and Colonoscopy (N/A, 3/3/2021).    Medications:   has a current medication list which includes the following prescription(s): acarbose, accu-chek guide glucose meter, albuterol, albuterol-ipratropium, allopurinol, aspirin, atorvastatin, fasenra pen, betamethasone acetate-betamethasone sodium phosphate, blood sugar diagnostic, blood sugar diagnostic, blood sugar diagnostic, drum, blood-glucose meter, blood-glucose meter,  "drum-type, colchicine (gout), dexamethasone, diclofenac sodium, eliquis, ergocalciferol, fluticasone propionate, fluticasone-salmeterol 250-50 mcg/dose, trelegy ellipta, furosemide, gabapentin, hydrocodone-acetaminophen, lancets, lancets, lancing device, lisinopril, metformin, metolazone, montelukast, mupirocin, omega 3-dha-epa-fish oil, omega-3 fatty acids-vitamin e, oxybutynin, potassium chloride, prednisone, pulse oximeter, sotalol, tamsulosin, turmeric, turmeric root extract, and ventolin hfa, and the following Facility-Administered Medications: lidocaine (pf) 10 mg/ml (1%).    Allergies:   Review of patient's allergies indicates:   Allergen Reactions    No known drug allergies           OBJECTIVE   Mr. Lopez ambulated 150 feet to treatment room without difficulty and extended time. Son standing by. Reliable historians.  Has had foot and swelling problems almost all of his life. Worsening of edema in the past 5 years, 2 incidents of weeping from posterior lower legs without infection. Legs feel tight and he is concerned that weeping will reoccur and subsequently, wounds will follow. Wears compression stocking of unknown compression.  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 Safety impaired further by impaired range of motion in ankles dorsiflexion.  Limb inspection: Bilateral lower legs edematous with discoloration typical of a chronic venous insufficiency diagnosis. Left leg visibly larger than right. Density allows for no movement of tissue when manually attempted. Left leg has softened area, not blistered but has wept in past week.  No wounds or scarring and no deformities.  Girth Measurements (in centimeters)  LANDMARK LEFT Leg RIGHT leg    forefoot 25.50 cm 24.5 cm    ankle  34.50 cm 31.75 cm    4" 36.25 cm 33.0 cm    calf 41.5 cm 40.0 cm    Below knee 45 cm 42 cm    Above knee 51 cm 51.25 cm       Treatment   Total Treatment time (time-based codes) separate from Evaluation: 30 minutes    Lenny received the " treatments listed below:         Therapeutic activities to improve functional performance for 30  minutes, including:  Patient presented  for evaluation . Skin care completed. Measurements taken and documented.  Demonstrated the compression garments needed for maintenance after the decongestion is complete.  Was able to get Mr. Lopez scheduled for tomorrow and so his left leg was compression , bandaged, due to compromised skin integrity.  Verbally instructed son to compression bandage for the future.  Instructions given to remove it is painful  Patient Education and Home Exercises    Education provided:   1. Educated on definition of lymphedema.  2. Explained the Complete Decongestive Therapy protocol in depth  3. Educated on Phase 1 and 2 of protocol.  4. Reviewed treatment frequency and likely duration of weeks  5.Contraindications for treatment.  6. Plan of care and goals.  7. Educated on home management protocols.     Written Home Exercises Provided:  no .    Patient/Family Education: role of OT, goals for OT, scheduling/cancellations - pt verbalized understanding. Discussed insurance limitations with patient.      ASSESSMENT     Lenny Lopez is a 78 y.o. male referred to outpatient occupational therapy and presents with a medical diagnosis of lymphedema .Lymphedema, left untreated increases risk of infection, gait deviation causing ortho problems and poor body image. Patient presents with the following therapy deficits: lymphedema of bilateral lower limbs and demonstrates limitations as described in the chart below. Following medical record review it is determined that pt will benefit from complete decongestive therapy services for the treatment and management of this chronic condition. The following goals were discussed with the patient and patient is in agreement with them as to be addressed in the treatment plan. The patient's rehab potential is Good.   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 Goal is to maximize reduction and leave with Sigvaris garment but to return for therapy to meet goals in this plan of care.  Anticipated barriers to occupational therapy: travels out of town with donna  Pt has no cultural, educational or language barriers to learning provided.    Profile and History Assessment of Occupational Performance Level of Clinical Decision Making Complexity Score   Occupational Profile:   Lneny Lopez is a 78 y.o. male who lives with their family and is currently employed Lenny Lopez has difficulty with  ADLs and IADLs as listed previously, which  Affecting hisdaily functional abilities.      Comorbidities:    has a past medical history of Acute hypoxemic respiratory failure, Angina pectoris, Arthritis, Asthma, Atrial fibrillation, Back pain, Cardiac pacemaker, Cataract, Chronic bronchitis, COPD (chronic obstructive pulmonary disease), Coronary artery disease, COVID-19, Diabetic retinopathy associated with controlled type 2 diabetes mellitus, DM (diabetes mellitus), type 2, 2000, Gout, Hepatic cirrhosis, unspecified hepatic cirrhosis type, unspecified whether ascites present, Hoarseness of voice, Hyperlipidemia, Hypertension, Kidney stones, Nephrolithiasis, Obesity, Pneumonia due to COVID-19 virus, Renal manifestation of secondary diabetes mellitus, Rheumatoid factor positive, Sleep apnea, Thyroid disease, Unspecified disorder of kidney and ureter, Urinary incontinence, and Vocal cord polyp.    Medical and Therapy History Review:   Expanded               Performance Deficits    Physical:  Joint Mobility  Muscle Power/Strength  Muscle Endurance  Skin Integrity/Scar Formation  Edema  Postural Control  Pain    Cognitive:  No Deficits    Psychosocial:    No Deficits     Clinical Decision Making:  moderate    Assessment Process:  Problem-Focused Assessments    Modification/Need for Assistance:  Minimal-Moderate  Modifications/Assistance    Intervention Selection:  Room       moderate  Based on PMHX, co morbidities , data from assessments and functional level of assistance required with task and clinical presentation directly impacting function.       The following goals were discussed with the patient and patient is in agreement with them as to be addressed in the treatment plan.     Goals:   Short Term Goals for 1 weeks: (phase 1 of protocol)  Maximize decongestion left lower extremity - Ongoing 8/21/2023   Patient/son will be educated on lymphedema precautions and signs of infection. - Ongoing 8/21/2023   Patient will perform deep abdominal breathing TID- Ongoing 8/21/2023   Patient will tolerate daily activities with multilayered bandaging.- Ongoing 8/21/2023   Long Term Goals for 12 weeks: (Phase 2 of goals)  Patient /son will be independent with home management of this chronic condition.  Patient/son to carissa/doff compression garment.   Patient will demonstrate compliance with all home management recommendations.  Patient will maintain reduction at monthly follow up appointments for 3 months     PLAN   Plan of Care Certification: 8/21/2023 to 12-.     Outpatient Occupational Therapy mom times weekly for 1 weeks to include the following interventions:  Complete decongestive therapy  .Will return when back in town for 12 weeks as indicated in goals above.  And      GILDARDO Garrido CLT/JOSELINE      I CERTIFY THE NEED FOR THESE SERVICES FURNISHED UNDER THIS PLAN OF TREATMENT AND WHILE UNDER MY CARE  Physician's comments:      Physician's Signature: ___________________________________________________

## 2023-08-24 NOTE — TELEPHONE ENCOUNTER
----- Message from Julia Anthony sent at 8/24/2023  2:55 PM CDT -----  Regarding: refill  Contact: Anh collins  Type:  RX Refill Request    Who Called:  Anh collins  Refill or New Rx:  refill  RX Name and Strength:  fluticasone-umeclidin-vilanter (TRELEGY ELLIPTA) 200-62.5-25 mcg inhaler  How is the patient currently taking it? (ex. 1XDay):  1xday  Is this a 30 day or 90 day RX:  30  Preferred Pharmacy with phone number:    [id number] - [place], [place] - [address] [address]  Phone: [phone] Fax: [phone]  Local or Mail Order:  local  Ordering Provider:  Dr Jerrell Mccloud Call Back Number:  [phone]  Additional Information:  Patient is out of medication. Please call patient's son to advise.Thanks!

## 2023-08-25 ENCOUNTER — OFFICE VISIT (OUTPATIENT)
Dept: UROLOGY | Facility: CLINIC | Age: 78
End: 2023-08-25
Payer: MEDICARE

## 2023-08-25 DIAGNOSIS — N20.0 RENAL CALCULUS: ICD-10-CM

## 2023-08-25 DIAGNOSIS — N39.41 URGENCY INCONTINENCE: Primary | ICD-10-CM

## 2023-08-25 DIAGNOSIS — R39.9 LOWER URINARY TRACT SYMPTOMS: ICD-10-CM

## 2023-08-25 DIAGNOSIS — N32.81 OAB (OVERACTIVE BLADDER): ICD-10-CM

## 2023-08-25 PROCEDURE — 1159F MED LIST DOCD IN RCRD: CPT | Mod: HCNC,CPTII,95, | Performed by: UROLOGY

## 2023-08-25 PROCEDURE — 99443 PR PHYSICIAN TELEPHONE EVALUATION 21-30 MIN: ICD-10-PCS | Mod: HCNC,95,, | Performed by: UROLOGY

## 2023-08-25 PROCEDURE — 1160F PR REVIEW ALL MEDS BY PRESCRIBER/CLIN PHARMACIST DOCUMENTED: ICD-10-PCS | Mod: HCNC,CPTII,95, | Performed by: UROLOGY

## 2023-08-25 PROCEDURE — 3072F PR LOW RISK FOR RETINOPATHY: ICD-10-PCS | Mod: HCNC,CPTII,95, | Performed by: UROLOGY

## 2023-08-25 PROCEDURE — 1159F PR MEDICATION LIST DOCUMENTED IN MEDICAL RECORD: ICD-10-PCS | Mod: HCNC,CPTII,95, | Performed by: UROLOGY

## 2023-08-25 PROCEDURE — 3072F LOW RISK FOR RETINOPATHY: CPT | Mod: HCNC,CPTII,95, | Performed by: UROLOGY

## 2023-08-25 PROCEDURE — 99443 PR PHYSICIAN TELEPHONE EVALUATION 21-30 MIN: CPT | Mod: HCNC,95,, | Performed by: UROLOGY

## 2023-08-25 PROCEDURE — 1160F RVW MEDS BY RX/DR IN RCRD: CPT | Mod: HCNC,CPTII,95, | Performed by: UROLOGY

## 2023-08-25 NOTE — PROGRESS NOTES
Established Patient - Audio Only Telehealth Visit     The patient location is: Home in [place]  Date of Service: 08/25/2023   The chief complaint leading to consultation is: Nephrolithiasis, lower urinary tract symptoms and abnormal imaging  Visit type: Virtual visit with audio only (telephone)  Total time spent with patient: 30 minutes       The reason for the audio only service rather than synchronous audio and video virtual visit was related to technical difficulties or patient preference/necessity.     Each patient to whom I provide medical services by telemedicine is:  (1) informed of the relationship between the physician and patient and the respective role of any other health care provider with respect to management of the patient; and (2) notified that they may decline to receive medical services by telemedicine and may withdraw from such care at any time. Patient verbally consented to receive this service via voice-only telephone call.       HPI:     Lenny Lopez is a 78 y.o. male who presents for follow up for nephrolithiasis, lower urinary tract symptoms and abnormal imaging.     Patient with a history of COPD, DM, urge incontinence and nephrolithiasis who presents for urologic follow-up. On review of records he was previously managed by Dr. Zaragoza and Dr. Wheatley      He has a known 1.4 cm proximal left ureteral stone without hydronephrosis. Asymptomatic. Visible on CT abdomen pelvis in 12/2020. Not interested in any surgical intervention.  Has had ESWL in the past for his stone disease.     He reports bothersome lower urinary tract symptoms - urgency, urge incontinence, intermittency and nocturia x 1. He has tried Vesicare and Myrbetriq in the past with no improvement. Remains on Flomax 0.4 mg and Finasteride 5 mg PO daily. Does not wear pads or Depends. Significantly bothered by his symptoms. He is on Lasix daily.        Interval History     8/11/23: Here today for follow up. He was prescribed  Gemtesa 75 mg at his last visit. Unclear if he ever took the medication. Here today with persistent frequency, intermittency, urgency with incontinence, straining and nocturia x 1 - 2. Significantly bothered by his symptoms. Remains on Flomax 0.8 mg and Finasteride 5 mg PO daily.      Ultrasound bladder with a bladder mass. There was intervesicular protrusion of the prostate on previous CT in 2020. However, ultrasound could not definitively contribute this to his prostate. Recommended CT urogram or cystoscopy.      Urine dipstick negative on 8/10/23.    8/25/23:  Discussion over the phone regarding his results and symptoms. He was instructed to start Ditropan 15 mg and continue Flomax 0.8 mg at his last visit. Also discontinued Finasteride. States his urgency with incontinence and nocturia has mostly resolved. Reports a significant improvement.     CT urogram with an enlarged prostate with intra-vesicular extension, two proximal left ureteral stones that appear chronic and unchanged. Remains asymptomatic.      Denies any fever, chills, gross hematuria, flank pain,  trauma or history of  malignancy.         PSA  3.5                   9/3/20     IPSS    QoL  17        5          8/11/23  17        6          10/14/21     PVR  12 mL              8/11/23     A1C  7.1                   1/27/23         Assessment and plan:      Urgency incontinence  Lower urinary tract symptoms  Abnormal imaging  Left ureteral stones    - I spent 30 minutes of the day of this encounter preparing for, treating and managing this patient. Extensive discussion with patient regarding the etiology and management of his lower urinary tract symptoms. Explained that LUTS are multifactorial and can be secondary to an enlarged prostate, PO intake of bladder irritants, overactive bladder, constipation, malignancy, trauma, infection, stones or medications.  - Continue Flomax 0.8 mg and Ditropan 15 mg PO daily given his significant improvement in  his LUTS.   - Offered left ureteroscopy and laser lithotripsy for his chronic left ureteral stones with no obstruction that have previously been monitored with Dr. Wheatley. Patient states he is asymptomatic and wishes to continue with observation.   - RTC in 6 months with symptom score and PVR.         This service was not originating from a related E/M service provided within the previous 7 days nor will  to an E/M service or procedure within the next 24 hours or my soonest available appointment.  Prevailing standard of care was able to be met in this audio-only visit.        The patient location is: [place]  Date of Service: 08/25/2023   The chief complaint leading to consultation is: see hpi and visit diagnosis

## 2023-08-28 NOTE — PROGRESS NOTES
"  [facility]  Occupational Therapy Treatment Note    Date: 08/24/2023  Name: Lenny Lopez  Clinic Number: [id number]    Therapy Diagnosis:   Encounter Diagnosis   Name Primary?    Lymphedema of both lower extremities Yes     Physician: Blue Shah MD  Physician Orders: evaluate and treat  Medical Diagnosis: thrombocytopenia,rheumatoid arthritis  Surgical Procedure and Date: na,   Evaluation Date: 8/21/2023  Insurance Authorization Period Expiration: 12-  Plan of Care Certification Period: 12-  Progress Note Due: same   Date of Return to MD: to be scheduled  Visit # / Visits authorized: 3/60  FOTO: intake done/    Precautions:  Standard and Diabetes      Time In: 1200  Time Out: 1300  Total Billable Time: 60 minutes    SUBJECTIVE     Pt reports: " The wraps don't bother me."    Response to previous treatment: see measurements below  Functional change: na    Pain: 0/10  Location: no pain reported    OBJECTIVE     Girth Measurements (in centimeters)  LANDMARK LEFT Leg 8/21, 8/24 RIGHT leg 8/21.    forefoot 25.50/24 cm 24.5 cm    ankle  34.50/28 cm 31.75 cm    4" 36.25/30 cm 33.0 cm    calf 41.5/37.5 cm 40.0 cm    Below knee 45/44 cm 42 cm    Above knee 51/51 cm 51.25 cm        Treatment     Lenny received the treatments listed below:     Therapeutic activities to improve functional performance for 60 minutes, including:  Compression bandages were removed and skin care was completed. Verbally reviewed home management protocol while patient is out of town. (2-3 months) Sigvaris compreflex garment had been delivered for left leg. Verbally and physically instructed patient and his son in the application of, the wearing schedule, contraindications, and daily skin checks and care.  Good understanding.  Patient Education and Home Exercises      Education provided:   1. Educated on definition of lymphedema.  2. Explained the Complete Decongestive Therapy protocol in depth  3. " Educated on Phase 1 and 2 of protocol.  4.   5.Contraindications for treatment.  6. Plan of care and goals.  7. Educated on home management protocols.     Written Home Exercises Program: no, patient was verbally and physically instructed in the deep abdominal breathing technique.  Demonstrated good return demonstration.  Completed 3 sets amd instructed to do so daily.  Assessment     Pt would continue to benefit from skilled OT. Yes, he would, but he is leaving town for up to 3 months for work. Will return when he gets home.   Lenny did very well in the very few days that we had to work with. 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  Lenny is progressing well towards his goals and there are no updates to goals at this time.     Pt would continue to benefit from skilled outpatient occupational therapy to address the deficits listed in the problem list on initial evaluation provide pt/family education and to maximize pt's level of independence in the home and community environment, but will be put on hold due to travel obligations.     Pt's spiritual, cultural and educational needs considered and pt agreeable to plan of care and goals.    Anticipated barriers to occupational therapy: frequently out of town        The following goals were discussed with the patient and patient is in agreement with them as to be addressed in the treatment plan.      Goals:   Short Term Goals for 1 weeks: (phase 1 of protocol)  Maximize decongestion left lower extremity - met  Patient/son will be educated on lymphedema precautions and signs of infection. - Ongoing 8/21/2023   Patient will perform deep abdominal breathing TID- met   Patient will tolerate daily activities with multilayered bandaging.- met  Long Term Goals for 12 weeks: (Phase 2 of  goals)  Patient /son will be independent with home management of this chronic condition.  Patient/son to carissa/doff compression garment. met  Patient will demonstrate compliance with all home management recommendations.met  Patient will maintain reduction at monthly follow up appointments for 3 months   PLAN   Plan of Care Certification: 8/21/2023 to 12-.      Outpatient Occupational Therapy:  will discharge at this time and resume when he returns.          GILDARDO Garrido, CINTHIA/JOSELINE

## 2023-09-26 ENCOUNTER — PATIENT MESSAGE (OUTPATIENT)
Dept: UROLOGY | Facility: CLINIC | Age: 78
End: 2023-09-26
Payer: MEDICARE

## 2023-11-15 ENCOUNTER — HOSPITAL ENCOUNTER (OUTPATIENT)
Dept: RADIOLOGY | Facility: CLINIC | Age: 78
Discharge: HOME | End: 2023-11-15
Attending: NURSE PRACTITIONER
Payer: MEDICARE

## 2023-11-15 ENCOUNTER — OFFICE VISIT (OUTPATIENT)
Dept: FAMILY MEDICINE | Facility: CLINIC | Age: 78
End: 2023-11-15
Payer: MEDICARE

## 2023-11-15 VITALS
HEART RATE: 62 BPM | WEIGHT: 259.69 LBS | OXYGEN SATURATION: 97 % | SYSTOLIC BLOOD PRESSURE: 112 MMHG | BODY MASS INDEX: 36.35 KG/M2 | TEMPERATURE: 62 F | HEIGHT: 71 IN | DIASTOLIC BLOOD PRESSURE: 46 MMHG

## 2023-11-15 DIAGNOSIS — M25.531 RIGHT WRIST PAIN: ICD-10-CM

## 2023-11-15 DIAGNOSIS — R21 RASH: ICD-10-CM

## 2023-11-15 DIAGNOSIS — N18.31 STAGE 3A CHRONIC KIDNEY DISEASE: ICD-10-CM

## 2023-11-15 DIAGNOSIS — M25.531 RIGHT WRIST PAIN: Primary | ICD-10-CM

## 2023-11-15 PROCEDURE — 99999 PR PBB SHADOW E&M-EST. PATIENT-LVL III: ICD-10-PCS | Mod: PBBFAC,,, | Performed by: NURSE PRACTITIONER

## 2023-11-15 PROCEDURE — 3072F LOW RISK FOR RETINOPATHY: CPT | Mod: CPTII,S$GLB,, | Performed by: NURSE PRACTITIONER

## 2023-11-15 PROCEDURE — 3288F FALL RISK ASSESSMENT DOCD: CPT | Mod: CPTII,S$GLB,, | Performed by: NURSE PRACTITIONER

## 2023-11-15 PROCEDURE — 3078F PR MOST RECENT DIASTOLIC BLOOD PRESSURE < 80 MM HG: ICD-10-PCS | Mod: CPTII,S$GLB,, | Performed by: NURSE PRACTITIONER

## 2023-11-15 PROCEDURE — 96372 THER/PROPH/DIAG INJ SC/IM: CPT | Mod: S$GLB,,, | Performed by: NURSE PRACTITIONER

## 2023-11-15 PROCEDURE — 3078F DIAST BP <80 MM HG: CPT | Mod: CPTII,S$GLB,, | Performed by: NURSE PRACTITIONER

## 2023-11-15 PROCEDURE — 3072F PR LOW RISK FOR RETINOPATHY: ICD-10-PCS | Mod: CPTII,S$GLB,, | Performed by: NURSE PRACTITIONER

## 2023-11-15 PROCEDURE — 73110 X-RAY EXAM OF WRIST: CPT | Mod: TC,FY,PO,RT

## 2023-11-15 PROCEDURE — 1159F MED LIST DOCD IN RCRD: CPT | Mod: CPTII,S$GLB,, | Performed by: NURSE PRACTITIONER

## 2023-11-15 PROCEDURE — 1159F PR MEDICATION LIST DOCUMENTED IN MEDICAL RECORD: ICD-10-PCS | Mod: CPTII,S$GLB,, | Performed by: NURSE PRACTITIONER

## 2023-11-15 PROCEDURE — 99999 PR PBB SHADOW E&M-EST. PATIENT-LVL III: CPT | Mod: PBBFAC,,, | Performed by: NURSE PRACTITIONER

## 2023-11-15 PROCEDURE — 3074F PR MOST RECENT SYSTOLIC BLOOD PRESSURE < 130 MM HG: ICD-10-PCS | Mod: CPTII,S$GLB,, | Performed by: NURSE PRACTITIONER

## 2023-11-15 PROCEDURE — 3288F PR FALLS RISK ASSESSMENT DOCUMENTED: ICD-10-PCS | Mod: CPTII,S$GLB,, | Performed by: NURSE PRACTITIONER

## 2023-11-15 PROCEDURE — 1125F PR PAIN SEVERITY QUANTIFIED, PAIN PRESENT: ICD-10-PCS | Mod: CPTII,S$GLB,, | Performed by: NURSE PRACTITIONER

## 2023-11-15 PROCEDURE — 73110 XR WRIST COMPLETE 3 VIEWS RIGHT: ICD-10-PCS | Mod: 26,RT,S$GLB, | Performed by: RADIOLOGY

## 2023-11-15 PROCEDURE — 1101F PR PT FALLS ASSESS DOC 0-1 FALLS W/OUT INJ PAST YR: ICD-10-PCS | Mod: CPTII,S$GLB,, | Performed by: NURSE PRACTITIONER

## 2023-11-15 PROCEDURE — 99214 OFFICE O/P EST MOD 30 MIN: CPT | Mod: 25,S$GLB,, | Performed by: NURSE PRACTITIONER

## 2023-11-15 PROCEDURE — 3074F SYST BP LT 130 MM HG: CPT | Mod: CPTII,S$GLB,, | Performed by: NURSE PRACTITIONER

## 2023-11-15 PROCEDURE — 73110 X-RAY EXAM OF WRIST: CPT | Mod: 26,RT,S$GLB, | Performed by: RADIOLOGY

## 2023-11-15 PROCEDURE — 99214 PR OFFICE/OUTPT VISIT, EST, LEVL IV, 30-39 MIN: ICD-10-PCS | Mod: 25,S$GLB,, | Performed by: NURSE PRACTITIONER

## 2023-11-15 PROCEDURE — 1101F PT FALLS ASSESS-DOCD LE1/YR: CPT | Mod: CPTII,S$GLB,, | Performed by: NURSE PRACTITIONER

## 2023-11-15 PROCEDURE — 96372 PR INJECTION,THERAP/PROPH/DIAG2ST, IM OR SUBCUT: ICD-10-PCS | Mod: S$GLB,,, | Performed by: NURSE PRACTITIONER

## 2023-11-15 PROCEDURE — 1125F AMNT PAIN NOTED PAIN PRSNT: CPT | Mod: CPTII,S$GLB,, | Performed by: NURSE PRACTITIONER

## 2023-11-15 RX ORDER — KETOROLAC TROMETHAMINE 30 MG/ML
15 INJECTION, SOLUTION INTRAMUSCULAR; INTRAVENOUS ONCE
Status: COMPLETED | OUTPATIENT
Start: 2023-11-15 | End: 2023-11-15

## 2023-11-15 RX ORDER — METHYLPREDNISOLONE 4 MG/1
TABLET ORAL
Qty: 1 EACH | Refills: 0 | Status: SHIPPED | OUTPATIENT
Start: 2023-11-15 | End: 2024-01-17

## 2023-11-15 RX ORDER — KETOCONAZOLE 20 MG/G
CREAM TOPICAL 2 TIMES DAILY
Qty: 30 G | Refills: 0 | Status: SHIPPED | OUTPATIENT
Start: 2023-11-15

## 2023-11-15 RX ADMIN — KETOROLAC TROMETHAMINE 15 MG: 30 INJECTION, SOLUTION INTRAMUSCULAR; INTRAVENOUS at 01:11

## 2023-11-15 NOTE — PROGRESS NOTES
This dictation has been generated using Modal Fluency Dictation some phonetic errors may occur. Please contact author for clarification if needed.     Problem List Items Addressed This Visit       CKD (chronic kidney disease) stage 3, GFR 39.7 ml/min     Other Visit Diagnoses       Right wrist pain    -  Primary    Relevant Orders    X-Ray Wrist Complete Right (Completed)    Rash                Orders Placed This Encounter    X-Ray Wrist Complete Right    methylPREDNISolone (MEDROL DOSEPACK) 4 mg tablet    ketorolac injection 15 mg    ketoconazole (NIZORAL) 2 % cream     Right wrist pain with old trauma noted.  Failed gout therapy with limited improvement.  CKD 3 avoid further NSAIDs steroids as above.    Check x-ray of right wrist I will review and address accordingly.  Unable to visualize images at this time.    Rash ketoconazole as above for rash on lower abdomen.  Re-evaluate follow-up 2 weeks.    No follow-ups on file.    ________________________________________________________________  ________________________________________________________________      Chief Complaint   Patient presents with    Hand Pain     History of present illness  This 78 y.o. presents today for complaint of right wrist pain.  Patient history of old trauma.  Does note recent lifting he was pulling on his wife to help her get up.  About a week later he began experiencing wrist pain.  He thought he may be having a flare of gout and tried taking colchicine 2 doses.  He did not have improvement but no further medication so he got a refill and took 2 additional doses.  He had minimal improvement but pain has persisted.  He did resume his allopurinol.  Patient notes rash on the lower abdomen.  Notes some abnormal skin coloration on the right.  Notes that started as rash and progressed to a darker color.  He now has rash on the left side below the abdominal fold.  Has had some itching.  Denies any drainage.  Patient denies pain.  Limited  review of systems negative    Past Medical History:   Diagnosis Date    Acute hypoxemic respiratory failure 1/1/2021    Angina pectoris     Arthritis     Asthma     Atrial fibrillation     Back pain     Cardiac pacemaker     Cataract     Chronic bronchitis     COPD (chronic obstructive pulmonary disease)     Coronary artery disease     COVID-19     Diabetic retinopathy associated with controlled type 2 diabetes mellitus 2/18/2021    DM (diabetes mellitus), type 2, 2000 12/12/2014    Gout     Hepatic cirrhosis, unspecified hepatic cirrhosis type, unspecified whether ascites present 1/23/2022    Hoarseness of voice     Hyperlipidemia     Hypertension     Kidney stones 12/17/2012    Nephrolithiasis     Obesity     Pneumonia due to COVID-19 virus 1/1/2021    Renal manifestation of secondary diabetes mellitus     Rheumatoid factor positive 03/08/2017    Negative work up with Dr. Choudhury    Sleep apnea     Thyroid disease     Unspecified disorder of kidney and ureter     Urinary incontinence     Vocal cord polyp        Past Surgical History:   Procedure Laterality Date    APPENDECTOMY      CARDIAC PACEMAKER PLACEMENT  2018    COLONOSCOPY N/A 3/3/2021    Procedure: COLONOSCOPY;  Surgeon: Jesus Soliman MD;  Location: [facility];  Service: Endoscopy;  Laterality: N/A;    CORONARY STENT PLACEMENT  2018    EYE SURGERY      left eye secondary to trauma    FRACTURE SURGERY      right arm    KNEE SURGERY      screw    MICROLARYNGOSCOPY WITH BIOPSY OF VOCAL CORD N/A 10/13/2020    Procedure: MICROLARYNGOSCOPY, WITH VOCAL CORD BIOPSY;  Surgeon: Deandra Diaz MD;  Location: [facility];  Service: ENT;  Laterality: N/A;    TONSILLECTOMY, ADENOIDECTOMY         Family History   Problem Relation Age of Onset    Thyroid disease Other     Cancer Mother     Hypertension Mother     Osteoarthritis Mother     Heart disease Father     Hypertension Father     Cancer Paternal Uncle         lung    Hypertension Sister     Hypertension  Brother     Cancer Brother         colon?    Diabetes Maternal Aunt     Cancer Brother         pancreatic    Kidney disease Daughter     Stroke Daughter     No Known Problems Son     No Known Problems Daughter     Collagen disease Neg Hx     Amblyopia Neg Hx     Blindness Neg Hx     Cataracts Neg Hx     Glaucoma Neg Hx     Macular degeneration Neg Hx     Retinal detachment Neg Hx     Strabismus Neg Hx        Social History     Socioeconomic History    Marital status:      Spouse name: Halie    Number of children: 3    Years of education: 8    Highest education level: 8th grade   Occupational History    Occupation: Retired   Tobacco Use    Smoking status: Former     Types: Cigars    Smokeless tobacco: Current     Types: Chew    Tobacco comments:     smoked a few cigars in his 20s   Substance and Sexual Activity    Alcohol use: Yes     Comment: a few beers during holidays    Drug use: No    Sexual activity: Not Currently     Partners: Female     Social Determinants of Health     Financial Resource Strain: Low Risk  (1/24/2022)    Overall Financial Resource Strain (CARDIA)     Difficulty of Paying Living Expenses: Not very hard   Food Insecurity: No Food Insecurity (1/24/2022)    Hunger Vital Sign     Worried About Running Out of Food in the Last Year: Never true     Ran Out of Food in the Last Year: Never true   Transportation Needs: No Transportation Needs (1/24/2022)    PRAPARE - Transportation     Lack of Transportation (Medical): No     Lack of Transportation (Non-Medical): No   Physical Activity: Inactive (1/24/2022)    Exercise Vital Sign     Days of Exercise per Week: 0 days     Minutes of Exercise per Session: 0 min   Stress: No Stress Concern Present (1/24/2022)    [employer] [place] [employer] - Occupational Stress Questionnaire     Feeling of Stress : Not at all   Social Connections: Unknown (1/24/2022)    Social Connection and Isolation Panel [NHANES]     Frequency of Communication with  Friends and Family: More than three times a week     Frequency of Social Gatherings with Friends and Family: More than three times a week     Active Member of Clubs or Organizations: No     Attends Club or Organization Meetings: Never     Marital Status:    Housing Stability: Unknown (1/24/2022)    Housing Stability Vital Sign     Unable to Pay for Housing in the Last Year: No     Unstable Housing in the Last Year: No       Current Outpatient Medications   Medication Sig Dispense Refill    acarbose (PRECOSE) 25 MG Tab Take 1 tablet (25 mg total) by mouth 3 (three) times daily with meals. 270 tablet 0    ACCU-CHEK GUIDE GLUCOSE METER Misc USE DEVICE TO TEST BLOOD SUGAR TWO TIMES DAILY      albuterol (PROVENTIL/VENTOLIN HFA) 90 mcg/actuation inhaler 2 puffs every 4 hours as needed for cough, wheeze, or shortness of breath 18 g 11    albuterol-ipratropium (DUO-NEB) 2.5 mg-0.5 mg/3 mL nebulizer solution Take 3 mLs by nebulization every 4 (four) hours as needed for Wheezing or Shortness of Breath. 50 each 4    allopurinoL (ZYLOPRIM) 300 MG tablet Take 1 tablet (300 mg total) by mouth once daily. 90 tablet 3    aspirin 81 mg Tab Take 1 tablet by mouth once daily.       atorvastatin (LIPITOR) 80 MG tablet Take 1 tablet (80 mg total) by mouth once daily. 90 tablet 3    benralizumab (FASENRA PEN) 30 mg/mL AtIn Inject 30 mg into the skin every 8 weeks. 1 mL 6    blood sugar diagnostic Strp 1 strip by Misc.(Non-Drug; Combo Route) route 3 (three) times daily. DX: E11.29   Dispense test strips that are covered by insurance (Patient taking differently: 1 strip by Misc.(Non-Drug; Combo Route) route 3 (three) times daily. DX: E11.29   Dispense test strips that are covered by insurance) 300 strip 3    blood sugar diagnostic Strp To check BG 3 times daily, to use with insurance preferred meter 200 each 0    blood sugar diagnostic, drum (ACCU-CHEK COMPACT PLUS TEST) Strp 1 strip by Misc.(Non-Drug; Combo Route) route 2 (two)  times daily with meals. 100 strip 1    blood-glucose meter kit To check BG 3 times daily, to use with insurance preferred meter 1 each 0    colchicine (MITIGARE) 0.6 mg Cap Take 2 caps PO. Take additional 1 cap one hour later. Then take  1 cap PO BID until flare resolves. 30 capsule 0    diclofenac sodium (VOLTAREN) 1 % Gel SMARTSI Gram(s) Topical 3 Times Daily      ELIQUIS 5 mg Tab Take 5 mg by mouth 2 (two) times daily.       fluticasone propionate (FLONASE) 50 mcg/actuation nasal spray 1 spray by Nasal route.      fluticasone-umeclidin-vilanter (TRELEGY ELLIPTA) 200-62.5-25 mcg inhaler Inhale 1 puff into the lungs once daily. 60 each 3    furosemide (LASIX) 40 MG tablet Take 1 tablet (40 mg total) by mouth 2 (two) times daily as needed (edema). 120 tablet 0    HYDROcodone-acetaminophen (NORCO) 7.5-325 mg per tablet Take 1 tablet by mouth every 6 (six) hours as needed for Pain. 28 tablet 0    lancets (FREESTYLE LANCETS) 28 gauge Misc 1 lancet by Misc.(Non-Drug; Combo Route) route 3 (three) times daily. 300 each 3    lancets Misc To check BG 3 times daily, to use with insurance preferred meter 200 each 0    lisinopriL (PRINIVIL,ZESTRIL) 5 MG tablet Take 1 tablet by mouth once daily 90 tablet 3    metFORMIN (GLUCOPHAGE) 500 MG tablet Take 1 tablet (500 mg total) by mouth 2 (two) times daily with meals. 180 tablet 3    montelukast (SINGULAIR) 10 mg tablet TAKE 1 TABLET BY MOUTH ONCE DAILY IN THE EVENING 90 tablet 3    omega-3 fatty acids-vitamin E 1,000 mg Cap Take 1 capsule by mouth once daily. Three times a day      potassium chloride (KLOR-CON) 10 MEQ TbSR 1 tab with furosemide. 180 tablet 5    sotalol (BETAPACE) 80 MG tablet Take 0.5 tablets (40 mg total) by mouth 2 (two) times daily. for 7 days 7 tablet 0    tamsulosin (FLOMAX) 0.4 mg Cap Take 2 capsules (0.8 mg total) by mouth once daily. 180 capsule 3    TURMERIC ORAL Take 1 tablet by mouth once daily.      blood-glucose meter, drum-type Kit 1 Device by  Misc.(Non-Drug; Combo Route) route 2 (two) times daily with meals. 1 kit 0    gabapentin (NEURONTIN) 300 MG capsule Take 1 capsule (300 mg total) by mouth 2 (two) times daily. (Patient not taking: Reported on 2/23/2023) 60 capsule 1    ketoconazole (NIZORAL) 2 % cream Apply topically 2 (two) times daily. 30 g 0    lancing device Misc 1 Device by Misc.(Non-Drug; Combo Route) route 2 (two) times daily with meals. 100 each 0    methylPREDNISolone (MEDROL DOSEPACK) 4 mg tablet use as directed 1 each 0    metOLazone (ZAROXOLYN) 2.5 MG tablet Take 2.5 mg by mouth daily as needed.      predniSONE (DELTASONE) 20 MG tablet Take one daily for 3 days and may repeat for shortness of breath. (Patient not taking: Reported on 11/15/2023) 21 tablet 0     No current facility-administered medications for this visit.     Facility-Administered Medications Ordered in Other Visits   Medication Dose Route Frequency Provider Last Rate Last Admin    lidocaine (PF) 10 mg/ml (1%) injection 10 mg  1 mL Intradermal Once Deandra Diaz MD           Review of patient's allergies indicates:   Allergen Reactions    No known drug allergies        Physical examination  Vitals Reviewed\  Vitals:    11/15/23 1132   BP: (!) 112/46   Pulse: 62   Temp: (!) 62 °F (16.7 °C)     Body mass index is 36.22 kg/m². Weight: 117.8 kg (259 lb 11.2 oz)    Gen. Well-dressed well-nourished   Skin warm dry and intact.  No rashes noted.  Neck is supple without adenopathy  Chest.  Respirations are even unlabored.  Lungs are clear to auscultation.  Cardiac regular rate and rhythm.  No chest wall adenopathy noted.  Abd. Obese with pendulous abd. Right lower abd with rash dark in color. It is not raised. Red and dusk rash left abd fold. No drainage.   Neuro. Awake alert oriented x4.  Normal judgment and cognition noted.  Extremities no clubbing cyanosis or edema noted.     Call or return to clinic prn if these symptoms worsen or fail to improve as anticipated.

## 2023-11-20 ENCOUNTER — PATIENT MESSAGE (OUTPATIENT)
Dept: FAMILY MEDICINE | Facility: CLINIC | Age: 78
End: 2023-11-20
Payer: MEDICARE

## 2023-11-20 DIAGNOSIS — R80.9 TYPE 2 DIABETES MELLITUS WITH MICROALBUMINURIA, WITHOUT LONG-TERM CURRENT USE OF INSULIN: ICD-10-CM

## 2023-11-20 DIAGNOSIS — E21.3 HYPERPARATHYROIDISM: Primary | ICD-10-CM

## 2023-11-20 DIAGNOSIS — E11.29 TYPE 2 DIABETES MELLITUS WITH MICROALBUMINURIA, WITHOUT LONG-TERM CURRENT USE OF INSULIN: ICD-10-CM

## 2023-11-20 DIAGNOSIS — M1A.3710 CHRONIC GOUT OF RIGHT ANKLE DUE TO RENAL IMPAIRMENT WITHOUT TOPHUS: ICD-10-CM

## 2023-11-20 DIAGNOSIS — N18.31 STAGE 3A CHRONIC KIDNEY DISEASE: ICD-10-CM

## 2023-11-20 DIAGNOSIS — D69.6 THROMBOCYTOPENIA, UNSPECIFIED: ICD-10-CM

## 2023-11-25 ENCOUNTER — LAB VISIT (OUTPATIENT)
Dept: LAB | Facility: HOSPITAL | Age: 78
End: 2023-11-25
Attending: FAMILY MEDICINE
Payer: MEDICARE

## 2023-11-25 DIAGNOSIS — D69.6 THROMBOCYTOPENIA, UNSPECIFIED: ICD-10-CM

## 2023-11-25 DIAGNOSIS — E21.3 HYPERPARATHYROIDISM: ICD-10-CM

## 2023-11-25 DIAGNOSIS — R80.9 TYPE 2 DIABETES MELLITUS WITH MICROALBUMINURIA, WITHOUT LONG-TERM CURRENT USE OF INSULIN: ICD-10-CM

## 2023-11-25 DIAGNOSIS — N18.31 STAGE 3A CHRONIC KIDNEY DISEASE: ICD-10-CM

## 2023-11-25 DIAGNOSIS — M1A.3710 CHRONIC GOUT OF RIGHT ANKLE DUE TO RENAL IMPAIRMENT WITHOUT TOPHUS: ICD-10-CM

## 2023-11-25 DIAGNOSIS — E11.29 TYPE 2 DIABETES MELLITUS WITH MICROALBUMINURIA, WITHOUT LONG-TERM CURRENT USE OF INSULIN: ICD-10-CM

## 2023-11-25 LAB
ALBUMIN SERPL BCP-MCNC: 3.1 G/DL (ref 3.5–5.2)
ALP SERPL-CCNC: 96 U/L (ref 55–135)
ALT SERPL W/O P-5'-P-CCNC: 25 U/L (ref 10–44)
ANION GAP SERPL CALC-SCNC: 14 MMOL/L (ref 8–16)
AST SERPL-CCNC: 18 U/L (ref 10–40)
BASOPHILS # BLD AUTO: 0.01 K/UL (ref 0–0.2)
BASOPHILS NFR BLD: 0.1 % (ref 0–1.9)
BILIRUB SERPL-MCNC: 0.9 MG/DL (ref 0.1–1)
BUN SERPL-MCNC: 49 MG/DL (ref 8–23)
CALCIUM SERPL-MCNC: 9.1 MG/DL (ref 8.7–10.5)
CHLORIDE SERPL-SCNC: 101 MMOL/L (ref 95–110)
CHOLEST SERPL-MCNC: 104 MG/DL (ref 120–199)
CHOLEST/HDLC SERPL: 3.2 {RATIO} (ref 2–5)
CO2 SERPL-SCNC: 25 MMOL/L (ref 23–29)
CREAT SERPL-MCNC: 1.7 MG/DL (ref 0.5–1.4)
DIFFERENTIAL METHOD: ABNORMAL
EOSINOPHIL # BLD AUTO: 0 K/UL (ref 0–0.5)
EOSINOPHIL NFR BLD: 0 % (ref 0–8)
ERYTHROCYTE [DISTWIDTH] IN BLOOD BY AUTOMATED COUNT: 14.9 % (ref 11.5–14.5)
EST. GFR  (NO RACE VARIABLE): 40.2 ML/MIN/1.73 M^2
ESTIMATED AVG GLUCOSE: 143 MG/DL (ref 68–131)
GLUCOSE SERPL-MCNC: 144 MG/DL (ref 70–110)
HBA1C MFR BLD: 6.6 % (ref 4–5.6)
HCT VFR BLD AUTO: 32 % (ref 40–54)
HDLC SERPL-MCNC: 33 MG/DL (ref 40–75)
HDLC SERPL: 31.7 % (ref 20–50)
HGB BLD-MCNC: 10.2 G/DL (ref 14–18)
IMM GRANULOCYTES # BLD AUTO: 0.05 K/UL (ref 0–0.04)
IMM GRANULOCYTES NFR BLD AUTO: 0.7 % (ref 0–0.5)
LDLC SERPL CALC-MCNC: 51.6 MG/DL (ref 63–159)
LYMPHOCYTES # BLD AUTO: 1.4 K/UL (ref 1–4.8)
LYMPHOCYTES NFR BLD: 20.5 % (ref 18–48)
MCH RBC QN AUTO: 28.8 PG (ref 27–31)
MCHC RBC AUTO-ENTMCNC: 31.9 G/DL (ref 32–36)
MCV RBC AUTO: 90 FL (ref 82–98)
MONOCYTES # BLD AUTO: 0.6 K/UL (ref 0.3–1)
MONOCYTES NFR BLD: 8.6 % (ref 4–15)
NEUTROPHILS # BLD AUTO: 4.8 K/UL (ref 1.8–7.7)
NEUTROPHILS NFR BLD: 70.1 % (ref 38–73)
NONHDLC SERPL-MCNC: 71 MG/DL
NRBC BLD-RTO: 0 /100 WBC
PHOSPHATE SERPL-MCNC: 4 MG/DL (ref 2.7–4.5)
PLATELET # BLD AUTO: 148 K/UL (ref 150–450)
PMV BLD AUTO: 13.6 FL (ref 9.2–12.9)
POTASSIUM SERPL-SCNC: 4.4 MMOL/L (ref 3.5–5.1)
PROT SERPL-MCNC: 6.7 G/DL (ref 6–8.4)
PTH-INTACT SERPL-MCNC: 156 PG/ML (ref 9–77)
RBC # BLD AUTO: 3.54 M/UL (ref 4.6–6.2)
SODIUM SERPL-SCNC: 140 MMOL/L (ref 136–145)
T4 FREE SERPL-MCNC: 0.83 NG/DL (ref 0.71–1.51)
TRIGL SERPL-MCNC: 97 MG/DL (ref 30–150)
TSH SERPL DL<=0.005 MIU/L-ACNC: 4.94 UIU/ML (ref 0.4–4)
WBC # BLD AUTO: 6.84 K/UL (ref 3.9–12.7)

## 2023-11-25 PROCEDURE — 80061 LIPID PANEL: CPT | Performed by: FAMILY MEDICINE

## 2023-11-25 PROCEDURE — 85025 COMPLETE CBC W/AUTO DIFF WBC: CPT | Performed by: FAMILY MEDICINE

## 2023-11-25 PROCEDURE — 80053 COMPREHEN METABOLIC PANEL: CPT | Performed by: FAMILY MEDICINE

## 2023-11-25 PROCEDURE — 84439 ASSAY OF FREE THYROXINE: CPT | Performed by: FAMILY MEDICINE

## 2023-11-25 PROCEDURE — 84100 ASSAY OF PHOSPHORUS: CPT | Performed by: FAMILY MEDICINE

## 2023-11-25 PROCEDURE — 36415 COLL VENOUS BLD VENIPUNCTURE: CPT | Mod: PO | Performed by: FAMILY MEDICINE

## 2023-11-25 PROCEDURE — 83970 ASSAY OF PARATHORMONE: CPT | Performed by: FAMILY MEDICINE

## 2023-11-25 PROCEDURE — 83036 HEMOGLOBIN GLYCOSYLATED A1C: CPT | Performed by: FAMILY MEDICINE

## 2023-11-25 PROCEDURE — 84443 ASSAY THYROID STIM HORMONE: CPT | Performed by: FAMILY MEDICINE

## 2023-11-27 NOTE — PROGRESS NOTES
I note some minor lab abnormalities that have been stable over time.  Please repeat lab tests within next 3-4 months

## 2023-11-29 ENCOUNTER — OFFICE VISIT (OUTPATIENT)
Dept: FAMILY MEDICINE | Facility: CLINIC | Age: 78
End: 2023-11-29
Payer: MEDICARE

## 2023-11-29 VITALS
HEIGHT: 71 IN | DIASTOLIC BLOOD PRESSURE: 58 MMHG | BODY MASS INDEX: 36.45 KG/M2 | HEART RATE: 61 BPM | WEIGHT: 260.38 LBS | OXYGEN SATURATION: 98 % | SYSTOLIC BLOOD PRESSURE: 130 MMHG | TEMPERATURE: 98 F

## 2023-11-29 DIAGNOSIS — R80.9 TYPE 2 DIABETES MELLITUS WITH MICROALBUMINURIA, WITHOUT LONG-TERM CURRENT USE OF INSULIN: Primary | ICD-10-CM

## 2023-11-29 DIAGNOSIS — Z23 FLU VACCINE NEED: ICD-10-CM

## 2023-11-29 DIAGNOSIS — N18.31 STAGE 3A CHRONIC KIDNEY DISEASE: ICD-10-CM

## 2023-11-29 DIAGNOSIS — M1A.3710 CHRONIC GOUT OF RIGHT ANKLE DUE TO RENAL IMPAIRMENT WITHOUT TOPHUS: ICD-10-CM

## 2023-11-29 DIAGNOSIS — E11.29 TYPE 2 DIABETES MELLITUS WITH MICROALBUMINURIA, WITHOUT LONG-TERM CURRENT USE OF INSULIN: Primary | ICD-10-CM

## 2023-11-29 DIAGNOSIS — E11.319 DIABETIC RETINOPATHY ASSOCIATED WITH CONTROLLED TYPE 2 DIABETES MELLITUS: ICD-10-CM

## 2023-11-29 DIAGNOSIS — E21.3 HYPERPARATHYROIDISM: ICD-10-CM

## 2023-11-29 DIAGNOSIS — G89.29 CHRONIC WRIST PAIN, RIGHT: ICD-10-CM

## 2023-11-29 DIAGNOSIS — M05.79 RHEUMATOID ARTHRITIS INVOLVING MULTIPLE SITES WITH POSITIVE RHEUMATOID FACTOR: ICD-10-CM

## 2023-11-29 DIAGNOSIS — M25.531 CHRONIC WRIST PAIN, RIGHT: ICD-10-CM

## 2023-11-29 DIAGNOSIS — E66.9 OBESITY (BMI 30-39.9): ICD-10-CM

## 2023-11-29 PROCEDURE — 99999 PR PBB SHADOW E&M-EST. PATIENT-LVL V: CPT | Mod: PBBFAC,,, | Performed by: NURSE PRACTITIONER

## 2023-11-29 PROCEDURE — 3078F DIAST BP <80 MM HG: CPT | Mod: CPTII,S$GLB,, | Performed by: NURSE PRACTITIONER

## 2023-11-29 PROCEDURE — 3078F PR MOST RECENT DIASTOLIC BLOOD PRESSURE < 80 MM HG: ICD-10-PCS | Mod: CPTII,S$GLB,, | Performed by: NURSE PRACTITIONER

## 2023-11-29 PROCEDURE — 99999 PR PBB SHADOW E&M-EST. PATIENT-LVL V: ICD-10-PCS | Mod: PBBFAC,,, | Performed by: NURSE PRACTITIONER

## 2023-11-29 PROCEDURE — 1160F PR REVIEW ALL MEDS BY PRESCRIBER/CLIN PHARMACIST DOCUMENTED: ICD-10-PCS | Mod: CPTII,S$GLB,, | Performed by: NURSE PRACTITIONER

## 2023-11-29 PROCEDURE — 99214 PR OFFICE/OUTPT VISIT, EST, LEVL IV, 30-39 MIN: ICD-10-PCS | Mod: S$GLB,,, | Performed by: NURSE PRACTITIONER

## 2023-11-29 PROCEDURE — 1101F PT FALLS ASSESS-DOCD LE1/YR: CPT | Mod: CPTII,S$GLB,, | Performed by: NURSE PRACTITIONER

## 2023-11-29 PROCEDURE — 3072F LOW RISK FOR RETINOPATHY: CPT | Mod: CPTII,S$GLB,, | Performed by: NURSE PRACTITIONER

## 2023-11-29 PROCEDURE — 99214 OFFICE O/P EST MOD 30 MIN: CPT | Mod: S$GLB,,, | Performed by: NURSE PRACTITIONER

## 2023-11-29 PROCEDURE — G0008 ADMIN INFLUENZA VIRUS VAC: HCPCS | Mod: S$GLB,,, | Performed by: NURSE PRACTITIONER

## 2023-11-29 PROCEDURE — 3075F SYST BP GE 130 - 139MM HG: CPT | Mod: CPTII,S$GLB,, | Performed by: NURSE PRACTITIONER

## 2023-11-29 PROCEDURE — 1159F PR MEDICATION LIST DOCUMENTED IN MEDICAL RECORD: ICD-10-PCS | Mod: CPTII,S$GLB,, | Performed by: NURSE PRACTITIONER

## 2023-11-29 PROCEDURE — 3075F PR MOST RECENT SYSTOLIC BLOOD PRESS GE 130-139MM HG: ICD-10-PCS | Mod: CPTII,S$GLB,, | Performed by: NURSE PRACTITIONER

## 2023-11-29 PROCEDURE — 3288F FALL RISK ASSESSMENT DOCD: CPT | Mod: CPTII,S$GLB,, | Performed by: NURSE PRACTITIONER

## 2023-11-29 PROCEDURE — 1159F MED LIST DOCD IN RCRD: CPT | Mod: CPTII,S$GLB,, | Performed by: NURSE PRACTITIONER

## 2023-11-29 PROCEDURE — 90662 FLU VACCINE - HIGH DOSE (65+) PRESERVATIVE FREE IM: ICD-10-PCS | Mod: S$GLB,,, | Performed by: NURSE PRACTITIONER

## 2023-11-29 PROCEDURE — 90662 IIV NO PRSV INCREASED AG IM: CPT | Mod: S$GLB,,, | Performed by: NURSE PRACTITIONER

## 2023-11-29 PROCEDURE — 1125F AMNT PAIN NOTED PAIN PRSNT: CPT | Mod: CPTII,S$GLB,, | Performed by: NURSE PRACTITIONER

## 2023-11-29 PROCEDURE — 3288F PR FALLS RISK ASSESSMENT DOCUMENTED: ICD-10-PCS | Mod: CPTII,S$GLB,, | Performed by: NURSE PRACTITIONER

## 2023-11-29 PROCEDURE — 1125F PR PAIN SEVERITY QUANTIFIED, PAIN PRESENT: ICD-10-PCS | Mod: CPTII,S$GLB,, | Performed by: NURSE PRACTITIONER

## 2023-11-29 PROCEDURE — 3072F PR LOW RISK FOR RETINOPATHY: ICD-10-PCS | Mod: CPTII,S$GLB,, | Performed by: NURSE PRACTITIONER

## 2023-11-29 PROCEDURE — 1160F RVW MEDS BY RX/DR IN RCRD: CPT | Mod: CPTII,S$GLB,, | Performed by: NURSE PRACTITIONER

## 2023-11-29 PROCEDURE — G0008 FLU VACCINE - HIGH DOSE (65+) PRESERVATIVE FREE IM: ICD-10-PCS | Mod: S$GLB,,, | Performed by: NURSE PRACTITIONER

## 2023-11-29 PROCEDURE — 1101F PR PT FALLS ASSESS DOC 0-1 FALLS W/OUT INJ PAST YR: ICD-10-PCS | Mod: CPTII,S$GLB,, | Performed by: NURSE PRACTITIONER

## 2023-11-29 RX ORDER — METHYLPREDNISOLONE 4 MG/1
TABLET ORAL
Qty: 1 EACH | Refills: 0 | Status: SHIPPED | OUTPATIENT
Start: 2023-11-29 | End: 2023-11-29

## 2023-11-29 RX ORDER — METHYLPREDNISOLONE 4 MG/1
TABLET ORAL
Qty: 1 EACH | Refills: 0 | Status: SHIPPED | OUTPATIENT
Start: 2023-11-29 | End: 2023-12-20

## 2023-11-29 NOTE — PROGRESS NOTES
Subjective:       Patient ID: Lenny Loepz is a 78 y.o. male.    Chief Complaint: Follow-up    HPI    Patient presents today for follow up visit. Labs reviewed. Last visit with PCP- 8/10/23.    8/25/23 urology-: urgency incontinence-flomax 0.8 mg and Ditropan 15 mg PO daily     8/3/23 Pulmonary disease: : severe asthma without complication-fasenra and trelegy    3/8/23 Pain Med-: spinal stenosis of lumbar-formal physical therapy  and lyrica   Past Medical History:   Diagnosis Date    Acute hypoxemic respiratory failure 1/1/2021    Angina pectoris     Arthritis     Asthma     Atrial fibrillation     Back pain     Cardiac pacemaker     Cataract     Chronic bronchitis     COPD (chronic obstructive pulmonary disease)     Coronary artery disease     COVID-19     Diabetic retinopathy associated with controlled type 2 diabetes mellitus 2/18/2021    DM (diabetes mellitus), type 2, 2000 12/12/2014    Gout     Hepatic cirrhosis, unspecified hepatic cirrhosis type, unspecified whether ascites present 1/23/2022    Hoarseness of voice     Hyperlipidemia     Hypertension     Kidney stones 12/17/2012    Nephrolithiasis     Obesity     Pneumonia due to COVID-19 virus 1/1/2021    Renal manifestation of secondary diabetes mellitus     Rheumatoid factor positive 03/08/2017    Negative work up with Dr. Choudhury    Sleep apnea     Thyroid disease     Unspecified disorder of kidney and ureter     Urinary incontinence     Vocal cord polyp        Review of patient's allergies indicates:   Allergen Reactions    No known drug allergies          Current Outpatient Medications:     acarbose (PRECOSE) 25 MG Tab, Take 1 tablet (25 mg total) by mouth 3 (three) times daily with meals., Disp: 270 tablet, Rfl: 0    ACCU-CHEK GUIDE GLUCOSE METER Misc, USE DEVICE TO TEST BLOOD SUGAR TWO TIMES DAILY, Disp: , Rfl:     albuterol (PROVENTIL/VENTOLIN HFA) 90 mcg/actuation inhaler, 2 puffs every 4 hours as needed for cough,  wheeze, or shortness of breath, Disp: 18 g, Rfl: 11    albuterol-ipratropium (DUO-NEB) 2.5 mg-0.5 mg/3 mL nebulizer solution, Take 3 mLs by nebulization every 4 (four) hours as needed for Wheezing or Shortness of Breath., Disp: 50 each, Rfl: 4    allopurinoL (ZYLOPRIM) 300 MG tablet, Take 1 tablet (300 mg total) by mouth once daily., Disp: 90 tablet, Rfl: 3    aspirin 81 mg Tab, Take 1 tablet by mouth once daily. , Disp: , Rfl:     atorvastatin (LIPITOR) 80 MG tablet, Take 1 tablet (80 mg total) by mouth once daily., Disp: 90 tablet, Rfl: 3    benralizumab (FASENRA PEN) 30 mg/mL AtIn, Inject 30 mg into the skin every 8 weeks., Disp: 1 mL, Rfl: 6    blood sugar diagnostic Strp, 1 strip by Misc.(Non-Drug; Combo Route) route 3 (three) times daily. DX: E11.29   Dispense test strips that are covered by insurance (Patient taking differently: 1 strip by Misc.(Non-Drug; Combo Route) route 3 (three) times daily. DX: E11.29   Dispense test strips that are covered by insurance), Disp: 300 strip, Rfl: 3    blood sugar diagnostic Strp, To check BG 3 times daily, to use with insurance preferred meter, Disp: 200 each, Rfl: 0    blood sugar diagnostic, drum (ACCU-CHEK COMPACT PLUS TEST) Strp, 1 strip by Misc.(Non-Drug; Combo Route) route 2 (two) times daily with meals., Disp: 100 strip, Rfl: 1    blood-glucose meter kit, To check BG 3 times daily, to use with insurance preferred meter, Disp: 1 each, Rfl: 0    colchicine (MITIGARE) 0.6 mg Cap, Take 2 caps PO. Take additional 1 cap one hour later. Then take  1 cap PO BID until flare resolves., Disp: 30 capsule, Rfl: 0    diclofenac sodium (VOLTAREN) 1 % Gel, SMARTSI Gram(s) Topical 3 Times Daily, Disp: , Rfl:     ELIQUIS 5 mg Tab, Take 5 mg by mouth 2 (two) times daily. , Disp: , Rfl:     fluticasone propionate (FLONASE) 50 mcg/actuation nasal spray, 1 spray by Nasal route., Disp: , Rfl:     fluticasone-umeclidin-vilanter (TRELEGY ELLIPTA) 200-62.5-25 mcg inhaler, Inhale 1 puff  into the lungs once daily., Disp: 60 each, Rfl: 3    furosemide (LASIX) 40 MG tablet, Take 1 tablet (40 mg total) by mouth 2 (two) times daily as needed (edema)., Disp: 120 tablet, Rfl: 0    ketoconazole (NIZORAL) 2 % cream, Apply topically 2 (two) times daily., Disp: 30 g, Rfl: 0    lancets (FREESTYLE LANCETS) 28 gauge Misc, 1 lancet by Misc.(Non-Drug; Combo Route) route 3 (three) times daily., Disp: 300 each, Rfl: 3    lancets Misc, To check BG 3 times daily, to use with insurance preferred meter, Disp: 200 each, Rfl: 0    lisinopriL (PRINIVIL,ZESTRIL) 5 MG tablet, Take 1 tablet by mouth once daily, Disp: 90 tablet, Rfl: 3    metFORMIN (GLUCOPHAGE) 500 MG tablet, Take 1 tablet (500 mg total) by mouth 2 (two) times daily with meals., Disp: 180 tablet, Rfl: 3    metOLazone (ZAROXOLYN) 2.5 MG tablet, Take 2.5 mg by mouth daily as needed., Disp: , Rfl:     montelukast (SINGULAIR) 10 mg tablet, TAKE 1 TABLET BY MOUTH ONCE DAILY IN THE EVENING, Disp: 90 tablet, Rfl: 3    omega-3 fatty acids-vitamin E 1,000 mg Cap, Take 1 capsule by mouth once daily. Three times a day, Disp: , Rfl:     potassium chloride (KLOR-CON) 10 MEQ TbSR, 1 tab with furosemide., Disp: 180 tablet, Rfl: 5    predniSONE (DELTASONE) 20 MG tablet, Take one daily for 3 days and may repeat for shortness of breath., Disp: 21 tablet, Rfl: 0    tamsulosin (FLOMAX) 0.4 mg Cap, Take 2 capsules (0.8 mg total) by mouth once daily., Disp: 180 capsule, Rfl: 3    TURMERIC ORAL, Take 1 tablet by mouth once daily., Disp: , Rfl:     blood-glucose meter, drum-type Kit, 1 Device by Misc.(Non-Drug; Combo Route) route 2 (two) times daily with meals., Disp: 1 kit, Rfl: 0    gabapentin (NEURONTIN) 300 MG capsule, Take 1 capsule (300 mg total) by mouth 2 (two) times daily. (Patient not taking: Reported on 2/23/2023), Disp: 60 capsule, Rfl: 1    HYDROcodone-acetaminophen (NORCO) 7.5-325 mg per tablet, Take 1 tablet by mouth every 6 (six) hours as needed for Pain., Disp: 28  "tablet, Rfl: 0    lancing device Misc, 1 Device by Misc.(Non-Drug; Combo Route) route 2 (two) times daily with meals., Disp: 100 each, Rfl: 0    methylPREDNISolone (MEDROL DOSEPACK) 4 mg tablet, use as directed (Patient not taking: Reported on 11/29/2023), Disp: 1 each, Rfl: 0    methylPREDNISolone (MEDROL DOSEPACK) 4 mg tablet, use as directed, Disp: 1 each, Rfl: 0    nitroGLYCERIN (NITROSTAT) 0.4 MG SL tablet, DISSOLVE ONE TABLET UNDER THE TONGUE EVERY 5 MINUTES AS NEEDED FOR CHEST PAIN.  DO NOT EXCEED A TOTAL OF 3 DOSES IN 15 MINUTES, Disp: 25 tablet, Rfl: 0    sotalol (BETAPACE) 80 MG tablet, Take 0.5 tablets (40 mg total) by mouth 2 (two) times daily. for 7 days, Disp: 7 tablet, Rfl: 0  No current facility-administered medications for this visit.    Facility-Administered Medications Ordered in Other Visits:     lidocaine (PF) 10 mg/ml (1%) injection 10 mg, 1 mL, Intradermal, Once, Deandra Diaz MD    Review of Systems   Constitutional:  Negative for unexpected weight change.   HENT:  Negative for trouble swallowing.    Eyes:  Negative for visual disturbance.   Respiratory:  Negative for shortness of breath.    Cardiovascular:  Negative for chest pain, palpitations and leg swelling.   Gastrointestinal:  Negative for blood in stool.   Genitourinary:  Negative for hematuria.   Skin:  Negative for rash.   Allergic/Immunologic: Negative for immunocompromised state.   Neurological:  Negative for headaches.   Hematological:  Does not bruise/bleed easily.   Psychiatric/Behavioral:  Negative for agitation. The patient is not nervous/anxious.        Objective:      BP (!) 130/58 (BP Location: Left arm, Patient Position: Sitting, BP Method: Large (Manual))   Pulse 61   Temp 97.6 °F (36.4 °C) (Oral)   Ht 5' 11" (1.803 m)   Wt 118.1 kg (260 lb 5.8 oz)   SpO2 98%   BMI 36.31 kg/m²   Physical Exam  Constitutional:       Appearance: He is well-developed.   Eyes:      Conjunctiva/sclera: Conjunctivae normal.     "  Pupils: Pupils are equal, round, and reactive to light.   Cardiovascular:      Rate and Rhythm: Normal rate and regular rhythm.      Heart sounds: Normal heart sounds.   Pulmonary:      Effort: Pulmonary effort is normal.   Musculoskeletal:         General: Normal range of motion.   Neurological:      Mental Status: He is alert and oriented to person, place, and time.   Psychiatric:         Behavior: Behavior normal.         Thought Content: Thought content normal.         Judgment: Judgment normal.         Assessment:       1. Type 2 diabetes mellitus with microalbuminuria, without long-term current use of insulin    2. Chronic wrist pain, right    3. Flu vaccine need    4. Hyperparathyroidism    5. Stage 3a chronic kidney disease    6. 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 Rheumatoid arthritis involving multiple sites with positive rheumatoid factor    8. Diabetic retinopathy associated with controlled type 2 diabetes mellitus    9. Obesity (BMI 30-39.9)        Plan:       Type 2 diabetes mellitus with microalbuminuria, without long-term current use of insulin  Stable, continue management  Follow the ADA diet  Hemoglobin A1C   Date Value Ref Range Status   11/25/2023 6.6 (H) 4.0 - 5.6 % Final     Comment:     ADA Screening Guidelines:  5.7-6.4%  Consistent with prediabetes  >or=6.5%  Consistent with diabetes    High levels of fetal hemoglobin interfere with the HbA1C  assay. Heterozygous hemoglobin variants (HbS, HgC, etc)do  not significantly interfere with this assay.   However, presence of multiple variants may affect accuracy.     01/27/2023 7.1 (H) 4.0 - 5.6 % Final     Comment:     ADA Screening Guidelines:  5.7-6.4%  Consistent with prediabetes  >or=6.5%  Consistent with diabetes    High levels of fetal hemoglobin interfere with the HbA1C  assay. Heterozygous hemoglobin variants (HbS, HgC, etc)do  not significantly interfere with this assay.   However, presence of  "multiple variants may affect accuracy.     01/11/2022 7.4 (H) 4.0 - 5.6 % Final     Comment:     ADA Screening Guidelines:  5.7-6.4%  Consistent with prediabetes  >or=6.5%  Consistent with diabetes    High levels of fetal hemoglobin interfere with the HbA1C  assay. Heterozygous hemoglobin variants (HbS, HgC, etc)do  not significantly interfere with this assay.   However, presence of multiple variants may affect accuracy.        Chronic wrist pain, right  -     Ambulatory referral/consult to Orthopedics; Future; Expected date: 12/06/2023  -     Discontinue: methylPREDNISolone (MEDROL DOSEPACK) 4 mg tablet; use as directed  Dispense: 1 each; Refill: 0  -     methylPREDNISolone (MEDROL DOSEPACK) 4 mg tablet; use as directed  Dispense: 1 each; Refill: 0    Flu vaccine need  -     Influenza - High Dose (65+) (PF) (IM)    Hyperparathyroidism  Stable, continue management  Stage 3a chronic kidney disease  Stable and chronic.  Will continue to monitor q3-6 months and control chronic conditions as optimally as possible to preserve function.   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  Current BMI is:Estimated body mass index is 36.31 kg/m² as calculated from the following:    Height as of this encounter: 5' 11" (1.803 m).    Weight as of this encounter: 118.1 kg (260 lb 5.8 oz)..  Patient is aware that ideal BMI < 25 or Weight in (lb) to have BMI = 25: 178.9.           Patient readiness: acceptance and barriers:none    During the " course of the visit the patient was educated and counseled about the following:     Diabetes:  Discussed general issues about diabetes pathophysiology and management.  Addressed ADA diet.  Encouraged aerobic exercise.  Hypertension:   Regular aerobic exercise.  Obesity:   General weight loss/lifestyle modification strategies discussed (elicit support from others; identify saboteurs; non-food rewards, etc).  Regular aerobic exercise program discussed.    Goals: Diabetes: Maintain Hemoglobin A1C below 7, Hypertension: Reduce Blood Pressure, and Obesity: Reduce calorie intake and BMI    Did patient meet goals/outcomes: Yes    The following self management tools provided: declined    Patient Instructions (the written plan) was given to the patient/family.     Time spent with patient: 30 minutes    Barriers to medications present (no )    Adverse reactions to current medications (no)    Over the counter medications reviewed (Yes)

## 2023-11-30 ENCOUNTER — TELEPHONE (OUTPATIENT)
Dept: FAMILY MEDICINE | Facility: CLINIC | Age: 78
End: 2023-11-30
Payer: MEDICARE

## 2023-11-30 ENCOUNTER — PATIENT MESSAGE (OUTPATIENT)
Dept: FAMILY MEDICINE | Facility: CLINIC | Age: 78
End: 2023-11-30
Payer: MEDICARE

## 2023-11-30 DIAGNOSIS — I48.91 ATRIAL FIBRILLATION, UNSPECIFIED TYPE: ICD-10-CM

## 2023-11-30 NOTE — TELEPHONE ENCOUNTER
Pt requesting a refill on his betapace medication.  Ordered-9/25/18  Last office visit-8/3/23  Next appointment -2/5/24

## 2023-11-30 NOTE — TELEPHONE ENCOUNTER
----- Message from Galina Nicholson sent at 11/29/2023 12:36 PM CST -----  Regarding: Needs refills  Type:  RX Refill Request    Who Called:  Lenny  Refill or New Rx:  refills  RX Name and Strength:  sotalol (BETAPACE) 80 MG tablet 7 tablet 0 9/25/2018 11/15/2023   Sig - Route: Take 0.5 tablets (40 mg total) by mouth 2 (two) times daily. for 7 days - Oral   Sent to pharmacy as: sotalol (BETAPACE) 80 MG tablet   E-Prescribing Status: Receipt confirmed by pharmacy (9/25/2018 10:58 AM CDT)       How is the patient currently taking it? (ex. 1XDay):  see above  Is this a 30 day or 90 day RX:  see ABOVE  Preferred Pharmacy with phone number:   [id number] - [place], [place] - [address] [address]  Phone: [phone] Fax: [phone]        Local or Mail Order:  LOCAL  Ordering Provider:  RIGOBERTO Mccloud Call Back Number:  [phone]    Additional Information:

## 2023-12-01 ENCOUNTER — TELEPHONE (OUTPATIENT)
Dept: FAMILY MEDICINE | Facility: CLINIC | Age: 78
End: 2023-12-01
Payer: MEDICARE

## 2023-12-01 RX ORDER — SOTALOL HYDROCHLORIDE 80 MG/1
40 TABLET ORAL 2 TIMES DAILY
Qty: 7 TABLET | Refills: 0 | OUTPATIENT
Start: 2023-12-01 | End: 2023-12-08

## 2023-12-04 NOTE — TELEPHONE ENCOUNTER
Placed a call to the pt in regards to his betapace medication. Notified pt to contact his Cardiologist for this medication. Pt has medication refilled. Pt verbalized understanding.

## 2023-12-05 DIAGNOSIS — M51.36 DDD (DEGENERATIVE DISC DISEASE), LUMBAR: ICD-10-CM

## 2023-12-05 RX ORDER — HYDROCODONE BITARTRATE AND ACETAMINOPHEN 7.5; 325 MG/1; MG/1
1 TABLET ORAL EVERY 6 HOURS PRN
Qty: 28 TABLET | Refills: 0 | Status: SHIPPED | OUTPATIENT
Start: 2023-12-05

## 2023-12-05 NOTE — TELEPHONE ENCOUNTER
Patient requesting refill of Hydrocodone.  Preferred pharmacy is [facility] ([facility] location).   LR--12-22-22  LOV--11-29-23  FOV--1-9-24  Checked [place] Prescription Monitoring Program site. No unusual or abnormal behavior noted. Per  site this prescription was last filled on 12-22-22.  Please advise. Thank you.

## 2023-12-05 NOTE — TELEPHONE ENCOUNTER
No care due was identified.  [facility] Embedded Care Due Messages. Reference number: [id number].   12/05/2023 1:01:49 PM CST

## 2023-12-11 ENCOUNTER — OFFICE VISIT (OUTPATIENT)
Dept: ORTHOPEDICS | Facility: CLINIC | Age: 78
End: 2023-12-11
Payer: MEDICARE

## 2023-12-11 VITALS — HEIGHT: 71 IN | BODY MASS INDEX: 36.4 KG/M2 | WEIGHT: 260 LBS

## 2023-12-11 DIAGNOSIS — M19.031 ARTHRITIS OF RIGHT WRIST: Primary | ICD-10-CM

## 2023-12-11 DIAGNOSIS — M25.531 CHRONIC WRIST PAIN, RIGHT: ICD-10-CM

## 2023-12-11 DIAGNOSIS — G89.29 CHRONIC WRIST PAIN, RIGHT: ICD-10-CM

## 2023-12-11 PROCEDURE — 20605 DRAIN/INJ JOINT/BURSA W/O US: CPT | Mod: RT,S$GLB,, | Performed by: ORTHOPAEDIC SURGERY

## 2023-12-11 PROCEDURE — 99204 OFFICE O/P NEW MOD 45 MIN: CPT | Mod: 25,S$GLB,, | Performed by: ORTHOPAEDIC SURGERY

## 2023-12-11 PROCEDURE — 3288F FALL RISK ASSESSMENT DOCD: CPT | Mod: CPTII,S$GLB,, | Performed by: ORTHOPAEDIC SURGERY

## 2023-12-11 PROCEDURE — 99999 PR PBB SHADOW E&M-EST. PATIENT-LVL V: ICD-10-PCS | Mod: PBBFAC,,, | Performed by: ORTHOPAEDIC SURGERY

## 2023-12-11 PROCEDURE — 1101F PT FALLS ASSESS-DOCD LE1/YR: CPT | Mod: CPTII,S$GLB,, | Performed by: ORTHOPAEDIC SURGERY

## 2023-12-11 PROCEDURE — 99204 PR OFFICE/OUTPT VISIT, NEW, LEVL IV, 45-59 MIN: ICD-10-PCS | Mod: 25,S$GLB,, | Performed by: ORTHOPAEDIC SURGERY

## 2023-12-11 PROCEDURE — 20605 INTERMEDIATE JOINT ASPIRATION/INJECTION: R RADIOCARPAL: ICD-10-PCS | Mod: RT,S$GLB,, | Performed by: ORTHOPAEDIC SURGERY

## 2023-12-11 PROCEDURE — 1159F MED LIST DOCD IN RCRD: CPT | Mod: CPTII,S$GLB,, | Performed by: ORTHOPAEDIC SURGERY

## 2023-12-11 PROCEDURE — 99999 PR PBB SHADOW E&M-EST. PATIENT-LVL V: CPT | Mod: PBBFAC,,, | Performed by: ORTHOPAEDIC SURGERY

## 2023-12-11 PROCEDURE — 3072F PR LOW RISK FOR RETINOPATHY: ICD-10-PCS | Mod: CPTII,S$GLB,, | Performed by: ORTHOPAEDIC SURGERY

## 2023-12-11 PROCEDURE — 3288F PR FALLS RISK ASSESSMENT DOCUMENTED: ICD-10-PCS | Mod: CPTII,S$GLB,, | Performed by: ORTHOPAEDIC SURGERY

## 2023-12-11 PROCEDURE — 3072F LOW RISK FOR RETINOPATHY: CPT | Mod: CPTII,S$GLB,, | Performed by: ORTHOPAEDIC SURGERY

## 2023-12-11 PROCEDURE — 1101F PR PT FALLS ASSESS DOC 0-1 FALLS W/OUT INJ PAST YR: ICD-10-PCS | Mod: CPTII,S$GLB,, | Performed by: ORTHOPAEDIC SURGERY

## 2023-12-11 PROCEDURE — 1125F AMNT PAIN NOTED PAIN PRSNT: CPT | Mod: CPTII,S$GLB,, | Performed by: ORTHOPAEDIC SURGERY

## 2023-12-11 PROCEDURE — 1125F PR PAIN SEVERITY QUANTIFIED, PAIN PRESENT: ICD-10-PCS | Mod: CPTII,S$GLB,, | Performed by: ORTHOPAEDIC SURGERY

## 2023-12-11 PROCEDURE — 1159F PR MEDICATION LIST DOCUMENTED IN MEDICAL RECORD: ICD-10-PCS | Mod: CPTII,S$GLB,, | Performed by: ORTHOPAEDIC SURGERY

## 2023-12-11 RX ADMIN — TRIAMCINOLONE ACETONIDE 40 MG: 40 INJECTION, SUSPENSION INTRA-ARTICULAR; INTRAMUSCULAR at 02:12
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
## 2023-12-12 DIAGNOSIS — R60.0 BILATERAL LEG EDEMA: ICD-10-CM

## 2023-12-12 RX ORDER — TRIAMCINOLONE ACETONIDE 40 MG/ML
40 INJECTION, SUSPENSION INTRA-ARTICULAR; INTRAMUSCULAR
Status: DISCONTINUED | OUTPATIENT
Start: 2023-12-11 | End: 2023-12-12 | Stop reason: HOSPADM

## 2023-12-12 RX ORDER — FUROSEMIDE 40 MG/1
40 TABLET ORAL 2 TIMES DAILY PRN
Qty: 120 TABLET | Refills: 0 | Status: SHIPPED | OUTPATIENT
Start: 2023-12-12 | End: 2024-03-27 | Stop reason: SDUPTHER

## 2023-12-12 NOTE — PROCEDURES
Intermediate Joint Aspiration/Injection: R radiocarpal    Date/Time: 12/11/2023 2:40 PM    Performed by: Jesus Platt MD  Authorized by: Jesus Platt MD    Consent Done?:  Yes (Verbal)  Indications:  Arthritis  Timeout: Prior to procedure the correct patient, procedure, and site was verified      Location:  Wrist  Site:  R radiocarpal (Right distal radial ulnar joint)  Prep: Patient was prepped and draped in usual sterile fashion    Needle size:  25 G  Medications:  40 mg triamcinolone acetonide 40 mg/mL  Patient tolerance:  Patient tolerated the procedure well with no immediate complications

## 2023-12-12 NOTE — TELEPHONE ENCOUNTER
Pt requesting a refill on his lasix.  Ordered on-8/3/23  Last office visit-8/3/23  Next appointment-2/5/24

## 2023-12-12 NOTE — TELEPHONE ENCOUNTER
----- Message from Sushila Mckeon sent at 12/12/2023 12:00 PM CST -----  Contact: self  Type:  RX Refill Request    Who Called:  Pt   Refill or New Rx: Refill   RX Name and Strength: furosemide (LASIX) 40 MG tablet  How is the patient currently taking it? (ex. 1XDay): as directed   Is this a 30 day or 90 day RX: 90  Preferred Pharmacy with phone number:   [id number] - [place], [place] - [address] [address]  Phone: [phone] Fax: [phone]  Local or Mail Order: Local  Ordering Provider: Dr. Allan  Would the patient rather a call back or a response via [facility]?  Call   Best Call Back Number:[phone]  Thank you...

## 2023-12-21 NOTE — PROGRESS NOTES
Subjective:       Patient ID: Lenny Lopez is a 77 y.o. male.    Chief Complaint: Follow-up (Back pain)    SUBJECTIVE: Lenny Lopez is a 77 y.o. male seen for a follow up visit; he has  Controlled diabetes II controlled hypertension, mixed hyperlipidemia, preserved EF CHF, peripheral vascular disease and obesity.  Edema: Patient complains of chronic lymphedema /edema. The location of the edema is lower leg(s) bilateral.  The edema has been less  Onset of symptoms was  intermittent several months ago, gradually improving since that time. The edema is present all day. The patient states the problem is long-standing.  The swelling has been aggravated by dependency of involved area and increased salt intake, relieved by diuretics, support stockings, elevation of involved area, low-salt diet, and been associated with diagnosis of heart failure, shortness of breath, venous insufficiency and weight gain. Cardiac risk factors include .        Current Outpatient Medications:  ACCU-CHEK GUIDE GLUCOSE METER Misc, USE DEVICE TO TEST BLOOD SUGAR TWO TIMES DAILY, Disp: , Rfl:   albuterol (PROVENTIL/VENTOLIN HFA) 90 mcg/actuation inhaler, 2 puffs every 4 hours as needed for cough, wheeze, or shortness of breath, Disp: 18 g, Rfl: 11  albuterol-ipratropium (DUO-NEB) 2.5 mg-0.5 mg/3 mL nebulizer solution, Take 3 mLs by nebulization every 6 (six) hours as needed for Wheezing. Rescue, Disp: 1 Box, Rfl: 4  aspirin 81 mg Tab, Take 1 tablet by mouth once daily. , Disp: , Rfl:   atorvastatin (LIPITOR) 40 MG tablet, Take 1 tablet (40 mg total) by mouth once daily., Disp: 90 tablet, Rfl: 3  benralizumab (FASENRA PEN) 30 mg/mL AtIn, Inject 30 mg into the skin every 8 weeks., Disp: 1 mL, Rfl: 6  blood sugar diagnostic Strp, 1 strip by Misc.(Non-Drug; Combo Route) route 3 (three) times daily. DX: E11.29   Dispense test strips that are covered by insurance, Disp: 300 strip, Rfl: 3  blood sugar diagnostic, drum (ACCU-CHEK COMPACT PLUS  TEST) Strp, 1 strip by Misc.(Non-Drug; Combo Route) route 2 (two) times daily with meals., Disp: 100 strip, Rfl: 1  blood-glucose meter, drum-type Kit, 1 Device by Misc.(Non-Drug; Combo Route) route 2 (two) times daily with meals., Disp: 1 kit, Rfl: 0  ELIQUIS 5 mg Tab, Take 5 mg by mouth 2 (two) times daily. , Disp: , Rfl:   ergocalciferol (ERGOCALCIFEROL) 50,000 unit Cap, Take 1 capsule (50,000 Units total) by mouth once a week., Disp: 4 capsule, Rfl: 5  finasteride (PROSCAR) 5 mg tablet, Take 1 tablet (5 mg total) by mouth once daily., Disp: 30 tablet, Rfl: 11  fluticasone propionate (FLOVENT HFA) 220 mcg/actuation inhaler, Inhale 1 puff into the lungs 2 (two) times daily. Controller, Disp: 12 g, Rfl: 11  furosemide (LASIX) 20 MG tablet, Take 1 tablet (20 mg total) by mouth once daily., Disp: 90 tablet, Rfl: 3  furosemide (LASIX) 20 MG tablet, Take 2 tablets (40 mg total) by mouth 2 (two) times daily., Disp: 120 tablet, Rfl: 11  KENALOG 40 mg/mL injection, , Disp: , Rfl:   lancets (FREESTYLE LANCETS) 28 gauge Misc, 1 lancet by Misc.(Non-Drug; Combo Route) route 3 (three) times daily., Disp: 300 each, Rfl: 3  lancing device Misc, 1 Device by Misc.(Non-Drug; Combo Route) route 2 (two) times daily with meals., Disp: 100 each, Rfl: 0  lisinopriL (PRINIVIL,ZESTRIL) 2.5 MG tablet, Take 1 tablet (2.5 mg total) by mouth once daily., Disp: 90 tablet, Rfl: 3  metFORMIN (GLUCOPHAGE) 500 MG tablet, Take 1 tablet (500 mg total) by mouth 2 (two) times daily with meals., Disp: 60 tablet, Rfl: 3  montelukast (SINGULAIR) 10 mg tablet, Take 1 tablet (10 mg total) by mouth every evening., Disp: 30 tablet, Rfl: 11  mupirocin (BACTROBAN) 2 % ointment, APPLY OINTMENT TOPICALLY TO AFFECTED AREA THREE TIMES DAILY FOR 5 DAYS, Disp: , Rfl:   MYRBETRIQ 50 mg Tb24, TAKE 1 TABLET BY MOUTH ONCE DAILY, Disp: 30 tablet, Rfl: 11  omega-3 fatty acids-vitamin E (FISH OIL) 1,000 mg Cap, Take 1 capsule by mouth once daily. Three times a day, Disp: ,  Rfl:   potassium chloride (KLOR-CON) 10 MEQ TbSR, 1 tab with furosemide., Disp: 180 tablet, Rfl: 5  predniSONE (DELTASONE) 20 MG tablet, Take one daily for 3 days and may repeat for shortness of breath., Disp: 21 tablet, Rfl: 0  pulse oximeter (PULSE OXIMETER) device, by Apply Externally route 2 (two) times a day. Use twice daily at 8 AM and 3 PM and record the value [facility] as directed., Disp: 1 each, Rfl: 0  sotalol (BETAPACE) 80 MG tablet, Take 0.5 tablets (40 mg total) by mouth 2 (two) times daily. for 7 days, Disp: 7 tablet, Rfl: 0  tamsulosin (FLOMAX) 0.4 mg Cap, Take 1 capsule (0.4 mg total) by mouth once daily., Disp: 30 capsule, Rfl: 11  traMADoL (ULTRAM) 50 mg tablet, Take 1 tablet (50 mg total) by mouth every 6 (six) hours as needed for Pain. 7 days for acute pain., Disp: 28 tablet, Rfl: 1  turmeric root extract 500 mg Cap, Take 1 capsule by mouth once daily., Disp: , Rfl:   VENTOLIN HFA 90 mcg/actuation inhaler, Inhale 1-2 puffs into the lungs every 4 (four) hours as needed for Wheezing or Shortness of Breath., Disp: 18 g, Rfl: 3  acarbose (PRECOSE) 25 MG Tab, Take 1 tablet (25 mg total) by mouth 2 (two) times daily before meals., Disp: 180 tablet, Rfl: 3    No current facility-administered medications for this visit.  Facility-Administered Medications Ordered in Other Visits:  lidocaine (PF) 10 mg/ml (1%) injection 10 mg, 1 mL, Intradermal, Once, Deandra Diaz MD      Patient Active Problem List:     Osteoarthritis     Hypertension associated with diabetes     CKD (chronic kidney disease) stage 3, GFR 39.7 ml/min     Atrial fibrillation     Constipation due to pain medication     Gout     Low testosterone     Metabolic syndrome     LVH (left ventricular hypertrophy) due to hypertensive disease     Cardiovascular risk factor, ASCVD 10-year risk 20.7%, ideal 14.1%     Chews tobacco regularly, about can a day     Type 2 diabetes mellitus with microalbuminuria, without long-term current use of  insulin     Microalbuminuria     LUZ on CPAP, 100% use, 2016     Hyperlipidemia associated with type 2 diabetes mellitus     Prostatism     Bilateral leg edema     Calcified granuloma of lung     Bilateral carotid artery disease     Statin intolerance     Severe persistent asthma without complication     Eosinophilic asthma     Coronary artery disease of native artery of native heart with stable angina pectoris     Urge urinary incontinence     Rheumatoid factor positive     Hyperparathyroidism     Chronic pain of left knee     Vocal cord mass     COPD (chronic obstructive pulmonary disease)     Fall     Rheumatoid arthritis involving multiple sites with positive rheumatoid factor     Thrombocytopenia, unspecified     Hypertensive heart disease with heart failure     Diabetic retinopathy associated with controlled type 2 diabetes mellitus     History of colon polyps      Review of Systems   Constitutional: Positive for fatigue. Negative for unexpected weight change.   Respiratory:stable for shortness of breath. Negative for chest tightness.    Cardiovascular: Positive for leg swelling. Negative for chest pain and palpitations.   Gastrointestinal: Negative for abdominal pain.   Musculoskeletal: Negative for arthralgias.   Neurological: Negative for dizziness, syncope, light-headedness and headaches.       Patient Active Problem List   Diagnosis    Osteoarthritis    Hypertension associated with diabetes    CKD (chronic kidney disease) stage 3, GFR 39.7 ml/min    Atrial fibrillation    Constipation due to pain medication    Gout    Low testosterone    Metabolic syndrome    LVH (left ventricular hypertrophy) due to hypertensive disease    Cardiovascular risk factor, ASCVD 10-year risk 20.7%, ideal 14.1%    Chews tobacco regularly, about can a day    Type 2 diabetes mellitus with microalbuminuria, without long-term current use of insulin    Microalbuminuria    LUZ on CPAP, 100% use, 2016    Hyperlipidemia associated with  type 2 diabetes mellitus    Prostatism    Bilateral leg edema    Calcified granuloma of lung    Bilateral carotid artery disease    Statin intolerance    Severe persistent asthma without complication    Eosinophilic asthma    Coronary artery disease of native artery of native heart with stable angina pectoris    Urge urinary incontinence    Rheumatoid factor positive    Obesity, morbid, BMI 40.0-49.9    Hyperparathyroidism    Chronic pain of left knee    Vocal cord mass    COPD (chronic obstructive pulmonary disease)    Fall    Rheumatoid arthritis involving multiple sites with positive rheumatoid factor    Thrombocytopenia, unspecified    Hypertensive heart disease with heart failure    Diabetic retinopathy associated with controlled type 2 diabetes mellitus    History of colon polyps    Hepatic cirrhosis, unspecified hepatic cirrhosis type, unspecified whether ascites present    TONEY (dyspnea on exertion)    Asthma-COPD overlap syndrome    Chronic left-sided low back pain without sciatica       Objective:      Physical Exam  Vitals reviewed.   Constitutional:       General: He is not in acute distress.     Appearance: He is well-developed. He is obese. He is not diaphoretic.   HENT:      Head: Normocephalic and atraumatic.      Right Ear: External ear normal.      Left Ear: External ear normal.      Nose: Nose normal.      Mouth/Throat:      Pharynx: No oropharyngeal exudate.   Eyes:      General: No scleral icterus.        Right eye: No discharge.         Left eye: No discharge.      Conjunctiva/sclera: Conjunctivae normal.      Pupils: Pupils are equal, round, and reactive to light.   Neck:      Thyroid: No thyromegaly.      Vascular: No JVD.      Trachea: No tracheal deviation.   Cardiovascular:      Rate and Rhythm: Normal rate.      Chest Wall: PMI is displaced.      Heart sounds: Heart sounds are distant. No murmur heard.     Pulmonary:      Effort: Pulmonary effort is normal. No respiratory distress.       Breath sounds: improved air movement present. Examination of the right-lower field reveals decreased breath sounds. Examination of the left-lower field reveals improved breath sounds. Decreased breath sounds present. No wheezing or rales.   Abdominal:      General: Bowel sounds are normal. There is no distension.      Palpations: Abdomen is soft. There is no mass.      Tenderness: There is no abdominal tenderness. There is no guarding or rebound.   Musculoskeletal:         General: No tenderness.      Cervical back: Neck supple. Crepitus present. Spinous process tenderness present. Decreased range of motion.      Comments:   Bilateral swollen feet, swollen lat malleolar and both first proximal phalangeal metatarsal bunion.Left leg with lower pitting 2/3 moderate erythema.     Lymphadenopathy:      Cervical: No cervical adenopathy.   Skin:     General: Skin is warm and dry.      Coloration: Skin is not pale.      Findings: No erythema or rash.   Neurological:      Mental Status: He is alert and oriented to person, place, and time.      Cranial Nerves: No cranial nerve deficit.      Coordination: Coordination normal.   Psychiatric:         Behavior: Behavior normal.         Thought Content: Thought content normal.         Judgment: Judgment normal.         Lab Results   Component Value Date    WBC 6.88 08/08/2022    HGB 11.7 (L) 08/08/2022    HCT 36.3 (L) 08/08/2022     08/08/2022    CHOL 106 (L) 10/07/2021    TRIG 74 10/07/2021    HDL 30 (L) 10/07/2021    ALT 21 01/11/2022    AST 16 01/11/2022     (L) 08/26/2022    K 5.5 (H) 08/26/2022     08/26/2022    CREATININE 1.6 (H) 08/26/2022    BUN 57 (H) 08/26/2022    CO2 23 08/26/2022    TSH 4.436 (H) 08/08/2022    PSA 3.5 12/01/2015    INR 1.1 09/03/2020    HGBA1C 7.4 (H) 01/11/2022     The ASCVD Risk score (Beth GUEVARA, et al., 2019) failed to calculate for the following reasons:    The valid total cholesterol range is 130 to 320 mg/dL  CLINICAL  HISTORY:  Stroke, follow up;Other cerebral infarction     TECHNIQUE:  Non contrast low dose axial images were obtained through the head.  CT angiogram was performed from the level of the alejandro to the top of the head following the IV administration of 75mL of Omnipaque 350.   Sagittal and coronal reconstructions and maximum intensity projection reconstructions were performed. Arterial stenosis percentages are based on NASCET measurement criteria.     COMPARISON:  None     FINDINGS:  Noncontrast head CT demonstrates no intracranial hemorrhage.  Gray-white differentiation is well maintained.  There is no mass or midline shift.  The ventricles are nondilated.     There is moderate plaque formation of the origins of the arch vessels, without significant narrowing.  The bilateral common carotid arteries demonstrate mild scattered plaque formation without significant narrowing.     There is moderate plaque formation of the right carotid bulb resulting in 50% narrowing.  The right cervical internal carotid artery is patent.  The left internal carotid artery demonstrates no significant narrowing.     The bilateral vertebral arteries are codominant and widely patent.  There is no evidence for arterial dissection.     The intracranial internal carotid arteries exhibit moderate calcified plaque formation with multifocal mild narrowing.  The bilateral middle cerebral and anterior cerebral arteries are patent without significant stenosis.  The bilateral posterior cerebral arteries are patent without significant stenosis.  The posterior communicating arteries are not well seen and may be congenitally absent or hypoplastic.  A patent anterior communicating artery is present.  The basilar artery is normal in caliber and widely patent.  The distal vertebral arteries are widely patent.     A left chest cardiac pacer is partially imaged.  The heart is enlarged.     Impression:     Moderate atherosclerosis without high-grade arterial  stenosis within the head or neck.  50% narrowing of the right ICA origin is present.        Electronically signed by: Best Hutton MD  Date:                                            03/07/2022  Assessment:       1. Type 2 diabetes mellitus with microalbuminuria, without long-term current use of insulin    2. COPD exacerbation    3. PVD (peripheral vascular disease)    4. Stage 3a chronic kidney disease    5. DDD (degenerative disc disease), lumbar    6. 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        Plan:       Type 2 diabetes mellitus with microalbuminuria, without long-term current use of insulin  -     Comprehensive metabolic panel; Future; Expected date: 12/22/2022  -     Microalbumin/creatinine urine ratio; Future; Expected date: 12/22/2022  -     Lipid panel; Future; Expected date: 12/22/2022  -     Fructosamine; Future; Expected date: 12/22/2022  -     T4, free; Future; Expected date: 12/22/2022  -     TSH; Future; Expected date: 12/22/2022    COPD exacerbation    PVD (peripheral vascular disease)    Stage 3a chronic kidney disease    DDD (degenerative disc disease), lumbar  -     Ambulatory referral/consult to Pain Clinic; Future; Expected date: 12/29/2022  -     HYDROcodone-acetaminophen (NORCO) 7.5-325 mg per tablet; Take 1 tablet by mouth every 6 (six) hours as needed for Pain.  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  Dispense: 5 capsule; Refill: 3  -     Uric Acid; Future; Expected date: 12/22/2022    Severe risk factors for a stroke or heart attack, or pneumonia. Goal /75. No lower than 125/70.  I have reviewed diabetes in detail with the patient today. All questions have been answered to his satisfaction. We have discussed the following concepts: low cholesterol diet, weight control and daily  exercise discussed, home glucose monitoring emphasized, all medications, side effects and compliance discussed carefully, diabetic Sick Day rules reviewed, handout given, foot care discussed and Podiatry visits discussed, annual eye examinations at Ophthalmology discussed, glycohemoglobin and other lab monitoring discussed, long term diabetic complications discussed and labs immediately prior to next visit. The diabetic Sick Day rules are reviewed with him verbally and in writing. If following usual diet, stay on same dose of diabetic medication, maintain high fluid intake, and perform home glucose monitoring QID. If not able to maintain normal diet due to illness maintain hydration with high fluid intake and call MD if glucose above 400. Insulin: instructions given on proper technique of injection, storing medication, timing of dose. The patient was able to demonstrate adequate skill with doing self injections and home glucose monitoring. Blue Shah 1/3/2023 3:46 PM    30-minute visit. 10 minutes spent counseling patient on diet, exercise, and weight loss.  This has been fully explained to the patient, who indicates understanding.    Discussed health maintenance guidelines appropriate for age.  Discussed health maintenance guidelines appropriate for age.    9  Discussed health maintenance guidelines appropriate for age.  Discussed health maintenance guidelines appropriate for age.  Discussed health maintenance guidelines appropriate for age.     Clothing

## 2023-12-26 ENCOUNTER — CLINICAL SUPPORT (OUTPATIENT)
Dept: REHABILITATION | Facility: HOSPITAL | Age: 78
End: 2023-12-26
Attending: ORTHOPAEDIC SURGERY
Payer: MEDICARE

## 2023-12-26 DIAGNOSIS — M25.631 STIFFNESS OF RIGHT WRIST JOINT: Primary | ICD-10-CM

## 2023-12-26 DIAGNOSIS — M25.531 CHRONIC WRIST PAIN, RIGHT: ICD-10-CM

## 2023-12-26 DIAGNOSIS — G89.29 CHRONIC WRIST PAIN, RIGHT: ICD-10-CM

## 2023-12-26 PROCEDURE — 97165 OT EVAL LOW COMPLEX 30 MIN: CPT | Mod: PN

## 2023-12-26 NOTE — PLAN OF CARE
[facility] Occupational Therapy  Initial Evaluation     Date: 12/26/2023  Name: Lenny Lopez  Clinic Number: [id number]    Therapy Diagnosis:   Encounter Diagnoses   Name Primary?    Chronic wrist pain, right     Stiffness of right wrist joint Yes     Physician: Jesus Platt MD    Physician Orders: evaluate and tx, see epic  Medical Diagnosis: chronic right wrist pain  Surgical Procedure and Date: n/a, n/a  Evaluation Date: 12/26/2023  Insurance Authorization Period Expiration: referral [id number] 12-11-23 thru 12-10-24             Plan of Care Certification Period: n/a                            Date of Return to referral source's office: see epic    Visit # / Visits authorized: 1 / 1  Time In:3  Time Out: 320  Total Billable Time: 0 minutes    Precautions:  universal, see epic, protocol if applicable  Subjective     Involved Side: right  Dominant Side: right  Date of Onset: 6-8 weeks pre 12-11-23  History of Current Condition/Mechanism of Injury: strained while moving wife; saw primary care, xrayed, took meds-see epic; saw ortho, got injection, therapy ordered  Imaging: see epic  Previous Therapy: none for above dx    Past Medical History/Physical Systems Review:   Lenny Lopez  has a past medical history of Acute hypoxemic respiratory failure, Angina pectoris, Arthritis, Asthma, Atrial fibrillation, Back pain, Cardiac pacemaker, Cataract, Chronic bronchitis, COPD (chronic obstructive pulmonary disease), Coronary artery disease, COVID-19, Diabetic retinopathy associated with controlled type 2 diabetes mellitus, DM (diabetes mellitus), type 2, 2000, Gout, Hepatic cirrhosis, unspecified hepatic cirrhosis type, unspecified whether ascites present, Hoarseness of voice, Hyperlipidemia, Hypertension, Kidney stones, Nephrolithiasis, Obesity, Pneumonia due to COVID-19 virus, Renal manifestation of secondary diabetes mellitus, Rheumatoid factor positive, Sleep apnea, Thyroid disease,  Unspecified disorder of kidney and ureter, Urinary incontinence, and Vocal cord polyp.    Lenny Lopez  has a past surgical history that includes Knee surgery; Appendectomy; TONSILLECTOMY, ADENOIDECTOMY; Fracture surgery; Eye surgery; Coronary stent placement (2018); Cardiac pacemaker placement (2018); Microlaryngoscopy with biopsy of vocal cord (N/A, 10/13/2020); and Colonoscopy (N/A, 3/3/2021).    Lenny has a current medication list which includes the following prescription(s): acarbose, accu-chek guide glucose meter, albuterol, albuterol-ipratropium, allopurinol, aspirin, atorvastatin, fasenra pen, blood sugar diagnostic, blood sugar diagnostic, blood sugar diagnostic, drum, blood-glucose meter, blood-glucose meter, drum-type, colchicine, diclofenac sodium, eliquis, fluticasone propionate, trelegy ellipta, furosemide, gabapentin, hydrocodone-acetaminophen, ketoconazole, lancets, lancets, lancing device, lisinopril, metformin, methylprednisolone, metolazone, montelukast, nitroglycerin, omega-3 fatty acids-vitamin e, potassium chloride, prednisone, sotalol, tamsulosin, and turmeric, and the following Facility-Administered Medications: lidocaine (pf) 10 mg/ml (1%).    Review of patient's allergies indicates:   Allergen Reactions    No known drug allergies         Patient's Goals for Therapy: full painless use    Pain:  Functional Pain Scale Rating 0-10:   0/10 on average  0/10 at best  0/10 at worst  Side:right wrist complex and hand  Occupation:  retired  Working presently: no  Duties: per job if working; typical self and home care    Functional Limitations/Social History:    Previous functional status includes: Independent with all ADLs.     Current Functional Status   Home/Living environment : lives with wife    Limitation of Functional Status as follows: doing all required tasks                 Leisure: not tested  pain meds:  for issues different from what he is here for  nicotine: chews about 1 can of  tobacco daily  caffeine: 1 cup regular coffee daily    Objective   Posture: multiple changes due to old injury from a long time ago, see last ortho note and xray  Palpation:not tested  Sensation:wnl  ROM:    Limited for wrist due to old injury, says wrist and digit motion just as good now as it was pre injury which he is here for      Edema: none noted        CMS Impairment/Limitation/Restriction for FOTO Survey    Therapist reviewed FOTO scores for Lenny Lopez on 12/26/2023.   FOTO documents entered into EPIC - see Media section.                 Treatment                   Home Exercise Program/Education:  I    Additional Education provided: home program and clinic OT nonessential  Assessment     Lenny Lopez is a 78 y.o. male referred to outpatient occupational therapy and presents with a medical diagnosis of see above resulting in Decreased ROM from old injury per patient.  Pt has no cultural, educational or language barriers to learning provided.    Profile and History Assessment of Occupational Performance Level of Clinical Decision Making Complexity Score   Occupational Profile:   Lenny Lopez is a 78 y.o. male who lives with their spouse and is retired Lenny Lopez has difficulty with  ADLs and IADLs as listed previously, which  affecting his/her daily functional abilities.      Comorbidities:    has a past medical history of Acute hypoxemic respiratory failure, Angina pectoris, Arthritis, Asthma, Atrial fibrillation, Back pain, Cardiac pacemaker, Cataract, Chronic bronchitis, COPD (chronic obstructive pulmonary disease), Coronary artery disease, COVID-19, Diabetic retinopathy associated with controlled type 2 diabetes mellitus, DM (diabetes mellitus), type 2, 2000, Gout, Hepatic cirrhosis, unspecified hepatic cirrhosis type, unspecified whether ascites present, Hoarseness of voice, Hyperlipidemia, Hypertension, Kidney stones, Nephrolithiasis, Obesity, Pneumonia due to COVID-19 virus, Renal  manifestation of secondary diabetes mellitus, Rheumatoid factor positive, Sleep apnea, Thyroid disease, Unspecified disorder of kidney and ureter, Urinary incontinence, and Vocal cord polyp.    Medical and Therapy History Review:   Brief               Performance Deficits    Physical:  Joint Mobility    Cognitive:  No Deficits    Psychosocial:    No Deficits     Clinical Decision Making:  low    Assessment Process:  Problem-Focused Assessments    Modification/Need for Assistance:  Not Necessary    Intervention Selection:  Limited Treatment Options       low  Based on PMHX, co morbidities , data from assessments and functional level of assistance required with task and clinical presentation directly impacting function.           The following goals were discussed with the patient and patient is in agreement with them as to be addressed in the treatment plan.     Goals:   N/a    Plan   D/c    Lenny EWING CHT

## 2024-01-09 ENCOUNTER — OFFICE VISIT (OUTPATIENT)
Dept: PRIMARY CARE CLINIC | Facility: CLINIC | Age: 79
End: 2024-01-09
Payer: MEDICARE

## 2024-01-09 ENCOUNTER — HOSPITAL ENCOUNTER (OUTPATIENT)
Dept: RADIOLOGY | Facility: HOSPITAL | Age: 79
Discharge: HOME | End: 2024-01-09
Attending: FAMILY MEDICINE
Payer: MEDICARE

## 2024-01-09 VITALS
OXYGEN SATURATION: 95 % | HEIGHT: 71 IN | WEIGHT: 260.94 LBS | BODY MASS INDEX: 36.53 KG/M2 | SYSTOLIC BLOOD PRESSURE: 96 MMHG | HEART RATE: 63 BPM | TEMPERATURE: 98 F | DIASTOLIC BLOOD PRESSURE: 58 MMHG

## 2024-01-09 DIAGNOSIS — E21.3 HYPERPARATHYROIDISM: ICD-10-CM

## 2024-01-09 DIAGNOSIS — R60.0 EDEMA OF EXTREMITIES: ICD-10-CM

## 2024-01-09 DIAGNOSIS — I73.9 PVD (PERIPHERAL VASCULAR DISEASE): ICD-10-CM

## 2024-01-09 DIAGNOSIS — S52.021A OLECRANON FRACTURE, RIGHT, CLOSED, INITIAL ENCOUNTER: Primary | ICD-10-CM

## 2024-01-09 DIAGNOSIS — I48.0 PAROXYSMAL ATRIAL FIBRILLATION: ICD-10-CM

## 2024-01-09 DIAGNOSIS — D69.6 THROMBOCYTOPENIA, UNSPECIFIED: ICD-10-CM

## 2024-01-09 DIAGNOSIS — K74.60 HEPATIC CIRRHOSIS, UNSPECIFIED HEPATIC CIRRHOSIS TYPE, UNSPECIFIED WHETHER ASCITES PRESENT: ICD-10-CM

## 2024-01-09 DIAGNOSIS — N18.32 STAGE 3B CHRONIC KIDNEY DISEASE: ICD-10-CM

## 2024-01-09 DIAGNOSIS — M25.531 RIGHT WRIST PAIN: ICD-10-CM

## 2024-01-09 DIAGNOSIS — I25.118 CORONARY ARTERY DISEASE OF NATIVE ARTERY OF NATIVE HEART WITH STABLE ANGINA PECTORIS: ICD-10-CM

## 2024-01-09 DIAGNOSIS — R80.9 TYPE 2 DIABETES MELLITUS WITH MICROALBUMINURIA, WITHOUT LONG-TERM CURRENT USE OF INSULIN: ICD-10-CM

## 2024-01-09 DIAGNOSIS — M1A.4790 OTHER SECONDARY CHRONIC GOUT OF ANKLE WITHOUT TOPHUS, UNSPECIFIED LATERALITY: ICD-10-CM

## 2024-01-09 DIAGNOSIS — M19.071 PRIMARY OSTEOARTHRITIS OF RIGHT ANKLE: ICD-10-CM

## 2024-01-09 DIAGNOSIS — M1A.3710 CHRONIC GOUT OF RIGHT ANKLE DUE TO RENAL IMPAIRMENT WITHOUT TOPHUS: ICD-10-CM

## 2024-01-09 DIAGNOSIS — E66.01 OBESITY, MORBID, BMI 40.0-49.9: ICD-10-CM

## 2024-01-09 DIAGNOSIS — E11.29 TYPE 2 DIABETES MELLITUS WITH MICROALBUMINURIA, WITHOUT LONG-TERM CURRENT USE OF INSULIN: ICD-10-CM

## 2024-01-09 DIAGNOSIS — J43.2 CENTRILOBULAR EMPHYSEMA: ICD-10-CM

## 2024-01-09 DIAGNOSIS — M05.79 RHEUMATOID ARTHRITIS INVOLVING MULTIPLE SITES WITH POSITIVE RHEUMATOID FACTOR: ICD-10-CM

## 2024-01-09 DIAGNOSIS — J45.50 SEVERE PERSISTENT ASTHMA WITHOUT COMPLICATION: ICD-10-CM

## 2024-01-09 DIAGNOSIS — M10.9 GOUT, UNSPECIFIED CAUSE, UNSPECIFIED CHRONICITY, UNSPECIFIED SITE: ICD-10-CM

## 2024-01-09 DIAGNOSIS — N18.31 STAGE 3A CHRONIC KIDNEY DISEASE: ICD-10-CM

## 2024-01-09 DIAGNOSIS — I11.0 HYPERTENSIVE HEART DISEASE WITH HEART FAILURE: ICD-10-CM

## 2024-01-09 PROCEDURE — 1159F MED LIST DOCD IN RCRD: CPT | Mod: CPTII,S$GLB,, | Performed by: FAMILY MEDICINE

## 2024-01-09 PROCEDURE — 1160F RVW MEDS BY RX/DR IN RCRD: CPT | Mod: CPTII,S$GLB,, | Performed by: FAMILY MEDICINE

## 2024-01-09 PROCEDURE — 3078F DIAST BP <80 MM HG: CPT | Mod: CPTII,S$GLB,, | Performed by: FAMILY MEDICINE

## 2024-01-09 PROCEDURE — 99214 OFFICE O/P EST MOD 30 MIN: CPT | Mod: S$GLB,,, | Performed by: FAMILY MEDICINE

## 2024-01-09 PROCEDURE — 1125F AMNT PAIN NOTED PAIN PRSNT: CPT | Mod: CPTII,S$GLB,, | Performed by: FAMILY MEDICINE

## 2024-01-09 PROCEDURE — 73200 CT UPPER EXTREMITY W/O DYE: CPT | Mod: 26,RT,, | Performed by: RADIOLOGY

## 2024-01-09 PROCEDURE — 93971 EXTREMITY STUDY: CPT | Mod: 26,RT,, | Performed by: RADIOLOGY

## 2024-01-09 PROCEDURE — 1101F PT FALLS ASSESS-DOCD LE1/YR: CPT | Mod: CPTII,S$GLB,, | Performed by: FAMILY MEDICINE

## 2024-01-09 PROCEDURE — 73200 CT UPPER EXTREMITY W/O DYE: CPT | Mod: TC,RT

## 2024-01-09 PROCEDURE — 93971 EXTREMITY STUDY: CPT | Mod: TC,RT

## 2024-01-09 PROCEDURE — 99999 PR PBB SHADOW E&M-EST. PATIENT-LVL IV: CPT | Mod: PBBFAC,,, | Performed by: FAMILY MEDICINE

## 2024-01-09 PROCEDURE — 3074F SYST BP LT 130 MM HG: CPT | Mod: CPTII,S$GLB,, | Performed by: FAMILY MEDICINE

## 2024-01-09 PROCEDURE — 3288F FALL RISK ASSESSMENT DOCD: CPT | Mod: CPTII,S$GLB,, | Performed by: FAMILY MEDICINE

## 2024-01-09 RX ORDER — COLCHICINE 0.6 MG/1
CAPSULE ORAL
Qty: 30 CAPSULE | Refills: 3 | Status: SHIPPED | OUTPATIENT
Start: 2024-01-09

## 2024-01-09 RX ORDER — DICLOFENAC SODIUM 30 MG/G
GEL TOPICAL
Qty: 300 G | Refills: 2 | Status: SHIPPED | OUTPATIENT
Start: 2024-01-09

## 2024-01-09 RX ORDER — ALLOPURINOL 300 MG/1
TABLET ORAL
Qty: 135 TABLET | Refills: 3 | Status: SHIPPED | OUTPATIENT
Start: 2024-01-09

## 2024-01-09 NOTE — PROGRESS NOTES
SUBJECTIVE:  Lenny Lopez is a 78 y.o. male who sustained a right elbow injury 2 week(s) ago. Mechanism of injury: unknown. Immediate symptoms: delayed pain, delayed swelling, inability to use arm directly after injury, inability to use hand directly after injury, deformity was immediately noted. Symptoms have been constant since that time. Prior history of related problems: no prior problems with this area in the past, previous elbow injury gout. Partial results with Steroids.    OBJECTIVE:  Vitals:    01/09/24 0749   BP: (!) 96/58   Pulse: 63   Temp: 97.7 °F (36.5 °C)       Vital signs as noted above.  Appearance: alert, well appearing, and in no distress, morbidly obese, and acyanotic, in no respiratory distress.  Elbow exam: soft tissue tenderness and swelling at the wrist and hand is improved, reduced range of motion of elbow is reduced but wrist is improved, lateral epicondylar tenderness, olecranon tenderness, olecranon effusion, radial pulse normal, contralateral elbow exam is normal..  Lab Results   Component Value Date    WBC 9.09 01/09/2024    HGB 10.9 (L) 01/09/2024    HCT 35.1 (L) 01/09/2024     01/09/2024    CHOL 104 (L) 11/25/2023    TRIG 97 11/25/2023    HDL 33 (L) 11/25/2023    ALT 25 11/25/2023    AST 18 11/25/2023     01/09/2024    K 4.3 01/09/2024     01/09/2024    CREATININE 2.0 (H) 01/09/2024    BUN 54 (H) 01/09/2024    CO2 22 (L) 01/09/2024    TSH 4.939 (H) 11/25/2023    PSA 3.5 12/01/2015    INR 1.1 09/03/2020    HGBA1C 6.6 (H) 11/25/2023       CT Arm Elbow Without Contrast Right  Narrative: EXAMINATION:  CT ARM ELBOW WITHOUT CONTRAST RIGHT    CLINICAL HISTORY:  Soft tissue infection suspected, elbow, xray done;  Localized edema    TECHNIQUE:  Fine detail images are obtained through the right elbow and displayed at soft tissue and bone windows.  Multi planer reconstructions are provided.    COMPARISON:  None    FINDINGS:  An acute fracture of the body of the humerus  radius or ulna is not seen.  Dislocation is not noted.  A joint effusion is not seen.    There is however an irregular olecranon bone spur and soft tissue calcifications directly adjacent with edema and a small amount of fluid consistent with bursitis.  Plain film correlation is recommended.  Impression: Olecranon bursitis with fracture of the olecranon bone spur and soft tissue calcifications noted.  A fracture of the body of the humerus, radius or ulna is not seen.    Electronically signed by: Aaron Powell MD  Date:    01/09/2024  Time:    10:59  US Upper Extremity Veins Right  Narrative: EXAMINATION:  US UPPER EXTREMITY VEINS RIGHT    CLINICAL HISTORY:  moderate swelling of arm and forearm;    TECHNIQUE:  Duplex and color flow Doppler evaluation and dynamic compression was performed of the right upper extremity veins.    COMPARISON:  None    FINDINGS:  Central veins: The internal jugular, subclavian, and axillary veins are patent and free of thrombus.    Arm veins: The brachial, basilic, and cephalic veins are patent and compressible.    Contralateral subclavian/internal jugular veins: The left subclavian and internal jugular veins are patent and free of thrombus.    Other findings: None.  Impression: No thrombus in central veins of the right upper extremity.    Electronically signed by: Aaron Powell MD  Date:    01/09/2024  Time:    10:53      ASSESSMENT:  elbow rule out goutty  Olecranon fracture, right, closed, initial encounter    Edema of extremities  -     Cancel: US Upper Extremity Veins Right; Future; Expected date: 01/09/2024  -     Basic Metabolic Panel; Future; Expected date: 01/09/2024  -     Sedimentation rate; Future; Expected date: 01/09/2024  -     CBC W/ AUTO DIFFERENTIAL; Future; Expected date: 01/09/2024  -     CT Arm Elbow Without Contrast Right; Future; Expected date: 01/09/2024  -     US Upper Extremity Veins Right; Future; Expected date: 01/09/2024    Primary osteoarthritis of right  ankle  -     allopurinoL (ZYLOPRIM) 300 MG tablet; 1 1/2 tab daily  Dispense: 135 tablet; Refill: 3    Type 2 diabetes mellitus with microalbuminuria, without long-term current use of insulin    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 Take additional 1 cap one hour later. Then take  1 cap PO BID until flare resolves.  Dispense: 30 capsule; Refill: 3  -     diclofenac sodium (SOLARAZE) 3 % gel; 1 application bid  Dispense: 300 g; Refill: 2  -     Urinalysis; Future; Expected date: 01/09/2024    Right wrist pain  -     colchicine (MITIGARE) 0.6 mg Cap; Take 2 caps PO. Take additional 1 cap one hour later. Then take  1 cap PO BID until flare resolves.  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 Take additional 1 cap one hour later. Then take  1 cap PO BID until flare resolves.  Dispense: 30 capsule; Refill: 3          PLAN:  rest the injured area as much as practical, elevate the injured limb, compressive bandage, prescription for analgesic given  See orders for this visit as documented in the electronic medical record.  Discussed health maintenance guidelines appropriate for age.

## 2024-01-10 ENCOUNTER — TELEPHONE (OUTPATIENT)
Dept: PULMONOLOGY | Facility: CLINIC | Age: 79
End: 2024-01-10
Payer: MEDICARE

## 2024-01-11 DIAGNOSIS — R80.9 TYPE 2 DIABETES MELLITUS WITH MICROALBUMINURIA, WITHOUT LONG-TERM CURRENT USE OF INSULIN: ICD-10-CM

## 2024-01-11 DIAGNOSIS — E11.29 TYPE 2 DIABETES MELLITUS WITH MICROALBUMINURIA, WITHOUT LONG-TERM CURRENT USE OF INSULIN: ICD-10-CM

## 2024-01-11 RX ORDER — ATORVASTATIN CALCIUM 80 MG/1
80 TABLET, FILM COATED ORAL DAILY
Qty: 90 TABLET | Refills: 3 | Status: SHIPPED | OUTPATIENT
Start: 2024-01-11

## 2024-01-11 NOTE — TELEPHONE ENCOUNTER
----- Message from Sigrid Tinoco sent at 1/11/2024 10:23 AM CST -----  Regarding: refill  Type:  RX Refill Request    Who Called: pt    Refill or New Rx:refill    RX Name and Strength:atorvastatin (LIPITOR) 80 MG tablet 90 tablet 3 1/18/2023 -   Sig - Route: Take 1 tablet (80 mg total) by mouth once daily. - Oral   Sent to pharmacy as: atorvastatin (LIPITOR) 80 MG tablet         How is the patient currently taking it? (ex. 1XDay):see above    Is this a 30 day or 90 day RX:see above    Preferred Pharmacy with phone number:    [id number] - [place] - [address] [address]  Phone: [phone] Fax: [phone]    Local or Mail Order:Local    Ordering Provider:Pablo Mccloud Call Back Number:see above    Additional Information:  Please call to discuss.

## 2024-01-12 ENCOUNTER — TELEPHONE (OUTPATIENT)
Dept: ORTHOPEDICS | Facility: CLINIC | Age: 79
End: 2024-01-12
Payer: MEDICARE

## 2024-01-12 DIAGNOSIS — M25.551 BILATERAL HIP PAIN: Primary | ICD-10-CM

## 2024-01-12 DIAGNOSIS — M25.552 BILATERAL HIP PAIN: Primary | ICD-10-CM

## 2024-01-12 NOTE — TELEPHONE ENCOUNTER
No care due was identified.  [facility] Embedded Care Due Messages. Reference number: [id number].   1/11/2024 9:39:28 PM CST

## 2024-01-12 NOTE — TELEPHONE ENCOUNTER
Refill Decision Note   Lenny Lopez  is requesting a refill authorization.  Brief Assessment and Rationale for Refill:  Approve     Medication Therapy Plan:         Comments:     Note composed:10:21 PM 01/11/2024

## 2024-01-12 NOTE — TELEPHONE ENCOUNTER
LM to let pt know that we would like to schedule you on 1/17/24 at 1pm to see Dr. Platt (per Alisha).  I will not schedule this until we hear back from you/jf 1/12/24

## 2024-01-16 ENCOUNTER — OFFICE VISIT (OUTPATIENT)
Dept: PRIMARY CARE CLINIC | Facility: CLINIC | Age: 79
End: 2024-01-16
Payer: MEDICARE

## 2024-01-16 VITALS
OXYGEN SATURATION: 98 % | WEIGHT: 253.75 LBS | TEMPERATURE: 98 F | HEIGHT: 71 IN | HEART RATE: 65 BPM | DIASTOLIC BLOOD PRESSURE: 60 MMHG | BODY MASS INDEX: 35.52 KG/M2 | SYSTOLIC BLOOD PRESSURE: 110 MMHG

## 2024-01-16 DIAGNOSIS — E11.69 HYPERLIPIDEMIA ASSOCIATED WITH TYPE 2 DIABETES MELLITUS: ICD-10-CM

## 2024-01-16 DIAGNOSIS — M05.79 RHEUMATOID ARTHRITIS INVOLVING MULTIPLE SITES WITH POSITIVE RHEUMATOID FACTOR: ICD-10-CM

## 2024-01-16 DIAGNOSIS — I15.2 HYPERTENSION ASSOCIATED WITH DIABETES: ICD-10-CM

## 2024-01-16 DIAGNOSIS — E66.9 OBESITY (BMI 30-39.9): ICD-10-CM

## 2024-01-16 DIAGNOSIS — N18.32 STAGE 3B CHRONIC KIDNEY DISEASE: ICD-10-CM

## 2024-01-16 DIAGNOSIS — E78.5 HYPERLIPIDEMIA ASSOCIATED WITH TYPE 2 DIABETES MELLITUS: ICD-10-CM

## 2024-01-16 DIAGNOSIS — S52.021A OLECRANON FRACTURE, RIGHT, CLOSED, INITIAL ENCOUNTER: ICD-10-CM

## 2024-01-16 DIAGNOSIS — J43.2 CENTRILOBULAR EMPHYSEMA: ICD-10-CM

## 2024-01-16 DIAGNOSIS — R80.9 TYPE 2 DIABETES MELLITUS WITH MICROALBUMINURIA, WITHOUT LONG-TERM CURRENT USE OF INSULIN: Primary | ICD-10-CM

## 2024-01-16 DIAGNOSIS — I25.118 CORONARY ARTERY DISEASE OF NATIVE ARTERY OF NATIVE HEART WITH STABLE ANGINA PECTORIS: ICD-10-CM

## 2024-01-16 DIAGNOSIS — E79.0 ELEVATED URIC ACID IN BLOOD: ICD-10-CM

## 2024-01-16 DIAGNOSIS — E11.59 HYPERTENSION ASSOCIATED WITH DIABETES: ICD-10-CM

## 2024-01-16 DIAGNOSIS — E11.319 DIABETIC RETINOPATHY ASSOCIATED WITH CONTROLLED TYPE 2 DIABETES MELLITUS: ICD-10-CM

## 2024-01-16 DIAGNOSIS — E11.29 TYPE 2 DIABETES MELLITUS WITH MICROALBUMINURIA, WITHOUT LONG-TERM CURRENT USE OF INSULIN: Primary | ICD-10-CM

## 2024-01-16 PROCEDURE — 1159F MED LIST DOCD IN RCRD: CPT | Mod: CPTII,S$GLB,, | Performed by: NURSE PRACTITIONER

## 2024-01-16 PROCEDURE — 3078F DIAST BP <80 MM HG: CPT | Mod: CPTII,S$GLB,, | Performed by: NURSE PRACTITIONER

## 2024-01-16 PROCEDURE — 99214 OFFICE O/P EST MOD 30 MIN: CPT | Mod: S$GLB,,, | Performed by: NURSE PRACTITIONER

## 2024-01-16 PROCEDURE — 1101F PT FALLS ASSESS-DOCD LE1/YR: CPT | Mod: CPTII,S$GLB,, | Performed by: NURSE PRACTITIONER

## 2024-01-16 PROCEDURE — 3288F FALL RISK ASSESSMENT DOCD: CPT | Mod: CPTII,S$GLB,, | Performed by: NURSE PRACTITIONER

## 2024-01-16 PROCEDURE — 1160F RVW MEDS BY RX/DR IN RCRD: CPT | Mod: CPTII,S$GLB,, | Performed by: NURSE PRACTITIONER

## 2024-01-16 PROCEDURE — 1126F AMNT PAIN NOTED NONE PRSNT: CPT | Mod: CPTII,S$GLB,, | Performed by: NURSE PRACTITIONER

## 2024-01-16 PROCEDURE — 99999 PR PBB SHADOW E&M-EST. PATIENT-LVL V: CPT | Mod: PBBFAC,,, | Performed by: NURSE PRACTITIONER

## 2024-01-16 PROCEDURE — 3074F SYST BP LT 130 MM HG: CPT | Mod: CPTII,S$GLB,, | Performed by: NURSE PRACTITIONER

## 2024-01-16 NOTE — PROGRESS NOTES
Subjective:       Patient ID: Lenny Lopez is a 79 y.o. male.    Chief Complaint: Follow-up    HPI  Patient presents today with son for follow up visit. Last visit with PCP-Dr. Shah on 1/9/24 12/11/23 ortho-Dudoussat: right wrist arthritis-triamcinolone injection    8/25/23 urology-: urgency incontinence-flomax 0.8 mg and Ditropan 15 mg PO daily      8/3/23 Pulmonary disease: : severe asthma without complication-fasenra and trelegy     3/8/23 Pain Med-: spinal stenosis of lumbar-formal physical therapy  and lyrica   Past Medical History:   Diagnosis Date    Acute hypoxemic respiratory failure 1/1/2021    Angina pectoris     Arthritis     Asthma     Atrial fibrillation     Back pain     Cardiac pacemaker     Cataract     Chronic bronchitis     COPD (chronic obstructive pulmonary disease)     Coronary artery disease     COVID-19     Diabetic retinopathy associated with controlled type 2 diabetes mellitus 2/18/2021    DM (diabetes mellitus), type 2, 2000 12/12/2014    Gout     Hepatic cirrhosis, unspecified hepatic cirrhosis type, unspecified whether ascites present 1/23/2022    Hoarseness of voice     Hyperlipidemia     Hypertension     Kidney stones 12/17/2012    Nephrolithiasis     Obesity     Pneumonia due to COVID-19 virus 1/1/2021    Renal manifestation of secondary diabetes mellitus     Rheumatoid factor positive 03/08/2017    Negative work up with Dr. Choudhury    Sleep apnea     Thyroid disease     Unspecified disorder of kidney and ureter     Urinary incontinence     Vocal cord polyp        Review of patient's allergies indicates:   Allergen Reactions    No known drug allergies          Current Outpatient Medications:     acarbose (PRECOSE) 25 MG Tab, Take 1 tablet (25 mg total) by mouth 3 (three) times daily with meals., Disp: 270 tablet, Rfl: 0    ACCU-CHEK GUIDE GLUCOSE METER Misc, USE DEVICE TO TEST BLOOD SUGAR TWO TIMES DAILY, Disp: , Rfl:     albuterol (PROVENTIL/VENTOLIN HFA)  90 mcg/actuation inhaler, 2 puffs every 4 hours as needed for cough, wheeze, or shortness of breath, Disp: 18 g, Rfl: 11    albuterol-ipratropium (DUO-NEB) 2.5 mg-0.5 mg/3 mL nebulizer solution, Take 3 mLs by nebulization every 4 (four) hours as needed for Wheezing or Shortness of Breath., Disp: 50 each, Rfl: 4    allopurinoL (ZYLOPRIM) 300 MG tablet, 1 1/2 tab daily, Disp: 135 tablet, Rfl: 3    aspirin 81 mg Tab, Take 1 tablet by mouth once daily. , Disp: , Rfl:     atorvastatin (LIPITOR) 80 MG tablet, Take 1 tablet (80 mg total) by mouth once daily., Disp: 90 tablet, Rfl: 3    benralizumab (FASENRA PEN) 30 mg/mL AtIn, Inject 30 mg into the skin every 8 weeks., Disp: 1 mL, Rfl: 6    blood sugar diagnostic Strp, 1 strip by Misc.(Non-Drug; Combo Route) route 3 (three) times daily. DX: E11.29   Dispense test strips that are covered by insurance (Patient taking differently: 1 strip by Misc.(Non-Drug; Combo Route) route 3 (three) times daily. DX: E11.29   Dispense test strips that are covered by insurance), Disp: 300 strip, Rfl: 3    blood sugar diagnostic Strp, To check BG 3 times daily, to use with insurance preferred meter, Disp: 200 each, Rfl: 0    blood sugar diagnostic, drum (ACCU-CHEK COMPACT PLUS TEST) Strp, 1 strip by Misc.(Non-Drug; Combo Route) route 2 (two) times daily with meals., Disp: 100 strip, Rfl: 1    blood-glucose meter kit, To check BG 3 times daily, to use with insurance preferred meter, Disp: 1 each, Rfl: 0    colchicine (MITIGARE) 0.6 mg Cap, Take 2 caps PO. Take additional 1 cap one hour later. Then take  1 cap PO BID until flare resolves., Disp: 30 capsule, Rfl: 3    diclofenac sodium (SOLARAZE) 3 % gel, 1 application bid, Disp: 300 g, Rfl: 2    ELIQUIS 5 mg Tab, Take 5 mg by mouth 2 (two) times daily. , Disp: , Rfl:     fluticasone propionate (FLONASE) 50 mcg/actuation nasal spray, 1 spray by Nasal route., Disp: , Rfl:     fluticasone-umeclidin-vilanter (TRELEGY ELLIPTA) 200-62.5-25 mcg  inhaler, Inhale 1 puff into the lungs once daily., Disp: 60 each, Rfl: 3    furosemide (LASIX) 40 MG tablet, Take 1 tablet (40 mg total) by mouth 2 (two) times daily as needed (edema)., Disp: 120 tablet, Rfl: 0    HYDROcodone-acetaminophen (NORCO) 7.5-325 mg per tablet, Take 1 tablet by mouth every 6 (six) hours as needed for Pain., Disp: 28 tablet, Rfl: 0    ketoconazole (NIZORAL) 2 % cream, Apply topically 2 (two) times daily., Disp: 30 g, Rfl: 0    lancets (FREESTYLE LANCETS) 28 gauge Misc, 1 lancet by Misc.(Non-Drug; Combo Route) route 3 (three) times daily., Disp: 300 each, Rfl: 3    lancets Misc, To check BG 3 times daily, to use with insurance preferred meter, Disp: 200 each, Rfl: 0    metFORMIN (GLUCOPHAGE) 500 MG tablet, Take 1 tablet (500 mg total) by mouth 2 (two) times daily with meals., Disp: 180 tablet, Rfl: 3    metOLazone (ZAROXOLYN) 2.5 MG tablet, Take 2.5 mg by mouth daily as needed., Disp: , Rfl:     montelukast (SINGULAIR) 10 mg tablet, TAKE 1 TABLET BY MOUTH ONCE DAILY IN THE EVENING, Disp: 90 tablet, Rfl: 3    nitroGLYCERIN (NITROSTAT) 0.4 MG SL tablet, DISSOLVE ONE TABLET UNDER THE TONGUE EVERY 5 MINUTES AS NEEDED FOR CHEST PAIN.  DO NOT EXCEED A TOTAL OF 3 DOSES IN 15 MINUTES, Disp: 25 tablet, Rfl: 0    omega-3 fatty acids-vitamin E 1,000 mg Cap, Take 1 capsule by mouth once daily. Three times a day, Disp: , Rfl:     potassium chloride (KLOR-CON) 10 MEQ TbSR, 1 tab with furosemide., Disp: 180 tablet, Rfl: 5    predniSONE (DELTASONE) 20 MG tablet, Take one daily for 3 days and may repeat for shortness of breath., Disp: 21 tablet, Rfl: 0    sotalol (BETAPACE) 80 MG tablet, Take 0.5 tablets (40 mg total) by mouth 2 (two) times daily. for 7 days, Disp: 7 tablet, Rfl: 0    tamsulosin (FLOMAX) 0.4 mg Cap, Take 2 capsules (0.8 mg total) by mouth once daily., Disp: 180 capsule, Rfl: 3    TURMERIC ORAL, Take 1 tablet by mouth once daily., Disp: , Rfl:     blood-glucose meter, drum-type Kit, 1 Device  "by Misc.(Non-Drug; Combo Route) route 2 (two) times daily with meals., Disp: 1 kit, Rfl: 0    gabapentin (NEURONTIN) 300 MG capsule, Take 1 capsule (300 mg total) by mouth 2 (two) times daily. (Patient not taking: Reported on 2/23/2023), Disp: 60 capsule, Rfl: 1    lancing device Misc, 1 Device by Misc.(Non-Drug; Combo Route) route 2 (two) times daily with meals., Disp: 100 each, Rfl: 0    lisinopriL (PRINIVIL,ZESTRIL) 5 MG tablet, Take 1 tablet by mouth once daily (Patient not taking: Reported on 1/16/2024), Disp: 90 tablet, Rfl: 3    methylPREDNISolone (MEDROL DOSEPACK) 4 mg tablet, use as directed (Patient not taking: Reported on 1/16/2024), Disp: 1 each, Rfl: 0  No current facility-administered medications for this visit.    Facility-Administered Medications Ordered in Other Visits:     lidocaine (PF) 10 mg/ml (1%) injection 10 mg, 1 mL, Intradermal, Once, Deandra Diaz MD    Review of Systems   Constitutional:  Negative for unexpected weight change.   HENT:  Negative for trouble swallowing.    Eyes:  Negative for visual disturbance.   Respiratory:  Negative for shortness of breath.    Cardiovascular:  Negative for chest pain, palpitations and leg swelling.   Gastrointestinal:  Negative for blood in stool.   Genitourinary:  Negative for hematuria.   Skin:  Negative for rash.   Allergic/Immunologic: Negative for immunocompromised state.   Neurological:  Negative for headaches.   Hematological:  Does not bruise/bleed easily.   Psychiatric/Behavioral:  Negative for agitation. The patient is not nervous/anxious.        Objective:      /60 (BP Location: Left arm, Patient Position: Sitting, BP Method: Large (Manual))   Pulse 65   Temp 97.7 °F (36.5 °C)   Ht 5' 11" (1.803 m)   Wt 115.1 kg (253 lb 12 oz)   SpO2 98%   BMI 35.39 kg/m²   Physical Exam  Constitutional:       Appearance: He is well-developed.   Eyes:      Conjunctiva/sclera: Conjunctivae normal.      Pupils: Pupils are equal, round, and " reactive to light.   Cardiovascular:      Rate and Rhythm: Normal rate and regular rhythm.      Heart sounds: Normal heart sounds.   Pulmonary:      Effort: Pulmonary effort is normal.   Musculoskeletal:         General: Normal range of motion.   Neurological:      Mental Status: He is alert and oriented to person, place, and time.   Psychiatric:         Behavior: Behavior normal.         Thought Content: Thought content normal.         Judgment: Judgment normal.         Assessment:       1. Type 2 diabetes mellitus with microalbuminuria, without long-term current use of insulin    2. Hypertension associated with diabetes    3. Hyperlipidemia associated with type 2 diabetes mellitus    4. Stage 3b chronic kidney disease    5. Elevated uric acid in blood    6. Diabetic retinopathy associated with controlled type 2 diabetes mellitus    7. Coronary artery disease of native artery of native heart with stable angina pectoris    8. Rheumatoid arthritis involving multiple sites with positive rheumatoid factor    9. Olecranon fracture, right, closed, initial encounter    10. Centrilobular emphysema    11. Obesity (BMI 30-39.9)        Plan:       Type 2 diabetes mellitus with microalbuminuria, without long-term current use of insulin  -     CBC W/ AUTO DIFFERENTIAL; Future; Expected date: 01/16/2024  -     Hemoglobin A1C; Future; Expected date: 01/16/2024  -     Microalbumin/Creatinine Ratio, Urine; Future; Expected date: 01/16/2024  Stable, continue management  Follow the ADA diet  Hemoglobin A1C   Date Value Ref Range Status   11/25/2023 6.6 (H) 4.0 - 5.6 % Final     Comment:     ADA Screening Guidelines:  5.7-6.4%  Consistent with prediabetes  >or=6.5%  Consistent with diabetes    High levels of fetal hemoglobin interfere with the HbA1C  assay. Heterozygous hemoglobin variants (HbS, HgC, etc)do  not significantly interfere with this assay.   However, presence of multiple variants may affect accuracy.     01/27/2023 7.1  (H) 4.0 - 5.6 % Final     Comment:     ADA Screening Guidelines:  5.7-6.4%  Consistent with prediabetes  >or=6.5%  Consistent with diabetes    High levels of fetal hemoglobin interfere with the HbA1C  assay. Heterozygous hemoglobin variants (HbS, HgC, etc)do  not significantly interfere with this assay.   However, presence of multiple variants may affect accuracy.     01/11/2022 7.4 (H) 4.0 - 5.6 % Final     Comment:     ADA Screening Guidelines:  5.7-6.4%  Consistent with prediabetes  >or=6.5%  Consistent with diabetes    High levels of fetal hemoglobin interfere with the HbA1C  assay. Heterozygous hemoglobin variants (HbS, HgC, etc)do  not significantly interfere with this assay.   However, presence of multiple variants may affect accuracy.        Hypertension associated with diabetes  Stable, continue medication  Low sodium diet  BP Readings from Last 3 Encounters:   01/16/24 110/60   01/09/24 (!) 96/58   11/29/23 (!) 130/58      Hyperlipidemia associated with type 2 diabetes mellitus  Stable on lipitor  Stage 3b chronic kidney disease  -     COMPREHENSIVE METABOLIC PANEL; Future; Expected date: 01/16/2024  -     CBC W/ AUTO DIFFERENTIAL; Future; Expected date: 01/16/2024  -     Lipid Panel; Future; Expected date: 01/16/2024  Stable and chronic.  Will continue to monitor q3-6 months and control chronic conditions as optimally as possible to preserve function.   Elevated uric acid in blood  -     Uric Acid; Future; Expected date: 01/16/2024    Diabetic retinopathy associated with controlled type 2 diabetes mellitus  Stable, on gabapentin  Coronary artery disease of native artery of native heart with stable angina pectoris  Stable, continue management  Rheumatoid arthritis involving multiple sites with positive rheumatoid factor  Stable, continue management  Olecranon fracture, right, closed, initial encounter  Stable, a visit with ortho on 1/17/24  Centrilobular emphysema  Stable, continue management  Obesity (BMI  "30-39.9)  Counseled patient on his ideal body weight, health consequences of being obese and current recommendations including weekly exercise and a heart healthy diet.  Current BMI is:Estimated body mass index is 35.39 kg/m² as calculated from the following:    Height as of this encounter: 5' 11" (1.803 m).    Weight as of this encounter: 115.1 kg (253 lb 12 oz)..  Patient is aware that ideal BMI < 25 or Weight in (lb) to have BMI = 25: 178.9.           Patient readiness: acceptance and barriers:none    During the course of the visit the patient was educated and counseled about the following:     Diabetes:  Discussed general issues about diabetes pathophysiology and management.  Addressed ADA diet.  Suggested low cholesterol diet.  Encouraged aerobic exercise.  Hypertension:   Dietary sodium restriction.  Regular aerobic exercise.    Goals: Diabetes: Maintain Hemoglobin A1C below 7, Hypertension: Reduce Blood Pressure, and Obesity: Reduce calorie intake and BMI    Did patient meet goals/outcomes: Yes    The following self management tools provided: declined    Patient Instructions (the written plan) was given to the patient/family.     Time spent with patient: 30 minutes    Barriers to medications present (no )    Adverse reactions to current medications (no)    Over the counter medications reviewed (Yes)          "

## 2024-01-16 NOTE — PATIENT INSTRUCTIONS
Edd Galvez,     If you are due for any health screening(s) below please notify me so we can arrange them to be ordered and scheduled to maintain your health. Most healthy patients complete it. Don't lose out on improving your health.     All of your core healthy metrics are met.                 Diabetic Retinal Eye Exam    Diabetes is the #1 cause of blindness in the [place] - early detection before signs or symptoms develop can prevent debilitating blindness.    Once-a-year screening is recommended for all diabetic patients. This exam can prevent and treat diabetes complications in the eye before developing symptoms. This can be done with a special camera is used to take photographs of the back of your eye without having to dilate them, or you can see an eye doctor for a full dilated exam.

## 2024-01-17 ENCOUNTER — OFFICE VISIT (OUTPATIENT)
Dept: ORTHOPEDICS | Facility: CLINIC | Age: 79
End: 2024-01-17
Payer: MEDICARE

## 2024-01-17 VITALS — BODY MASS INDEX: 35.42 KG/M2 | WEIGHT: 253 LBS | HEIGHT: 71 IN

## 2024-01-17 DIAGNOSIS — S52.021A OLECRANON FRACTURE, RIGHT, CLOSED, INITIAL ENCOUNTER: ICD-10-CM

## 2024-01-17 PROCEDURE — 99213 OFFICE O/P EST LOW 20 MIN: CPT | Mod: S$GLB,,, | Performed by: ORTHOPAEDIC SURGERY

## 2024-01-17 PROCEDURE — 1126F AMNT PAIN NOTED NONE PRSNT: CPT | Mod: CPTII,S$GLB,, | Performed by: ORTHOPAEDIC SURGERY

## 2024-01-17 PROCEDURE — 99999 PR PBB SHADOW E&M-EST. PATIENT-LVL II: CPT | Mod: PBBFAC,,, | Performed by: ORTHOPAEDIC SURGERY

## 2024-01-17 NOTE — PROGRESS NOTES
Mr Lopez returns to clinic today.  Has a history of a strain of his right wrist.  He was injected at his last visit.  He states that his wrist is now doing very well.  He has a new complaint of pain to his right elbow.  He did have some swelling and soreness.  States that his elbow subsequently got hit by a door and since the elbow was hit by a door he has not having additional pain or swelling     Physical exam:  Examination of the right elbow reveals that there is no edema.  There are no skin changes.  Palpation does not produce significant tenderness.  He is able to flex and extend.    Radiology:  CT scan of the right elbow from 01/09/2024 has been reviewed.  He was noted have enthesopathy of the triceps tendon.  There is an osteophyte from the olecranon which appears to have fractured.  There was no fracture or dislocation of the humerus radial head or remainder of the ulna.      Assessment:  Right elbow olecranon bursitis     Plan:    1. He has no longer having any soreness and I feel like this is most likely related to fracture of the osteophyte.  We will therefore continue to monitor    2. Followup on PRN basis

## 2024-01-29 DIAGNOSIS — J44.89 ASTHMA-COPD OVERLAP SYNDROME: ICD-10-CM

## 2024-01-29 DIAGNOSIS — J45.50 SEVERE PERSISTENT ASTHMA WITHOUT COMPLICATION: ICD-10-CM

## 2024-01-29 DIAGNOSIS — J44.9 CHRONIC OBSTRUCTIVE PULMONARY DISEASE, UNSPECIFIED COPD TYPE: ICD-10-CM

## 2024-01-29 RX ORDER — FLUTICASONE FUROATE, UMECLIDINIUM BROMIDE AND VILANTEROL TRIFENATATE 200; 62.5; 25 UG/1; UG/1; UG/1
1 POWDER RESPIRATORY (INHALATION) DAILY
Qty: 60 EACH | Refills: 3 | Status: SHIPPED | OUTPATIENT
Start: 2024-01-29 | End: 2024-02-05 | Stop reason: SDUPTHER

## 2024-01-29 NOTE — TELEPHONE ENCOUNTER
Pt requesting a refill on his Trelegy 200mg  Ordered-8/24/23  Last office visit-8/3/23  Next office visit-2/5/24

## 2024-02-05 ENCOUNTER — TELEPHONE (OUTPATIENT)
Dept: PULMONOLOGY | Facility: CLINIC | Age: 79
End: 2024-02-05

## 2024-02-05 ENCOUNTER — OFFICE VISIT (OUTPATIENT)
Dept: PULMONOLOGY | Facility: CLINIC | Age: 79
End: 2024-02-05
Payer: MEDICARE

## 2024-02-05 VITALS
HEIGHT: 71 IN | DIASTOLIC BLOOD PRESSURE: 85 MMHG | BODY MASS INDEX: 35.43 KG/M2 | OXYGEN SATURATION: 97 % | SYSTOLIC BLOOD PRESSURE: 159 MMHG | WEIGHT: 253.06 LBS | HEART RATE: 75 BPM

## 2024-02-05 DIAGNOSIS — J44.9 CHRONIC OBSTRUCTIVE PULMONARY DISEASE, UNSPECIFIED COPD TYPE: ICD-10-CM

## 2024-02-05 DIAGNOSIS — J82.83 EOSINOPHILIC ASTHMA: Primary | ICD-10-CM

## 2024-02-05 DIAGNOSIS — J44.89 ASTHMA-COPD OVERLAP SYNDROME: ICD-10-CM

## 2024-02-05 DIAGNOSIS — M25.551 BILATERAL HIP PAIN: ICD-10-CM

## 2024-02-05 DIAGNOSIS — M25.552 BILATERAL HIP PAIN: ICD-10-CM

## 2024-02-05 DIAGNOSIS — J45.50 SEVERE PERSISTENT ASTHMA WITHOUT COMPLICATION: ICD-10-CM

## 2024-02-05 PROCEDURE — 3079F DIAST BP 80-89 MM HG: CPT | Mod: CPTII,S$GLB,, | Performed by: INTERNAL MEDICINE

## 2024-02-05 PROCEDURE — 99999 PR PBB SHADOW E&M-EST. PATIENT-LVL V: CPT | Mod: PBBFAC,,, | Performed by: INTERNAL MEDICINE

## 2024-02-05 PROCEDURE — 99213 OFFICE O/P EST LOW 20 MIN: CPT | Mod: S$GLB,,, | Performed by: INTERNAL MEDICINE

## 2024-02-05 PROCEDURE — 1101F PT FALLS ASSESS-DOCD LE1/YR: CPT | Mod: CPTII,S$GLB,, | Performed by: INTERNAL MEDICINE

## 2024-02-05 PROCEDURE — 3077F SYST BP >= 140 MM HG: CPT | Mod: CPTII,S$GLB,, | Performed by: INTERNAL MEDICINE

## 2024-02-05 PROCEDURE — 1159F MED LIST DOCD IN RCRD: CPT | Mod: CPTII,S$GLB,, | Performed by: INTERNAL MEDICINE

## 2024-02-05 PROCEDURE — 3288F FALL RISK ASSESSMENT DOCD: CPT | Mod: CPTII,S$GLB,, | Performed by: INTERNAL MEDICINE

## 2024-02-05 RX ORDER — BENRALIZUMAB 30 MG/ML
30 INJECTION, SOLUTION SUBCUTANEOUS
Qty: 1 ML | Refills: 6 | Status: SHIPPED | OUTPATIENT
Start: 2024-02-05

## 2024-02-05 RX ORDER — FLUTICASONE FUROATE, UMECLIDINIUM BROMIDE AND VILANTEROL TRIFENATATE 200; 62.5; 25 UG/1; UG/1; UG/1
1 POWDER RESPIRATORY (INHALATION) DAILY
Qty: 60 EACH | Refills: 3 | Status: SHIPPED | OUTPATIENT
Start: 2024-02-05

## 2024-02-05 RX ORDER — ALBUTEROL SULFATE 90 UG/1
AEROSOL, METERED RESPIRATORY (INHALATION)
Qty: 18 G | Refills: 11 | Status: SHIPPED | OUTPATIENT
Start: 2024-02-05

## 2024-02-05 RX ORDER — HYDROCODONE BITARTRATE AND ACETAMINOPHEN 5; 325 MG/1; MG/1
1 TABLET ORAL EVERY 6 HOURS PRN
Qty: 45 TABLET | Refills: 0 | Status: SHIPPED | OUTPATIENT
Start: 2024-02-05 | End: 2024-06-14 | Stop reason: SDUPTHER

## 2024-02-05 NOTE — PATIENT INSTRUCTIONS
Fasenra dosed 30  mg/ml right abd wall sub q mcl  Lot [id number], exp 11/2025..  Norco sent in 45 pills for as needed use-- bilat hip pain is severe-- need to try to get hips fixed..    Try to stay active       Use prednisone if needed.      Edema not good    Fluid ok.          Continue trelegy  --    Continue cpap

## 2024-02-05 NOTE — TELEPHONE ENCOUNTER
----- Message from Erika Flores sent at 2/5/2024  2:36 PM CST -----  Regarding: advise prescription  Contact: [facility]  Type: Needs Medical Advice  Who Called:  [facility]   Symptoms (please be specific):  HYDROcodone-acetaminophen (NORCO) 5-325 mg per tablet  How long has patient had these symptoms:    Pharmacy name and phone #:    [id number]  [place] - [address]  [place] [address]  Phone: [phone] Fax: [phone]      Best Call Back Number: [phone]    Additional Information: first time on the following so they can only prescribe 28 tablets 7 days.

## 2024-02-05 NOTE — TELEPHONE ENCOUNTER
Candace - pharmacist I spoke with and confirmed with the patient was okay with the 28 pill supply.  VU
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
## 2024-02-10 ENCOUNTER — TELEPHONE (OUTPATIENT)
Dept: PULMONOLOGY | Facility: CLINIC | Age: 79
End: 2024-02-10
Payer: MEDICARE

## 2024-02-15 ENCOUNTER — OFFICE VISIT (OUTPATIENT)
Dept: UROLOGY | Facility: CLINIC | Age: 79
End: 2024-02-15
Payer: MEDICARE

## 2024-02-15 ENCOUNTER — OFFICE VISIT (OUTPATIENT)
Dept: ORTHOPEDICS | Facility: CLINIC | Age: 79
End: 2024-02-15
Payer: MEDICARE

## 2024-02-15 VITALS — WEIGHT: 253 LBS | BODY MASS INDEX: 35.42 KG/M2 | HEIGHT: 71 IN

## 2024-02-15 DIAGNOSIS — N20.0 RENAL CALCULUS: ICD-10-CM

## 2024-02-15 DIAGNOSIS — N39.41 URGENCY INCONTINENCE: Primary | ICD-10-CM

## 2024-02-15 DIAGNOSIS — N32.81 OAB (OVERACTIVE BLADDER): ICD-10-CM

## 2024-02-15 DIAGNOSIS — N40.0 PROSTATISM: ICD-10-CM

## 2024-02-15 DIAGNOSIS — M70.21 OLECRANON BURSITIS OF RIGHT ELBOW: Primary | ICD-10-CM

## 2024-02-15 DIAGNOSIS — R39.9 LOWER URINARY TRACT SYMPTOMS: ICD-10-CM

## 2024-02-15 PROCEDURE — 1159F MED LIST DOCD IN RCRD: CPT | Mod: CPTII,S$GLB,, | Performed by: UROLOGY

## 2024-02-15 PROCEDURE — 1160F RVW MEDS BY RX/DR IN RCRD: CPT | Mod: CPTII,S$GLB,, | Performed by: UROLOGY

## 2024-02-15 PROCEDURE — 99999 PR PBB SHADOW E&M-EST. PATIENT-LVL III: CPT | Mod: PBBFAC,,, | Performed by: UROLOGY

## 2024-02-15 PROCEDURE — 99999 PR PBB SHADOW E&M-EST. PATIENT-LVL IV: CPT | Mod: PBBFAC,,, | Performed by: ORTHOPAEDIC SURGERY

## 2024-02-15 PROCEDURE — 1101F PT FALLS ASSESS-DOCD LE1/YR: CPT | Mod: CPTII,S$GLB,, | Performed by: UROLOGY

## 2024-02-15 PROCEDURE — 1159F MED LIST DOCD IN RCRD: CPT | Mod: CPTII,S$GLB,, | Performed by: ORTHOPAEDIC SURGERY

## 2024-02-15 PROCEDURE — 3288F FALL RISK ASSESSMENT DOCD: CPT | Mod: CPTII,S$GLB,, | Performed by: ORTHOPAEDIC SURGERY

## 2024-02-15 PROCEDURE — 1101F PT FALLS ASSESS-DOCD LE1/YR: CPT | Mod: CPTII,S$GLB,, | Performed by: ORTHOPAEDIC SURGERY

## 2024-02-15 PROCEDURE — 99213 OFFICE O/P EST LOW 20 MIN: CPT | Mod: S$GLB,,, | Performed by: ORTHOPAEDIC SURGERY

## 2024-02-15 PROCEDURE — G2211 COMPLEX E/M VISIT ADD ON: HCPCS | Mod: S$GLB,,, | Performed by: UROLOGY

## 2024-02-15 PROCEDURE — 99214 OFFICE O/P EST MOD 30 MIN: CPT | Mod: S$GLB,,, | Performed by: UROLOGY

## 2024-02-15 PROCEDURE — 3288F FALL RISK ASSESSMENT DOCD: CPT | Mod: CPTII,S$GLB,, | Performed by: UROLOGY

## 2024-02-15 PROCEDURE — 1125F AMNT PAIN NOTED PAIN PRSNT: CPT | Mod: CPTII,S$GLB,, | Performed by: ORTHOPAEDIC SURGERY

## 2024-02-15 RX ORDER — SOLIFENACIN SUCCINATE 10 MG/1
10 TABLET, FILM COATED ORAL DAILY
Qty: 90 TABLET | Refills: 3 | Status: SHIPPED | OUTPATIENT
Start: 2024-02-15 | End: 2025-02-14

## 2024-02-15 RX ORDER — TAMSULOSIN HYDROCHLORIDE 0.4 MG/1
0.8 CAPSULE ORAL DAILY
Qty: 180 CAPSULE | Refills: 3 | Status: SHIPPED | OUTPATIENT
Start: 2024-02-15 | End: 2025-02-14

## 2024-02-15 NOTE — PROGRESS NOTES
Mr Lopez returns to clinic today.  Has a history of right elbow olecranon bursitis.  He is still having some pain and some swelling.  He is here today for further evaluation     Physical exam:  Examination of the right elbow reveals that there is no edema.  There are no major skin changes.  Palpation produces mild tenderness over the tip of the olecranon.  He was able to flex and extend the elbow.  Sensation is grossly intact    Assessment:  Right elbow olecranon bursitis    Plan:    1. He can continue to wear a compressive sleeve     2.  I have discussed treatments including aspiration and excision of the bursa.  At this time there is not a significant interest in having any of those procedures    3. Will follow up with me on a p.r.n. basis

## 2024-02-15 NOTE — PROGRESS NOTES
[facility] Established Patient/H&P:    Lenny Lopez is a 79 y.o. male who presents for follow up for nephrolithiasis, lower urinary tract symptoms and abnormal imaging.     Patient with a history of COPD, DM, urge incontinence and nephrolithiasis who presents for urologic follow-up. On review of records he was previously managed by Dr. Zaragoza and Dr. Wheatley      He has a known 1.4 cm proximal left ureteral stone without hydronephrosis. Asymptomatic. Visible on CT abdomen pelvis in 12/2020. Not interested in any surgical intervention.  Has had ESWL in the past for his stone disease.     He reports bothersome lower urinary tract symptoms - urgency, urge incontinence, intermittency and nocturia x 1. He has tried Vesicare and Myrbetriq in the past with no improvement. Remains on Flomax 0.4 mg and Finasteride 5 mg PO daily. Does not wear pads or Depends. Significantly bothered by his symptoms. He is on Lasix daily.        Interval History     8/11/23: Here today for follow up. He was prescribed Gemtesa 75 mg at his last visit. Unclear if he ever took the medication. Here today with persistent frequency, intermittency, urgency with incontinence, straining and nocturia x 1 - 2. Significantly bothered by his symptoms. Remains on Flomax 0.8 mg and Finasteride 5 mg PO daily.      Ultrasound bladder with a bladder mass. There was intervesicular protrusion of the prostate on previous CT in 2020. However, ultrasound could not definitively contribute this to his prostate. Recommended CT urogram or cystoscopy. Patient decided on CTU.      Urine dipstick negative on 8/10/23.     8/25/23:  Discussion over the phone regarding his results and symptoms. He was instructed to start Ditropan 15 mg and continue Flomax 0.8 mg at his last visit. Also discontinued Finasteride. States his urgency with incontinence and nocturia has mostly resolved. Reports a significant improvement.      CT urogram with an enlarged  prostate with intra-vesicular extension, two proximal left ureteral stones that appear chronic and unchanged. Remains asymptomatic.     2/15/24: Patient instructed to continue Flomax 0.8 mg and Ditropan 15 mg PO daily at his last vsit. Once again offered left ureteroscopy and laser lithotripsy for his chronic left ureteral stones with no obstruction, but he declined as he is asymptomatic. Here today for follow up. He has since discontinued Ditropan due to dizziness and dry mouth. States he remains with dry mouth, but dizziness has improved.      Denies any fever, chills, gross hematuria, flank pain,  trauma or history of  malignancy.         PSA  3.5                   9/3/20     IPSS    QoL  17        5          8/11/23  17        6          10/14/21     PVR  0 mL  2/15/24  12 mL              8/11/23     A1C  7.1                   1/27/23       Past Medical History:   Diagnosis Date    Acute hypoxemic respiratory failure 1/1/2021    Angina pectoris     Arthritis     Asthma     Atrial fibrillation     Back pain     Cardiac pacemaker     Cataract     Chronic bronchitis     COPD (chronic obstructive pulmonary disease)     Coronary artery disease     COVID-19     Diabetic retinopathy associated with controlled type 2 diabetes mellitus 2/18/2021    DM (diabetes mellitus), type 2, 2000 12/12/2014    Gout     Hepatic cirrhosis, unspecified hepatic cirrhosis type, unspecified whether ascites present 1/23/2022    Hoarseness of voice     Hyperlipidemia     Hypertension     Kidney stones 12/17/2012    Nephrolithiasis     Obesity     Pneumonia due to COVID-19 virus 1/1/2021    Renal manifestation of secondary diabetes mellitus     Rheumatoid factor positive 03/08/2017    Negative work up with Dr. Choudhury    Sleep apnea     Thyroid disease     Unspecified disorder of kidney and ureter     Urinary incontinence     Vocal cord polyp        Past Surgical History:   Procedure Laterality Date    APPENDECTOMY      CARDIAC PACEMAKER  PLACEMENT  2018    COLONOSCOPY N/A 3/3/2021    Procedure: COLONOSCOPY;  Surgeon: Jesus Soliman MD;  Location: [facility];  Service: Endoscopy;  Laterality: N/A;    CORONARY STENT PLACEMENT  2018    EYE SURGERY      left eye secondary to trauma    FRACTURE SURGERY      right arm    KNEE SURGERY      screw    MICROLARYNGOSCOPY WITH BIOPSY OF VOCAL CORD N/A 10/13/2020    Procedure: MICROLARYNGOSCOPY, WITH VOCAL CORD BIOPSY;  Surgeon: Deandra Diaz MD;  Location: [facility] OR;  Service: ENT;  Laterality: N/A;    TONSILLECTOMY, ADENOIDECTOMY         Review of patient's allergies indicates:   Allergen Reactions    No known drug allergies        Medications Reviewed: see MAR    FOCUSED PHYSICAL EXAM:    There were no vitals filed for this visit.  There is no height or weight on file to calculate BMI.           General: Alert, cooperative, no distress, appears stated age  Abdomen: Soft, non-tender, no CVA tenderness, non-distended        LABS:    No results found for this or any previous visit (from the past 336 hour(s)).        Assessment/Diagnosis:    1. Urgency incontinence  tamsulosin (FLOMAX) 0.4 mg Cap    solifenacin (VESICARE) 10 MG tablet      2. OAB (overactive bladder)        3. Lower urinary tract symptoms        4. Renal calculus        5. Prostatism  tamsulosin (FLOMAX) 0.4 mg Cap          Plans:     I spent 30 minutes of the day of this encounter preparing for, treating and managing this patient. Visit today is associated with current or anticipated ongoing medical care related to this patient's single serious condition/complex condition. Extensive discussion with patient regarding the etiology and management of his lower urinary tract symptoms. Explained that LUTS are multifactorial and can be secondary to an enlarged prostate, PO intake of bladder irritants, overactive bladder, constipation, malignancy, trauma, infection, stones or medications.  - Continue Flomax 0.8 mg.   - RX for Vesicare 10 mg PO  daily.   - Again, offered left ureteroscopy and laser lithotripsy for his chronic left ureteral stones with no obstruction that have previously been monitored with Dr. Wheatley. Patient states he is asymptomatic and wishes to continue with observation.   - RTC in 6 months with symptom score and PVR.

## 2024-02-27 ENCOUNTER — TELEPHONE (OUTPATIENT)
Dept: PULMONOLOGY | Facility: CLINIC | Age: 79
End: 2024-02-27
Payer: MEDICARE

## 2024-02-27 NOTE — TELEPHONE ENCOUNTER
----- Message from Sigrid Tinoco sent at 2/26/2024  1:17 PM CST -----  Regarding: refill  Type:  RX Refill Request    Who Called: pt    Refill or New Rx:refill    RX Name and Strength:benralizumab 30 mg/mL AtIn 1 mL 6 2/5/2024 -   Sig - Route: Inject 30 mg into the skin every 8 weeks. - Subcutaneous   No prior authorization was found for this prescription.   Found prior authorization for another prescription for the same medication: Canceled - Other (Deadline to reply:  March 12, 2020 11:00 PM (3 days ago))         How is the patient currently taking it? (ex. 1XDay):see above    Is this a 30 day or 90 day RX:see above    Preferred Pharmacy with phone number:        [facility] ([facility]) - [place], [place] - [address]  [address] [address]  Phone: [phone] Fax: [phone]          Local or Mail Order:Mail    Ordering Provider:Jerrell Mccloud Call Back Number:[phone]      Additional Information: Please call to discuss.

## 2024-02-28 ENCOUNTER — TELEPHONE (OUTPATIENT)
Dept: PULMONOLOGY | Facility: CLINIC | Age: 79
End: 2024-02-28
Payer: MEDICARE

## 2024-02-28 NOTE — TELEPHONE ENCOUNTER
----- Message from Marques Mcnair sent at 2/28/2024  7:21 AM CST -----  Type: Needs Medical Advice  Who Called:  Ximena/ Joshua     Pharmacy name and phone #:        [email or web address] ([facility]) - [place], [place] - [address]  [address] [address]  Phone: [phone] Fax: [phone]      Best Call Back Number: [phone]  Additional Information: Caller states that she would like a callback regarding the status of the patient's refill request:    benralizumab (FASENRA PEN) 30 mg/mL AtIn

## 2024-02-28 NOTE — TELEPHONE ENCOUNTER
Spoke to pharmacist with [place] (Joshua) informed them that [facility] is working with patient with PAP due to high deductible.  She said she will hold refill requests until otherwise informed.

## 2024-02-29 ENCOUNTER — OFFICE VISIT (OUTPATIENT)
Dept: OPTOMETRY | Facility: CLINIC | Age: 79
End: 2024-02-29
Payer: MEDICARE

## 2024-02-29 DIAGNOSIS — E11.9 DIABETES MELLITUS TYPE 2 WITHOUT RETINOPATHY: Primary | ICD-10-CM

## 2024-02-29 DIAGNOSIS — H52.7 REFRACTIVE ERROR: ICD-10-CM

## 2024-02-29 DIAGNOSIS — H26.9 CORTICAL CATARACT: ICD-10-CM

## 2024-02-29 DIAGNOSIS — H04.123 DRY EYE SYNDROME, BILATERAL: ICD-10-CM

## 2024-02-29 DIAGNOSIS — H25.13 NUCLEAR SCLEROSIS, BILATERAL: ICD-10-CM

## 2024-02-29 DIAGNOSIS — E11.319 DIABETIC RETINOPATHY ASSOCIATED WITH CONTROLLED TYPE 2 DIABETES MELLITUS: ICD-10-CM

## 2024-02-29 DIAGNOSIS — H11.003 PTERYGIUM OF BOTH EYES: ICD-10-CM

## 2024-02-29 PROCEDURE — 2023F DILAT RTA XM W/O RTNOPTHY: CPT | Mod: CPTII,S$GLB,, | Performed by: OPTOMETRIST

## 2024-02-29 PROCEDURE — 1126F AMNT PAIN NOTED NONE PRSNT: CPT | Mod: CPTII,S$GLB,, | Performed by: OPTOMETRIST

## 2024-02-29 PROCEDURE — 92004 COMPRE OPH EXAM NEW PT 1/>: CPT | Mod: S$GLB,,, | Performed by: OPTOMETRIST

## 2024-02-29 PROCEDURE — 99999 PR PBB SHADOW E&M-EST. PATIENT-LVL IV: CPT | Mod: PBBFAC,,, | Performed by: OPTOMETRIST

## 2024-02-29 PROCEDURE — 1160F RVW MEDS BY RX/DR IN RCRD: CPT | Mod: CPTII,S$GLB,, | Performed by: OPTOMETRIST

## 2024-02-29 PROCEDURE — 1159F MED LIST DOCD IN RCRD: CPT | Mod: CPTII,S$GLB,, | Performed by: OPTOMETRIST

## 2024-02-29 PROCEDURE — 3288F FALL RISK ASSESSMENT DOCD: CPT | Mod: CPTII,S$GLB,, | Performed by: OPTOMETRIST

## 2024-02-29 PROCEDURE — 1101F PT FALLS ASSESS-DOCD LE1/YR: CPT | Mod: CPTII,S$GLB,, | Performed by: OPTOMETRIST

## 2024-02-29 NOTE — PROGRESS NOTES
HPI    Pt here for annual DM exam. Pt doing well no complaints, vision OS seems   slightly weaker.     Denies eye pain. No headaches. Denies floaters/flashes  +Ats PRN      BG not checking    Hemoglobin A1C       Date                     Value               Ref Range             Status                11/25/2023               6.6 (H)             4.0 - 5.6 %           Final                  01/27/2023               7.1 (H)             4.0 - 5.6 %           Final                  01/11/2022               7.4 (H)             4.0 - 5.6 %           Final              Recently updated glasses rx  Last edited by Cy Gallego, OD on 2/29/2024  3:31 PM.            Assessment /Plan     For exam results, see Encounter Report.    Diabetes mellitus type 2 without retinopathy    Diabetic retinopathy associated with controlled type 2 diabetes mellitus  -     Ambulatory referral/consult to Optometry    Nuclear sclerosis, bilateral    Cortical cataract    Refractive error    Dry eye syndrome, bilateral    Pterygium of both eyes      1. Diabetes mellitus type 2 without retinopathy  2. Diabetic retinopathy associated with controlled type 2 diabetes mellitus  Discussed possible ocular affects of uncontrolled blood sugar with patient. Recommended continued strong blood sugar control and continued care with PCP. Monitor yearly.     3. Nuclear sclerosis, bilateral  4. Cortical cataract  Moderate OU, approaching visual significance  Discussed possible ocular affects of cataracts. Acceptable BCVA OU.   Discussed treatment options. Surgery not recommended at this time.   Recheck in 8 months prior to updating 's license     5. Refractive error  Declines refraction, recently updated rx    6. Dry eye syndrome, bilateral  7. Pterygium of both eyes  Not in visual axis  Discussed ocular affects of dry eyes. Recommend OTC artificial tears 2-4 times a day in both eyes.   Discussed chronicity of IVONE. RTC if symptoms not alleviated by continued  use of artificial tears.

## 2024-03-05 ENCOUNTER — TELEPHONE (OUTPATIENT)
Dept: PULMONOLOGY | Facility: CLINIC | Age: 79
End: 2024-03-05
Payer: MEDICARE

## 2024-03-20 ENCOUNTER — TELEPHONE (OUTPATIENT)
Dept: PRIMARY CARE CLINIC | Facility: CLINIC | Age: 79
End: 2024-03-20
Payer: MEDICARE

## 2024-03-20 DIAGNOSIS — J45.909 PERSISTENT ASTHMA WITHOUT COMPLICATION, UNSPECIFIED ASTHMA SEVERITY: ICD-10-CM

## 2024-03-20 RX ORDER — MONTELUKAST SODIUM 10 MG/1
TABLET ORAL
Qty: 90 TABLET | Refills: 3 | Status: SHIPPED | OUTPATIENT
Start: 2024-03-20

## 2024-03-20 NOTE — TELEPHONE ENCOUNTER
Chichi Doll, Patient Care Assistant  [facility] Staff     ----- Message from Chichi Doll Patient Care Assistant sent at 3/20/2024  9:47 AM CDT -----  Regarding: refill  Contact: pt  Type:  RX Refill Request    Who Called:  pt   Refill or New Rx:  refill   RX Name and Strength:  montelukast (SINGULAIR) 10 mg tablet    How is the patient currently taking it? 1xday   Is this a 30 day or 90 day RX:  90    Preferred Pharmacy with phone number:    [id number] - [place], [place] - [address] [address]  Phone: [phone] Fax: [phone]    Local or Mail Order:  local   Ordering Provider:  Pablo Mccloud Call Back Number:  [phone] (home)     Additional Information:  please call to advise. Thanks!

## 2024-03-20 NOTE — TELEPHONE ENCOUNTER
Care Due:                  Date            Visit Type   Department     Provider  --------------------------------------------------------------------------------                                EP -                              PRIMARY  Last Visit: 01-      CARE ([facility])   None Found     Blue Shah                              EP -                              PRIMARY  Next Visit: 07-      CARE ([facility])   None Found     Blue Shah                                                            Last  Test          Frequency    Reason                     Performed    Due Date  --------------------------------------------------------------------------------    HBA1C.......  6 months...  acarbose, metFORMIN......  11- 05-    [facility] Embedded Care Due Messages. Reference number: [id number].   3/20/2024 9:44:17 AM CDT

## 2024-03-20 NOTE — TELEPHONE ENCOUNTER
Refill Decision Note   Lenny Lopez  is requesting a refill authorization.  Brief Assessment and Rationale for Refill:  Approve     Medication Therapy Plan:         Comments:     Note composed:10:47 AM 03/20/2024

## 2024-03-27 DIAGNOSIS — R60.0 BILATERAL LEG EDEMA: ICD-10-CM

## 2024-03-27 RX ORDER — METOLAZONE 2.5 MG/1
2.5 TABLET ORAL DAILY PRN
Qty: 4 TABLET | Refills: 0 | Status: SHIPPED | OUTPATIENT
Start: 2024-03-27

## 2024-03-27 RX ORDER — FUROSEMIDE 40 MG/1
40 TABLET ORAL 2 TIMES DAILY PRN
Qty: 120 TABLET | Refills: 0 | Status: SHIPPED | OUTPATIENT
Start: 2024-03-27

## 2024-03-27 NOTE — TELEPHONE ENCOUNTER
----- Message from Jerome Cuenca sent at 3/27/2024 11:15 AM CDT -----  Type: Needs Medical Advice  Who Called:  pt son   Best Call Back Number: [phone]    Additional Information: Pt is calling needing metOLazone (ZAROXOLYN) 2.5 MG tablet and furosemide (LASIX) 40 MG tabletrefilled to   [facility] [address] [place], [place] [address] [address]  Phone: [phone] Fax: [phone]  Please call back and advise.

## 2024-03-28 ENCOUNTER — PATIENT MESSAGE (OUTPATIENT)
Dept: FAMILY MEDICINE | Facility: CLINIC | Age: 79
End: 2024-03-28
Payer: MEDICARE

## 2024-04-09 ENCOUNTER — LAB VISIT (OUTPATIENT)
Dept: LAB | Facility: HOSPITAL | Age: 79
End: 2024-04-09
Attending: NURSE PRACTITIONER
Payer: MEDICARE

## 2024-04-09 DIAGNOSIS — N18.31 CHRONIC KIDNEY DISEASE (CKD) STAGE G3A/A1, MODERATELY DECREASED GLOMERULAR FILTRATION RATE (GFR) BETWEEN 45-59 ML/MIN/1.73 SQUARE METER AND ALBUMINURIA CREATININE RATIO LESS THAN 30 MG/G: Primary | ICD-10-CM

## 2024-04-09 LAB
ANION GAP SERPL CALC-SCNC: 11 MMOL/L (ref 8–16)
BUN SERPL-MCNC: 41 MG/DL (ref 8–23)
CALCIUM SERPL-MCNC: 9.4 MG/DL (ref 8.7–10.5)
CHLORIDE SERPL-SCNC: 90 MMOL/L (ref 95–110)
CO2 SERPL-SCNC: 31 MMOL/L (ref 23–29)
CREAT SERPL-MCNC: 1.6 MG/DL (ref 0.5–1.4)
EST. GFR  (NO RACE VARIABLE): 43 ML/MIN/1.73 M^2
GLUCOSE SERPL-MCNC: 150 MG/DL (ref 70–110)
POTASSIUM SERPL-SCNC: 3.5 MMOL/L (ref 3.5–5.1)
SODIUM SERPL-SCNC: 132 MMOL/L (ref 136–145)

## 2024-04-09 PROCEDURE — 80048 BASIC METABOLIC PNL TOTAL CA: CPT | Performed by: NURSE PRACTITIONER

## 2024-04-09 PROCEDURE — 36415 COLL VENOUS BLD VENIPUNCTURE: CPT | Mod: PO | Performed by: NURSE PRACTITIONER

## 2024-04-12 ENCOUNTER — TELEPHONE (OUTPATIENT)
Dept: PULMONOLOGY | Facility: CLINIC | Age: 79
End: 2024-04-12
Payer: MEDICARE

## 2024-04-12 NOTE — TELEPHONE ENCOUNTER
----- Message from Nika Alonzo sent at 4/12/2024 10:13 AM CDT -----  Contact: Patient  Type:  RX Refill Request    Who Called:   Patient  Refill or New Rx:  New    RX Name and Strength:   Z-Pack    Preferred Pharmacy with phone number:      [id number] - [place], [place] - [address]  [place] [address]  Phone: [phone] Fax: [phone]    Local or Mail Order:  Local  Ordering Provider:  Dr Allan    Would the patient rather a call back or a response via [facility]?   Call back  Best Call Back Number:  [phone]    Additional Information:   States he is coughing and has congestion - states he would like Dr Allan to send in a prescription for a Z-Pack for him - please call - thank you

## 2024-04-12 NOTE — TELEPHONE ENCOUNTER
Was finally able to reach patient.  He is saying he has a little green color to his mucus.  He was wanting to have zithromax called in to his pharmacy.

## 2024-04-15 ENCOUNTER — PATIENT MESSAGE (OUTPATIENT)
Dept: PRIMARY CARE CLINIC | Facility: CLINIC | Age: 79
End: 2024-04-15
Payer: MEDICARE

## 2024-04-15 ENCOUNTER — OFFICE VISIT (OUTPATIENT)
Dept: PRIMARY CARE CLINIC | Facility: CLINIC | Age: 79
End: 2024-04-15
Payer: MEDICARE

## 2024-04-15 VITALS
HEART RATE: 116 BPM | HEIGHT: 71 IN | DIASTOLIC BLOOD PRESSURE: 68 MMHG | WEIGHT: 258.38 LBS | SYSTOLIC BLOOD PRESSURE: 110 MMHG | BODY MASS INDEX: 36.17 KG/M2 | TEMPERATURE: 98 F | OXYGEN SATURATION: 96 %

## 2024-04-15 DIAGNOSIS — R80.9 TYPE 2 DIABETES MELLITUS WITH MICROALBUMINURIA, WITHOUT LONG-TERM CURRENT USE OF INSULIN: ICD-10-CM

## 2024-04-15 DIAGNOSIS — J44.9 CHRONIC OBSTRUCTIVE PULMONARY DISEASE, UNSPECIFIED COPD TYPE: Primary | ICD-10-CM

## 2024-04-15 DIAGNOSIS — I15.2 HYPERTENSION ASSOCIATED WITH DIABETES: ICD-10-CM

## 2024-04-15 DIAGNOSIS — E11.69 HYPERLIPIDEMIA ASSOCIATED WITH TYPE 2 DIABETES MELLITUS: ICD-10-CM

## 2024-04-15 DIAGNOSIS — N18.32 STAGE 3B CHRONIC KIDNEY DISEASE: ICD-10-CM

## 2024-04-15 DIAGNOSIS — E11.29 TYPE 2 DIABETES MELLITUS WITH MICROALBUMINURIA, WITHOUT LONG-TERM CURRENT USE OF INSULIN: ICD-10-CM

## 2024-04-15 DIAGNOSIS — E78.5 HYPERLIPIDEMIA ASSOCIATED WITH TYPE 2 DIABETES MELLITUS: ICD-10-CM

## 2024-04-15 DIAGNOSIS — E66.9 OBESITY (BMI 30-39.9): ICD-10-CM

## 2024-04-15 DIAGNOSIS — M10.9 GOUT, UNSPECIFIED CAUSE, UNSPECIFIED CHRONICITY, UNSPECIFIED SITE: ICD-10-CM

## 2024-04-15 DIAGNOSIS — R05.8 COUGH PRODUCTIVE OF PURULENT SPUTUM: ICD-10-CM

## 2024-04-15 DIAGNOSIS — K59.00 CONSTIPATION, UNSPECIFIED CONSTIPATION TYPE: Primary | ICD-10-CM

## 2024-04-15 DIAGNOSIS — E11.59 HYPERTENSION ASSOCIATED WITH DIABETES: ICD-10-CM

## 2024-04-15 PROCEDURE — 3074F SYST BP LT 130 MM HG: CPT | Mod: CPTII,S$GLB,, | Performed by: NURSE PRACTITIONER

## 2024-04-15 PROCEDURE — 1159F MED LIST DOCD IN RCRD: CPT | Mod: CPTII,S$GLB,, | Performed by: NURSE PRACTITIONER

## 2024-04-15 PROCEDURE — 99214 OFFICE O/P EST MOD 30 MIN: CPT | Mod: 25,S$GLB,, | Performed by: NURSE PRACTITIONER

## 2024-04-15 PROCEDURE — 96372 THER/PROPH/DIAG INJ SC/IM: CPT | Mod: S$GLB,,, | Performed by: NURSE PRACTITIONER

## 2024-04-15 PROCEDURE — 1101F PT FALLS ASSESS-DOCD LE1/YR: CPT | Mod: CPTII,S$GLB,, | Performed by: NURSE PRACTITIONER

## 2024-04-15 PROCEDURE — 1125F AMNT PAIN NOTED PAIN PRSNT: CPT | Mod: CPTII,S$GLB,, | Performed by: NURSE PRACTITIONER

## 2024-04-15 PROCEDURE — 3288F FALL RISK ASSESSMENT DOCD: CPT | Mod: CPTII,S$GLB,, | Performed by: NURSE PRACTITIONER

## 2024-04-15 PROCEDURE — 3078F DIAST BP <80 MM HG: CPT | Mod: CPTII,S$GLB,, | Performed by: NURSE PRACTITIONER

## 2024-04-15 PROCEDURE — 99999 PR PBB SHADOW E&M-EST. PATIENT-LVL V: CPT | Mod: PBBFAC,,, | Performed by: NURSE PRACTITIONER

## 2024-04-15 PROCEDURE — 1160F RVW MEDS BY RX/DR IN RCRD: CPT | Mod: CPTII,S$GLB,, | Performed by: NURSE PRACTITIONER

## 2024-04-15 RX ORDER — CEPHALEXIN 250 MG/1
250 CAPSULE ORAL EVERY 12 HOURS
Qty: 10 CAPSULE | Refills: 0 | Status: SHIPPED | OUTPATIENT
Start: 2024-04-15 | End: 2024-04-20

## 2024-04-15 RX ORDER — METHYLPREDNISOLONE ACETATE 40 MG/ML
40 INJECTION, SUSPENSION INTRA-ARTICULAR; INTRALESIONAL; INTRAMUSCULAR; SOFT TISSUE
Status: COMPLETED | OUTPATIENT
Start: 2024-04-15 | End: 2024-04-15

## 2024-04-15 RX ORDER — AZITHROMYCIN 500 MG/1
TABLET, FILM COATED ORAL
Qty: 3 TABLET | Refills: 3 | Status: SHIPPED | OUTPATIENT
Start: 2024-04-15

## 2024-04-15 RX ADMIN — METHYLPREDNISOLONE ACETATE 40 MG: 40 INJECTION, SUSPENSION INTRA-ARTICULAR; INTRALESIONAL; INTRAMUSCULAR; SOFT TISSUE at 11:04

## 2024-04-15 NOTE — PROGRESS NOTES
Subjective:       Patient ID: Lenny Lopez is a 79 y.o. male.    Chief Complaint: Constipation    Constipation  This is a new problem. The current episode started in the past 7 days. The problem has been gradually worsening since onset. The patient is not on a high fiber diet. He Does not exercise regularly. There has Been adequate water intake. Associated symptoms include bloating, flatus and rectal pain. Pertinent negatives include no back pain, fecal incontinence or fever. He has tried stool softeners for the symptoms. The treatment provided no relief.   Cough  This is a new problem. Episode onset: 3-4. The problem has been unchanged. The cough is Productive of purulent sputum. Associated symptoms include headaches, postnasal drip and rhinorrhea. Pertinent negatives include no ear congestion, ear pain, fever, sore throat or shortness of breath.     Present today with son  Past Medical History:   Diagnosis Date    Acute hypoxemic respiratory failure 1/1/2021    Angina pectoris     Arthritis     Asthma     Atrial fibrillation     Back pain     Cardiac pacemaker     Cataract     Chronic bronchitis     COPD (chronic obstructive pulmonary disease)     Coronary artery disease     COVID-19     Diabetic retinopathy associated with controlled type 2 diabetes mellitus 2/18/2021    DM (diabetes mellitus), type 2, 2000 12/12/2014    Gout     Hepatic cirrhosis, unspecified hepatic cirrhosis type, unspecified whether ascites present 1/23/2022    Hoarseness of voice     Hyperlipidemia     Hypertension     Kidney stones 12/17/2012    Nephrolithiasis     Obesity     Pneumonia due to COVID-19 virus 1/1/2021    Renal manifestation of secondary diabetes mellitus     Rheumatoid factor positive 03/08/2017    Negative work up with Dr. Choudhury    Sleep apnea     Thyroid disease     Unspecified disorder of kidney and ureter     Urinary incontinence     Vocal cord polyp        Review of patient's allergies indicates:   Allergen  Reactions    No known drug allergies          Current Outpatient Medications:     acarbose (PRECOSE) 25 MG Tab, Take 1 tablet (25 mg total) by mouth 3 (three) times daily with meals., Disp: 270 tablet, Rfl: 0    ACCU-CHEK GUIDE GLUCOSE METER Misc, USE DEVICE TO TEST BLOOD SUGAR TWO TIMES DAILY, Disp: , Rfl:     albuterol (PROVENTIL/VENTOLIN HFA) 90 mcg/actuation inhaler, 2 puffs every 4 hours as needed for cough, wheeze, or shortness of breath, Disp: 18 g, Rfl: 11    albuterol-ipratropium (DUO-NEB) 2.5 mg-0.5 mg/3 mL nebulizer solution, Take 3 mLs by nebulization every 4 (four) hours as needed for Wheezing or Shortness of Breath., Disp: 50 each, Rfl: 4    allopurinoL (ZYLOPRIM) 300 MG tablet, 1 1/2 tab daily, Disp: 135 tablet, Rfl: 3    aspirin 81 mg Tab, Take 1 tablet by mouth once daily. , Disp: , Rfl:     atorvastatin (LIPITOR) 80 MG tablet, Take 1 tablet (80 mg total) by mouth once daily., Disp: 90 tablet, Rfl: 3    benralizumab (FASENRA PEN) 30 mg/mL AtIn, Inject 30 mg into the skin every 8 weeks., Disp: 1 mL, Rfl: 6    benralizumab 30 mg/mL AtIn, Inject 30 mg into the skin every 8 weeks., Disp: 1 mL, Rfl: 6    blood sugar diagnostic Strp, 1 strip by Misc.(Non-Drug; Combo Route) route 3 (three) times daily. DX: E11.29   Dispense test strips that are covered by insurance (Patient taking differently: 1 strip by Misc.(Non-Drug; Combo Route) route 3 (three) times daily. DX: E11.29   Dispense test strips that are covered by insurance), Disp: 300 strip, Rfl: 3    blood sugar diagnostic Strp, To check BG 3 times daily, to use with insurance preferred meter, Disp: 200 each, Rfl: 0    blood sugar diagnostic, drum (ACCU-CHEK COMPACT PLUS TEST) Strp, 1 strip by Misc.(Non-Drug; Combo Route) route 2 (two) times daily with meals., Disp: 100 strip, Rfl: 1    colchicine (MITIGARE) 0.6 mg Cap, Take 2 caps PO. Take additional 1 cap one hour later. Then take  1 cap PO BID until flare resolves., Disp: 30 capsule, Rfl: 3     diclofenac sodium (SOLARAZE) 3 % gel, 1 application bid, Disp: 300 g, Rfl: 2    ELIQUIS 5 mg Tab, Take 5 mg by mouth 2 (two) times daily. , Disp: , Rfl:     fluticasone propionate (FLONASE) 50 mcg/actuation nasal spray, 1 spray by Nasal route., Disp: , Rfl:     fluticasone-umeclidin-vilanter (TRELEGY ELLIPTA) 200-62.5-25 mcg inhaler, Inhale 1 puff into the lungs once daily., Disp: 60 each, Rfl: 3    furosemide (LASIX) 40 MG tablet, Take 1 tablet (40 mg total) by mouth 2 (two) times daily as needed (edema)., Disp: 120 tablet, Rfl: 0    ketoconazole (NIZORAL) 2 % cream, Apply topically 2 (two) times daily., Disp: 30 g, Rfl: 0    lancets (FREESTYLE LANCETS) 28 gauge Misc, 1 lancet by Misc.(Non-Drug; Combo Route) route 3 (three) times daily., Disp: 300 each, Rfl: 3    lancets Misc, To check BG 3 times daily, to use with insurance preferred meter, Disp: 200 each, Rfl: 0    metFORMIN (GLUCOPHAGE) 500 MG tablet, Take 1 tablet (500 mg total) by mouth 2 (two) times daily with meals., Disp: 180 tablet, Rfl: 3    metOLazone (ZAROXOLYN) 2.5 MG tablet, Take 1 tablet (2.5 mg total) by mouth daily as needed (edema)., Disp: 4 tablet, Rfl: 0    montelukast (SINGULAIR) 10 mg tablet, TAKE 1 TABLET BY MOUTH ONCE DAILY IN THE EVENING, Disp: 90 tablet, Rfl: 3    nitroGLYCERIN (NITROSTAT) 0.4 MG SL tablet, DISSOLVE ONE TABLET UNDER THE TONGUE EVERY 5 MINUTES AS NEEDED FOR CHEST PAIN.  DO NOT EXCEED A TOTAL OF 3 DOSES IN 15 MINUTES, Disp: 25 tablet, Rfl: 0    omega-3 fatty acids-vitamin E 1,000 mg Cap, Take 1 capsule by mouth once daily. Three times a day, Disp: , Rfl:     potassium chloride (KLOR-CON) 10 MEQ TbSR, 1 tab with furosemide., Disp: 180 tablet, Rfl: 5    predniSONE (DELTASONE) 20 MG tablet, Take one daily for 3 days and may repeat for shortness of breath., Disp: 21 tablet, Rfl: 0    solifenacin (VESICARE) 10 MG tablet, Take 1 tablet (10 mg total) by mouth once daily., Disp: 90 tablet, Rfl: 3    tamsulosin (FLOMAX) 0.4 mg Cap,  Take 2 capsules (0.8 mg total) by mouth once daily., Disp: 180 capsule, Rfl: 3    TURMERIC ORAL, Take 1 tablet by mouth once daily., Disp: , Rfl:     azithromycin (ZITHROMAX) 500 MG tablet, One daily for yellow mucous, repeat if needed, Disp: 3 tablet, Rfl: 3    blood-glucose meter, drum-type Kit, 1 Device by Misc.(Non-Drug; Combo Route) route 2 (two) times daily with meals., Disp: 1 kit, Rfl: 0    cephALEXin (KEFLEX) 250 MG capsule, Take 1 capsule (250 mg total) by mouth every 12 (twelve) hours. for 5 days, Disp: 10 capsule, Rfl: 0    HYDROcodone-acetaminophen (NORCO) 5-325 mg per tablet, Take 1 tablet by mouth every 6 (six) hours as needed for Pain. (Patient not taking: Reported on 4/15/2024), Disp: 45 tablet, Rfl: 0    HYDROcodone-acetaminophen (NORCO) 7.5-325 mg per tablet, Take 1 tablet by mouth every 6 (six) hours as needed for Pain. (Patient not taking: Reported on 4/15/2024), Disp: 28 tablet, Rfl: 0    lancing device Misc, 1 Device by Misc.(Non-Drug; Combo Route) route 2 (two) times daily with meals., Disp: 100 each, Rfl: 0    sotalol (BETAPACE) 80 MG tablet, Take 0.5 tablets (40 mg total) by mouth 2 (two) times daily. for 7 days, Disp: 7 tablet, Rfl: 0  No current facility-administered medications for this visit.    Facility-Administered Medications Ordered in Other Visits:     lidocaine (PF) 10 mg/ml (1%) injection 10 mg, 1 mL, Intradermal, Once, Deandra Diaz MD    Review of Systems   Constitutional:  Negative for fever.   HENT:  Positive for postnasal drip and rhinorrhea. Negative for ear pain and sore throat.    Respiratory:  Positive for cough. Negative for shortness of breath.    Gastrointestinal:  Positive for bloating, constipation, flatus and rectal pain.   Genitourinary:         Rectal pressure   Musculoskeletal:  Negative for back pain.   Neurological:  Positive for headaches.       Objective:      /68 (BP Location: Left arm, Patient Position: Sitting, BP Method: Large (Manual))   " Pulse (!) 116   Temp 97.6 °F (36.4 °C) (Oral)   Ht 5' 11" (1.803 m)   Wt 117.2 kg (258 lb 6.1 oz)   SpO2 96%   BMI 36.04 kg/m²   Physical Exam  Constitutional:       Appearance: He is well-developed.   Eyes:      Conjunctiva/sclera: Conjunctivae normal.      Pupils: Pupils are equal, round, and reactive to light.   Cardiovascular:      Rate and Rhythm: Normal rate and regular rhythm.      Heart sounds: Normal heart sounds.   Pulmonary:      Effort: Pulmonary effort is normal.   Abdominal:      General: There is no distension.      Tenderness: There is no abdominal tenderness.   Musculoskeletal:         General: Normal range of motion.   Neurological:      Mental Status: He is alert and oriented to person, place, and time.   Psychiatric:         Behavior: Behavior normal.         Thought Content: Thought content normal.         Judgment: Judgment normal.         Assessment:       1. Constipation, unspecified constipation type    2. Cough productive of purulent sputum    3. Type 2 diabetes mellitus with microalbuminuria, without long-term current use of insulin    4. Hypertension associated with diabetes    5. Hyperlipidemia associated with type 2 diabetes mellitus    6. Stage 3b chronic kidney disease    7. Gout, unspecified cause, unspecified chronicity, unspecified site    8. Obesity (BMI 30-39.9)        Plan:       Constipation, unspecified constipation type  -     X-Ray Abdomen Flat And Erect; Future; Expected date: 04/15/2024    Cough productive of purulent sputum  -     methylPREDNISolone acetate injection 40 mg  -     cephALEXin (KEFLEX) 250 MG capsule; Take 1 capsule (250 mg total) by mouth every 12 (twelve) hours. for 5 days  Dispense: 10 capsule; Refill: 0    Type 2 diabetes mellitus with microalbuminuria, without long-term current use of insulin  Stable, continue management  Follow the ADA diet  Hemoglobin A1C   Date Value Ref Range Status   11/25/2023 6.6 (H) 4.0 - 5.6 % Final     Comment:     ADA " "Screening Guidelines:  5.7-6.4%  Consistent with prediabetes  >or=6.5%  Consistent with diabetes    High levels of fetal hemoglobin interfere with the HbA1C  assay. Heterozygous hemoglobin variants (HbS, HgC, etc)do  not significantly interfere with this assay.   However, presence of multiple variants may affect accuracy.     01/27/2023 7.1 (H) 4.0 - 5.6 % Final     Comment:     ADA Screening Guidelines:  5.7-6.4%  Consistent with prediabetes  >or=6.5%  Consistent with diabetes    High levels of fetal hemoglobin interfere with the HbA1C  assay. Heterozygous hemoglobin variants (HbS, HgC, etc)do  not significantly interfere with this assay.   However, presence of multiple variants may affect accuracy.     01/11/2022 7.4 (H) 4.0 - 5.6 % Final     Comment:     ADA Screening Guidelines:  5.7-6.4%  Consistent with prediabetes  >or=6.5%  Consistent with diabetes    High levels of fetal hemoglobin interfere with the HbA1C  assay. Heterozygous hemoglobin variants (HbS, HgC, etc)do  not significantly interfere with this assay.   However, presence of multiple variants may affect accuracy.        Hypertension associated with diabetes  Stable, continue medication  Low sodium diet  BP Readings from Last 3 Encounters:   04/15/24 110/68   02/05/24 (!) 159/85   01/16/24 110/60      Hyperlipidemia associated with type 2 diabetes mellitus  Stable, on Lipitor    Stage 3b chronic kidney disease  Stable and chronic.  Will continue to monitor q3-6 months and control chronic conditions as optimally as possible to preserve function.   Gout, unspecified cause, unspecified chronicity, unspecified site  Stable, continue management  Obesity (BMI 30-39.9)  Counseled patient on his ideal body weight, health consequences of being obese and current recommendations including weekly exercise and a heart healthy diet.  Current BMI is:Estimated body mass index is 36.04 kg/m² as calculated from the following:    Height as of this encounter: 5' 11" " (1.803 m).    Weight as of this encounter: 117.2 kg (258 lb 6.1 oz)..  Patient is aware that ideal BMI < 25 or Weight in (lb) to have BMI = 25: 178.9.           Patient readiness: acceptance and barriers:none    During the course of the visit the patient was educated and counseled about the following:     Diabetes:  Discussed general issues about diabetes pathophysiology and management.  Addressed ADA diet.  Suggested low cholesterol diet.  Encouraged aerobic exercise.  Hypertension:   Dietary sodium restriction.  Regular aerobic exercise.  Obesity:   General weight loss/lifestyle modification strategies discussed (elicit support from others; identify saboteurs; non-food rewards, etc).  Regular aerobic exercise program discussed.    Goals: Diabetes: Maintain Hemoglobin A1C below 7, Hypertension: Reduce Blood Pressure, and Obesity: Reduce calorie intake and BMI    Did patient meet goals/outcomes: Yes    The following self management tools provided: declined    Patient Instructions (the written plan) was given to the patient/family.     Time spent with patient: 35 minutes    Barriers to medications present (no )    Adverse reactions to current medications (no)    Over the counter medications reviewed (Yes)

## 2024-04-16 ENCOUNTER — TELEPHONE (OUTPATIENT)
Dept: PRIMARY CARE CLINIC | Facility: CLINIC | Age: 79
End: 2024-04-16
Payer: MEDICARE

## 2024-04-16 NOTE — TELEPHONE ENCOUNTER
Spoke with pt regarding constipation pt stated that he was able to have a bowel movement 2x yesterday. Pt stated the First was Hard an then next was loose. Pt stated that he feels a lot better an that he will call if anything changes.

## 2024-04-18 NOTE — ANESTHESIA PREPROCEDURE EVALUATION
10/13/2020  Lenny Lopez is a 75 y.o., male.    Anesthesia Evaluation          Review of Systems  Anesthesia Hx:  No problems with previous Anesthesia   Cardiovascular:   Pacemaker Hypertension CAD  Dysrhythmias atrial fibrillation Angina TONEY ECG has been reviewed.    Pulmonary:   COPD Asthma Sleep Apnea    Renal/:   Chronic Renal Disease, CRI renal calculi    Hepatic/GI:  Hepatic/GI Normal    Musculoskeletal:   Arthritis     Neurological:  Neurology Normal    Endocrine:   Diabetes, type 2, using insulin        Physical Exam  General:  Obesity    Airway/Jaw/Neck:  Airway Findings: Mouth Opening: Normal Tongue: Normal  General Airway Assessment: Adult  Mallampati: II  TM Distance: Normal, at least 6 cm       Chest/Lungs:  Chest/Lungs Clear    Heart/Vascular:  Heart Findings: Normal            Anesthesia Plan  Type of Anesthesia, risks & benefits discussed:  Anesthesia Type:  general  Patient's Preference:   Intra-op Monitoring Plan: standard ASA monitors  Intra-op Monitoring Plan Comments:   Post Op Pain Control Plan: multimodal analgesia and IV/PO Opioids PRN  Post Op Pain Control Plan Comments:   Induction:   IV  Beta Blocker:  Patient is on a Beta-Blocker and has received one dose within the past 24 hours (No further documentation required).       Informed Consent: Patient understands risks and agrees with Anesthesia plan.  Questions answered. Anesthesia consent signed with patient.  ASA Score: 3     Day of Surgery Review of History & Physical:    H&P update referred to the surgeon.     Anesthesia Plan Notes: I have personally evaluated the patient and discussed risk/benefits/alternatives of general anesthesia.        Ready For Surgery From Anesthesia Perspective.        Wants to cancel. Forwarded message to office.Wants to rechedule after 5/8/2024

## 2024-04-19 ENCOUNTER — TELEPHONE (OUTPATIENT)
Dept: PULMONOLOGY | Facility: CLINIC | Age: 79
End: 2024-04-19
Payer: MEDICARE

## 2024-04-19 NOTE — TELEPHONE ENCOUNTER
Spoke to patient     ----- Message from Rupali Wynn sent at 4/19/2024  4:21 PM CDT -----  Type:  Patient Returning Call    Who Called:pt      Who Left Message for Patient:unsure    Does the patient know what this is regarding?:no    Would the patient rather a call back or a response via [facility]? Call back    Best Call Back Number:[phone]      Additional Information:      Please call Back to advise. Thanks!

## 2024-04-26 ENCOUNTER — TELEPHONE (OUTPATIENT)
Dept: PULMONOLOGY | Facility: CLINIC | Age: 79
End: 2024-04-26
Payer: MEDICARE

## 2024-04-26 NOTE — TELEPHONE ENCOUNTER
----- Message from Jose Wilson sent at 4/26/2024  2:15 PM CDT -----  Type: Needs Medical Advice    Who Called:  Pt    Best Call Back Number: [phone]    Additional Information: Pt is returning call, unsure what the call is for . Please call back to advise, Thanks!

## 2024-04-26 NOTE — TELEPHONE ENCOUNTER
Pt called the clinic, but he was unsure what the call was for. Pt was needing a antibioic a week ago, but was seen by another provider. Pt does not know what the call was for.
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
## 2024-05-08 ENCOUNTER — OFFICE VISIT (OUTPATIENT)
Dept: PODIATRY | Facility: CLINIC | Age: 79
End: 2024-05-08
Payer: MEDICARE

## 2024-05-08 VITALS — BODY MASS INDEX: 36.17 KG/M2 | WEIGHT: 258.38 LBS | HEIGHT: 71 IN

## 2024-05-08 DIAGNOSIS — M21.6X2 PLANTAR FAT PAD ATROPHY OF LEFT FOOT: ICD-10-CM

## 2024-05-08 DIAGNOSIS — M19.071 OSTEOARTHRITIS OF RIGHT ANKLE OR FOOT: ICD-10-CM

## 2024-05-08 DIAGNOSIS — E11.621 DIABETIC ULCER OF LEFT MIDFOOT ASSOCIATED WITH TYPE 2 DIABETES MELLITUS, LIMITED TO BREAKDOWN OF SKIN: Primary | ICD-10-CM

## 2024-05-08 DIAGNOSIS — E11.42 TYPE 2 DIABETES MELLITUS WITH PERIPHERAL NEUROPATHY: ICD-10-CM

## 2024-05-08 DIAGNOSIS — I87.2 VENOUS INSUFFICIENCY OF BOTH LOWER EXTREMITIES: ICD-10-CM

## 2024-05-08 DIAGNOSIS — M53.86 DECREASED RANGE OF MOTION OF LUMBAR SPINE: ICD-10-CM

## 2024-05-08 DIAGNOSIS — I89.0 LYMPHEDEMA OF BOTH LOWER EXTREMITIES: ICD-10-CM

## 2024-05-08 DIAGNOSIS — L03.116 CELLULITIS OF LEFT LOWER LEG: ICD-10-CM

## 2024-05-08 DIAGNOSIS — Z78.9 IMPAIRED MOBILITY AND ADLS: ICD-10-CM

## 2024-05-08 DIAGNOSIS — M19.072 OSTEOARTHRITIS OF LEFT ANKLE OR FOOT: ICD-10-CM

## 2024-05-08 DIAGNOSIS — M77.42 METATARSALGIA, LEFT FOOT: ICD-10-CM

## 2024-05-08 DIAGNOSIS — L97.421 DIABETIC ULCER OF LEFT MIDFOOT ASSOCIATED WITH TYPE 2 DIABETES MELLITUS, LIMITED TO BREAKDOWN OF SKIN: Primary | ICD-10-CM

## 2024-05-08 DIAGNOSIS — R53.81 DEBILITY: ICD-10-CM

## 2024-05-08 DIAGNOSIS — Z74.09 IMPAIRED MOBILITY AND ADLS: ICD-10-CM

## 2024-05-08 DIAGNOSIS — N18.31 CKD STAGE 3A, GFR 45-59 ML/MIN: ICD-10-CM

## 2024-05-08 PROCEDURE — 11042 DBRDMT SUBQ TIS 1ST 20SQCM/<: CPT | Mod: S$GLB,,, | Performed by: PODIATRIST

## 2024-05-08 PROCEDURE — 99999 PR PBB SHADOW E&M-EST. PATIENT-LVL II: CPT | Mod: PBBFAC,,, | Performed by: PODIATRIST

## 2024-05-08 PROCEDURE — 1159F MED LIST DOCD IN RCRD: CPT | Mod: CPTII,S$GLB,, | Performed by: PODIATRIST

## 2024-05-08 PROCEDURE — 99204 OFFICE O/P NEW MOD 45 MIN: CPT | Mod: 25,S$GLB,, | Performed by: PODIATRIST

## 2024-05-08 PROCEDURE — 1160F RVW MEDS BY RX/DR IN RCRD: CPT | Mod: CPTII,S$GLB,, | Performed by: PODIATRIST

## 2024-05-08 RX ORDER — MUPIROCIN 20 MG/G
OINTMENT TOPICAL 2 TIMES DAILY
Qty: 30 G | Refills: 1 | Status: SHIPPED | OUTPATIENT
Start: 2024-05-08 | End: 2024-06-07

## 2024-05-08 RX ORDER — DOXYCYCLINE 100 MG/1
100 CAPSULE ORAL EVERY 12 HOURS
Qty: 14 CAPSULE | Refills: 0 | Status: SHIPPED | OUTPATIENT
Start: 2024-05-08 | End: 2024-05-15

## 2024-05-08 NOTE — PROGRESS NOTES
Subjective:       Patient ID: Lenny Lopez is a 79 y.o. male.    Chief Complaint: Foot Ulcer      HPI: Lenny Lopez presents to the clinic today, for evaluation and treatment concerning an ulceration/wound/bulla(e) to the left 3rd/4th MTPJ. Patient's Primary Care Provider is Blue Shah MD. The PMHx. does include DM w/ PVD, DM w/ peripheral neuropathy, DM w/ CKD/ESRD, venous insufficiency, lymphedema, obesity, and acquired foot/ankle deformity/deformities. The wound(s) have/has been present for several days. Most recent local wound care w/ dry dressings. 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 Son and grandson are present.  Also states recent traumatic, noninfected nail avulsion, right great toe.      Hemoglobin A1C   Date Value Ref Range Status   11/25/2023 6.6 (H) 4.0 - 5.6 % Final     Comment:     ADA Screening Guidelines:  5.7-6.4%  Consistent with prediabetes  >or=6.5%  Consistent with diabetes    High levels of fetal hemoglobin interfere with the HbA1C  assay. Heterozygous hemoglobin variants (HbS, HgC, etc)do  not significantly interfere with this assay.   However, presence of multiple variants may affect accuracy.     01/27/2023 7.1 (H) 4.0 - 5.6 % Final     Comment:     ADA Screening Guidelines:  5.7-6.4%  Consistent with prediabetes  >or=6.5%  Consistent with diabetes    High levels of fetal hemoglobin interfere with the HbA1C  assay. Heterozygous hemoglobin variants (HbS, HgC, etc)do  not significantly interfere with this assay.   However, presence of multiple variants may affect accuracy.     01/11/2022 7.4 (H) 4.0 - 5.6 % Final     Comment:     ADA Screening Guidelines:  5.7-6.4%  Consistent with prediabetes  >or=6.5%  Consistent with diabetes    High levels of fetal hemoglobin interfere with the HbA1C  assay. Heterozygous hemoglobin variants (HbS, HgC, etc)do  not significantly interfere with this assay.   However, presence of multiple variants may affect accuracy.          Review of patient's allergies indicates:   Allergen Reactions    No known drug allergies        Past Medical History:   Diagnosis Date    Acute hypoxemic respiratory failure 1/1/2021    Angina pectoris     Arthritis     Asthma     Atrial fibrillation     Back pain     Cardiac pacemaker     Cataract     Chronic bronchitis     COPD (chronic obstructive pulmonary disease)     Coronary artery disease     COVID-19     Diabetic retinopathy associated with controlled type 2 diabetes mellitus 2/18/2021    DM (diabetes mellitus), type 2, 2000 12/12/2014    Gout     Hepatic cirrhosis, unspecified hepatic cirrhosis type, unspecified whether ascites present 1/23/2022    Hoarseness of voice     Hyperlipidemia     Hypertension     Kidney stones 12/17/2012    Nephrolithiasis     Obesity     Pneumonia due to COVID-19 virus 1/1/2021    Renal manifestation of secondary diabetes mellitus     Rheumatoid factor positive 03/08/2017    Negative work up with Dr. Choudhury    Sleep apnea     Thyroid disease     Unspecified disorder of kidney and ureter     Urinary incontinence     Vocal cord polyp        Family History   Problem Relation Name Age of Onset    Thyroid disease Other nephew     Cancer Mother      Hypertension Mother      Osteoarthritis Mother      Heart disease Father      Hypertension Father      Cancer Paternal Uncle          lung    Hypertension Sister      Hypertension Brother      Cancer Brother          colon?    Diabetes Maternal Aunt      Cancer Brother          pancreatic    Kidney disease Daughter      Stroke Daughter      No Known Problems Son      No Known Problems Daughter      Collagen disease Neg Hx      Amblyopia Neg Hx      Blindness Neg Hx      Cataracts Neg Hx      Glaucoma Neg Hx      Macular degeneration Neg Hx      Retinal detachment Neg Hx      Strabismus Neg Hx         Social History     Socioeconomic History    Marital status:      Spouse name: Halie    Number of children: 3    Years of  education: 8    Highest education level: 8th grade   Occupational History    Occupation: Retired   Tobacco Use    Smoking status: Former     Types: Cigars    Smokeless tobacco: Current     Types: Chew    Tobacco comments:     smoked a few cigars in his 20s   Substance and Sexual Activity    Alcohol use: Yes     Comment: a few beers during holidays    Drug use: No    Sexual activity: Not Currently     Partners: Female     Social Determinants of Health     Financial Resource Strain: Low Risk  (1/24/2022)    Overall Financial Resource Strain (CARDIA)     Difficulty of Paying Living Expenses: Not very hard   Food Insecurity: No Food Insecurity (1/24/2022)    Hunger Vital Sign     Worried About Running Out of Food in the Last Year: Never true     Ran Out of Food in the Last Year: Never true   Transportation Needs: No Transportation Needs (1/24/2022)    PRAPARE - Transportation     Lack of Transportation (Medical): No     Lack of Transportation (Non-Medical): No   Physical Activity: Inactive (1/24/2022)    Exercise Vital Sign     Days of Exercise per Week: 0 days     Minutes of Exercise per Session: 0 min   Stress: No Stress Concern Present (1/24/2022)    [employer] - Occupational Stress Questionnaire     Feeling of Stress : Not at all   Housing Stability: Unknown (1/24/2022)    Housing Stability Vital Sign     Unable to Pay for Housing in the Last Year: No     Unstable Housing in the Last Year: No       Past Surgical History:   Procedure Laterality Date    APPENDECTOMY      CARDIAC PACEMAKER PLACEMENT  2018    COLONOSCOPY N/A 3/3/2021    Procedure: COLONOSCOPY;  Surgeon: Jesus Soliman MD;  Location: [facility];  Service: Endoscopy;  Laterality: N/A;    CORONARY STENT PLACEMENT  2018    EYE SURGERY      left eye secondary to trauma    FRACTURE SURGERY      right arm    KNEE SURGERY      screw    MICROLARYNGOSCOPY WITH BIOPSY OF VOCAL CORD N/A 10/13/2020    Procedure: MICROLARYNGOSCOPY,  "WITH VOCAL CORD BIOPSY;  Surgeon: Deandra Diaz MD;  Location: [facility] OR;  Service: ENT;  Laterality: N/A;    TONSILLECTOMY, ADENOIDECTOMY         Review of Systems   Constitutional:  Negative for chills, fatigue and fever.   HENT:  Negative for hearing loss.    Eyes:  Negative for photophobia and visual disturbance.   Respiratory:  Negative for cough, chest tightness, shortness of breath and wheezing.    Cardiovascular:  Positive for leg swelling. Negative for chest pain and palpitations.   Gastrointestinal:  Negative for constipation, diarrhea, nausea and vomiting.   Endocrine: Negative for cold intolerance and heat intolerance.   Genitourinary:  Negative for flank pain.   Musculoskeletal:  Positive for arthralgias and gait problem. Negative for neck pain and neck stiffness.   Skin:  Positive for color change and wound.   Neurological:  Positive for numbness. Negative for light-headedness and headaches.   Psychiatric/Behavioral:  Negative for sleep disturbance.           Objective:   Ht 5' 11" (1.803 m)   Wt 117.2 kg (258 lb 6.1 oz)   BMI 36.04 kg/m²     Physical Exam  LOWER EXTREMITY PHYSICAL EXAMINATION    DERMATOLOGY:  Callus/ulceration, plantar aspect left 3rd metatarsophalangeal joint.  Upon debridement, no infection noted.  The wound is approximately 0.80 cm x 0.90 cm x 0.10 cm.  Granulation tissues are noted.  No probe to bone or osseous exposure noted.  No drainage.  No malodor.  Recent traumatic nail avulsion, right great toe.  No infection noted.  Moderate erythema/cellulitis with calor is noted, left lower leg.  As per patient and family, this is normal.  No venous weeping is noted.    VASCULAR: Proximal to distal, warm to warm. Edema is severe, bilateral, 3+ pitting. Capillary refill time is sluggish at slightly greater than 3s. Hair growth is absent on the left and right dorsal foot and at the digits. The left dorsalis pedis pulse is 1/4 and on the right is 1/4, and the left posterior tibial " pulse is 0/4 on the left and is 0/4 on the right.  The posterior tibial pulses are nonpalpable secondary to edema.  Spider veins are noted to the bilateral lower extremity. Varicosities are noted.     NEUROLOGY: Sensation to light touch is intact. Proprioception is intact, bilateral. Sensation to pin prick is reduced to absent. Vibratory sensation is diminished to the left and right lower extremity. Examination with 5.07 [place] monofilament reveals that protective sensation is not intact to the left and right plantar surfaces of the foot and digits, as the patient has no sensation/detection at greater than 4 distinct points of contact.    ORTHOPEDIC:  Weight-bearing with a wheelchair at this time.  Decreased range of motion to bilateral hip, knee, and ankle.  Pes planus foot type.  Plantar\flexed metatarsals are noted.  Plantar fat pad atrophy is noted.  Equinus noted.    Assessment:     1. Diabetic ulcer of left midfoot associated with type 2 diabetes mellitus, limited to breakdown of skin    2. Type 2 diabetes mellitus with peripheral neuropathy    3. 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 Metatarsalgia, left foot    5. Venous insufficiency of both lower extremities    6. Lymphedema of both lower extremities    7. Cellulitis of left lower leg    8. Osteoarthritis of left ankle or foot    9. Osteoarthritis of right ankle or foot    10. Decreased range of motion of lumbar spine    11. Debility    12. Impaired mobility and ADLs    13. CKD stage 3a, GFR 45-59 ml/min        Plan:     Diabetic ulcer of left midfoot associated with type 2 diabetes mellitus, limited to breakdown of skin  -     mupirocin (BACTROBAN) 2 % ointment; Apply topically 2 (two) times daily.  Dispense: 30 g; Refill: 1  -     doxycycline (VIBRAMYCIN) 100 MG Cap; Take 1 capsule (100 mg total) by mouth every 12 (twelve) hours. for 7 days  Dispense: 14 capsule; Refill: 0    Type 2 diabetes mellitus with peripheral neuropathy    Plantar  fat pad atrophy of left foot    Metatarsalgia, left foot    Venous insufficiency of both lower extremities    Lymphedema of both lower extremities    Cellulitis of left lower leg  -     doxycycline (VIBRAMYCIN) 100 MG Cap; Take 1 capsule (100 mg total) by mouth every 12 (twelve) hours. for 7 days  Dispense: 14 capsule; Refill: 0    Osteoarthritis of left ankle or foot    Osteoarthritis of right ankle or foot    Decreased range of motion of lumbar spine    Debility    Impaired mobility and ADLs    CKD stage 3a, GFR 45-59 ml/min      Thorough discussion is had with the patient today, concerning the diagnosis, its etiology, and the treatment algorithm at present.    Patient's past medical history is thoroughly reviewed today with the patient and/or family members. Complete chart review is performed at this time.    The wound was surgically debrided after adequate prep with alcohol and/or betadine paint. Excisional wound debridement was performed using sharp #10/#15 blade/rounded scalpel and tissue nipper, with removal of all non-viable skin and soft tissues; necrotic skin/tissue formation. The woundbase/wound bed was also debrided to encourage bleeding as to promote/stimulate healing. Debridement was excisional and included epidermal, dermal and subcutaneous tissues. Post debridement measurements are as above. Hemostasis was achieved. Patient tolerated procedure well and without complications. Local woundcare with topical Abx ointment dressings and bandage thereafter.     Patient will apply topical antibiotic ointment twice a day to the right great toe nail avulsion site was a left foot ulceration.    Start oral antibiotic due to left lower leg cellulitis due to venous insufficiency.    Compression ATC.    Limb elevation.    DM control.          Future Appointments   Date Time Provider Department Center   6/27/2024 11:00 AM [facility]   7/1/2024  2:20 PM Nathaniel Allan MD [facility]    7/1/2024  4:30 PM Blue Shah MD [facility] [place]

## 2024-05-09 DIAGNOSIS — J44.1 COPD EXACERBATION: ICD-10-CM

## 2024-05-09 RX ORDER — IPRATROPIUM BROMIDE AND ALBUTEROL SULFATE 2.5; .5 MG/3ML; MG/3ML
3 SOLUTION RESPIRATORY (INHALATION) EVERY 4 HOURS PRN
Qty: 50 EACH | Refills: 4 | Status: SHIPPED | OUTPATIENT
Start: 2024-05-09

## 2024-05-20 ENCOUNTER — TELEPHONE (OUTPATIENT)
Dept: PRIMARY CARE CLINIC | Facility: CLINIC | Age: 79
End: 2024-05-20
Payer: MEDICARE

## 2024-05-20 NOTE — TELEPHONE ENCOUNTER
Received refill request for Eliquis, upon further assessment it was noted this medication was last refilled in 2018.  Call placed to number in attached message, call answered by patient's grandson (Lenny) who states this medication is normally prescribed to patient by Dr. Grayson Masterson.  Writer advised to reach out to Dr. Masterson for refills as this is the provider who is provided medication and any follow up labs/treatment regarding.  Mr. Galvez verbalized understanding.

## 2024-05-20 NOTE — TELEPHONE ENCOUNTER
Ana Shanks Staff     ----- Message from Ana Shanks sent at 5/20/2024  2:49 PM CDT -----  Type:  RX Refill Request    Who Called:  pt;s son Lenny  Refill or New Rx:  refill  RX Name and Strength:  ELIQUIS 5 mg Tab  How is the patient currently taking it? (ex. 1XDay):  as directed  Is this a 30 day or 90 day RX:  90  Preferred Pharmacy with phone number:    [facility] - [place], [place] - [address] [facility]  [address] [facility]  [place] [address]  Phone: [phone] Fax: [phone]  Best Call Back Number:  [phone]  Additional Information:  Lenny is calling in regards to getting the pt's refill sent in. Please call back and advise. Thanks!

## 2024-05-24 DIAGNOSIS — E11.29 TYPE 2 DIABETES MELLITUS WITH MICROALBUMINURIA, WITHOUT LONG-TERM CURRENT USE OF INSULIN: ICD-10-CM

## 2024-05-24 DIAGNOSIS — R80.9 TYPE 2 DIABETES MELLITUS WITH MICROALBUMINURIA, WITHOUT LONG-TERM CURRENT USE OF INSULIN: ICD-10-CM

## 2024-05-24 DIAGNOSIS — I48.91 ATRIAL FIBRILLATION, UNSPECIFIED TYPE: ICD-10-CM

## 2024-05-24 NOTE — TELEPHONE ENCOUNTER
Care Due:                  Date            Visit Type   Department     Provider  --------------------------------------------------------------------------------                                EP -                              PRIMARY  Last Visit: 01-      CARE ([facility])   None Found     Blue Shah                              EP -                              PRIMARY  Next Visit: 07-      CARE ([facility])   None Found     Blue Shah                                                            Last  Test          Frequency    Reason                     Performed    Due Date  --------------------------------------------------------------------------------    HBA1C.......  6 months...  acarbose, metFORMIN......  11- 05-    Uric Acid...  12 months..  allopurinoL, colchicine..  08- 08-    [facility] Embedded Care Due Messages. Reference number: [id number].   5/24/2024 11:29:50 AM CDT

## 2024-05-24 NOTE — TELEPHONE ENCOUNTER
----- Message from Aretha Mckeon sent at 5/24/2024 11:15 AM CDT -----  Regarding: RX Refill Request  Contact: patient's son at [phone]  Type:  RX Refill Request    Who Called:  patient's son at [phone]    Preferred Pharmacy with phone number:    [facility] - [place] - [address] [facility]  [address] [place] [address]  Phone: [phone] Fax: [phone]      Additional Information:  patient is requesting refills of the medications below. Please call and advise. Thank you    acarbose (PRECOSE) 25 MG Tab 270 tablet 0 7/12/2023 -   Sig - Route: Take 1 tablet (25 mg total) by mouth 3 (three) times daily with meals. - Oral   Sent to pharmacy as: acarbose (PRECOSE) 25 MG Tab   Notes to Pharmacy: Please inactivate all prior scripts with same name and strength including on holds.   E-Prescribing Status: Receipt confirmed by pharmacy (7/12/2023  4:55 PM CDT)        metFORMIN (GLUCOPHAGE) 500 MG tablet 180 tablet 3 11/30/2022 -   Sig - Route: Take 1 tablet (500 mg total) by mouth 2 (two) times daily with meals. - Oral   Sent to pharmacy as: metFORMIN (GLUCOPHAGE) 500 MG tablet   E-Prescribing Status: Receipt confirmed by pharmacy (11/30/2022  9:00 PM CST)       sotalol (BETAPACE) 80 MG tablet 7 tablet 0 9/25/2018 1/16/2024   Sig - Route: Take 0.5 tablets (40 mg total) by mouth 2 (two) times daily. for 7 days - Oral   Sent to pharmacy as: sotalol (BETAPACE) 80 MG tablet   E-Prescribing Status: Receipt confirmed by pharmacy (9/25/2018 10:58 AM CDT)

## 2024-05-27 RX ORDER — ACARBOSE 25 MG/1
25 TABLET ORAL
Qty: 270 TABLET | Refills: 0 | Status: SHIPPED | OUTPATIENT
Start: 2024-05-27

## 2024-05-27 RX ORDER — SOTALOL HYDROCHLORIDE 80 MG/1
40 TABLET ORAL 2 TIMES DAILY
Qty: 7 TABLET | Refills: 0 | Status: SHIPPED | OUTPATIENT
Start: 2024-05-27 | End: 2024-06-03

## 2024-05-27 RX ORDER — METFORMIN HYDROCHLORIDE 500 MG/1
500 TABLET ORAL 2 TIMES DAILY WITH MEALS
Qty: 180 TABLET | Refills: 0 | Status: SHIPPED | OUTPATIENT
Start: 2024-05-27

## 2024-06-14 ENCOUNTER — OFFICE VISIT (OUTPATIENT)
Dept: PRIMARY CARE CLINIC | Facility: CLINIC | Age: 79
End: 2024-06-14
Payer: MEDICARE

## 2024-06-14 VITALS
OXYGEN SATURATION: 96 % | TEMPERATURE: 97 F | BODY MASS INDEX: 36.79 KG/M2 | HEART RATE: 79 BPM | DIASTOLIC BLOOD PRESSURE: 62 MMHG | WEIGHT: 262.81 LBS | SYSTOLIC BLOOD PRESSURE: 110 MMHG | HEIGHT: 71 IN

## 2024-06-14 DIAGNOSIS — E66.9 OBESITY (BMI 30-39.9): ICD-10-CM

## 2024-06-14 DIAGNOSIS — R21 RASH OF BACK: ICD-10-CM

## 2024-06-14 DIAGNOSIS — M51.36 DDD (DEGENERATIVE DISC DISEASE), LUMBAR: Primary | ICD-10-CM

## 2024-06-14 DIAGNOSIS — E11.59 HYPERTENSION ASSOCIATED WITH DIABETES: ICD-10-CM

## 2024-06-14 DIAGNOSIS — M25.551 BILATERAL HIP PAIN: ICD-10-CM

## 2024-06-14 DIAGNOSIS — E11.319 DIABETIC RETINOPATHY ASSOCIATED WITH CONTROLLED TYPE 2 DIABETES MELLITUS: ICD-10-CM

## 2024-06-14 DIAGNOSIS — M25.552 BILATERAL HIP PAIN: ICD-10-CM

## 2024-06-14 DIAGNOSIS — Z00.00 ENCOUNTER FOR PREVENTIVE HEALTH EXAMINATION: Primary | ICD-10-CM

## 2024-06-14 DIAGNOSIS — E11.29 TYPE 2 DIABETES MELLITUS WITH MICROALBUMINURIA, WITHOUT LONG-TERM CURRENT USE OF INSULIN: ICD-10-CM

## 2024-06-14 DIAGNOSIS — N18.32 STAGE 3B CHRONIC KIDNEY DISEASE: ICD-10-CM

## 2024-06-14 DIAGNOSIS — I15.2 HYPERTENSION ASSOCIATED WITH DIABETES: ICD-10-CM

## 2024-06-14 DIAGNOSIS — R80.9 TYPE 2 DIABETES MELLITUS WITH MICROALBUMINURIA, WITHOUT LONG-TERM CURRENT USE OF INSULIN: ICD-10-CM

## 2024-06-14 DIAGNOSIS — E78.5 HYPERLIPIDEMIA ASSOCIATED WITH TYPE 2 DIABETES MELLITUS: ICD-10-CM

## 2024-06-14 DIAGNOSIS — R26.9 ABNORMALITY OF GAIT AND MOBILITY: ICD-10-CM

## 2024-06-14 DIAGNOSIS — E11.69 HYPERLIPIDEMIA ASSOCIATED WITH TYPE 2 DIABETES MELLITUS: ICD-10-CM

## 2024-06-14 DIAGNOSIS — I48.91 ATRIAL FIBRILLATION, UNSPECIFIED TYPE: ICD-10-CM

## 2024-06-14 DIAGNOSIS — D69.2 SENILE PURPURA: ICD-10-CM

## 2024-06-14 PROCEDURE — 1101F PT FALLS ASSESS-DOCD LE1/YR: CPT | Mod: CPTII,S$GLB,, | Performed by: NURSE PRACTITIONER

## 2024-06-14 PROCEDURE — G0439 PPPS, SUBSEQ VISIT: HCPCS | Mod: S$GLB,,, | Performed by: NURSE PRACTITIONER

## 2024-06-14 PROCEDURE — 3078F DIAST BP <80 MM HG: CPT | Mod: CPTII,S$GLB,, | Performed by: NURSE PRACTITIONER

## 2024-06-14 PROCEDURE — 3074F SYST BP LT 130 MM HG: CPT | Mod: CPTII,S$GLB,, | Performed by: NURSE PRACTITIONER

## 2024-06-14 PROCEDURE — 3288F FALL RISK ASSESSMENT DOCD: CPT | Mod: CPTII,S$GLB,, | Performed by: NURSE PRACTITIONER

## 2024-06-14 PROCEDURE — 99999 PR PBB SHADOW E&M-EST. PATIENT-LVL V: CPT | Mod: PBBFAC,,, | Performed by: NURSE PRACTITIONER

## 2024-06-14 PROCEDURE — 1158F ADVNC CARE PLAN TLK DOCD: CPT | Mod: CPTII,S$GLB,, | Performed by: NURSE PRACTITIONER

## 2024-06-14 RX ORDER — TRIAMCINOLONE ACETONIDE 1 MG/G
CREAM TOPICAL 2 TIMES DAILY
Qty: 80 G | Refills: 0 | Status: SHIPPED | OUTPATIENT
Start: 2024-06-14 | End: 2024-06-14 | Stop reason: SDUPTHER

## 2024-06-14 RX ORDER — HYDROCODONE BITARTRATE AND ACETAMINOPHEN 5; 325 MG/1; MG/1
1 TABLET ORAL EVERY 6 HOURS PRN
Qty: 28 TABLET | Refills: 0 | Status: SHIPPED | OUTPATIENT
Start: 2024-06-14 | End: 2024-06-14 | Stop reason: SDUPTHER

## 2024-06-14 RX ORDER — TRIAMCINOLONE ACETONIDE 1 MG/G
CREAM TOPICAL 2 TIMES DAILY
Qty: 80 G | Refills: 0 | Status: SHIPPED | OUTPATIENT
Start: 2024-06-14 | End: 2024-06-28

## 2024-06-14 RX ORDER — HYDROCODONE BITARTRATE AND ACETAMINOPHEN 5; 325 MG/1; MG/1
1 TABLET ORAL EVERY 6 HOURS PRN
Qty: 28 TABLET | Refills: 0 | Status: SHIPPED | OUTPATIENT
Start: 2024-06-14 | End: 2024-06-21

## 2024-06-14 RX ORDER — KETOCONAZOLE 20 MG/G
CREAM TOPICAL 2 TIMES DAILY
Qty: 30 G | Refills: 0 | OUTPATIENT
Start: 2024-06-14

## 2024-06-14 RX ORDER — HYDROCODONE BITARTRATE AND ACETAMINOPHEN 5; 325 MG/1; MG/1
1 TABLET ORAL EVERY 6 HOURS PRN
Qty: 28 TABLET | Refills: 0 | OUTPATIENT
Start: 2024-06-14 | End: 2024-06-21

## 2024-06-14 NOTE — TELEPHONE ENCOUNTER
Patient seen today at annual wellness visit with c/o low back and bilateral hip pain. Is request epidural steroid injections. He also c/o of itchy rash on back and abdomin.    1/24/23 Xray lumbar FINDINGS:  Vertebral bodies are normal in height without evidence of fracture. Osseous structures demonstrate diffuse demineralization.     Normal sagittal alignment is preserved.  No spondylolisthesis. No abnormal translation with flexion and extension.     Multilevel disc degeneration with intervertebral disc space narrowing, degenerative endplate change in marginal osteophyte formation, most pronounced at L2-3.Multilevel facet arthropathy.     Round 2.8 cm calcification projects over the right mid abdomen compatible with known gallstone.  Scattered small calcifications again project over the left renal shadow suggestive of renal stones.  1.7 cm calcification projects over the left paraspinous region compatible with known left ureteral stone.     Marked aortoiliac calcific atherosclerosis.     Bilateral hip joint space narrowing.     Impression:     Multilevel degenerative change of the lumbar spine without evidence of acute radiographic abnormality.     Cholelithiasis.     Left urolithiasis.

## 2024-06-14 NOTE — PATIENT INSTRUCTIONS
Counseling and Referral of Other Preventative  (Italic type indicates deductible and co-insurance are waived)    Patient Name: Lenny Lopez  Today's Date: 6/14/2024    Health Maintenance       Date Due Completion Date    Shingles Vaccine (1 of 2) Never done ---    RSV Vaccine (Age 60+ and Pregnant patients) (1 - 1-dose 60+ series) Never done ---    COVID-19 Vaccine (3 - 2023-24 season) 09/01/2023 4/14/2021    Diabetes Urine Screening 01/09/2024 1/9/2023    Colonoscopy 03/03/2024 3/3/2021    Hemoglobin A1c 05/25/2024 11/25/2023    Lipid Panel 11/25/2024 11/25/2023    Eye Exam 02/28/2025 2/29/2024    TETANUS VACCINE 11/30/2026 11/30/2016        No orders of the defined types were placed in this encounter.      The following information is provided to all patients.  This information is to help you find resources for any of the problems found today that may be affecting your health:                  Living healthy guide: www.[facility].[place].gov      Understanding Diabetes: www.diabetes.org      Eating healthy: www.cdc.gov/healthyweight      CDC home safety checklist: www.cdc.gov/steadi/patient.html      Agency on Aging: www.goea.[place].gov      Alcoholics anonymous (AA): www.aa.org      Physical Activity: www.mary beth.nih.gov/oy0bahh      Tobacco use: www.quitwithusla.org

## 2024-06-14 NOTE — PROGRESS NOTES
Lenny Lopez presented for a  Medicare AWV and comprehensive Health Risk Assessment today. The following components were reviewed and updated:    Medical history  Family History  Social history  Allergies and Current Medications  Health Risk Assessment  Health Maintenance  Care Team         ** See Completed Assessments for Annual Wellness Visit within the encounter summary.**         The following assessments were completed:  Living Situation  CAGE  Depression Screening  Timed Get Up and Go  Whisper Test  Cognitive Function Screening  Nutrition Screening  ADL Screening  PAQ Screening      Opioid documentation:      Patient does have a current opioid prescription.      Patient accepted further discussion regarding opioid medication use.      Patient is currently taking hydrocodone narcotic for back pain.        Pain level today is 4/10.       In addition to narcotic pain medications, patient is also using physical therapy, NSAIDs, and acetaminophen for pain control.       Patient is not followed by a specialist currently for their pain and will not be referred today.       Patient's opioid risk potential based on ORT-OUD tool:       Rommel each box that applies   No   Yes     Family history of substance abuse   Alcohol [x] []   Illegal drugs [x] []   Rx drugs [x] []     Personal history of substance abuse   Alcohol [x] []   Illegal drugs [x] []   Rx drugs [] [x]     Age between 16-45 years   [x]   []     Patient with ADD, OCD, Bipolar disorder, schizoprenia   [x]   []     Patient with depression   [x]   []                         Scoring total                                                                 Non-opioid treatment options have been discussed today and added to the patient's after visit summary.                  Vitals:    06/14/24 1451   BP: 110/62   BP Location: Left arm   Patient Position: Sitting   BP Method: Large (Manual)   Pulse: 79   Temp: 97.2 °F (36.2 °C)   TempSrc: Oral   SpO2: 96%   Weight:  "119.2 kg (262 lb 12.6 oz)   Height: 5' 11" (1.803 m)     Body mass index is 36.65 kg/m².  Physical Exam  Constitutional:       Appearance: He is well-developed.   Eyes:      Conjunctiva/sclera: Conjunctivae normal.      Pupils: Pupils are equal, round, and reactive to light.   Cardiovascular:      Rate and Rhythm: Normal rate and regular rhythm.      Heart sounds: Normal heart sounds.   Pulmonary:      Effort: Pulmonary effort is normal.   Musculoskeletal:         General: Normal range of motion.   Neurological:      Mental Status: He is alert and oriented to person, place, and time.   Psychiatric:         Behavior: Behavior normal.         Thought Content: Thought content normal.         Judgment: Judgment normal.             Annual Health Risk Assessment (HRA) visit today.  Counseling and referral of health maintenance and preventative health measures performed.  Patient given annual wellness paperwork to take home.  Encouraged to return in 1 year for subsequent HRA visi   Diagnoses and health risks identified today and associated recommendations/orders:    1. Encounter for preventive health examination    Discussed health maintenance guidelines appropriate for age.    Review for Opioid Screening: Patient does have rx for Opioids.     2. Abnormality of gait and mobility  Stable, ambulates with a cane  Followed by PCP    3. Senile purpura  Stable, on aspirin    4. Type 2 diabetes mellitus with microalbuminuria, without long-term current use of insulin  Stable, continue management  Las A1c 6.6  Followed by PCP  5. Hypertension associated with diabetes  Stable, continue management  Low sodium diet  Followed by PCP  6. Hyperlipidemia associated with type 2 diabetes mellitus  Stable, continue management  On Lipitor  Followed by PCP  7. Atrial fibrillation, unspecified type  Stable, continue management]  Followed by Cardiology    8. Stage 3b chronic kidney disease  Please avoid or minimize all NSAIDS (ibuprofen, motrin, " "aleve, indocin, naprosyn) to minimize the risk to your kidneys.      9. Diabetic retinopathy associated with controlled type 2 diabetes mellitus  Stable, continue management  Followed by Optometry  10. Obesity (BMI 30-39.9)  Uncontrolled, Counseled patient on his ideal body weight, health consequences of being obese and current recommendations including weekly exercise and a heart healthy diet.  Current BMI is:Estimated body mass index is 36.65 kg/m² as calculated from the following:    Height as of this encounter: 5' 11" (1.803 m).    Weight as of this encounter: 119.2 kg (262 lb 12.6 oz)..  Patient is aware that ideal BMI < 25 or Weight in (lb) to have BMI = 25: 178.9.         Provided Lenny with a 5-10 year written screening schedule and personal prevention plan. Recommendations were developed using the USPSTF age appropriate recommendations. Education, counseling, and referrals were provided as needed. After Visit Summary printed and given to patient which includes a list of additional screenings\tests needed.    No follow-ups on file.    Anayeli Montesinos NP    I offered to discuss advanced care planning, including how to pick a person who would make decisions for you if you were unable to make them for yourself, called a health care power of , and what kind of decisions you might make such as use of life sustaining treatments such as ventilators and tube feeding when faced with a life limiting illness recorded on a living will that they will need to know. (How you want to be cared for as you near the end of your natural life)    Advance Care Planning     Date: 06/24/2024    Power of   I initiated the process of voluntary advance care planning today and explained the importance of this process to the patient.  I introduced the concept of advance directives to the patient, as well. Then the patient received detailed information about the importance of designating a Health Care Power of "  (HCPOA). He was also instructed to communicate with this person about their wishes for future healthcare, should he become sick and lose decision-making capacity. The patient has previously appointed a HCPOA. After our discussion, the patient has decided to complete a HCPOA and has appointed his significant other, health care agent:  Halie Lopez  & health care agent number: . I encouraged him to communicate with this person about their wishes for future healthcare, should he become sick and lose decision-making capacity.      A total of 30 min was spent on advance care planning, goals of care discussion, emotional support, formulating and communicating prognosis and exploring burden/benefit of various approaches of treatment. This discussion occurred on a fully voluntary basis with the verbal consent of the patient and/or family.       X Patient is interested in learning more about how to make advanced directives.  I provided them paperwork and offered to discuss this with them.

## 2024-06-14 NOTE — TELEPHONE ENCOUNTER
Call placed to patient.  Call answered by patient's grandson (Best).  Mr. Jewell states preferred pharmacy is [facility] ([facility] location) in [place].  Prescriptions were sent to [facility] in [place].  Orders pended in this encounter to e-scribe to preferred pharmacy location.  Also states patient was seen approximately a year ago by a back specialist who offered steroid injections. Offered to assist with scheduling pain management appointment, Mr. Jewell requested to send message advising HIEU Montesinos regarding previous appointment with back specialist.  Message forwarded to provider as per request.  Please advise. Thank you.

## 2024-06-17 ENCOUNTER — OFFICE VISIT (OUTPATIENT)
Dept: PAIN MEDICINE | Facility: CLINIC | Age: 79
End: 2024-06-17
Payer: MEDICARE

## 2024-06-17 ENCOUNTER — TELEPHONE (OUTPATIENT)
Dept: PAIN MEDICINE | Facility: CLINIC | Age: 79
End: 2024-06-17

## 2024-06-17 VITALS
DIASTOLIC BLOOD PRESSURE: 68 MMHG | HEIGHT: 71 IN | SYSTOLIC BLOOD PRESSURE: 106 MMHG | HEART RATE: 108 BPM | WEIGHT: 262.81 LBS | BODY MASS INDEX: 36.79 KG/M2

## 2024-06-17 DIAGNOSIS — M48.062 SPINAL STENOSIS OF LUMBAR REGION WITH NEUROGENIC CLAUDICATION: ICD-10-CM

## 2024-06-17 DIAGNOSIS — M54.16 LUMBAR RADICULOPATHY: Primary | ICD-10-CM

## 2024-06-17 DIAGNOSIS — M47.816 LUMBAR SPONDYLOSIS: ICD-10-CM

## 2024-06-17 DIAGNOSIS — M54.9 DORSALGIA: ICD-10-CM

## 2024-06-17 PROCEDURE — 1159F MED LIST DOCD IN RCRD: CPT | Mod: CPTII,S$GLB,,

## 2024-06-17 PROCEDURE — 3074F SYST BP LT 130 MM HG: CPT | Mod: CPTII,S$GLB,,

## 2024-06-17 PROCEDURE — 99999 PR PBB SHADOW E&M-EST. PATIENT-LVL IV: CPT | Mod: PBBFAC,,,

## 2024-06-17 PROCEDURE — 1101F PT FALLS ASSESS-DOCD LE1/YR: CPT | Mod: CPTII,S$GLB,,

## 2024-06-17 PROCEDURE — 3288F FALL RISK ASSESSMENT DOCD: CPT | Mod: CPTII,S$GLB,,

## 2024-06-17 PROCEDURE — 3078F DIAST BP <80 MM HG: CPT | Mod: CPTII,S$GLB,,

## 2024-06-17 PROCEDURE — 1125F AMNT PAIN NOTED PAIN PRSNT: CPT | Mod: CPTII,S$GLB,,

## 2024-06-17 PROCEDURE — 99214 OFFICE O/P EST MOD 30 MIN: CPT | Mod: S$GLB,,,

## 2024-06-17 NOTE — PROGRESS NOTES
[facility] Follow Up Evaluation      Referred by: No ref. provider found    PCP:     CC:   Chief Complaint   Patient presents with    Low-back Pain    Hip Pain          6/17/2024     2:25 PM   Last 3 PDI Scores   Pain Disability Index (PDI) 42       Interval HPI 6/17/2024: Lenny Lopez returns to the office for follow up.  Follow-up with worsening lower back pain, 3-8/10, minimal pain at rest but when standing for any extended periods of time he has significant pain radiating into his bilateral hips.  Numbness and tingling in left lower leg.  He reports some weakness with left hip flexion but denies any other weakness.  He did not tolerate trial of gabapentin nor Lyrica.  He avoids NSAIDs due to being on aspirin and Eliquis.  Minimal relief with the pain medication.      HPI:   Lenny Lopez is a 79 y.o. male who presents with back pain.  He has been dealing with this pain for the last couple years and has been gradually worsening.  Today he reports his pain is 3/10, intermittent, aching.  The pain radiates down the bilateral buttocks and bilateral hips to the thighs.  His pain is worse with standing and walking and can be relieved with rest.  He also does not feel much discomfort when he is lying in bed and sleeping.  Can feel like his legs feel a little weak but denies any numbness.      Pain Intervention History:      Past Spine Surgical History:      Past and current medications:  Antineuropathics:  NSAIDs:  Physical therapy:  Antidepressants:  Muscle relaxers:  Opioids: hydrocodone  Antiplatelets/Anticoagulants: eliquis, aspirin    History:    Current Outpatient Medications:     acarbose (PRECOSE) 25 MG Tab, Take 1 tablet (25 mg total) by mouth 3 (three) times daily with meals., Disp: 270 tablet, Rfl: 0    ACCU-CHEK GUIDE GLUCOSE METER Misc, USE DEVICE TO TEST BLOOD SUGAR TWO TIMES DAILY, Disp: , Rfl:     albuterol (PROVENTIL/VENTOLIN HFA) 90 mcg/actuation inhaler, 2 puffs every 4 hours as  needed for cough, wheeze, or shortness of breath, Disp: 18 g, Rfl: 11    albuterol-ipratropium (DUO-NEB) 2.5 mg-0.5 mg/3 mL nebulizer solution, Take 3 mLs by nebulization every 4 (four) hours as needed for Wheezing or Shortness of Breath., Disp: 50 each, Rfl: 4    allopurinoL (ZYLOPRIM) 300 MG tablet, 1 1/2 tab daily, Disp: 135 tablet, Rfl: 3    aspirin 81 mg Tab, Take 1 tablet by mouth once daily. , Disp: , Rfl:     atorvastatin (LIPITOR) 80 MG tablet, Take 1 tablet (80 mg total) by mouth once daily., Disp: 90 tablet, Rfl: 3    azithromycin (ZITHROMAX) 500 MG tablet, One daily for yellow mucous, repeat if needed, Disp: 3 tablet, Rfl: 3    benralizumab (FASENRA PEN) 30 mg/mL AtIn, Inject 30 mg into the skin every 8 weeks., Disp: 1 mL, Rfl: 6    benralizumab 30 mg/mL AtIn, Inject 30 mg into the skin every 8 weeks., Disp: 1 mL, Rfl: 6    blood sugar diagnostic Strp, 1 strip by Misc.(Non-Drug; Combo Route) route 3 (three) times daily. DX: E11.29   Dispense test strips that are covered by insurance (Patient taking differently: 1 strip by Misc.(Non-Drug; Combo Route) route 3 (three) times daily. DX: E11.29   Dispense test strips that are covered by insurance), Disp: 300 strip, Rfl: 3    blood sugar diagnostic Strp, To check BG 3 times daily, to use with insurance preferred meter, Disp: 200 each, Rfl: 0    blood sugar diagnostic, drum (ACCU-CHEK COMPACT PLUS TEST) Strp, 1 strip by Misc.(Non-Drug; Combo Route) route 2 (two) times daily with meals., Disp: 100 strip, Rfl: 1    colchicine (MITIGARE) 0.6 mg Cap, Take 2 caps PO. Take additional 1 cap one hour later. Then take  1 cap PO BID until flare resolves., Disp: 30 capsule, Rfl: 3    diclofenac sodium (SOLARAZE) 3 % gel, 1 application bid, Disp: 300 g, Rfl: 2    ELIQUIS 5 mg Tab, Take 5 mg by mouth 2 (two) times daily. , Disp: , Rfl:     fluticasone propionate (FLONASE) 50 mcg/actuation nasal spray, 1 spray by Nasal route., Disp: , Rfl:     fluticasone-umeclidin-vilanter  (TRELEGY ELLIPTA) 200-62.5-25 mcg inhaler, Inhale 1 puff into the lungs once daily., Disp: 60 each, Rfl: 3    furosemide (LASIX) 40 MG tablet, Take 1 tablet (40 mg total) by mouth 2 (two) times daily as needed (edema)., Disp: 120 tablet, Rfl: 0    HYDROcodone-acetaminophen (NORCO) 5-325 mg per tablet, Take 1 tablet by mouth every 6 (six) hours as needed for Pain., Disp: 28 tablet, Rfl: 0    ketoconazole (NIZORAL) 2 % cream, Apply topically 2 (two) times daily., Disp: 30 g, Rfl: 0    lancets (FREESTYLE LANCETS) 28 gauge Misc, 1 lancet by Misc.(Non-Drug; Combo Route) route 3 (three) times daily., Disp: 300 each, Rfl: 3    lancets Misc, To check BG 3 times daily, to use with insurance preferred meter, Disp: 200 each, Rfl: 0    metFORMIN (GLUCOPHAGE) 500 MG tablet, Take 1 tablet (500 mg total) by mouth 2 (two) times daily with meals., Disp: 180 tablet, Rfl: 0    metOLazone (ZAROXOLYN) 2.5 MG tablet, Take 1 tablet (2.5 mg total) by mouth daily as needed (edema)., Disp: 4 tablet, Rfl: 0    montelukast (SINGULAIR) 10 mg tablet, TAKE 1 TABLET BY MOUTH ONCE DAILY IN THE EVENING, Disp: 90 tablet, Rfl: 3    nitroGLYCERIN (NITROSTAT) 0.4 MG SL tablet, DISSOLVE ONE TABLET UNDER THE TONGUE EVERY 5 MINUTES AS NEEDED FOR CHEST PAIN.  DO NOT EXCEED A TOTAL OF 3 DOSES IN 15 MINUTES, Disp: 25 tablet, Rfl: 0    omega-3 fatty acids-vitamin E 1,000 mg Cap, Take 1 capsule by mouth once daily. Three times a day, Disp: , Rfl:     potassium chloride (KLOR-CON) 10 MEQ TbSR, 1 tab with furosemide., Disp: 180 tablet, Rfl: 5    predniSONE (DELTASONE) 20 MG tablet, Take one daily for 3 days and may repeat for shortness of breath., Disp: 21 tablet, Rfl: 0    solifenacin (VESICARE) 10 MG tablet, Take 1 tablet (10 mg total) by mouth once daily., Disp: 90 tablet, Rfl: 3    tamsulosin (FLOMAX) 0.4 mg Cap, Take 2 capsules (0.8 mg total) by mouth once daily., Disp: 180 capsule, Rfl: 3    triamcinolone acetonide 0.1% (KENALOG) 0.1 % cream, Apply  topically 2 (two) times daily. for 14 days, Disp: 80 g, Rfl: 0    TURMERIC ORAL, Take 1 tablet by mouth once daily., Disp: , Rfl:     blood-glucose meter, drum-type Kit, 1 Device by Misc.(Non-Drug; Combo Route) route 2 (two) times daily with meals., Disp: 1 kit, Rfl: 0    lancing device Misc, 1 Device by Misc.(Non-Drug; Combo Route) route 2 (two) times daily with meals., Disp: 100 each, Rfl: 0    sotaloL (BETAPACE) 80 MG tablet, Take 0.5 tablets (40 mg total) by mouth 2 (two) times daily. for 7 days, Disp: 7 tablet, Rfl: 0  No current facility-administered medications for this visit.    Facility-Administered Medications Ordered in Other Visits:     lidocaine (PF) 10 mg/ml (1%) injection 10 mg, 1 mL, Intradermal, Once, Deandra Diaz MD    Past Medical History:   Diagnosis Date    Acute hypoxemic respiratory failure 1/1/2021    Angina pectoris     Arthritis     Asthma     Atrial fibrillation     Back pain     Cardiac pacemaker     Cataract     Chronic bronchitis     COPD (chronic obstructive pulmonary disease)     Coronary artery disease     COVID-19     Diabetic retinopathy associated with controlled type 2 diabetes mellitus 2/18/2021    DM (diabetes mellitus), type 2, 2000 12/12/2014    Gout     Hepatic cirrhosis, unspecified hepatic cirrhosis type, unspecified whether ascites present 1/23/2022    Hoarseness of voice     Hyperlipidemia     Hypertension     Kidney stones 12/17/2012    Nephrolithiasis     Obesity     Pneumonia due to COVID-19 virus 1/1/2021    Renal manifestation of secondary diabetes mellitus     Rheumatoid factor positive 03/08/2017    Negative work up with Dr. Choudhury    Sleep apnea     Thyroid disease     Unspecified disorder of kidney and ureter     Urinary incontinence     Vocal cord polyp        Past Surgical History:   Procedure Laterality Date    APPENDECTOMY      CARDIAC PACEMAKER PLACEMENT  2018    COLONOSCOPY N/A 3/3/2021    Procedure: COLONOSCOPY;  Surgeon: Jesus Soliman MD;   Location: [facility] ENDO;  Service: Endoscopy;  Laterality: N/A;    CORONARY STENT PLACEMENT  2018    EYE SURGERY      left eye secondary to trauma    FRACTURE SURGERY      right arm    KNEE SURGERY      screw    MICROLARYNGOSCOPY WITH BIOPSY OF VOCAL CORD N/A 10/13/2020    Procedure: MICROLARYNGOSCOPY, WITH VOCAL CORD BIOPSY;  Surgeon: Deandra Diaz MD;  Location: [facility] OR;  Service: ENT;  Laterality: N/A;    TONSILLECTOMY, ADENOIDECTOMY         Family History   Problem Relation Name Age of Onset    Thyroid disease Other nephew     Cancer Mother      Hypertension Mother      Osteoarthritis Mother      Heart disease Father      Hypertension Father      Cancer Paternal Uncle          lung    Hypertension Sister      Hypertension Brother      Cancer Brother          colon?    Diabetes Maternal Aunt      Cancer Brother          pancreatic    Kidney disease Daughter      Stroke Daughter      No Known Problems Son      No Known Problems Daughter      Collagen disease Neg Hx      Amblyopia Neg Hx      Blindness Neg Hx      Cataracts Neg Hx      Glaucoma Neg Hx      Macular degeneration Neg Hx      Retinal detachment Neg Hx      Strabismus Neg Hx         Social History     Socioeconomic History    Marital status:      Spouse name: Halie    Number of children: 3    Years of education: 8    Highest education level: 8th grade   Occupational History    Occupation: Retired   Tobacco Use    Smoking status: Former     Types: Cigars    Smokeless tobacco: Current     Types: Chew    Tobacco comments:     smoked a few cigars in his 20s   Substance and Sexual Activity    Alcohol use: Yes     Comment: a few beers during holidays    Drug use: No    Sexual activity: Not Currently     Partners: Female     Social Determinants of Health     Financial Resource Strain: Low Risk  (6/14/2024)    Overall Financial Resource Strain (CARDIA)     Difficulty of Paying Living Expenses: Not hard at all   Food Insecurity: No Food Insecurity  "(6/14/2024)    Hunger Vital Sign     Worried About Running Out of Food in the Last Year: Never true     Ran Out of Food in the Last Year: Never true   Transportation Needs: No Transportation Needs (6/14/2024)    PRAPARE - Transportation     Lack of Transportation (Medical): No     Lack of Transportation (Non-Medical): No   Physical Activity: Insufficiently Active (6/14/2024)    Exercise Vital Sign     Days of Exercise per Week: 7 days     Minutes of Exercise per Session: 10 min   Stress: No Stress Concern Present (1/24/2022)    [facility] - Occupational Stress Questionnaire     Feeling of Stress : Not at all   Housing Stability: Low Risk  (6/14/2024)    Housing Stability Vital Sign     Unable to Pay for Housing in the Last Year: No     Homeless in the Last Year: No       Review of patient's allergies indicates:   Allergen Reactions    No known drug allergies        Review of Systems:  12 point review of systems is negative.    Physical Exam:  Vitals:    06/17/24 1429   BP: 106/68   Pulse: 108   Weight: 119.2 kg (262 lb 12.6 oz)   Height: 5' 11" (1.803 m)   PainSc:   4   PainLoc: Back     Body mass index is 36.65 kg/m².    Gen: NAD  Psych: mood appropriate for given condition  HEENT: eyes anicteric   CV: RRR, 2+ radial pulse  HEENT: anicteric   Respiratory: non-labored, no signs of respiratory distress  Abd: non-distended  Skin: warm, dry and intact.  Gait:  Presents in wheelchair    Pain with axial lumbar back extension    Sensory:  Intact and symmetrical to light touch in L1-S1 dermatomes bilaterally.    Motor:       Right Left   L2/3 Iliacus Hip flexion  5  4+   L3/4 Qudratus Femoris Knee Extension  5  5   L4/5 Tib Anterior Ankle Dorsiflexion   5  5   L5/S1 Extensor Hallicus Longus Great toe extension  5  5   S1/S2 Gastroc/Soleus Plantar Flexion  5  5     Imaging:  MRI lumbar 12/6/21  FINDINGS:  This report assumes that there are 5 non-rib bearing lumbar vertebral bodies with the L5 " vertebral body articulating with the sacrum. Accurate numbering of the spine levels would require imaging of the thoracolumbar spine to count ribs.     The prevertebral soft tissues are normal. There is approximately 7 degrees levoscoliosis of lumbar spine. Individual vertebral height is maintained. Marrow signal is within normal limits. Conus is normal in caliber and signal. The conus terminates at L1     At T12-L1 there is moderate disc height loss with a small broad-based posterior disc bulge. There is no facet arthropathy or ligamentum flavum hypertrophy. There is minimal spinal canal/neural foraminal stenosis.     At L1-2, there is moderate disc height loss with a tiny broad-based posterior disc bulge. There is mild bilateral facet arthropathy with ligamentum flavum hypertrophy. There is very mild spinal canal/neural foraminal stenosis.     At L2-3, the disc shows moderate disc height loss with a small broad-based posterior disc bulge study more eccentric to the neural foramina. There is moderate facet arthropathy with ligament flavum hypertrophy. This results in moderate to severe right neural foraminal stenosis, mild to moderate left neural foraminal stenosis and minimal spinal canal stenosis.     At L3-4, shows mild disc height loss with a moderate-sized broad-based posterior disc bulge. There is a small superimposed left neural foraminal hyperintensity zone/annular fissure images (sagittal image 15). There is moderate facet arthropathy and moderate ligamentum flavum hypertrophy. These findings in combination result in severe spinal canal stenosis with crowding of the nerve root centrally. There is moderate bilateral neural foraminal stenosis as well. There is some waviness of the nerve roots at this level suggestive of nerve root impingement (and less likely arachnoiditis)     At L4-5, the disc shows mild disc height loss with a moderate-sized broad-based posterior disc bulge. There appears to be a tiny  superimposed left neural foraminal high intensity zone/annular fissure images (sagittal image 15). There is moderate bilateral facet arthropathy with mild ligamentum flavum hypertrophy. This results in severe left neural foraminal stenosis, moderate right neural foraminal stenosis and mild spinal canal stenosis.     At L5-S1, the disc shows moderate disc height loss with a moderate-sized broad-based posterior disc bulge/disc osteophyte complex which is more eccentric to the neural foramina. There is moderate facet arthropathy and mild ligamentum flavum hypertrophy. These findings in combination result in moderate to severe bilateral neural foraminal stenosis and mild spinal canal stenosis. The facet osteophytes appear to impinge upon the left greater than right lateral recess and possibly upon the traversing S1 nerve roots in the lateral recesses.     The SI joints and visualized pelvis are within normal limits.    Labs:  Lab Results   Component Value Date    HGBA1C 6.6 (H) 11/25/2023       Lab Results   Component Value Date    WBC 9.09 01/09/2024    HGB 10.9 (L) 01/09/2024    HCT 35.1 (L) 01/09/2024    MCV 92 01/09/2024     01/09/2024         Assessment:   Problem List Items Addressed This Visit    None  Visit Diagnoses       Lumbar radiculopathy    -  Primary    Lumbar spondylosis        Spinal stenosis of lumbar region with neurogenic claudication        Dorsalgia                      79 y.o. year old male with PMH HTN, AFib, CAD, CKD, COPD who presents with back pain.  He has been dealing with this pain for the last couple years and has been gradually worsening.  Today he reports his pain is 3/10, intermittent, aching.  The pain radiates down the bilateral buttocks and bilateral hips to the thighs.  His pain is worse with standing and walking and can be relieved with rest.  He also does not feel much discomfort when he is lying in bed and sleeping.  Can feel like his legs feel a little weak but denies any  numbness.    6/17/2024: Lenny Lopez returns to the office for follow up.  Follow-up with worsening lower back pain, 3-8/10, minimal pain at rest but when standing for any extended periods of time he has significant pain radiating into his bilateral hips.  Numbness and tingling in left lower leg.  He reports some weakness with left hip flexion but denies any other weakness.  He did not tolerate trial of gabapentin nor Lyrica.  He avoids NSAIDs due to being on aspirin and Eliquis.  Minimal relief with the pain medication.      - on exam he has full strength and intact sensation to light touch bilateral L2-S1.  He has weakness with left hip flexion, 4+/5.  New since last office visit 1 year ago.  Reports this has been worsening over the past week.  - we reviewed his lumbar MRI in clinic today and is consistent with multilevel bilateral facet arthropathy.  He also has severe central canal narrowing at L3-4  - he continues to describe neurogenic claudication.  Likely from his severe central canal stenosis at L3/4.  He has not tolerated trials of gabapentin, Lyrica.  Pain is too severe to restart formal physical therapy, he has not finding relief with at-home PT directed exercises.  - this pain is limiting his mobility interfering with his ADLs and quality of life.  - at this time I would like to schedule him for an L4/5 interlaminar epidural steroid injection.  We will need clearance to hold his aspirin and Eliquis.  - follow-up 2 weeks post procedure or sooner if needed.  If he fails get relief with this can consider bilateral TFESI at L3/4 to target his central canal narrowing.  Ultimately, if he fails get relief with this and he has not a surgical candidate, he may be a candidate for Veriflex      : Not applicable      This note was completed with dictation software and grammatical errors may exist.

## 2024-06-17 NOTE — TELEPHONE ENCOUNTER
Please schedule patient for the following procedure:    Physician - Dr Ngyuen    Type of Procedure/Injection - Lumbar Epidural  L4/5           Laterality - NA      Anxiolysis- Local      Need to hold medication - Yes      Aspirin for 5 days and Eliquis for 3 days      Clearance needed - Yes      Follow up - 2 week

## 2024-06-17 NOTE — H&P (VIEW-ONLY)
[facility] Follow Up Evaluation      Referred by: No ref. provider found    PCP:     CC:   Chief Complaint   Patient presents with    Low-back Pain    Hip Pain          6/17/2024     2:25 PM   Last 3 PDI Scores   Pain Disability Index (PDI) 42       Interval HPI 6/17/2024: Lenny Lopez returns to the office for follow up.  Follow-up with worsening lower back pain, 3-8/10, minimal pain at rest but when standing for any extended periods of time he has significant pain radiating into his bilateral hips.  Numbness and tingling in left lower leg.  He reports some weakness with left hip flexion but denies any other weakness.  He did not tolerate trial of gabapentin nor Lyrica.  He avoids NSAIDs due to being on aspirin and Eliquis.  Minimal relief with the pain medication.      HPI:   Lenny Lopez is a 79 y.o. male who presents with back pain.  He has been dealing with this pain for the last couple years and has been gradually worsening.  Today he reports his pain is 3/10, intermittent, aching.  The pain radiates down the bilateral buttocks and bilateral hips to the thighs.  His pain is worse with standing and walking and can be relieved with rest.  He also does not feel much discomfort when he is lying in bed and sleeping.  Can feel like his legs feel a little weak but denies any numbness.      Pain Intervention History:      Past Spine Surgical History:      Past and current medications:  Antineuropathics:  NSAIDs:  Physical therapy:  Antidepressants:  Muscle relaxers:  Opioids: hydrocodone  Antiplatelets/Anticoagulants: eliquis, aspirin    History:    Current Outpatient Medications:     acarbose (PRECOSE) 25 MG Tab, Take 1 tablet (25 mg total) by mouth 3 (three) times daily with meals., Disp: 270 tablet, Rfl: 0    ACCU-CHEK GUIDE GLUCOSE METER Misc, USE DEVICE TO TEST BLOOD SUGAR TWO TIMES DAILY, Disp: , Rfl:     albuterol (PROVENTIL/VENTOLIN HFA) 90 mcg/actuation inhaler, 2 puffs every 4 hours as  needed for cough, wheeze, or shortness of breath, Disp: 18 g, Rfl: 11    albuterol-ipratropium (DUO-NEB) 2.5 mg-0.5 mg/3 mL nebulizer solution, Take 3 mLs by nebulization every 4 (four) hours as needed for Wheezing or Shortness of Breath., Disp: 50 each, Rfl: 4    allopurinoL (ZYLOPRIM) 300 MG tablet, 1 1/2 tab daily, Disp: 135 tablet, Rfl: 3    aspirin 81 mg Tab, Take 1 tablet by mouth once daily. , Disp: , Rfl:     atorvastatin (LIPITOR) 80 MG tablet, Take 1 tablet (80 mg total) by mouth once daily., Disp: 90 tablet, Rfl: 3    azithromycin (ZITHROMAX) 500 MG tablet, One daily for yellow mucous, repeat if needed, Disp: 3 tablet, Rfl: 3    benralizumab (FASENRA PEN) 30 mg/mL AtIn, Inject 30 mg into the skin every 8 weeks., Disp: 1 mL, Rfl: 6    benralizumab 30 mg/mL AtIn, Inject 30 mg into the skin every 8 weeks., Disp: 1 mL, Rfl: 6    blood sugar diagnostic Strp, 1 strip by Misc.(Non-Drug; Combo Route) route 3 (three) times daily. DX: E11.29   Dispense test strips that are covered by insurance (Patient taking differently: 1 strip by Misc.(Non-Drug; Combo Route) route 3 (three) times daily. DX: E11.29   Dispense test strips that are covered by insurance), Disp: 300 strip, Rfl: 3    blood sugar diagnostic Strp, To check BG 3 times daily, to use with insurance preferred meter, Disp: 200 each, Rfl: 0    blood sugar diagnostic, drum (ACCU-CHEK COMPACT PLUS TEST) Strp, 1 strip by Misc.(Non-Drug; Combo Route) route 2 (two) times daily with meals., Disp: 100 strip, Rfl: 1    colchicine (MITIGARE) 0.6 mg Cap, Take 2 caps PO. Take additional 1 cap one hour later. Then take  1 cap PO BID until flare resolves., Disp: 30 capsule, Rfl: 3    diclofenac sodium (SOLARAZE) 3 % gel, 1 application bid, Disp: 300 g, Rfl: 2    ELIQUIS 5 mg Tab, Take 5 mg by mouth 2 (two) times daily. , Disp: , Rfl:     fluticasone propionate (FLONASE) 50 mcg/actuation nasal spray, 1 spray by Nasal route., Disp: , Rfl:     fluticasone-umeclidin-vilanter  (TRELEGY ELLIPTA) 200-62.5-25 mcg inhaler, Inhale 1 puff into the lungs once daily., Disp: 60 each, Rfl: 3    furosemide (LASIX) 40 MG tablet, Take 1 tablet (40 mg total) by mouth 2 (two) times daily as needed (edema)., Disp: 120 tablet, Rfl: 0    HYDROcodone-acetaminophen (NORCO) 5-325 mg per tablet, Take 1 tablet by mouth every 6 (six) hours as needed for Pain., Disp: 28 tablet, Rfl: 0    ketoconazole (NIZORAL) 2 % cream, Apply topically 2 (two) times daily., Disp: 30 g, Rfl: 0    lancets (FREESTYLE LANCETS) 28 gauge Misc, 1 lancet by Misc.(Non-Drug; Combo Route) route 3 (three) times daily., Disp: 300 each, Rfl: 3    lancets Misc, To check BG 3 times daily, to use with insurance preferred meter, Disp: 200 each, Rfl: 0    metFORMIN (GLUCOPHAGE) 500 MG tablet, Take 1 tablet (500 mg total) by mouth 2 (two) times daily with meals., Disp: 180 tablet, Rfl: 0    metOLazone (ZAROXOLYN) 2.5 MG tablet, Take 1 tablet (2.5 mg total) by mouth daily as needed (edema)., Disp: 4 tablet, Rfl: 0    montelukast (SINGULAIR) 10 mg tablet, TAKE 1 TABLET BY MOUTH ONCE DAILY IN THE EVENING, Disp: 90 tablet, Rfl: 3    nitroGLYCERIN (NITROSTAT) 0.4 MG SL tablet, DISSOLVE ONE TABLET UNDER THE TONGUE EVERY 5 MINUTES AS NEEDED FOR CHEST PAIN.  DO NOT EXCEED A TOTAL OF 3 DOSES IN 15 MINUTES, Disp: 25 tablet, Rfl: 0    omega-3 fatty acids-vitamin E 1,000 mg Cap, Take 1 capsule by mouth once daily. Three times a day, Disp: , Rfl:     potassium chloride (KLOR-CON) 10 MEQ TbSR, 1 tab with furosemide., Disp: 180 tablet, Rfl: 5    predniSONE (DELTASONE) 20 MG tablet, Take one daily for 3 days and may repeat for shortness of breath., Disp: 21 tablet, Rfl: 0    solifenacin (VESICARE) 10 MG tablet, Take 1 tablet (10 mg total) by mouth once daily., Disp: 90 tablet, Rfl: 3    tamsulosin (FLOMAX) 0.4 mg Cap, Take 2 capsules (0.8 mg total) by mouth once daily., Disp: 180 capsule, Rfl: 3    triamcinolone acetonide 0.1% (KENALOG) 0.1 % cream, Apply  topically 2 (two) times daily. for 14 days, Disp: 80 g, Rfl: 0    TURMERIC ORAL, Take 1 tablet by mouth once daily., Disp: , Rfl:     blood-glucose meter, drum-type Kit, 1 Device by Misc.(Non-Drug; Combo Route) route 2 (two) times daily with meals., Disp: 1 kit, Rfl: 0    lancing device Misc, 1 Device by Misc.(Non-Drug; Combo Route) route 2 (two) times daily with meals., Disp: 100 each, Rfl: 0    sotaloL (BETAPACE) 80 MG tablet, Take 0.5 tablets (40 mg total) by mouth 2 (two) times daily. for 7 days, Disp: 7 tablet, Rfl: 0  No current facility-administered medications for this visit.    Facility-Administered Medications Ordered in Other Visits:     lidocaine (PF) 10 mg/ml (1%) injection 10 mg, 1 mL, Intradermal, Once, Deandra Diaz MD    Past Medical History:   Diagnosis Date    Acute hypoxemic respiratory failure 1/1/2021    Angina pectoris     Arthritis     Asthma     Atrial fibrillation     Back pain     Cardiac pacemaker     Cataract     Chronic bronchitis     COPD (chronic obstructive pulmonary disease)     Coronary artery disease     COVID-19     Diabetic retinopathy associated with controlled type 2 diabetes mellitus 2/18/2021    DM (diabetes mellitus), type 2, 2000 12/12/2014    Gout     Hepatic cirrhosis, unspecified hepatic cirrhosis type, unspecified whether ascites present 1/23/2022    Hoarseness of voice     Hyperlipidemia     Hypertension     Kidney stones 12/17/2012    Nephrolithiasis     Obesity     Pneumonia due to COVID-19 virus 1/1/2021    Renal manifestation of secondary diabetes mellitus     Rheumatoid factor positive 03/08/2017    Negative work up with Dr. Choudhury    Sleep apnea     Thyroid disease     Unspecified disorder of kidney and ureter     Urinary incontinence     Vocal cord polyp        Past Surgical History:   Procedure Laterality Date    APPENDECTOMY      CARDIAC PACEMAKER PLACEMENT  2018    COLONOSCOPY N/A 3/3/2021    Procedure: COLONOSCOPY;  Surgeon: Jesus Soliman MD;   Location: [facility] ENDO;  Service: Endoscopy;  Laterality: N/A;    CORONARY STENT PLACEMENT  2018    EYE SURGERY      left eye secondary to trauma    FRACTURE SURGERY      right arm    KNEE SURGERY      screw    MICROLARYNGOSCOPY WITH BIOPSY OF VOCAL CORD N/A 10/13/2020    Procedure: MICROLARYNGOSCOPY, WITH VOCAL CORD BIOPSY;  Surgeon: Deandra Diaz MD;  Location: [facility] OR;  Service: ENT;  Laterality: N/A;    TONSILLECTOMY, ADENOIDECTOMY         Family History   Problem Relation Name Age of Onset    Thyroid disease Other nephew     Cancer Mother      Hypertension Mother      Osteoarthritis Mother      Heart disease Father      Hypertension Father      Cancer Paternal Uncle          lung    Hypertension Sister      Hypertension Brother      Cancer Brother          colon?    Diabetes Maternal Aunt      Cancer Brother          pancreatic    Kidney disease Daughter      Stroke Daughter      No Known Problems Son      No Known Problems Daughter      Collagen disease Neg Hx      Amblyopia Neg Hx      Blindness Neg Hx      Cataracts Neg Hx      Glaucoma Neg Hx      Macular degeneration Neg Hx      Retinal detachment Neg Hx      Strabismus Neg Hx         Social History     Socioeconomic History    Marital status:      Spouse name: Halie    Number of children: 3    Years of education: 8    Highest education level: 8th grade   Occupational History    Occupation: Retired   Tobacco Use    Smoking status: Former     Types: Cigars    Smokeless tobacco: Current     Types: Chew    Tobacco comments:     smoked a few cigars in his 20s   Substance and Sexual Activity    Alcohol use: Yes     Comment: a few beers during holidays    Drug use: No    Sexual activity: Not Currently     Partners: Female     Social Determinants of Health     Financial Resource Strain: Low Risk  (6/14/2024)    Overall Financial Resource Strain (CARDIA)     Difficulty of Paying Living Expenses: Not hard at all   Food Insecurity: No Food Insecurity  "(6/14/2024)    Hunger Vital Sign     Worried About Running Out of Food in the Last Year: Never true     Ran Out of Food in the Last Year: Never true   Transportation Needs: No Transportation Needs (6/14/2024)    PRAPARE - Transportation     Lack of Transportation (Medical): No     Lack of Transportation (Non-Medical): No   Physical Activity: Insufficiently Active (6/14/2024)    Exercise Vital Sign     Days of Exercise per Week: 7 days     Minutes of Exercise per Session: 10 min   Stress: No Stress Concern Present (1/24/2022)    [facility] of Occupational Health - Occupational Stress Questionnaire     Feeling of Stress : Not at all   Housing Stability: Low Risk  (6/14/2024)    Housing Stability Vital Sign     Unable to Pay for Housing in the Last Year: No     Homeless in the Last Year: No       Review of patient's allergies indicates:   Allergen Reactions    No known drug allergies        Review of Systems:  12 point review of systems is negative.    Physical Exam:  Vitals:    06/17/24 1429   BP: 106/68   Pulse: 108   Weight: 119.2 kg (262 lb 12.6 oz)   Height: 5' 11" (1.803 m)   PainSc:   4   PainLoc: Back     Body mass index is 36.65 kg/m².    Gen: NAD  Psych: mood appropriate for given condition  HEENT: eyes anicteric   CV: RRR, 2+ radial pulse  HEENT: anicteric   Respiratory: non-labored, no signs of respiratory distress  Abd: non-distended  Skin: warm, dry and intact.  Gait:  Presents in wheelchair    Pain with axial lumbar back extension    Sensory:  Intact and symmetrical to light touch in L1-S1 dermatomes bilaterally.    Motor:       Right Left   L2/3 Iliacus Hip flexion  5  4+   L3/4 Qudratus Femoris Knee Extension  5  5   L4/5 Tib Anterior Ankle Dorsiflexion   5  5   L5/S1 Extensor Hallicus Longus Great toe extension  5  5   S1/S2 Gastroc/Soleus Plantar Flexion  5  5     Imaging:  MRI lumbar 12/6/21  FINDINGS:  This report assumes that there are 5 non-rib bearing lumbar vertebral bodies with the L5 " vertebral body articulating with the sacrum. Accurate numbering of the spine levels would require imaging of the thoracolumbar spine to count ribs.     The prevertebral soft tissues are normal. There is approximately 7 degrees levoscoliosis of lumbar spine. Individual vertebral height is maintained. Marrow signal is within normal limits. Conus is normal in caliber and signal. The conus terminates at L1     At T12-L1 there is moderate disc height loss with a small broad-based posterior disc bulge. There is no facet arthropathy or ligamentum flavum hypertrophy. There is minimal spinal canal/neural foraminal stenosis.     At L1-2, there is moderate disc height loss with a tiny broad-based posterior disc bulge. There is mild bilateral facet arthropathy with ligamentum flavum hypertrophy. There is very mild spinal canal/neural foraminal stenosis.     At L2-3, the disc shows moderate disc height loss with a small broad-based posterior disc bulge study more eccentric to the neural foramina. There is moderate facet arthropathy with ligament flavum hypertrophy. This results in moderate to severe right neural foraminal stenosis, mild to moderate left neural foraminal stenosis and minimal spinal canal stenosis.     At L3-4, shows mild disc height loss with a moderate-sized broad-based posterior disc bulge. There is a small superimposed left neural foraminal hyperintensity zone/annular fissure images (sagittal image 15). There is moderate facet arthropathy and moderate ligamentum flavum hypertrophy. These findings in combination result in severe spinal canal stenosis with crowding of the nerve root centrally. There is moderate bilateral neural foraminal stenosis as well. There is some waviness of the nerve roots at this level suggestive of nerve root impingement (and less likely arachnoiditis)     At L4-5, the disc shows mild disc height loss with a moderate-sized broad-based posterior disc bulge. There appears to be a tiny  superimposed left neural foraminal high intensity zone/annular fissure images (sagittal image 15). There is moderate bilateral facet arthropathy with mild ligamentum flavum hypertrophy. This results in severe left neural foraminal stenosis, moderate right neural foraminal stenosis and mild spinal canal stenosis.     At L5-S1, the disc shows moderate disc height loss with a moderate-sized broad-based posterior disc bulge/disc osteophyte complex which is more eccentric to the neural foramina. There is moderate facet arthropathy and mild ligamentum flavum hypertrophy. These findings in combination result in moderate to severe bilateral neural foraminal stenosis and mild spinal canal stenosis. The facet osteophytes appear to impinge upon the left greater than right lateral recess and possibly upon the traversing S1 nerve roots in the lateral recesses.     The SI joints and visualized pelvis are within normal limits.    Labs:  Lab Results   Component Value Date    HGBA1C 6.6 (H) 11/25/2023       Lab Results   Component Value Date    WBC 9.09 01/09/2024    HGB 10.9 (L) 01/09/2024    HCT 35.1 (L) 01/09/2024    MCV 92 01/09/2024     01/09/2024         Assessment:   Problem List Items Addressed This Visit    None  Visit Diagnoses       Lumbar radiculopathy    -  Primary    Lumbar spondylosis        Spinal stenosis of lumbar region with neurogenic claudication        Dorsalgia                      79 y.o. year old male with PMH HTN, AFib, CAD, CKD, COPD who presents with back pain.  He has been dealing with this pain for the last couple years and has been gradually worsening.  Today he reports his pain is 3/10, intermittent, aching.  The pain radiates down the bilateral buttocks and bilateral hips to the thighs.  His pain is worse with standing and walking and can be relieved with rest.  He also does not feel much discomfort when he is lying in bed and sleeping.  Can feel like his legs feel a little weak but denies any  numbness.    6/17/2024: Lenny Lopez returns to the office for follow up.  Follow-up with worsening lower back pain, 3-8/10, minimal pain at rest but when standing for any extended periods of time he has significant pain radiating into his bilateral hips.  Numbness and tingling in left lower leg.  He reports some weakness with left hip flexion but denies any other weakness.  He did not tolerate trial of gabapentin nor Lyrica.  He avoids NSAIDs due to being on aspirin and Eliquis.  Minimal relief with the pain medication.      - on exam he has full strength and intact sensation to light touch bilateral L2-S1.  He has weakness with left hip flexion, 4+/5.  New since last office visit 1 year ago.  Reports this has been worsening over the past week.  - we reviewed his lumbar MRI in clinic today and is consistent with multilevel bilateral facet arthropathy.  He also has severe central canal narrowing at L3-4  - he continues to describe neurogenic claudication.  Likely from his severe central canal stenosis at L3/4.  He has not tolerated trials of gabapentin, Lyrica.  Pain is too severe to restart formal physical therapy, he has not finding relief with at-home PT directed exercises.  - this pain is limiting his mobility interfering with his ADLs and quality of life.  - at this time I would like to schedule him for an L4/5 interlaminar epidural steroid injection.  We will need clearance to hold his aspirin and Eliquis.  - follow-up 2 weeks post procedure or sooner if needed.  If he fails get relief with this can consider bilateral TFESI at L3/4 to target his central canal narrowing.  Ultimately, if he fails get relief with this and he has not a surgical candidate, he may be a candidate for Veriflex      : Not applicable      This note was completed with dictation software and grammatical errors may exist.

## 2024-06-18 DIAGNOSIS — J44.9 CHRONIC OBSTRUCTIVE PULMONARY DISEASE, UNSPECIFIED COPD TYPE: ICD-10-CM

## 2024-06-18 DIAGNOSIS — J45.50 SEVERE PERSISTENT ASTHMA WITHOUT COMPLICATION: ICD-10-CM

## 2024-06-18 DIAGNOSIS — J44.89 ASTHMA-COPD OVERLAP SYNDROME: ICD-10-CM

## 2024-06-18 NOTE — TELEPHONE ENCOUNTER
Patient request RX refill   Last seen:  02/05/2024  Last written:  02/05/2024  Next appt:  07/01/2024

## 2024-06-19 ENCOUNTER — TELEPHONE (OUTPATIENT)
Dept: PAIN MEDICINE | Facility: CLINIC | Age: 79
End: 2024-06-19
Payer: MEDICARE

## 2024-06-19 DIAGNOSIS — M54.16 LUMBAR RADICULOPATHY: Primary | ICD-10-CM

## 2024-06-19 RX ORDER — ALPRAZOLAM 1 MG/1
1 TABLET, ORALLY DISINTEGRATING ORAL ONCE AS NEEDED
OUTPATIENT
Start: 2024-06-19 | End: 2035-11-16

## 2024-06-19 NOTE — TELEPHONE ENCOUNTER
Spoke with pt's grandchild, Best, scheduled procedure LESI with Dr. Nguyen on 7/16/24. Reviewed pre op instructions. Cardio clearance faxed to Dr. Masterson to hold Aspirin for 5 days and Eliquis for 3 days prior procedure. Follow up scheduled.

## 2024-06-20 RX ORDER — FLUTICASONE FUROATE, UMECLIDINIUM BROMIDE AND VILANTEROL TRIFENATATE 200; 62.5; 25 UG/1; UG/1; UG/1
1 POWDER RESPIRATORY (INHALATION) DAILY
Qty: 60 EACH | Refills: 3 | Status: SHIPPED | OUTPATIENT
Start: 2024-06-20

## 2024-06-25 ENCOUNTER — LAB VISIT (OUTPATIENT)
Dept: LAB | Facility: HOSPITAL | Age: 79
End: 2024-06-25
Attending: NURSE PRACTITIONER
Payer: MEDICARE

## 2024-06-25 DIAGNOSIS — R80.9 TYPE 2 DIABETES MELLITUS WITH MICROALBUMINURIA, WITHOUT LONG-TERM CURRENT USE OF INSULIN: ICD-10-CM

## 2024-06-25 DIAGNOSIS — E79.0 ELEVATED URIC ACID IN BLOOD: ICD-10-CM

## 2024-06-25 DIAGNOSIS — E11.29 TYPE 2 DIABETES MELLITUS WITH MICROALBUMINURIA, WITHOUT LONG-TERM CURRENT USE OF INSULIN: ICD-10-CM

## 2024-06-25 DIAGNOSIS — N18.32 STAGE 3B CHRONIC KIDNEY DISEASE: ICD-10-CM

## 2024-06-25 LAB
ALBUMIN SERPL BCP-MCNC: 3.2 G/DL (ref 3.5–5.2)
ALBUMIN/CREAT UR: 293.1 UG/MG (ref 0–30)
ALP SERPL-CCNC: 100 U/L (ref 55–135)
ALT SERPL W/O P-5'-P-CCNC: 26 U/L (ref 10–44)
ANION GAP SERPL CALC-SCNC: 11 MMOL/L (ref 8–16)
AST SERPL-CCNC: 22 U/L (ref 10–40)
BASOPHILS # BLD AUTO: 0.01 K/UL (ref 0–0.2)
BASOPHILS NFR BLD: 0.1 % (ref 0–1.9)
BILIRUB SERPL-MCNC: 1 MG/DL (ref 0.1–1)
BUN SERPL-MCNC: 29 MG/DL (ref 8–23)
CALCIUM SERPL-MCNC: 9.6 MG/DL (ref 8.7–10.5)
CHLORIDE SERPL-SCNC: 99 MMOL/L (ref 95–110)
CHOLEST SERPL-MCNC: 89 MG/DL (ref 120–199)
CHOLEST/HDLC SERPL: 3.2 {RATIO} (ref 2–5)
CO2 SERPL-SCNC: 29 MMOL/L (ref 23–29)
CREAT SERPL-MCNC: 1.5 MG/DL (ref 0.5–1.4)
CREAT UR-MCNC: 160 MG/DL (ref 23–375)
DIFFERENTIAL METHOD BLD: ABNORMAL
EOSINOPHIL # BLD AUTO: 0 K/UL (ref 0–0.5)
EOSINOPHIL NFR BLD: 0 % (ref 0–8)
ERYTHROCYTE [DISTWIDTH] IN BLOOD BY AUTOMATED COUNT: 16.7 % (ref 11.5–14.5)
EST. GFR  (NO RACE VARIABLE): 46.5 ML/MIN/1.73 M^2
ESTIMATED AVG GLUCOSE: 143 MG/DL (ref 68–131)
GLUCOSE SERPL-MCNC: 131 MG/DL (ref 70–110)
HBA1C MFR BLD: 6.6 % (ref 4–5.6)
HCT VFR BLD AUTO: 38.6 % (ref 40–54)
HDLC SERPL-MCNC: 28 MG/DL (ref 40–75)
HDLC SERPL: 31.5 % (ref 20–50)
HGB BLD-MCNC: 12.1 G/DL (ref 14–18)
IMM GRANULOCYTES # BLD AUTO: 0.03 K/UL (ref 0–0.04)
IMM GRANULOCYTES NFR BLD AUTO: 0.4 % (ref 0–0.5)
LDLC SERPL CALC-MCNC: 47 MG/DL (ref 63–159)
LYMPHOCYTES # BLD AUTO: 1.8 K/UL (ref 1–4.8)
LYMPHOCYTES NFR BLD: 24.6 % (ref 18–48)
MCH RBC QN AUTO: 28.4 PG (ref 27–31)
MCHC RBC AUTO-ENTMCNC: 31.3 G/DL (ref 32–36)
MCV RBC AUTO: 91 FL (ref 82–98)
MICROALBUMIN UR DL<=1MG/L-MCNC: 469 UG/ML
MONOCYTES # BLD AUTO: 0.6 K/UL (ref 0.3–1)
MONOCYTES NFR BLD: 8.5 % (ref 4–15)
NEUTROPHILS # BLD AUTO: 4.9 K/UL (ref 1.8–7.7)
NEUTROPHILS NFR BLD: 66.4 % (ref 38–73)
NONHDLC SERPL-MCNC: 61 MG/DL
NRBC BLD-RTO: 0 /100 WBC
PLATELET # BLD AUTO: 168 K/UL (ref 150–450)
PMV BLD AUTO: 12.6 FL (ref 9.2–12.9)
POTASSIUM SERPL-SCNC: 4.1 MMOL/L (ref 3.5–5.1)
PROT SERPL-MCNC: 6.6 G/DL (ref 6–8.4)
RBC # BLD AUTO: 4.26 M/UL (ref 4.6–6.2)
SODIUM SERPL-SCNC: 139 MMOL/L (ref 136–145)
TRIGL SERPL-MCNC: 70 MG/DL (ref 30–150)
URATE SERPL-MCNC: 4.3 MG/DL (ref 3.4–7)
WBC # BLD AUTO: 7.33 K/UL (ref 3.9–12.7)

## 2024-06-25 PROCEDURE — 82570 ASSAY OF URINE CREATININE: CPT | Performed by: NURSE PRACTITIONER

## 2024-06-25 PROCEDURE — 36415 COLL VENOUS BLD VENIPUNCTURE: CPT | Mod: PO | Performed by: NURSE PRACTITIONER

## 2024-06-25 PROCEDURE — 80061 LIPID PANEL: CPT | Performed by: NURSE PRACTITIONER

## 2024-06-25 PROCEDURE — 84550 ASSAY OF BLOOD/URIC ACID: CPT | Performed by: NURSE PRACTITIONER

## 2024-06-25 PROCEDURE — 85025 COMPLETE CBC W/AUTO DIFF WBC: CPT | Performed by: NURSE PRACTITIONER

## 2024-06-25 PROCEDURE — 83036 HEMOGLOBIN GLYCOSYLATED A1C: CPT | Performed by: NURSE PRACTITIONER

## 2024-06-25 PROCEDURE — 80053 COMPREHEN METABOLIC PANEL: CPT | Performed by: NURSE PRACTITIONER

## 2024-06-26 ENCOUNTER — OFFICE VISIT (OUTPATIENT)
Dept: PULMONOLOGY | Facility: CLINIC | Age: 79
End: 2024-06-26
Payer: MEDICARE

## 2024-06-26 VITALS
SYSTOLIC BLOOD PRESSURE: 108 MMHG | OXYGEN SATURATION: 98 % | HEART RATE: 50 BPM | WEIGHT: 257.69 LBS | BODY MASS INDEX: 36.08 KG/M2 | DIASTOLIC BLOOD PRESSURE: 72 MMHG | HEIGHT: 71 IN

## 2024-06-26 DIAGNOSIS — J44.9 CHRONIC OBSTRUCTIVE PULMONARY DISEASE, UNSPECIFIED COPD TYPE: ICD-10-CM

## 2024-06-26 DIAGNOSIS — J44.89 ASTHMA-COPD OVERLAP SYNDROME: ICD-10-CM

## 2024-06-26 DIAGNOSIS — N18.32 STAGE 3B CHRONIC KIDNEY DISEASE: Primary | ICD-10-CM

## 2024-06-26 DIAGNOSIS — J45.50 SEVERE PERSISTENT ASTHMA WITHOUT COMPLICATION: ICD-10-CM

## 2024-06-26 PROCEDURE — 1126F AMNT PAIN NOTED NONE PRSNT: CPT | Mod: CPTII,S$GLB,, | Performed by: NURSE PRACTITIONER

## 2024-06-26 PROCEDURE — 99213 OFFICE O/P EST LOW 20 MIN: CPT | Mod: S$GLB,,, | Performed by: NURSE PRACTITIONER

## 2024-06-26 PROCEDURE — 3288F FALL RISK ASSESSMENT DOCD: CPT | Mod: CPTII,S$GLB,, | Performed by: NURSE PRACTITIONER

## 2024-06-26 PROCEDURE — 99999 PR PBB SHADOW E&M-EST. PATIENT-LVL V: CPT | Mod: PBBFAC,,, | Performed by: NURSE PRACTITIONER

## 2024-06-26 PROCEDURE — 1101F PT FALLS ASSESS-DOCD LE1/YR: CPT | Mod: CPTII,S$GLB,, | Performed by: NURSE PRACTITIONER

## 2024-06-26 PROCEDURE — 3078F DIAST BP <80 MM HG: CPT | Mod: CPTII,S$GLB,, | Performed by: NURSE PRACTITIONER

## 2024-06-26 PROCEDURE — 1159F MED LIST DOCD IN RCRD: CPT | Mod: CPTII,S$GLB,, | Performed by: NURSE PRACTITIONER

## 2024-06-26 PROCEDURE — 3074F SYST BP LT 130 MM HG: CPT | Mod: CPTII,S$GLB,, | Performed by: NURSE PRACTITIONER

## 2024-06-26 RX ORDER — FLUTICASONE FUROATE, UMECLIDINIUM BROMIDE AND VILANTEROL TRIFENATATE 200; 62.5; 25 UG/1; UG/1; UG/1
1 POWDER RESPIRATORY (INHALATION) DAILY
Qty: 60 EACH | Refills: 3 | Status: SHIPPED | OUTPATIENT
Start: 2024-06-26
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
## 2024-06-26 NOTE — PROGRESS NOTES
Patient here for her Fasenra injection.  2 patient identifiers used (name and ).  Injection given into the left arm SQ.  Pt tolerated well.  No bleeding, redness, swelling or reaction noted to the injection site.       NDC:  2427-7667-70  LOT:   [id number]  EXP:  2026

## 2024-06-27 ENCOUNTER — TELEPHONE (OUTPATIENT)
Dept: PRIMARY CARE CLINIC | Facility: CLINIC | Age: 79
End: 2024-06-27
Payer: MEDICARE

## 2024-06-27 NOTE — TELEPHONE ENCOUNTER
----- Message from Anayeli Montesinos NP sent at 6/26/2024  2:03 PM CDT -----  Please inform patient that the lab results shows the the sodium level has returned to normal range. The A1c is at goal. Kidney function is stable. The urine sample shows an increase in protein-increase water intake-repeat urine in 2 weeks-if no improvement I will refer him to a nephrologist. The other labs are normal or in acceptable range.

## 2024-06-27 NOTE — TELEPHONE ENCOUNTER
Call placed to patient for notification. Patient verbalized understanding.  Patient states he has plans to go out of town in the next day or so, states will call back to schedule lab appointment, as he may need to submit urine sample at a different [facility] location.

## 2024-07-15 ENCOUNTER — TELEPHONE (OUTPATIENT)
Dept: PAIN MEDICINE | Facility: CLINIC | Age: 79
End: 2024-07-15
Payer: MEDICARE

## 2024-07-15 NOTE — TELEPHONE ENCOUNTER
----- Message from Sigrid Tinoco sent at 7/15/2024  3:14 PM CDT -----  Regarding: advice  Type:  Needs Medical Advice    Who Called: pt    Best Call Back Number: [phone]      Additional Information: pt wants a call back to see what time is his procedure tomorrow.  please call to discuss.

## 2024-07-16 ENCOUNTER — HOSPITAL ENCOUNTER (OUTPATIENT)
Dept: RADIOLOGY | Facility: HOSPITAL | Age: 79
Discharge: HOME | End: 2024-07-16
Attending: ANESTHESIOLOGY | Admitting: ANESTHESIOLOGY
Payer: MEDICARE

## 2024-07-16 ENCOUNTER — HOSPITAL ENCOUNTER (OUTPATIENT)
Facility: HOSPITAL | Age: 79
Discharge: HOME | End: 2024-07-16
Attending: ANESTHESIOLOGY | Admitting: ANESTHESIOLOGY
Payer: MEDICARE

## 2024-07-16 VITALS
DIASTOLIC BLOOD PRESSURE: 57 MMHG | SYSTOLIC BLOOD PRESSURE: 114 MMHG | OXYGEN SATURATION: 95 % | TEMPERATURE: 97 F | BODY MASS INDEX: 35.98 KG/M2 | WEIGHT: 257 LBS | HEART RATE: 108 BPM | HEIGHT: 71 IN | RESPIRATION RATE: 19 BRPM

## 2024-07-16 DIAGNOSIS — M10.9 GOUT, UNSPECIFIED CAUSE, UNSPECIFIED CHRONICITY, UNSPECIFIED SITE: ICD-10-CM

## 2024-07-16 DIAGNOSIS — M54.16 LUMBAR RADICULOPATHY: Primary | ICD-10-CM

## 2024-07-16 DIAGNOSIS — M54.50 LOWER BACK PAIN: ICD-10-CM

## 2024-07-16 DIAGNOSIS — M25.531 RIGHT WRIST PAIN: ICD-10-CM

## 2024-07-16 DIAGNOSIS — M1A.3710 CHRONIC GOUT OF RIGHT ANKLE DUE TO RENAL IMPAIRMENT WITHOUT TOPHUS: ICD-10-CM

## 2024-07-16 PROCEDURE — 25500020 PHARM REV CODE 255: Mod: PO | Performed by: ANESTHESIOLOGY

## 2024-07-16 PROCEDURE — A4216 STERILE WATER/SALINE, 10 ML: HCPCS | Mod: PO | Performed by: ANESTHESIOLOGY

## 2024-07-16 PROCEDURE — 63600175 PHARM REV CODE 636 W HCPCS: Mod: PO | Performed by: ANESTHESIOLOGY

## 2024-07-16 PROCEDURE — 25000003 PHARM REV CODE 250: Mod: PO | Performed by: ANESTHESIOLOGY

## 2024-07-16 PROCEDURE — 76000 FLUOROSCOPY <1 HR PHYS/QHP: CPT | Mod: TC,PO

## 2024-07-16 PROCEDURE — 62323 NJX INTERLAMINAR LMBR/SAC: CPT | Mod: PO | Performed by: ANESTHESIOLOGY

## 2024-07-16 PROCEDURE — 62323 NJX INTERLAMINAR LMBR/SAC: CPT | Mod: ,,, | Performed by: ANESTHESIOLOGY

## 2024-07-16 RX ORDER — ALPRAZOLAM 0.5 MG/1
1 TABLET, ORALLY DISINTEGRATING ORAL ONCE AS NEEDED
Status: DISCONTINUED | OUTPATIENT
Start: 2024-07-16 | End: 2024-07-16 | Stop reason: HOSPADM

## 2024-07-16 RX ORDER — METHYLPREDNISOLONE ACETATE 80 MG/ML
INJECTION, SUSPENSION INTRA-ARTICULAR; INTRALESIONAL; INTRAMUSCULAR; SOFT TISSUE
Status: DISCONTINUED | OUTPATIENT
Start: 2024-07-16 | End: 2024-07-16 | Stop reason: HOSPADM

## 2024-07-16 RX ORDER — COLCHICINE 0.6 MG/1
CAPSULE ORAL
Qty: 30 CAPSULE | Refills: 5 | Status: SHIPPED | OUTPATIENT
Start: 2024-07-16

## 2024-07-16 RX ORDER — LIDOCAINE HYDROCHLORIDE 10 MG/ML
INJECTION, SOLUTION EPIDURAL; INFILTRATION; INTRACAUDAL; PERINEURAL
Status: DISCONTINUED | OUTPATIENT
Start: 2024-07-16 | End: 2024-07-16 | Stop reason: HOSPADM

## 2024-07-16 RX ORDER — SODIUM CHLORIDE 9 MG/ML
INJECTION, SOLUTION INTRAMUSCULAR; INTRAVENOUS; SUBCUTANEOUS
Status: DISCONTINUED | OUTPATIENT
Start: 2024-07-16 | End: 2024-07-16 | Stop reason: HOSPADM

## 2024-07-16 NOTE — OP NOTE
"Procedure Note    Procedure Date: 7/16/2024    Procedure Performed:  L4/5 lumbar interlaminar epidural steroid injection under fluoroscopy.    Indications:  Lenny Lopez presents with lumbar radiculitis/radiculopathy secondary to disc herniation, osteophyte/osteophyte complexes, and/or severe degenerative disc disease producing foraminal or central spinal stenosis.  The pain has been present for at least 4 weeks and the patient has failed to respond to noninvasive conservative care.  Pain rated by NRS at baseline prior to intervention is 6/10.  Their radiculitis/radiculopathy and/or neurogenic claudication is severe enough to greatly impact their quality of life or function.     Pre-op diagnosis: Lumbar Radiculopathy    Post-op diagnosis: same    Physician: Tavares Nguyen MD    IV anxiolysis medications: none    Medications injected: depomedrol 80mg, 1% Lidocaine 1ml, 2 mL sterile, preservative-free normal saline.    Local anesthetic used: 1% Lidocaine, 1 ml    Estimated Blood Loss: none    Complications:  none    Technique:  The patient was interviewed in the holding area and Risks/Benefits were discussed, including, but not limited to, the possibility of new or different pain, bleeding or infection.   All questions were answered.  The patient understood and accepted risks.  Consent was verfied.  A time-out was taken to identify patient and procedure prior to starting the procedure. The patient was placed in the prone position on the fluoroscopy table. The area of the lumbar spine was prepped with Chloraprep x2 and draped in a sterile manner. The L4/5 interspace was identified and marked under AP fluoroscopy. The skin and subcutaneous tissues overlying the targeted interspace were anesthetized with 3-5 mL of 1% lidocaine using a 25G, 1.5" needle.  A 20G, 3.5" Tuohy epidural needle was directed toward the interspace under fluoroscopic guidance until the ligamentum flavum was engaged. From this point, a loss of " resistance technique with a glass syringe and saline was used to identify entrance of the needle into the epidural space. Once loss of resistance was observed 1 mL of contrast solution was injected. An appropriate epidurogram was noted.  A 4 mL mixture consisting of saline, 1 mL 1% Lidocaine and 80 mg of depomedrol was injected slowly and without resistance.  The needle was flushed with normal saline and removed. The contrast was seen to be displaced after injection. Patient was awake/responsive during all injections.  The patient tolerated the procedure well and was transferred to the [facility] in stable condition.  The patient was monitored after the procedure and was given post-procedure and discharge instructions to follow at home. The patient was discharged in a stable condition.

## 2024-07-16 NOTE — TELEPHONE ENCOUNTER
No care due was identified.  [facility] Embedded Care Due Messages. Reference number: [id number].   7/16/2024 1:30:23 PM CDT

## 2024-07-16 NOTE — DISCHARGE SUMMARY
[facility]  Discharge Note  Short Stay    Admit Date: 7/16/2024    Discharge Date: 7/16/2024    Attending Physician: Tavares Nguyen     Discharge Provider: Tavares Nguyen    Diagnoses:  There are no hospital problems to display for this patient.      Discharged Condition: Good    Final Diagnoses: Lumbar radiculopathy [M54.16]    Disposition: Home or Self Care    Hospital Course: No complications, uneventful    Outcome of Hospitalization, Treatment, Procedure, or Surgery:  Patient was admitted for outpatient interventional pain management procedure. The patient tolerated the procedure well with no complications.    Follow up/Patient Instructions:  Follow up as scheduled in Pain Management office in 2-3 weeks.  Patient has received instructions and follow up date and time.    Medications:  Continue previous medications, except restart aspirin and eliquis in 24 hours    Discharge Procedure Orders   Notify your health care provider if you experience any of the following:  temperature >100.4     Notify your health care provider if you experience any of the following:  persistent nausea and vomiting or diarrhea     Notify your health care provider if you experience any of the following:  severe uncontrolled pain     Notify your health care provider if you experience any of the following:  redness, tenderness, or signs of infection (pain, swelling, redness, odor or green/yellow discharge around incision site)     Notify your health care provider if you experience any of the following:  difficulty breathing or increased cough     Notify your health care provider if you experience any of the following:  severe persistent headache     Notify your health care provider if you experience any of the following:  worsening rash     Notify your health care provider if you experience any of the following:  persistent dizziness, light-headedness, or visual disturbances     Notify your health care provider if you experience  any of the following:  increased confusion or weakness     Activity as tolerated

## 2024-07-16 NOTE — TELEPHONE ENCOUNTER
Refill Routing Note   Medication(s) are not appropriate for processing by [facility] for the following reason(s):      Required labs abnormal    [facility] action(s):  Defer Care Due:  None identified          Pharmacist review requested: Yes     Appointments  past 12m or future 3m with PCP    Date Provider   Last Visit   1/9/2024 Blue Shah MD   Next Visit   Visit date not found Blue Shah MD   ED visits in past 90 days: 0        Note composed:1:31 PM 07/16/2024

## 2024-07-16 NOTE — TELEPHONE ENCOUNTER
Refill Decision Note   Lenny Lopez  is requesting a refill authorization.  Brief Assessment and Rationale for Refill:  Approve     Medication Therapy Plan:  No dose adjustment recommended per renal fxn.       Pharmacist review requested: Yes   Extended chart review required: Yes   Comments:     Note composed:1:38 PM 07/16/2024

## 2024-07-16 NOTE — INTERVAL H&P NOTE
The patient has been examined and the H&P has been reviewed:    I concur with the findings and no changes have occurred since H&P was written.  He has held aspirin and eliquis appropriately.    The risks and benefits of this intervention, and alternative therapies were discussed with the patient.  The discussion of risks included infection, bleeding, need for additional procedures or surgery, nerve damage.  Questions regarding the procedure, risks, expected outcome, and possible side effects were solicited and answered to the patient's satisfaction.  Lenny Lopez wishes to proceed with the injection or procedure.  Written consent was obtained.        There are no hospital problems to display for this patient.

## 2024-08-08 ENCOUNTER — OFFICE VISIT (OUTPATIENT)
Dept: PAIN MEDICINE | Facility: CLINIC | Age: 79
End: 2024-08-08
Payer: MEDICARE

## 2024-08-08 VITALS
BODY MASS INDEX: 35.28 KG/M2 | SYSTOLIC BLOOD PRESSURE: 134 MMHG | HEART RATE: 60 BPM | DIASTOLIC BLOOD PRESSURE: 60 MMHG | WEIGHT: 252 LBS | HEIGHT: 71 IN

## 2024-08-08 DIAGNOSIS — M47.816 LUMBAR SPONDYLOSIS: ICD-10-CM

## 2024-08-08 DIAGNOSIS — M54.9 DORSALGIA: ICD-10-CM

## 2024-08-08 DIAGNOSIS — M48.062 SPINAL STENOSIS OF LUMBAR REGION WITH NEUROGENIC CLAUDICATION: ICD-10-CM

## 2024-08-08 DIAGNOSIS — M54.16 LUMBAR RADICULOPATHY: Primary | ICD-10-CM

## 2024-08-08 DIAGNOSIS — J44.89 ASTHMA-COPD OVERLAP SYNDROME: ICD-10-CM

## 2024-08-08 DIAGNOSIS — J44.9 CHRONIC OBSTRUCTIVE PULMONARY DISEASE, UNSPECIFIED COPD TYPE: ICD-10-CM

## 2024-08-08 PROCEDURE — 3075F SYST BP GE 130 - 139MM HG: CPT | Mod: CPTII,S$GLB,,

## 2024-08-08 PROCEDURE — 3078F DIAST BP <80 MM HG: CPT | Mod: CPTII,S$GLB,,

## 2024-08-08 PROCEDURE — 1125F AMNT PAIN NOTED PAIN PRSNT: CPT | Mod: CPTII,S$GLB,,

## 2024-08-08 PROCEDURE — 99999 PR PBB SHADOW E&M-EST. PATIENT-LVL IV: CPT | Mod: PBBFAC,,,

## 2024-08-08 PROCEDURE — 99213 OFFICE O/P EST LOW 20 MIN: CPT | Mod: S$GLB,,,

## 2024-08-08 PROCEDURE — 1159F MED LIST DOCD IN RCRD: CPT | Mod: CPTII,S$GLB,,

## 2024-08-08 RX ORDER — FLUTICASONE FUROATE, UMECLIDINIUM BROMIDE AND VILANTEROL TRIFENATATE 200; 62.5; 25 UG/1; UG/1; UG/1
1 POWDER RESPIRATORY (INHALATION) DAILY
Qty: 60 EACH | Refills: 3 | Status: SHIPPED | OUTPATIENT
Start: 2024-08-08

## 2024-08-21 DIAGNOSIS — R60.0 BILATERAL LEG EDEMA: ICD-10-CM

## 2024-08-21 RX ORDER — FUROSEMIDE 40 MG/1
40 TABLET ORAL 2 TIMES DAILY PRN
Qty: 120 TABLET | Refills: 0 | Status: SHIPPED | OUTPATIENT
Start: 2024-08-21

## 2024-08-21 NOTE — TELEPHONE ENCOUNTER
Patient requesting refill on Lasix.  Last seen 6/26/24 by Pratibha Quesada NP.  RX last written:  03/27/2024

## 2024-08-21 NOTE — TELEPHONE ENCOUNTER
----- Message from Abiodun Benavides sent at 8/21/2024 10:57 AM CDT -----  Regarding: refill  Contact: patient  Type:  RX Refill Request    Who Called:  patient   Refill or New Rx:  refill  RX Name and Strength:  furosemide (LASIX) 40 MG [id number] - Route: Take 1 tablet (40 mg total) by mouth 2 (two) times daily as needed (edema). - OralSent to pharmacy as: furosemide (LASIX) 40 MG tabletNotes to Pharmacy: 3 month supplyE-Prescribing Status: Receipt confirmed by pharmacy (3/27/2024  5:55 PM CDT)     How is the patient currently taking it? (ex. 1XDay):  see above   Is this a 30 day or 90 day RX:  see above   Preferred Pharmacy with phone number:      [id number] - [place] - [address] [address]  Phone: [phone] Fax: [phone]        Local or Mail Order:  local   Ordering Provider:  Dr Jerrell Mccloud Call Back Number:  [phone]  Additional Information:  [phone]

## 2024-10-02 ENCOUNTER — OFFICE VISIT (OUTPATIENT)
Dept: PAIN MEDICINE | Facility: CLINIC | Age: 79
End: 2024-10-02
Payer: MEDICARE

## 2024-10-02 VITALS
BODY MASS INDEX: 33.74 KG/M2 | HEART RATE: 127 BPM | DIASTOLIC BLOOD PRESSURE: 66 MMHG | HEIGHT: 71 IN | SYSTOLIC BLOOD PRESSURE: 114 MMHG | WEIGHT: 241 LBS

## 2024-10-02 DIAGNOSIS — M47.816 LUMBAR SPONDYLOSIS: ICD-10-CM

## 2024-10-02 DIAGNOSIS — M54.16 LUMBAR RADICULOPATHY: ICD-10-CM

## 2024-10-02 DIAGNOSIS — M48.062 SPINAL STENOSIS OF LUMBAR REGION WITH NEUROGENIC CLAUDICATION: Primary | ICD-10-CM

## 2024-10-02 DIAGNOSIS — M79.18 MYOFASCIAL PAIN: ICD-10-CM

## 2024-10-02 DIAGNOSIS — M79.609 PAIN OF SOFT TISSUE OF EXTREMITY: ICD-10-CM

## 2024-10-02 DIAGNOSIS — M54.9 DORSALGIA: ICD-10-CM

## 2024-10-02 PROCEDURE — 3074F SYST BP LT 130 MM HG: CPT | Mod: CPTII,S$GLB,,

## 2024-10-02 PROCEDURE — 3288F FALL RISK ASSESSMENT DOCD: CPT | Mod: CPTII,S$GLB,,

## 2024-10-02 PROCEDURE — 99999 PR PBB SHADOW E&M-EST. PATIENT-LVL IV: CPT | Mod: PBBFAC,,,

## 2024-10-02 PROCEDURE — 1159F MED LIST DOCD IN RCRD: CPT | Mod: CPTII,S$GLB,,

## 2024-10-02 PROCEDURE — 99215 OFFICE O/P EST HI 40 MIN: CPT | Mod: S$GLB,,,

## 2024-10-02 PROCEDURE — 1125F AMNT PAIN NOTED PAIN PRSNT: CPT | Mod: CPTII,S$GLB,,

## 2024-10-02 PROCEDURE — 1101F PT FALLS ASSESS-DOCD LE1/YR: CPT | Mod: CPTII,S$GLB,,

## 2024-10-02 PROCEDURE — 3078F DIAST BP <80 MM HG: CPT | Mod: CPTII,S$GLB,,

## 2024-10-02 RX ORDER — METHYLPREDNISOLONE 4 MG/1
TABLET ORAL
Qty: 21 EACH | Refills: 0 | Status: SHIPPED | OUTPATIENT
Start: 2024-10-02 | End: 2024-10-23

## 2024-10-02 NOTE — PROGRESS NOTES
[facility] Follow Up Evaluation      Referred by: No ref. provider found    PCP:     CC:   Chief Complaint   Patient presents with    Back Pain    Hip Pain          10/2/2024    10:25 AM 8/8/2024    10:59 AM 6/17/2024     2:25 PM   Last 3 PDI Scores   Pain Disability Index (PDI) 55 9 42     Interval HPI 10/2/2024: Lenny Lopez returns to the office for follow up.  Today he is reporting the return of his lower back pain, 0-6/10, worsened with standing and walking, nearly resolved with rest.  Pain is in his lower back with radiation into bilateral hips, no further radiating pain.  No numbness, ash weakness or any new changes to  his bowel bladder function.  He recently fell in his having worsening pain in right leg and right arm, but feels like pain as result of a fall and nothing severe.      HPI:   Lenny Lopez is a 79 y.o. male who presents with back pain.  He has been dealing with this pain for the last couple years and has been gradually worsening.  Today he reports his pain is 3/10, intermittent, aching.  The pain radiates down the bilateral buttocks and bilateral hips to the thighs.  His pain is worse with standing and walking and can be relieved with rest.  He also does not feel much discomfort when he is lying in bed and sleeping.  Can feel like his legs feel a little weak but denies any numbness.      Pain Intervention History:  - s/p L4/5 RICKY on 07/16/2024 with unknown relief.    Past Spine Surgical History:      Past and current medications:  Antineuropathics:  NSAIDs:  Physical therapy:  Antidepressants:  Muscle relaxers:  Opioids: hydrocodone  Antiplatelets/Anticoagulants: eliquis, aspirin    History:    Current Outpatient Medications:     acarbose (PRECOSE) 25 MG Tab, Take 1 tablet (25 mg total) by mouth 3 (three) times daily with meals., Disp: 270 tablet, Rfl: 0    ACCU-CHEK GUIDE GLUCOSE METER Misc, USE DEVICE TO TEST BLOOD SUGAR TWO TIMES DAILY, Disp: , Rfl:     albuterol  (PROVENTIL/VENTOLIN HFA) 90 mcg/actuation inhaler, 2 puffs every 4 hours as needed for cough, wheeze, or shortness of breath, Disp: 18 g, Rfl: 11    albuterol-ipratropium (DUO-NEB) 2.5 mg-0.5 mg/3 mL nebulizer solution, Take 3 mLs by nebulization every 4 (four) hours as needed for Wheezing or Shortness of Breath., Disp: 50 each, Rfl: 4    allopurinoL (ZYLOPRIM) 300 MG tablet, 1 1/2 tab daily, Disp: 135 tablet, Rfl: 3    aspirin 81 mg Tab, Take 1 tablet by mouth once daily. , Disp: , Rfl:     atorvastatin (LIPITOR) 80 MG tablet, Take 1 tablet (80 mg total) by mouth once daily., Disp: 90 tablet, Rfl: 3    azithromycin (ZITHROMAX) 500 MG tablet, One daily for yellow mucous, repeat if needed, Disp: 3 tablet, Rfl: 3    benralizumab (FASENRA PEN) 30 mg/mL AtIn, Inject 30 mg into the skin every 8 weeks., Disp: 1 mL, Rfl: 6    benralizumab 30 mg/mL AtIn, Inject 30 mg into the skin every 8 weeks., Disp: 1 mL, Rfl: 6    blood sugar diagnostic Strp, 1 strip by Misc.(Non-Drug; Combo Route) route 3 (three) times daily. DX: E11.29   Dispense test strips that are covered by insurance (Patient taking differently: 1 strip by Misc.(Non-Drug; Combo Route) route 3 (three) times daily. DX: E11.29   Dispense test strips that are covered by insurance), Disp: 300 strip, Rfl: 3    blood sugar diagnostic Strp, To check BG 3 times daily, to use with insurance preferred meter, Disp: 200 each, Rfl: 0    blood sugar diagnostic, drum (ACCU-CHEK COMPACT PLUS TEST) Strp, 1 strip by Misc.(Non-Drug; Combo Route) route 2 (two) times daily with meals., Disp: 100 strip, Rfl: 1    blood-glucose meter, drum-type Kit, 1 Device by Misc.(Non-Drug; Combo Route) route 2 (two) times daily with meals., Disp: 1 kit, Rfl: 0    colchicine (MITIGARE) 0.6 mg Cap, Take 2 caps PO. Take additional 1 cap one hour later. Then take  1 cap PO BID until flare resolves., Disp: 30 capsule, Rfl: 5    diclofenac sodium (SOLARAZE) 3 % gel, 1 application bid, Disp: 300 g, Rfl: 2     ELIQUIS 5 mg Tab, Take by mouth 2 (two) times daily., Disp: , Rfl:     fluticasone propionate (FLONASE) 50 mcg/actuation nasal spray, 1 spray by Nasal route., Disp: , Rfl:     fluticasone-umeclidin-vilanter (TRELEGY ELLIPTA) 200-62.5-25 mcg inhaler, Inhale 1 puff into the lungs once daily., Disp: 60 each, Rfl: 3    furosemide (LASIX) 40 MG tablet, Take 1 tablet (40 mg total) by mouth 2 (two) times daily as needed (edema)., Disp: 120 tablet, Rfl: 0    ketoconazole (NIZORAL) 2 % cream, Apply topically 2 (two) times daily., Disp: 30 g, Rfl: 0    lancets (FREESTYLE LANCETS) 28 gauge Misc, 1 lancet by Misc.(Non-Drug; Combo Route) route 3 (three) times daily., Disp: 300 each, Rfl: 3    lancets Misc, To check BG 3 times daily, to use with insurance preferred meter, Disp: 200 each, Rfl: 0    lancing device Misc, 1 Device by Misc.(Non-Drug; Combo Route) route 2 (two) times daily with meals., Disp: 100 each, Rfl: 0    metFORMIN (GLUCOPHAGE) 500 MG tablet, Take 1 tablet (500 mg total) by mouth 2 (two) times daily with meals., Disp: 180 tablet, Rfl: 0    methylPREDNISolone (MEDROL DOSEPACK) 4 mg tablet, use as directed, Disp: 21 each, Rfl: 0    metOLazone (ZAROXOLYN) 2.5 MG tablet, Take 1 tablet (2.5 mg total) by mouth daily as needed (edema)., Disp: 4 tablet, Rfl: 0    montelukast (SINGULAIR) 10 mg tablet, TAKE 1 TABLET BY MOUTH ONCE DAILY IN THE EVENING, Disp: 90 tablet, Rfl: 3    nitroGLYCERIN (NITROSTAT) 0.4 MG SL tablet, DISSOLVE ONE TABLET UNDER THE TONGUE EVERY 5 MINUTES AS NEEDED FOR CHEST PAIN.  DO NOT EXCEED A TOTAL OF 3 DOSES IN 15 MINUTES, Disp: 25 tablet, Rfl: 0    omega-3 fatty acids-vitamin E 1,000 mg Cap, Take 1 capsule by mouth once daily. Three times a day, Disp: , Rfl:     predniSONE (DELTASONE) 20 MG tablet, Take one daily for 3 days and may repeat for shortness of breath., Disp: 21 tablet, Rfl: 0    solifenacin (VESICARE) 10 MG tablet, Take 1 tablet (10 mg total) by mouth once daily., Disp: 90 tablet, Rfl:  3    sotaloL (BETAPACE) 80 MG tablet, Take 0.5 tablets (40 mg total) by mouth 2 (two) times daily. for 7 days, Disp: 7 tablet, Rfl: 0    tamsulosin (FLOMAX) 0.4 mg Cap, Take 2 capsules (0.8 mg total) by mouth once daily., Disp: 180 capsule, Rfl: 3    triamcinolone acetonide 0.1% (KENALOG) 0.1 % cream, Apply topically 2 (two) times daily. for 14 days, Disp: 80 g, Rfl: 0    TURMERIC ORAL, Take 1 tablet by mouth once daily., Disp: , Rfl:   No current facility-administered medications for this visit.    Facility-Administered Medications Ordered in Other Visits:     lidocaine (PF) 10 mg/ml (1%) injection 10 mg, 1 mL, Intradermal, Once, Deandra Diaz MD    Past Medical History:   Diagnosis Date    Acute hypoxemic respiratory failure 1/1/2021    Angina pectoris     Arthritis     Asthma     Atrial fibrillation     Back pain     Cardiac pacemaker     Cataract     Chronic bronchitis     COPD (chronic obstructive pulmonary disease)     Coronary artery disease     COVID-19     Diabetic retinopathy associated with controlled type 2 diabetes mellitus 2/18/2021    DM (diabetes mellitus), type 2, 2000 12/12/2014    Gout     Hepatic cirrhosis, unspecified hepatic cirrhosis type, unspecified whether ascites present 1/23/2022    Hoarseness of voice     Hyperlipidemia     Hypertension     Kidney stones 12/17/2012    Nephrolithiasis     Obesity     Pneumonia due to COVID-19 virus 1/1/2021    Renal manifestation of secondary diabetes mellitus     Rheumatoid factor positive 03/08/2017    Negative work up with Dr. Choudhury    Sleep apnea     Thyroid disease     Unspecified disorder of kidney and ureter     Urinary incontinence     Vocal cord polyp        Past Surgical History:   Procedure Laterality Date    APPENDECTOMY      CARDIAC PACEMAKER PLACEMENT  2018    COLONOSCOPY N/A 3/3/2021    Procedure: COLONOSCOPY;  Surgeon: Jesus Soliman MD;  [facility]: [facility];  Service: Endoscopy;  Laterality: N/A;    CORONARY STENT PLACEMENT   2018    EPIDURAL STEROID INJECTION INTO LUMBAR SPINE N/A 7/16/2024    Procedure: Injection-steroid-epidural-lumbar L4/5;  Surgeon: Tavares Nguyen MD;  Location: [facility] OR;  Service: Pain Management;  Laterality: N/A;  L4/5    EYE SURGERY      left eye secondary to trauma    FRACTURE SURGERY      right arm    KNEE SURGERY      screw    MICROLARYNGOSCOPY WITH BIOPSY OF VOCAL CORD N/A 10/13/2020    Procedure: MICROLARYNGOSCOPY, WITH VOCAL CORD BIOPSY;  Surgeon: Deandra Diaz MD;  Location: [facility] OR;  Service: ENT;  Laterality: N/A;    TONSILLECTOMY, ADENOIDECTOMY         Family History   Problem Relation Name Age of Onset    Thyroid disease Other nephew     Cancer Mother      Hypertension Mother      Osteoarthritis Mother      Heart disease Father      Hypertension Father      Cancer Paternal Uncle          lung    Hypertension Sister      Hypertension Brother      Cancer Brother          colon?    Diabetes Maternal Aunt      Cancer Brother          pancreatic    Kidney disease Daughter      Stroke Daughter      No Known Problems Son      No Known Problems Daughter      Collagen disease Neg Hx      Amblyopia Neg Hx      Blindness Neg Hx      Cataracts Neg Hx      Glaucoma Neg Hx      Macular degeneration Neg Hx      Retinal detachment Neg Hx      Strabismus Neg Hx         Social History     Socioeconomic History    Marital status:      Spouse name: Halie    Number of children: 3    Years of education: 8    Highest education level: 8th grade   Occupational History    Occupation: Retired   Tobacco Use    Smoking status: Former     Types: Cigars    Smokeless tobacco: Current     Types: Chew    Tobacco comments:     smoked a few cigars in his 20s   Substance and Sexual Activity    Alcohol use: Yes     Comment: a few beers during holidays    Drug use: No    Sexual activity: Not Currently     Partners: Female     Social Drivers of Health     Financial Resource Strain: Low Risk  (6/14/2024)    Overall Financial  "Resource Strain (CARDIA)     Difficulty of Paying Living Expenses: Not hard at all   Food Insecurity: No Food Insecurity (6/14/2024)    Hunger Vital Sign     Worried About Running Out of Food in the Last Year: Never true     Ran Out of Food in the Last Year: Never true   Transportation Needs: No Transportation Needs (6/14/2024)    PRAPARE - Transportation     Lack of Transportation (Medical): No     Lack of Transportation (Non-Medical): No   Physical Activity: Insufficiently Active (6/14/2024)    Exercise Vital Sign     Days of Exercise per Week: 7 days     Minutes of Exercise per Session: 10 min   Stress: No Stress Concern Present (1/24/2022)    [employer] - Occupational Stress Questionnaire     Feeling of Stress : Not at all   Housing Stability: Low Risk  (6/14/2024)    Housing Stability Vital Sign     Unable to Pay for Housing in the Last Year: No     Homeless in the Last Year: No       Review of patient's allergies indicates:   Allergen Reactions    No known drug allergies        Review of Systems:  12 point review of systems is negative.    Physical Exam:  Vitals:    10/02/24 1026   BP: 114/66   Pulse: (!) 127   Weight: 109.3 kg (241 lb)   Height: 5' 11" (1.803 m)   PainSc:   6   PainLoc: Hip       Body mass index is 33.61 kg/m².    Gen: NAD  Psych: mood appropriate for given condition  HEENT: eyes anicteric   CV: RRR, 2+ radial pulse  HEENT: anicteric   Respiratory: non-labored, no signs of respiratory distress  Abd: non-distended  Skin: warm, dry and intact.  Gait:  Presents in wheelchair    Pain with axial lumbar back extension    Sensory:  Intact and symmetrical to light touch in L1-S1 dermatomes bilaterally.    Motor:       Right Left   L2/3 Iliacus Hip flexion  5  4+   L3/4 Qudratus Femoris Knee Extension  5  5   L4/5 Tib Anterior Ankle Dorsiflexion   5  5   L5/S1 Extensor Hallicus Longus Great toe extension  5  5   S1/S2 Gastroc/Soleus Plantar Flexion  5  5     Imaging:  MRI " lumbar 12/6/21  FINDINGS:  This report assumes that there are 5 non-rib bearing lumbar vertebral bodies with the L5 vertebral body articulating with the sacrum. Accurate numbering of the spine levels would require imaging of the thoracolumbar spine to count ribs.     The prevertebral soft tissues are normal. There is approximately 7 degrees levoscoliosis of lumbar spine. Individual vertebral height is maintained. Marrow signal is within normal limits. Conus is normal in caliber and signal. The conus terminates at L1     At T12-L1 there is moderate disc height loss with a small broad-based posterior disc bulge. There is no facet arthropathy or ligamentum flavum hypertrophy. There is minimal spinal canal/neural foraminal stenosis.     At L1-2, there is moderate disc height loss with a tiny broad-based posterior disc bulge. There is mild bilateral facet arthropathy with ligamentum flavum hypertrophy. There is very mild spinal canal/neural foraminal stenosis.     At L2-3, the disc shows moderate disc height loss with a small broad-based posterior disc bulge study more eccentric to the neural foramina. There is moderate facet arthropathy with ligament flavum hypertrophy. This results in moderate to severe right neural foraminal stenosis, mild to moderate left neural foraminal stenosis and minimal spinal canal stenosis.     At L3-4, shows mild disc height loss with a moderate-sized broad-based posterior disc bulge. There is a small superimposed left neural foraminal hyperintensity zone/annular fissure images (sagittal image 15). There is moderate facet arthropathy and moderate ligamentum flavum hypertrophy. These findings in combination result in severe spinal canal stenosis with crowding of the nerve root centrally. There is moderate bilateral neural foraminal stenosis as well. There is some waviness of the nerve roots at this level suggestive of nerve root impingement (and less likely arachnoiditis)     At L4-5, the  disc shows mild disc height loss with a moderate-sized broad-based posterior disc bulge. There appears to be a tiny superimposed left neural foraminal high intensity zone/annular fissure images (sagittal image 15). There is moderate bilateral facet arthropathy with mild ligamentum flavum hypertrophy. This results in severe left neural foraminal stenosis, moderate right neural foraminal stenosis and mild spinal canal stenosis.     At L5-S1, the disc shows moderate disc height loss with a moderate-sized broad-based posterior disc bulge/disc osteophyte complex which is more eccentric to the neural foramina. There is moderate facet arthropathy and mild ligamentum flavum hypertrophy. These findings in combination result in moderate to severe bilateral neural foraminal stenosis and mild spinal canal stenosis. The facet osteophytes appear to impinge upon the left greater than right lateral recess and possibly upon the traversing S1 nerve roots in the lateral recesses.     The SI joints and visualized pelvis are within normal limits.    Labs:  Lab Results   Component Value Date    HGBA1C 6.6 (H) 06/25/2024       Lab Results   Component Value Date    WBC 7.33 06/25/2024    HGB 12.1 (L) 06/25/2024    HCT 38.6 (L) 06/25/2024    MCV 91 06/25/2024     06/25/2024         Assessment:   Problem List Items Addressed This Visit    None  Visit Diagnoses       Spinal stenosis of lumbar region with neurogenic claudication    -  Primary    Dorsalgia        Relevant Medications    methylPREDNISolone (MEDROL DOSEPACK) 4 mg tablet    Lumbar radiculopathy        Lumbar spondylosis        Pain of soft tissue of extremity        Myofascial pain                    79 y.o. year old male with PMH HTN, AFib, CAD, CKD, COPD who presents with back pain.  He has been dealing with this pain for the last couple years and has been gradually worsening.  Today he reports his pain is 3/10, intermittent, aching.  The pain radiates down the bilateral  buttocks and bilateral hips to the thighs.  His pain is worse with standing and walking and can be relieved with rest.  He also does not feel much discomfort when he is lying in bed and sleeping.  Can feel like his legs feel a little weak but denies any numbness.    10/2/2024: Lenny Lopez returns to the office for follow up.  Today he is reporting the return of his lower back pain, 0-6/10, worsened with standing and walking, nearly resolved with rest.  Pain is in his lower back with radiation into bilateral hips, no further radiating pain.  No numbness, ash weakness or any new changes to  his bowel bladder function.  He recently fell in his having worsening pain in right leg and right arm, but feels like pain as result of a fall and nothing severe.      - I reviewed his lumbar MRI in clinic today and is consistent with multilevel bilateral facet arthropathy.  He also has severe central canal narrowing at L3-4  - the pain in his right leg and right arm is likely soft tissue pain from the fall, I have little suspicion there is any acute changes to his bony anatomy.  Discussed this we will continue to improve with time.  He will continue to take Tylenol for this.  I have also prescribed a oral Medrol Dosepak for any inflammatory component.  - he continues to describe neurogenic claudication  - discussed his bilateral hip pain is likely continuing to be from his central canal stenosis at L3/4.  He found unknown relief with epidural at L4/5, we discussed transforaminal injection at L3/4.  He is going to consider this and reach out to us in the future if he would like to proceed.  He does not want to pursue any surgical interventions, next step could potentially be a spinal cord stimulator.  - he was prescribed some hydrocodone 5/325 mg from his primary care for severe pain, he does find relief with this when standing and he denies any ill side effects or adverse events.  Discussed this is a good alternative for  pain relief when he is active.  We discussed  Risks and adverse events with these medications.  He verbalized understanding.  - at this time he will follow up as needed, in the future if interested can proceed with bilateral transforaminal RICKY at L3/4.        : Not applicable      This note was completed with dictation software and grammatical errors may exist.    The total time spent for evaluation and management on 10/02/2024 including reviewing separately obtained history, performing a medically appropriate exam and evaluation, documenting clinical information in the health record, independently interpreting results and communicating them to the patient/family/caregiver, and ordering medications/tests/procedures was between 40-54 minutes.

## 2024-10-09 DIAGNOSIS — J44.1 COPD EXACERBATION: ICD-10-CM

## 2024-10-09 DIAGNOSIS — J45.50 SEVERE PERSISTENT ASTHMA WITHOUT COMPLICATION: Primary | ICD-10-CM

## 2024-10-09 RX ORDER — IPRATROPIUM BROMIDE AND ALBUTEROL SULFATE 2.5; .5 MG/3ML; MG/3ML
3 SOLUTION RESPIRATORY (INHALATION) EVERY 4 HOURS PRN
Qty: 50 EACH | Refills: 1 | Status: SHIPPED | OUTPATIENT
Start: 2024-10-09 | End: 2024-10-09 | Stop reason: SDUPTHER

## 2024-10-09 RX ORDER — ALBUTEROL SULFATE 90 UG/1
INHALANT RESPIRATORY (INHALATION)
Qty: 18 G | Refills: 11 | Status: SHIPPED | OUTPATIENT
Start: 2024-10-09

## 2024-10-09 RX ORDER — IPRATROPIUM BROMIDE AND ALBUTEROL SULFATE 2.5; .5 MG/3ML; MG/3ML
3 SOLUTION RESPIRATORY (INHALATION) EVERY 4 HOURS PRN
Qty: 50 EACH | Refills: 1 | Status: SHIPPED | OUTPATIENT
Start: 2024-10-09

## 2024-10-09 NOTE — TELEPHONE ENCOUNTER
----- Message from Rupali sent at 10/9/2024  1:32 PM CDT -----  Type:  Needs Medical Advice    Who Called: pts grandson and care giver Best    Symptoms (please be specific):  na    How long has patient had these symptoms:  na    Pharmacy name and phone #:  [facility]   [address]   [place] [address]   phone # [phone]    Would the patient rather a call back or a response via [facility]? CALL BACK    Best Call Back Number: [phone]      Additional Information: Pt rx for albuterol-ipratropium (DUO-NEB) 2.5 mg-0.5 mg/3 mL nebulizer solution [facility] [place] and [facility] [place] [facility] above      Please call Back to advise. Thanks!

## 2024-11-19 DIAGNOSIS — E11.29 TYPE 2 DIABETES MELLITUS WITH MICROALBUMINURIA, WITHOUT LONG-TERM CURRENT USE OF INSULIN: ICD-10-CM

## 2024-11-19 DIAGNOSIS — R80.9 TYPE 2 DIABETES MELLITUS WITH MICROALBUMINURIA, WITHOUT LONG-TERM CURRENT USE OF INSULIN: ICD-10-CM

## 2024-11-19 RX ORDER — METFORMIN HYDROCHLORIDE 500 MG/1
500 TABLET ORAL 2 TIMES DAILY WITH MEALS
Qty: 180 TABLET | Refills: 0 | Status: SHIPPED | OUTPATIENT
Start: 2024-11-19

## 2024-11-19 NOTE — TELEPHONE ENCOUNTER
----- Message from Iris sent at 11/19/2024 10:55 AM CST -----  Contact: pt  Type:  RX Refill Request    Who Called:   pt  Refill or New Rx:   refill  RX Name and Strength:   metFORMIN (GLUCOPHAGE) 500 MG tablet   How is the patient currently taking it? (ex. 1XDay):   as ordered  Is this a 30 day or 90 day RX:  90  Preferred Pharmacy with phone number:    [facility] - [place] - [address] [facility]  [address]  [place] [address]  Phone: [phone] Fax: [phone]    [id number] - [place], [place] - [address] [address]  Phone: [phone] Fax: [phone]      Local or Mail Order:  local  Ordering Provider:  anne Mccloud Call Back Number:  [phone]    Additional Information:

## 2024-12-26 ENCOUNTER — LAB VISIT (OUTPATIENT)
Dept: LAB | Facility: HOSPITAL | Age: 79
End: 2024-12-26
Attending: SPECIALIST
Payer: MEDICARE

## 2024-12-26 DIAGNOSIS — E78.00 PURE HYPERCHOLESTEROLEMIA: Primary | ICD-10-CM

## 2024-12-26 DIAGNOSIS — R60.0 BILATERAL LEG EDEMA: ICD-10-CM

## 2024-12-26 DIAGNOSIS — I48.0 PAF (PAROXYSMAL ATRIAL FIBRILLATION): ICD-10-CM

## 2024-12-26 LAB
ALBUMIN SERPL BCP-MCNC: 3.2 G/DL (ref 3.5–5.2)
ALP SERPL-CCNC: 108 U/L (ref 40–150)
ALT SERPL W/O P-5'-P-CCNC: 28 U/L (ref 10–44)
ANION GAP SERPL CALC-SCNC: 11 MMOL/L (ref 8–16)
AST SERPL-CCNC: 27 U/L (ref 10–40)
BASOPHILS # BLD AUTO: 0.01 K/UL (ref 0–0.2)
BASOPHILS NFR BLD: 0.1 % (ref 0–1.9)
BILIRUB SERPL-MCNC: 0.7 MG/DL (ref 0.1–1)
BUN SERPL-MCNC: 37 MG/DL (ref 8–23)
CALCIUM SERPL-MCNC: 9.7 MG/DL (ref 8.7–10.5)
CHLORIDE SERPL-SCNC: 99 MMOL/L (ref 95–110)
CHOLEST SERPL-MCNC: 109 MG/DL (ref 120–199)
CHOLEST/HDLC SERPL: 3.9 {RATIO} (ref 2–5)
CO2 SERPL-SCNC: 27 MMOL/L (ref 23–29)
CREAT SERPL-MCNC: 1.5 MG/DL (ref 0.5–1.4)
DIFFERENTIAL METHOD BLD: ABNORMAL
EOSINOPHIL # BLD AUTO: 0 K/UL (ref 0–0.5)
EOSINOPHIL NFR BLD: 0 % (ref 0–8)
ERYTHROCYTE [DISTWIDTH] IN BLOOD BY AUTOMATED COUNT: 14.5 % (ref 11.5–14.5)
EST. GFR  (NO RACE VARIABLE): 46.5 ML/MIN/1.73 M^2
GLUCOSE SERPL-MCNC: 171 MG/DL (ref 70–110)
HCT VFR BLD AUTO: 34.3 % (ref 40–54)
HDLC SERPL-MCNC: 28 MG/DL (ref 40–75)
HDLC SERPL: 25.7 % (ref 20–50)
HGB BLD-MCNC: 10.9 G/DL (ref 14–18)
IMM GRANULOCYTES # BLD AUTO: 0.09 K/UL (ref 0–0.04)
IMM GRANULOCYTES NFR BLD AUTO: 1.1 % (ref 0–0.5)
LDLC SERPL CALC-MCNC: 59 MG/DL (ref 63–159)
LYMPHOCYTES # BLD AUTO: 1.5 K/UL (ref 1–4.8)
LYMPHOCYTES NFR BLD: 18.4 % (ref 18–48)
MAGNESIUM SERPL-MCNC: 1.5 MG/DL (ref 1.6–2.6)
MCH RBC QN AUTO: 30.6 PG (ref 27–31)
MCHC RBC AUTO-ENTMCNC: 31.8 G/DL (ref 32–36)
MCV RBC AUTO: 96 FL (ref 82–98)
MONOCYTES # BLD AUTO: 0.8 K/UL (ref 0.3–1)
MONOCYTES NFR BLD: 10.1 % (ref 4–15)
NEUTROPHILS # BLD AUTO: 5.7 K/UL (ref 1.8–7.7)
NEUTROPHILS NFR BLD: 70.3 % (ref 38–73)
NONHDLC SERPL-MCNC: 81 MG/DL
NRBC BLD-RTO: 0 /100 WBC
PLATELET # BLD AUTO: 118 K/UL (ref 150–450)
PMV BLD AUTO: 13 FL (ref 9.2–12.9)
POTASSIUM SERPL-SCNC: 4.1 MMOL/L (ref 3.5–5.1)
PROT SERPL-MCNC: 6.3 G/DL (ref 6–8.4)
RBC # BLD AUTO: 3.56 M/UL (ref 4.6–6.2)
SODIUM SERPL-SCNC: 137 MMOL/L (ref 136–145)
T4 FREE SERPL-MCNC: 0.93 NG/DL (ref 0.71–1.51)
TRIGL SERPL-MCNC: 110 MG/DL (ref 30–150)
TSH SERPL DL<=0.005 MIU/L-ACNC: 5.46 UIU/ML (ref 0.4–4)
WBC # BLD AUTO: 8.1 K/UL (ref 3.9–12.7)

## 2024-12-26 PROCEDURE — 85025 COMPLETE CBC W/AUTO DIFF WBC: CPT | Performed by: SPECIALIST

## 2024-12-26 PROCEDURE — 84439 ASSAY OF FREE THYROXINE: CPT | Performed by: SPECIALIST

## 2024-12-26 PROCEDURE — 36415 COLL VENOUS BLD VENIPUNCTURE: CPT | Mod: PO | Performed by: SPECIALIST

## 2024-12-26 PROCEDURE — 80053 COMPREHEN METABOLIC PANEL: CPT | Performed by: SPECIALIST

## 2024-12-26 PROCEDURE — 84443 ASSAY THYROID STIM HORMONE: CPT | Performed by: SPECIALIST

## 2024-12-26 PROCEDURE — 80061 LIPID PANEL: CPT | Performed by: SPECIALIST

## 2024-12-26 PROCEDURE — 83735 ASSAY OF MAGNESIUM: CPT | Performed by: SPECIALIST

## 2024-12-26 NOTE — TELEPHONE ENCOUNTER
----- Message from Erika sent at 12/26/2024  9:59 AM CST -----  Regarding: refill  Contact: pt  Type:  RX Refill Request    Who Called:  patient   Refill or New Rx:  refill  RX Name and Strength:  furosemide (LASIX) 40 MG tablet  How is the patient currently taking it? (ex. 1XDay):    Is this a 30 day or 90 day RX:    Preferred Pharmacy with phone number:      [id number]  [place] - [address]  [place] [address]  Phone: [phone] Fax: [phone]        Local or Mail Order:    Ordering Provider:    Rogers Call Back Number:  [phone]    Additional Information:  call once sent

## 2024-12-26 NOTE — TELEPHONE ENCOUNTER
Pt requesting RX refill Furosemide 40 mg. Last seen 06/26/24, provider requested 6 month f/u, appt on 01/15/25 @ [id number] with Dr. Allan.

## 2024-12-27 RX ORDER — FUROSEMIDE 40 MG/1
40 TABLET ORAL 2 TIMES DAILY PRN
Qty: 120 TABLET | Refills: 0 | Status: SHIPPED | OUTPATIENT
Start: 2024-12-27

## 2025-01-15 ENCOUNTER — OFFICE VISIT (OUTPATIENT)
Dept: PULMONOLOGY | Facility: CLINIC | Age: 80
End: 2025-01-15
Payer: MEDICARE

## 2025-01-15 VITALS
SYSTOLIC BLOOD PRESSURE: 136 MMHG | WEIGHT: 244.19 LBS | HEART RATE: 64 BPM | HEIGHT: 71 IN | DIASTOLIC BLOOD PRESSURE: 68 MMHG | OXYGEN SATURATION: 97 % | BODY MASS INDEX: 34.19 KG/M2

## 2025-01-15 DIAGNOSIS — M53.86 DECREASED RANGE OF MOTION OF LUMBAR SPINE: ICD-10-CM
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
## 2025-01-15 DIAGNOSIS — G47.33 OSA ON CPAP: ICD-10-CM

## 2025-01-15 DIAGNOSIS — J45.50 SEVERE PERSISTENT ASTHMA WITHOUT COMPLICATION: Primary | ICD-10-CM

## 2025-01-15 PROCEDURE — 1159F MED LIST DOCD IN RCRD: CPT | Mod: CPTII,S$GLB,, | Performed by: INTERNAL MEDICINE

## 2025-01-15 PROCEDURE — 3288F FALL RISK ASSESSMENT DOCD: CPT | Mod: CPTII,S$GLB,, | Performed by: INTERNAL MEDICINE

## 2025-01-15 PROCEDURE — 99999 PR PBB SHADOW E&M-EST. PATIENT-LVL III: CPT | Mod: PBBFAC,,, | Performed by: INTERNAL MEDICINE

## 2025-01-15 PROCEDURE — 3078F DIAST BP <80 MM HG: CPT | Mod: CPTII,S$GLB,, | Performed by: INTERNAL MEDICINE

## 2025-01-15 PROCEDURE — 3072F LOW RISK FOR RETINOPATHY: CPT | Mod: CPTII,S$GLB,, | Performed by: INTERNAL MEDICINE

## 2025-01-15 PROCEDURE — 1100F PTFALLS ASSESS-DOCD GE2>/YR: CPT | Mod: CPTII,S$GLB,, | Performed by: INTERNAL MEDICINE

## 2025-01-15 PROCEDURE — 3075F SYST BP GE 130 - 139MM HG: CPT | Mod: CPTII,S$GLB,, | Performed by: INTERNAL MEDICINE

## 2025-01-15 PROCEDURE — 99214 OFFICE O/P EST MOD 30 MIN: CPT | Mod: S$GLB,,, | Performed by: INTERNAL MEDICINE

## 2025-01-15 RX ORDER — MUPIROCIN 20 MG/G
OINTMENT TOPICAL 2 TIMES DAILY
COMMUNITY
Start: 2024-10-20

## 2025-01-15 RX ORDER — OSELTAMIVIR PHOSPHATE 75 MG/1
75 CAPSULE ORAL 2 TIMES DAILY
Qty: 10 CAPSULE | Refills: 0 | Status: SHIPPED | OUTPATIENT
Start: 2025-01-15 | End: 2025-01-20
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
## 2025-01-15 NOTE — PATIENT INSTRUCTIONS
Use of cpap tape may help nasal breathing ?    Op site covering nasal wound may decrease shear force.    Need physical therapy and roller walker    Asthma doing well.

## 2025-01-17 ENCOUNTER — CLINICAL SUPPORT (OUTPATIENT)
Dept: REHABILITATION | Facility: HOSPITAL | Age: 80
End: 2025-01-17
Attending: INTERNAL MEDICINE
Payer: MEDICARE
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
## 2025-01-17 DIAGNOSIS — M53.86 DECREASED RANGE OF MOTION OF LUMBAR SPINE: ICD-10-CM

## 2025-01-17 PROCEDURE — 97112 NEUROMUSCULAR REEDUCATION: CPT | Mod: PO

## 2025-01-17 PROCEDURE — 97161 PT EVAL LOW COMPLEX 20 MIN: CPT | Mod: PO
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
## 2025-01-25 ENCOUNTER — TELEPHONE (OUTPATIENT)
Dept: PULMONOLOGY | Facility: CLINIC | Age: 80
End: 2025-01-25
Payer: MEDICARE

## 2025-01-29 ENCOUNTER — CLINICAL SUPPORT (OUTPATIENT)
Dept: REHABILITATION | Facility: HOSPITAL | Age: 80
End: 2025-01-29
Payer: MEDICARE

## 2025-01-29 DIAGNOSIS — M54.50 LOW BACK PAIN, UNSPECIFIED BACK PAIN LATERALITY, UNSPECIFIED CHRONICITY, UNSPECIFIED WHETHER SCIATICA PRESENT: Primary | ICD-10-CM

## 2025-01-29 PROCEDURE — 97110 THERAPEUTIC EXERCISES: CPT | Mod: PO

## 2025-01-29 PROCEDURE — 97112 NEUROMUSCULAR REEDUCATION: CPT | Mod: PO

## 2025-01-29 NOTE — PROGRESS NOTES
[facility]   Physical Therapy Treatment Note      Name: Lenny Lopez  Clinic Number: [id number]    Therapy Diagnosis:   Encounter Diagnosis   Name Primary?    Low back pain, unspecified back pain laterality, unspecified chronicity, unspecified whether sciatica present Yes     Physician: Nathaniel Allan MD    Visit Date: 1/29/2025    [copy and paste header from eval here including time in/out and billable time]  Physician Orders: PT Eval and Treat   Medical Diagnosis from Referral: Decreased ROM of lumbar spine.  Evaluation Date: 1/17/2025  Authorization Period Expiration: 12/31/25  Plan of Care Expiration: 3/28/25  Progress Note Due: 2/16/25  Date of Surgery: NA  Visit # / Visits authorized: 1/ 20   FOTO: 1/ 3  35/100.     Precautions: Diabetes, Fall, and pacemaker      Time In: 0958  Time Out: 1044  Total Billable Time: 43 minutes     PTA Visit #: 0/5       Subjective     Patient reports: significant back pain today.  He was compliant with home exercise program.  Response to previous treatment: first visit following eval.  Functional change: no    Pain: 8-9/10  Location: bilateral back      Objective      Objective Measures updated at progress report unless specified.     Treatment     Lenny received the treatments listed below:      therapeutic exercises to develop strength, endurance, ROM, flexibility, posture, and core stabilization for 8 minutes including:  Nustep 8' L1.    manual therapy techniques: dry needling were applied to the:  for 0 minutes, including:      neuromuscular re-education activities to improve: Balance, Coordination, Kinesthetic, Sense, Proprioception, and Posture for 35 minutes. The following activities were included:  // bars;  Mini squats 30  Marching 20/20  HR/TR 20    Mat;  LTR 30 w/ball  DKTC 30 w/ball  Clams; YTB 35.  Hip ADD w/ball 35  SLR; LT/RT 3X10  Bridging 3x10    therapeutic activities to improve functional performance for 0  minutes,  including:      gait training to improve functional mobility and safety for 0  minutes, including:      direct contact modalities after being cleared for contraindications:     supervised modalities after being cleared for contradictions: TENS:  Lenny received TENS electrical stimulation for pain to the . Pt received continuous mode at a rate of 2 pps for 0 minutes. Lenny tolerated treatment well without any adverse effects.     Patient Education and Home Exercises       Education provided:   - Gait pattern ed    Written Home Exercises Provided:  to be provided .   Assessment     Poor endurance, requires frequent rest periods.SOBOE.  Unable to tolerate full session today d/t fatigue.    Lenny Is progressing well towards his goals.   Patient prognosis is Fair.     Patient will continue to benefit from skilled outpatient physical therapy to address the deficits listed in the problem list box on initial evaluation, provide pt/family education and to maximize pt's level of independence in the home and community environment.     Patient's spiritual, cultural and educational needs considered and pt agreeable to plan of care and goals.     Anticipated barriers to physical therapy: no    Goals:   SHORT TERM GOALS:  3 weeks  Progress Date met   Recent signs and systems trend is improving in order to progress towards Long term goals.  [] Met  [] Not Met  [x] Progressing     Patient will be independent with Home Exercise Program  in order to further progress and return to maximal function. [] Met  [] Not Met  [x] Progressing     Pain rating at Worst: 5 /10 in order to progress towards increased independence with activity. [] Met  [] Not Met  [x] Progressing     Patient will be able to correct postural deviations in sitting and standing, to decrease pain and promote postural awareness for injury prevention.  [] Met  [] Not Met  [x] Progressing     Patient will improve functional outcome (FOTO) score: by 5% to increase  self-worth & perceived functional ability towards long term goals [] Met  [] Not Met  [x] Progressing        LONG TERM GOALS: 6 weeks  Progress Date met   Patient will return to normal activites of daily living, recreational, and work related activities with less pain and limitation.  [] Met  [] Not Met  [] Progressing     Patient will improve range of motion  to stated goals in order to return to maximal functional potential. ROM of l/spine WNL/WFL. [] Met  [] Not Met  [] Progressing     Patient will improve Strength to stated goals of appropriate musculature in order to improve functional independence. Strength of l/spine 5/5 in all planes. [] Met  [] Not Met  [] Progressing     Pain Rating at Best: 1/10 to improve Quality of Life.  [] Met  [] Not Met  [] Progressing     Patient will meet predicted functional outcome (FOTO) score: 55% to increase self-worth & perceived functional ability. [] Met  [] Not Met  [] Progressing     Patient will have met/partially met personal goal of: ambulate without pain. [] Met  [] Not Met  [] Progressing         Plan     Progress as able.    Rommel Morales, PT

## 2025-02-05 ENCOUNTER — CLINICAL SUPPORT (OUTPATIENT)
Dept: REHABILITATION | Facility: HOSPITAL | Age: 80
End: 2025-02-05
Payer: MEDICARE

## 2025-02-05 DIAGNOSIS — E11.29 TYPE 2 DIABETES MELLITUS WITH MICROALBUMINURIA, WITHOUT LONG-TERM CURRENT USE OF INSULIN: Primary | ICD-10-CM

## 2025-02-05 DIAGNOSIS — M54.50 LOW BACK PAIN, UNSPECIFIED BACK PAIN LATERALITY, UNSPECIFIED CHRONICITY, UNSPECIFIED WHETHER SCIATICA PRESENT: Primary | ICD-10-CM

## 2025-02-05 DIAGNOSIS — R80.9 TYPE 2 DIABETES MELLITUS WITH MICROALBUMINURIA, WITHOUT LONG-TERM CURRENT USE OF INSULIN: Primary | ICD-10-CM

## 2025-02-05 PROCEDURE — 97110 THERAPEUTIC EXERCISES: CPT | Mod: PO

## 2025-02-05 PROCEDURE — 97112 NEUROMUSCULAR REEDUCATION: CPT | Mod: PO

## 2025-02-05 RX ORDER — METOLAZONE 2.5 MG/1
2.5 TABLET ORAL DAILY PRN
Qty: 4 TABLET | Refills: 0 | Status: CANCELLED | OUTPATIENT
Start: 2025-02-05

## 2025-02-05 RX ORDER — METOLAZONE 2.5 MG/1
2.5 TABLET ORAL DAILY PRN
Qty: 4 TABLET | Refills: 1 | Status: SHIPPED | OUTPATIENT
Start: 2025-02-05

## 2025-02-05 NOTE — TELEPHONE ENCOUNTER
----- Message from Glenis sent at 2/5/2025 12:12 PM CST -----  Regarding: Refill  Type:  RX Refill Request    Who Called: Pt Son    Refill or New Rx:Refill    RX Name and Strength:metOLazone (ZAROXOLYN) 2.5 MG tablet and atorvastatin (LIPITOR) 80 MG tablet     Preferred Pharmacy with phone number:      [id number] - [place], [place] - [address] [address]  Phone: [phone] Fax: [phone]      Local or Mail Order:Local    Ordering Provider:.    Would the patient rather a call back or a response via [facility]? Call    Best Call Back Number:[phone]    Additional Information: Pt is requesting refill. Thanks

## 2025-02-05 NOTE — PROGRESS NOTES
[facility]   Physical Therapy Treatment Note      Name: Lenny Lopez  Clinic Number: [id number]    Therapy Diagnosis:   Encounter Diagnosis   Name Primary?    Low back pain, unspecified back pain laterality, unspecified chronicity, unspecified whether sciatica present Yes     Physician: Nathaniel Allan MD    Visit Date: 2/5/2025    Physician Orders: PT Eval and Treat   Medical Diagnosis from Referral: Decreased ROM of lumbar spine.  Evaluation Date: 1/17/2025  Authorization Period Expiration: 12/31/25  Plan of Care Expiration: 3/28/25  Progress Note Due: 2/16/25  Date of Surgery: NA  Visit # / Visits authorized: 2/ 20   FOTO: 1/ 3  35/100.     Precautions: Diabetes, Fall, and pacemaker      Time In: 1300  Time Out: 1353  Total Billable Time: 53 minutes     PTA Visit #: 0/5       Subjective     Patient reports: significant back pain today.  He was compliant with home exercise program.  Response to previous treatment: first visit following eval.  Functional change: no    Pain: 5-6/10  Location: bilateral back.knees.      Objective      Objective Measures updated at progress report unless specified.     Treatment     Lenny received the treatments listed below:      therapeutic exercises to develop strength, endurance, ROM, flexibility, posture, and core stabilization for 12 minutes including:  Nustep 12' L1.    manual therapy techniques: dry needling were applied to the:  for 0 minutes, including:      neuromuscular re-education activities to improve: Balance, Coordination, Kinesthetic, Sense, Proprioception, and Posture for 41 minutes. The following activities were included:  UBE 5/5'  // bars;  Mini squats 30  Marching 20/20  HR/TR 30    Mat;  LTR 30 w/ball  DKTC 35 w/ball  Gait with cane 2 x 120'.  Clams; YTB 35.NP  Hip ADD w/ball 35 NP  SLR; LT/RT 3X10 NP  Bridging 3x10 NP    therapeutic activities to improve functional performance for 0  minutes, including:      gait training to  improve functional mobility and safety for 0  minutes, including:      direct contact modalities after being cleared for contraindications:     supervised modalities after being cleared for contradictions: TENS:  Lenny received TENS electrical stimulation for pain to the . Pt received continuous mode at a rate of 2 pps for 0 minutes. Lenny tolerated treatment well without any adverse effects.     Patient Education and Home Exercises       Education provided:   - Gait pattern ed    Written Home Exercises Provided:  to be provided .   Assessment     Poor endurance, requires frequent rest periods.SOBOE.  Unable to tolerate full session today d/t fatigue.    Lenny Is progressing well towards his goals.   Patient prognosis is Fair.     Patient will continue to benefit from skilled outpatient physical therapy to address the deficits listed in the problem list box on initial evaluation, provide pt/family education and to maximize pt's level of independence in the home and community environment.     Patient's spiritual, cultural and educational needs considered and pt agreeable to plan of care and goals.     Anticipated barriers to physical therapy: no    Goals:   SHORT TERM GOALS:  3 weeks  Progress Date met   Recent signs and systems trend is improving in order to progress towards Long term goals.  [] Met  [] Not Met  [x] Progressing     Patient will be independent with Home Exercise Program  in order to further progress and return to maximal function. [] Met  [] Not Met  [x] Progressing     Pain rating at Worst: 5 /10 in order to progress towards increased independence with activity. [] Met  [] Not Met  [x] Progressing     Patient will be able to correct postural deviations in sitting and standing, to decrease pain and promote postural awareness for injury prevention.  [] Met  [] Not Met  [x] Progressing     Patient will improve functional outcome (FOTO) score: by 5% to increase self-worth & perceived functional ability  towards long term goals [] Met  [] Not Met  [x] Progressing        LONG TERM GOALS: 6 weeks  Progress Date met   Patient will return to normal activites of daily living, recreational, and work related activities with less pain and limitation.  [] Met  [] Not Met  [] Progressing     Patient will improve range of motion  to stated goals in order to return to maximal functional potential. ROM of l/spine WNL/WFL. [] Met  [] Not Met  [] Progressing     Patient will improve Strength to stated goals of appropriate musculature in order to improve functional independence. Strength of l/spine 5/5 in all planes. [] Met  [] Not Met  [] Progressing     Pain Rating at Best: 1/10 to improve Quality of Life.  [] Met  [] Not Met  [] Progressing     Patient will meet predicted functional outcome (FOTO) score: 55% to increase self-worth & perceived functional ability. [] Met  [] Not Met  [] Progressing     Patient will have met/partially met personal goal of: ambulate without pain. [] Met  [] Not Met  [] Progressing         Plan     Progress as able.    Rommel Morales, PT

## 2025-02-05 NOTE — TELEPHONE ENCOUNTER
----- Message from Glenis sent at 2/5/2025 12:12 PM CST -----  Regarding: Refill  Type:  RX Refill Request    Who Called: Pt Son    Refill or New Rx:Refill    RX Name and Strength:metOLazone (ZAROXOLYN) 2.5 MG tablet and atorvastatin (LIPITOR) 80 MG tablet     Preferred Pharmacy with phone number:      [id number] - [place], [place] - [address] [address]  Phone: [phone] Fax: [phone]      Local or Mail Order:Local    Ordering Provider:.    Would the patient rather a call back or a response via [facility]? Call    Best Call Back Number:[phone]    Additional Information: Pt is requesting refill. Thanks

## 2025-02-06 RX ORDER — ATORVASTATIN CALCIUM 80 MG/1
80 TABLET, FILM COATED ORAL DAILY
Qty: 90 TABLET | Refills: 3 | Status: SHIPPED | OUTPATIENT
Start: 2025-02-06

## 2025-02-06 NOTE — PROGRESS NOTES
Contacted number listed for pt. Pt grandson answered. Sts pt is sleeping and will give us a call back later

## 2025-02-06 NOTE — PROGRESS NOTES
Zaroxolyn has been refilled by Dr. Allan.  Atorvastatin has been sent to pharmacy.   coordination.  Patient demonstrated weakness of core and LE stabilization strength.  The patient seems very motivated and should improve with PT and HEP compliance.    Functional Impairments:    []Noted lumbar/proximal hip hypomobility  []Noted lumbosacral and/or generalized hypermobility  [x]Decreased core/proximal hip strength and neuromuscular control   [x]Decreased LE functional strength  []pelvic/sacral/spinal malalignment   []Increased pain with penetration  [x]Absent/poor control of PF contraction   []Absent/poor control of PF relaxation  [x]Decreased control of bladder  [x]Decreased control of bowel    Functional Activity Limitations (from functional questionnaire and intake)  [x]Reduced ability to maintain good posture and demonstrate good body mechanics with sitting, bending, and lifting  [x]Reduced ability to perform lifting, reaching, carrying tasks  [x]Reduced ability to control urine  [x]Reduced ability to control bowel movements   []Reduced ability to ambulate prolonged functional periods/distances/surfaces  []Reduced ability to squat   []Reduced ability to forward bend  []Reduced ability to ascend/descend stairs  []Reduced ability to tolerate penetration/intercourse    Participation Restrictions  [x]Reduced participation in self care activities  [x]Reduced participation in home management activities  [x]Reduced participation in work activities  [x]Reduced participation in social activities  [x]Reduced participation in sport/recreational activities    Classification/Subgrouping:  [x]signs/symptoms consistent vaginismus/dyspareunia    []signs/symptoms consistent with pelvic floor organ prolapse  [x]signs/symptoms consistent with stress urinary incontinence  [x]signs/symptoms consistent with urge urinary incontinence  []signs/symptoms consistent with bowel incontinence  [x]signs/symptoms consistent with decreased ROM, strength and function  []signs/symptoms consistent with other:       Prognosis/Rehab

## 2025-02-11 ENCOUNTER — CLINICAL SUPPORT (OUTPATIENT)
Dept: REHABILITATION | Facility: HOSPITAL | Age: 80
End: 2025-02-11
Payer: MEDICARE

## 2025-02-11 DIAGNOSIS — M54.50 LOW BACK PAIN, UNSPECIFIED BACK PAIN LATERALITY, UNSPECIFIED CHRONICITY, UNSPECIFIED WHETHER SCIATICA PRESENT: Primary | ICD-10-CM

## 2025-02-11 PROCEDURE — 97110 THERAPEUTIC EXERCISES: CPT | Mod: PO

## 2025-02-11 PROCEDURE — 97112 NEUROMUSCULAR REEDUCATION: CPT | Mod: PO

## 2025-02-11 NOTE — PROGRESS NOTES
[facility]   Physical Therapy Treatment Note      Name: Lenny Lopez  [facility] Number: [id number]    Therapy Diagnosis:   Encounter Diagnosis   Name Primary?    Low back pain, unspecified back pain laterality, unspecified chronicity, unspecified whether sciatica present Yes     Physician: Nathaniel Allan MD    Visit Date: 2/11/2025    Physician Orders: PT Eval and Treat   Medical Diagnosis from Referral: Decreased ROM of lumbar spine.  Evaluation Date: 1/17/2025  Authorization Period Expiration: 12/31/25  Plan of Care Expiration: 3/28/25  Progress Note Due: 2/16/25  Date of Surgery: NA  Visit # / Visits authorized: 2/ 20   FOTO: 1/ 3  35/100.     Precautions: Diabetes, Fall, and pacemaker      Time In: 1054  Time Out: 1147  Total Billable Time: 53 minutes     PTA Visit #: 0/5       Subjective     Patient reports: hip pain.  He was compliant with home exercise program.  Response to previous treatment: positive  Functional change: gait improving    Pain: 5/10  Location: bilateral hips RT>LT.    Objective      Objective Measures updated at progress report unless specified.     Treatment     Lenny received the treatments listed below:      therapeutic exercises to develop strength, endurance, ROM, flexibility, posture, and core stabilization for 12 minutes including:  Nustep 12' L1.    manual therapy techniques: dry needling were applied to the:  for 0 minutes, including:    neuromuscular re-education activities to improve: Balance, Coordination, Kinesthetic, Sense, Proprioception, and Posture for 41 minutes. The following activities were included:  UBE 5/5'  // bars;  Mini squats 30  Marching 20/20  HR/TR 30    Mat;  LTR 30 w/ball  DKTC 35 w/ball  Gait with cane 2 x 120'.  Clams; YTB 35.  Hip ADD w/ball 35   SLR; LT/RT 3X10 NP  Bridging 3x10 NP    therapeutic activities to improve functional performance for 0  minutes, including:      gait training to improve functional mobility and safety  for 0  minutes, including:      direct contact modalities after being cleared for contraindications:     supervised modalities after being cleared for contradictions: TENS:  Lenny received TENS electrical stimulation for pain to the . Pt received continuous mode at a rate of 2 pps for 0 minutes. Lenny tolerated treatment well without any adverse effects.     Patient Education and Home Exercises       Education provided:   - Gait pattern ed    Written Home Exercises Provided:  to be provided .   Assessment     Poor endurance, requires frequent rest periods.SOBOE.  Unable to tolerate full session today d/t fatigue.    Lenny Is progressing well towards his goals.   Patient prognosis is Fair.     Patient will continue to benefit from skilled outpatient physical therapy to address the deficits listed in the problem list box on initial evaluation, provide pt/family education and to maximize pt's level of independence in the home and community environment.     Patient's spiritual, cultural and educational needs considered and pt agreeable to plan of care and goals.     Anticipated barriers to physical therapy: no    Goals:   SHORT TERM GOALS:  3 weeks  Progress Date met   Recent signs and systems trend is improving in order to progress towards Long term goals.  [] Met  [] Not Met  [x] Progressing     Patient will be independent with Home Exercise Program  in order to further progress and return to maximal function. [] Met  [] Not Met  [x] Progressing     Pain rating at Worst: 5 /10 in order to progress towards increased independence with activity. [] Met  [] Not Met  [x] Progressing     Patient will be able to correct postural deviations in sitting and standing, to decrease pain and promote postural awareness for injury prevention.  [] Met  [] Not Met  [x] Progressing     Patient will improve functional outcome (FOTO) score: by 5% to increase self-worth & perceived functional ability towards long term goals [] Met  [] Not  Met  [x] Progressing        LONG TERM GOALS: 6 weeks  Progress Date met   Patient will return to normal activites of daily living, recreational, and work related activities with less pain and limitation.  [] Met  [] Not Met  [x] Progressing     Patient will improve range of motion  to stated goals in order to return to maximal functional potential. ROM of l/spine WNL/WFL. [] Met  [] Not Met  [x] Progressing     Patient will improve Strength to stated goals of appropriate musculature in order to improve functional independence. Strength of l/spine 5/5 in all planes. [] Met  [] Not Met  [x] Progressing     Pain Rating at Best: 1/10 to improve Quality of Life.  [] Met  [] Not Met  [x] Progressing     Patient will meet predicted functional outcome (FOTO) score: 55% to increase self-worth & perceived functional ability. [] Met  [] Not Met  [x] Progressing     Patient will have met/partially met personal goal of: ambulate without pain. [] Met  [] Not Met  [x] Progressing         Plan     Progress as able.    Rommel Morales, PT

## 2025-02-18 ENCOUNTER — CLINICAL SUPPORT (OUTPATIENT)
Dept: REHABILITATION | Facility: HOSPITAL | Age: 80
End: 2025-02-18
Payer: MEDICARE

## 2025-02-18 DIAGNOSIS — M54.50 LOW BACK PAIN, UNSPECIFIED BACK PAIN LATERALITY, UNSPECIFIED CHRONICITY, UNSPECIFIED WHETHER SCIATICA PRESENT: Primary | ICD-10-CM

## 2025-02-18 PROCEDURE — 97110 THERAPEUTIC EXERCISES: CPT | Mod: PO

## 2025-02-18 PROCEDURE — 97112 NEUROMUSCULAR REEDUCATION: CPT | Mod: PO

## 2025-02-18 NOTE — PROGRESS NOTES
[facility]   Physical Therapy Treatment Note      Name: Lenny Lopez  Clinic Number: [id number]    Therapy Diagnosis:   No diagnosis found.    Physician: Nathaniel Allan MD    Visit Date: 2/18/2025    Physician Orders: PT Eval and Treat   Medical Diagnosis from Referral: Decreased ROM of lumbar spine.  Evaluation Date: 1/17/2025  Authorization Period Expiration: 12/31/25  Plan of Care Expiration: 3/28/25  Progress Note Due: 2/16/25  Date of Surgery: NA  Visit # / Visits authorized: 2/ 20   FOTO: 1/ 3  35/100.     Precautions: Diabetes, Fall, and pacemaker      Time In: 1050  Time Out: 1143  Total Billable Time: 53 minutes     PTA Visit #: 0/5       Subjective     Patient reports: hip pain.  He was compliant with home exercise program.  Response to previous treatment: positive  Functional change: gait improving    Pain: 5/10  Location: bilateral hips RT>LT.    Objective      Objective Measures updated at progress report unless specified.     Treatment     Lenny received the treatments listed below:      therapeutic exercises to develop strength, endurance, ROM, flexibility, posture, and core stabilization for 12 minutes including:  Nustep 12' L1.    manual therapy techniques: dry needling were applied to the:  for 0 minutes, including:    neuromuscular re-education activities to improve: Balance, Coordination, Kinesthetic, Sense, Proprioception, and Posture for 41 minutes. The following activities were included:  UBE 5/5'  // bars;  Mini squats 30  Marching 20/20  HR/TR 30    Mat;  LTR 30 w/ball  DKTC 35 w/ball  Gait with cane 2 x 120'.  Clams; YTB 35.  Hip ADD w/ball 35   SLR; LT/RT 3X10 NP  Bridging 3x10 NP    therapeutic activities to improve functional performance for 0  minutes, including:      gait training to improve functional mobility and safety for 0  minutes, including:      direct contact modalities after being cleared for contraindications:     supervised modalities after  being cleared for contradictions: TENS:  Lenny received TENS electrical stimulation for pain to the . Pt received continuous mode at a rate of 2 pps for 0 minutes. Lenny tolerated treatment well without any adverse effects.     Patient Education and Home Exercises       Education provided:   - Gait pattern ed    Written Home Exercises Provided:  to be provided .   Assessment     Poor endurance, requires frequent rest periods.SOBOE.  Unable to tolerate full session today d/t fatigue.    Lenny Is progressing well towards his goals.   Patient prognosis is Fair.     Patient will continue to benefit from skilled outpatient physical therapy to address the deficits listed in the problem list box on initial evaluation, provide pt/family education and to maximize pt's level of independence in the home and community environment.     Patient's spiritual, cultural and educational needs considered and pt agreeable to plan of care and goals.     Anticipated barriers to physical therapy: no    Goals:   SHORT TERM GOALS:  3 weeks  Progress Date met   Recent signs and systems trend is improving in order to progress towards Long term goals.  [] Met  [] Not Met  [x] Progressing     Patient will be independent with Home Exercise Program  in order to further progress and return to maximal function. [] Met  [] Not Met  [x] Progressing     Pain rating at Worst: 5 /10 in order to progress towards increased independence with activity. [] Met  [] Not Met  [x] Progressing     Patient will be able to correct postural deviations in sitting and standing, to decrease pain and promote postural awareness for injury prevention.  [] Met  [] Not Met  [x] Progressing     Patient will improve functional outcome (FOTO) score: by 5% to increase self-worth & perceived functional ability towards long term goals [] Met  [] Not Met  [x] Progressing        LONG TERM GOALS: 6 weeks  Progress Date met   Patient will return to normal activites of daily living,  recreational, and work related activities with less pain and limitation.  [] Met  [] Not Met  [x] Progressing     Patient will improve range of motion  to stated goals in order to return to maximal functional potential. ROM of l/spine WNL/WFL. [] Met  [] Not Met  [x] Progressing     Patient will improve Strength to stated goals of appropriate musculature in order to improve functional independence. Strength of l/spine 5/5 in all planes. [] Met  [] Not Met  [x] Progressing     Pain Rating at Best: 1/10 to improve Quality of Life.  [] Met  [] Not Met  [x] Progressing     Patient will meet predicted functional outcome (FOTO) score: 55% to increase self-worth & perceived functional ability. [] Met  [] Not Met  [x] Progressing     Patient will have met/partially met personal goal of: ambulate without pain. [] Met  [] Not Met  [x] Progressing         Plan     Progress as able.    Rommel Morales, PT

## 2025-02-24 DIAGNOSIS — N40.0 PROSTATISM: ICD-10-CM

## 2025-02-24 DIAGNOSIS — N39.41 URGENCY INCONTINENCE: ICD-10-CM

## 2025-02-24 RX ORDER — TAMSULOSIN HYDROCHLORIDE 0.4 MG/1
2 CAPSULE ORAL
Qty: 180 CAPSULE | Refills: 0 | OUTPATIENT
Start: 2025-02-24

## 2025-02-25 ENCOUNTER — TELEPHONE (OUTPATIENT)
Dept: UROLOGY | Facility: CLINIC | Age: 80
End: 2025-02-25
Payer: MEDICARE

## 2025-02-25 DIAGNOSIS — N39.41 URGENCY INCONTINENCE: ICD-10-CM

## 2025-02-25 DIAGNOSIS — N40.0 PROSTATISM: ICD-10-CM

## 2025-02-25 RX ORDER — TAMSULOSIN HYDROCHLORIDE 0.4 MG/1
0.8 CAPSULE ORAL DAILY
Qty: 180 CAPSULE | Refills: 3 | Status: SHIPPED | OUTPATIENT
Start: 2025-02-25 | End: 2026-02-25

## 2025-02-25 NOTE — TELEPHONE ENCOUNTER
----- Message from Med Assistant Gallagher sent at 2/24/2025  3:11 PM CST -----  Contact: Anh [phone]    ----- Message -----  From: Lenora Rodrigues  Sent: 2/24/2025  12:33 PM CST  To: Kennedy LITTLE Staff    Type:  RX Refill RequestWho Called:  Pts son AnhRefill or New Rx:  refillRX Name and Strength:  tamsulosin (FLOMAX) 0.4 mg Cap How is the patient currently taking it? (ex. 1XDay):  1xday -2 caps Is this a 30 day or 90 day RX:  90Preferred Pharmacy with phone number:  [id number] - [place] - [address] [place] [address]Kkfrb: [phone] Fax: [phone] or Mail Order:  Local Ordering Provider:  Kennedy Mccloud Call Back Number:  [phone] Information: No need to call back. Thank you

## 2025-02-26 DIAGNOSIS — J82.83 EOSINOPHILIC ASTHMA: ICD-10-CM

## 2025-02-26 DIAGNOSIS — J45.50 SEVERE PERSISTENT ASTHMA, UNCOMPLICATED: Primary | ICD-10-CM

## 2025-03-12 DIAGNOSIS — J45.909 PERSISTENT ASTHMA WITHOUT COMPLICATION, UNSPECIFIED ASTHMA SEVERITY: ICD-10-CM

## 2025-03-12 DIAGNOSIS — M19.071 PRIMARY OSTEOARTHRITIS OF RIGHT ANKLE: ICD-10-CM

## 2025-03-12 NOTE — TELEPHONE ENCOUNTER
Care Due:                  Date            Visit Type   Department     Provider  --------------------------------------------------------------------------------                                EP -                              PRIMARY  Last Visit: 01-      CARE ([facility])   None Found     Blue Shah  Next Visit: None Scheduled  None         None Found                                                            Last  Test          Frequency    Reason                     Performed    Due Date  --------------------------------------------------------------------------------    Office Visit  12 months..  acarbose, allopurinoL,     01- 01-                             atorvastatin, colchicine,                             diclofenac, metFORMIN,                             montelukast,                             nitroGLYCERIN............    HBA1C.......  6 months...  acarbose, metFORMIN......  06- 12-    [facility] Embedded Care Due Messages. Reference number: [id number].   3/12/2025 4:38:04 PM CDT

## 2025-03-13 ENCOUNTER — PATIENT MESSAGE (OUTPATIENT)
Dept: PRIMARY CARE CLINIC | Facility: CLINIC | Age: 80
End: 2025-03-13
Payer: MEDICARE

## 2025-03-13 RX ORDER — MONTELUKAST SODIUM 10 MG/1
10 TABLET ORAL NIGHTLY
Qty: 90 TABLET | Refills: 3 | Status: SHIPPED | OUTPATIENT
Start: 2025-03-13

## 2025-03-13 RX ORDER — ALLOPURINOL 300 MG/1
450 TABLET ORAL
Qty: 135 TABLET | Refills: 0 | OUTPATIENT
Start: 2025-03-13

## 2025-03-13 NOTE — TELEPHONE ENCOUNTER
Provider Staff:  Action required for this patient    Requires appointment   Requires labs      Please see care gap opportunities below in Care Due Message.    Thanks!  [facility]     Appointments      Date Provider   Last Visit   1/9/2024 Blue Shah MD   Next Visit   3/12/2025 Blue Shah MD     Refill Decision Note   Lenny Lopez  is requesting a refill authorization.  Brief Assessment and Rationale for Refill:  Approve     Medication Therapy Plan:         Comments:     Note composed:1:42 PM 03/13/2025

## 2025-04-24 ENCOUNTER — LAB VISIT (OUTPATIENT)
Dept: LAB | Facility: HOSPITAL | Age: 80
End: 2025-04-24
Attending: SPECIALIST
Payer: MEDICARE

## 2025-04-24 DIAGNOSIS — M1A.3710 CHRONIC GOUT OF RIGHT ANKLE DUE TO RENAL IMPAIRMENT WITHOUT TOPHUS: ICD-10-CM

## 2025-04-24 DIAGNOSIS — R60.0 LOCALIZED EDEMA: Primary | ICD-10-CM

## 2025-04-24 LAB
ABSOLUTE EOSINOPHIL (OHS): 0 K/UL
ABSOLUTE MONOCYTE (OHS): 0.56 K/UL (ref 0.3–1)
ABSOLUTE NEUTROPHIL COUNT (OHS): 4.25 K/UL (ref 1.8–7.7)
ALBUMIN SERPL BCP-MCNC: 3.4 G/DL (ref 3.5–5.2)
ALP SERPL-CCNC: 116 UNIT/L (ref 40–150)
ALT SERPL W/O P-5'-P-CCNC: 23 UNIT/L (ref 10–44)
ANION GAP (OHS): 10 MMOL/L (ref 8–16)
AST SERPL-CCNC: 20 UNIT/L (ref 11–45)
BASOPHILS # BLD AUTO: 0.01 K/UL
BASOPHILS NFR BLD AUTO: 0.2 %
BILIRUB SERPL-MCNC: 0.6 MG/DL (ref 0.1–1)
BUN SERPL-MCNC: 39 MG/DL (ref 8–23)
CALCIUM SERPL-MCNC: 9.2 MG/DL (ref 8.7–10.5)
CHLORIDE SERPL-SCNC: 105 MMOL/L (ref 95–110)
CO2 SERPL-SCNC: 26 MMOL/L (ref 23–29)
CREAT SERPL-MCNC: 1.3 MG/DL (ref 0.5–1.4)
ERYTHROCYTE [DISTWIDTH] IN BLOOD BY AUTOMATED COUNT: 15.2 % (ref 11.5–14.5)
GFR SERPLBLD CREATININE-BSD FMLA CKD-EPI: 55 ML/MIN/1.73/M2
GLUCOSE SERPL-MCNC: 134 MG/DL (ref 70–110)
HCT VFR BLD AUTO: 38.4 % (ref 40–54)
HGB BLD-MCNC: 11.9 GM/DL (ref 14–18)
IMM GRANULOCYTES # BLD AUTO: 0.02 K/UL (ref 0–0.04)
IMM GRANULOCYTES NFR BLD AUTO: 0.3 % (ref 0–0.5)
LYMPHOCYTES # BLD AUTO: 1.55 K/UL (ref 1–4.8)
MCH RBC QN AUTO: 29.9 PG (ref 27–31)
MCHC RBC AUTO-ENTMCNC: 31 G/DL (ref 32–36)
MCV RBC AUTO: 97 FL (ref 82–98)
NUCLEATED RBC (/100WBC) (OHS): 0 /100 WBC
PLATELET # BLD AUTO: 133 K/UL (ref 150–450)
PMV BLD AUTO: 12.9 FL (ref 9.2–12.9)
POTASSIUM SERPL-SCNC: 4.1 MMOL/L (ref 3.5–5.1)
PROT SERPL-MCNC: 6.8 GM/DL (ref 6–8.4)
RBC # BLD AUTO: 3.98 M/UL (ref 4.6–6.2)
RELATIVE EOSINOPHIL (OHS): 0 %
RELATIVE LYMPHOCYTE (OHS): 24.3 % (ref 18–48)
RELATIVE MONOCYTE (OHS): 8.8 % (ref 4–15)
RELATIVE NEUTROPHIL (OHS): 66.4 % (ref 38–73)
SODIUM SERPL-SCNC: 141 MMOL/L (ref 136–145)
URATE SERPL-MCNC: 3.5 MG/DL (ref 3.4–7)
WBC # BLD AUTO: 6.39 K/UL (ref 3.9–12.7)

## 2025-04-24 PROCEDURE — 36415 COLL VENOUS BLD VENIPUNCTURE: CPT | Mod: PO

## 2025-04-24 PROCEDURE — 84550 ASSAY OF BLOOD/URIC ACID: CPT

## 2025-04-24 PROCEDURE — 85025 COMPLETE CBC W/AUTO DIFF WBC: CPT

## 2025-04-24 PROCEDURE — 80053 COMPREHEN METABOLIC PANEL: CPT

## 2025-04-29 ENCOUNTER — RESULTS FOLLOW-UP (OUTPATIENT)
Dept: PRIMARY CARE CLINIC | Facility: CLINIC | Age: 80
End: 2025-04-29

## 2025-05-07 DIAGNOSIS — R80.9 TYPE 2 DIABETES MELLITUS WITH MICROALBUMINURIA, WITHOUT LONG-TERM CURRENT USE OF INSULIN: ICD-10-CM

## 2025-05-07 DIAGNOSIS — E11.29 TYPE 2 DIABETES MELLITUS WITH MICROALBUMINURIA, WITHOUT LONG-TERM CURRENT USE OF INSULIN: ICD-10-CM

## 2025-05-07 RX ORDER — METFORMIN HYDROCHLORIDE 500 MG/1
500 TABLET ORAL 2 TIMES DAILY WITH MEALS
Qty: 180 TABLET | Refills: 0 | Status: SHIPPED | OUTPATIENT
Start: 2025-05-07

## 2025-05-07 NOTE — TELEPHONE ENCOUNTER
Refill Routing Note   Medication(s) are not appropriate for processing by [facility] for the following reason(s):        Required labs outdated  Patient not seen by provider within 15 months    [facility] action(s):  Defer               Appointments  past 12m or future 3m with PCP    Date Provider   Last Visit   3/12/2025 Blue Shah MD   Next Visit   Visit date not found Blue Shah MD   ED visits in past 90 days: 0        Note composed:3:41 PM 05/07/2025

## 2025-05-07 NOTE — TELEPHONE ENCOUNTER
No care due was identified.  [facility] Embedded Care Due Messages. Reference number: [id number].   5/07/2025 3:39:51 PM CDT

## 2025-05-16 DIAGNOSIS — J44.9 CHRONIC OBSTRUCTIVE PULMONARY DISEASE, UNSPECIFIED COPD TYPE: ICD-10-CM

## 2025-05-16 DIAGNOSIS — J44.89 ASTHMA-COPD OVERLAP SYNDROME: ICD-10-CM

## 2025-05-16 RX ORDER — FLUTICASONE FUROATE, UMECLIDINIUM BROMIDE AND VILANTEROL TRIFENATATE 200; 62.5; 25 UG/1; UG/1; UG/1
1 POWDER RESPIRATORY (INHALATION) DAILY
Qty: 60 EACH | Refills: 3 | Status: SHIPPED | OUTPATIENT
Start: 2025-05-16

## 2025-05-19 ENCOUNTER — PATIENT MESSAGE (OUTPATIENT)
Dept: FAMILY MEDICINE | Facility: CLINIC | Age: 80
End: 2025-05-19
Payer: MEDICARE

## 2025-05-21 DIAGNOSIS — I48.91 ATRIAL FIBRILLATION, UNSPECIFIED TYPE: Primary | ICD-10-CM

## 2025-05-21 RX ORDER — APIXABAN 5 MG/1
5 TABLET, FILM COATED ORAL 2 TIMES DAILY
Qty: 180 TABLET | Refills: 3 | Status: SHIPPED | OUTPATIENT
Start: 2025-05-21 | End: 2025-05-23 | Stop reason: SDUPTHER

## 2025-05-21 NOTE — TELEPHONE ENCOUNTER
Care Due:                  Date            Visit Type   Department     Provider  --------------------------------------------------------------------------------                                EP -                              PRIMARY  Last Visit: 01-      CARE ([facility])   None Found     Blue Shah  Next Visit: None Scheduled  None         None Found                                                            Last  Test          Frequency    Reason                     Performed    Due Date  --------------------------------------------------------------------------------    Office Visit  12 months..  acarbose, allopurinoL,     01- 01-                             atorvastatin, colchicine,                             diclofenac, montelukast,                             nitroGLYCERIN............    HBA1C.......  6 months...  acarbose.................  06- 12-    [facility] Embedded Care Due Messages. Reference number: [id number].   5/21/2025 3:10:55 PM CDT

## 2025-05-21 NOTE — TELEPHONE ENCOUNTER
----- Message from Galina sent at 5/21/2025  2:57 PM CDT -----  Regarding: Needs refills  Type:  RX Refill RequestWho Called:  PT SONRefill or New Rx:  REFILLRX Name and Strength:  ELOQUISHow is the patient currently taking it? (ex. 1XDay):  SEE CHARTIs this a 30 day or 90 day RX:  SEE CHARTPreferred Pharmacy with phone number:  [facility] - [place] - [address] [address]Zbcbb: [phone] Fax: [phone] or Mail Order:  LOCALOrdering Provider:  Sonya Call Back Number:  [phone] Information:  HE IS COMPLETELY OUT

## 2025-05-23 DIAGNOSIS — I48.91 ATRIAL FIBRILLATION, UNSPECIFIED TYPE: ICD-10-CM

## 2025-05-23 RX ORDER — APIXABAN 5 MG/1
5 TABLET, FILM COATED ORAL 2 TIMES DAILY
Qty: 180 TABLET | Refills: 3 | Status: SHIPPED | OUTPATIENT
Start: 2025-05-23 | End: 2026-05-23

## 2025-05-23 NOTE — TELEPHONE ENCOUNTER
No care due was identified.  [facility] Embedded Care Due Messages. Reference number: [id number].   5/23/2025 1:59:18 PM CDT

## 2025-05-23 NOTE — TELEPHONE ENCOUNTER
Iris Dutta Staff     ----- Message from Iris sent at 5/23/2025  1:21 PM CDT -----  Contact: PT SON - Mr Lopez  Type: Needs Medical Advice Who Called: PT SON - Mr Lopez   Best Call Back Number: [phone]    Additional Information: Requesting a call back regarding Rx went to incorrect pharm. Pt needs it to go to pharm below.     ELIQUIS 5 mg Tab  90 day supply     [facility] - [place] [address] [facility] [address]Ttkwa: [phone] Fax: [phone]: [phone] Fax: [phone] Advise- Thank you

## 2025-05-26 DIAGNOSIS — I48.91 ATRIAL FIBRILLATION, UNSPECIFIED TYPE: ICD-10-CM

## 2025-05-26 RX ORDER — APIXABAN 5 MG/1
5 TABLET, FILM COATED ORAL 2 TIMES DAILY
Qty: 180 TABLET | Refills: 0 | OUTPATIENT
Start: 2025-05-26 | End: 2026-05-26

## 2025-05-26 NOTE — TELEPHONE ENCOUNTER
Madie Carballo [facility] Staff     ----- Message from Madie sent at 5/24/2025 11:08 AM CDT -----  Type:  RX Refill Request  Who Called:  pt dennis Galvez    Refill or New Rx:  refill  RX Name and Strength:  ELIQUIS 5 mg Tab  How is the patient currently taking it? (ex. 1XDay):  as directed  Is this a 30 day or 90 day RX:  90    Preferred Pharmacy with phone number:  [facility] - [place], [place] [address] [facility] [address] [address]Kigjw: [phone] Fax: [phone]  Local or Mail Order:  local    Ordering Provider:  Jaguar  Would the patient rather a call back or a response via [facility]? Call  Best Call Back Number:  [phone]    Additional Information:  Script was sent to [facility] by error, pt needs it sent to [facility] in [place]. Pt will ONLY be in [place] until Monday, May 26 at noon.    Please call back to advise, thank you!

## 2025-08-04 ENCOUNTER — TELEPHONE (OUTPATIENT)
Dept: PRIMARY CARE CLINIC | Facility: CLINIC | Age: 80
End: 2025-08-04
Payer: MEDICARE

## 2025-08-04 NOTE — TELEPHONE ENCOUNTER
I spoke with Gila ( [facility]). I advised her that pt will need an appt prior to signing DME orders. Dr. Shah is currently not in clinic. No further questions states that she will have the pt f/u.       Copied from CRM #[id number]. Topic: General Inquiry - Patient Advice  >> Aug 4, 2025  2:45 PM Emili wrote:  Type:  Needs Medical Advice    Who Called: pt  Symptoms (please be specific):    How long has patient had these symptoms:    Pharmacy name and phone #:    Would the patient rather a call back or a response via [facility]? call  Best Call Back Number: [phone]    Additional Information: calling about a fax that was sent over, calling to do a follow up. Reference number [id number]   Fax number [phone]

## 2025-08-20 ENCOUNTER — TELEPHONE (OUTPATIENT)
Dept: PULMONOLOGY | Facility: CLINIC | Age: 80
End: 2025-08-20
Payer: MEDICARE

## (undated) DEVICE — SOL SOD CHLORIDE 0.9% 10ML

## (undated) DEVICE — SEE MEDLINE ITEM 88971

## (undated) DEVICE — SEE MEDLINE ITEM 157131

## (undated) DEVICE — SEE L#120831

## (undated) DEVICE — APPLICATOR CHLORAPREP CLR 10.5

## (undated) DEVICE — DRESSING TELFA STRL 4X3 LF

## (undated) DEVICE — GLOVE BIOGEL PI MICRO SZ 6

## (undated) DEVICE — GLOVE 7.5 PROTEXIS PI MICRO

## (undated) DEVICE — KIT ANTIFOG

## (undated) DEVICE — GAUZE SPONGE XRAY 4X4

## (undated) DEVICE — DRESSING SURGICAL 3/4X3/4

## (undated) DEVICE — TRAY NERVE BLOCK

## (undated) DEVICE — SEE MEDLINE ITEM 146292

## (undated) DEVICE — SYS LABEL CORRECT MED